# Patient Record
Sex: FEMALE | Race: WHITE | NOT HISPANIC OR LATINO | Employment: OTHER | URBAN - METROPOLITAN AREA
[De-identification: names, ages, dates, MRNs, and addresses within clinical notes are randomized per-mention and may not be internally consistent; named-entity substitution may affect disease eponyms.]

---

## 2018-02-23 ENCOUNTER — PATIENT OUTREACH (OUTPATIENT)
Dept: HEALTH INFORMATION MANAGEMENT | Facility: OTHER | Age: 53
End: 2018-02-23

## 2018-03-30 ENCOUNTER — OFFICE VISIT (OUTPATIENT)
Dept: MEDICAL GROUP | Facility: MEDICAL CENTER | Age: 53
End: 2018-03-30
Payer: COMMERCIAL

## 2018-03-30 ENCOUNTER — HOSPITAL ENCOUNTER (OUTPATIENT)
Facility: MEDICAL CENTER | Age: 53
End: 2018-03-30
Attending: FAMILY MEDICINE
Payer: COMMERCIAL

## 2018-03-30 VITALS
SYSTOLIC BLOOD PRESSURE: 140 MMHG | RESPIRATION RATE: 16 BRPM | OXYGEN SATURATION: 98 % | HEART RATE: 100 BPM | WEIGHT: 212 LBS | HEIGHT: 67 IN | DIASTOLIC BLOOD PRESSURE: 92 MMHG | BODY MASS INDEX: 33.27 KG/M2 | TEMPERATURE: 98 F

## 2018-03-30 DIAGNOSIS — Z13.220 SCREENING FOR HYPERLIPIDEMIA: ICD-10-CM

## 2018-03-30 DIAGNOSIS — Z13.1 SCREENING FOR DIABETES MELLITUS: ICD-10-CM

## 2018-03-30 DIAGNOSIS — Z13.29 SCREENING FOR THYROID DISORDER: ICD-10-CM

## 2018-03-30 DIAGNOSIS — Z12.4 SCREENING FOR CERVICAL CANCER: ICD-10-CM

## 2018-03-30 DIAGNOSIS — N30.01 ACUTE CYSTITIS WITH HEMATURIA: ICD-10-CM

## 2018-03-30 DIAGNOSIS — G43.009 MIGRAINE WITHOUT AURA AND WITHOUT STATUS MIGRAINOSUS, NOT INTRACTABLE: ICD-10-CM

## 2018-03-30 DIAGNOSIS — Z13.0 SCREENING FOR DEFICIENCY ANEMIA: ICD-10-CM

## 2018-03-30 DIAGNOSIS — Z01.419 WELL WOMAN EXAM WITH ROUTINE GYNECOLOGICAL EXAM: ICD-10-CM

## 2018-03-30 DIAGNOSIS — Z12.11 SCREENING FOR COLON CANCER: ICD-10-CM

## 2018-03-30 DIAGNOSIS — Z12.31 ENCOUNTER FOR SCREENING MAMMOGRAM FOR BREAST CANCER: ICD-10-CM

## 2018-03-30 DIAGNOSIS — Z13.21 ENCOUNTER FOR VITAMIN DEFICIENCY SCREENING: ICD-10-CM

## 2018-03-30 DIAGNOSIS — Z80.0 FAMILY HISTORY OF COLON CANCER IN MOTHER: ICD-10-CM

## 2018-03-30 LAB
APPEARANCE UR: NORMAL
BILIRUB UR STRIP-MCNC: NORMAL MG/DL
COLOR UR AUTO: NORMAL
GLUCOSE UR STRIP.AUTO-MCNC: NORMAL MG/DL
KETONES UR STRIP.AUTO-MCNC: NORMAL MG/DL
LEUKOCYTE ESTERASE UR QL STRIP.AUTO: NORMAL
NITRITE UR QL STRIP.AUTO: NORMAL
PH UR STRIP.AUTO: 7 [PH] (ref 5–8)
PROT UR QL STRIP: NORMAL MG/DL
RBC UR QL AUTO: NORMAL
SP GR UR STRIP.AUTO: 1.01
UROBILINOGEN UR STRIP-MCNC: NORMAL MG/DL

## 2018-03-30 PROCEDURE — 87086 URINE CULTURE/COLONY COUNT: CPT

## 2018-03-30 PROCEDURE — 81002 URINALYSIS NONAUTO W/O SCOPE: CPT | Performed by: FAMILY MEDICINE

## 2018-03-30 PROCEDURE — 81001 URINALYSIS AUTO W/SCOPE: CPT

## 2018-03-30 PROCEDURE — 88175 CYTOPATH C/V AUTO FLUID REDO: CPT

## 2018-03-30 PROCEDURE — 99396 PREV VISIT EST AGE 40-64: CPT | Performed by: FAMILY MEDICINE

## 2018-03-30 PROCEDURE — 87077 CULTURE AEROBIC IDENTIFY: CPT

## 2018-03-30 PROCEDURE — 87624 HPV HI-RISK TYP POOLED RSLT: CPT

## 2018-03-30 PROCEDURE — 87186 SC STD MICRODIL/AGAR DIL: CPT

## 2018-03-30 RX ORDER — SUMATRIPTAN 20 MG/1
1 SPRAY NASAL PRN
Qty: 9 EACH | Refills: 3 | Status: SHIPPED | OUTPATIENT
Start: 2018-03-30 | End: 2024-01-30

## 2018-03-30 RX ORDER — CEFDINIR 300 MG/1
300 CAPSULE ORAL EVERY 12 HOURS
Qty: 10 CAP | Refills: 0 | Status: SHIPPED | OUTPATIENT
Start: 2018-03-30 | End: 2018-04-04

## 2018-03-30 ASSESSMENT — PATIENT HEALTH QUESTIONNAIRE - PHQ9
CLINICAL INTERPRETATION OF PHQ2 SCORE: 4
SUM OF ALL RESPONSES TO PHQ QUESTIONS 1-9: 9
5. POOR APPETITE OR OVEREATING: 0 - NOT AT ALL

## 2018-04-02 DIAGNOSIS — Z12.4 SCREENING FOR CERVICAL CANCER: ICD-10-CM

## 2018-04-02 DIAGNOSIS — N30.01 ACUTE CYSTITIS WITH HEMATURIA: ICD-10-CM

## 2018-04-02 LAB
APPEARANCE UR: ABNORMAL
BACTERIA #/AREA URNS HPF: ABNORMAL /HPF
COLOR UR: YELLOW
CULTURE IF INDICATED INDCX: YES UA CULTURE
EPI CELLS #/AREA URNS HPF: ABNORMAL /HPF
GLUCOSE UR STRIP.AUTO-MCNC: NEGATIVE MG/DL
KETONES UR STRIP.AUTO-MCNC: NEGATIVE MG/DL
LEUKOCYTE ESTERASE UR QL STRIP.AUTO: ABNORMAL
MICRO URNS: ABNORMAL
NITRITE UR QL STRIP.AUTO: NEGATIVE
PH UR STRIP.AUTO: 7.5 [PH]
PROT UR QL STRIP: NEGATIVE MG/DL
RBC # URNS HPF: ABNORMAL /HPF
RBC UR QL AUTO: NEGATIVE
SP GR UR STRIP.AUTO: 1.01
WBC #/AREA URNS HPF: ABNORMAL /HPF

## 2018-04-02 PROCEDURE — 87086 URINE CULTURE/COLONY COUNT: CPT

## 2018-04-02 PROCEDURE — 87186 SC STD MICRODIL/AGAR DIL: CPT

## 2018-04-02 PROCEDURE — 87077 CULTURE AEROBIC IDENTIFY: CPT

## 2018-04-02 NOTE — PROGRESS NOTES
SUBJECTIVE:   Chief Complaint   Patient presents with   • Annual Exam       52 y.o. female for annual routine gynecologic exam  Acute cystitis with hematuria  Symptom onset: 7 days ago   Current symptoms: painful, urgent, frequent voids. No blood noted in urine.  Since onset symptoms are: unchanged  Treatments tried: none  Associated symptoms: negative for fever, flank pain, nausea and vomiting, vaginal discharge, pelvic pain.  History is negative for frequent UTI.           Obstetric History       T0      L1     SAB0   TAB0   Ectopic0   Molar0   Multiple0   Live Births0       History   Sexual Activity   • Sexual activity: Yes   • Partners: Male       Last Pap: 3-5 years ago  HPV testing unknown  H/O Abnormal Pap no  No significant bloating/fluid retention, pelvic pain, or dyspareunia. No vaginal discharge   She does perform regular self breast exams.  No breast tenderness, mass, nipple discharge, changes in size or contour, or abnormal cyclic discomfort.        ROS:      No abdominal pain, change in bowel habits, black or bloody stools.    No unusual headaches, no visual changes, menstrual migraines   No prolonged cough. No dyspnea or chest pain on exertion.  No depression, labile mood, anxiety ,libido changes, insomnia.  No new/concerning skin lesions,    Social History   Substance Use Topics   • Smoking status: Former Smoker     Packs/day: 0.50     Years: 25.00     Types: Cigarettes     Start date: 1994     Quit date: 2016   • Smokeless tobacco: Never Used   • Alcohol use No       Patient Active Problem List    Diagnosis Date Noted   • Hypotension 07/15/2016     Priority: Medium   • Lightheadedness 2015     Priority: Medium   • PSVT (paroxysmal supraventricular tachycardia) (CMS-[facility]) 2013     Priority: Medium   • Heart palpitations 2013     Priority: Medium   • Mixed hyperlipidemia 2013     Priority: Medium   • Essential hypertension 2013     Priority: Medium    • Depression with anxiety 07/15/2016     Priority: Low   • Vitamin d deficiency 05/03/2013     Priority: Low   • Tobacco abuse 05/03/2013     Priority: Low   • Anxiety 04/16/2013     Priority: Low   • Well woman exam with routine gynecological exam 03/30/2018   • Family history of colon cancer in mother 03/30/2018   • Heterozygous MTHFR mutation [id number] (CMS-HCC) 10/26/2016   • Elevated liver enzymes 10/26/2016   • High serum high density lipoprotein (HDL) 10/26/2016   • Macrocytosis without anemia 10/26/2016   • Acute cystitis with hematuria 09/21/2016   • Secondary adrenal insufficiency (CMS-HCC) 09/21/2016   • Migraine without aura and without status migrainosus, not intractable 09/21/2016       Past Medical History:   Diagnosis Date   • Allergy    • Anxiety    • Arrhythmia     for tachycardia   • Back pain    • Depression     Treated by Dr Karla Whatley   • Depression    • Hyperlipidemia    • Hypertension    • Indigestion    • Pain 2/24/15    right shoulder 6/10   • PSVT (paroxysmal supraventricular tachycardia) (CMS-HCC) 6/24/2013   • Psychiatric disorder     anxiety    • Tobacco abuse 5/3/2013   • URI (upper respiratory infection) 9/21/2013   • Urinary tract infection, site not specified     Recurrent UTI's   • UTI (lower urinary tract infection) 9/21/2013     Past Surgical History:   Procedure Laterality Date   • SHOULDER ARTHROSCOPY W/ ROTATOR CUFF REPAIR  3/9/2015    Performed by Gilberto Meyer M.D. at SURGERY [facility] ORS   • TRIGGER FINGER RELEASE  3/9/2015    Performed by Gilberto Meyer M.D. at SURGERY [facility] ORS   • OTHER  6/2014    broken ribs- plates & Pins right front 4-8   • SHOULDER ARTHROSCOPY  3/23/2009    Performed by GILBERTO MEYER at SURGERY [facility] ORS   • BREAST BIOPSY  7/18/08    Performed by MARY DE LA CRUZ at SURGERY SAME DAY [facility] ORS   • SHOULDER ARTHROSCOPY  2005   • GYN SURGERY  1995    tubal ligation   • OPEN REDUCTION      flail chest   • TUBAL  "LIGATION           Family History   Problem Relation Age of Onset   • Hypertension Mother    • Stroke Mother    • Hypertension Father      ? valvular heart    • Hypertension Brother    • Cancer Paternal Grandmother      breast cancer     Family Status   Relation Status   • Mother Alive   • Father Alive   • Brother Alive   • Paternal Grandmother          Current medicines (including changes today)  Current Outpatient Prescriptions   Medication Sig Dispense Refill   • cefdinir (OMNICEF) 300 MG Cap Take 1 Cap by mouth every 12 hours for 5 days. 10 Cap 0   • risperidone (RISPERDAL) 1 MG Tab every bedtime.     • alprazolam (XANAX XR) 0.5 MG XR tablet      • propafenone (RYTHMOL SR) 225 MG SR capsule Take 1 Cap by mouth 2 times a day. 60 Cap 11   • sumatriptan (IMITREX) 20 MG/ACT nasal spray Spray 1 Spray in nose as needed for Migraine. 9 Each 3   • Vortioxetine HBr 20 MG Tab Take 20 mg by mouth every day.     • omeprazole (PRILOSEC) 20 MG delayed-release capsule TAKE 1 CAPSULE ORALLY EVERY DAY IN THE MORNING ON A EMPTY STOMACH 30 Cap 10     No current facility-administered medications for this visit.            Allergies: Bactrim [sulfamethoxazole w-trimethoprim] and Macrobid [nitrofurantoin monohydrate macrocrystals]     Preventive Care:  Health Maintenance Topics with due status: Overdue       Topic Date Due    MAMMOGRAM 03/15/2014    PAP SMEAR 05/06/2016    COLONOSCOPY 06/22/2017       OBJECTIVE:   /92   Pulse 100   Temp 36.7 °C (98 °F)   Resp 16   Ht 1.702 m (5' 7\")   Wt 96.2 kg (212 lb)   SpO2 98%   BMI 33.20 kg/m²   Body mass index is 33.2 kg/m².      Gen: Well developed, well nourished in no acute distress.   Skin: Pink, warm, and dry. No rashes  Eyes: conjunctiva non-injected, sclera non-icteric. EOMs intact.   Nasal mucosa without edema nor erythema. No facial tenderness  Ears:Pinna normal. TM pearly gray.   Nose: Nares patent.  No discharge.  Oral mucous membranes pink and moist with no " lesions.  Neck: Supple, trachea midline. No adenopathy or masses in the neck or supraclavicular regions. No thyromegaly.  Lungs: Effort is normal. Clear to auscultation bilaterally with good excursion.  CV: regular rate and rhythm. No murmur.  Abdomen: soft, nontender, + BS. No HSM.  No CVAT  Ext: no edema, color normal, vascularity normal, temperature normal  Alert and oriented Eye contact is good, speech goal directed, affect calm     Breast Exam: Performed with instruction during examination. No axillary lymphadenopathy, no skin changes, no dominant masses. No nipple retraction  Pelvic Exam:  Normal external genitalia with no lesions. Normal vaginal mucosa with normal rugation and no discharge. Cervix with no visible lesions. No cervical motion tenderness. Uterus is normal sized with no masses. No adnexal tenderness or enlargement appreciated. Pap is obtained and the specimen was sent to lab  Rectal: Good tone, heme negative. No masses.    <ASSESSMENT and PLAN>  1. Well woman exam with routine gynecological exam     2. Acute cystitis with hematuria  POCT Urinalysis    Urinalysis, Culture if Indicated    cefdinir (OMNICEF) 300 MG Cap   3. Screening for colon cancer  REFERRAL TO GASTROENTEROLOGY   4. Screening for cervical cancer  THINPREP PAP WITH HPV   5. Encounter for screening mammogram for breast cancer  MA-SCREEN MAMMO W/CAD-BILAT   6. Family history of colon cancer in mother  REFERRAL TO GASTROENTEROLOGY   7. Screening for deficiency anemia  CBC WITH DIFFERENTIAL   8. Screening for diabetes mellitus  COMP METABOLIC PANEL   9. Screening for hyperlipidemia  LIPID PROFILE   10. Screening for thyroid disorder  TSH   11. Encounter for vitamin deficiency screening  VITAMIN D,25 HYDROXY       Discussed  menopause, adequate intake of calcium and vitamin D, diet and exercise     Return in about 1 year (around 3/30/2019).

## 2018-04-02 NOTE — ASSESSMENT & PLAN NOTE
Symptom onset: 7 days ago   Current symptoms: painful, urgent, frequent voids. No blood noted in urine.  Since onset symptoms are: unchanged  Treatments tried: none  Associated symptoms: negative for fever, flank pain, nausea and vomiting, vaginal discharge, pelvic pain.  History is negative for frequent UTI.

## 2018-04-03 LAB
CYTOLOGY REG CYTOL: NORMAL
HPV HR 12 DNA CVX QL NAA+PROBE: NEGATIVE
HPV16 DNA SPEC QL NAA+PROBE: NEGATIVE
HPV18 DNA SPEC QL NAA+PROBE: NEGATIVE
SPECIMEN SOURCE: NORMAL

## 2018-04-05 LAB
BACTERIA UR CULT: ABNORMAL
SIGNIFICANT IND 70042: ABNORMAL
SITE SITE: ABNORMAL
SOURCE SOURCE: ABNORMAL

## 2018-05-04 ENCOUNTER — HOSPITAL ENCOUNTER (OUTPATIENT)
Dept: RADIOLOGY | Facility: MEDICAL CENTER | Age: 53
End: 2018-05-04
Attending: FAMILY MEDICINE
Payer: COMMERCIAL

## 2018-05-04 DIAGNOSIS — R92.30 DENSE BREAST TISSUE: ICD-10-CM

## 2018-05-04 PROCEDURE — 76641 ULTRASOUND BREAST COMPLETE: CPT

## 2018-05-10 ENCOUNTER — HOSPITAL ENCOUNTER (OUTPATIENT)
Dept: LAB | Facility: MEDICAL CENTER | Age: 53
End: 2018-05-10
Attending: PSYCHIATRY & NEUROLOGY
Payer: COMMERCIAL

## 2018-05-10 ENCOUNTER — HOSPITAL ENCOUNTER (OUTPATIENT)
Dept: LAB | Facility: MEDICAL CENTER | Age: 53
End: 2018-05-10
Attending: FAMILY MEDICINE
Payer: COMMERCIAL

## 2018-05-10 ENCOUNTER — HOSPITAL ENCOUNTER (OUTPATIENT)
Dept: RADIOLOGY | Facility: MEDICAL CENTER | Age: 53
End: 2018-05-10
Attending: FAMILY MEDICINE
Payer: COMMERCIAL

## 2018-05-10 DIAGNOSIS — Z13.1 SCREENING FOR DIABETES MELLITUS: ICD-10-CM

## 2018-05-10 DIAGNOSIS — Z13.220 SCREENING FOR HYPERLIPIDEMIA: ICD-10-CM

## 2018-05-10 DIAGNOSIS — R92.8 ABNORMAL FINDING ON BREAST IMAGING: ICD-10-CM

## 2018-05-10 DIAGNOSIS — Z13.29 SCREENING FOR THYROID DISORDER: ICD-10-CM

## 2018-05-10 DIAGNOSIS — Z13.21 ENCOUNTER FOR VITAMIN DEFICIENCY SCREENING: ICD-10-CM

## 2018-05-10 DIAGNOSIS — Z13.0 SCREENING FOR DEFICIENCY ANEMIA: ICD-10-CM

## 2018-05-10 LAB
25(OH)D3 SERPL-MCNC: 4 NG/ML (ref 30–100)
ALBUMIN SERPL BCP-MCNC: 4.4 G/DL (ref 3.2–4.9)
ALBUMIN/GLOB SERPL: 1.5 G/DL
ALP SERPL-CCNC: 63 U/L (ref 30–99)
ALT SERPL-CCNC: 130 U/L (ref 2–50)
ANION GAP SERPL CALC-SCNC: 13 MMOL/L (ref 0–11.9)
AST SERPL-CCNC: 152 U/L (ref 12–45)
BASOPHILS # BLD AUTO: 0.6 % (ref 0–1.8)
BASOPHILS # BLD: 0.04 K/UL (ref 0–0.12)
BILIRUB SERPL-MCNC: 1 MG/DL (ref 0.1–1.5)
BUN SERPL-MCNC: 9 MG/DL (ref 8–22)
CALCIUM SERPL-MCNC: 10 MG/DL (ref 8.5–10.5)
CHLORIDE SERPL-SCNC: 103 MMOL/L (ref 96–112)
CHOLEST SERPL-MCNC: 216 MG/DL (ref 100–199)
CO2 SERPL-SCNC: 25 MMOL/L (ref 20–33)
CREAT SERPL-MCNC: 0.74 MG/DL (ref 0.5–1.4)
EOSINOPHIL # BLD AUTO: 0.19 K/UL (ref 0–0.51)
EOSINOPHIL NFR BLD: 2.9 % (ref 0–6.9)
ERYTHROCYTE [DISTWIDTH] IN BLOOD BY AUTOMATED COUNT: 55.7 FL (ref 35.9–50)
GLOBULIN SER CALC-MCNC: 3 G/DL (ref 1.9–3.5)
GLUCOSE SERPL-MCNC: 100 MG/DL (ref 65–99)
HCT VFR BLD AUTO: 45.6 % (ref 37–47)
HDLC SERPL-MCNC: 95 MG/DL
HGB BLD-MCNC: 15.8 G/DL (ref 12–16)
IMM GRANULOCYTES # BLD AUTO: 0.03 K/UL (ref 0–0.11)
IMM GRANULOCYTES NFR BLD AUTO: 0.5 % (ref 0–0.9)
LDLC SERPL CALC-MCNC: 107 MG/DL
LYMPHOCYTES # BLD AUTO: 1.36 K/UL (ref 1–4.8)
LYMPHOCYTES NFR BLD: 20.5 % (ref 22–41)
MCH RBC QN AUTO: 36.7 PG (ref 27–33)
MCHC RBC AUTO-ENTMCNC: 34.6 G/DL (ref 33.6–35)
MCV RBC AUTO: 105.8 FL (ref 81.4–97.8)
MONOCYTES # BLD AUTO: 0.6 K/UL (ref 0–0.85)
MONOCYTES NFR BLD AUTO: 9 % (ref 0–13.4)
NEUTROPHILS # BLD AUTO: 4.42 K/UL (ref 2–7.15)
NEUTROPHILS NFR BLD: 66.5 % (ref 44–72)
NRBC # BLD AUTO: 0 K/UL
NRBC BLD-RTO: 0 /100 WBC
PLATELET # BLD AUTO: 194 K/UL (ref 164–446)
PMV BLD AUTO: 10.8 FL (ref 9–12.9)
POTASSIUM SERPL-SCNC: 4 MMOL/L (ref 3.6–5.5)
PROT SERPL-MCNC: 7.4 G/DL (ref 6–8.2)
RBC # BLD AUTO: 4.31 M/UL (ref 4.2–5.4)
SODIUM SERPL-SCNC: 141 MMOL/L (ref 135–145)
TRIGL SERPL-MCNC: 69 MG/DL (ref 0–149)
TSH SERPL DL<=0.005 MIU/L-ACNC: 4.45 UIU/ML (ref 0.38–5.33)
WBC # BLD AUTO: 6.6 K/UL (ref 4.8–10.8)

## 2018-05-10 PROCEDURE — 80061 LIPID PANEL: CPT

## 2018-05-10 PROCEDURE — 84443 ASSAY THYROID STIM HORMONE: CPT

## 2018-05-10 PROCEDURE — 76642 ULTRASOUND BREAST LIMITED: CPT | Mod: LT

## 2018-05-10 PROCEDURE — 82306 VITAMIN D 25 HYDROXY: CPT

## 2018-05-10 PROCEDURE — 85025 COMPLETE CBC W/AUTO DIFF WBC: CPT

## 2018-05-10 PROCEDURE — 80053 COMPREHEN METABOLIC PANEL: CPT

## 2018-05-10 PROCEDURE — 36415 COLL VENOUS BLD VENIPUNCTURE: CPT

## 2018-05-14 RX ORDER — ERGOCALCIFEROL 1.25 MG/1
50000 CAPSULE ORAL
Qty: 28 CAP | Refills: 0 | Status: SHIPPED | OUTPATIENT
Start: 2018-05-14 | End: 2024-01-30

## 2018-06-18 ENCOUNTER — PATIENT OUTREACH (OUTPATIENT)
Dept: HEALTH INFORMATION MANAGEMENT | Facility: OTHER | Age: 53
End: 2018-06-18

## 2018-06-18 NOTE — PROGRESS NOTES
1. Attempt #: 1    2. [facility] Verified: yes    3. Verify PCP: yes    5.  Reviewed/Updated the following with patient:       •   Communication Preference Obtained? YES    6. MyChart Activation: already active        9. Care Gap Scheduling (Attempt to Schedule EACH Overdue Care Gap!)     There are no preventive care reminders to display for this patient.     Patient has completed Mammogram - Was completed earlier this year.

## 2018-10-17 ENCOUNTER — PATIENT OUTREACH (OUTPATIENT)
Dept: HEALTH INFORMATION MANAGEMENT | Facility: OTHER | Age: 53
End: 2018-10-17

## 2024-01-01 ENCOUNTER — APPOINTMENT (OUTPATIENT)
Dept: CARDIOLOGY | Facility: MEDICAL CENTER | Age: 59
DRG: 871 | End: 2024-01-01
Attending: HOSPITALIST
Payer: COMMERCIAL

## 2024-01-01 ENCOUNTER — APPOINTMENT (OUTPATIENT)
Dept: RADIOLOGY | Facility: MEDICAL CENTER | Age: 59
DRG: 871 | End: 2024-01-01
Attending: INTERNAL MEDICINE
Payer: COMMERCIAL

## 2024-01-01 ENCOUNTER — APPOINTMENT (OUTPATIENT)
Dept: RADIOLOGY | Facility: MEDICAL CENTER | Age: 59
DRG: 871 | End: 2024-01-01
Attending: NURSE PRACTITIONER
Payer: COMMERCIAL

## 2024-01-01 ENCOUNTER — ANESTHESIA EVENT (OUTPATIENT)
Dept: SURGERY | Facility: MEDICAL CENTER | Age: 59
DRG: 871 | End: 2024-01-01
Payer: COMMERCIAL

## 2024-01-01 ENCOUNTER — APPOINTMENT (OUTPATIENT)
Dept: RADIOLOGY | Facility: MEDICAL CENTER | Age: 59
DRG: 871 | End: 2024-01-01
Attending: HOSPITALIST
Payer: COMMERCIAL

## 2024-01-01 ENCOUNTER — ANESTHESIA (OUTPATIENT)
Dept: SURGERY | Facility: MEDICAL CENTER | Age: 59
DRG: 871 | End: 2024-01-01
Payer: COMMERCIAL

## 2024-01-01 ENCOUNTER — APPOINTMENT (OUTPATIENT)
Dept: RADIOLOGY | Facility: MEDICAL CENTER | Age: 59
DRG: 871 | End: 2024-01-01
Payer: COMMERCIAL

## 2024-01-01 ENCOUNTER — APPOINTMENT (OUTPATIENT)
Dept: CARDIOLOGY | Facility: MEDICAL CENTER | Age: 59
DRG: 871 | End: 2024-01-01
Attending: NURSE PRACTITIONER
Payer: COMMERCIAL

## 2024-01-01 ENCOUNTER — APPOINTMENT (OUTPATIENT)
Dept: RADIOLOGY | Facility: MEDICAL CENTER | Age: 59
DRG: 871 | End: 2024-01-01
Attending: EMERGENCY MEDICINE
Payer: COMMERCIAL

## 2024-01-01 ENCOUNTER — APPOINTMENT (OUTPATIENT)
Dept: NEUROLOGY | Facility: MEDICAL CENTER | Age: 59
DRG: 871 | End: 2024-01-01
Attending: EMERGENCY MEDICINE
Payer: COMMERCIAL

## 2024-01-01 PROCEDURE — 76700 US EXAM ABDOM COMPLETE: CPT

## 2024-01-01 PROCEDURE — 74018 RADEX ABDOMEN 1 VIEW: CPT

## 2024-01-01 PROCEDURE — 71045 X-RAY EXAM CHEST 1 VIEW: CPT

## 2024-01-01 PROCEDURE — 93308 TTE F-UP OR LMTD: CPT | Mod: 26 | Performed by: INTERNAL MEDICINE

## 2024-01-01 PROCEDURE — 93325 DOPPLER ECHO COLOR FLOW MAPG: CPT

## 2024-01-01 PROCEDURE — 70450 CT HEAD/BRAIN W/O DYE: CPT

## 2024-01-01 PROCEDURE — 700111 HCHG RX REV CODE 636 W/ 250 OVERRIDE (IP): Performed by: ANESTHESIOLOGY

## 2024-01-01 PROCEDURE — 74176 CT ABD & PELVIS W/O CONTRAST: CPT

## 2024-01-01 PROCEDURE — 76775 US EXAM ABDO BACK WALL LIM: CPT

## 2024-01-01 PROCEDURE — 93306 TTE W/DOPPLER COMPLETE: CPT

## 2024-01-01 PROCEDURE — 93306 TTE W/DOPPLER COMPLETE: CPT | Mod: 26 | Performed by: INTERNAL MEDICINE

## 2024-01-01 PROCEDURE — 93325 DOPPLER ECHO COLOR FLOW MAPG: CPT | Mod: 26 | Performed by: INTERNAL MEDICINE

## 2024-01-01 RX ADMIN — PROPOFOL 200 MG: 10 INJECTION, EMULSION INTRAVENOUS at 10:47

## 2024-01-30 ENCOUNTER — OFFICE VISIT (OUTPATIENT)
Dept: URGENT CARE | Facility: PHYSICIAN GROUP | Age: 59
End: 2024-01-30
Payer: COMMERCIAL

## 2024-01-30 ENCOUNTER — HOSPITAL ENCOUNTER (EMERGENCY)
Facility: MEDICAL CENTER | Age: 59
End: 2024-01-30
Attending: EMERGENCY MEDICINE
Payer: COMMERCIAL

## 2024-01-30 ENCOUNTER — APPOINTMENT (OUTPATIENT)
Dept: RADIOLOGY | Facility: IMAGING CENTER | Age: 59
End: 2024-01-30
Attending: NURSE PRACTITIONER
Payer: COMMERCIAL

## 2024-01-30 ENCOUNTER — APPOINTMENT (OUTPATIENT)
Dept: RADIOLOGY | Facility: MEDICAL CENTER | Age: 59
End: 2024-01-30
Attending: EMERGENCY MEDICINE
Payer: COMMERCIAL

## 2024-01-30 VITALS
HEART RATE: 89 BPM | RESPIRATION RATE: 20 BRPM | OXYGEN SATURATION: 92 % | SYSTOLIC BLOOD PRESSURE: 148 MMHG | TEMPERATURE: 98.2 F | WEIGHT: 182 LBS | DIASTOLIC BLOOD PRESSURE: 82 MMHG | HEIGHT: 68 IN | BODY MASS INDEX: 27.58 KG/M2

## 2024-01-30 VITALS
RESPIRATION RATE: 14 BRPM | SYSTOLIC BLOOD PRESSURE: 156 MMHG | TEMPERATURE: 98.6 F | OXYGEN SATURATION: 94 % | DIASTOLIC BLOOD PRESSURE: 102 MMHG | HEIGHT: 68 IN | WEIGHT: 183 LBS | BODY MASS INDEX: 27.74 KG/M2 | HEART RATE: 84 BPM

## 2024-01-30 DIAGNOSIS — I10 ESSENTIAL HYPERTENSION: ICD-10-CM

## 2024-01-30 DIAGNOSIS — R00.2 PALPITATIONS: ICD-10-CM

## 2024-01-30 DIAGNOSIS — R06.02 SOB (SHORTNESS OF BREATH): ICD-10-CM

## 2024-01-30 DIAGNOSIS — R07.9 CHEST PAIN, UNSPECIFIED TYPE: ICD-10-CM

## 2024-01-30 DIAGNOSIS — R05.1 ACUTE COUGH: ICD-10-CM

## 2024-01-30 DIAGNOSIS — R03.0 ELEVATED BLOOD PRESSURE READING: ICD-10-CM

## 2024-01-30 DIAGNOSIS — Z72.0 TOBACCO ABUSE: ICD-10-CM

## 2024-01-30 LAB
ALBUMIN SERPL BCP-MCNC: 3.5 G/DL (ref 3.2–4.9)
ALBUMIN/GLOB SERPL: 0.9 G/DL
ALP SERPL-CCNC: 86 U/L (ref 30–99)
ALT SERPL-CCNC: 32 U/L (ref 2–50)
ANION GAP SERPL CALC-SCNC: 11 MMOL/L (ref 7–16)
AST SERPL-CCNC: 63 U/L (ref 12–45)
BASOPHILS # BLD AUTO: 0.5 % (ref 0–1.8)
BASOPHILS # BLD: 0.05 K/UL (ref 0–0.12)
BILIRUB SERPL-MCNC: 2.2 MG/DL (ref 0.1–1.5)
BUN SERPL-MCNC: 7 MG/DL (ref 8–22)
CALCIUM ALBUM COR SERPL-MCNC: 9.6 MG/DL (ref 8.5–10.5)
CALCIUM SERPL-MCNC: 9.2 MG/DL (ref 8.5–10.5)
CHLORIDE SERPL-SCNC: 107 MMOL/L (ref 96–112)
CO2 SERPL-SCNC: 21 MMOL/L (ref 20–33)
CREAT SERPL-MCNC: 0.41 MG/DL (ref 0.5–1.4)
EKG IMPRESSION: NORMAL
EOSINOPHIL # BLD AUTO: 0.19 K/UL (ref 0–0.51)
EOSINOPHIL NFR BLD: 2 % (ref 0–6.9)
ERYTHROCYTE [DISTWIDTH] IN BLOOD BY AUTOMATED COUNT: 51.4 FL (ref 35.9–50)
FLUAV RNA SPEC QL NAA+PROBE: NEGATIVE
FLUBV RNA SPEC QL NAA+PROBE: NEGATIVE
GFR SERPLBLD CREATININE-BSD FMLA CKD-EPI: 115 ML/MIN/1.73 M 2
GLOBULIN SER CALC-MCNC: 4.1 G/DL (ref 1.9–3.5)
GLUCOSE SERPL-MCNC: 105 MG/DL (ref 65–99)
HCT VFR BLD AUTO: 45 % (ref 37–47)
HGB BLD-MCNC: 15.6 G/DL (ref 12–16)
IMM GRANULOCYTES # BLD AUTO: 0.04 K/UL (ref 0–0.11)
IMM GRANULOCYTES NFR BLD AUTO: 0.4 % (ref 0–0.9)
LYMPHOCYTES # BLD AUTO: 1.76 K/UL (ref 1–4.8)
LYMPHOCYTES NFR BLD: 18.4 % (ref 22–41)
MCH RBC QN AUTO: 34.4 PG (ref 27–33)
MCHC RBC AUTO-ENTMCNC: 34.7 G/DL (ref 32.2–35.5)
MCV RBC AUTO: 99.1 FL (ref 81.4–97.8)
MONOCYTES # BLD AUTO: 1.1 K/UL (ref 0–0.85)
MONOCYTES NFR BLD AUTO: 11.5 % (ref 0–13.4)
NEUTROPHILS # BLD AUTO: 6.45 K/UL (ref 1.82–7.42)
NEUTROPHILS NFR BLD: 67.2 % (ref 44–72)
NRBC # BLD AUTO: 0 K/UL
NRBC BLD-RTO: 0 /100 WBC (ref 0–0.2)
NT-PROBNP SERPL IA-MCNC: <36 PG/ML (ref 0–125)
PLATELET # BLD AUTO: 153 K/UL (ref 164–446)
PMV BLD AUTO: 10.8 FL (ref 9–12.9)
POTASSIUM SERPL-SCNC: 3.8 MMOL/L (ref 3.6–5.5)
PROT SERPL-MCNC: 7.6 G/DL (ref 6–8.2)
RBC # BLD AUTO: 4.54 M/UL (ref 4.2–5.4)
RSV RNA SPEC QL NAA+PROBE: NEGATIVE
SARS-COV-2 RNA RESP QL NAA+PROBE: NEGATIVE
SODIUM SERPL-SCNC: 139 MMOL/L (ref 135–145)
TROPONIN T SERPL-MCNC: 24 NG/L (ref 6–19)
WBC # BLD AUTO: 9.6 K/UL (ref 4.8–10.8)

## 2024-01-30 PROCEDURE — 93005 ELECTROCARDIOGRAM TRACING: CPT | Performed by: EMERGENCY MEDICINE

## 2024-01-30 PROCEDURE — 99205 OFFICE O/P NEW HI 60 MIN: CPT | Performed by: NURSE PRACTITIONER

## 2024-01-30 PROCEDURE — 94640 AIRWAY INHALATION TREATMENT: CPT

## 2024-01-30 PROCEDURE — 0241U POCT CEPHEID COV-2, FLU A/B, RSV - PCR: CPT | Performed by: NURSE PRACTITIONER

## 2024-01-30 PROCEDURE — 80053 COMPREHEN METABOLIC PANEL: CPT

## 2024-01-30 PROCEDURE — 84484 ASSAY OF TROPONIN QUANT: CPT

## 2024-01-30 PROCEDURE — 93000 ELECTROCARDIOGRAM COMPLETE: CPT | Performed by: NURSE PRACTITIONER

## 2024-01-30 PROCEDURE — 93005 ELECTROCARDIOGRAM TRACING: CPT

## 2024-01-30 PROCEDURE — 700101 HCHG RX REV CODE 250: Performed by: EMERGENCY MEDICINE

## 2024-01-30 PROCEDURE — 99285 EMERGENCY DEPT VISIT HI MDM: CPT

## 2024-01-30 PROCEDURE — 85025 COMPLETE CBC W/AUTO DIFF WBC: CPT

## 2024-01-30 PROCEDURE — 3080F DIAST BP >= 90 MM HG: CPT | Performed by: NURSE PRACTITIONER

## 2024-01-30 PROCEDURE — 83880 ASSAY OF NATRIURETIC PEPTIDE: CPT

## 2024-01-30 PROCEDURE — 36415 COLL VENOUS BLD VENIPUNCTURE: CPT

## 2024-01-30 PROCEDURE — 71045 X-RAY EXAM CHEST 1 VIEW: CPT

## 2024-01-30 PROCEDURE — 700102 HCHG RX REV CODE 250 W/ 637 OVERRIDE(OP): Performed by: EMERGENCY MEDICINE

## 2024-01-30 PROCEDURE — 71046 X-RAY EXAM CHEST 2 VIEWS: CPT | Mod: TC,FY | Performed by: RADIOLOGY

## 2024-01-30 PROCEDURE — 3077F SYST BP >= 140 MM HG: CPT | Performed by: NURSE PRACTITIONER

## 2024-01-30 PROCEDURE — A9270 NON-COVERED ITEM OR SERVICE: HCPCS | Performed by: EMERGENCY MEDICINE

## 2024-01-30 RX ORDER — DEXAMETHASONE 4 MG/1
4 TABLET ORAL ONCE
Status: COMPLETED | OUTPATIENT
Start: 2024-01-30 | End: 2024-01-30

## 2024-01-30 RX ORDER — IPRATROPIUM BROMIDE AND ALBUTEROL SULFATE 2.5; .5 MG/3ML; MG/3ML
3 SOLUTION RESPIRATORY (INHALATION) ONCE
Status: COMPLETED | OUTPATIENT
Start: 2024-01-30 | End: 2024-01-30

## 2024-01-30 RX ORDER — DULOXETIN HYDROCHLORIDE 60 MG/1
CAPSULE, DELAYED RELEASE ORAL
COMMUNITY
Start: 2024-01-25 | End: 2024-01-30

## 2024-01-30 RX ORDER — DOXEPIN HYDROCHLORIDE 10 MG/1
CAPSULE ORAL
COMMUNITY
Start: 2023-12-31 | End: 2024-01-30

## 2024-01-30 RX ORDER — ALPRAZOLAM 0.5 MG/1
0.5 TABLET ORAL
COMMUNITY
End: 2024-01-30

## 2024-01-30 RX ORDER — RISPERIDONE 3 MG/1
TABLET ORAL
COMMUNITY
Start: 2023-12-22 | End: 2024-01-30

## 2024-01-30 RX ADMIN — IPRATROPIUM BROMIDE AND ALBUTEROL SULFATE 3 ML: 2.5; .5 SOLUTION RESPIRATORY (INHALATION) at 15:46

## 2024-01-30 RX ADMIN — DEXAMETHASONE 4 MG: 4 TABLET ORAL at 16:41

## 2024-01-30 ASSESSMENT — FIBROSIS 4 INDEX
FIB4 SCORE: 7.56
FIB4 SCORE: 7.56

## 2024-01-30 NOTE — ED TRIAGE NOTES
"Chief Complaint   Patient presents with    Chest Pain     Pt BIB EMS from urgent care for chest pain and flu like symptoms. Pt reports a cough for a week and thinks \"the chest pain could be from coughing so much.\" Pt endorses neck pain and right sided chest pain. Pt denies any other flu like symptoms. Pt aox4.    Flu Like Symptoms     Blood Pressure: (!) 172/80, Pulse: 71, Respiration: 19, Temperature: 36.7 °C (98 °F), Height: 172.7 cm (5' 8\"), Weight: 82.6 kg (182 lb), BMI (Calculated): 27.67, BSA (Calculated): 2, Pulse Oximetry: 95 %, O2 Delivery Device: None - Room Air    Pt reports night sweats and weight loss of 15 pounds in the past two-three weeks.  "

## 2024-01-30 NOTE — ED PROVIDER NOTES
"ED Provider Note    Primary care provider: Jeni Nuno M.D.    CHIEF COMPLAINT  Chief Complaint   Patient presents with    Chest Pain     Pt BIB EMS from urgent care for chest pain and flu like symptoms. Pt reports a cough for a week and thinks \"the chest pain could be from coughing so much.\" Pt endorses neck pain and right sided chest pain. Pt denies any other flu like symptoms. Pt aox4.    Flu Like Symptoms         HPI  Kavita Freeman is a 58 y.o. female who presents to the Emergency Department for chest tightness.  The patient has had 2 weeks of a cough, 1 week of constant chest tightness.  The tightness is always there but appears worse if she is in a supine position, also when she voids, she feels that her heart rate races and the pain gets worse.  She notes it is improved with standing, not worsened with walking.  She has not had pain like this previously.  She denies any shortness of breath, she has had 1 episode of vomiting.    Does not currently take any medications.  Does smoke but has not smoked for the past 2 weeks.    External Record Review: Patient went to the [place] urgent care today for 2 weeks of chest pain, neck pain intermittently and was referred here for further evaluation.  Was seen at [facility] emergency department in July 2023 for frequent falls, dizziness.  Underwent head CT, cardiac workup that was unremarkable.  CT head showed slightly larger atrophy than expected for age.  Laboratory abnormalities were attributed to chronic alcohol use.    Was hospitalized to our facility in 2016 for hypotension, secondary adrenal insufficiency.    REVIEW OF SYSTEMS  See HPI.     PAST MEDICAL HISTORY   has a past medical history of Allergy, Anxiety, Arrhythmia, Back pain, Depression, Depression, Hyperlipidemia, Hypertension, Indigestion, Pain (2/24/15), PSVT (paroxysmal supraventricular tachycardia) (6/24/2013), Psychiatric disorder, Tobacco abuse (5/3/2013), URI (upper respiratory infection) " "(2013), Urinary tract infection, site not specified, and UTI (lower urinary tract infection) (2013).    SURGICAL HISTORY   has a past surgical history that includes breast biopsy (08); shoulder arthroscopy (3/23/2009); tubal ligation; gyn surgery (); shoulder arthroscopy (); other (2014); shoulder arthroscopy w/ rotator cuff repair (3/9/2015); trigger finger release (3/9/2015); and open reduction.    SOCIAL HISTORY  Social History     Tobacco Use    Smoking status: Former     Current packs/day: 0.00     Average packs/day: 0.5 packs/day for 25.0 years (12.5 ttl pk-yrs)     Types: Cigarettes     Start date: 1994     Quit date: 2016     Years since quittin.6    Smokeless tobacco: Never   Substance Use Topics    Alcohol use: No     Alcohol/week: 1.5 - 2.0 oz     Types: 3 - 4 Cans of beer per week    Drug use: No      Social History     Substance and Sexual Activity   Drug Use No       FAMILY HISTORY  Family History   Problem Relation Age of Onset    Hypertension Mother     Stroke Mother     Hypertension Father         ? valvular heart     Hypertension Brother     Cancer Paternal Grandmother         breast cancer       CURRENT MEDICATIONS  Reviewed.  See Encounter Summary.     ALLERGIES  Allergies   Allergen Reactions    Bactrim [Sulfamethoxazole W-Trimethoprim] Vomiting    Macrobid [Nitrofurantoin Monohydrate Macrocrystals]      vomiting       PHYSICAL EXAM  VITAL SIGNS: BP (!) 148/82   Pulse 89   Temp 36.8 °C (98.2 °F) (Temporal)   Resp 20   Ht 1.727 m (5' 8\")   Wt 82.6 kg (182 lb)   SpO2 92%   BMI 27.67 kg/m²   Constitutional: Awake, alert in no apparent distress.  HENT: Normocephalic, Bilateral external ears normal. Nose normal.   Eyes: Conjunctiva normal, non-icteric, EOMI.    Thorax & Lungs: Easy unlabored respirations, Clear to ascultation bilaterally.  Cardiovascular: Regular rate, Regular rhythm, No murmurs, rubs or gallops. Bilateral pulses symmetrical.   Abdomen:  " Soft, nontender, nondistended, normal active bowel sounds.   :    Skin: Visualized skin is  Dry, No erythema, No rash.   Musculoskeletal:   No cyanosis, clubbing or edema. No leg asymmetry.   Neurologic: Alert, Grossly non-focal.   Psychiatric: Normal affect, Normal mood  Lymphatic:      EKG   12 lead Interpreted by me  Rhythm:  Normal sinus rhythm   Rate: 66  Axis: normal  Ectopy: none  Conduction: normal  ST Segments: no acute change  T Waves: no acute change  Clinical Impression: Flat ST segments throughout, unchanged, otherwise normal EKG without acute changes       RADIOLOGY  I have independently interpreted the diagnostic imaging associated with this visit and am waiting the final reading from the radiologist.   My preliminary interpretation is as follows: No infiltrate    Radiologist interpretation:   DX-CHEST-PORTABLE (1 VIEW)   Final Result      No acute cardiopulmonary abnormality.          COURSE & MEDICAL DECISION MAKING  Pertinent Labs & Imaging studies reviewed. (See chart for details)    COURSE & MEDICAL DECISION MAKING  Pertinent Labs & Imaging studies reviewed. (See chart for details)    Differential diagnoses include but are not limited to: Bronchitis, early COPD, ACS, CHF, pericarditis/myocarditis, viral syndrome    2:11 PM - Nursing notes reviewed, patient seen and examined at bedside.    Escalation of care considered, and ultimately not performed: acute inpatient care management, however at this time, the patient is most appropriate for outpatient management.    Barriers to care at this time, including but not limited to: Patient does not have established PCP.     Decision tools and prescription drugs considered including, but not limited to: Heart score 2    Decision Making:  This is a pleasant 58 y.o. year old female who presents with 2 weeks of a cough, positional chest tightness.  The patient's EKG does not show any signs of ischemia, troponin is slightly elevated at 24 today, suspect this  is her baseline.  Given the chronicity of symptoms, do not feel that delta troponin is essential.  Seems very atypical in nature as well.  Do not suspect ACS.  At this point the patient is well-appearing, workup relatively unremarkable, feel that she can follow-up with cardiology for further testing with regards to the chest pain.  She is in agreement the plan.       The patient was discharged home (see d/c instructions) was told to return immediately for any signs or symptoms listed, or any worsening at all.  The patient verbally agreed to the discharge precautions and follow-up plan which is documented in EPIC.    Discharge Medications:  There are no discharge medications for this patient.      FINAL IMPRESSION  1. Chest pain, unspecified type

## 2024-01-30 NOTE — PROGRESS NOTES
Kavita Freeman is a 58 y.o. female who presents for Neck Pain, Cough (With chest discomfort ), and Congestion      HPI  This is a new problem. Kavita Freeman is a 58 y.o. patient who presents to urgent care with c/o: chest pain, neck pain intermittently for 2 weeks. Did not have insurance to come in until now. Sometimes gets sweaty. Pain 5/10.  Pain lasting 30 min. Pain does not radiate it is substernal. Taking theraflu prn . Generally feeling unwell.   Hx afib, tobacco abuse, and HTN. Not taking any medications at time time due to lack of insurance.     ROS See HPI    Allergies:       Allergies   Allergen Reactions    Bactrim [Sulfamethoxazole W-Trimethoprim] Vomiting    Macrobid [Nitrofurantoin Monohydrate Macrocrystals]      vomiting       PMSFS Hx:  Past Medical History:   Diagnosis Date    Allergy     Anxiety     Arrhythmia     for tachycardia    Back pain     Depression     Treated by Dr Karla Whatley    Depression     Hyperlipidemia     Hypertension     Indigestion     Pain 2/24/15    right shoulder 6/10    PSVT (paroxysmal supraventricular tachycardia) 6/24/2013    Psychiatric disorder     anxiety     Tobacco abuse 5/3/2013    URI (upper respiratory infection) 9/21/2013    Urinary tract infection, site not specified     Recurrent UTI's    UTI (lower urinary tract infection) 9/21/2013     Past Surgical History:   Procedure Laterality Date    SHOULDER ARTHROSCOPY W/ ROTATOR CUFF REPAIR  3/9/2015    Performed by Corey Terry M.D. at SURGERY [facility] ORS    TRIGGER FINGER RELEASE  3/9/2015    Performed by Corey Terry M.D. at SURGERY [facility] ORS    OTHER  6/2014    broken ribs- plates & Pins right front 4-8    SHOULDER ARTHROSCOPY  3/23/2009    Performed by COREY TERRY at SURGERY [facility] ORS    BREAST BIOPSY  7/18/08    Performed by MARY DE LA CRUZ at SURGERY SAME DAY [facility] ORS    SHOULDER ARTHROSCOPY  2005    GYN SURGERY  1995    tubal ligation    OPEN REDUCTION       flail chest    TUBAL LIGATION       Family History   Problem Relation Age of Onset    Hypertension Mother     Stroke Mother     Hypertension Father         ? valvular heart     Hypertension Brother     Cancer Paternal Grandmother         breast cancer     Social History     Tobacco Use    Smoking status: Former     Current packs/day: 0.00     Average packs/day: 0.5 packs/day for 25.0 years (12.5 ttl pk-yrs)     Types: Cigarettes     Start date: 1994     Quit date: 2016     Years since quittin.6    Smokeless tobacco: Never   Substance Use Topics    Alcohol use: No     Alcohol/week: 1.5 - 2.0 oz     Types: 3 - 4 Cans of beer per week       Problems:   Patient Active Problem List   Diagnosis    Essential hypertension    Anxiety    Vitamin d deficiency    Heart palpitations    Mixed hyperlipidemia    Tobacco abuse    PSVT (paroxysmal supraventricular tachycardia) (HCC)    Lightheadedness    Hypotension    Depression with anxiety    Acute cystitis with hematuria    Secondary adrenal insufficiency (HCC)    Migraine without aura and without status migrainosus, not intractable    Heterozygous MTHFR mutation [id number]    Elevated liver enzymes    High serum high density lipoprotein (HDL)    Macrocytosis without anemia    Well woman exam with routine gynecological exam    Family history of colon cancer in mother       Medications:   Current Outpatient Medications on File Prior to Visit   Medication Sig Dispense Refill    DOXEPIN HCL PO Take  by mouth every day. (Patient not taking: Reported on 2024)      DULoxetine (CYMBALTA) 60 MG Cap DR Particles delayed-release capsule  (Patient not taking: Reported on 2024)      doxepin (SINEQUAN) 10 MG Cap TAKE 2 CAPSULES BY MOUTH ONCE DAILY AT NIGHT (Patient not taking: Reported on 2024)      ALPRAZolam (XANAX) 0.5 MG Tab Take 0.5 mg by mouth. (Patient not taking: Reported on 2024)      risperiDONE (RISPERDAL) 3 MG Tab TAKE 1 TABLET BY MOUTH ONCE DAILY AT  "NIGHT (Patient not taking: Reported on 1/30/2024)      vitamin D, Ergocalciferol, (DRISDOL) 43720 units Cap capsule Take 1 Cap by mouth every 3 days. (Patient not taking: Reported on 1/30/2024) 28 Cap 0    sumatriptan (IMITREX) 20 MG/ACT nasal spray Spray 1 Spray in nose as needed for Migraine. (Patient not taking: Reported on 1/30/2024) 9 Each 3    Vortioxetine HBr 20 MG Tab Take 20 mg by mouth every day. (Patient not taking: Reported on 1/30/2024)      propafenone (RYTHMOL SR) 225 MG SR capsule Take 1 Cap by mouth 2 times a day. (Patient not taking: Reported on 1/30/2024) 60 Cap 11    omeprazole (PRILOSEC) 20 MG delayed-release capsule TAKE 1 CAPSULE ORALLY EVERY DAY IN THE MORNING ON A EMPTY STOMACH (Patient not taking: Reported on 1/30/2024) 30 Cap 10     No current facility-administered medications on file prior to visit.        Objective:     BP (!) 156/102   Pulse 84   Temp 37 °C (98.6 °F) (Temporal)   Resp 14   Ht 1.727 m (5' 8\")   Wt 83 kg (183 lb)   SpO2 94%   BMI 27.83 kg/m²     Physical Exam  Vitals and nursing note reviewed.   Constitutional:       General: She is not in acute distress.     Appearance: Normal appearance. She is well-developed. She is ill-appearing. She is not toxic-appearing.   HENT:      Head: Normocephalic.      Right Ear: Hearing, ear canal and external ear normal. No middle ear effusion. Tympanic membrane is injected. Tympanic membrane is not erythematous.      Left Ear: Hearing, ear canal and external ear normal.  No middle ear effusion. Tympanic membrane is injected. Tympanic membrane is not erythematous.      Nose: No mucosal edema, congestion or rhinorrhea.      Right Sinus: No maxillary sinus tenderness or frontal sinus tenderness.      Left Sinus: No maxillary sinus tenderness or frontal sinus tenderness.      Mouth/Throat:      Mouth: Mucous membranes are moist.      Pharynx: Uvula midline. No posterior oropharyngeal erythema.      Tonsils: No tonsillar abscesses. "   Neck:      Trachea: Trachea normal.   Cardiovascular:      Rate and Rhythm: Normal rate and regular rhythm.      Chest Wall: PMI is not displaced.      Pulses: Normal pulses.      Heart sounds: Normal heart sounds.   Pulmonary:      Effort: Pulmonary effort is normal. No respiratory distress.      Breath sounds: Normal breath sounds. No decreased breath sounds, wheezing, rhonchi or rales.   Chest:      Chest wall: No tenderness.   Musculoskeletal:         General: Normal range of motion.      Cervical back: Full passive range of motion without pain, normal range of motion and neck supple.   Lymphadenopathy:      Cervical: No cervical adenopathy.      Upper Body:      Right upper body: No supraclavicular adenopathy.      Left upper body: No supraclavicular adenopathy.   Skin:     General: Skin is warm and dry.      Capillary Refill: Capillary refill takes less than 2 seconds.   Neurological:      Mental Status: She is alert and oriented to person, place, and time.      Gait: Gait normal.   Psychiatric:         Mood and Affect: Mood normal.         Speech: Speech normal.         Behavior: Behavior normal. Behavior is cooperative.         Thought Content: Thought content normal.     EKG Interpretation    Rhythm: normal sinus   Rate: normal  Axis: normal  Ectopy: none  Conduction: normal  ST Segments: no acute change  T Waves: no acute change  Q Waves: none    Clinical Impression: no acute changes. No comparison EKG since 2015        RADIOLOGY RESULTS   DX-CHEST-2 VIEWS    Result Date: 1/30/2024 1/30/2024 12:30 PM HISTORY/REASON FOR EXAM:  Chest Pain. TECHNIQUE/EXAM DESCRIPTION AND NUMBER OF VIEWS: Two views of the chest. COMPARISON:  07/14/2016 FINDINGS: The heart is normal in size. No pulmonary infiltrates or consolidations are noted. No pleural effusions are appreciated. Malleable compression plates are again identified in right-sided ribs.     No active disease.         Xray: Reviewed and interpreted  independently by me. I agree with the radiologist's findings.         Assessment /Associated Orders:      1. Chest pain, unspecified type  EKG - Clinic Performed    DX-CHEST-2 VIEWS    POCT CoV-2, Flu A/B, RSV by PCR      2. Acute cough  DX-CHEST-2 VIEWS    POCT CoV-2, Flu A/B, RSV by PCR      3. Tobacco abuse        4. Essential hypertension      not curently being treated      5. Elevated blood pressure reading        6. SOB (shortness of breath)        7. Palpitations                Medical Decision Making:    Pt's clinical presentation and exam today indicate a need for higher level of care with further evaluation and/or diagnostics.   Social determinants of health - lack of insurance. Lack of PCP provider. + chronic medical problems.   [facility] was  called to arrange transfer to higher level of care in ER.  Pt is to be transported via [facility] ambulance.  I have reiterated to patient that although an Urgent Care to ER transfer was made this will not necessarily expedite the ER process        Please note that this dictation was created using voice recognition software. I have worked with consultants from the vendor as well as technical experts from [facility] to optimize the interface. I have made every reasonable attempt to correct obvious errors, but I expect that there are errors of grammar and possibly content that I did not discover before finalizing the note.  This note was electronically signed by provider

## 2024-01-31 NOTE — ED NOTES
Patient discharged per orders. IV removed -bandage applied. Wristband removed per protocol. Pt verbalized understanding of discharge instructions. Pt leaving in stable condition with all belongings.

## 2024-02-09 ENCOUNTER — APPOINTMENT (OUTPATIENT)
Dept: CARDIOLOGY | Facility: MEDICAL CENTER | Age: 59
End: 2024-02-09
Attending: EMERGENCY MEDICINE
Payer: COMMERCIAL

## 2024-02-16 ENCOUNTER — OFFICE VISIT (OUTPATIENT)
Dept: CARDIOLOGY | Facility: MEDICAL CENTER | Age: 59
End: 2024-02-16
Attending: EMERGENCY MEDICINE
Payer: COMMERCIAL

## 2024-02-16 ENCOUNTER — HOSPITAL ENCOUNTER (OUTPATIENT)
Dept: LAB | Facility: MEDICAL CENTER | Age: 59
End: 2024-02-16
Attending: NURSE PRACTITIONER
Payer: COMMERCIAL

## 2024-02-16 VITALS
DIASTOLIC BLOOD PRESSURE: 94 MMHG | HEART RATE: 86 BPM | WEIGHT: 180 LBS | HEIGHT: 68 IN | SYSTOLIC BLOOD PRESSURE: 152 MMHG | BODY MASS INDEX: 27.28 KG/M2 | OXYGEN SATURATION: 96 % | RESPIRATION RATE: 14 BRPM

## 2024-02-16 DIAGNOSIS — R07.89 OTHER CHEST PAIN: Primary | ICD-10-CM

## 2024-02-16 DIAGNOSIS — I47.10 PAROXYSMAL SUPRAVENTRICULAR TACHYCARDIA (HCC): ICD-10-CM

## 2024-02-16 DIAGNOSIS — R61 DIAPHORESIS: ICD-10-CM

## 2024-02-16 DIAGNOSIS — E78.2 MIXED HYPERLIPIDEMIA: ICD-10-CM

## 2024-02-16 DIAGNOSIS — I10 ESSENTIAL HYPERTENSION: ICD-10-CM

## 2024-02-16 PROCEDURE — 99211 OFF/OP EST MAY X REQ PHY/QHP: CPT | Performed by: NURSE PRACTITIONER

## 2024-02-16 PROCEDURE — 3077F SYST BP >= 140 MM HG: CPT | Performed by: NURSE PRACTITIONER

## 2024-02-16 PROCEDURE — 36415 COLL VENOUS BLD VENIPUNCTURE: CPT

## 2024-02-16 PROCEDURE — 80061 LIPID PANEL: CPT

## 2024-02-16 PROCEDURE — 84443 ASSAY THYROID STIM HORMONE: CPT

## 2024-02-16 PROCEDURE — 99214 OFFICE O/P EST MOD 30 MIN: CPT | Performed by: NURSE PRACTITIONER

## 2024-02-16 PROCEDURE — 99212 OFFICE O/P EST SF 10 MIN: CPT | Performed by: NURSE PRACTITIONER

## 2024-02-16 PROCEDURE — 3080F DIAST BP >= 90 MM HG: CPT | Performed by: NURSE PRACTITIONER

## 2024-02-16 PROCEDURE — 84439 ASSAY OF FREE THYROXINE: CPT

## 2024-02-16 PROCEDURE — 93005 ELECTROCARDIOGRAM TRACING: CPT | Performed by: NURSE PRACTITIONER

## 2024-02-16 RX ORDER — LOSARTAN POTASSIUM 50 MG/1
50 TABLET ORAL DAILY
Qty: 30 TABLET | Refills: 11 | Status: SHIPPED | OUTPATIENT
Start: 2024-02-16

## 2024-02-16 RX ORDER — BLOOD PRESSURE TEST KIT
1 KIT MISCELLANEOUS ONCE
Qty: 1 KIT | Refills: 0 | Status: SHIPPED | OUTPATIENT
Start: 2024-02-16 | End: 2024-02-16

## 2024-02-16 ASSESSMENT — ENCOUNTER SYMPTOMS
PND: 0
MUSCULOSKELETAL NEGATIVE: 1
DIZZINESS: 0
PALPITATIONS: 0
CONSTITUTIONAL NEGATIVE: 1
WHEEZING: 0
CLAUDICATION: 0
DEPRESSION: 0
EYES NEGATIVE: 1
BRUISES/BLEEDS EASILY: 0
NERVOUS/ANXIOUS: 0
SENSORY CHANGE: 0
GASTROINTESTINAL NEGATIVE: 1
ORTHOPNEA: 0
NEUROLOGICAL NEGATIVE: 1
NAUSEA: 0
HALLUCINATIONS: 0
SHORTNESS OF BREATH: 1

## 2024-02-16 ASSESSMENT — FIBROSIS 4 INDEX: FIB4 SCORE: 4.08

## 2024-02-16 NOTE — ASSESSMENT & PLAN NOTE
- ASCVD risk 4.8%  - nuclear stress test for perfusion assessment   - echocardiogram for functional structural/valvular assessment   - stop smoking   - start daily aspirin 81mg    - lipid panel

## 2024-02-16 NOTE — PROGRESS NOTES
Cardiology Follow up Note    DOS: 2/16/2024  [id number]  Maadam Sylvia Freeman    Chief complaint/Reason for consult: ED chest pain    HPI: Pt is a 58 y.o. female who presents to the clinic today in follow up for ED chest pain. Patient has a past medical history significant for but not limited to: no diagnosed problems pre visit. Patient is hypertensive in clinic today and was also hypertensive at ED. Patient recently had the flu and been coughing but chest pain was significant. Testing gin ED were unremarkable. Today she still states she is having chest pain and has noticed with exertion it gets worse. Some shortness of breath accompanying. She was last seen over 8 years ago here and documented mild mitral regurgitation at that time. No early family history of CAD but both parents had hypertension. Down to 3 cigarettes a day and encouraged to quit. Patient will start losartan 50mg daily for blood pressure with goal less than 130/80. Blood pressure cuff sent to pharmacy as well. Start baby aspirin prophylactically with suspicion of possible CAD. Some changes in ST intervals in inferior leads on today EKG vs ED. Order stress test and echocardiogram for assessment. Lipid panel ordered as well. Patient has also been diaphoretic a lot lately. Will order TSH to check thyroid. Patient lives >1hr away and will get her follow ups and established with Dr. Dsouza in [place].       Past Medical History:   Diagnosis Date    Allergy     Anxiety     Arrhythmia     for tachycardia    Back pain     Depression     Treated by Dr Karla Whatley    Depression     Hyperlipidemia     Hypertension     Indigestion     Pain 2/24/15    right shoulder 6/10    PSVT (paroxysmal supraventricular tachycardia) 6/24/2013    Psychiatric disorder     anxiety     Tobacco abuse 5/3/2013    URI (upper respiratory infection) 9/21/2013    Urinary tract infection, site not specified     Recurrent UTI's    UTI (lower urinary tract infection) 9/21/2013        Past Surgical History:   Procedure Laterality Date    SHOULDER ARTHROSCOPY W/ ROTATOR CUFF REPAIR  3/9/2015    Performed by Gilberto Meyer M.D. at SURGERY [facility] ORS    TRIGGER FINGER RELEASE  3/9/2015    Performed by Gilberto Meyer M.D. at SURGERY [facility] ORS    OTHER  2014    broken ribs- plates & Pins right front 4-8    SHOULDER ARTHROSCOPY  3/23/2009    Performed by GILBERTO MEYER at SURGERY [facility] ORS    BREAST BIOPSY  08    Performed by MARY DE LA CRUZ at SURGERY SAME DAY [facility] ORS    SHOULDER ARTHROSCOPY      GYN SURGERY      tubal ligation    OPEN REDUCTION      flail chest    TUBAL LIGATION         Social History     Socioeconomic History    Marital status:      Spouse name: Not on file    Number of children: Not on file    Years of education: Not on file    Highest education level: Not on file   Occupational History    Not on file   Tobacco Use    Smoking status: Every Day     Current packs/day: 0.00     Average packs/day: 0.5 packs/day for 25.0 years (12.5 ttl pk-yrs)     Types: Cigarettes     Start date: 1994     Last attempt to quit: 2016     Years since quittin.7    Smokeless tobacco: Never    Tobacco comments:     1-2 cigarettes a day   Substance and Sexual Activity    Alcohol use: No     Alcohol/week: 1.5 - 2.0 oz     Types: 3 - 4 Cans of beer per week    Drug use: No    Sexual activity: Yes     Partners: Male   Other Topics Concern    Not on file   Social History Narrative    ** Merged History Encounter **          Social Determinants of Health     Financial Resource Strain: Not on file   Food Insecurity: Not on file   Transportation Needs: Not on file   Physical Activity: Not on file   Stress: Not on file   Social Connections: Not on file   Intimate Partner Violence: Not on file   Housing Stability: Not on file       Family History   Problem Relation Age of Onset    Hypertension Mother     Stroke Mother     Hypertension Father          ? valvular heart     Hypertension Brother     Cancer Paternal Grandmother         breast cancer       Allergies   Allergen Reactions    Bactrim [Sulfamethoxazole W-Trimethoprim] Vomiting    Macrobid [Nitrofurantoin Monohydrate Macrocrystals]      vomiting       Current Outpatient Medications   Medication Sig Dispense Refill    losartan (COZAAR) 50 MG Tab Take 1 Tablet by mouth every day. 30 Tablet 11    Blood Pressure Kit 1 Each one time for 1 dose. 1 Kit 0     No current facility-administered medications for this visit.       Vitals:    02/16/24 1055   BP: (!) 152/94   Pulse: 86   Resp: 14   SpO2: 96%         Review of Systems   Constitutional: Negative.  Negative for malaise/fatigue.   HENT: Negative.     Eyes: Negative.    Respiratory:  Positive for shortness of breath. Negative for wheezing.    Cardiovascular:  Positive for chest pain. Negative for palpitations, orthopnea, claudication, leg swelling and PND.   Gastrointestinal: Negative.  Negative for nausea.   Genitourinary: Negative.    Musculoskeletal: Negative.    Skin: Negative.    Neurological: Negative.  Negative for dizziness and sensory change.   Endo/Heme/Allergies: Negative.  Does not bruise/bleed easily.   Psychiatric/Behavioral:  Negative for depression and hallucinations. The patient is not nervous/anxious.         Prior echo results reviewed: 2016 mild mitral regurgitation      EKG interpreted by me: Sinus with ST depression in inferior leads different than EKG in ED    Physical Exam  Constitutional:       Appearance: Normal appearance.   HENT:      Head: Normocephalic.   Eyes:      Pupils: Pupils are equal, round, and reactive to light.   Neck:      Vascular: No JVD.   Cardiovascular:      Rate and Rhythm: Normal rate and regular rhythm.      Pulses: Normal pulses.      Heart sounds: Normal heart sounds.   Pulmonary:      Effort: Pulmonary effort is normal.      Breath sounds: Normal breath sounds.   Abdominal:      General: Abdomen is  "flat.      Palpations: Abdomen is soft.   Musculoskeletal:      Cervical back: Normal range of motion.      Right lower leg: No edema.      Left lower leg: No edema.   Skin:     General: Skin is warm and dry.   Neurological:      Mental Status: She is alert and oriented to person, place, and time.   Psychiatric:         Mood and Affect: Mood normal.         Behavior: Behavior normal.          Data:  Lipids:   Lab Results   Component Value Date/Time    CHOLSTRLTOT 216 (H) 05/10/2018 12:00 PM    TRIGLYCERIDE 69 05/10/2018 12:00 PM    HDL 95 05/10/2018 12:00 PM     (H) 05/10/2018 12:00 PM        BMP:  Lab Results   Component Value Date/Time    SODIUM 139 01/30/2024 1458    POTASSIUM 3.8 01/30/2024 1458    CHLORIDE 107 01/30/2024 1458    CO2 21 01/30/2024 1458    GLUCOSE 105 (H) 01/30/2024 1458    BUN 7 (L) 01/30/2024 1458    CREATININE 0.41 (L) 01/30/2024 1458    CALCIUM 9.2 01/30/2024 1458    ANION 11.0 01/30/2024 1458       GFR:  Lab Results   Component Value Date/Time    IFAFRICA >60 05/10/2018 1200    IFNOTAFR >60 05/10/2018 1200        TSH:   Lab Results   Component Value Date/Time    TSHULTRASEN 4.450 05/10/2018 1200       MAGNESIUM:  Lab Results   Component Value Date/Time    MAGNESIUM 1.4 (L) 07/18/2016 0305    MAGNESIUM 1.8 11/21/2014 0830        THYROXINE (T4):   No results found for: \"FREEDIR\"     CBC:   Lab Results   Component Value Date/Time    WBC 9.6 01/30/2024 02:58 PM    RBC 4.54 01/30/2024 02:58 PM    HEMOGLOBIN 15.6 01/30/2024 02:58 PM    HEMATOCRIT 45.0 01/30/2024 02:58 PM    MCV 99.1 (H) 01/30/2024 02:58 PM    MCH 34.4 (H) 01/30/2024 02:58 PM    MCHC 34.7 01/30/2024 02:58 PM    RDW 51.4 (H) 01/30/2024 02:58 PM    PLATELETCT 153 (L) 01/30/2024 02:58 PM    MPV 10.8 01/30/2024 02:58 PM    NEUTSPOLYS 67.20 01/30/2024 02:58 PM    LYMPHOCYTES 18.40 (L) 01/30/2024 02:58 PM    MONOCYTES 11.50 01/30/2024 02:58 PM    EOSINOPHILS 2.00 01/30/2024 02:58 PM    BASOPHILS 0.50 01/30/2024 02:58 PM    IMMGRAN " "0.40 01/30/2024 02:58 PM    NRBC 0.00 01/30/2024 02:58 PM    NEUTS 6.45 01/30/2024 02:58 PM    LYMPHS 1.76 01/30/2024 02:58 PM    MONOS 1.10 (H) 01/30/2024 02:58 PM    EOS 0.19 01/30/2024 02:58 PM    BASO 0.05 01/30/2024 02:58 PM    IMMGRANAB 0.04 01/30/2024 02:58 PM    NRBCAB 0.00 01/30/2024 02:58 PM        CBC w/o DIFF  Lab Results   Component Value Date/Time    WBC 9.6 01/30/2024 02:58 PM    RBC 4.54 01/30/2024 02:58 PM    HEMOGLOBIN 15.6 01/30/2024 02:58 PM    MCV 99.1 (H) 01/30/2024 02:58 PM    MCH 34.4 (H) 01/30/2024 02:58 PM    MCHC 34.7 01/30/2024 02:58 PM    RDW 51.4 (H) 01/30/2024 02:58 PM    MPV 10.8 01/30/2024 02:58 PM       LIVER:  Lab Results   Component Value Date/Time    ALKPHOSPHAT 86 01/30/2024 02:58 PM    ASTSGOT 63 (H) 01/30/2024 02:58 PM    ALTSGPT 32 01/30/2024 02:58 PM    TBILIRUBIN 2.2 (H) 01/30/2024 02:58 PM       BNP:  No results found for: \"BNPBTYPENAT\"    PT/INR:  No results found for: \"PROTHROMBTM\", \"INR\"          Impression/Plan:  Essential hypertension   - losartan 50mg daily   - blood pressure daily   - taught proper technique   - cuff order placed   - RN check in 7-10 days     Mixed hyperlipidemia   - lipid panel   - likely starting statin therapy    Other chest pain  - ASCVD risk 4.8%  - nuclear stress test for perfusion assessment   - echocardiogram for functional structural/valvular assessment   - stop smoking   - start daily aspirin 81mg    - lipid panel       Lifestyle Modification for better heart health care as well as beneficial for prevention of worsening cardiac disease. Lifestyle modification discussed includes diet and reduction of sodium. Patients should eat more of a Mediterranean diet and be very careful with how much processed and fast food they eat. Excessive sodium intake can result in fluid retention which can add additional strain to patients cardiac system. Weight loss can also help long term with cardiac health. For patients with BMI above 25kg/m2, studies have " shown a greater chance of progression of disease as well as recurrence after interventions. Smoking and vaping should be stopped. Moderation on caffeine and alcohol. Excessive alcohol or caffeine can result in reactions and antagonize the heart system. Patient that fit the matrix for sleep apnea should be referred for a formal study and should try to be compliant with treatment for sleep apnea. Stay hydrated and stay active. Studies have shown that staying physically active can help heart health. Exercise goals should be trying to maintain 120-200 minutes per week of exercise.      A total of 35 minutes of time was spent on day of encounter reviewing medical record, performing history and examination, counseling, ordering medication/test/consults, collaborating with referring service, and documentation.    Shahzad Boyd AGACNP-EP  Cardiac Electrophysiology

## 2024-02-16 NOTE — ASSESSMENT & PLAN NOTE
- losartan 50mg daily   - blood pressure daily   - taught proper technique   - cuff order placed   - RN check in 7-10 days

## 2024-02-17 LAB
CHOLEST SERPL-MCNC: 183 MG/DL (ref 100–199)
EKG IMPRESSION: NORMAL
FASTING STATUS PATIENT QL REPORTED: NORMAL
HDLC SERPL-MCNC: 56 MG/DL
LDLC SERPL CALC-MCNC: 107 MG/DL
T4 FREE SERPL-MCNC: 1.34 NG/DL (ref 0.93–1.7)
TRIGL SERPL-MCNC: 99 MG/DL (ref 0–149)
TSH SERPL DL<=0.005 MIU/L-ACNC: 2.9 UIU/ML (ref 0.38–5.33)

## 2024-02-17 PROCEDURE — 93010 ELECTROCARDIOGRAM REPORT: CPT | Performed by: INTERNAL MEDICINE

## 2024-02-20 ENCOUNTER — TELEPHONE (OUTPATIENT)
Dept: CARDIOLOGY | Facility: MEDICAL CENTER | Age: 59
End: 2024-02-20
Payer: COMMERCIAL

## 2024-02-20 DIAGNOSIS — E78.2 MIXED HYPERLIPIDEMIA: ICD-10-CM

## 2024-02-20 RX ORDER — ROSUVASTATIN CALCIUM 20 MG/1
20 TABLET, COATED ORAL EVERY EVENING
Qty: 90 TABLET | Refills: 3 | Status: SHIPPED | OUTPATIENT
Start: 2024-02-20

## 2024-02-20 NOTE — TELEPHONE ENCOUNTER
----- Message from YANA Casper sent at 2/20/2024  9:32 AM PST -----  Start rosuvastatin 20mg daily

## 2024-03-22 ENCOUNTER — HOSPITAL ENCOUNTER (OUTPATIENT)
Dept: RADIOLOGY | Facility: MEDICAL CENTER | Age: 59
End: 2024-03-22
Attending: NURSE PRACTITIONER
Payer: COMMERCIAL

## 2024-03-22 ENCOUNTER — HOSPITAL ENCOUNTER (OUTPATIENT)
Dept: CARDIOLOGY | Facility: MEDICAL CENTER | Age: 59
End: 2024-03-22
Attending: NURSE PRACTITIONER
Payer: COMMERCIAL

## 2024-03-22 DIAGNOSIS — R07.89 OTHER CHEST PAIN: ICD-10-CM

## 2024-03-22 LAB — LV EJECT FRACT MOD 4C 99902: 69.18

## 2024-03-22 PROCEDURE — 93306 TTE W/DOPPLER COMPLETE: CPT

## 2024-03-22 PROCEDURE — A9502 TC99M TETROFOSMIN: HCPCS

## 2024-03-22 PROCEDURE — 700111 HCHG RX REV CODE 636 W/ 250 OVERRIDE (IP)

## 2024-03-22 PROCEDURE — 93306 TTE W/DOPPLER COMPLETE: CPT | Mod: 26 | Performed by: INTERNAL MEDICINE

## 2024-03-22 RX ORDER — REGADENOSON 0.08 MG/ML
INJECTION, SOLUTION INTRAVENOUS
Status: COMPLETED
Start: 2024-03-22 | End: 2024-03-22

## 2024-03-22 RX ORDER — AMINOPHYLLINE 25 MG/ML
100 INJECTION, SOLUTION INTRAVENOUS
Status: DISCONTINUED | OUTPATIENT
Start: 2024-03-22 | End: 2024-03-23 | Stop reason: HOSPADM

## 2024-03-22 RX ORDER — REGADENOSON 0.08 MG/ML
0.4 INJECTION, SOLUTION INTRAVENOUS ONCE
Status: COMPLETED | OUTPATIENT
Start: 2024-03-22 | End: 2024-03-22

## 2024-03-22 RX ADMIN — REGADENOSON 0.4 MG: 0.08 INJECTION, SOLUTION INTRAVENOUS at 15:12

## 2024-04-29 ENCOUNTER — APPOINTMENT (OUTPATIENT)
Dept: CARDIOLOGY | Facility: PHYSICIAN GROUP | Age: 59
End: 2024-04-29
Payer: COMMERCIAL

## 2024-05-06 ENCOUNTER — APPOINTMENT (OUTPATIENT)
Dept: CARDIOLOGY | Facility: MEDICAL CENTER | Age: 59
End: 2024-05-06
Payer: COMMERCIAL

## 2024-05-06 VITALS
BODY MASS INDEX: 26.07 KG/M2 | SYSTOLIC BLOOD PRESSURE: 132 MMHG | WEIGHT: 172 LBS | RESPIRATION RATE: 16 BRPM | DIASTOLIC BLOOD PRESSURE: 80 MMHG | HEIGHT: 68 IN | OXYGEN SATURATION: 100 % | HEART RATE: 70 BPM

## 2024-05-06 DIAGNOSIS — I10 ESSENTIAL HYPERTENSION: ICD-10-CM

## 2024-05-06 DIAGNOSIS — I47.10 PAROXYSMAL SUPRAVENTRICULAR TACHYCARDIA (HCC): ICD-10-CM

## 2024-05-06 DIAGNOSIS — E78.2 MIXED HYPERLIPIDEMIA: ICD-10-CM

## 2024-05-06 LAB — EKG IMPRESSION: NORMAL

## 2024-05-06 PROCEDURE — 3075F SYST BP GE 130 - 139MM HG: CPT | Performed by: NURSE PRACTITIONER

## 2024-05-06 PROCEDURE — 3079F DIAST BP 80-89 MM HG: CPT | Performed by: NURSE PRACTITIONER

## 2024-05-06 PROCEDURE — 93010 ELECTROCARDIOGRAM REPORT: CPT | Performed by: INTERNAL MEDICINE

## 2024-05-06 PROCEDURE — 99213 OFFICE O/P EST LOW 20 MIN: CPT | Performed by: NURSE PRACTITIONER

## 2024-05-06 ASSESSMENT — FIBROSIS 4 INDEX: FIB4 SCORE: 4.08

## 2024-05-06 ASSESSMENT — ENCOUNTER SYMPTOMS
WHEEZING: 0
HALLUCINATIONS: 0
GASTROINTESTINAL NEGATIVE: 1
MUSCULOSKELETAL NEGATIVE: 1
PALPITATIONS: 0
CONSTITUTIONAL NEGATIVE: 1
EYES NEGATIVE: 1
DIZZINESS: 0
NEUROLOGICAL NEGATIVE: 1
ORTHOPNEA: 0
DEPRESSION: 0
CLAUDICATION: 0
NAUSEA: 0
BRUISES/BLEEDS EASILY: 0
SENSORY CHANGE: 0
PND: 0
SHORTNESS OF BREATH: 1
NERVOUS/ANXIOUS: 0

## 2024-05-06 NOTE — PROGRESS NOTES
Cardiology Follow up Note    DOS: 2/16/2024  [id number]  Kavita Freeman    Chief complaint/Reason for consult: follow up    HPI: Pt is a 58 y.o. female who presents to the clinic today in follow up for follow up testing. Patient has a past medical history significant for but not limited to: hypertension, mixed hyperlipidemia, chest pain, PSVT, tobacco abuse. Patient blood pressure and lipid management much better now. Patient echocardiogram shows no valvular abnormalities and normal pump function and diastolic pattern. Stress test negative for coronary disease. Can continue to see cardiology as needed. Continue to make dietary changes.       Past Medical History:   Diagnosis Date    Allergy     Anxiety     Arrhythmia     for tachycardia    Back pain     Depression     Treated by Dr Karla Whatley    Depression     Hyperlipidemia     Hypertension     Indigestion     Pain 2/24/15    right shoulder 6/10    PSVT (paroxysmal supraventricular tachycardia) ([facility]) 6/24/2013    Psychiatric disorder     anxiety     Tobacco abuse 5/3/2013    URI (upper respiratory infection) 9/21/2013    Urinary tract infection, site not specified     Recurrent UTI's    UTI (lower urinary tract infection) 9/21/2013       Past Surgical History:   Procedure Laterality Date    SHOULDER ARTHROSCOPY W/ ROTATOR CUFF REPAIR  3/9/2015    Performed by Corey Terry M.D. at SURGERY [facility] ORS    TRIGGER FINGER RELEASE  3/9/2015    Performed by Corey Terry M.D. at SURGERY [facility] ORS    OTHER  6/2014    broken ribs- plates & Pins right front 4-8    SHOULDER ARTHROSCOPY  3/23/2009    Performed by COREY TERRY at SURGERY [facility] ORS    BREAST BIOPSY  7/18/08    Performed by MARY DE LA CRUZ at SURGERY SAME DAY [facility] ORS    SHOULDER ARTHROSCOPY  2005    GYN SURGERY  1995    tubal ligation    OPEN REDUCTION      flail chest    TUBAL LIGATION         Social History     Socioeconomic History    Marital status:       Spouse name: Not on file    Number of children: Not on file    Years of education: Not on file    Highest education level: Not on file   Occupational History    Not on file   Tobacco Use    Smoking status: Every Day     Current packs/day: 0.00     Average packs/day: 0.5 packs/day for 25.0 years (12.5 ttl pk-yrs)     Types: Cigarettes     Start date: 1994     Last attempt to quit: 2016     Years since quittin.9    Smokeless tobacco: Never    Tobacco comments:     1-2 cigarettes a day   Substance and Sexual Activity    Alcohol use: No     Alcohol/week: 1.5 - 2.0 oz     Types: 3 - 4 Cans of beer per week    Drug use: No    Sexual activity: Yes     Partners: Male   Other Topics Concern    Not on file   Social History Narrative    ** Merged History Encounter **          Social Determinants of Health     Financial Resource Strain: Not on file   Food Insecurity: Not on file   Transportation Needs: Not on file   Physical Activity: Not on file   Stress: Not on file   Social Connections: Not on file   Intimate Partner Violence: Not on file   Housing Stability: Not on file       Family History   Problem Relation Age of Onset    Hypertension Mother     Stroke Mother     Hypertension Father         ? valvular heart     Hypertension Brother     Cancer Paternal Grandmother         breast cancer       Allergies   Allergen Reactions    Bactrim [Sulfamethoxazole W-Trimethoprim] Vomiting    Macrobid [Nitrofurantoin Monohydrate Macrocrystals]      vomiting       Current Outpatient Medications   Medication Sig Dispense Refill    rosuvastatin (CRESTOR) 20 MG Tab Take 1 Tablet by mouth every evening. 90 Tablet 3    losartan (COZAAR) 50 MG Tab Take 1 Tablet by mouth every day. 30 Tablet 11     No current facility-administered medications for this visit.       Vitals:    24 0857   BP: 132/80   Pulse: 70   Resp: 16   SpO2: 100%         Review of Systems   Constitutional: Negative.  Negative for malaise/fatigue.   HENT:  Negative.     Eyes: Negative.    Respiratory:  Positive for shortness of breath. Negative for wheezing.    Cardiovascular:  Positive for chest pain. Negative for palpitations, orthopnea, claudication, leg swelling and PND.   Gastrointestinal: Negative.  Negative for nausea.   Genitourinary: Negative.    Musculoskeletal: Negative.    Skin: Negative.    Neurological: Negative.  Negative for dizziness and sensory change.   Endo/Heme/Allergies: Negative.  Does not bruise/bleed easily.   Psychiatric/Behavioral:  Negative for depression and hallucinations. The patient is not nervous/anxious.         Prior echo results reviewed: LVEF 65%; normal wall motion; normal diastolic; no valve abnormalities     Stress: negative for ischemia     EKG interpreted by me: Sinus     Physical Exam  Constitutional:       Appearance: Normal appearance.   HENT:      Head: Normocephalic.   Eyes:      Pupils: Pupils are equal, round, and reactive to light.   Neck:      Vascular: No JVD.   Cardiovascular:      Rate and Rhythm: Normal rate and regular rhythm.      Pulses: Normal pulses.      Heart sounds: Normal heart sounds.   Pulmonary:      Effort: Pulmonary effort is normal.      Breath sounds: Normal breath sounds.   Abdominal:      General: Abdomen is flat.      Palpations: Abdomen is soft.   Musculoskeletal:      Cervical back: Normal range of motion.      Right lower leg: No edema.      Left lower leg: No edema.   Skin:     General: Skin is warm and dry.   Neurological:      Mental Status: She is alert and oriented to person, place, and time.   Psychiatric:         Mood and Affect: Mood normal.         Behavior: Behavior normal.          Data:  Lipids:   Lab Results   Component Value Date/Time    CHOLSTRLTOT 183 02/16/2024 01:09 PM    TRIGLYCERIDE 99 02/16/2024 01:09 PM    HDL 56 02/16/2024 01:09 PM     (H) 02/16/2024 01:09 PM        BMP:  Lab Results   Component Value Date/Time    SODIUM 139 01/30/2024 1458    POTASSIUM 3.8  "01/30/2024 1458    CHLORIDE 107 01/30/2024 1458    CO2 21 01/30/2024 1458    GLUCOSE 105 (H) 01/30/2024 1458    BUN 7 (L) 01/30/2024 1458    CREATININE 0.41 (L) 01/30/2024 1458    CALCIUM 9.2 01/30/2024 1458    ANION 11.0 01/30/2024 1458       GFR:  Lab Results   Component Value Date/Time    IFAFRICA >60 05/10/2018 1200    IFNOTAFR >60 05/10/2018 1200        TSH:   Lab Results   Component Value Date/Time    TSHULTRASEN 4.450 05/10/2018 1200       MAGNESIUM:  Lab Results   Component Value Date/Time    MAGNESIUM 1.4 (L) 07/18/2016 0305    MAGNESIUM 1.8 11/21/2014 0830        THYROXINE (T4):   No results found for: \"FREEDIR\"     CBC:   Lab Results   Component Value Date/Time    WBC 9.6 01/30/2024 02:58 PM    RBC 4.54 01/30/2024 02:58 PM    HEMOGLOBIN 15.6 01/30/2024 02:58 PM    HEMATOCRIT 45.0 01/30/2024 02:58 PM    MCV 99.1 (H) 01/30/2024 02:58 PM    MCH 34.4 (H) 01/30/2024 02:58 PM    MCHC 34.7 01/30/2024 02:58 PM    RDW 51.4 (H) 01/30/2024 02:58 PM    PLATELETCT 153 (L) 01/30/2024 02:58 PM    MPV 10.8 01/30/2024 02:58 PM    NEUTSPOLYS 67.20 01/30/2024 02:58 PM    LYMPHOCYTES 18.40 (L) 01/30/2024 02:58 PM    MONOCYTES 11.50 01/30/2024 02:58 PM    EOSINOPHILS 2.00 01/30/2024 02:58 PM    BASOPHILS 0.50 01/30/2024 02:58 PM    IMMGRAN 0.40 01/30/2024 02:58 PM    NRBC 0.00 01/30/2024 02:58 PM    NEUTS 6.45 01/30/2024 02:58 PM    LYMPHS 1.76 01/30/2024 02:58 PM    MONOS 1.10 (H) 01/30/2024 02:58 PM    EOS 0.19 01/30/2024 02:58 PM    BASO 0.05 01/30/2024 02:58 PM    IMMGRANAB 0.04 01/30/2024 02:58 PM    NRBCAB 0.00 01/30/2024 02:58 PM        CBC w/o DIFF  Lab Results   Component Value Date/Time    WBC 9.6 01/30/2024 02:58 PM    RBC 4.54 01/30/2024 02:58 PM    HEMOGLOBIN 15.6 01/30/2024 02:58 PM    MCV 99.1 (H) 01/30/2024 02:58 PM    MCH 34.4 (H) 01/30/2024 02:58 PM    MCHC 34.7 01/30/2024 02:58 PM    RDW 51.4 (H) 01/30/2024 02:58 PM    MPV 10.8 01/30/2024 02:58 PM       LIVER:  Lab Results   Component Value Date/Time    " "ALKPHOSPHAT 86 01/30/2024 02:58 PM    ASTSGOT 63 (H) 01/30/2024 02:58 PM    ALTSGPT 32 01/30/2024 02:58 PM    TBILIRUBIN 2.2 (H) 01/30/2024 02:58 PM       BNP:  No results found for: \"BNPBTYPENAT\"    PT/INR:  No results found for: \"PROTHROMBTM\", \"INR\"          Impression/Plan:  Essential hypertension   - much better controlled   - stress yumi dn echo within normal limits   - follow up as needed     Mixed hyperlipidemia   - better cholesterol panel on rosuvastatin    - follow up with PCP       Lifestyle Modification for better heart health care as well as beneficial for prevention of worsening cardiac disease. Lifestyle modification discussed includes diet and reduction of sodium. Patients should eat more of a Mediterranean diet and be very careful with how much processed and fast food they eat. Excessive sodium intake can result in fluid retention which can add additional strain to patients cardiac system. Weight loss can also help long term with cardiac health. For patients with BMI above 25kg/m2, studies have shown a greater chance of progression of disease as well as recurrence after interventions. Smoking and vaping should be stopped. Moderation on caffeine and alcohol. Excessive alcohol or caffeine can result in reactions and antagonize the heart system. Patient that fit the matrix for sleep apnea should be referred for a formal study and should try to be compliant with treatment for sleep apnea. Stay hydrated and stay active. Studies have shown that staying physically active can help heart health. Exercise goals should be trying to maintain 120-200 minutes per week of exercise.      A total of 25 minutes of time was spent on day of encounter reviewing medical record, performing history and examination, counseling, ordering medication/test/consults, collaborating with referring service, and documentation.    Shahzad Boyd [facility]  Cardiac Electrophysiology    "

## 2024-06-14 ENCOUNTER — APPOINTMENT (OUTPATIENT)
Dept: RADIOLOGY | Facility: MEDICAL CENTER | Age: 59
DRG: 853 | End: 2024-06-14
Attending: EMERGENCY MEDICINE
Payer: COMMERCIAL

## 2024-06-14 ENCOUNTER — APPOINTMENT (OUTPATIENT)
Dept: MEDICAL GROUP | Facility: LAB | Age: 59
End: 2024-06-14
Payer: COMMERCIAL

## 2024-06-14 ENCOUNTER — ANESTHESIA EVENT (OUTPATIENT)
Dept: SURGERY | Facility: MEDICAL CENTER | Age: 59
DRG: 853 | End: 2024-06-14
Payer: COMMERCIAL

## 2024-06-14 ENCOUNTER — HOSPITAL ENCOUNTER (INPATIENT)
Facility: MEDICAL CENTER | Age: 59
LOS: 7 days | DRG: 853 | End: 2024-06-21
Attending: EMERGENCY MEDICINE | Admitting: SURGERY
Payer: COMMERCIAL

## 2024-06-14 ENCOUNTER — ANESTHESIA (OUTPATIENT)
Dept: SURGERY | Facility: MEDICAL CENTER | Age: 59
DRG: 853 | End: 2024-06-14
Payer: COMMERCIAL

## 2024-06-14 DIAGNOSIS — K61.1 PERI-RECTAL ABSCESS: ICD-10-CM

## 2024-06-14 DIAGNOSIS — M79.89 NECROTIZING SOFT TISSUE INFECTION: ICD-10-CM

## 2024-06-14 DIAGNOSIS — R65.21 SEPSIS WITH ACUTE ORGAN DYSFUNCTION AND SEPTIC SHOCK, DUE TO UNSPECIFIED ORGANISM, UNSPECIFIED ORGAN DYSFUNCTION TYPE (HCC): Primary | ICD-10-CM

## 2024-06-14 DIAGNOSIS — K61.1 PERIRECTAL ABSCESS: ICD-10-CM

## 2024-06-14 DIAGNOSIS — N17.9 AKI (ACUTE KIDNEY INJURY) (HCC): ICD-10-CM

## 2024-06-14 DIAGNOSIS — K70.31 ALCOHOLIC CIRRHOSIS OF LIVER WITH ASCITES (HCC): ICD-10-CM

## 2024-06-14 DIAGNOSIS — A41.9 SEPSIS WITH ACUTE ORGAN DYSFUNCTION AND SEPTIC SHOCK, DUE TO UNSPECIFIED ORGANISM, UNSPECIFIED ORGAN DYSFUNCTION TYPE (HCC): Primary | ICD-10-CM

## 2024-06-14 DIAGNOSIS — E87.20 LACTIC ACIDOSIS: ICD-10-CM

## 2024-06-14 DIAGNOSIS — M72.6 NECROTIZING FASCIITIS (HCC): ICD-10-CM

## 2024-06-14 DIAGNOSIS — E87.6 HYPOKALEMIA: ICD-10-CM

## 2024-06-14 LAB
ABO + RH BLD: NORMAL
ABO GROUP BLD: NORMAL
ALBUMIN SERPL BCP-MCNC: 2.5 G/DL (ref 3.2–4.9)
ALBUMIN/GLOB SERPL: 0.5 G/DL
ALP SERPL-CCNC: 71 U/L (ref 30–99)
ALT SERPL-CCNC: 28 U/L (ref 2–50)
ANION GAP SERPL CALC-SCNC: 17 MMOL/L (ref 7–16)
ANION GAP SERPL CALC-SCNC: 18 MMOL/L (ref 7–16)
APPEARANCE UR: CLEAR
AST SERPL-CCNC: 47 U/L (ref 12–45)
BASOPHILS # BLD AUTO: 0.3 % (ref 0–1.8)
BASOPHILS # BLD AUTO: 0.3 % (ref 0–1.8)
BASOPHILS # BLD: 0.07 K/UL (ref 0–0.12)
BASOPHILS # BLD: 0.09 K/UL (ref 0–0.12)
BILIRUB SERPL-MCNC: 4.8 MG/DL (ref 0.1–1.5)
BILIRUB UR QL STRIP.AUTO: NEGATIVE
BLD GP AB SCN SERPL QL: NORMAL
BUN SERPL-MCNC: 28 MG/DL (ref 8–22)
BUN SERPL-MCNC: 32 MG/DL (ref 8–22)
CALCIUM ALBUM COR SERPL-MCNC: 9.5 MG/DL (ref 8.5–10.5)
CALCIUM SERPL-MCNC: 8.3 MG/DL (ref 8.5–10.5)
CALCIUM SERPL-MCNC: 8.4 MG/DL (ref 8.5–10.5)
CFT BLD TEG: 5.1 MIN (ref 4.6–9.1)
CFT P HPASE BLD TEG: 5.6 MIN (ref 4.3–8.3)
CHLORIDE SERPL-SCNC: 100 MMOL/L (ref 96–112)
CHLORIDE SERPL-SCNC: 102 MMOL/L (ref 96–112)
CLOT ANGLE BLD TEG: 79.2 DEGREES (ref 63–78)
CLOT LYSIS 30M P MA LENFR BLD TEG: 0 % (ref 0–2.6)
CO2 SERPL-SCNC: 17 MMOL/L (ref 20–33)
CO2 SERPL-SCNC: 17 MMOL/L (ref 20–33)
COLOR UR: YELLOW
CREAT SERPL-MCNC: 1.5 MG/DL (ref 0.5–1.4)
CREAT SERPL-MCNC: 1.6 MG/DL (ref 0.5–1.4)
CT.EXTRINSIC BLD ROTEM: 0.8 MIN (ref 0.8–2.1)
EOSINOPHIL # BLD AUTO: 0.39 K/UL (ref 0–0.51)
EOSINOPHIL # BLD AUTO: 0.7 K/UL (ref 0–0.51)
EOSINOPHIL NFR BLD: 1.9 % (ref 0–6.9)
EOSINOPHIL NFR BLD: 2.1 % (ref 0–6.9)
ERYTHROCYTE [DISTWIDTH] IN BLOOD BY AUTOMATED COUNT: 54.1 FL (ref 35.9–50)
ERYTHROCYTE [DISTWIDTH] IN BLOOD BY AUTOMATED COUNT: 55.4 FL (ref 35.9–50)
GFR SERPLBLD CREATININE-BSD FMLA CKD-EPI: 37 ML/MIN/1.73 M 2
GFR SERPLBLD CREATININE-BSD FMLA CKD-EPI: 40 ML/MIN/1.73 M 2
GLOBULIN SER CALC-MCNC: 4.6 G/DL (ref 1.9–3.5)
GLUCOSE SERPL-MCNC: 127 MG/DL (ref 65–99)
GLUCOSE SERPL-MCNC: 98 MG/DL (ref 65–99)
GLUCOSE UR STRIP.AUTO-MCNC: NEGATIVE MG/DL
GRAM STN SPEC: ABNORMAL
HCT VFR BLD AUTO: 27.2 % (ref 37–47)
HCT VFR BLD AUTO: 29.2 % (ref 37–47)
HGB BLD-MCNC: 10.1 G/DL (ref 12–16)
HGB BLD-MCNC: 9.5 G/DL (ref 12–16)
IMM GRANULOCYTES # BLD AUTO: 0.2 K/UL (ref 0–0.11)
IMM GRANULOCYTES # BLD AUTO: 0.75 K/UL (ref 0–0.11)
IMM GRANULOCYTES NFR BLD AUTO: 1 % (ref 0–0.9)
IMM GRANULOCYTES NFR BLD AUTO: 2.2 % (ref 0–0.9)
INR PPP: 2.05 (ref 0.87–1.13)
KETONES UR STRIP.AUTO-MCNC: NEGATIVE MG/DL
LACTATE SERPL-SCNC: 1.9 MMOL/L (ref 0.5–2)
LACTATE SERPL-SCNC: 3.8 MMOL/L (ref 0.5–2)
LACTATE SERPL-SCNC: 4.8 MMOL/L (ref 0.5–2)
LEUKOCYTE ESTERASE UR QL STRIP.AUTO: NEGATIVE
LYMPHOCYTES # BLD AUTO: 1.12 K/UL (ref 1–4.8)
LYMPHOCYTES # BLD AUTO: 1.27 K/UL (ref 1–4.8)
LYMPHOCYTES NFR BLD: 3.8 % (ref 22–41)
LYMPHOCYTES NFR BLD: 5.6 % (ref 22–41)
MCF BLD TEG: 67.5 MM (ref 52–69)
MCF.PLATELET INHIB BLD ROTEM: 35.2 MM (ref 15–32)
MCH RBC QN AUTO: 35.7 PG (ref 27–33)
MCH RBC QN AUTO: 35.9 PG (ref 27–33)
MCHC RBC AUTO-ENTMCNC: 34.6 G/DL (ref 32.2–35.5)
MCHC RBC AUTO-ENTMCNC: 34.9 G/DL (ref 32.2–35.5)
MCV RBC AUTO: 102.3 FL (ref 81.4–97.8)
MCV RBC AUTO: 103.9 FL (ref 81.4–97.8)
MICRO URNS: NORMAL
MONOCYTES # BLD AUTO: 1.74 K/UL (ref 0–0.85)
MONOCYTES # BLD AUTO: 1.9 K/UL (ref 0–0.85)
MONOCYTES NFR BLD AUTO: 5.7 % (ref 0–13.4)
MONOCYTES NFR BLD AUTO: 8.7 % (ref 0–13.4)
NEUTROPHILS # BLD AUTO: 16.56 K/UL (ref 1.82–7.42)
NEUTROPHILS # BLD AUTO: 28.76 K/UL (ref 1.82–7.42)
NEUTROPHILS NFR BLD: 82.5 % (ref 44–72)
NEUTROPHILS NFR BLD: 85.9 % (ref 44–72)
NITRITE UR QL STRIP.AUTO: NEGATIVE
NRBC # BLD AUTO: 0 K/UL
NRBC # BLD AUTO: 0 K/UL
NRBC BLD-RTO: 0 /100 WBC (ref 0–0.2)
NRBC BLD-RTO: 0 /100 WBC (ref 0–0.2)
PA AA BLD-ACNC: 60.2 % (ref 0–11)
PA ADP BLD-ACNC: 97.2 % (ref 0–17)
PH UR STRIP.AUTO: 6 [PH] (ref 5–8)
PLATELET # BLD AUTO: 160 K/UL (ref 164–446)
PLATELET # BLD AUTO: 207 K/UL (ref 164–446)
PMV BLD AUTO: 11.2 FL (ref 9–12.9)
PMV BLD AUTO: 11.7 FL (ref 9–12.9)
POTASSIUM SERPL-SCNC: 2.9 MMOL/L (ref 3.6–5.5)
POTASSIUM SERPL-SCNC: 3 MMOL/L (ref 3.6–5.5)
PROT SERPL-MCNC: 7.1 G/DL (ref 6–8.2)
PROT UR QL STRIP: NEGATIVE MG/DL
PROTHROMBIN TIME: 23.4 SEC (ref 12–14.6)
RBC # BLD AUTO: 2.66 M/UL (ref 4.2–5.4)
RBC # BLD AUTO: 2.81 M/UL (ref 4.2–5.4)
RBC UR QL AUTO: NEGATIVE
RH BLD: NORMAL
SCCMEC + MECA PNL NOSE NAA+PROBE: NEGATIVE
SIGNIFICANT IND 70042: ABNORMAL
SITE SITE: ABNORMAL
SODIUM SERPL-SCNC: 135 MMOL/L (ref 135–145)
SODIUM SERPL-SCNC: 136 MMOL/L (ref 135–145)
SOURCE SOURCE: ABNORMAL
SP GR UR STRIP.AUTO: 1.02
TEG ALGORITHM TGALG: ABNORMAL
UROBILINOGEN UR STRIP.AUTO-MCNC: 0.2 MG/DL
WBC # BLD AUTO: 20.1 K/UL (ref 4.8–10.8)
WBC # BLD AUTO: 33.5 K/UL (ref 4.8–10.8)

## 2024-06-14 PROCEDURE — 87205 SMEAR GRAM STAIN: CPT

## 2024-06-14 PROCEDURE — 99291 CRITICAL CARE FIRST HOUR: CPT | Performed by: INTERNAL MEDICINE

## 2024-06-14 PROCEDURE — 87076 CULTURE ANAEROBE IDENT EACH: CPT

## 2024-06-14 PROCEDURE — 71045 X-RAY EXAM CHEST 1 VIEW: CPT

## 2024-06-14 PROCEDURE — 81003 URINALYSIS AUTO W/O SCOPE: CPT

## 2024-06-14 PROCEDURE — 87102 FUNGUS ISOLATION CULTURE: CPT

## 2024-06-14 PROCEDURE — 85576 BLOOD PLATELET AGGREGATION: CPT | Mod: 91

## 2024-06-14 PROCEDURE — 02HV33Z INSERTION OF INFUSION DEVICE INTO SUPERIOR VENA CAVA, PERCUTANEOUS APPROACH: ICD-10-PCS | Performed by: EMERGENCY MEDICINE

## 2024-06-14 PROCEDURE — 700111 HCHG RX REV CODE 636 W/ 250 OVERRIDE (IP): Performed by: INTERNAL MEDICINE

## 2024-06-14 PROCEDURE — 85025 COMPLETE CBC W/AUTO DIFF WBC: CPT

## 2024-06-14 PROCEDURE — 87077 CULTURE AEROBIC IDENTIFY: CPT | Mod: 91

## 2024-06-14 PROCEDURE — 87070 CULTURE OTHR SPECIMN AEROBIC: CPT

## 2024-06-14 PROCEDURE — 700117 HCHG RX CONTRAST REV CODE 255: Performed by: EMERGENCY MEDICINE

## 2024-06-14 PROCEDURE — 700111 HCHG RX REV CODE 636 W/ 250 OVERRIDE (IP): Performed by: STUDENT IN AN ORGANIZED HEALTH CARE EDUCATION/TRAINING PROGRAM

## 2024-06-14 PROCEDURE — 87075 CULTR BACTERIA EXCEPT BLOOD: CPT

## 2024-06-14 PROCEDURE — 86901 BLOOD TYPING SEROLOGIC RH(D): CPT

## 2024-06-14 PROCEDURE — 700111 HCHG RX REV CODE 636 W/ 250 OVERRIDE (IP): Performed by: EMERGENCY MEDICINE

## 2024-06-14 PROCEDURE — 0D9P0ZX DRAINAGE OF RECTUM, OPEN APPROACH, DIAGNOSTIC: ICD-10-PCS | Performed by: SURGERY

## 2024-06-14 PROCEDURE — 160002 HCHG RECOVERY MINUTES (STAT): Performed by: SURGERY

## 2024-06-14 PROCEDURE — 83605 ASSAY OF LACTIC ACID: CPT | Mod: 91

## 2024-06-14 PROCEDURE — 85610 PROTHROMBIN TIME: CPT

## 2024-06-14 PROCEDURE — 46040 I&D ISCHIORCT&/PERIRCT ABSC: CPT | Performed by: SURGERY

## 2024-06-14 PROCEDURE — 86850 RBC ANTIBODY SCREEN: CPT

## 2024-06-14 PROCEDURE — 99222 1ST HOSP IP/OBS MODERATE 55: CPT | Mod: 25 | Performed by: SURGERY

## 2024-06-14 PROCEDURE — 160027 HCHG SURGERY MINUTES - 1ST 30 MINS LEVEL 2: Performed by: SURGERY

## 2024-06-14 PROCEDURE — 770022 HCHG ROOM/CARE - ICU (200)

## 2024-06-14 PROCEDURE — 80053 COMPREHEN METABOLIC PANEL: CPT

## 2024-06-14 PROCEDURE — 80048 BASIC METABOLIC PNL TOTAL CA: CPT

## 2024-06-14 PROCEDURE — 96367 TX/PROPH/DG ADDL SEQ IV INF: CPT

## 2024-06-14 PROCEDURE — 700101 HCHG RX REV CODE 250: Performed by: STUDENT IN AN ORGANIZED HEALTH CARE EDUCATION/TRAINING PROGRAM

## 2024-06-14 PROCEDURE — 85384 FIBRINOGEN ACTIVITY: CPT

## 2024-06-14 PROCEDURE — 96368 THER/DIAG CONCURRENT INF: CPT

## 2024-06-14 PROCEDURE — 0JBB0ZZ EXCISION OF PERINEUM SUBCUTANEOUS TISSUE AND FASCIA, OPEN APPROACH: ICD-10-PCS | Performed by: SURGERY

## 2024-06-14 PROCEDURE — 160048 HCHG OR STATISTICAL LEVEL 1-5: Performed by: SURGERY

## 2024-06-14 PROCEDURE — 99291 CRITICAL CARE FIRST HOUR: CPT

## 2024-06-14 PROCEDURE — 700111 HCHG RX REV CODE 636 W/ 250 OVERRIDE (IP): Mod: JZ | Performed by: STUDENT IN AN ORGANIZED HEALTH CARE EDUCATION/TRAINING PROGRAM

## 2024-06-14 PROCEDURE — C1751 CATH, INF, PER/CENT/MIDLINE: HCPCS

## 2024-06-14 PROCEDURE — 96365 THER/PROPH/DIAG IV INF INIT: CPT

## 2024-06-14 PROCEDURE — 87040 BLOOD CULTURE FOR BACTERIA: CPT | Mod: 91

## 2024-06-14 PROCEDURE — 160038 HCHG SURGERY MINUTES - EA ADDL 1 MIN LEVEL 2: Performed by: SURGERY

## 2024-06-14 PROCEDURE — 96366 THER/PROPH/DIAG IV INF ADDON: CPT

## 2024-06-14 PROCEDURE — 87086 URINE CULTURE/COLONY COUNT: CPT

## 2024-06-14 PROCEDURE — 160009 HCHG ANES TIME/MIN: Performed by: SURGERY

## 2024-06-14 PROCEDURE — 160035 HCHG PACU - 1ST 60 MINS PHASE I: Performed by: SURGERY

## 2024-06-14 PROCEDURE — 36556 INSERT NON-TUNNEL CV CATH: CPT

## 2024-06-14 PROCEDURE — 700105 HCHG RX REV CODE 258: Performed by: EMERGENCY MEDICINE

## 2024-06-14 PROCEDURE — 85347 COAGULATION TIME ACTIVATED: CPT

## 2024-06-14 PROCEDURE — 86900 BLOOD TYPING SEROLOGIC ABO: CPT

## 2024-06-14 PROCEDURE — 96375 TX/PRO/DX INJ NEW DRUG ADDON: CPT

## 2024-06-14 PROCEDURE — 700105 HCHG RX REV CODE 258: Performed by: INTERNAL MEDICINE

## 2024-06-14 PROCEDURE — 700101 HCHG RX REV CODE 250: Performed by: SURGERY

## 2024-06-14 PROCEDURE — 700101 HCHG RX REV CODE 250: Performed by: EMERGENCY MEDICINE

## 2024-06-14 PROCEDURE — 87641 MR-STAPH DNA AMP PROBE: CPT

## 2024-06-14 PROCEDURE — 36415 COLL VENOUS BLD VENIPUNCTURE: CPT

## 2024-06-14 PROCEDURE — 87186 SC STD MICRODIL/AGAR DIL: CPT

## 2024-06-14 PROCEDURE — 74177 CT ABD & PELVIS W/CONTRAST: CPT

## 2024-06-14 RX ORDER — ONDANSETRON 2 MG/ML
4 INJECTION INTRAMUSCULAR; INTRAVENOUS
Status: DISCONTINUED | OUTPATIENT
Start: 2024-06-14 | End: 2024-06-14 | Stop reason: HOSPADM

## 2024-06-14 RX ORDER — NOREPINEPHRINE BITARTRATE 0.03 MG/ML
0-1 INJECTION, SOLUTION INTRAVENOUS CONTINUOUS
Status: DISCONTINUED | OUTPATIENT
Start: 2024-06-14 | End: 2024-06-18

## 2024-06-14 RX ORDER — ENOXAPARIN SODIUM 100 MG/ML
40 INJECTION SUBCUTANEOUS DAILY
Status: DISCONTINUED | OUTPATIENT
Start: 2024-06-14 | End: 2024-06-14

## 2024-06-14 RX ORDER — ROCURONIUM BROMIDE 10 MG/ML
INJECTION, SOLUTION INTRAVENOUS PRN
Status: DISCONTINUED | OUTPATIENT
Start: 2024-06-14 | End: 2024-06-14 | Stop reason: SURG

## 2024-06-14 RX ORDER — LINEZOLID 2 MG/ML
600 INJECTION, SOLUTION INTRAVENOUS ONCE
Status: COMPLETED | OUTPATIENT
Start: 2024-06-14 | End: 2024-06-14

## 2024-06-14 RX ORDER — SODIUM CHLORIDE, SODIUM LACTATE, POTASSIUM CHLORIDE, CALCIUM CHLORIDE 600; 310; 30; 20 MG/100ML; MG/100ML; MG/100ML; MG/100ML
1000 INJECTION, SOLUTION INTRAVENOUS ONCE
Status: COMPLETED | OUTPATIENT
Start: 2024-06-14 | End: 2024-06-14

## 2024-06-14 RX ORDER — OXYCODONE HCL 5 MG/5 ML
5 SOLUTION, ORAL ORAL
Status: DISCONTINUED | OUTPATIENT
Start: 2024-06-14 | End: 2024-06-14 | Stop reason: HOSPADM

## 2024-06-14 RX ORDER — DIPHENHYDRAMINE HYDROCHLORIDE 50 MG/ML
12.5 INJECTION INTRAMUSCULAR; INTRAVENOUS
Status: DISCONTINUED | OUTPATIENT
Start: 2024-06-14 | End: 2024-06-14 | Stop reason: HOSPADM

## 2024-06-14 RX ORDER — SODIUM CHLORIDE, SODIUM LACTATE, POTASSIUM CHLORIDE, CALCIUM CHLORIDE 600; 310; 30; 20 MG/100ML; MG/100ML; MG/100ML; MG/100ML
INJECTION, SOLUTION INTRAVENOUS CONTINUOUS
Status: DISCONTINUED | OUTPATIENT
Start: 2024-06-14 | End: 2024-06-17

## 2024-06-14 RX ORDER — SODIUM CHLORIDE, SODIUM LACTATE, POTASSIUM CHLORIDE, CALCIUM CHLORIDE 600; 310; 30; 20 MG/100ML; MG/100ML; MG/100ML; MG/100ML
INJECTION, SOLUTION INTRAVENOUS CONTINUOUS
Status: DISCONTINUED | OUTPATIENT
Start: 2024-06-14 | End: 2024-06-14 | Stop reason: HOSPADM

## 2024-06-14 RX ORDER — OXYCODONE HCL 5 MG/5 ML
10 SOLUTION, ORAL ORAL
Status: DISCONTINUED | OUTPATIENT
Start: 2024-06-14 | End: 2024-06-14 | Stop reason: HOSPADM

## 2024-06-14 RX ORDER — POTASSIUM CHLORIDE 7.45 MG/ML
10 INJECTION INTRAVENOUS ONCE
Status: COMPLETED | OUTPATIENT
Start: 2024-06-14 | End: 2024-06-14

## 2024-06-14 RX ORDER — ONDANSETRON 2 MG/ML
4 INJECTION INTRAMUSCULAR; INTRAVENOUS EVERY 4 HOURS PRN
Status: DISCONTINUED | OUTPATIENT
Start: 2024-06-14 | End: 2024-06-21 | Stop reason: HOSPADM

## 2024-06-14 RX ORDER — POTASSIUM CHLORIDE 29.8 MG/ML
40 INJECTION INTRAVENOUS ONCE
Status: COMPLETED | OUTPATIENT
Start: 2024-06-14 | End: 2024-06-15

## 2024-06-14 RX ORDER — HALOPERIDOL 5 MG/ML
1 INJECTION INTRAMUSCULAR
Status: DISCONTINUED | OUTPATIENT
Start: 2024-06-14 | End: 2024-06-14 | Stop reason: HOSPADM

## 2024-06-14 RX ORDER — PROCHLORPERAZINE EDISYLATE 5 MG/ML
5-10 INJECTION INTRAMUSCULAR; INTRAVENOUS EVERY 4 HOURS PRN
Status: DISCONTINUED | OUTPATIENT
Start: 2024-06-14 | End: 2024-06-21 | Stop reason: HOSPADM

## 2024-06-14 RX ORDER — LIDOCAINE HYDROCHLORIDE 20 MG/ML
INJECTION, SOLUTION EPIDURAL; INFILTRATION; INTRACAUDAL; PERINEURAL PRN
Status: DISCONTINUED | OUTPATIENT
Start: 2024-06-14 | End: 2024-06-14 | Stop reason: SURG

## 2024-06-14 RX ORDER — HYDRALAZINE HYDROCHLORIDE 20 MG/ML
5 INJECTION INTRAMUSCULAR; INTRAVENOUS
Status: DISCONTINUED | OUTPATIENT
Start: 2024-06-14 | End: 2024-06-14 | Stop reason: HOSPADM

## 2024-06-14 RX ORDER — ALPRAZOLAM 0.5 MG/1
0.25 TABLET ORAL
COMMUNITY
End: 2024-06-27

## 2024-06-14 RX ORDER — ONDANSETRON 4 MG/1
4 TABLET, ORALLY DISINTEGRATING ORAL EVERY 4 HOURS PRN
Status: DISCONTINUED | OUTPATIENT
Start: 2024-06-14 | End: 2024-06-21 | Stop reason: HOSPADM

## 2024-06-14 RX ORDER — EPHEDRINE SULFATE 50 MG/ML
5 INJECTION, SOLUTION INTRAVENOUS
Status: DISCONTINUED | OUTPATIENT
Start: 2024-06-14 | End: 2024-06-14 | Stop reason: HOSPADM

## 2024-06-14 RX ORDER — DEXAMETHASONE SODIUM PHOSPHATE 4 MG/ML
INJECTION, SOLUTION INTRA-ARTICULAR; INTRALESIONAL; INTRAMUSCULAR; INTRAVENOUS; SOFT TISSUE PRN
Status: DISCONTINUED | OUTPATIENT
Start: 2024-06-14 | End: 2024-06-14 | Stop reason: SURG

## 2024-06-14 RX ORDER — ONDANSETRON 2 MG/ML
INJECTION INTRAMUSCULAR; INTRAVENOUS PRN
Status: DISCONTINUED | OUTPATIENT
Start: 2024-06-14 | End: 2024-06-14 | Stop reason: SURG

## 2024-06-14 RX ORDER — HYDROMORPHONE HYDROCHLORIDE 1 MG/ML
0.2 INJECTION, SOLUTION INTRAMUSCULAR; INTRAVENOUS; SUBCUTANEOUS
Status: DISCONTINUED | OUTPATIENT
Start: 2024-06-14 | End: 2024-06-14 | Stop reason: HOSPADM

## 2024-06-14 RX ORDER — HYDROMORPHONE HYDROCHLORIDE 1 MG/ML
0.4 INJECTION, SOLUTION INTRAMUSCULAR; INTRAVENOUS; SUBCUTANEOUS
Status: DISCONTINUED | OUTPATIENT
Start: 2024-06-14 | End: 2024-06-14 | Stop reason: HOSPADM

## 2024-06-14 RX ORDER — BUPIVACAINE HYDROCHLORIDE AND EPINEPHRINE 5; 5 MG/ML; UG/ML
INJECTION, SOLUTION EPIDURAL; INTRACAUDAL; PERINEURAL
Status: DISCONTINUED | OUTPATIENT
Start: 2024-06-14 | End: 2024-06-14 | Stop reason: HOSPADM

## 2024-06-14 RX ORDER — PROMETHAZINE HYDROCHLORIDE 25 MG/1
12.5-25 SUPPOSITORY RECTAL EVERY 4 HOURS PRN
Status: DISCONTINUED | OUTPATIENT
Start: 2024-06-14 | End: 2024-06-21 | Stop reason: HOSPADM

## 2024-06-14 RX ORDER — PROMETHAZINE HYDROCHLORIDE 25 MG/1
12.5-25 TABLET ORAL EVERY 4 HOURS PRN
Status: DISCONTINUED | OUTPATIENT
Start: 2024-06-14 | End: 2024-06-21 | Stop reason: HOSPADM

## 2024-06-14 RX ORDER — OXYCODONE HYDROCHLORIDE 5 MG/1
5 TABLET ORAL EVERY 4 HOURS PRN
Status: DISCONTINUED | OUTPATIENT
Start: 2024-06-14 | End: 2024-06-15

## 2024-06-14 RX ORDER — CEFAZOLIN SODIUM 1 G/3ML
INJECTION, POWDER, FOR SOLUTION INTRAMUSCULAR; INTRAVENOUS PRN
Status: DISCONTINUED | OUTPATIENT
Start: 2024-06-14 | End: 2024-06-14 | Stop reason: SURG

## 2024-06-14 RX ORDER — HEPARIN SODIUM 5000 [USP'U]/ML
5000 INJECTION, SOLUTION INTRAVENOUS; SUBCUTANEOUS EVERY 8 HOURS
Status: DISCONTINUED | OUTPATIENT
Start: 2024-06-14 | End: 2024-06-16

## 2024-06-14 RX ORDER — HYDROMORPHONE HYDROCHLORIDE 1 MG/ML
0.1 INJECTION, SOLUTION INTRAMUSCULAR; INTRAVENOUS; SUBCUTANEOUS
Status: DISCONTINUED | OUTPATIENT
Start: 2024-06-14 | End: 2024-06-14 | Stop reason: HOSPADM

## 2024-06-14 RX ADMIN — SODIUM CHLORIDE, POTASSIUM CHLORIDE, SODIUM LACTATE AND CALCIUM CHLORIDE: 600; 310; 30; 20 INJECTION, SOLUTION INTRAVENOUS at 20:05

## 2024-06-14 RX ADMIN — POTASSIUM CHLORIDE 10 MEQ: 7.46 INJECTION, SOLUTION INTRAVENOUS at 14:52

## 2024-06-14 RX ADMIN — LINEZOLID 600 MG: 600 INJECTION, SOLUTION INTRAVENOUS at 13:33

## 2024-06-14 RX ADMIN — FENTANYL CITRATE 50 MCG: 50 INJECTION, SOLUTION INTRAMUSCULAR; INTRAVENOUS at 14:14

## 2024-06-14 RX ADMIN — ROCURONIUM BROMIDE 50 MG: 50 INJECTION, SOLUTION INTRAVENOUS at 18:22

## 2024-06-14 RX ADMIN — SODIUM CHLORIDE, POTASSIUM CHLORIDE, SODIUM LACTATE AND CALCIUM CHLORIDE: 600; 310; 30; 20 INJECTION, SOLUTION INTRAVENOUS at 12:45

## 2024-06-14 RX ADMIN — PROPOFOL 110 MG: 10 INJECTION, EMULSION INTRAVENOUS at 18:21

## 2024-06-14 RX ADMIN — POTASSIUM CHLORIDE 40 MEQ: 29.8 INJECTION, SOLUTION INTRAVENOUS at 22:49

## 2024-06-14 RX ADMIN — NOREPINEPHRINE BITARTRATE 0.15 MCG/KG/MIN: 1 INJECTION, SOLUTION, CONCENTRATE INTRAVENOUS at 20:08

## 2024-06-14 RX ADMIN — HEPARIN SODIUM 5000 UNITS: 5000 INJECTION, SOLUTION INTRAVENOUS; SUBCUTANEOUS at 22:50

## 2024-06-14 RX ADMIN — PIPERACILLIN AND TAZOBACTAM 4.5 G: 4; .5 INJECTION, POWDER, FOR SOLUTION INTRAVENOUS at 13:17

## 2024-06-14 RX ADMIN — SUGAMMADEX 200 MG: 100 INJECTION, SOLUTION INTRAVENOUS at 18:45

## 2024-06-14 RX ADMIN — SODIUM CHLORIDE, POTASSIUM CHLORIDE, SODIUM LACTATE AND CALCIUM CHLORIDE 1000 ML: 600; 310; 30; 20 INJECTION, SOLUTION INTRAVENOUS at 14:15

## 2024-06-14 RX ADMIN — CEFAZOLIN 2 G: 1 INJECTION, POWDER, FOR SOLUTION INTRAMUSCULAR; INTRAVENOUS at 18:25

## 2024-06-14 RX ADMIN — NOREPINEPHRINE BITARTRATE 0.1 MCG/KG/MIN: 1 INJECTION, SOLUTION, CONCENTRATE INTRAVENOUS at 13:45

## 2024-06-14 RX ADMIN — SODIUM CHLORIDE, POTASSIUM CHLORIDE, SODIUM LACTATE AND CALCIUM CHLORIDE: 600; 310; 30; 20 INJECTION, SOLUTION INTRAVENOUS at 18:14

## 2024-06-14 RX ADMIN — SODIUM CHLORIDE, POTASSIUM CHLORIDE, SODIUM LACTATE AND CALCIUM CHLORIDE 1000 ML: 600; 310; 30; 20 INJECTION, SOLUTION INTRAVENOUS at 13:26

## 2024-06-14 RX ADMIN — FENTANYL CITRATE 50 MCG: 50 INJECTION, SOLUTION INTRAMUSCULAR; INTRAVENOUS at 19:05

## 2024-06-14 RX ADMIN — ONDANSETRON 4 MG: 2 INJECTION INTRAMUSCULAR; INTRAVENOUS at 18:28

## 2024-06-14 RX ADMIN — LIDOCAINE HYDROCHLORIDE 60 MG: 20 INJECTION, SOLUTION EPIDURAL; INFILTRATION; INTRACAUDAL at 18:21

## 2024-06-14 RX ADMIN — DEXAMETHASONE SODIUM PHOSPHATE 8 MG: 4 INJECTION INTRA-ARTICULAR; INTRALESIONAL; INTRAMUSCULAR; INTRAVENOUS; SOFT TISSUE at 18:28

## 2024-06-14 RX ADMIN — IOHEXOL 100 ML: 350 INJECTION, SOLUTION INTRAVENOUS at 13:45

## 2024-06-14 RX ADMIN — FENTANYL CITRATE 100 MCG: 50 INJECTION, SOLUTION INTRAMUSCULAR; INTRAVENOUS at 18:21

## 2024-06-14 ASSESSMENT — ENCOUNTER SYMPTOMS
DIARRHEA: 0
NAUSEA: 0
BACK PAIN: 0
FEVER: 0
SHORTNESS OF BREATH: 0
ABDOMINAL PAIN: 0
HEADACHES: 0
NERVOUS/ANXIOUS: 0
VOMITING: 0
WEAKNESS: 0

## 2024-06-14 ASSESSMENT — PAIN DESCRIPTION - PAIN TYPE
TYPE: ACUTE PAIN
TYPE: ACUTE PAIN;SURGICAL PAIN
TYPE: ACUTE PAIN
TYPE: ACUTE PAIN

## 2024-06-14 ASSESSMENT — FIBROSIS 4 INDEX: FIB4 SCORE: 4.08

## 2024-06-14 ASSESSMENT — PAIN SCALES - GENERAL: PAIN_LEVEL: 5

## 2024-06-14 NOTE — ED PROVIDER NOTES
ER Provider Note    Scribed for August Paula Ii, M.d. by Kelly Apple. 6/14/2024  12:37 PM    Primary Care Provider: Galilea Vaca D.O.    CHIEF COMPLAINT   Chief Complaint   Patient presents with    Wound Check     BIBA from home. Per EMS report, wound on pt's gluteal started a week ago.      EXTERNAL RECORDS REVIEWED  Outpatient Notes The patient was last seen on 05/06/2024 by [facility] for a follow up in regards to the patient's paroxysmal supraventricular tachycardia.    HPI/ROS  LIMITATION TO HISTORY   Select: : None  OUTSIDE HISTORIAN(S):  None.    Kavita Freeman is a 58 y.o. female who presents to the ED via EMS from her home in [place] for evaluation of a wound to her gluteal onset one week ago. The patient states that she has not been able to ambulate normally for about a week secondary to the wound pain. The patient states that she has had to lay on her left hip to be somewhat comfortable while laying down, but notes that she now has mild numbness to her left hip. The patient also reports pain to her vaginal area. The patient states she also has rhinorrhea and subjective fevers, but denies any shortness of breath. Per nursing note, the patient had a blood pressure of 65/40 upon EMS arrival and was given a liter of NS fluids en route.       PAST MEDICAL HISTORY  Past Medical History:   Diagnosis Date    Allergy     Anxiety     Arrhythmia     for tachycardia    Back pain     Depression     Treated by Dr Krala Whatley    Depression     Hyperlipidemia     Hypertension     Indigestion     Pain 2/24/15    right shoulder 6/10    PSVT (paroxysmal supraventricular tachycardia) (HCC) 6/24/2013    Psychiatric disorder     anxiety     Tobacco abuse 5/3/2013    URI (upper respiratory infection) 9/21/2013    Urinary tract infection, site not specified     Recurrent UTI's    UTI (lower urinary tract infection) 9/21/2013     SURGICAL HISTORY  Past Surgical History:  "  Procedure Laterality Date    SHOULDER ARTHROSCOPY W/ ROTATOR CUFF REPAIR  3/9/2015    Performed by Gilberto Meyer M.D. at SURGERY [facility] ORS    TRIGGER FINGER RELEASE  3/9/2015    Performed by Gilberto Meyer M.D. at SURGERY [facility] ORS    OTHER  6/2014    broken ribs- plates & Pins right front 4-8    SHOULDER ARTHROSCOPY  3/23/2009    Performed by GILBERTO MEYER at SURGERY [facility]    BREAST BIOPSY  7/18/08    Performed by MARY DE LA CRUZ at SURGERY SAME DAY [facility]    SHOULDER ARTHROSCOPY  2005    GYN SURGERY  1995    tubal ligation    OPEN REDUCTION      flail chest    TUBAL LIGATION       FAMILY HISTORY  Family History   Problem Relation Age of Onset    Hypertension Mother     Stroke Mother     Hypertension Father         ? valvular heart     Hypertension Brother     Cancer Paternal Grandmother         breast cancer     SOCIAL HISTORY   reports that she has been smoking cigarettes. She started smoking about 30 years ago. She has a 12.5 pack-year smoking history. She has never used smokeless tobacco. She reports that she does not drink alcohol and does not use drugs.    CURRENT MEDICATIONS  Previous Medications    ALPRAZOLAM (XANAX) 0.5 MG TAB    Take 0.25 mg by mouth 1 time a day as needed for Sleep.    LOSARTAN (COZAAR) 50 MG TAB    Take 1 Tablet by mouth every day.    ROSUVASTATIN (CRESTOR) 20 MG TAB    Take 1 Tablet by mouth every evening.     ALLERGIES  Bactrim [sulfamethoxazole w-trimethoprim] and Macrobid [nitrofurantoin monohydrate macrocrystals]    PHYSICAL EXAM  BP (!) 71/34   Pulse 63   Temp 36.7 °C (98.1 °F) (Temporal)   Resp 20   Ht 1.727 m (5' 8\")   Wt 76.2 kg (168 lb)   SpO2 97%   BMI 25.54 kg/m²     Physical Exam  Vitals and nursing note reviewed.   Constitutional:       Comments: 58 year old woman appears to be in pain   HENT:      Head: Normocephalic and atraumatic.      Mouth/Throat:      Mouth: Mucous membranes are moist.   Eyes:      Extraocular " Movements: Extraocular movements intact.      Pupils: Pupils are equal, round, and reactive to light.   Cardiovascular:      Rate and Rhythm: Normal rate and regular rhythm.   Pulmonary:      Effort: Pulmonary effort is normal.      Breath sounds: Normal breath sounds.   Abdominal:      General: There is no distension.      Tenderness: There is no abdominal tenderness.   Genitourinary:     Comments: At rectum area extending to perineum is erythema, skin breakdown, induration. This extends to the inferior right labia. This area is extremely tender to palpation.   Musculoskeletal:      Cervical back: Normal range of motion.   Neurological:      General: No focal deficit present.      Mental Status: She is alert.          DIAGNOSTIC STUDIES    EKG/LABS  Results for orders placed or performed during the hospital encounter of 06/14/24   Lactic Acid   Result Value Ref Range    Lactic Acid 4.8 (HH) 0.5 - 2.0 mmol/L   Lactic Acid   Result Value Ref Range    Lactic Acid 3.8 (H) 0.5 - 2.0 mmol/L   CBC with Differential   Result Value Ref Range    WBC 20.1 (H) 4.8 - 10.8 K/uL    RBC 2.66 (L) 4.20 - 5.40 M/uL    Hemoglobin 9.5 (L) 12.0 - 16.0 g/dL    Hematocrit 27.2 (L) 37.0 - 47.0 %    .3 (H) 81.4 - 97.8 fL    MCH 35.7 (H) 27.0 - 33.0 pg    MCHC 34.9 32.2 - 35.5 g/dL    RDW 54.1 (H) 35.9 - 50.0 fL    Platelet Count 160 (L) 164 - 446 K/uL    MPV 11.7 9.0 - 12.9 fL    Neutrophils-Polys 82.50 (H) 44.00 - 72.00 %    Lymphocytes 5.60 (L) 22.00 - 41.00 %    Monocytes 8.70 0.00 - 13.40 %    Eosinophils 1.90 0.00 - 6.90 %    Basophils 0.30 0.00 - 1.80 %    Immature Granulocytes 1.00 (H) 0.00 - 0.90 %    Nucleated RBC 0.00 0.00 - 0.20 /100 WBC    Neutrophils (Absolute) 16.56 (H) 1.82 - 7.42 K/uL    Lymphs (Absolute) 1.12 1.00 - 4.80 K/uL    Monos (Absolute) 1.74 (H) 0.00 - 0.85 K/uL    Eos (Absolute) 0.39 0.00 - 0.51 K/uL    Baso (Absolute) 0.07 0.00 - 0.12 K/uL    Immature Granulocytes (abs) 0.20 (H) 0.00 - 0.11 K/uL    NRBC  (Absolute) 0.00 K/uL   Complete Metabolic Panel   Result Value Ref Range    Sodium 135 135 - 145 mmol/L    Potassium 3.0 (L) 3.6 - 5.5 mmol/L    Chloride 100 96 - 112 mmol/L    Co2 17 (L) 20 - 33 mmol/L    Anion Gap 18.0 (H) 7.0 - 16.0    Glucose 98 65 - 99 mg/dL    Bun 32 (H) 8 - 22 mg/dL    Creatinine 1.60 (H) 0.50 - 1.40 mg/dL    Calcium 8.3 (L) 8.5 - 10.5 mg/dL    Correct Calcium 9.5 8.5 - 10.5 mg/dL    AST(SGOT) 47 (H) 12 - 45 U/L    ALT(SGPT) 28 2 - 50 U/L    Alkaline Phosphatase 71 30 - 99 U/L    Total Bilirubin 4.8 (H) 0.1 - 1.5 mg/dL    Albumin 2.5 (L) 3.2 - 4.9 g/dL    Total Protein 7.1 6.0 - 8.2 g/dL    Globulin 4.6 (H) 1.9 - 3.5 g/dL    A-G Ratio 0.5 g/dL   MRSA By PCR (Amp)    Specimen: Nares; Respirate   Result Value Ref Range    MRSA by PCR Negative Negative   ESTIMATED GFR   Result Value Ref Range    GFR (CKD-EPI) 37 (A) >60 mL/min/1.73 m 2   Prothrombin Time   Result Value Ref Range    PT 23.4 (H) 12.0 - 14.6 sec    INR 2.05 (H) 0.87 - 1.13   COD - Adult (Type and Screen)   Result Value Ref Range    ABO Grouping Only O     Rh Grouping Only POS     Antibody Screen-Cod NEG    ABO Rh Confirm   Result Value Ref Range    ABO Rh Confirm O POS         RADIOLOGY/PROCEDURES   The attending emergency physician has independently interpreted the diagnostic imaging associated with this visit and am waiting the final reading from the radiologist.   My preliminary interpretation is a follows: Inflammatory changes with gas to perirectal region with extension. Free fluid in abdomen likely ascites.    Radiologist interpretation:  DX-CHEST-PORTABLE (1 VIEW)   Final Result      1.  Interval placement of right internal jugular central venous catheter.   2.  No evidence of procedure-related pneumothorax or other change compared with earlier film from today's date.      CT-ABDOMEN-PELVIS WITH   Final Result      1.  Cirrhotic changes of the liver.      2.  Splenomegaly.      3.  Esophageal, periumbilical, mesenteric, and  retroperitoneal varices are noted.      4.  Small to moderate amount of ascites throughout the lower abdomen and pelvis      5.  Acute left-sided perirectal abscess extending into the medial aspect of the left upper thigh.      DX-CHEST-PORTABLE (1 VIEW)   Final Result      1.  Slight atelectasis near left costophrenic angle.      2.  Postoperative changes of the right ribs.                  Point of Care Ultrasound    Indication: hypotension, septic shock    Consent: The patient provided verbal consent for this procedure.    Procedure: The patient was positioned appropriately and the skin over the right internal jugular vein was prepped with betadine and draped in a sterile fashion. Local anesthesia was obtained by infiltration using 1% Lidocaine without epinephrine.  A large bore needle was used to identify the vein under dynamic Ultrasound guidance.  A guide wire was then inserted into the vein through the needle. An 7 [place] introducer was then inserted into the vessel over the guide wire using the Seldinger technique.  All ports showed good, free flowing blood return and were flushed with saline solution.  The catheter was then securely fastened to the skin with sutures and covered with a sterile dressing.  A post procedure X-ray was ordered and showed good line position.    *Bedside Ultrasound utilized for Limited Diagnostic Exam: Venipuncture requiring physician skill with ultrasound guidance and image retained as below.    The patient tolerated the procedure well.    Complications: None    [facility]:       This study is a limited ultrasound examination performed and interpreted to evaluate for limited conditions as outlined above. There may be other clinically important information contained in the images that is outside this scope. When clinically warranted, a comprehensive ultrasound through the appropriate department is considered.         COURSE & MEDICAL DECISION  MAKING     ASSESSMENT, COURSE AND PLAN  Care Narrative:     12:37 PM - Patient seen and examined at bedside. This is a 58 year old woman who presents for evaluation of a wound to her gluteal onset about one week ago. The patient reports difficulty ambulating secondary to the pain and requires laying on her left hip for comfort.Presentation very concerning for necrotizing infection such as Jose's gangrene.  discussed plan of care, including performing lab work and imaging. Patient agrees to the plan of care. The patient will be medicated with LR bolus (she is hypotensive), Zosyn 4.5 g and Zyvox 600 mg. Ordered for Blood Culture x2, Urine Culture, UA, CMP, CBC w/ Diff., Lactic Acid, MRSA by PCR, DX-Chest and CT-Abdomen-Pelvis w/ to evaluate her symptoms.      1:20 PM - The patient was reevaluated at bedside.  Labs show leukocytosis WBC 20, anemia hemoglobin 9.5, EFRAIN creatinine 1.6 BUN 32, hypokalemia K 3.0, Increased bilirubin 4.8, lactic acid 4.8. I reviewed CT and concerning infectious findings, also findings of ascites.     1:24 PM - Paged Surgery.     1:33 PM - The patient had no responsive to LR fluids, so I am starting noepi infusion. Spoke with Pharmacy, Alexys.     1:41 PM - Spoke with Dr. Ko, Surgery, about the patient's condition. She will come down to see the patient. The patient will likely need to go up to the OR. The patient was reevaluated and I informed her that Dr. Ko will be down in a little to come evaluate her.      1:43 PM - Ordered COD. The patient will be given potassium chloride. She has not been responsive to fluid bolus and norepinephrine infusion will be started.     1:51 PM - The patient was reevaluated at bedside.  BP improved.     2:03 PM - The patient was reevaluated at bedside. Central line procedure done by me. See note above.     5:11 PM  She was going to surgery soon.  Surgery team contacted me and after discussion among other service decided that it be best for her to be  admitted to the medical ICU following surgery.  I will send a request for ICU admission now.    CRITICAL CARE  The very real possibility of a deterioration of this patient's condition required the highest level of my preparedness for sudden, emergent intervention.  I provided critical care services, which included medication orders, frequent reevaluations of the patient's condition and response to treatment, ordering and reviewing test results, and discussing the case with various consultants.  The critical care time associated with the care of the patient was 40 minutes. Review chart for interventions. This time is exclusive of any other billable procedures.      Sepsis: Infection was suspected 12:37 PM (Time). Sepsis pathway was initiated. 30cc/kg bolus given. Antibiotics were given per protocol.    PROBLEM LIST  #Yeimi-rectal abscess with likely necrotizing infection   -abx given shortly after arrival   -to surgery with Dr. Ko     #Septic shock   -not responsive to 30cc/kg bolus   -norepinephrine started    #EFRAIN    #Hypokalemia   -given KCL 10meq  #Lactic acidosis    #Cirrhosis    -heavy alcohol use history but no longer drinking   -labs show liver dysfunction with INR >2, increased bilirubin    #Anemia   -likely chronic    DISPOSITION AND DISCUSSIONS  I have discussed management of the patient with the following physicians and ALONSO's:  Dr. Ko (Surgery)    Discussion of management with other QHP or appropriate source(s): Pharmacy Christopher      Barriers to care at this time, including but not limited to:  None known .     DISPOSITION:  Patient will be hospitalized by Dr. Ko in criticalcondition.     FINAL DIANGOSIS  1. Sepsis with acute organ dysfunction and septic shock, due to unspecified organism, unspecified organ dysfunction type (HCC)    2. EFRAIN (acute kidney injury) (HCC)    3. Hypokalemia    4. Lactic acidosis    5. Necrotizing soft tissue infection    6. Yeimi-rectal abscess       IKelly  Rony Apple (Scribe), am scribing for, and in the presence of, NANETTE Reynoso II.    Electronically signed by: Kelly Apple (Scribe), 6/14/2024    IAugust II, M* personally performed the services described in this documentation, as scribed by Kelly Apple in my presence, and it is both accurate and complete.      The note accurately reflects work and decisions made by me.  August Paula II, M.D.  6/14/2024  4:59 PM

## 2024-06-14 NOTE — ANESTHESIA PREPROCEDURE EVALUATION
Case: [id number] Date/Time: 06/14/24 1608    Procedure: INCISION AND DRAINAGE, ABSCESS, PERIRECTAL    [facility] OR [facility]    Surgeons: NAIMA Cavazos central line in situ. On norepinephrine 0.2mcg/kg/min.   Alert and oriented. Preop BP 110s/60s.    Relevant Problems   NEURO   (positive) Migraine without aura and without status migrainosus, not intractable      CARDIAC   (positive) Essential hypertension   (positive) Migraine without aura and without status migrainosus, not intractable   (positive) PSVT (paroxysmal supraventricular tachycardia) (HCC)         (positive) Alcoholic cirrhosis of liver with ascites (HCC)       Physical Exam    Airway   Mallampati: II  TM distance: >3 FB  Neck ROM: full       Cardiovascular - normal exam  Rhythm: regular  Rate: normal  (-) murmur     Dental - normal exam           Pulmonary - normal exam  Breath sounds clear to auscultation     Abdominal    Neurological - normal exam         Other findings: No loose teeth               Anesthesia Plan    ASA 4- EMERGENT   ASA physical status 4 criteria: sepsisASA physical status emergent criteria: sepsis and acutely contaminated wound or identified infection source    Plan - general       Airway plan will be ETT          Induction: intravenous    Postoperative Plan: Postoperative administration of opioids is intended.    Pertinent diagnostic labs and testing reviewed    Informed Consent:    Anesthetic plan and risks discussed with patient.    Use of blood products discussed with: patient whom consented to blood products.

## 2024-06-14 NOTE — H&P
DATE OF CONSULTATION:  6/14/2024     REFERRING PHYSICIAN:   August Paula M.D.     CONSULTING PHYSICIAN:  Lori Ko M.D.     REASON FOR CONSULTATION:  I have been asked by Dr. Paula to see the patient in surgical consultation for evaluation of perirectal abscess and septic shock.    HISTORY OF PRESENT ILLNESS: The patient is a 58 year-old White woman who presents to the Emergency Department with a one-week history of severe  perirectal . The pain is associated with diaphoresis and malaise. The patient denies any recent or intercurrent illness. The patient denies any history of previous abdominal surgery. The patient denies any previous surgery for obstructive symptoms.  Patient states that she has stopped drinking alcohol but was a very heavy drinker previously.  She does have cirrhosis of her liver with ascites.  INR has not been sent so I cannot calculate a MELD currently.  Will add it later with MELD.  Patient states that she thought she had a hemorrhoid which is now gotten entirely out of hand.    PAST MEDICAL HISTORY:  has a past medical history of Allergy, Anxiety, Arrhythmia, Back pain, Depression, Depression, Hyperlipidemia, Hypertension, Indigestion, Pain (2/24/15), PSVT (paroxysmal supraventricular tachycardia) (HCC) (6/24/2013), Psychiatric disorder, Tobacco abuse (5/3/2013), URI (upper respiratory infection) (9/21/2013), Urinary tract infection, site not specified, and UTI (lower urinary tract infection) (9/21/2013).    She has no past medical history of Breast cancer (HCC), Chronic airway obstruction, not elsewhere classified, Diabetes, Fall, Thyroid disease, or Unspecified asthma(493.90).    PAST SURGICAL HISTORY:  has a past surgical history that includes breast biopsy (7/18/08); shoulder arthroscopy (3/23/2009); tubal ligation; gyn surgery (1995); shoulder arthroscopy (2005); other (6/2014); shoulder arthroscopy w/ rotator cuff repair (3/9/2015); trigger finger release (3/9/2015);  and open reduction.    ALLERGIES:   Allergies   Allergen Reactions    Nitrofurantoin Vomiting    Sulfamethoxazole W-Trimethoprim Vomiting       CURRENT MEDICATIONS:    Home Medications       Reviewed by Conrado Dominique (Pharmacy Tech) on 06/14/24 at 1344  Med List Status: Complete     Medication Last Dose Status   ALPRAZolam (XANAX) 0.5 MG Tab 6/14/2024 Active   losartan (COZAAR) 50 MG Tab 6/13/2024 Active   rosuvastatin (CRESTOR) 20 MG Tab 6/13/2024 Active                  Audit from Redirected Encounters    **Home medications have not yet been reviewed for this encounter**         FAMILY HISTORY: family history includes Cancer in her paternal grandmother; Hypertension in her brother, father, and mother; Stroke in her mother.    SOCIAL HISTORY:  reports that she has been smoking cigarettes. She started smoking about 30 years ago. She has a 12.5 pack-year smoking history. She has never used smokeless tobacco. She reports that she does not drink alcohol and does not use drugs.    REVIEW OF SYSTEMS: Comprehensive review of systems is negative with the exception of the aforementioned HPI, PMH, and PSH bullets in accordance with CMS guidelines.    PHYSICAL EXAMINATION:    Vitals:    06/14/24 1401   BP: (!) 87/52   Pulse: 61   Resp: (!) 22   Temp:    SpO2: 96%       Physical Exam  Vitals and nursing note reviewed. Exam conducted with a chaperone present.   Constitutional:       Appearance: She is ill-appearing and toxic-appearing.   HENT:      Head: Normocephalic and atraumatic.      Right Ear: External ear normal.      Left Ear: External ear normal.      Nose: Nose normal.      Mouth/Throat:      Mouth: Mucous membranes are moist.   Eyes:      General: Scleral icterus present.      Pupils: Pupils are equal, round, and reactive to light.   Cardiovascular:      Rate and Rhythm: Normal rate and regular rhythm.      Pulses: Normal pulses.   Pulmonary:      Effort: Pulmonary effort is normal.   Abdominal:      General:  Abdomen is flat.      Palpations: Abdomen is soft.   Genitourinary:     Comments: Large perirectal abscess with blistering and foul drainage  Musculoskeletal:         General: Normal range of motion.      Cervical back: Normal range of motion.   Skin:     General: Skin is warm.      Capillary Refill: Capillary refill takes 2 to 3 seconds.   Neurological:      Mental Status: She is alert and oriented to person, place, and time.   Psychiatric:         Mood and Affect: Mood normal.         LABORATORY VALUES:   Recent Labs     06/14/24  1212   WBC 20.1*   RBC 2.66*   HEMOGLOBIN 9.5*   HEMATOCRIT 27.2*   .3*   MCH 35.7*   MCHC 34.9   RDW 54.1*   PLATELETCT 160*   MPV 11.7     Recent Labs     06/14/24  1212   SODIUM 135   POTASSIUM 3.0*   CHLORIDE 100   CO2 17*   GLUCOSE 98   BUN 32*   CREATININE 1.60*   CALCIUM 8.3*     Recent Labs     06/14/24  1212   ASTSGOT 47*   ALTSGPT 28   TBILIRUBIN 4.8*   ALKPHOSPHAT 71   GLOBULIN 4.6*            IMAGING:   CT-ABDOMEN-PELVIS WITH   Final Result      1.  Cirrhotic changes of the liver.      2.  Splenomegaly.      3.  Esophageal, periumbilical, mesenteric, and retroperitoneal varices are noted.      4.  Small to moderate amount of ascites throughout the lower abdomen and pelvis      5.  Acute left-sided perirectal abscess extending into the medial aspect of the left upper thigh.      DX-CHEST-PORTABLE (1 VIEW)   Final Result      1.  Slight atelectasis near left costophrenic angle.      2.  Postoperative changes of the right ribs.             ASSESSMENT AND PLAN:     Alcoholic cirrhosis of liver with ascites (HCC)- (present on admission)  Assessment & Plan  Cirrhosis with ascites.  INR pending for for meld calculation.    Perirectal abscess- (present on admission)  Assessment & Plan  Perirectal abscess with septic shock.  Fluid resuscitation in the emergency department as well as broad-spectrum antibiotic coverage.  NPO.  Coagulation studies have been sent.  Patient was  taken to the operating room for incision and debridement perirectal abscess with potential component of necrotizing soft tissue infection.  Postoperative ICU admission will be necessary.        DISPOSITION: Admit [facility] Acute Care Surgery [facility].     ____________________________________     Lori Ko M.D.    DD: 6/14/2024  2:07 PM

## 2024-06-14 NOTE — ED NOTES
Pharmacy Medication Reconciliation      ~Medication reconciliation updated and complete per patient at bedside   ~Allergies have been verified and updated   ~No oral ABX within the last 30 days  ~Patient home pharmacy :  [facility]      ~Anticoagulants (rivaroxaban, apixaban, edoxaban, dabigatran, warfarin, enoxaparin) taken in the last 14 days? No

## 2024-06-14 NOTE — ED TRIAGE NOTES
"Chief Complaint   Patient presents with    Wound Check     BIBA from home. Per EMS report, wound on pt's gluteal started a week ago.      65/40 BP upon EMS arrival. PIV established G20 Left AC. NS 1L given en route.    AOx4. GCS 15. Room air.    BP (!) 71/34   Pulse 63   Temp 36.7 °C (98.1 °F) (Temporal)   Resp 20   Ht 1.727 m (5' 8\")   Wt 76.2 kg (168 lb)   SpO2 97%   BMI 25.54 kg/m²     "

## 2024-06-14 NOTE — ASSESSMENT & PLAN NOTE
Perirectal abscess with septic shock.  Fluid resuscitation in the emergency department as well as broad-spectrum antibiotic coverage.  NPO.  Coagulation studies have been sent.  Patient was taken to the operating room for incision and debridement perirectal abscess with potential component of necrotizing soft tissue infection.  Postoperative ICU admission will be necessary.

## 2024-06-15 LAB
ANION GAP SERPL CALC-SCNC: 14 MMOL/L (ref 7–16)
BASOPHILS # BLD AUTO: 0.4 % (ref 0–1.8)
BASOPHILS # BLD: 0.13 K/UL (ref 0–0.12)
BUN SERPL-MCNC: 27 MG/DL (ref 8–22)
CALCIUM SERPL-MCNC: 8.6 MG/DL (ref 8.5–10.5)
CHLORIDE SERPL-SCNC: 105 MMOL/L (ref 96–112)
CO2 SERPL-SCNC: 19 MMOL/L (ref 20–33)
CREAT SERPL-MCNC: 1.38 MG/DL (ref 0.5–1.4)
EKG IMPRESSION: NORMAL
EOSINOPHIL # BLD AUTO: 0.22 K/UL (ref 0–0.51)
EOSINOPHIL NFR BLD: 0.7 % (ref 0–6.9)
ERYTHROCYTE [DISTWIDTH] IN BLOOD BY AUTOMATED COUNT: 55.7 FL (ref 35.9–50)
FUNGUS SPEC FUNGUS STN: NORMAL
GFR SERPLBLD CREATININE-BSD FMLA CKD-EPI: 45 ML/MIN/1.73 M 2
GLUCOSE SERPL-MCNC: 113 MG/DL (ref 65–99)
HCT VFR BLD AUTO: 29.2 % (ref 37–47)
HGB BLD-MCNC: 10.1 G/DL (ref 12–16)
IMM GRANULOCYTES # BLD AUTO: 0.56 K/UL (ref 0–0.11)
IMM GRANULOCYTES NFR BLD AUTO: 1.8 % (ref 0–0.9)
LACTATE SERPL-SCNC: 1.3 MMOL/L (ref 0.5–2)
LYMPHOCYTES # BLD AUTO: 0.79 K/UL (ref 1–4.8)
LYMPHOCYTES NFR BLD: 2.5 % (ref 22–41)
MAGNESIUM SERPL-MCNC: 1.4 MG/DL (ref 1.5–2.5)
MCH RBC QN AUTO: 35.6 PG (ref 27–33)
MCHC RBC AUTO-ENTMCNC: 34.6 G/DL (ref 32.2–35.5)
MCV RBC AUTO: 102.8 FL (ref 81.4–97.8)
MONOCYTES # BLD AUTO: 0.35 K/UL (ref 0–0.85)
MONOCYTES NFR BLD AUTO: 1.1 % (ref 0–13.4)
NEUTROPHILS # BLD AUTO: 29.62 K/UL (ref 1.82–7.42)
NEUTROPHILS NFR BLD: 93.5 % (ref 44–72)
NRBC # BLD AUTO: 0 K/UL
NRBC BLD-RTO: 0 /100 WBC (ref 0–0.2)
PHOSPHATE SERPL-MCNC: 4.5 MG/DL (ref 2.5–4.5)
PLATELET # BLD AUTO: 188 K/UL (ref 164–446)
PMV BLD AUTO: 11.4 FL (ref 9–12.9)
POTASSIUM SERPL-SCNC: 3.9 MMOL/L (ref 3.6–5.5)
RBC # BLD AUTO: 2.84 M/UL (ref 4.2–5.4)
SIGNIFICANT IND 70042: NORMAL
SITE SITE: NORMAL
SODIUM SERPL-SCNC: 138 MMOL/L (ref 135–145)
SOURCE SOURCE: NORMAL
WBC # BLD AUTO: 31.7 K/UL (ref 4.8–10.8)

## 2024-06-15 PROCEDURE — 83735 ASSAY OF MAGNESIUM: CPT

## 2024-06-15 PROCEDURE — 84100 ASSAY OF PHOSPHORUS: CPT

## 2024-06-15 PROCEDURE — 770022 HCHG ROOM/CARE - ICU (200)

## 2024-06-15 PROCEDURE — 93010 ELECTROCARDIOGRAM REPORT: CPT | Performed by: INTERNAL MEDICINE

## 2024-06-15 PROCEDURE — 700105 HCHG RX REV CODE 258: Performed by: NURSE PRACTITIONER

## 2024-06-15 PROCEDURE — 93005 ELECTROCARDIOGRAM TRACING: CPT | Performed by: NURSE PRACTITIONER

## 2024-06-15 PROCEDURE — 99291 CRITICAL CARE FIRST HOUR: CPT | Performed by: INTERNAL MEDICINE

## 2024-06-15 PROCEDURE — 83605 ASSAY OF LACTIC ACID: CPT

## 2024-06-15 PROCEDURE — A9270 NON-COVERED ITEM OR SERVICE: HCPCS | Performed by: INTERNAL MEDICINE

## 2024-06-15 PROCEDURE — 80048 BASIC METABOLIC PNL TOTAL CA: CPT

## 2024-06-15 PROCEDURE — 700111 HCHG RX REV CODE 636 W/ 250 OVERRIDE (IP): Performed by: INTERNAL MEDICINE

## 2024-06-15 PROCEDURE — 85025 COMPLETE CBC W/AUTO DIFF WBC: CPT

## 2024-06-15 PROCEDURE — 700102 HCHG RX REV CODE 250 W/ 637 OVERRIDE(OP): Performed by: INTERNAL MEDICINE

## 2024-06-15 PROCEDURE — 700105 HCHG RX REV CODE 258: Performed by: EMERGENCY MEDICINE

## 2024-06-15 PROCEDURE — 99024 POSTOP FOLLOW-UP VISIT: CPT | Performed by: SURGERY

## 2024-06-15 PROCEDURE — 700101 HCHG RX REV CODE 250: Performed by: EMERGENCY MEDICINE

## 2024-06-15 PROCEDURE — 700111 HCHG RX REV CODE 636 W/ 250 OVERRIDE (IP): Performed by: NURSE PRACTITIONER

## 2024-06-15 PROCEDURE — 700105 HCHG RX REV CODE 258: Performed by: INTERNAL MEDICINE

## 2024-06-15 RX ORDER — MAGNESIUM SULFATE HEPTAHYDRATE 40 MG/ML
4 INJECTION, SOLUTION INTRAVENOUS ONCE
Status: COMPLETED | OUTPATIENT
Start: 2024-06-15 | End: 2024-06-15

## 2024-06-15 RX ORDER — HYDROMORPHONE HYDROCHLORIDE 1 MG/ML
1-2 INJECTION, SOLUTION INTRAMUSCULAR; INTRAVENOUS; SUBCUTANEOUS
Status: DISCONTINUED | OUTPATIENT
Start: 2024-06-15 | End: 2024-06-19

## 2024-06-15 RX ORDER — OXYCODONE HYDROCHLORIDE 5 MG/1
5-10 TABLET ORAL
Status: DISCONTINUED | OUTPATIENT
Start: 2024-06-15 | End: 2024-06-19

## 2024-06-15 RX ADMIN — OXYCODONE HYDROCHLORIDE 5 MG: 5 TABLET ORAL at 09:01

## 2024-06-15 RX ADMIN — HEPARIN SODIUM 5000 UNITS: 5000 INJECTION, SOLUTION INTRAVENOUS; SUBCUTANEOUS at 21:40

## 2024-06-15 RX ADMIN — SODIUM CHLORIDE, POTASSIUM CHLORIDE, SODIUM LACTATE AND CALCIUM CHLORIDE: 600; 310; 30; 20 INJECTION, SOLUTION INTRAVENOUS at 20:35

## 2024-06-15 RX ADMIN — NOREPINEPHRINE BITARTRATE 0.12 MCG/KG/MIN: 1 INJECTION, SOLUTION, CONCENTRATE INTRAVENOUS at 23:35

## 2024-06-15 RX ADMIN — HEPARIN SODIUM 5000 UNITS: 5000 INJECTION, SOLUTION INTRAVENOUS; SUBCUTANEOUS at 06:04

## 2024-06-15 RX ADMIN — OXYCODONE HYDROCHLORIDE 10 MG: 5 TABLET ORAL at 21:41

## 2024-06-15 RX ADMIN — MAGNESIUM SULFATE HEPTAHYDRATE 4 G: 4 INJECTION, SOLUTION INTRAVENOUS at 05:59

## 2024-06-15 RX ADMIN — FENTANYL CITRATE 50 MCG: 50 INJECTION, SOLUTION INTRAMUSCULAR; INTRAVENOUS at 10:23

## 2024-06-15 RX ADMIN — OXYCODONE HYDROCHLORIDE 5 MG: 5 TABLET ORAL at 17:04

## 2024-06-15 RX ADMIN — PIPERACILLIN AND TAZOBACTAM 4.5 G: 4; .5 INJECTION, POWDER, FOR SOLUTION INTRAVENOUS at 09:29

## 2024-06-15 RX ADMIN — SODIUM CHLORIDE, POTASSIUM CHLORIDE, SODIUM LACTATE AND CALCIUM CHLORIDE: 600; 310; 30; 20 INJECTION, SOLUTION INTRAVENOUS at 11:16

## 2024-06-15 RX ADMIN — PIPERACILLIN AND TAZOBACTAM 4.5 G: 4; .5 INJECTION, POWDER, FOR SOLUTION INTRAVENOUS at 22:16

## 2024-06-15 RX ADMIN — NOREPINEPHRINE BITARTRATE 0.08 MCG/KG/MIN: 1 INJECTION, SOLUTION, CONCENTRATE INTRAVENOUS at 06:35

## 2024-06-15 RX ADMIN — PIPERACILLIN AND TAZOBACTAM 4.5 G: 4; .5 INJECTION, POWDER, FOR SOLUTION INTRAVENOUS at 06:04

## 2024-06-15 RX ADMIN — FENTANYL CITRATE 50 MCG: 50 INJECTION, SOLUTION INTRAMUSCULAR; INTRAVENOUS at 11:17

## 2024-06-15 RX ADMIN — SODIUM CHLORIDE, POTASSIUM CHLORIDE, SODIUM LACTATE AND CALCIUM CHLORIDE: 600; 310; 30; 20 INJECTION, SOLUTION INTRAVENOUS at 03:53

## 2024-06-15 RX ADMIN — HEPARIN SODIUM 5000 UNITS: 5000 INJECTION, SOLUTION INTRAVENOUS; SUBCUTANEOUS at 14:24

## 2024-06-15 SDOH — ECONOMIC STABILITY: TRANSPORTATION INSECURITY
IN THE PAST 12 MONTHS, HAS LACK OF RELIABLE TRANSPORTATION KEPT YOU FROM MEDICAL APPOINTMENTS, MEETINGS, WORK OR FROM GETTING THINGS NEEDED FOR DAILY LIVING?: NO

## 2024-06-15 SDOH — ECONOMIC STABILITY: TRANSPORTATION INSECURITY
IN THE PAST 12 MONTHS, HAS THE LACK OF TRANSPORTATION KEPT YOU FROM MEDICAL APPOINTMENTS OR FROM GETTING MEDICATIONS?: NO

## 2024-06-15 ASSESSMENT — COGNITIVE AND FUNCTIONAL STATUS - GENERAL
DRESSING REGULAR LOWER BODY CLOTHING: A LOT
WALKING IN HOSPITAL ROOM: A LOT
MOVING TO AND FROM BED TO CHAIR: A LOT
STANDING UP FROM CHAIR USING ARMS: A LITTLE
MOBILITY SCORE: 16
TOILETING: A LITTLE
MOVING FROM LYING ON BACK TO SITTING ON SIDE OF FLAT BED: A LITTLE
DAILY ACTIVITIY SCORE: 19
HELP NEEDED FOR BATHING: A LOT
SUGGESTED CMS G CODE MODIFIER DAILY ACTIVITY: CK
SUGGESTED CMS G CODE MODIFIER MOBILITY: CK
CLIMB 3 TO 5 STEPS WITH RAILING: A LOT

## 2024-06-15 ASSESSMENT — PAIN DESCRIPTION - PAIN TYPE
TYPE: ACUTE PAIN

## 2024-06-15 ASSESSMENT — PATIENT HEALTH QUESTIONNAIRE - PHQ9
1. LITTLE INTEREST OR PLEASURE IN DOING THINGS: NOT AT ALL
2. FEELING DOWN, DEPRESSED, IRRITABLE, OR HOPELESS: NOT AT ALL
SUM OF ALL RESPONSES TO PHQ9 QUESTIONS 1 AND 2: 0

## 2024-06-15 ASSESSMENT — SOCIAL DETERMINANTS OF HEALTH (SDOH)

## 2024-06-15 ASSESSMENT — LIFESTYLE VARIABLES
TOTAL SCORE: 0
TOTAL SCORE: 0
HOW MANY TIMES IN THE PAST YEAR HAVE YOU HAD 5 OR MORE DRINKS IN A DAY: 0
HAVE YOU EVER FELT YOU SHOULD CUT DOWN ON YOUR DRINKING: NO
HAVE PEOPLE ANNOYED YOU BY CRITICIZING YOUR DRINKING: NO
EVER HAD A DRINK FIRST THING IN THE MORNING TO STEADY YOUR NERVES TO GET RID OF A HANGOVER: NO
CONSUMPTION TOTAL: NEGATIVE
AVERAGE NUMBER OF DAYS PER WEEK YOU HAVE A DRINK CONTAINING ALCOHOL: 0
ALCOHOL_USE: NO
ON A TYPICAL DAY WHEN YOU DRINK ALCOHOL HOW MANY DRINKS DO YOU HAVE: 0
EVER FELT BAD OR GUILTY ABOUT YOUR DRINKING: NO
DOES PATIENT WANT TO STOP DRINKING: NO
TOTAL SCORE: 0

## 2024-06-15 ASSESSMENT — COPD QUESTIONNAIRES
DO YOU EVER COUGH UP ANY MUCUS OR PHLEGM?: NO/ONLY WITH OCCASIONAL COLDS OR INFECTIONS
DURING THE PAST 4 WEEKS HOW MUCH DID YOU FEEL SHORT OF BREATH: NONE/LITTLE OF THE TIME
COPD SCREENING SCORE: 4
HAVE YOU SMOKED AT LEAST 100 CIGARETTES IN YOUR ENTIRE LIFE: YES

## 2024-06-15 ASSESSMENT — FIBROSIS 4 INDEX: FIB4 SCORE: 2.645751311064590591

## 2024-06-15 NOTE — WOUND TEAM
Received wound consult for barbara-rectal abscess s/p I&D.  Clarified consult with RN; per Dr. Maikel zaman for wound team to assist with daily packing changes starting tomorrow.     Wound team following.

## 2024-06-15 NOTE — ASSESSMENT & PLAN NOTE
This is Septic shock Present on admission  SIRS criteria identified on my evaluation include: Tachycardia, with heart rate greater than 90 BPM and Leukocytosis, with WBC greater than 12,000  Clinical indicators of end organ dysfunction include Hypotension with systolic blood pressure less than 90 or MAP less than 65  Indicators of septic shock include: Sepsis present and persistent hypotension despite fluid resuscitation   Sources is: perirectal abscess  Sepsis protocol initiated  Crystalloid Fluid Administration: Fluid resuscitation ordered per standard protocol - 30 mL/kg per current or ideal body weight  IV antibiotics as appropriate for source of sepsis  Reassessment: I have reassessed the patient's hemodynamic status    Source control per surgery.   S/p I&D of abscess  Unasyn  Wound culture: E.coli, fusobacterium mortiferum, bacteroides ovatus and fragilis, corynebacterium amycolatum   Vasopressors titrated to MAP > 65

## 2024-06-15 NOTE — ANESTHESIA PROCEDURE NOTES
Airway    Date/Time: 6/14/2024 6:22 PM    Performed by: Gosia Velasco M.D.  Authorized by: Gosia Velasco M.D.    Location:  OR  Urgency:  Elective  Indications for Airway Management:  Anesthesia      Spontaneous Ventilation: absent    Sedation Level:  Deep  Preoxygenated: Yes    Patient Position:  Sniffing  Mask Difficulty Assessment:  0 - not attempted  Final Airway Type:  Endotracheal airway  Final Endotracheal Airway:  ETT  Cuffed: Yes    Technique Used for Successful ETT Placement:  Direct laryngoscopy    Insertion Site:  Oral  Blade Type:  Mali  Laryngoscope Blade/Videolaryngoscope Blade Size:  3  ETT Size (mm):  7.5  Measured from:  Teeth  ETT to Teeth (cm):  21  Placement Verified by: auscultation and capnometry    Cormack-Lehane Classification:  Grade I - full view of glottis  Number of Attempts at Approach:  1

## 2024-06-15 NOTE — OP REPORT
Surgeon: Kelton Orellana MD  Preoperative diagnosis: Perirectal abscess  Postoperative diagnosis: Perirectal abscess  Procedure: Incision and drainage with minimal debridement of perirectal abscess  Anesthesia: General endotracheal anesthesia  Anesthesiologist: Gosia Velasco MD  Indications: 58-year-old woman with a history of alcoholic cirrhosis and a perirectal abscess.  I&D is indicated.  Narrative: The procedure was discussed in detail with the patient including the risks of bleeding, infection, abscess, and hematoma.  I discussed the fact that the wound would be left open and that would require wound care.  I discussed the potential for debridement if nonviable tissue was identified.  She understood all the above and wished proceed.  She was placed under anesthesia by Dr. Velasco.  She was placed in a high lithotomy position.  The area of the abscess was identified.  It was partially drained by an opening that was present at the time of surgery.  I enlarged this to allow for complete unroofing of the abscess cavity.  The majority of the tissue in this area was well-perfused and viable and did not require debridement.  I did debride some necrotic skin next to the opening that was present.  The abscess cavity was carefully probed and drained completely.  A culture was obtained and sent.  Hemostasis was assured with cautery and packing.  The wound was packed with 1 inch plain packing gauze.  Dressings were placed.  The patient tolerated the procedure well without apparent complication.  The final counts were reported as correct.  Wound class was class IV.

## 2024-06-15 NOTE — OR NURSING
1856 Pt arrived to PACU with Anesthesiologist and OR RN. AAOx4. Even, unlabored respirations. VSS. C/o rectal pain, PRN oxycodone and fentanyl given. Denies nausea. Surgical site dressing CDI. Right IJ CVC dressing bloody and loose on arrival. Dressing change performed by anesthesiologist. Pt declined RN for calling family with POC update.     1917 Pt meets floor criteria. Report called to KAYLEY Giordano on TICU.     1922 Pt transported to [facility] with RN. Chart, transport monitor, full oxygen tank, and belongings with patient.

## 2024-06-15 NOTE — ASSESSMENT & PLAN NOTE
Perirectal abscess that extends into the medial aspect of the left upper thigh with multiple air bubbles.   Surgery following for source control  Unasyn  Wound culture: E.coli, fusobacterium mortiferum, bacteroides ovatus and fragilis, corynebacterium amycolatum

## 2024-06-15 NOTE — ANESTHESIA PROCEDURE NOTES
Arterial Line    Performed by: Gosia Velasco M.D.  Authorized by: Gosia Velasco M.D.    Start Time:  6/14/2024 6:27 PM  End Time:  6/14/2024 6:30 PM  Localization: ultrasound guidance  Image captured, interpreted and electronically stored.  Patient Location:  OR  Indication: continuous blood pressure monitoring        Catheter Size:  20 G  Seldinger Technique?: Yes    Laterality:  Left  Site:  Radial artery  Line Secured:  Antimicrobial disc, tape and transparent dressing  Events: patient tolerated procedure well with no complications

## 2024-06-15 NOTE — PROGRESS NOTES
"  DATE: 6/15/2024    Post Operative Day  1 incision and drainage of  perirectal abscess .    INTERVAL EVENTS:  No acute events overnight.  Patient remains on pressors in the intensive care unit.  Packing was changed at the bedside with the nurse.    PHYSICAL EXAMINATION:  Vital Signs: /56   Pulse (!) 53   Temp (!) 35.7 °C (96.2 °F) (Temporal)   Resp (!) 29   Ht 1.727 m (5' 8\")   Wt 75 kg (165 lb 5.5 oz)   SpO2 97%     Awake and alert.  Perirectal packing changed at bedside.  Small amount of necrotic tissue remains in place.    LABORATORY VALUES:   Recent Labs     06/14/24  1212 06/14/24  2000 06/15/24  0253   WBC 20.1* 33.5* 31.7*   RBC 2.66* 2.81* 2.84*   HEMOGLOBIN 9.5* 10.1* 10.1*   HEMATOCRIT 27.2* 29.2* 29.2*   .3* 103.9* 102.8*   MCH 35.7* 35.9* 35.6*   MCHC 34.9 34.6 34.6   RDW 54.1* 55.4* 55.7*   PLATELETCT 160* 207 188   MPV 11.7 11.2 11.4     Recent Labs     06/14/24  1212 06/14/24  2000 06/15/24  0253   SODIUM 135 136 138   POTASSIUM 3.0* 2.9* 3.9   CHLORIDE 100 102 105   CO2 17* 17* 19*   GLUCOSE 98 127* 113*   BUN 32* 28* 27*   CREATININE 1.60* 1.50* 1.38   CALCIUM 8.3* 8.4* 8.6     Recent Labs     06/14/24  1212   ASTSGOT 47*   ALTSGPT 28   TBILIRUBIN 4.8*   ALKPHOSPHAT 71   GLOBULIN 4.6*   INR 2.05*     Recent Labs     06/14/24  1212   INR 2.05*        IMAGING:   DX-CHEST-PORTABLE (1 VIEW)   Final Result      1.  Interval placement of right internal jugular central venous catheter.   2.  No evidence of procedure-related pneumothorax or other change compared with earlier film from today's date.      CT-ABDOMEN-PELVIS WITH   Final Result      1.  Cirrhotic changes of the liver.      2.  Splenomegaly.      3.  Esophageal, periumbilical, mesenteric, and retroperitoneal varices are noted.      4.  Small to moderate amount of ascites throughout the lower abdomen and pelvis      5.  Acute left-sided perirectal abscess extending into the medial aspect of the left upper thigh.    "   DX-CHEST-PORTABLE (1 VIEW)   Final Result      1.  Slight atelectasis near left costophrenic angle.      2.  Postoperative changes of the right ribs.             ASSESSMENT AND PLAN:  Change packing daily.  Patient may require return trip to the operating room.      Appreciate ICU and consulting physician care.       ____________________________________     Lori Ko M.D.    DD: 6/15/2024  11:38 AM

## 2024-06-15 NOTE — CONSULTS
Critical Care Consultation    Date of consult: 6/14/2024    Referring Physician  Judd Paula II, MD, ERP    Reason for Consultation  Necrotizing fasciitis    History of Presenting Illness  58 y.o. female with hx of cirrhosis presented to ED after 1 week hx of severe perirectal pain. At ED, pt was hypotensive in 50-70s s/p 2L fluid boluses. Pt was found to have 6.7x5.7x3 cmperirectal abscess that extends into the medial aspect of the left upper thigh with multiple air bubbles, concerning of necrotizing soft tissue infection. Blood cultures obtained. Linezolid and pitpazo were given. General surgery was consulted and plan to take pt to OR emergently     WBC 20K, plt 160K. HCO3 17, creatiine 1.6, sodium 135  Lactate 3.8. INR 2.0  Total bili 4.8, AST mildly elevated    Pt is evaluated after OR. Pt underwent I&D with minimal debridement of perirectal abscess. Culture was sent. No evidence of nec fasc per Dr. Orellana. Pt was transferred to ICU post op. Alert, oriented. Comfortable. On NE gtt at 0.2 mcg/kg/min. On 2L NC.     Code Status  Full Code    Review of Systems  Review of Systems   Constitutional:  Negative for fever.   Respiratory:  Negative for shortness of breath.    Cardiovascular:  Negative for chest pain and leg swelling.   Gastrointestinal:  Negative for abdominal pain, diarrhea, nausea and vomiting.   Genitourinary:         Pain where the surgical incision is.   Surgical packing noted in left upper thigh   Musculoskeletal:  Negative for back pain.   Neurological:  Negative for weakness and headaches.   Psychiatric/Behavioral:  The patient is not nervous/anxious.    All other systems reviewed and are negative.      Past Medical History   has a past medical history of Allergy, Anxiety, Arrhythmia, Back pain, Depression, Depression, Hyperlipidemia, Hypertension, Indigestion, Pain (2/24/15), PSVT (paroxysmal supraventricular tachycardia) (HCC) (6/24/2013), Psychiatric disorder, Tobacco abuse (5/3/2013), URI  (upper respiratory infection) (9/21/2013), Urinary tract infection, site not specified, and UTI (lower urinary tract infection) (9/21/2013).    She has no past medical history of Breast cancer (HCC), Chronic airway obstruction, not elsewhere classified, Diabetes, Fall, Thyroid disease, or Unspecified asthma(493.90).    Surgical History   has a past surgical history that includes breast biopsy (7/18/08); shoulder arthroscopy (3/23/2009); tubal ligation; gyn surgery (1995); shoulder arthroscopy (2005); other (6/2014); shoulder arthroscopy w/ rotator cuff repair (3/9/2015); trigger finger release (3/9/2015); and open reduction.    Family History  family history includes Cancer in her paternal grandmother; Hypertension in her brother, father, and mother; Stroke in her mother.    Social History   reports that she has been smoking cigarettes. She started smoking about 30 years ago. She has a 12.5 pack-year smoking history. She has never used smokeless tobacco. She reports that she does not drink alcohol and does not use drugs.    Medications  Home Medications       Reviewed by Conrado Dominique (Pharmacy Tech) on 06/14/24 at 1344  Med List Status: Complete     Medication Last Dose Status   ALPRAZolam (XANAX) 0.5 MG Tab 6/14/2024 Active   losartan (COZAAR) 50 MG Tab 6/13/2024 Active   rosuvastatin (CRESTOR) 20 MG Tab 6/13/2024 Active                  Audit from Redirected Encounters    **Home medications have not yet been reviewed for this encounter**       Current Facility-Administered Medications   Medication Dose Route Frequency Provider Last Rate Last Admin    lactated ringers infusion   Intravenous Continuous August Paula II, M.D.   Stopped at 06/14/24 1326    norepinephrine (Levophed) 8 mg in 250 mL NS infusion (premix)  0-1 mcg/kg/min (Ideal) Intravenous Continuous August Paula II, M.D. 12 mL/hr at 06/14/24 1345 0.1 mcg/kg/min at 06/14/24 1345     Current Outpatient Medications   Medication Sig  Dispense Refill    ALPRAZolam (XANAX) 0.5 MG Tab Take 0.25 mg by mouth 1 time a day as needed for Sleep.      rosuvastatin (CRESTOR) 20 MG Tab Take 1 Tablet by mouth every evening. 90 Tablet 3    losartan (COZAAR) 50 MG Tab Take 1 Tablet by mouth every day. 30 Tablet 11       Allergies  Allergies   Allergen Reactions    Nitrofurantoin Vomiting    Sulfamethoxazole W-Trimethoprim Vomiting       Vital Signs last 24 hours  Temp:  [36.7 °C (98.1 °F)] 36.7 °C (98.1 °F)  Pulse:  [56-72] 62  Resp:  [15-74] 18  BP: ()/(31-68) 118/57  SpO2:  [89 %-98 %] 93 %    Physical Exam  Physical Exam  Vitals and nursing note reviewed.   Constitutional:       General: She is not in acute distress.     Appearance: She is ill-appearing. She is not toxic-appearing or diaphoretic.   HENT:      Head: Normocephalic and atraumatic.      Mouth/Throat:      Mouth: Mucous membranes are dry.   Eyes:      Pupils: Pupils are equal, round, and reactive to light.   Cardiovascular:      Rate and Rhythm: Normal rate and regular rhythm.      Pulses: Normal pulses.      Heart sounds: Normal heart sounds. No murmur heard.  Pulmonary:      Effort: No respiratory distress.      Breath sounds: Normal breath sounds. No wheezing, rhonchi or rales.   Abdominal:      General: There is no distension.      Palpations: Abdomen is soft.      Tenderness: There is no abdominal tenderness. There is no guarding.   Genitourinary:     Comments: Surgical packing noted in left upper thigh  Musculoskeletal:      Cervical back: Neck supple.      Right lower leg: No edema.      Left lower leg: No edema.   Skin:     Capillary Refill: Capillary refill takes less than 2 seconds.      Coloration: Skin is jaundiced.      Findings: No bruising or rash.   Neurological:      General: No focal deficit present.      Mental Status: She is alert and oriented to person, place, and time.      Comments: Nonfocal exam  Moving all extremities.    Psychiatric:         Mood and Affect: Mood  normal.         Behavior: Behavior normal.         Fluids    Intake/Output Summary (Last 24 hours) at 6/14/2024 1740  Last data filed at 6/14/2024 1601  Gross per 24 hour   Intake 3500 ml   Output --   Net 3500 ml       Laboratory  Recent Results (from the past 48 hour(s))   CBC with Differential    Collection Time: 06/14/24 12:12 PM   Result Value Ref Range    WBC 20.1 (H) 4.8 - 10.8 K/uL    RBC 2.66 (L) 4.20 - 5.40 M/uL    Hemoglobin 9.5 (L) 12.0 - 16.0 g/dL    Hematocrit 27.2 (L) 37.0 - 47.0 %    .3 (H) 81.4 - 97.8 fL    MCH 35.7 (H) 27.0 - 33.0 pg    MCHC 34.9 32.2 - 35.5 g/dL    RDW 54.1 (H) 35.9 - 50.0 fL    Platelet Count 160 (L) 164 - 446 K/uL    MPV 11.7 9.0 - 12.9 fL    Neutrophils-Polys 82.50 (H) 44.00 - 72.00 %    Lymphocytes 5.60 (L) 22.00 - 41.00 %    Monocytes 8.70 0.00 - 13.40 %    Eosinophils 1.90 0.00 - 6.90 %    Basophils 0.30 0.00 - 1.80 %    Immature Granulocytes 1.00 (H) 0.00 - 0.90 %    Nucleated RBC 0.00 0.00 - 0.20 /100 WBC    Neutrophils (Absolute) 16.56 (H) 1.82 - 7.42 K/uL    Lymphs (Absolute) 1.12 1.00 - 4.80 K/uL    Monos (Absolute) 1.74 (H) 0.00 - 0.85 K/uL    Eos (Absolute) 0.39 0.00 - 0.51 K/uL    Baso (Absolute) 0.07 0.00 - 0.12 K/uL    Immature Granulocytes (abs) 0.20 (H) 0.00 - 0.11 K/uL    NRBC (Absolute) 0.00 K/uL   Complete Metabolic Panel    Collection Time: 06/14/24 12:12 PM   Result Value Ref Range    Sodium 135 135 - 145 mmol/L    Potassium 3.0 (L) 3.6 - 5.5 mmol/L    Chloride 100 96 - 112 mmol/L    Co2 17 (L) 20 - 33 mmol/L    Anion Gap 18.0 (H) 7.0 - 16.0    Glucose 98 65 - 99 mg/dL    Bun 32 (H) 8 - 22 mg/dL    Creatinine 1.60 (H) 0.50 - 1.40 mg/dL    Calcium 8.3 (L) 8.5 - 10.5 mg/dL    Correct Calcium 9.5 8.5 - 10.5 mg/dL    AST(SGOT) 47 (H) 12 - 45 U/L    ALT(SGPT) 28 2 - 50 U/L    Alkaline Phosphatase 71 30 - 99 U/L    Total Bilirubin 4.8 (H) 0.1 - 1.5 mg/dL    Albumin 2.5 (L) 3.2 - 4.9 g/dL    Total Protein 7.1 6.0 - 8.2 g/dL    Globulin 4.6 (H) 1.9 - 3.5 g/dL     A-G Ratio 0.5 g/dL   ESTIMATED GFR    Collection Time: 06/14/24 12:12 PM   Result Value Ref Range    GFR (CKD-EPI) 37 (A) >60 mL/min/1.73 m 2   Prothrombin Time    Collection Time: 06/14/24 12:12 PM   Result Value Ref Range    PT 23.4 (H) 12.0 - 14.6 sec    INR 2.05 (H) 0.87 - 1.13   Lactic Acid    Collection Time: 06/14/24 12:42 PM   Result Value Ref Range    Lactic Acid 4.8 (HH) 0.5 - 2.0 mmol/L   MRSA By PCR (Amp)    Collection Time: 06/14/24 12:49 PM    Specimen: Nares; Respirate   Result Value Ref Range    MRSA by PCR Negative Negative   COD - Adult (Type and Screen)    Collection Time: 06/14/24  2:45 PM   Result Value Ref Range    ABO Grouping Only O     Rh Grouping Only POS     Antibody Screen-Cod NEG    Lactic Acid    Collection Time: 06/14/24  2:54 PM   Result Value Ref Range    Lactic Acid 3.8 (H) 0.5 - 2.0 mmol/L   ABO Rh Confirm    Collection Time: 06/14/24  2:54 PM   Result Value Ref Range    ABO Rh Confirm O POS    PLATELET MAPPING WITH BASIC TEG    Collection Time: 06/14/24  3:52 PM   Result Value Ref Range    Reaction Time Initial-R 5.1 4.6 - 9.1 min    React Time Initial Hep 5.6 4.3 - 8.3 min    Clot Kinetics-K 0.8 0.8 - 2.1 min    Clot Angle-Angle 79.2 (H) 63.0 - 78.0 degrees    Maximum Clot Strength-MA 67.5 52.0 - 69.0 mm    TEG Functional Fibrinogen(MA) 35.2 (H) 15.0 - 32.0 mm    Lysis 30 minutes-LY30 0.0 0.0 - 2.6 %    % Inhibition ADP 97.2 (H) 0.0 - 17.0 %    % Inhibition AA 60.2 (H) 0.0 - 11.0 %    TEG Algorithm Link Algorithm    Lactic Acid    Collection Time: 06/14/24  6:09 PM   Result Value Ref Range    Lactic Acid 1.9 0.5 - 2.0 mmol/L   Urinalysis    Collection Time: 06/14/24  6:09 PM    Specimen: Urine, Clean Catch   Result Value Ref Range    Color Yellow     Character Clear     Specific Gravity 1.019 <1.035    Ph 6.0 5.0 - 8.0    Glucose Negative Negative mg/dL    Ketones Negative Negative mg/dL    Protein Negative Negative mg/dL    Bilirubin Negative Negative    Urobilinogen, Urine  0.2 Negative    Nitrite Negative Negative    Leukocyte Esterase Negative Negative    Occult Blood Negative Negative    Micro Urine Req see below        Imaging  DX-CHEST-PORTABLE (1 VIEW)   Final Result      1.  Interval placement of right internal jugular central venous catheter.   2.  No evidence of procedure-related pneumothorax or other change compared with earlier film from today's date.      CT-ABDOMEN-PELVIS WITH   Final Result      1.  Cirrhotic changes of the liver.      2.  Splenomegaly.      3.  Esophageal, periumbilical, mesenteric, and retroperitoneal varices are noted.      4.  Small to moderate amount of ascites throughout the lower abdomen and pelvis      5.  Acute left-sided perirectal abscess extending into the medial aspect of the left upper thigh.      DX-CHEST-PORTABLE (1 VIEW)   Final Result      1.  Slight atelectasis near left costophrenic angle.      2.  Postoperative changes of the right ribs.             Assessment/Plan  * Septic shock (HCC)  Assessment & Plan  This is Septic shock Present on admission  SIRS criteria identified on my evaluation include: Tachycardia, with heart rate greater than 90 BPM and Leukocytosis, with WBC greater than 12,000  Clinical indicators of end organ dysfunction include Hypotension with systolic blood pressure less than 90 or MAP less than 65  Indicators of septic shock include: Sepsis present and persistent hypotension despite fluid resuscitation   Sources is: perirectal abscess  Sepsis protocol initiated  Crystalloid Fluid Administration: Fluid resuscitation ordered per standard protocol - 30 mL/kg per current or ideal body weight  IV antibiotics as appropriate for source of sepsis  Reassessment: I have reassessed the patient's hemodynamic status    Source controled by surgery.   S/p I&D of abscess  No evidence of nec fasc intra-op   Linezolid and piptazo  Follow up intra-op cultures and blood cultures  Serial lactates until normalizes  Monitor UOP, strict  I/Os. Herrera     Perirectal abscess- (present on admission)  Assessment & Plan  Perirectal abscess that extends into the medial aspect of the left upper thigh with multiple air bubbles.   Went to OR emergently for concern of necrotizing fasciitis.   Intra-op, pt underwent I&D of the abscess and sample sent for culture  No evidence of nec fasc per surgery.     On linezolid and piptazo  Follow up intra-op cx  Follow up blood cultures.   Fluids  Follow lactates.   Monitor UOP  D/w Dr. Orellana, Surgery    Acute kidney injury (HCC)  Assessment & Plan  EFRAIN due to ATN due to sepsis, hypotension   Sodium 135  K 3.0, HCO3 17  Replete K   Monitor UOP, strict I/Os      Alcoholic cirrhosis of liver with ascites (HCC)- (present on admission)  Assessment & Plan  Pt admits that she has been drinking a lot of alcohols (beer, wine, hard liquor) for years, daily drink  Recently quit beer/wine 4 months ago and quit hard liquor 2 months ago  She does not know prior diagnosis of cirrhosis  Denies hematemesis, melena, hematochezia.   Noted minimal ascites on CT A/P, not enough to tap   H/H 9.5  Total bili 4.8, INR 2.0. MELD 26  No signs of decompensated cirrhosis   Monitor for signs of bleeding        Discussed patient condition and risk of morbidity and/or mortality with RN, RT, Pharmacy, and Charge nurse / hot rounds.    The patient remains critically ill.  Critical care time = 65 minutes in directly providing and coordinating critical care and extensive data review.  No time overlap and excludes procedures.

## 2024-06-15 NOTE — PROGRESS NOTES
"Critical Care Progress Note    Date of admission  6/14/2024    Chief Complaint  58 y.o. female admitted 6/14/2024 with perirectal abscess, sepsis    Hospital Course  \"58 y.o. female with hx of cirrhosis presented to ED after 1 week hx of severe perirectal pain. At ED, pt was hypotensive in 50-70s s/p 2L fluid boluses. Pt was found to have 6.7x5.7x3 cmperirectal abscess that extends into the medial aspect of the left upper thigh with multiple air bubbles, concerning of necrotizing soft tissue infection. Blood cultures obtained. Linezolid and pitpazo were given. General surgery was consulted and plan to take pt to OR emergently. WBC 20K, plt 160K. HCO3 17, creatiine 1.6, sodium 135, Lactate 3.8. INR 2.0, Total bili 4.8, AST mildly elevated. Pt is evaluated after OR. Pt underwent I&D with minimal debridement of perirectal abscess. Culture was sent. No evidence of nec fasc per Dr. Orellana. Pt was transferred to ICU post op. Alert, oriented. Comfortable. On NE gtt at 0.2 mcg/kg/min. On 2L NC.\" ~Dr De Jesus 6/14    6/15 -remains on norepinephrine; Zosyn day #2, replacing magnesium, low-flow oxygen, wound care consult today, start diet    Interval Problem Update  Reviewed last 24 hour events:  A/o x 4  SR/SB  T 98.1 off Gregor hugger  WBC 31.7  Zosyn d#2  NE at 0.1  LA 4.8->1.3  I/O = 5563/1500  Cr 1.6->1.38  Mg 1.4  Sc heparin ppx    Wound care  Start diet      Review of Systems  Review of Systems   Unable to perform ROS: Acuity of condition        Vital Signs for last 24 hours   Temp:  [35.7 °C (96.2 °F)-36.7 °C (98.1 °F)] 35.7 °C (96.2 °F)  Pulse:  [40-80] 54  Resp:  [5-74] 18  BP: ()/(31-68) 95/51  SpO2:  [89 %-99 %] 95 %    Hemodynamic parameters for last 24 hours       Respiratory Information for the last 24 hours       Physical Exam   Physical Exam  Vitals and nursing note reviewed.   Constitutional:       General: She is not in acute distress.     Appearance: She is ill-appearing. She is not toxic-appearing or " diaphoretic.   HENT:      Head: Normocephalic and atraumatic.      Mouth/Throat:      Mouth: Mucous membranes are dry.   Eyes:      Pupils: Pupils are equal, round, and reactive to light.   Cardiovascular:      Rate and Rhythm: Normal rate and regular rhythm.      Pulses: Normal pulses.      Heart sounds: Normal heart sounds. No murmur heard.  Pulmonary:      Effort: No respiratory distress.      Breath sounds: Normal breath sounds. No wheezing, rhonchi or rales.   Abdominal:      General: There is no distension.      Palpations: Abdomen is soft.      Tenderness: There is no abdominal tenderness. There is no guarding.   Genitourinary:     Comments: Surgical packing noted in left upper thigh  Musculoskeletal:      Cervical back: Neck supple.      Right lower leg: No edema.      Left lower leg: No edema.   Skin:     General: Skin is warm and dry.      Capillary Refill: Capillary refill takes less than 2 seconds.      Findings: No bruising or rash.   Neurological:      General: No focal deficit present.      Mental Status: She is alert and oriented to person, place, and time.      Comments: Nonfocal exam  Moving all extremities.    Psychiatric:         Mood and Affect: Mood normal.         Behavior: Behavior normal.         Medications  Current Facility-Administered Medications   Medication Dose Route Frequency Provider Last Rate Last Admin    magnesium sulfate IVPB premix 4 g  4 g Intravenous Once Nissa L. Latona 25 mL/hr at 06/15/24 0559 4 g at 06/15/24 0559    piperacillin-tazobactam (Zosyn) 4.5 g in  mL IVPB  4.5 g Intravenous Q8HRS Nissa L. Latona        lactated ringers infusion   Intravenous Continuous Eliazar De Jesus D.O. 125 mL/hr at 06/15/24 0353 New Bag at 06/15/24 0353    norepinephrine (Levophed) 8 mg in 250 mL NS infusion (premix)  0-1 mcg/kg/min (Ideal) Intravenous Continuous August Paula II, M.D. 9.6 mL/hr at 06/15/24 0635 0.08 mcg/kg/min at 06/15/24 0635    Respiratory Therapy Consult    Nebulization Continuous RT Eliazar De Jesus D.O.        ondansetron (Zofran) syringe/vial injection 4 mg  4 mg Intravenous Q4HRS PRN TRUDY MilanO.        ondansetron (Zofran ODT) dispertab 4 mg  4 mg Oral Q4HRS PRN ASTRID Milan.OSandy        promethazine (Phenergan) tablet 12.5-25 mg  12.5-25 mg Oral Q4HRS PRN ASTRID Mlian.O.        promethazine (Phenergan) suppository 12.5-25 mg  12.5-25 mg Rectal Q4HRS PRN ASTRID Milan.O.        prochlorperazine (Compazine) injection 5-10 mg  5-10 mg Intravenous Q4HRS PRN ASTRID Milan.O.        fentaNYL (Sublimaze) injection 50 mcg  50 mcg Intravenous Q HOUR PRN ASTRID Milan.O.        oxyCODONE immediate-release (Roxicodone) tablet 5 mg  5 mg Oral Q4HRS PRN ASTRID Milan.O.        heparin injection 5,000 Units  5,000 Units Subcutaneous Q8HRS ASTRID Milan.O.   5,000 Units at 06/15/24 0604       Fluids    Intake/Output Summary (Last 24 hours) at 6/15/2024 0650  Last data filed at 6/15/2024 0600  Gross per 24 hour   Intake 5563.5 ml   Output 1500 ml   Net 4063.5 ml       Laboratory          Recent Labs     06/14/24 1212 06/14/24  2000 06/15/24  0253   SODIUM 135 136 138   POTASSIUM 3.0* 2.9* 3.9   CHLORIDE 100 102 105   CO2 17* 17* 19*   BUN 32* 28* 27*   CREATININE 1.60* 1.50* 1.38   MAGNESIUM  --   --  1.4*   PHOSPHORUS  --   --  4.5   CALCIUM 8.3* 8.4* 8.6     Recent Labs     06/14/24 1212 06/14/24  2000 06/15/24  0253   ALTSGPT 28  --   --    ASTSGOT 47*  --   --    ALKPHOSPHAT 71  --   --    TBILIRUBIN 4.8*  --   --    GLUCOSE 98 127* 113*     Recent Labs     06/14/24  1212 06/14/24  2000 06/15/24  0253   WBC 20.1* 33.5* 31.7*   NEUTSPOLYS 82.50* 85.90* 93.50*   LYMPHOCYTES 5.60* 3.80* 2.50*   MONOCYTES 8.70 5.70 1.10   EOSINOPHILS 1.90 2.10 0.70   BASOPHILS 0.30 0.30 0.40   ASTSGOT 47*  --   --    ALTSGPT 28  --   --    ALKPHOSPHAT 71  --   --    TBILIRUBIN 4.8*  --   --      Recent Labs     06/14/24  1212 06/14/24  2000 06/15/24  0253   RBC 2.66* 2.81* 2.84*   HEMOGLOBIN  9.5* 10.1* 10.1*   HEMATOCRIT 27.2* 29.2* 29.2*   PLATELETCT 160* 207 188   PROTHROMBTM 23.4*  --   --    INR 2.05*  --   --        Imaging  X-Ray:  I have personally reviewed the images and compared with prior images.    Assessment/Plan  * Septic shock (HCC)  Assessment & Plan  This is Septic shock Present on admission  SIRS criteria identified on my evaluation include: Tachycardia, with heart rate greater than 90 BPM and Leukocytosis, with WBC greater than 12,000  Clinical indicators of end organ dysfunction include Hypotension with systolic blood pressure less than 90 or MAP less than 65  Indicators of septic shock include: Sepsis present and persistent hypotension despite fluid resuscitation   Sources is: perirectal abscess  Sepsis protocol initiated  Crystalloid Fluid Administration: Fluid resuscitation ordered per standard protocol - 30 mL/kg per current or ideal body weight  IV antibiotics as appropriate for source of sepsis  Reassessment: I have reassessed the patient's hemodynamic status    Source controled by surgery.   S/p I&D of abscess  No evidence of nec fasc intra-op   Linezolid and piptazo  Follow up intra-op cultures and blood cultures  Serial lactates until normalizes  Monitor UOP, strict I/Os. Sharon     Acute kidney injury (HCC)  Assessment & Plan  EFRAIN due to ATN due to sepsis, hypotension   Sodium 135  K 3.0, HCO3 17  Replete K   Monitor UOP, strict I/Os      Alcoholic cirrhosis of liver with ascites (HCC)- (present on admission)  Assessment & Plan  Pt admits that she has been drinking a lot of alcohols (beer, wine, hard liquor) for years, daily drink  Recently quit beer/wine 4 months ago and quit hard liquor 2 months ago  She does not know prior diagnosis of cirrhosis  Denies hematemesis, melena, hematochezia.   Noted minimal ascites on CT A/P, not enough to tap   H/H 9.5  Total bili 4.8, INR 2.0. MELD 26  No signs of decompensated cirrhosis   Monitor for signs of bleeding    Perirectal abscess-  (present on admission)  Assessment & Plan  Perirectal abscess that extends into the medial aspect of the left upper thigh with multiple air bubbles.   Went to OR emergently for concern of necrotizing fasciitis.   Intra-op, pt underwent I&D of the abscess and sample sent for culture  No evidence of nec fasc per surgery.     On linezolid and piptazo  Follow up intra-op cx  Follow up blood cultures.   Fluids  Follow lactates.   Monitor UOP  D/w Dr. Orellana, Surgery       Updated plan:  Okay to advance diet  Wound care and ongoing dressing change daily, surgery following  Continue empiric Zosyn, follow-up blood and wound cultures  Titrating norepinephrine  Continue to follow closely in ICU    VTE:  Heparin  Ulcer: Not Indicated  Lines: Central Line  Ongoing indication addressed    I have performed a physical exam and reviewed and updated ROS and Plan today (6/15/2024). In review of yesterday's note (6/14/2024), there are no changes except as documented above.     Discussed patient condition and risk of morbidity and/or mortality with RN, RT, Pharmacy, and QA team  The patient remains critically ill.  Critical care time = 45 minutes in directly providing and coordinating critical care and extensive data review.  No time overlap and excludes procedures.

## 2024-06-15 NOTE — CARE PLAN
The patient is Watcher - Medium risk of patient condition declining or worsening    Shift Goals  Clinical Goals: Hemodynamic stability  Patient Goals: Rest  Family Goals: Family not present    Progress made toward(s) clinical / shift goals:    Problem: Pain - Standard  Goal: Alleviation of pain or a reduction in pain to the patient’s comfort goal  Outcome: Progressing     Problem: Knowledge Deficit - Standard  Goal: Patient and family/care givers will demonstrate understanding of plan of care, disease process/condition, diagnostic tests and medications  Outcome: Progressing     Problem: Skin Integrity  Goal: Skin integrity is maintained or improved  Outcome: Progressing

## 2024-06-15 NOTE — ANESTHESIA POSTPROCEDURE EVALUATION
Patient: Kavita Freeman    Procedure Summary       Date: 06/14/24 Room / Location: [facility] 09 / SURGERY [facility]    Anesthesia Start: 1814 Anesthesia Stop: 1909    Procedure: INCISION AND DRAINAGE, ABSCESS, PERIRECTAL (Perineum) Diagnosis: (PERIRECTAL ABSCESS)    Surgeons: Kelton Oerllana M.D. Responsible Provider: Gosia Velasco M.D.    Anesthesia Type: general ASA Status: 4 - Emergent            Final Anesthesia Type: general  Last vitals  BP   Blood Pressure: 103/50    Temp   36.2 °C (97.2 °F)    Pulse   80   Resp   16    SpO2   97 %      Anesthesia Post Evaluation    Patient location during evaluation: PACU  Patient participation: complete - patient participated  Level of consciousness: awake and alert  Pain score: 5    Airway patency: patent  Anesthetic complications: no  Cardiovascular status: hemodynamically stable  Respiratory status: acceptable and face mask  Hydration status: euvolemic    PONV: none          No notable events documented.     Nurse Pain Score: 4 (NPRS)

## 2024-06-15 NOTE — PROGRESS NOTES
Potassium noted to be 2.9 on repeat lab draw. Nissa OWENS updated. Orders received and implemented.

## 2024-06-15 NOTE — ASSESSMENT & PLAN NOTE
EFRAIN due to ATN due to sepsis, hypotension  Improved     Strict I/Os  Renally dosed medications  Avoid nephrotoxic agents as able  Trend

## 2024-06-15 NOTE — ASSESSMENT & PLAN NOTE
Pt admits that she has been drinking a lot of alcohols (beer, wine, hard liquor) for years, daily drink  Recently quit beer/wine 4 months ago and quit hard liquor 2 months ago  She does not know prior diagnosis of cirrhosis  Denies hematemesis, melena, hematochezia.   Noted minimal ascites on CT A/P, not enough to tap   H/H 9.5  Total bili 4.8, INR 2.0. MELD 26  Monitor for signs of bleeding

## 2024-06-15 NOTE — PROGRESS NOTES
4 Eyes Skin Assessment Completed by KAYLEY Giordano and KAYLEY Alfaro.  HR: 56  BP: 108/59  TEMP:  SPO2: 97 ON 4l nc  WEIGHT:     Head WDL  Ears WDL  Nose WDL  Mouth Redness, small cut to lip  Neck Bleeding central line to right IJ  Breast/Chest Redness  Shoulder Blades WDL  Spine WDL  (R) Arm/Elbow/Hand WDL  (L) Arm/Elbow/Hand WDL  Abdomen WDL  Groin surgical packing in place  Scrotum/Coccyx/Buttocks WDL- barbara rectal abscess OR packing in place  (R) Leg WDL  (L) Leg WDL  (R) Heel/Foot/Toe WDL  (L) Heel/Foot/Toe WDL    Patient has *tooth* in a blue specimen cup      Devices In Places ECG, Blood Pressure Cuff, Pulse Ox, Arterial Line, SCD's, and Central Line      Interventions In Place Gray Ear Foams, NC W/Ear Foams, Sacral Mepilex, TAP System, Pillows, Q2 Turns, Low Air Loss Mattress, and Heels Loaded W/Pillows    Possible Skin Injury No    Pictures Uploaded Into Epic Yes  Wound Consult Placed N/A  RN Wound Prevention Protocol Ordered Yes

## 2024-06-15 NOTE — CARE PLAN
The patient is Stable - Low risk of patient condition declining or worsening    Shift Goals  Clinical Goals: MAP goal >65, pain management  Patient Goals: Rest  Family Goals:   Progress made toward(s) clinical / shift goals:  pt is on levophed for MAP goals of 65. Dressing changed by Dr. Ko at bedside, orders for wound team consult in place. Pt has orders for PRN medication, is able to make needs known.           Problem: Pain - Standard  Goal: Alleviation of pain or a reduction in pain to the patient’s comfort goal  Outcome: Progressing     Problem: Knowledge Deficit - Standard  Goal: Patient and family/care givers will demonstrate understanding of plan of care, disease process/condition, diagnostic tests and medications  Outcome: Progressing     Problem: Skin Integrity  Goal: Skin integrity is maintained or improved  Outcome: Progressing       Patient is not progressing towards the following goals:

## 2024-06-15 NOTE — ANESTHESIA TIME REPORT
Anesthesia Start and Stop Event Times       Date Time Event    6/14/2024 1800 Ready for Procedure     1814 Anesthesia Start     1909 Anesthesia Stop          Responsible Staff  06/14/24      Name Role Begin End    Gosia Velasco M.D. Anesth 1814 1909          Overtime Reason:  no overtime (within assigned shift)    Comments:

## 2024-06-16 LAB
ALBUMIN SERPL BCP-MCNC: 2.4 G/DL (ref 3.2–4.9)
ALBUMIN/GLOB SERPL: 0.6 G/DL
ALP SERPL-CCNC: 90 U/L (ref 30–99)
ALT SERPL-CCNC: 39 U/L (ref 2–50)
ANION GAP SERPL CALC-SCNC: 12 MMOL/L (ref 7–16)
AST SERPL-CCNC: 63 U/L (ref 12–45)
BACTERIA UR CULT: NORMAL
BACTERIA WND AEROBE CULT: ABNORMAL
BASOPHILS # BLD AUTO: 0.2 % (ref 0–1.8)
BASOPHILS # BLD: 0.09 K/UL (ref 0–0.12)
BILIRUB SERPL-MCNC: 2.7 MG/DL (ref 0.1–1.5)
BUN SERPL-MCNC: 23 MG/DL (ref 8–22)
CALCIUM ALBUM COR SERPL-MCNC: 9.9 MG/DL (ref 8.5–10.5)
CALCIUM SERPL-MCNC: 8.6 MG/DL (ref 8.5–10.5)
CHLORIDE SERPL-SCNC: 109 MMOL/L (ref 96–112)
CO2 SERPL-SCNC: 21 MMOL/L (ref 20–33)
CREAT SERPL-MCNC: 1.06 MG/DL (ref 0.5–1.4)
EOSINOPHIL # BLD AUTO: 0.08 K/UL (ref 0–0.51)
EOSINOPHIL NFR BLD: 0.2 % (ref 0–6.9)
ERYTHROCYTE [DISTWIDTH] IN BLOOD BY AUTOMATED COUNT: 57.7 FL (ref 35.9–50)
GFR SERPLBLD CREATININE-BSD FMLA CKD-EPI: 61 ML/MIN/1.73 M 2
GLOBULIN SER CALC-MCNC: 4.1 G/DL (ref 1.9–3.5)
GLUCOSE SERPL-MCNC: 149 MG/DL (ref 65–99)
GRAM STN SPEC: ABNORMAL
HCT VFR BLD AUTO: 27.5 % (ref 37–47)
HGB BLD-MCNC: 9.2 G/DL (ref 12–16)
IMM GRANULOCYTES # BLD AUTO: 0.74 K/UL (ref 0–0.11)
IMM GRANULOCYTES NFR BLD AUTO: 2 % (ref 0–0.9)
LYMPHOCYTES # BLD AUTO: 1.32 K/UL (ref 1–4.8)
LYMPHOCYTES NFR BLD: 3.5 % (ref 22–41)
MAGNESIUM SERPL-MCNC: 2 MG/DL (ref 1.5–2.5)
MCH RBC QN AUTO: 35.4 PG (ref 27–33)
MCHC RBC AUTO-ENTMCNC: 33.5 G/DL (ref 32.2–35.5)
MCV RBC AUTO: 105.8 FL (ref 81.4–97.8)
MONOCYTES # BLD AUTO: 1.78 K/UL (ref 0–0.85)
MONOCYTES NFR BLD AUTO: 4.8 % (ref 0–13.4)
NEUTROPHILS # BLD AUTO: 33.32 K/UL (ref 1.82–7.42)
NEUTROPHILS NFR BLD: 89.3 % (ref 44–72)
NRBC # BLD AUTO: 0 K/UL
NRBC BLD-RTO: 0 /100 WBC (ref 0–0.2)
PLATELET # BLD AUTO: 223 K/UL (ref 164–446)
PMV BLD AUTO: 11.3 FL (ref 9–12.9)
POTASSIUM SERPL-SCNC: 3.5 MMOL/L (ref 3.6–5.5)
PROT SERPL-MCNC: 6.5 G/DL (ref 6–8.2)
RBC # BLD AUTO: 2.6 M/UL (ref 4.2–5.4)
SIGNIFICANT IND 70042: ABNORMAL
SIGNIFICANT IND 70042: NORMAL
SITE SITE: ABNORMAL
SITE SITE: NORMAL
SODIUM SERPL-SCNC: 142 MMOL/L (ref 135–145)
SOURCE SOURCE: ABNORMAL
SOURCE SOURCE: NORMAL
WBC # BLD AUTO: 37.3 K/UL (ref 4.8–10.8)

## 2024-06-16 PROCEDURE — 83735 ASSAY OF MAGNESIUM: CPT

## 2024-06-16 PROCEDURE — 700111 HCHG RX REV CODE 636 W/ 250 OVERRIDE (IP): Mod: JZ | Performed by: INTERNAL MEDICINE

## 2024-06-16 PROCEDURE — 80053 COMPREHEN METABOLIC PANEL: CPT

## 2024-06-16 PROCEDURE — 700105 HCHG RX REV CODE 258: Performed by: NURSE PRACTITIONER

## 2024-06-16 PROCEDURE — 85025 COMPLETE CBC W/AUTO DIFF WBC: CPT

## 2024-06-16 PROCEDURE — 700111 HCHG RX REV CODE 636 W/ 250 OVERRIDE (IP): Mod: JZ | Performed by: NURSE PRACTITIONER

## 2024-06-16 PROCEDURE — 700105 HCHG RX REV CODE 258: Performed by: INTERNAL MEDICINE

## 2024-06-16 PROCEDURE — 97597 DBRDMT OPN WND 1ST 20 CM/<: CPT

## 2024-06-16 PROCEDURE — 99231 SBSQ HOSP IP/OBS SF/LOW 25: CPT | Performed by: SURGERY

## 2024-06-16 PROCEDURE — 700111 HCHG RX REV CODE 636 W/ 250 OVERRIDE (IP): Performed by: INTERNAL MEDICINE

## 2024-06-16 PROCEDURE — A9270 NON-COVERED ITEM OR SERVICE: HCPCS | Mod: JZ | Performed by: INTERNAL MEDICINE

## 2024-06-16 PROCEDURE — 99291 CRITICAL CARE FIRST HOUR: CPT | Performed by: INTERNAL MEDICINE

## 2024-06-16 PROCEDURE — 770022 HCHG ROOM/CARE - ICU (200)

## 2024-06-16 PROCEDURE — 700102 HCHG RX REV CODE 250 W/ 637 OVERRIDE(OP): Mod: JZ | Performed by: INTERNAL MEDICINE

## 2024-06-16 RX ORDER — AMOXICILLIN 250 MG
2 CAPSULE ORAL EVERY EVENING
Status: DISCONTINUED | OUTPATIENT
Start: 2024-06-16 | End: 2024-06-21 | Stop reason: HOSPADM

## 2024-06-16 RX ORDER — POTASSIUM CHLORIDE 20 MEQ/1
60 TABLET, EXTENDED RELEASE ORAL ONCE
Status: COMPLETED | OUTPATIENT
Start: 2024-06-16 | End: 2024-06-16

## 2024-06-16 RX ORDER — ENOXAPARIN SODIUM 100 MG/ML
40 INJECTION SUBCUTANEOUS DAILY
Status: DISCONTINUED | OUTPATIENT
Start: 2024-06-16 | End: 2024-06-21 | Stop reason: HOSPADM

## 2024-06-16 RX ORDER — POLYETHYLENE GLYCOL 3350 17 G/17G
1 POWDER, FOR SOLUTION ORAL
Status: DISCONTINUED | OUTPATIENT
Start: 2024-06-16 | End: 2024-06-21 | Stop reason: HOSPADM

## 2024-06-16 RX ORDER — MIDODRINE HYDROCHLORIDE 5 MG/1
5 TABLET ORAL EVERY 8 HOURS
Status: DISCONTINUED | OUTPATIENT
Start: 2024-06-16 | End: 2024-06-19

## 2024-06-16 RX ADMIN — PIPERACILLIN AND TAZOBACTAM 4.5 G: 4; .5 INJECTION, POWDER, FOR SOLUTION INTRAVENOUS at 04:46

## 2024-06-16 RX ADMIN — SODIUM CHLORIDE, POTASSIUM CHLORIDE, SODIUM LACTATE AND CALCIUM CHLORIDE: 600; 310; 30; 20 INJECTION, SOLUTION INTRAVENOUS at 16:20

## 2024-06-16 RX ADMIN — MIDODRINE HYDROCHLORIDE 5 MG: 5 TABLET ORAL at 14:35

## 2024-06-16 RX ADMIN — HEPARIN SODIUM 5000 UNITS: 5000 INJECTION, SOLUTION INTRAVENOUS; SUBCUTANEOUS at 05:47

## 2024-06-16 RX ADMIN — OXYCODONE HYDROCHLORIDE 5 MG: 5 TABLET ORAL at 05:47

## 2024-06-16 RX ADMIN — SODIUM CHLORIDE, POTASSIUM CHLORIDE, SODIUM LACTATE AND CALCIUM CHLORIDE: 600; 310; 30; 20 INJECTION, SOLUTION INTRAVENOUS at 01:51

## 2024-06-16 RX ADMIN — HYDROMORPHONE HYDROCHLORIDE 2 MG: 1 INJECTION, SOLUTION INTRAMUSCULAR; INTRAVENOUS; SUBCUTANEOUS at 10:40

## 2024-06-16 RX ADMIN — OXYCODONE HYDROCHLORIDE 5 MG: 5 TABLET ORAL at 12:54

## 2024-06-16 RX ADMIN — OXYCODONE HYDROCHLORIDE 5 MG: 5 TABLET ORAL at 08:30

## 2024-06-16 RX ADMIN — POTASSIUM CHLORIDE 60 MEQ: 1500 TABLET, EXTENDED RELEASE ORAL at 08:30

## 2024-06-16 RX ADMIN — PIPERACILLIN AND TAZOBACTAM 4.5 G: 4; .5 INJECTION, POWDER, FOR SOLUTION INTRAVENOUS at 12:56

## 2024-06-16 RX ADMIN — PIPERACILLIN AND TAZOBACTAM 4.5 G: 4; .5 INJECTION, POWDER, FOR SOLUTION INTRAVENOUS at 20:19

## 2024-06-16 RX ADMIN — HYDROMORPHONE HYDROCHLORIDE 1 MG: 1 INJECTION, SOLUTION INTRAMUSCULAR; INTRAVENOUS; SUBCUTANEOUS at 14:39

## 2024-06-16 RX ADMIN — SENNOSIDES AND DOCUSATE SODIUM 2 TABLET: 50; 8.6 TABLET ORAL at 17:40

## 2024-06-16 RX ADMIN — ENOXAPARIN SODIUM 40 MG: 100 INJECTION SUBCUTANEOUS at 17:40

## 2024-06-16 RX ADMIN — MIDODRINE HYDROCHLORIDE 5 MG: 5 TABLET ORAL at 21:31

## 2024-06-16 RX ADMIN — MIDODRINE HYDROCHLORIDE 5 MG: 5 TABLET ORAL at 08:30

## 2024-06-16 RX ADMIN — SODIUM CHLORIDE, POTASSIUM CHLORIDE, SODIUM LACTATE AND CALCIUM CHLORIDE: 600; 310; 30; 20 INJECTION, SOLUTION INTRAVENOUS at 08:22

## 2024-06-16 ASSESSMENT — PAIN DESCRIPTION - PAIN TYPE
TYPE: ACUTE PAIN

## 2024-06-16 ASSESSMENT — FIBROSIS 4 INDEX: FIB4 SCORE: 2.645751311064590591

## 2024-06-16 NOTE — CARE PLAN
The patient is Watcher - Medium risk of patient condition declining or worsening    Shift Goals  Clinical Goals: MAP>65, pain management  Patient Goals: Rest  Family Goals: Family not present    Progress made toward(s) clinical / shift goals:  pt is on levophed drip for MAP goals of 65. Has prn medication for pain management. Is able to make needs known. Mobilized with 1 person assist to standing at the edge of the bed. Tolerated well.      Problem: Pain - Standard  Goal: Alleviation of pain or a reduction in pain to the patient’s comfort goal  Outcome: Progressing     Problem: Knowledge Deficit - Standard  Goal: Patient and family/care givers will demonstrate understanding of plan of care, disease process/condition, diagnostic tests and medications  Outcome: Progressing     Problem: Skin Integrity  Goal: Skin integrity is maintained or improved  Outcome: Progressing       Patient is not progressing towards the following goals:

## 2024-06-16 NOTE — WOUND TEAM
[facility]  Inpatient Services  Initial Wound and Skin Care Evaluation    Admission Date: 2024     Last order of IP CONSULT TO WOUND CARE was found on 6/15/2024 from Hospital Encounter on 2024     HPI, PMH, SH: Reviewed    Past Surgical History:   Procedure Laterality Date    SHOULDER ARTHROSCOPY W/ ROTATOR CUFF REPAIR  3/9/2015    Performed by Gilberto Meyer M.D. at SURGERY [facility] ORS    TRIGGER FINGER RELEASE  3/9/2015    Performed by Gilberto Meyer M.D. at SURGERY [facility] ORS    OTHER  2014    broken ribs- plates & Pins right front 4-8    SHOULDER ARTHROSCOPY  3/23/2009    Performed by GILBERTO MEYER at SURGERY [facility] ORS    BREAST BIOPSY  08    Performed by MARY DE LA CRUZ at SURGERY SAME DAY [facility] ORS    SHOULDER ARTHROSCOPY      GYN SURGERY      tubal ligation    OPEN REDUCTION      flail chest    TUBAL LIGATION       Social History     Tobacco Use    Smoking status: Every Day     Current packs/day: 0.00     Average packs/day: 0.5 packs/day for 25.0 years (12.5 ttl pk-yrs)     Types: Cigarettes     Start date: 1994     Last attempt to quit: 2016     Years since quittin.0    Smokeless tobacco: Never    Tobacco comments:     1-2 cigarettes a day   Substance Use Topics    Alcohol use: No     Alcohol/week: 1.5 - 2.0 oz     Types: 3 - 4 Cans of beer per week     Chief Complaint   Patient presents with    Wound Check     BIBA from home. Per EMS report, wound on pt's gluteal started a week ago.      Diagnosis: Necrotizing fasciitis (HCC) [M72.6]    Unit where seen by Wound Team: T913/01     WOUND CONSULT RELATED TO:  Yeimi-rectal abscess packing    WOUND TEAM PLAN OF CARE - Frequency of Follow-up:   Nursing to follow dressing orders written for wound care. Contact wound team if area fails to progress, deteriorates or with any questions/concerns if something comes up before next scheduled follow up (See below as to whether wound is  following and frequency of wound follow up)   NPWT change 3 times weekly - possible VAC placement after tomorrow I&D  Weekly - barbara-rectal abscess packing    WOUND HISTORY:   Barbara-rectal abscess, I&D performed by Dr. Orellana on 6/14, packed with 1'' plain packing strip.  Dr. Ko assessed and re-packed on 6/15.        WOUND ASSESSMENT/LDA  Wound 06/14/24 Other (comment) Buttocks Abcess site, Barbara rectal (Active)   Date First Assessed/Time First Assessed: 06/14/24 (c) 0000   Present on Original Admission: (c) Yes  Primary Wound Type: Other (comment)  Location: Buttocks  Wound Description (Comments): Abcess site, Barbara rectal      Assessments 6/16/2024 10:00 AM   Wound Image     Site Assessment Red;Yellow   Periwound Assessment Red   Margins Unattached edges;Undefined edges   Closure Secondary intention   Drainage Amount Moderate   Drainage Description Sanguineous;Purulent   Treatments Cleansed;Irrigation;Nonselective debridement;Site care   Wound Cleansing Approved Wound Cleanser   Periwound Protectant Not Applicable   Dressing Status Clean;Dry;Intact   Dressing Changed Changed   Dressing Options Plain Strip Packing;Dry Gauze;Offloading Dressing - Sacral   Dressing Change/Treatment Frequency Daily, and As Needed   NEXT Dressing Change/Treatment Date 06/17/24   NEXT Weekly Photo (Inpatient Only) 06/19/24   Wound Team Following Weekly   Non-staged Wound Description Full thickness   Wound Length (cm) 1.5 cm   Wound Width (cm) 0.5 cm   Wound Depth (cm) 4 cm   Wound Surface Area (cm^2) 0.75 cm^2   Wound Volume (cm^3) 3 cm^3   Shape linear x2   WOUND NURSE ONLY - Time Spent with Patient (mins) 30        Vascular:    YOSSI:   No results found.    Lab Values:    Lab Results   Component Value Date/Time    WBC 37.3 (H) 06/16/2024 04:54 AM    RBC 2.60 (L) 06/16/2024 04:54 AM    HEMOGLOBIN 9.2 (L) 06/16/2024 04:54 AM    HEMATOCRIT 27.5 (L) 06/16/2024 04:54 AM    HBA1C 4.6 03/15/2012 02:34 PM         Culture Results show:  No  results found for this or any previous visit (from the past 720 hour(s)).    Pain Level/Medicated:  IV pain medications administered by bedside RN at start and during prior (Pt educated that IV medication will not be available on outpatient basis)       INTERVENTIONS BY WOUND TEAM:  Chart and images reviewed. Discussed with bedside RN. All areas of concern (based on picture review, LDA review and discussion with bedside RN) have been thoroughly assessed. Documentation of areas based on significant findings. This RN in to assess patient. Performed standard wound care which includes appropriate positioning, dressing removal and non-selective debridement. Pictures and measurements obtained weekly if/when required.    Wound:  COLBY-RECTAL ABSCESS   Preparation for Dressing removal: Removed without difficulty  Cleansed/Non-selectively Debrided with:  Wound cleanser and Gauze  Non-Excisional Conservative Sharp debridement: Slough and Non-Viable/Devitalized tissue debrided away using scissors and forceps < 20cm2 debrided down to slough and non-viable/devitalized tissue.  Abscess fluid drained  Colby wound: Cleansed with Wound cleanser and Gauze, Prepped with No Sting  Primary Dressin/2'' plain packing strip moisten with wound cleanser packed into the two colby-rectal wounds   Secondary (Outer) Dressing: dry gauze and  sacral offloading foam     Advanced Wound Care Discharge Planning  Number of Clinicians necessary to complete wound care: 2  Is patient requiring IV pain medications for dressing changes:  Yes  Length of time for dressing change 10 min. (This does not include chart review, pre-medication time, set up, clean up or time spent charting.)    Interdisciplinary consultation: Patient, Bedside RN Connor), N/A.  Pressure injury and staging reviewed with N/A.    EVALUATION / RATIONALE FOR TREATMENT:     Date:  24  Wound Status:  Initial evaluation    Colby-rectal abscess wound openings still continue with  non-viable tissue and necrosis.  CSWD to remove some slough in the left lateral (to the anus) wound bed and drained, malodorous with stool smell.  Packed again with 1/2'' plain packing strip moisten with wound cleanser.  Distal (to the anus) wound bed is moist red with clear serosanguinous drainage.  Patient to go back to surgery with Dr. Ko tomorrow for possible I&D and VAC placement.          Goals: Steady decrease in wound area and depth weekly.    NURSING PLAN OF CARE ORDERS:  No new orders this visit    NUTRITION RECOMMENDATIONS   Wound Team Recommendations:  N/A    DIET ORDERS (From admission to next 24h)       Start     Ordered    06/16/24 4487  Diet NPO Restrict to: Sips with Medications  AT MIDNIGHT      Question:  Diet NPO Restrict to:  Answer:  Sips with Medications    06/16/24 1028    06/15/24 1434  Supplements  ALL MEALS        Comments: Chocolate flavor please   Question Answer Comment   Which Supplement Ensure    Ensure: Ensure Plus Carton        06/15/24 1434    06/15/24 0801  Diet Order Diet: Regular  ALL MEALS        Question:  Diet:  Answer:  Regular    06/15/24 0801                    PREVENTATIVE INTERVENTIONS:    Q shift Thad - performed per nursing policy  Q shift pressure point assessments - performed per nursing policy    Surface/Positioning  ICU Low Airloss - Currently in Place  Reposition q 2 hours - Currently in Place  TAPs Turning system - Currently in Place    Offloading/Redistribution  Sacral offloading dressing (Silicone dressing) - Currently in Place  Float Heels off Bed with Pillows - Currently in Place           Respiratory  Silicone O2 tubing - Currently in Place  Gray Foam Ear protectors - Currently in Place    Containment/Moisture Prevention    Dri-rhona pad - Currently in Place    Mobilization      Unable to assess     Anticipated discharge plans:  TBD        Vac Discharge Needs:  Vac Discharge plan is purely a recommendation from wound team and not a requirement for  discharge unless otherwise stated by physician.  Not Applicable Pt not on a wound vac  Possible VAC placement tomorrow after I&D

## 2024-06-16 NOTE — CARE PLAN
The patient is Watcher - Medium risk of patient condition declining or worsening    Shift Goals  Clinical Goals: MAP goal >65, pain management  Patient Goals: Rest  Family Goals: Family not present    Progress made toward(s) clinical / shift goals:    Problem: Pain - Standard  Goal: Alleviation of pain or a reduction in pain to the patient’s comfort goal  Outcome: Progressing     Problem: Knowledge Deficit - Standard  Goal: Patient and family/care givers will demonstrate understanding of plan of care, disease process/condition, diagnostic tests and medications  Outcome: Progressing     Problem: Skin Integrity  Goal: Skin integrity is maintained or improved  Outcome: Progressing

## 2024-06-16 NOTE — PROGRESS NOTES
"  DATE: 6/16/2024    Post Operative Day  2 incision and drainage of  perirectal abscess .    INTERVAL EVENTS:  Some persistent cellulitis.  There is also some necrotic tissue around the area of drainage.  Patient would benefit from trip back to the operating room.  Will make n.p.o. at midnight for trip to the operating room tomorrow.    PHYSICAL EXAMINATION:  Vital Signs: /58   Pulse (!) 43   Temp 36.3 °C (97.3 °F) (Temporal)   Resp 15   Ht 1.727 m (5' 8\")   Wt 77.3 kg (170 lb 6.7 oz)   SpO2 96%     Awake and slightly confused.  Abdomen soft and nontender  Perineal wound with some necrotic tissue.  Packing in place.  Some cellulitis remains with induration.    LABORATORY VALUES:   Recent Labs     06/14/24  2000 06/15/24  0253 06/16/24  0454   WBC 33.5* 31.7* 37.3*   RBC 2.81* 2.84* 2.60*   HEMOGLOBIN 10.1* 10.1* 9.2*   HEMATOCRIT 29.2* 29.2* 27.5*   .9* 102.8* 105.8*   MCH 35.9* 35.6* 35.4*   MCHC 34.6 34.6 33.5   RDW 55.4* 55.7* 57.7*   PLATELETCT 207 188 223   MPV 11.2 11.4 11.3     Recent Labs     06/14/24  2000 06/15/24  0253 06/16/24  0454   SODIUM 136 138 142   POTASSIUM 2.9* 3.9 3.5*   CHLORIDE 102 105 109   CO2 17* 19* 21   GLUCOSE 127* 113* 149*   BUN 28* 27* 23*   CREATININE 1.50* 1.38 1.06   CALCIUM 8.4* 8.6 8.6     Recent Labs     06/14/24 1212 06/16/24  0454   ASTSGOT 47* 63*   ALTSGPT 28 39   TBILIRUBIN 4.8* 2.7*   ALKPHOSPHAT 71 90   GLOBULIN 4.6* 4.1*   INR 2.05*  --      Recent Labs     06/14/24 1212   INR 2.05*        IMAGING:   DX-CHEST-PORTABLE (1 VIEW)   Final Result      1.  Interval placement of right internal jugular central venous catheter.   2.  No evidence of procedure-related pneumothorax or other change compared with earlier film from today's date.      CT-ABDOMEN-PELVIS WITH   Final Result      1.  Cirrhotic changes of the liver.      2.  Splenomegaly.      3.  Esophageal, periumbilical, mesenteric, and retroperitoneal varices are noted.      4.  Small to moderate " amount of ascites throughout the lower abdomen and pelvis      5.  Acute left-sided perirectal abscess extending into the medial aspect of the left upper thigh.      DX-CHEST-PORTABLE (1 VIEW)   Final Result      1.  Slight atelectasis near left costophrenic angle.      2.  Postoperative changes of the right ribs.             ASSESSMENT AND PLAN:  Perirectal infection in the setting of severe cirrhosis.  Recommend return trip to the operating room tomorrow.  N.p.o. midnight.    Appreciate ICU and consulting physician care.       ____________________________________     Lori Ko M.D.    DD: 6/16/2024  10:25 AM

## 2024-06-16 NOTE — PROGRESS NOTES
"Critical Care Progress Note    Date of admission  6/14/2024    Chief Complaint  58 y.o. female admitted 6/14/2024 with perirectal abscess, sepsis    Hospital Course  \"58 y.o. female with hx of cirrhosis presented to ED after 1 week hx of severe perirectal pain. At ED, pt was hypotensive in 50-70s s/p 2L fluid boluses. Pt was found to have 6.7x5.7x3 cmperirectal abscess that extends into the medial aspect of the left upper thigh with multiple air bubbles, concerning of necrotizing soft tissue infection. Blood cultures obtained. Linezolid and pitpazo were given. General surgery was consulted and plan to take pt to OR emergently. WBC 20K, plt 160K. HCO3 17, creatiine 1.6, sodium 135, Lactate 3.8. INR 2.0, Total bili 4.8, AST mildly elevated. Pt is evaluated after OR. Pt underwent I&D with minimal debridement of perirectal abscess. Culture was sent. No evidence of nec fasc per Dr. Orellana. Pt was transferred to ICU post op. Alert, oriented. Comfortable. On NE gtt at 0.2 mcg/kg/min. On 2L NC.\" ~Dr De Jesus 6/14    6/15 -remains on norepinephrine; Zosyn day #2, replacing magnesium, low-flow oxygen, wound care consult today, start diet  6/16 -POD #2 perirectal abscess I&D, Zosyn, cultures pending, remains on norepinephrine, adding midodrine, creatinine improving    Interval Problem Update  Reviewed last 24 hour events:  Tmax 97.3  WBC 37.3  Zosyn day #3  Wound culture light growth E. coli, sensitivities pending  Blood cultures x 2 6/14 no growth to date  POD #2 after I&D perirectal abscess, surgery following  Remains on norepinephrine at  I/O= 3121/1335 (+1.7 L, net +5.8 L)  Creatinine improving 1.6-> 1.06    Review of Systems  Review of Systems   Unable to perform ROS: Acuity of condition        Vital Signs for last 24 hours   Temp:  [36.1 °C (97 °F)-36.3 °C (97.3 °F)] 36.2 °C (97.1 °F)  Pulse:  [37-98] 44  Resp:  [0-67] 17  BP: ()/(36-63) 89/53  SpO2:  [90 %-100 %] 97 %    Hemodynamic parameters for last 24 hours   "     Respiratory Information for the last 24 hours       Physical Exam   Physical Exam  Vitals and nursing note reviewed.   Constitutional:       General: She is not in acute distress.     Appearance: She is ill-appearing. She is not toxic-appearing or diaphoretic.   HENT:      Head: Normocephalic and atraumatic.      Mouth/Throat:      Mouth: Mucous membranes are dry.   Eyes:      Pupils: Pupils are equal, round, and reactive to light.   Cardiovascular:      Rate and Rhythm: Normal rate and regular rhythm.      Pulses: Normal pulses.      Heart sounds: Normal heart sounds. No murmur heard.  Pulmonary:      Effort: No respiratory distress.      Breath sounds: Normal breath sounds. No wheezing, rhonchi or rales.   Abdominal:      General: There is no distension.      Palpations: Abdomen is soft.      Tenderness: There is no abdominal tenderness. There is no guarding.   Genitourinary:     Comments: Surgical packing noted in left upper thigh  Musculoskeletal:      Cervical back: Neck supple.      Right lower leg: No edema.      Left lower leg: No edema.   Skin:     General: Skin is warm and dry.      Capillary Refill: Capillary refill takes less than 2 seconds.      Findings: No bruising or rash.   Neurological:      General: No focal deficit present.      Mental Status: She is alert and oriented to person, place, and time.      Comments: Nonfocal exam  Moving all extremities.    Psychiatric:         Mood and Affect: Mood normal.         Behavior: Behavior normal.         Medications  Current Facility-Administered Medications   Medication Dose Route Frequency Provider Last Rate Last Admin    potassium chloride SA (Kdur) tablet 60 mEq  60 mEq Oral Once Dawit Bell M.D.        piperacillin-tazobactam (Zosyn) 4.5 g in  mL IVPB  4.5 g Intravenous Q8HRS Nissa Carbajal 25 mL/hr at 06/16/24 0446 4.5 g at 06/16/24 0446    MD Alert...ICU Electrolyte Replacement per Pharmacy   Other PHARMACY TO DOSE Dawit Bell  M.D.        oxyCODONE immediate-release (Roxicodone) tablet 5-10 mg  5-10 mg Oral Q3HRS PRN Dawit Bell M.D.   5 mg at 06/16/24 0547    HYDROmorphone (Dilaudid) injection 1-2 mg  1-2 mg Intravenous Q2HRS PRN Dawit Bell M.D.        lactated ringers infusion   Intravenous Continuous TRUDY MilanOSandy 125 mL/hr at 06/16/24 0151 New Bag at 06/16/24 0151    norepinephrine (Levophed) 8 mg in 250 mL NS infusion (premix)  0-1 mcg/kg/min (Ideal) Intravenous Continuous August Paula II, M.D. 4.8 mL/hr at 06/16/24 0628 0.04 mcg/kg/min at 06/16/24 0628    Respiratory Therapy Consult   Nebulization Continuous RT Eliazar De Jesus D.O.        ondansetron (Zofran) syringe/vial injection 4 mg  4 mg Intravenous Q4HRS PRN Eliazar De Jesus D.O.        ondansetron (Zofran ODT) dispertab 4 mg  4 mg Oral Q4HRS PRN Eliazar De Jesus, D.O.        promethazine (Phenergan) tablet 12.5-25 mg  12.5-25 mg Oral Q4HRS PRN Eliazar De Jesus, D.O.        promethazine (Phenergan) suppository 12.5-25 mg  12.5-25 mg Rectal Q4HRS PRN Eliazar De Jesus, D.O.        prochlorperazine (Compazine) injection 5-10 mg  5-10 mg Intravenous Q4HRS PRN Eliazar De Jesus, D.O.        heparin injection 5,000 Units  5,000 Units Subcutaneous Q8HRS Eliazar De Jesus D.O.   5,000 Units at 06/16/24 0547       Fluids    Intake/Output Summary (Last 24 hours) at 6/16/2024 0719  Last data filed at 6/16/2024 0600  Gross per 24 hour   Intake 3121.62 ml   Output 1335 ml   Net 1786.62 ml       Laboratory          Recent Labs     06/14/24  2000 06/15/24  0253 06/16/24  0454   SODIUM 136 138 142   POTASSIUM 2.9* 3.9 3.5*   CHLORIDE 102 105 109   CO2 17* 19* 21   BUN 28* 27* 23*   CREATININE 1.50* 1.38 1.06   MAGNESIUM  --  1.4* 2.0   PHOSPHORUS  --  4.5  --    CALCIUM 8.4* 8.6 8.6     Recent Labs     06/14/24  1212 06/14/24  2000 06/15/24  0253 06/16/24  0454   ALTSGPT 28  --   --  39   ASTSGOT 47*  --   --  63*   ALKPHOSPHAT 71  --   --  90   TBILIRUBIN 4.8*  --   --  2.7*   GLUCOSE 98 127* 113* 149*     Recent  Labs     06/14/24  1212 06/14/24  2000 06/15/24  0253 06/16/24  0454   WBC 20.1* 33.5* 31.7* 37.3*   NEUTSPOLYS 82.50* 85.90* 93.50* 89.30*   LYMPHOCYTES 5.60* 3.80* 2.50* 3.50*   MONOCYTES 8.70 5.70 1.10 4.80   EOSINOPHILS 1.90 2.10 0.70 0.20   BASOPHILS 0.30 0.30 0.40 0.20   ASTSGOT 47*  --   --  63*   ALTSGPT 28  --   --  39   ALKPHOSPHAT 71  --   --  90   TBILIRUBIN 4.8*  --   --  2.7*     Recent Labs     06/14/24  1212 06/14/24  2000 06/15/24  0253 06/16/24  0454   RBC 2.66* 2.81* 2.84* 2.60*   HEMOGLOBIN 9.5* 10.1* 10.1* 9.2*   HEMATOCRIT 27.2* 29.2* 29.2* 27.5*   PLATELETCT 160* 207 188 223   PROTHROMBTM 23.4*  --   --   --    INR 2.05*  --   --   --        Imaging  X-Ray:  I have personally reviewed the images and compared with prior images.    Assessment/Plan  * Septic shock (HCC)  Assessment & Plan  This is Septic shock Present on admission  SIRS criteria identified on my evaluation include: Tachycardia, with heart rate greater than 90 BPM and Leukocytosis, with WBC greater than 12,000  Clinical indicators of end organ dysfunction include Hypotension with systolic blood pressure less than 90 or MAP less than 65  Indicators of septic shock include: Sepsis present and persistent hypotension despite fluid resuscitation   Sources is: perirectal abscess  Sepsis protocol initiated  Crystalloid Fluid Administration: Fluid resuscitation ordered per standard protocol - 30 mL/kg per current or ideal body weight  IV antibiotics as appropriate for source of sepsis  Reassessment: I have reassessed the patient's hemodynamic status    Source controled by surgery.   S/p I&D of abscess  No evidence of nec fasc intra-op   Linezolid and piptazo  Follow up intra-op cultures and blood cultures  Serial lactates until normalizes  Monitor UOP, strict I/Os. Sharon     Acute kidney injury (HCC)  Assessment & Plan  EFRAIN due to ATN due to sepsis, hypotension   Sodium 135  K 3.0, HCO3 17  Replete K   Monitor UOP, strict  I/Os      Alcoholic cirrhosis of liver with ascites (HCC)- (present on admission)  Assessment & Plan  Pt admits that she has been drinking a lot of alcohols (beer, wine, hard liquor) for years, daily drink  Recently quit beer/wine 4 months ago and quit hard liquor 2 months ago  She does not know prior diagnosis of cirrhosis  Denies hematemesis, melena, hematochezia.   Noted minimal ascites on CT A/P, not enough to tap   H/H 9.5  Total bili 4.8, INR 2.0. MELD 26  No signs of decompensated cirrhosis   Monitor for signs of bleeding    Perirectal abscess- (present on admission)  Assessment & Plan  Perirectal abscess that extends into the medial aspect of the left upper thigh with multiple air bubbles.   Went to OR emergently for concern of necrotizing fasciitis.   Intra-op, pt underwent I&D of the abscess and sample sent for culture  No evidence of nec fasc per surgery.     On linezolid and piptazo  Follow up intra-op cx  Follow up blood cultures.   Fluids  Follow lactates.   Monitor UOP  D/w Dr. Orellana, Surgery       Updated plan:  Reviewed with surgery at bedside, plan for return to OR for further debridement in a.m.  N.p.o. after midnight  Continue titrated norepinephrine, adding midodrine with underlying cirrhosis  Continue Zosyn  Follow-up culture results      VTE:  Heparin  Ulcer: Not Indicated  Lines: Central Line  Ongoing indication addressed    I have performed a physical exam and reviewed and updated ROS and Plan today (6/16/2024). In review of yesterday's note (6/15/2024), there are no changes except as documented above.     Discussed patient condition and risk of morbidity and/or mortality with RN, RT, Pharmacy, and QA team  The patient remains critically ill.  I have actively titrated vasopressors today.  Critical care time = 40 minutes in directly providing and coordinating critical care and extensive data review.  No time overlap and excludes procedures.

## 2024-06-17 ENCOUNTER — ANESTHESIA EVENT (OUTPATIENT)
Dept: SURGERY | Facility: MEDICAL CENTER | Age: 59
DRG: 853 | End: 2024-06-17
Payer: COMMERCIAL

## 2024-06-17 ENCOUNTER — ANESTHESIA (OUTPATIENT)
Dept: SURGERY | Facility: MEDICAL CENTER | Age: 59
DRG: 853 | End: 2024-06-17
Payer: COMMERCIAL

## 2024-06-17 LAB
ALBUMIN SERPL BCP-MCNC: 2 G/DL (ref 3.2–4.9)
ALBUMIN/GLOB SERPL: 0.5 G/DL
ALP SERPL-CCNC: 93 U/L (ref 30–99)
ALT SERPL-CCNC: 63 U/L (ref 2–50)
ANION GAP SERPL CALC-SCNC: 9 MMOL/L (ref 7–16)
AST SERPL-CCNC: 105 U/L (ref 12–45)
BACTERIA SPEC ANAEROBE CULT: ABNORMAL
BASOPHILS # BLD AUTO: 0.3 % (ref 0–1.8)
BASOPHILS # BLD: 0.05 K/UL (ref 0–0.12)
BILIRUB SERPL-MCNC: 2.3 MG/DL (ref 0.1–1.5)
BUN SERPL-MCNC: 16 MG/DL (ref 8–22)
CALCIUM ALBUM COR SERPL-MCNC: 10.1 MG/DL (ref 8.5–10.5)
CALCIUM SERPL-MCNC: 8.5 MG/DL (ref 8.5–10.5)
CHLORIDE SERPL-SCNC: 110 MMOL/L (ref 96–112)
CO2 SERPL-SCNC: 23 MMOL/L (ref 20–33)
CREAT SERPL-MCNC: 0.94 MG/DL (ref 0.5–1.4)
EOSINOPHIL # BLD AUTO: 0.22 K/UL (ref 0–0.51)
EOSINOPHIL NFR BLD: 1.1 % (ref 0–6.9)
ERYTHROCYTE [DISTWIDTH] IN BLOOD BY AUTOMATED COUNT: 58.6 FL (ref 35.9–50)
GFR SERPLBLD CREATININE-BSD FMLA CKD-EPI: 71 ML/MIN/1.73 M 2
GLOBULIN SER CALC-MCNC: 3.9 G/DL (ref 1.9–3.5)
GLUCOSE SERPL-MCNC: 102 MG/DL (ref 65–99)
HCT VFR BLD AUTO: 26.5 % (ref 37–47)
HGB BLD-MCNC: 8.8 G/DL (ref 12–16)
IMM GRANULOCYTES # BLD AUTO: 0.39 K/UL (ref 0–0.11)
IMM GRANULOCYTES NFR BLD AUTO: 2 % (ref 0–0.9)
LYMPHOCYTES # BLD AUTO: 1.53 K/UL (ref 1–4.8)
LYMPHOCYTES NFR BLD: 7.7 % (ref 22–41)
MAGNESIUM SERPL-MCNC: 1.5 MG/DL (ref 1.5–2.5)
MCH RBC QN AUTO: 35.3 PG (ref 27–33)
MCHC RBC AUTO-ENTMCNC: 33.2 G/DL (ref 32.2–35.5)
MCV RBC AUTO: 106.4 FL (ref 81.4–97.8)
MONOCYTES # BLD AUTO: 1.21 K/UL (ref 0–0.85)
MONOCYTES NFR BLD AUTO: 6.1 % (ref 0–13.4)
NEUTROPHILS # BLD AUTO: 16.43 K/UL (ref 1.82–7.42)
NEUTROPHILS NFR BLD: 82.8 % (ref 44–72)
NRBC # BLD AUTO: 0 K/UL
NRBC BLD-RTO: 0 /100 WBC (ref 0–0.2)
PLATELET # BLD AUTO: 162 K/UL (ref 164–446)
PMV BLD AUTO: 11 FL (ref 9–12.9)
POTASSIUM SERPL-SCNC: 4.1 MMOL/L (ref 3.6–5.5)
PROT SERPL-MCNC: 5.9 G/DL (ref 6–8.2)
RBC # BLD AUTO: 2.49 M/UL (ref 4.2–5.4)
SIGNIFICANT IND 70042: ABNORMAL
SITE SITE: ABNORMAL
SODIUM SERPL-SCNC: 142 MMOL/L (ref 135–145)
SOURCE SOURCE: ABNORMAL
WBC # BLD AUTO: 19.8 K/UL (ref 4.8–10.8)

## 2024-06-17 PROCEDURE — 770022 HCHG ROOM/CARE - ICU (200)

## 2024-06-17 PROCEDURE — 160027 HCHG SURGERY MINUTES - 1ST 30 MINS LEVEL 2: Performed by: STUDENT IN AN ORGANIZED HEALTH CARE EDUCATION/TRAINING PROGRAM

## 2024-06-17 PROCEDURE — 700101 HCHG RX REV CODE 250: Performed by: ANESTHESIOLOGY

## 2024-06-17 PROCEDURE — 0JBB0ZZ EXCISION OF PERINEUM SUBCUTANEOUS TISSUE AND FASCIA, OPEN APPROACH: ICD-10-PCS | Performed by: STUDENT IN AN ORGANIZED HEALTH CARE EDUCATION/TRAINING PROGRAM

## 2024-06-17 PROCEDURE — 700102 HCHG RX REV CODE 250 W/ 637 OVERRIDE(OP): Performed by: ANESTHESIOLOGY

## 2024-06-17 PROCEDURE — 160009 HCHG ANES TIME/MIN: Performed by: STUDENT IN AN ORGANIZED HEALTH CARE EDUCATION/TRAINING PROGRAM

## 2024-06-17 PROCEDURE — A9270 NON-COVERED ITEM OR SERVICE: HCPCS | Performed by: ANESTHESIOLOGY

## 2024-06-17 PROCEDURE — 700105 HCHG RX REV CODE 258: Performed by: ANESTHESIOLOGY

## 2024-06-17 PROCEDURE — 99291 CRITICAL CARE FIRST HOUR: CPT | Performed by: INTERNAL MEDICINE

## 2024-06-17 PROCEDURE — 97162 PT EVAL MOD COMPLEX 30 MIN: CPT

## 2024-06-17 PROCEDURE — 700105 HCHG RX REV CODE 258: Performed by: INTERNAL MEDICINE

## 2024-06-17 PROCEDURE — 700105 HCHG RX REV CODE 258: Performed by: NURSE PRACTITIONER

## 2024-06-17 PROCEDURE — 160035 HCHG PACU - 1ST 60 MINS PHASE I: Performed by: STUDENT IN AN ORGANIZED HEALTH CARE EDUCATION/TRAINING PROGRAM

## 2024-06-17 PROCEDURE — 700111 HCHG RX REV CODE 636 W/ 250 OVERRIDE (IP): Mod: JZ | Performed by: NURSE PRACTITIONER

## 2024-06-17 PROCEDURE — 700102 HCHG RX REV CODE 250 W/ 637 OVERRIDE(OP): Performed by: INTERNAL MEDICINE

## 2024-06-17 PROCEDURE — 160036 HCHG PACU - EA ADDL 30 MINS PHASE I: Performed by: STUDENT IN AN ORGANIZED HEALTH CARE EDUCATION/TRAINING PROGRAM

## 2024-06-17 PROCEDURE — 700111 HCHG RX REV CODE 636 W/ 250 OVERRIDE (IP): Performed by: ANESTHESIOLOGY

## 2024-06-17 PROCEDURE — 85025 COMPLETE CBC W/AUTO DIFF WBC: CPT

## 2024-06-17 PROCEDURE — 160048 HCHG OR STATISTICAL LEVEL 1-5: Performed by: STUDENT IN AN ORGANIZED HEALTH CARE EDUCATION/TRAINING PROGRAM

## 2024-06-17 PROCEDURE — 80053 COMPREHEN METABOLIC PANEL: CPT

## 2024-06-17 PROCEDURE — 160002 HCHG RECOVERY MINUTES (STAT): Performed by: STUDENT IN AN ORGANIZED HEALTH CARE EDUCATION/TRAINING PROGRAM

## 2024-06-17 PROCEDURE — A9270 NON-COVERED ITEM OR SERVICE: HCPCS | Performed by: INTERNAL MEDICINE

## 2024-06-17 PROCEDURE — 700111 HCHG RX REV CODE 636 W/ 250 OVERRIDE (IP): Performed by: INTERNAL MEDICINE

## 2024-06-17 PROCEDURE — 83735 ASSAY OF MAGNESIUM: CPT

## 2024-06-17 PROCEDURE — 46050 I&D PERIANAL ABSCESS SUPFC: CPT | Performed by: STUDENT IN AN ORGANIZED HEALTH CARE EDUCATION/TRAINING PROGRAM

## 2024-06-17 PROCEDURE — 700111 HCHG RX REV CODE 636 W/ 250 OVERRIDE (IP): Mod: JZ | Performed by: ANESTHESIOLOGY

## 2024-06-17 RX ORDER — SODIUM CHLORIDE, SODIUM LACTATE, POTASSIUM CHLORIDE, CALCIUM CHLORIDE 600; 310; 30; 20 MG/100ML; MG/100ML; MG/100ML; MG/100ML
INJECTION, SOLUTION INTRAVENOUS CONTINUOUS
Status: DISCONTINUED | OUTPATIENT
Start: 2024-06-17 | End: 2024-06-17 | Stop reason: HOSPADM

## 2024-06-17 RX ORDER — MIDAZOLAM HYDROCHLORIDE 1 MG/ML
INJECTION INTRAMUSCULAR; INTRAVENOUS PRN
Status: DISCONTINUED | OUTPATIENT
Start: 2024-06-17 | End: 2024-06-17 | Stop reason: SURG

## 2024-06-17 RX ORDER — MEPERIDINE HYDROCHLORIDE 25 MG/ML
12.5 INJECTION INTRAMUSCULAR; INTRAVENOUS; SUBCUTANEOUS
Status: DISCONTINUED | OUTPATIENT
Start: 2024-06-17 | End: 2024-06-17 | Stop reason: HOSPADM

## 2024-06-17 RX ORDER — SODIUM CHLORIDE, SODIUM LACTATE, POTASSIUM CHLORIDE, CALCIUM CHLORIDE 600; 310; 30; 20 MG/100ML; MG/100ML; MG/100ML; MG/100ML
INJECTION, SOLUTION INTRAVENOUS
Status: DISCONTINUED | OUTPATIENT
Start: 2024-06-17 | End: 2024-06-17 | Stop reason: SURG

## 2024-06-17 RX ORDER — DEXAMETHASONE SODIUM PHOSPHATE 4 MG/ML
INJECTION, SOLUTION INTRA-ARTICULAR; INTRALESIONAL; INTRAMUSCULAR; INTRAVENOUS; SOFT TISSUE PRN
Status: DISCONTINUED | OUTPATIENT
Start: 2024-06-17 | End: 2024-06-17 | Stop reason: SURG

## 2024-06-17 RX ORDER — OXYCODONE HCL 5 MG/5 ML
5 SOLUTION, ORAL ORAL
Status: COMPLETED | OUTPATIENT
Start: 2024-06-17 | End: 2024-06-17

## 2024-06-17 RX ORDER — OXYCODONE HCL 5 MG/5 ML
10 SOLUTION, ORAL ORAL
Status: COMPLETED | OUTPATIENT
Start: 2024-06-17 | End: 2024-06-17

## 2024-06-17 RX ORDER — HYDROMORPHONE HYDROCHLORIDE 1 MG/ML
0.2 INJECTION, SOLUTION INTRAMUSCULAR; INTRAVENOUS; SUBCUTANEOUS
Status: DISCONTINUED | OUTPATIENT
Start: 2024-06-17 | End: 2024-06-17 | Stop reason: HOSPADM

## 2024-06-17 RX ORDER — MAGNESIUM SULFATE HEPTAHYDRATE 40 MG/ML
4 INJECTION, SOLUTION INTRAVENOUS ONCE
Status: COMPLETED | OUTPATIENT
Start: 2024-06-17 | End: 2024-06-17

## 2024-06-17 RX ORDER — HYDROMORPHONE HYDROCHLORIDE 1 MG/ML
0.4 INJECTION, SOLUTION INTRAMUSCULAR; INTRAVENOUS; SUBCUTANEOUS
Status: DISCONTINUED | OUTPATIENT
Start: 2024-06-17 | End: 2024-06-17 | Stop reason: HOSPADM

## 2024-06-17 RX ORDER — HYDROMORPHONE HYDROCHLORIDE 1 MG/ML
0.1 INJECTION, SOLUTION INTRAMUSCULAR; INTRAVENOUS; SUBCUTANEOUS
Status: DISCONTINUED | OUTPATIENT
Start: 2024-06-17 | End: 2024-06-17 | Stop reason: HOSPADM

## 2024-06-17 RX ORDER — HALOPERIDOL 5 MG/ML
1 INJECTION INTRAMUSCULAR
Status: DISCONTINUED | OUTPATIENT
Start: 2024-06-17 | End: 2024-06-17 | Stop reason: HOSPADM

## 2024-06-17 RX ORDER — DIPHENHYDRAMINE HYDROCHLORIDE 50 MG/ML
12.5 INJECTION INTRAMUSCULAR; INTRAVENOUS
Status: DISCONTINUED | OUTPATIENT
Start: 2024-06-17 | End: 2024-06-17 | Stop reason: HOSPADM

## 2024-06-17 RX ORDER — ONDANSETRON 2 MG/ML
INJECTION INTRAMUSCULAR; INTRAVENOUS PRN
Status: DISCONTINUED | OUTPATIENT
Start: 2024-06-17 | End: 2024-06-17 | Stop reason: SURG

## 2024-06-17 RX ORDER — LIDOCAINE HYDROCHLORIDE 20 MG/ML
INJECTION, SOLUTION EPIDURAL; INFILTRATION; INTRACAUDAL; PERINEURAL PRN
Status: DISCONTINUED | OUTPATIENT
Start: 2024-06-17 | End: 2024-06-17 | Stop reason: SURG

## 2024-06-17 RX ADMIN — LIDOCAINE HYDROCHLORIDE 100 MG: 20 INJECTION, SOLUTION EPIDURAL; INFILTRATION; INTRACAUDAL at 09:12

## 2024-06-17 RX ADMIN — SODIUM CHLORIDE, POTASSIUM CHLORIDE, SODIUM LACTATE AND CALCIUM CHLORIDE: 600; 310; 30; 20 INJECTION, SOLUTION INTRAVENOUS at 00:13

## 2024-06-17 RX ADMIN — AMPICILLIN AND SULBACTAM 3 G: 1; 2 INJECTION, POWDER, FOR SOLUTION INTRAMUSCULAR; INTRAVENOUS at 23:58

## 2024-06-17 RX ADMIN — SUGAMMADEX 200 MG: 100 INJECTION, SOLUTION INTRAVENOUS at 09:36

## 2024-06-17 RX ADMIN — HYDROMORPHONE HYDROCHLORIDE 1 MG: 1 INJECTION, SOLUTION INTRAMUSCULAR; INTRAVENOUS; SUBCUTANEOUS at 07:21

## 2024-06-17 RX ADMIN — PROPOFOL 150 MG: 10 INJECTION, EMULSION INTRAVENOUS at 09:12

## 2024-06-17 RX ADMIN — OXYCODONE HYDROCHLORIDE 10 MG: 5 TABLET ORAL at 17:53

## 2024-06-17 RX ADMIN — MIDAZOLAM HYDROCHLORIDE 2 MG: 2 INJECTION, SOLUTION INTRAMUSCULAR; INTRAVENOUS at 09:10

## 2024-06-17 RX ADMIN — FENTANYL CITRATE 50 MCG: 50 INJECTION, SOLUTION INTRAMUSCULAR; INTRAVENOUS at 09:28

## 2024-06-17 RX ADMIN — SODIUM CHLORIDE, POTASSIUM CHLORIDE, SODIUM LACTATE AND CALCIUM CHLORIDE: 600; 310; 30; 20 INJECTION, SOLUTION INTRAVENOUS at 09:10

## 2024-06-17 RX ADMIN — FENTANYL CITRATE 50 MCG: 50 INJECTION, SOLUTION INTRAMUSCULAR; INTRAVENOUS at 09:25

## 2024-06-17 RX ADMIN — MAGNESIUM SULFATE HEPTAHYDRATE 4 G: 4 INJECTION, SOLUTION INTRAVENOUS at 07:57

## 2024-06-17 RX ADMIN — ONDANSETRON 8 MG: 2 INJECTION INTRAMUSCULAR; INTRAVENOUS at 09:32

## 2024-06-17 RX ADMIN — HYDROMORPHONE HYDROCHLORIDE 0.4 MG: 1 INJECTION, SOLUTION INTRAMUSCULAR; INTRAVENOUS; SUBCUTANEOUS at 10:30

## 2024-06-17 RX ADMIN — MIDODRINE HYDROCHLORIDE 5 MG: 5 TABLET ORAL at 13:58

## 2024-06-17 RX ADMIN — SODIUM CHLORIDE, POTASSIUM CHLORIDE, SODIUM LACTATE AND CALCIUM CHLORIDE: 600; 310; 30; 20 INJECTION, SOLUTION INTRAVENOUS at 07:55

## 2024-06-17 RX ADMIN — FENTANYL CITRATE 50 MCG: 50 INJECTION, SOLUTION INTRAMUSCULAR; INTRAVENOUS at 10:15

## 2024-06-17 RX ADMIN — AMPICILLIN AND SULBACTAM 3 G: 1; 2 INJECTION, POWDER, FOR SOLUTION INTRAMUSCULAR; INTRAVENOUS at 12:22

## 2024-06-17 RX ADMIN — OXYCODONE HYDROCHLORIDE 10 MG: 5 TABLET ORAL at 11:15

## 2024-06-17 RX ADMIN — DEXAMETHASONE SODIUM PHOSPHATE 8 MG: 4 INJECTION INTRA-ARTICULAR; INTRALESIONAL; INTRAMUSCULAR; INTRAVENOUS; SOFT TISSUE at 09:12

## 2024-06-17 RX ADMIN — ENOXAPARIN SODIUM 40 MG: 100 INJECTION SUBCUTANEOUS at 17:40

## 2024-06-17 RX ADMIN — OXYCODONE HYDROCHLORIDE 10 MG: 5 SOLUTION ORAL at 09:45

## 2024-06-17 RX ADMIN — AMPICILLIN AND SULBACTAM 3 G: 1; 2 INJECTION, POWDER, FOR SOLUTION INTRAMUSCULAR; INTRAVENOUS at 17:41

## 2024-06-17 RX ADMIN — ROCURONIUM BROMIDE 50 MG: 10 INJECTION, SOLUTION INTRAVENOUS at 09:12

## 2024-06-17 RX ADMIN — MIDODRINE HYDROCHLORIDE 5 MG: 5 TABLET ORAL at 21:31

## 2024-06-17 RX ADMIN — GLYCOPYRROLATE 0.4 MG: 0.2 INJECTION INTRAMUSCULAR; INTRAVENOUS at 09:12

## 2024-06-17 RX ADMIN — MIDODRINE HYDROCHLORIDE 5 MG: 5 TABLET ORAL at 05:13

## 2024-06-17 RX ADMIN — FENTANYL CITRATE 50 MCG: 50 INJECTION, SOLUTION INTRAMUSCULAR; INTRAVENOUS at 10:26

## 2024-06-17 RX ADMIN — PIPERACILLIN AND TAZOBACTAM 4.5 G: 4; .5 INJECTION, POWDER, FOR SOLUTION INTRAVENOUS at 05:13

## 2024-06-17 ASSESSMENT — PAIN DESCRIPTION - PAIN TYPE
TYPE: ACUTE PAIN
TYPE: SURGICAL PAIN
TYPE: ACUTE PAIN

## 2024-06-17 ASSESSMENT — GAIT ASSESSMENTS
ASSISTIVE DEVICE: HAND HELD ASSIST
DISTANCE (FEET): 30
DEVIATION: BRADYKINETIC;SHUFFLED GAIT;DECREASED BASE OF SUPPORT
GAIT LEVEL OF ASSIST: MINIMAL ASSIST

## 2024-06-17 ASSESSMENT — COGNITIVE AND FUNCTIONAL STATUS - GENERAL
SUGGESTED CMS G CODE MODIFIER MOBILITY: CK
WALKING IN HOSPITAL ROOM: A LOT
TURNING FROM BACK TO SIDE WHILE IN FLAT BAD: A LITTLE
CLIMB 3 TO 5 STEPS WITH RAILING: A LOT
MOVING TO AND FROM BED TO CHAIR: A LITTLE
STANDING UP FROM CHAIR USING ARMS: A LITTLE
MOBILITY SCORE: 16
MOVING FROM LYING ON BACK TO SITTING ON SIDE OF FLAT BED: A LITTLE

## 2024-06-17 ASSESSMENT — FIBROSIS 4 INDEX: FIB4 SCORE: 2.53

## 2024-06-17 NOTE — OP REPORT
"  DATE OF OPERATION:   6/17/2024     PREOPERATIVE DIAGNOSIS: Perianal abscess    POSTOPERATIVE DIAGNOSIS: Perianal abscess     PROCEDURE PERFORMED: Sharp excisional debridement of necrotic subcutaneous tissue 3 x3 cm    SURGEON:    Gilberto Sierra M.D.    ASSISTANT:    N/cruz    ANESTHESIOLOGIST:  Erick Anand M.D.     ANESTHESIA:   General endotracheal anesthesia.    ASA CLASSIFICATION:  IV.    INDICATIONS: The patient is a 58 year-old woman with history of cirrhosis who underwent I&D of barbara-anal abscess on 6/14/24.  She has been in the ICU for ongoing resuscitation of septic shock.  She is now off vasopressors.  At last wound check there was concern for some ongoing necrosis so she is indicated to return to the OR today        FINDINGS: minimal necrotic tissue lining the abscess cavities,     WOUND CLASSIFICATION:  Class IV, Dirty or Infected. Infection present at the time of surgery (PATOS).    SPECIMEN: n/a    ESTIMATED BLOOD LOSS:   10 mL.     PROCEDURE: Following informed consent consent, the patient was properly identified, taken to the operating room and placed in supine position where general endotracheal anesthesia was administered.   She was then transferred to the operating table in the prone position taking care to pad all bony prominences and pressure points.  Intravenous antibiotics were administered by the anesthesiologist in correct time interval. The patient arrived with a previously placed Herrera catheter. Sequential compression devices were employed. The abdomen was prepped and draped into a sterile field. A timeout was conducted with the full attention and participation of all operating room personnel.    The two wound cavities were explored.  The more cephalad one had a 3x3 cm area of necrotic subcutaneous fat that was sharply excised.  The remainder of the wound was without necrosis, abscess, or any obvious purulence.  A single long piece of 1\" Iodoform was used to pack both wounds.    The " patient tolerated the procedure well, and there were no apparent complications. All sponge, needle, and instrument counts were correct on 2 separate occasions. The patient was awakened, extubated, and transferred to  to the post anesthesia care unit (PACU) in satisfactory condition.    PLAN:  Daily packing changes       ____________________________________     Gilberto Sierra M.D.    DD: 6/17/2024  9:57 AM

## 2024-06-17 NOTE — DIETARY
"Nutrition services: Day 3 of admit.  Kavita Freeman is a 58 y.o. female with admitting DX of Necrotizing fasciitis.  Consult received for failure to thrive: only drinks ensure and eats fruit, poor PO intake, needs dietary education/resource information. Malnutrition Screening Tool score of 4 for unplanned weight loss of 34 or more lb x 6 months.    Spoke with pt at bedside and clarified food preferences. Discussed importance of good nutrition intake and good protein intake to support healing. Pt stated understanding and stated she will eat other protein items other than Ensure. She likes cottage cheese, yogurt, tofu, and plain baked chicken. She is drinking Ensure Compact, but would prefer Ensure Max Protein for a higher protein supplement.    Pt states she lost about 50 lb, but this was due to healthy dietary choices including stopping drinking and following a healthy diet.    Assessment:  Height: 172.7 cm (5' 8\")  Weight: 78.1 kg (172 lb 2.9 oz)  Body mass index is 26.18 kg/m²., BMI classification: Overweight  Diet/Intake: Regular with Ensure    Evaluation:   Recorded PO intake of meals has been <50%, but pt is drinking % of Ensure.  Labs 6/17 include Alb 2 (L). No Pre-Alb or CRP available.  Medications reviewed.  Skin: barbara-rectal abscess wounds. S/P surgical debridement today.  No significant fat or muscle wasting.    Malnutrition Risk: Unable to meet 2 criteria.    Recommendations/Plan:  Ensure Max Protein supplement with meals.  Offer pt's preferred high protein foods at meals.   Encourage intake of meals and supplements >50%  Document intake of all meals and supplements as % taken in ADL's to provide interdisciplinary communication across all shifts.   Monitor weight.  Nutrition rep will continue to see patient for ongoing meal and snack preferences.     RD following      "

## 2024-06-17 NOTE — ANESTHESIA POSTPROCEDURE EVALUATION
Patient: Kavita Freeman    Procedure Summary       Date: 06/17/24 Room / Location: [place] [id number] / SURGERY [facility]    Anesthesia Start: 0910 Anesthesia Stop: 0944    Procedure: INCISION AND DRAINAGE, ABSCESS, PERIRECTAL (Buttocks) Diagnosis: (PERIANAL ABSCESS)    Surgeons: Gilberto Sierra M.D. Responsible Provider: Erick Anand M.D.    Anesthesia Type: general ASA Status: 4            Final Anesthesia Type: general  Last vitals  /54     Temp   36.2 °C (97.1 °F)    Pulse   66   Resp   16    SpO2   96 %      Anesthesia Post Evaluation    Patient location during evaluation: PACU  Level of consciousness: awake and alert    Airway patency: patent  Anesthetic complications: no  Cardiovascular status: hemodynamically stable  Respiratory status: acceptable  Hydration status: euvolemic    PONV: none          There were no known notable events for this encounter.     Nurse Pain Score: 7 (NPRS)

## 2024-06-17 NOTE — ANESTHESIA TIME REPORT
Anesthesia Start and Stop Event Times       Date Time Event    6/17/2024 0750 Ready for Procedure     0910 Anesthesia Start     0944 Anesthesia Stop          Responsible Staff  06/17/24      Name Role Begin End    Erick Anand M.D. Anesth 0910 0944          Overtime Reason:  no overtime (within assigned shift)    Comments:

## 2024-06-17 NOTE — ANESTHESIA PROCEDURE NOTES
Airway    Date/Time: 6/17/2024 9:13 AM    Performed by: Erick Anand M.D.  Authorized by: Erick Anand M.D.    Location:  OR  Urgency:  Elective  Difficult Airway: No    Indications for Airway Management:  Anesthesia      Spontaneous Ventilation: absent    Sedation Level:  Deep  Preoxygenated: Yes    Patient Position:  Sniffing  Final Airway Type:  Endotracheal airway  Final Endotracheal Airway:  ETT  Cuffed: Yes    Technique Used for Successful ETT Placement:  Direct laryngoscopy  Devices/Methods Used in Placement:  Intubating stylet    Insertion Site:  Oral  Blade Type:  Mali  Laryngoscope Blade/Videolaryngoscope Blade Size:  3  ETT Size (mm):  7.0  Measured from:  Teeth  ETT to Teeth (cm):  21  Placement Verified by: auscultation and capnometry    Cormack-Lehane Classification:  Grade I - full view of glottis  Number of Attempts at Approach:  1

## 2024-06-17 NOTE — PROGRESS NOTES
"Critical Care Progress Note    Date of admission  6/14/2024    Chief Complaint  58 y.o. female admitted 6/14/2024 with perirectal abscess, sepsis    Hospital Course  \"58 y.o. female with hx of cirrhosis presented to ED after 1 week hx of severe perirectal pain. At ED, pt was hypotensive in 50-70s s/p 2L fluid boluses. Pt was found to have 6.7x5.7x3 cmperirectal abscess that extends into the medial aspect of the left upper thigh with multiple air bubbles, concerning of necrotizing soft tissue infection. Blood cultures obtained. Linezolid and pitpazo were given. General surgery was consulted and plan to take pt to OR emergently. WBC 20K, plt 160K. HCO3 17, creatiine 1.6, sodium 135, Lactate 3.8. INR 2.0, Total bili 4.8, AST mildly elevated. Pt is evaluated after OR. Pt underwent I&D with minimal debridement of perirectal abscess. Culture was sent. No evidence of nec fasc per Dr. Orellana. Pt was transferred to ICU post op. Alert, oriented. Comfortable. On NE gtt at 0.2 mcg/kg/min. On 2L NC.\" ~Dr De Jesus 6/14    6/15 -remains on norepinephrine; Zosyn day #2, replacing magnesium, low-flow oxygen, wound care consult today, start diet  6/16 -POD #2 perirectal abscess I&D, Zosyn, cultures pending, remains on norepinephrine, adding midodrine, creatinine improving  6/17 Weaning levo; repeat ID in OR    Interval Problem Update  Reviewed last 24 hour events:  Awake and alert  Levo weaning  Afebrile  Back to OR for I&D  Wound culture + ecoli, bacteroides, fusobacterium, and corynbacterium   Zosyn --> unasyn  VTE ppx      Review of Systems  Review of Systems   Unable to perform ROS: Acuity of condition        Vital Signs for last 24 hours   Temp:  [36.1 °C (97 °F)-36.3 °C (97.3 °F)] 36.2 °C (97.1 °F)  Pulse:  [39-78] 60  Resp:  [6-124] 12  BP: ()/(38-77) 110/55  SpO2:  [89 %-99 %] 95 %    Hemodynamic parameters for last 24 hours       Respiratory Information for the last 24 hours       Physical Exam   Physical Exam  Vitals and " nursing note reviewed.   Constitutional:       General: She is not in acute distress.     Appearance: She is ill-appearing. She is not toxic-appearing or diaphoretic.   HENT:      Head: Normocephalic and atraumatic.      Mouth/Throat:      Mouth: Mucous membranes are moist.   Eyes:      Pupils: Pupils are equal, round, and reactive to light.   Cardiovascular:      Rate and Rhythm: Normal rate.      Pulses: Normal pulses.   Pulmonary:      Effort: Pulmonary effort is normal. No respiratory distress.   Abdominal:      General: Abdomen is flat. There is no distension.      Palpations: Abdomen is soft.      Tenderness: There is no abdominal tenderness. There is no guarding or rebound.   Genitourinary:     Comments: Deferred   Musculoskeletal:      Right lower leg: No edema.      Left lower leg: No edema.   Skin:     General: Skin is warm and dry.      Capillary Refill: Capillary refill takes less than 2 seconds.      Findings: No bruising or rash.   Neurological:      General: No focal deficit present.      Mental Status: She is alert and oriented to person, place, and time.   Psychiatric:         Mood and Affect: Mood normal.         Behavior: Behavior normal.         Medications  Current Facility-Administered Medications   Medication Dose Route Frequency Provider Last Rate Last Admin    ampicillin/sulbactam (Unasyn) 3 g in  mL IVPB  3 g Intravenous Q6HRS Shahzad Jenkins M.D.        midodrine (Proamatine) tablet 5 mg  5 mg Oral Q8HRS Dawit Bell M.D.   5 mg at 06/17/24 0513    enoxaparin (Lovenox) inj 40 mg  40 mg Subcutaneous DAILY AT 1800 Dawit Bell M.D.   40 mg at 06/16/24 1740    senna-docusate (Pericolace Or Senokot S) 8.6-50 MG per tablet 2 Tablet  2 Tablet Oral Q EVENING Dawit Bell M.D.   2 Tablet at 06/16/24 1740    And    polyethylene glycol/lytes (Miralax) Packet 1 Packet  1 Packet Oral QDAY PRN Dawit Bell M.D.        MD Alert...ICU Electrolyte Replacement per Pharmacy   Other  PHARMACY TO DOSE Dawit Bell M.D.        oxyCODONE immediate-release (Roxicodone) tablet 5-10 mg  5-10 mg Oral Q3HRS PRN Dawit Bell M.D.   10 mg at 06/17/24 1115    HYDROmorphone (Dilaudid) injection 1-2 mg  1-2 mg Intravenous Q2HRS PRN Dawit Bell M.D.   1 mg at 06/17/24 0721    norepinephrine (Levophed) 8 mg in 250 mL NS infusion (premix)  0-1 mcg/kg/min (Ideal) Intravenous Continuous August Paula II, M.D.   Stopped at 06/17/24 0416    Respiratory Therapy Consult   Nebulization Continuous RT Eliazar De Jesus D.O.        ondansetron (Zofran) syringe/vial injection 4 mg  4 mg Intravenous Q4HRS PRN ASTRID Milan.O.        ondansetron (Zofran ODT) dispertab 4 mg  4 mg Oral Q4HRS PRN Eliazar De Jesus D.O.        promethazine (Phenergan) tablet 12.5-25 mg  12.5-25 mg Oral Q4HRS PRN ASTRID Milan.O.        promethazine (Phenergan) suppository 12.5-25 mg  12.5-25 mg Rectal Q4HRS PRN Eliazar De Jesus D.O.        prochlorperazine (Compazine) injection 5-10 mg  5-10 mg Intravenous Q4HRS PRN Elaizar De Jesus D.O.           Fluids    Intake/Output Summary (Last 24 hours) at 6/17/2024 1208  Last data filed at 6/17/2024 0943  Gross per 24 hour   Intake 3178.13 ml   Output 1065 ml   Net 2113.13 ml       Laboratory          Recent Labs     06/15/24  0253 06/16/24  0454 06/17/24  0511   SODIUM 138 142 142   POTASSIUM 3.9 3.5* 4.1   CHLORIDE 105 109 110   CO2 19* 21 23   BUN 27* 23* 16   CREATININE 1.38 1.06 0.94   MAGNESIUM 1.4* 2.0 1.5   PHOSPHORUS 4.5  --   --    CALCIUM 8.6 8.6 8.5     Recent Labs     06/14/24  1212 06/14/24  2000 06/15/24  0253 06/16/24  0454 06/17/24  0511   ALTSGPT 28  --   --  39 63*   ASTSGOT 47*  --   --  63* 105*   ALKPHOSPHAT 71  --   --  90 93   TBILIRUBIN 4.8*  --   --  2.7* 2.3*   GLUCOSE 98   < > 113* 149* 102*    < > = values in this interval not displayed.     Recent Labs     06/14/24  1212 06/14/24  2000 06/15/24  0253 06/16/24  0454 06/17/24  0511   WBC 20.1*   < > 31.7* 37.3* 19.8*   NEUTSPOLYS  82.50*   < > 93.50* 89.30* 82.80*   LYMPHOCYTES 5.60*   < > 2.50* 3.50* 7.70*   MONOCYTES 8.70   < > 1.10 4.80 6.10   EOSINOPHILS 1.90   < > 0.70 0.20 1.10   BASOPHILS 0.30   < > 0.40 0.20 0.30   ASTSGOT 47*  --   --  63* 105*   ALTSGPT 28  --   --  39 63*   ALKPHOSPHAT 71  --   --  90 93   TBILIRUBIN 4.8*  --   --  2.7* 2.3*    < > = values in this interval not displayed.     Recent Labs     06/14/24  1212 06/14/24  2000 06/15/24  0253 06/16/24  0454 06/17/24  0511   RBC 2.66*   < > 2.84* 2.60* 2.49*   HEMOGLOBIN 9.5*   < > 10.1* 9.2* 8.8*   HEMATOCRIT 27.2*   < > 29.2* 27.5* 26.5*   PLATELETCT 160*   < > 188 223 162*   PROTHROMBTM 23.4*  --   --   --   --    INR 2.05*  --   --   --   --     < > = values in this interval not displayed.       Imaging  X-Ray:  I have personally reviewed the images and compared with prior images.    Assessment/Plan  * Septic shock (HCC)  Assessment & Plan  This is Septic shock Present on admission  SIRS criteria identified on my evaluation include: Tachycardia, with heart rate greater than 90 BPM and Leukocytosis, with WBC greater than 12,000  Clinical indicators of end organ dysfunction include Hypotension with systolic blood pressure less than 90 or MAP less than 65  Indicators of septic shock include: Sepsis present and persistent hypotension despite fluid resuscitation   Sources is: perirectal abscess  Sepsis protocol initiated  Crystalloid Fluid Administration: Fluid resuscitation ordered per standard protocol - 30 mL/kg per current or ideal body weight  IV antibiotics as appropriate for source of sepsis  Reassessment: I have reassessed the patient's hemodynamic status    Source control per surgery.   S/p I&D of abscess  Unasyn  Wound culture: E.coli, fusobacterium mortiferum, bacteroides ovatus and fragilis, corynebacterium amycolatum   Vasopressors titrated to MAP > 65     Acute kidney injury (HCC)  Assessment & Plan  EFRAIN due to ATN due to sepsis, hypotension  Improved      Strict I/Os  Renally dosed medications  Avoid nephrotoxic agents as able  Trend       Alcoholic cirrhosis of liver with ascites (HCC)- (present on admission)  Assessment & Plan  Pt admits that she has been drinking a lot of alcohols (beer, wine, hard liquor) for years, daily drink  Recently quit beer/wine 4 months ago and quit hard liquor 2 months ago  She does not know prior diagnosis of cirrhosis  Denies hematemesis, melena, hematochezia.   Noted minimal ascites on CT A/P, not enough to tap   H/H 9.5  Total bili 4.8, INR 2.0. MELD 26  Monitor for signs of bleeding    Perirectal abscess- (present on admission)  Assessment & Plan  Perirectal abscess that extends into the medial aspect of the left upper thigh with multiple air bubbles.   Surgery following for source control  Unasyn  Wound culture: E.coli, fusobacterium mortiferum, bacteroides ovatus and fragilis, corynebacterium amycolatum          VTE:  Heparin  Ulcer: Not Indicated  Lines: Central Line  Ongoing indication addressed    I have performed a physical exam and reviewed and updated ROS and Plan today (6/17/2024). In review of yesterday's note (6/16/2024), there are no changes except as documented above.     Discussed patient condition and risk of morbidity and/or mortality with RN, RT, Pharmacy, and QA team  The patient remains critically ill.  I have actively titrated vasopressors today.  Critical care time = 38 minutes in directly providing and coordinating critical care and extensive data review.  No time overlap and excludes procedures.

## 2024-06-17 NOTE — CARE PLAN
The patient is Watcher - Medium risk of patient condition declining or worsening    Shift Goals  Clinical Goals: MAP>65, manage pain  Patient Goals: Sleep, comfort  Family Goals: Family not present    Progress made toward(s) clinical / shift goals:    Problem: Pain - Standard  Goal: Alleviation of pain or a reduction in pain to the patient’s comfort goal  Outcome: Progressing     Problem: Knowledge Deficit - Standard  Goal: Patient and family/care givers will demonstrate understanding of plan of care, disease process/condition, diagnostic tests and medications  Outcome: Progressing     Problem: Skin Integrity  Goal: Skin integrity is maintained or improved  Outcome: Progressing

## 2024-06-17 NOTE — THERAPY
Physical Therapy   Initial Evaluation     Patient Name: Kavita Freeman  Age:  58 y.o., Sex:  female  Medical Record #: [id number]  Today's Date: 6/17/2024     Precautions  Precautions: Fall Risk    Assessment  Patient is 58 y.o. female admitted with a perirectal abscess, EFRAIN, and sepsis. PMH includes cirrhosis. Pt is POD #3 and POD#0 I&D. Pt received in the ICU in bed with mother at bedside. She plans to dc to her [facility] which [facility] a [facility] with 0STE. Pt tremulous during mobility and limited by generalize fatigue and weakness. Min assist required for bed mobility, transfers, and gait with HHA. She ambulated with short shuffled steps with very narrow ARCADIO despite cues. PT will cont while pt is in acute care setting to address deficits.     Plan    Physical Therapy Initial Treatment Plan   Treatment Plan : Bed Mobility, Equipment, Family / Caregiver Training, Gait Training, Neuro Re-Education / Balance, Self Care / Home Evaluation, Stair Training, Therapeutic Activities, Therapeutic Exercise  Treatment Frequency: 4 Times per Week  Duration: Until Therapy Goals Met    DC Equipment Recommendations: Unable to determine at this time  Discharge Recommendations: Recommend post-acute placement for additional physical therapy services prior to discharge home (will likely progress to home with HHPT)            06/17/24 1524   Vitals   O2 (LPM) 1   O2 Delivery Device Silicone Nasal Cannula   Vitals Comments BP in 90s   Pain 0 - 10 Group   Therapist Pain Assessment During Activity;Nurse Notified;5   Non Verbal Descriptors   Non Verbal Scale  Calm   Prior Living Situation   Prior Services Home-Independent   Housing / Facility [address] [facility]   Steps Into Home 0   Steps In Home 0   Equipment Owned None   Lives with - Patient's Self Care Capacity Parents   Comments pt lives with her fiance but plans to stay with her parents   Prior Level of Functional Mobility   Bed Mobility Independent   Transfer Status Independent   Ambulation  Independent   Ambulation Distance community   Assistive Devices Used None   Stairs Independent   Comments independent prior   History of Falls   History of Falls No   Cognition    Level of Consciousness Alert   Active ROM Upper Body   Active ROM Upper Body  WDL   Strength Upper Body   Upper Body Strength  WDL   Active ROM Lower Body    Active ROM Lower Body  WDL   Strength Lower Body   Lower Body Strength  X   Gross Strength Generalized Weakness, Equal Bilaterally   Coordination Lower Body    Coordination Lower Body  X   Comments tremulous   Other Treatments   Other Treatments Provided mother present during evaluation   Balance Assessment   Sitting Balance (Static) Fair   Sitting Balance (Dynamic) Fair -   Standing Balance (Static) Poor   Standing Balance (Dynamic) Poor   Weight Shift Sitting Fair   Weight Shift Standing Poor   Comments HHA   Bed Mobility    Supine to Sit Minimal Assist   Sit to Supine Moderate Assist   Scooting Contact Guard Assist   Comments HOB elevated   Gait Analysis   Gait Level Of Assist Minimal Assist   Assistive Device Hand Held Assist   Distance (Feet) 30   # of Times Distance was Traveled 1   Deviation Bradykinetic;Shuffled Gait;Decreased Base Of Support   Weight Bearing Status no restrictions   Comments very slow gait speed with shuffled steps   Functional Mobility   Sit to Stand Minimal Assist   Bed, Chair, Wheelchair Transfer Minimal Assist   Transfer Method Stand Step   Mobility in room and hallway with HHA   Edema / Skin Assessment   Edema / Skin  Not Assessed   Patient / Family Goals    Patient / Family Goal #1 none stated   Short Term Goals    Short Term Goal # 1 pt will be able to complete supine<>sitting from flat bed with SPV in 6tx in order ot progress to prior level   Short Term Goal # 2 pt will be able to complete STS with no AD and SPV in 6tx in order to decrease fall risk   Short Term Goal # 3 pt will be able to ambulate 150ft with no AD and SPV in 6tx in order to decrease  fall risk   Education Group   Education Provided Role of Physical Therapist   Role of Physical Therapist Patient Response Patient;Family;Acceptance;Explanation;Demonstration;Verbal Demonstration;Action Demonstration   Anticipated Discharge Equipment and Recommendations   DC Equipment Recommendations Unable to determine at this time   Discharge Recommendations Recommend post-acute placement for additional physical therapy services prior to discharge home  (will likely progress to home with HHPT)

## 2024-06-17 NOTE — CARE PLAN
The patient is Stable - Low risk of patient condition declining or worsening    Shift Goals  Clinical Goals: stable hemodynamics, pain control  Patient Goals: pain control  Family Goals: pratibha    Progress made toward(s) clinical / shift goals:    Problem: Pain - Standard  Goal: Alleviation of pain or a reduction in pain to the patient’s comfort goal  Outcome: Progressing     Problem: Knowledge Deficit - Standard  Goal: Patient and family/care givers will demonstrate understanding of plan of care, disease process/condition, diagnostic tests and medications  Outcome: Progressing     Problem: Skin Integrity  Goal: Skin integrity is maintained or improved  Outcome: Progressing     Problem: Hemodynamics  Goal: Patient's hemodynamics, fluid balance and neurologic status will be stable or improve  Outcome: Progressing     Problem: Fluid Volume  Goal: Fluid volume balance will be maintained  Outcome: Progressing     Problem: Urinary - Renal Perfusion  Goal: Ability to achieve and maintain adequate renal perfusion and functioning will improve  Outcome: Progressing     Problem: Respiratory  Goal: Patient will achieve/maintain optimum respiratory ventilation and gas exchange  Outcome: Progressing     Problem: Mechanical Ventilation  Goal: Safe management of artificial airway and ventilation  Outcome: Progressing  Goal: Successful weaning off mechanical ventilator, spontaneously maintains adequate gas exchange  Outcome: Progressing  Goal: Patient will be able to express needs and understand communication  Outcome: Progressing     Problem: Physical Regulation  Goal: Diagnostic test results will improve  Outcome: Progressing  Goal: Signs and symptoms of infection will decrease  Outcome: Progressing

## 2024-06-17 NOTE — PROGRESS NOTES
"  DATE: 6/17/2024    Post Operative Day  3 incision and drainage of  perirectal abscess .    INTERVAL EVENTS:  WBC improving  Pressor requirements improving  Back to OR today for serial debridement      PHYSICAL EXAMINATION:  Vital Signs: BP 92/50   Pulse (!) 50   Temp 36.2 °C (97.2 °F) (Temporal)   Resp (!) 34   Ht 1.727 m (5' 8\")   Wt 78.1 kg (172 lb 2.9 oz)   SpO2 98%     Awake and alert.  Abdomen soft and nontender  Perineal wound with some necrotic tissue.  Packing in place    LABORATORY VALUES:   Recent Labs     06/15/24  0253 06/16/24  0454 06/17/24  0511   WBC 31.7* 37.3* 19.8*   RBC 2.84* 2.60* 2.49*   HEMOGLOBIN 10.1* 9.2* 8.8*   HEMATOCRIT 29.2* 27.5* 26.5*   .8* 105.8* 106.4*   MCH 35.6* 35.4* 35.3*   MCHC 34.6 33.5 33.2   RDW 55.7* 57.7* 58.6*   PLATELETCT 188 223 162*   MPV 11.4 11.3 11.0     Recent Labs     06/15/24  0253 06/16/24  0454 06/17/24  0511   SODIUM 138 142 142   POTASSIUM 3.9 3.5* 4.1   CHLORIDE 105 109 110   CO2 19* 21 23   GLUCOSE 113* 149* 102*   BUN 27* 23* 16   CREATININE 1.38 1.06 0.94   CALCIUM 8.6 8.6 8.5     Recent Labs     06/14/24  1212 06/16/24  0454 06/17/24  0511   ASTSGOT 47* 63* 105*   ALTSGPT 28 39 63*   TBILIRUBIN 4.8* 2.7* 2.3*   ALKPHOSPHAT 71 90 93   GLOBULIN 4.6* 4.1* 3.9*   INR 2.05*  --   --      Recent Labs     06/14/24  1212   INR 2.05*        IMAGING:   DX-CHEST-PORTABLE (1 VIEW)   Final Result      1.  Interval placement of right internal jugular central venous catheter.   2.  No evidence of procedure-related pneumothorax or other change compared with earlier film from today's date.      CT-ABDOMEN-PELVIS WITH   Final Result      1.  Cirrhotic changes of the liver.      2.  Splenomegaly.      3.  Esophageal, periumbilical, mesenteric, and retroperitoneal varices are noted.      4.  Small to moderate amount of ascites throughout the lower abdomen and pelvis      5.  Acute left-sided perirectal abscess extending into the medial aspect of the left " upper thigh.      DX-CHEST-PORTABLE (1 VIEW)   Final Result      1.  Slight atelectasis near left costophrenic angle.      2.  Postoperative changes of the right ribs.             ASSESSMENT AND PLAN:  Perirectal infection in the setting of severe cirrhosis.  Recommend return trip to the operating room today.     Full discussion of procedural details, risks, and alternative treatment strategies reviewed with patient.  Reviewed risks which include but are not limited to infection, bleeding, scars, and injury to nearby structures including the anal sphincter which could lead to incontinence.  All patient questions answered.  Patient states understanding and provides consent for surgery as planned.      Appreciate ICU and consulting physician care.       ____________________________________     Gilberto Sierra M.D.    DD: 6/16/2024  10:25 AM

## 2024-06-17 NOTE — PROGRESS NOTES
Pt return from OR @11:03    4 Eyes Skin Assessment Completed by katia restrepo RN and troy restrepo RN.    Head WDL  Ears WDL  Nose WDL  Mouth lower lip crack  Neck WDL  Breast/Chest Rash and Discoloration  Shoulder Blades WDL  Spine WDL  (R) Arm/Elbow/Hand WDL  (L) Arm/Elbow/Hand WDL  Abdomen Bruising  Groin WDL  Scrotum/Coccyx/Buttocks sacrum WDL, surgical dressing in place perirectal  (R) Leg WDL  (L) Leg WDL  (R) Heel/Foot/Toe WDL  (L) Heel/Foot/Toe WDL          Devices In Places ECG, Silicone Nasal Cannual, b/p cuff, pagan catheter      Interventions In Place NC W/Ear Foams, Sacral Mepilex, TAP System, Pillows, Q2 Turns, and Low Air Loss Mattress    Possible Skin Injury No    Pictures Uploaded Into Epic N/A  Wound Consult Placed N/A  RN Wound Prevention Protocol Ordered No

## 2024-06-17 NOTE — ANESTHESIA PREPROCEDURE EVALUATION
Case: [id number] Anesthesia Start Date/Time: 06/17/24 0910    Procedure: INCISION AND DRAINAGE, ABSCESS, PERIRECTAL    Anesthesia type: general    Pre-op diagnosis: Perirectal Abscess    Location: [facility] OR 10 / SURGERY [facility]    Surgeons: Gilberto Sierra M.D.            Pt septic and at times in septic shock    Relevant Problems   CARDIAC   (positive) Essential hypertension   (positive) PSVT (paroxysmal supraventricular tachycardia) (HCC)         (positive) Alcoholic cirrhosis of liver with ascites (HCC)      Other   (positive) Mixed hyperlipidemia   (positive) Necrotizing fasciitis (HCC)   (positive) Perirectal abscess   (positive) Tobacco abuse       Physical Exam    Airway   Mallampati: II  TM distance: >3 FB  Neck ROM: full       Cardiovascular - normal exam  Rhythm: regular  Rate: abnormal  (-) murmur     Dental       Very poor dentition     Pulmonary - normal exam  Breath sounds clear to auscultation     Abdominal    Neurological - normal exam                   Anesthesia Plan    ASA 4   ASA physical status 4 criteria: sepsis    Plan - general       Airway plan will be ETT          Induction: intravenous    Postoperative Plan: Postoperative administration of opioids is intended.    Pertinent diagnostic labs and testing reviewed    Informed Consent:    Anesthetic plan and risks discussed with patient.

## 2024-06-17 NOTE — OR NURSING
Report called to Anay ZALDIVAR. Plan of care discussed. Patient resting with even and unlabored respirations. Arouses to voice. Reports improvement to surgical site pain post medication administration. Denies nausea. VSS. Patient reports readiness to proceed to hospital room.

## 2024-06-18 LAB
ALBUMIN SERPL BCP-MCNC: 2.4 G/DL (ref 3.2–4.9)
ALBUMIN/GLOB SERPL: 0.7 G/DL
ALP SERPL-CCNC: 93 U/L (ref 30–99)
ALT SERPL-CCNC: 76 U/L (ref 2–50)
ANION GAP SERPL CALC-SCNC: 7 MMOL/L (ref 7–16)
AST SERPL-CCNC: 96 U/L (ref 12–45)
BASOPHILS # BLD AUTO: 0.2 % (ref 0–1.8)
BASOPHILS # BLD: 0.05 K/UL (ref 0–0.12)
BILIRUB SERPL-MCNC: 2.2 MG/DL (ref 0.1–1.5)
BUN SERPL-MCNC: 14 MG/DL (ref 8–22)
CALCIUM ALBUM COR SERPL-MCNC: 9.8 MG/DL (ref 8.5–10.5)
CALCIUM SERPL-MCNC: 8.5 MG/DL (ref 8.5–10.5)
CHLORIDE SERPL-SCNC: 109 MMOL/L (ref 96–112)
CO2 SERPL-SCNC: 24 MMOL/L (ref 20–33)
CREAT SERPL-MCNC: 0.9 MG/DL (ref 0.5–1.4)
EOSINOPHIL # BLD AUTO: 0.05 K/UL (ref 0–0.51)
EOSINOPHIL NFR BLD: 0.2 % (ref 0–6.9)
ERYTHROCYTE [DISTWIDTH] IN BLOOD BY AUTOMATED COUNT: 58.4 FL (ref 35.9–50)
GFR SERPLBLD CREATININE-BSD FMLA CKD-EPI: 75 ML/MIN/1.73 M 2
GLOBULIN SER CALC-MCNC: 3.6 G/DL (ref 1.9–3.5)
GLUCOSE SERPL-MCNC: 107 MG/DL (ref 65–99)
HCT VFR BLD AUTO: 27.9 % (ref 37–47)
HGB BLD-MCNC: 9.4 G/DL (ref 12–16)
IMM GRANULOCYTES # BLD AUTO: 0.47 K/UL (ref 0–0.11)
IMM GRANULOCYTES NFR BLD AUTO: 2 % (ref 0–0.9)
LYMPHOCYTES # BLD AUTO: 1.07 K/UL (ref 1–4.8)
LYMPHOCYTES NFR BLD: 4.5 % (ref 22–41)
MAGNESIUM SERPL-MCNC: 1.6 MG/DL (ref 1.5–2.5)
MCH RBC QN AUTO: 35.6 PG (ref 27–33)
MCHC RBC AUTO-ENTMCNC: 33.7 G/DL (ref 32.2–35.5)
MCV RBC AUTO: 105.7 FL (ref 81.4–97.8)
MONOCYTES # BLD AUTO: 1.48 K/UL (ref 0–0.85)
MONOCYTES NFR BLD AUTO: 6.2 % (ref 0–13.4)
NEUTROPHILS # BLD AUTO: 20.8 K/UL (ref 1.82–7.42)
NEUTROPHILS NFR BLD: 86.9 % (ref 44–72)
NRBC # BLD AUTO: 0 K/UL
NRBC BLD-RTO: 0 /100 WBC (ref 0–0.2)
PLATELET # BLD AUTO: 180 K/UL (ref 164–446)
PMV BLD AUTO: 10.9 FL (ref 9–12.9)
POTASSIUM SERPL-SCNC: 4.4 MMOL/L (ref 3.6–5.5)
PROT SERPL-MCNC: 6 G/DL (ref 6–8.2)
RBC # BLD AUTO: 2.64 M/UL (ref 4.2–5.4)
SODIUM SERPL-SCNC: 140 MMOL/L (ref 135–145)
WBC # BLD AUTO: 23.9 K/UL (ref 4.8–10.8)

## 2024-06-18 PROCEDURE — 99222 1ST HOSP IP/OBS MODERATE 55: CPT | Performed by: HOSPITALIST

## 2024-06-18 PROCEDURE — A9270 NON-COVERED ITEM OR SERVICE: HCPCS | Performed by: INTERNAL MEDICINE

## 2024-06-18 PROCEDURE — 700111 HCHG RX REV CODE 636 W/ 250 OVERRIDE (IP): Mod: JZ | Performed by: INTERNAL MEDICINE

## 2024-06-18 PROCEDURE — 83735 ASSAY OF MAGNESIUM: CPT

## 2024-06-18 PROCEDURE — 700105 HCHG RX REV CODE 258: Performed by: INTERNAL MEDICINE

## 2024-06-18 PROCEDURE — 80053 COMPREHEN METABOLIC PANEL: CPT

## 2024-06-18 PROCEDURE — 85025 COMPLETE CBC W/AUTO DIFF WBC: CPT

## 2024-06-18 PROCEDURE — 700102 HCHG RX REV CODE 250 W/ 637 OVERRIDE(OP): Performed by: INTERNAL MEDICINE

## 2024-06-18 PROCEDURE — 770020 HCHG ROOM/CARE - TELE (206)

## 2024-06-18 RX ORDER — MAGNESIUM SULFATE HEPTAHYDRATE 40 MG/ML
2 INJECTION, SOLUTION INTRAVENOUS ONCE
Status: COMPLETED | OUTPATIENT
Start: 2024-06-18 | End: 2024-06-18

## 2024-06-18 RX ADMIN — MAGNESIUM SULFATE HEPTAHYDRATE 2 G: 2 INJECTION, SOLUTION INTRAVENOUS at 08:56

## 2024-06-18 RX ADMIN — AMPICILLIN AND SULBACTAM 3 G: 1; 2 INJECTION, POWDER, FOR SOLUTION INTRAMUSCULAR; INTRAVENOUS at 12:35

## 2024-06-18 RX ADMIN — MIDODRINE HYDROCHLORIDE 5 MG: 5 TABLET ORAL at 14:27

## 2024-06-18 RX ADMIN — MIDODRINE HYDROCHLORIDE 5 MG: 5 TABLET ORAL at 06:10

## 2024-06-18 RX ADMIN — AMPICILLIN AND SULBACTAM 3 G: 1; 2 INJECTION, POWDER, FOR SOLUTION INTRAMUSCULAR; INTRAVENOUS at 17:05

## 2024-06-18 RX ADMIN — AMPICILLIN AND SULBACTAM 3 G: 1; 2 INJECTION, POWDER, FOR SOLUTION INTRAMUSCULAR; INTRAVENOUS at 06:11

## 2024-06-18 RX ADMIN — MIDODRINE HYDROCHLORIDE 5 MG: 5 TABLET ORAL at 21:12

## 2024-06-18 RX ADMIN — ENOXAPARIN SODIUM 40 MG: 100 INJECTION SUBCUTANEOUS at 17:06

## 2024-06-18 ASSESSMENT — ENCOUNTER SYMPTOMS
DEPRESSION: 0
CONSTITUTIONAL NEGATIVE: 1
EYES NEGATIVE: 1
FEVER: 0
FOCAL WEAKNESS: 0
DIZZINESS: 0
BRUISES/BLEEDS EASILY: 0
PALPITATIONS: 0
BACK PAIN: 0
HEADACHES: 0
POLYDIPSIA: 0
MYALGIAS: 1
VOMITING: 0
NEUROLOGICAL NEGATIVE: 1
WEAKNESS: 0
COUGH: 0
CHILLS: 0
RESPIRATORY NEGATIVE: 1
PSYCHIATRIC NEGATIVE: 1
CARDIOVASCULAR NEGATIVE: 1
GASTROINTESTINAL NEGATIVE: 1
NECK PAIN: 0
HEARTBURN: 0
NAUSEA: 0

## 2024-06-18 ASSESSMENT — COGNITIVE AND FUNCTIONAL STATUS - GENERAL
DAILY ACTIVITIY SCORE: 21
SUGGESTED CMS G CODE MODIFIER DAILY ACTIVITY: CJ
MOVING TO AND FROM BED TO CHAIR: A LITTLE
DRESSING REGULAR LOWER BODY CLOTHING: A LITTLE
CLIMB 3 TO 5 STEPS WITH RAILING: A LOT
TOILETING: A LITTLE
HELP NEEDED FOR BATHING: A LITTLE
MOVING FROM LYING ON BACK TO SITTING ON SIDE OF FLAT BED: A LITTLE
WALKING IN HOSPITAL ROOM: A LITTLE
SUGGESTED CMS G CODE MODIFIER MOBILITY: CK
MOBILITY SCORE: 19

## 2024-06-18 ASSESSMENT — PAIN DESCRIPTION - PAIN TYPE
TYPE: ACUTE PAIN

## 2024-06-18 ASSESSMENT — FIBROSIS 4 INDEX: FIB4 SCORE: 4.57

## 2024-06-18 NOTE — CONSULTS
Hospital Medicine Consultation    Date of Service  6/18/2024    Referring Physician  Shahzad Jenkins M.D.    Consulting Physician  Ant Christian M.D.    Reason for Consultation  Hospital medicine consultation requested patient admitted with perirectal abscess, sepsis    History of Presenting Illness  58 y.o. female who presented 6/14/2024 with a medical history of cirrhosis, presented to the hospital with complaints of 1 week of severe perirectal pain, initially patient in emergency room was hypotensive with blood pressure in the 50s to 70s, was fluid resuscitated, by imaging was found to have a 6.7 x 5.7 x 3 cm perirectal abscess extending into the medial aspect of the left upper thigh with multiple air bubbles, concerning for necrotizing soft tissue infection, the patient was pancultured and started on broad-spectrum antibiotics and referred to general surgery for intervention, the patient initially with a white second of 20,000, platelets 160, lactate of 3.8, INR 2.0, following the patient underwent I&D with surgery, some debridement of empiric abscess performed, the patient postoperatively was transferred to the intensive care unit secondary to low blood pressure and need for pressor therapy.  The patient remained in ICU, then underwent additional debridement on 6/17 with general surgery  On 6/18 the patient felt improved, she does have some pain with movement, she denies fevers chills currently  The patient is afebrile, heart rate in the 50s, respiration unlabored, the patient saturating well on room air, blood pressure 1 teens to 120s over 50s  Laboratory data indicated white count 23.9, hemoglobin 9.4, hematocrit 27.9, platelet count 180,  Cultures positive for E. coli, corynebacterium, as well as bacteroides    Review of Systems  Review of Systems   Constitutional: Negative.  Negative for chills and fever.   HENT: Negative.     Eyes: Negative.    Respiratory: Negative.  Negative for cough.     Cardiovascular: Negative.  Negative for chest pain and palpitations.   Gastrointestinal: Negative.  Negative for heartburn, nausea and vomiting.   Genitourinary: Negative.  Negative for dysuria and frequency.        Perirectal pain   Musculoskeletal:  Positive for myalgias. Negative for back pain and neck pain.   Skin: Negative.  Negative for itching and rash.   Neurological: Negative.  Negative for dizziness, focal weakness, weakness and headaches.   Endo/Heme/Allergies: Negative.  Negative for polydipsia. Does not bruise/bleed easily.   Psychiatric/Behavioral: Negative.  Negative for depression.        Past Medical History   has a past medical history of Allergy, Anxiety, Arrhythmia, Back pain, Depression, Depression, Hyperlipidemia, Hypertension, Indigestion, Pain (2/24/15), PSVT (paroxysmal supraventricular tachycardia) ([facility]) (6/24/2013), Psychiatric disorder, Tobacco abuse (5/3/2013), URI (upper respiratory infection) (9/21/2013), Urinary tract infection, site not specified, and UTI (lower urinary tract infection) (9/21/2013).    She has no past medical history of Breast cancer ([facility]), Chronic airway obstruction, not elsewhere classified, Diabetes, Fall, Thyroid disease, or Unspecified asthma(493.90).    Surgical History   has a past surgical history that includes breast biopsy (7/18/08); shoulder arthroscopy (3/23/2009); tubal ligation; gyn surgery (1995); shoulder arthroscopy (2005); other (6/2014); shoulder arthroscopy w/ rotator cuff repair (3/9/2015); trigger finger release (3/9/2015); open reduction; pr i&d perirectal abscess (6/14/2024); and pr i&d perirectal abscess (N/A, 6/17/2024).    Family History  family history includes Cancer in her paternal grandmother; Hypertension in her brother, father, and mother; Stroke in her mother.    Social History   reports that she has been smoking cigarettes. She started smoking about 30 years ago. She has a 12.5 pack-year smoking history. She has never used  smokeless tobacco. She reports that she does not drink alcohol and does not use drugs.    Medications  Prior to Admission Medications   Prescriptions Last Dose Informant Patient Reported? Taking?   ALPRAZolam (XANAX) 0.5 MG Tab 6/14/2024 at 0800 Patient Yes Yes   Sig: Take 0.25 mg by mouth 1 time a day as needed for Sleep.   losartan (COZAAR) 50 MG Tab 6/13/2024 at PM Patient No Yes   Sig: Take 1 Tablet by mouth every day.   rosuvastatin (CRESTOR) 20 MG Tab 6/13/2024 at PM Patient No Yes   Sig: Take 1 Tablet by mouth every evening.      Facility-Administered Medications: None       Allergies  Allergies   Allergen Reactions    Nitrofurantoin Vomiting    Sulfamethoxazole W-Trimethoprim Vomiting       Physical Exam  Pulse:  [41-60] 52  Resp:  [9-24] 20  BP: ()/(43-63) 108/63  SpO2:  [88 %-99 %] 96 %    Physical Exam  Vitals and nursing note reviewed.   Constitutional:       Appearance: She is well-developed. She is ill-appearing. She is not diaphoretic.   HENT:      Head: Normocephalic and atraumatic.      Nose: Nose normal.   Eyes:      Conjunctiva/sclera: Conjunctivae normal.      Pupils: Pupils are equal, round, and reactive to light.   Neck:      Thyroid: No thyromegaly.      Vascular: No JVD.   Cardiovascular:      Rate and Rhythm: Normal rate and regular rhythm.      Heart sounds: Normal heart sounds.      No friction rub. No gallop.   Pulmonary:      Effort: Pulmonary effort is normal.      Breath sounds: Normal breath sounds. No wheezing or rales.   Abdominal:      General: Bowel sounds are normal. There is distension.      Palpations: Abdomen is soft. There is no mass.      Tenderness: There is no abdominal tenderness. There is no guarding or rebound.   Genitourinary:     Comments: Perirectal abscess wound with packing  Musculoskeletal:         General: No tenderness. Normal range of motion.      Cervical back: Normal range of motion and neck supple.   Lymphadenopathy:      Cervical: No cervical  adenopathy.   Skin:     General: Skin is warm and dry.   Neurological:      Mental Status: She is alert and oriented to person, place, and time.      Cranial Nerves: No cranial nerve deficit.   Psychiatric:         Behavior: Behavior normal.         Fluids  Date 06/18/24 0700 - 06/19/24 0659   Shift 5381-5102 8900-5193 4973-8785 24 Hour Total   INTAKE   P.O. 800   800   IV Piggyback 99.5   99.5   Shift Total 899.5   899.5   OUTPUT   Urine 495   495   Shift Total 495   495   Weight (kg) 80.9 80.9 80.9 80.9       Laboratory  Recent Labs     06/16/24  0454 06/17/24  0511 06/18/24  0609   WBC 37.3* 19.8* 23.9*   RBC 2.60* 2.49* 2.64*   HEMOGLOBIN 9.2* 8.8* 9.4*   HEMATOCRIT 27.5* 26.5* 27.9*   .8* 106.4* 105.7*   MCH 35.4* 35.3* 35.6*   MCHC 33.5 33.2 33.7   RDW 57.7* 58.6* 58.4*   PLATELETCT 223 162* 180   MPV 11.3 11.0 10.9     Recent Labs     06/16/24  0454 06/17/24  0511 06/18/24  0609   SODIUM 142 142 140   POTASSIUM 3.5* 4.1 4.4   CHLORIDE 109 110 109   CO2 21 23 24   GLUCOSE 149* 102* 107*   BUN 23* 16 14   CREATININE 1.06 0.94 0.90   CALCIUM 8.6 8.5 8.5                     Imaging  DX-CHEST-PORTABLE (1 VIEW)   Final Result      1.  Interval placement of right internal jugular central venous catheter.   2.  No evidence of procedure-related pneumothorax or other change compared with earlier film from today's date.      CT-ABDOMEN-PELVIS WITH   Final Result      1.  Cirrhotic changes of the liver.      2.  Splenomegaly.      3.  Esophageal, periumbilical, mesenteric, and retroperitoneal varices are noted.      4.  Small to moderate amount of ascites throughout the lower abdomen and pelvis      5.  Acute left-sided perirectal abscess extending into the medial aspect of the left upper thigh.      DX-CHEST-PORTABLE (1 VIEW)   Final Result      1.  Slight atelectasis near left costophrenic angle.      2.  Postoperative changes of the right ribs.             Assessment/Plan  * Septic shock (HCC)- (present on  admission)  Assessment & Plan  This is Septic shock Present on admission  SIRS criteria identified on my evaluation include: Tachycardia, with heart rate greater than 90 BPM and Leukocytosis, with WBC greater than 12,000  Clinical indicators of end organ dysfunction include Hypotension with systolic blood pressure less than 90 or MAP less than 65  Indicators of septic shock include: Sepsis present and persistent hypotension despite fluid resuscitation   Sources is: perirectal abscess  Sepsis protocol initiated  Crystalloid Fluid Administration: Fluid resuscitation ordered per standard protocol - 30 mL/kg per current or ideal body weight  IV antibiotics as appropriate for source of sepsis  Reassessment: I have reassessed the patient's hemodynamic status  Source control per surgery.   S/p I&D of abscess  Unasyn  Wound culture: E.coli, fusobacterium mortiferum, bacteroides ovatus and fragilis, corynebacterium amycolatum   Vasopressors titrated to MAP > 65     Perirectal abscess- (present on admission)  Assessment & Plan  Perirectal abscess that extends into the medial aspect of the left upper thigh with multiple air bubbles.   Surgery following for source control  Unasyn  Wound culture: E.coli, fusobacterium mortiferum, bacteroides ovatus and fragilis, corynebacterium amycolatum     Acute kidney injury (HCC)- (present on admission)  Assessment & Plan  EFRAIN due to ATN due to sepsis, hypotension  Improved   Strict I/Os  Renally dosed medications  Avoid nephrotoxic agents as able  Trend     Alcoholic cirrhosis of liver with ascites (HCC)- (present on admission)  Assessment & Plan  Pt admits that she has been drinking a lot of alcohols (beer, wine, hard liquor) for years, daily drink  Recently quit beer/wine 4 months ago and quit hard liquor 2 months ago  She does not know prior diagnosis of cirrhosis  Denies hematemesis, melena, hematochezia.   Noted minimal ascites on CT A/P, not enough to tap   H/H 9.5  Total bili 4.8,  INR 2.0. MELD 26  Monitor for signs of bleeding    Plan  6/18  Continued antibiotic therapy through 6/21 preliminary,  Ongoing wound care, packing changes, surgical follow-up  Pain control  Blood pressure reported with midodrine, weaned off norepinephrine for the time being  Close laboratory follow-up  The patient will need close follow-up in terms of her newly diagnosed liver cirrhosis  Get additional laboratory workup  A.m. labs  See orders  Patient is has a high medical complexity, complex decision making and is at high risk for complication, morbidity, and mortality.  I spent 67 minutes, reviewing the chart, obtaining and/or reviewing separately obtained history. Performing a medically appropriate examination and evaluation.  Counseling and educating the patient. Ordering and reviewing medications, tests, or procedures.   Documenting clinical information in EPIC. Independently interpreting results and communicating results to patient. Discussing future disposition of care with patient, RN and case management.    Thank you for consulting with us, we will follow along closely while the patient is hospitalized      Please note that this dictation was created using voice recognition software. I have made every reasonable attempt to correct obvious errors, but I expect that there are errors of grammar and possibly context that I did not discover before finalizing the note.

## 2024-06-18 NOTE — CARE PLAN
The patient is Stable - Low risk of patient condition declining or worsening    Shift Goals  Clinical Goals: Stable hemodynamics, mobilize  Patient Goals: Rest, pain control  Family Goals: Family not present    Progress made toward(s) clinical / shift goals:    Problem: Pain - Standard  Goal: Alleviation of pain or a reduction in pain to the patient’s comfort goal  Outcome: Progressing  Flowsheets  Taken 6/18/2024 0800 by Zena Broussard R.N.  Pain Rating Scale (NPRS): 0  Taken 6/17/2024 1524 by Brittany Nance PT  Non Verbal Scale: Calm  Note: Patient remains pain free this AM     Problem: Respiratory  Goal: Patient will achieve/maintain optimum respiratory ventilation and gas exchange  Outcome: Progressing  Flowsheets (Taken 6/18/2024 0800)  O2 Delivery Device: None - Room Air  Incentive Spirometer: Not Applicable  Deep Breathe and Cough: Performs Correctly  Note: Patient is remaining on room air       Patient is not progressing towards the following goals:

## 2024-06-18 NOTE — CARE PLAN
The patient is Stable - Low risk of patient condition declining or worsening    Shift Goals  Clinical Goals: Stable hemodynamics, mobilize  Patient Goals: Rest, pain control  Family Goals: Family not present    Progress made toward(s) clinical / shift goals:    Problem: Pain - Standard  Goal: Alleviation of pain or a reduction in pain to the patient’s comfort goal  Outcome: Progressing     Problem: Knowledge Deficit - Standard  Goal: Patient and family/care givers will demonstrate understanding of plan of care, disease process/condition, diagnostic tests and medications  Outcome: Progressing     Problem: Skin Integrity  Goal: Skin integrity is maintained or improved  Outcome: Progressing     Problem: Hemodynamics  Goal: Patient's hemodynamics, fluid balance and neurologic status will be stable or improve  Outcome: Progressing     Problem: Fall Risk  Goal: Patient will remain free from falls  Outcome: Progressing

## 2024-06-18 NOTE — PROGRESS NOTES
"  DATE: 6/18/2024    Post Operative Day  4 incision and drainage of  perirectal abscess .  Post operative day 1 repeat debridement    INTERVAL EVENTS:  WBC stable  Pain improved  Off vasopressors      PHYSICAL EXAMINATION:  Vital Signs: /55   Pulse (!) 49   Temp 36.3 °C (97.3 °F) (Temporal)   Resp 14   Ht 1.727 m (5' 8\")   Wt 80.9 kg (178 lb 5.6 oz)   SpO2 92%     Awake and alert.  Abdomen soft and nontender  Packing in place, hemostatic    LABORATORY VALUES:   Recent Labs     06/16/24  0454 06/17/24  0511 06/18/24  0609   WBC 37.3* 19.8* 23.9*   RBC 2.60* 2.49* 2.64*   HEMOGLOBIN 9.2* 8.8* 9.4*   HEMATOCRIT 27.5* 26.5* 27.9*   .8* 106.4* 105.7*   MCH 35.4* 35.3* 35.6*   MCHC 33.5 33.2 33.7   RDW 57.7* 58.6* 58.4*   PLATELETCT 223 162* 180   MPV 11.3 11.0 10.9     Recent Labs     06/16/24  0454 06/17/24  0511 06/18/24  0609   SODIUM 142 142 140   POTASSIUM 3.5* 4.1 4.4   CHLORIDE 109 110 109   CO2 21 23 24   GLUCOSE 149* 102* 107*   BUN 23* 16 14   CREATININE 1.06 0.94 0.90   CALCIUM 8.6 8.5 8.5     Recent Labs     06/16/24  0454 06/17/24  0511 06/18/24  0609   ASTSGOT 63* 105* 96*   ALTSGPT 39 63* 76*   TBILIRUBIN 2.7* 2.3* 2.2*   ALKPHOSPHAT 90 93 93   GLOBULIN 4.1* 3.9* 3.6*              IMAGING:   DX-CHEST-PORTABLE (1 VIEW)   Final Result      1.  Interval placement of right internal jugular central venous catheter.   2.  No evidence of procedure-related pneumothorax or other change compared with earlier film from today's date.      CT-ABDOMEN-PELVIS WITH   Final Result      1.  Cirrhotic changes of the liver.      2.  Splenomegaly.      3.  Esophageal, periumbilical, mesenteric, and retroperitoneal varices are noted.      4.  Small to moderate amount of ascites throughout the lower abdomen and pelvis      5.  Acute left-sided perirectal abscess extending into the medial aspect of the left upper thigh.      DX-CHEST-PORTABLE (1 VIEW)   Final Result      1.  Slight atelectasis near left " "costophrenic angle.      2.  Postoperative changes of the right ribs.             ASSESSMENT AND PLAN:  Wound not amenable to vac placement  Daily packing changes with 1\" gauze and PRN soilage from bowel movements  After 2-3 days of packing she can switch to 3x daily sitz baths for wound care      Appreciate ICU and consulting physician care.       ____________________________________     Gilberto Sierra M.D.    DD: 6/16/2024  10:25 AM    "

## 2024-06-19 LAB
ALBUMIN SERPL BCP-MCNC: 2.2 G/DL (ref 3.2–4.9)
ALBUMIN/GLOB SERPL: 0.6 G/DL
ALP SERPL-CCNC: 80 U/L (ref 30–99)
ALT SERPL-CCNC: 74 U/L (ref 2–50)
ANION GAP SERPL CALC-SCNC: 8 MMOL/L (ref 7–16)
AST SERPL-CCNC: 100 U/L (ref 12–45)
BACTERIA BLD CULT: NORMAL
BACTERIA BLD CULT: NORMAL
BASOPHILS # BLD AUTO: 0.2 % (ref 0–1.8)
BASOPHILS # BLD: 0.04 K/UL (ref 0–0.12)
BILIRUB SERPL-MCNC: 2.2 MG/DL (ref 0.1–1.5)
BUN SERPL-MCNC: 16 MG/DL (ref 8–22)
CALCIUM ALBUM COR SERPL-MCNC: 9.8 MG/DL (ref 8.5–10.5)
CALCIUM SERPL-MCNC: 8.4 MG/DL (ref 8.5–10.5)
CHLORIDE SERPL-SCNC: 105 MMOL/L (ref 96–112)
CO2 SERPL-SCNC: 23 MMOL/L (ref 20–33)
CREAT SERPL-MCNC: 0.66 MG/DL (ref 0.5–1.4)
EOSINOPHIL # BLD AUTO: 0.38 K/UL (ref 0–0.51)
EOSINOPHIL NFR BLD: 1.9 % (ref 0–6.9)
ERYTHROCYTE [DISTWIDTH] IN BLOOD BY AUTOMATED COUNT: 56.2 FL (ref 35.9–50)
FUNGUS SPEC CULT: NORMAL
FUNGUS SPEC FUNGUS STN: NORMAL
GFR SERPLBLD CREATININE-BSD FMLA CKD-EPI: 102 ML/MIN/1.73 M 2
GLOBULIN SER CALC-MCNC: 3.9 G/DL (ref 1.9–3.5)
GLUCOSE SERPL-MCNC: 88 MG/DL (ref 65–99)
HAV IGM SERPL QL IA: NORMAL
HBV CORE IGM SER QL: NORMAL
HBV SURFACE AG SER QL: NORMAL
HCT VFR BLD AUTO: 28 % (ref 37–47)
HCV AB SER QL: NORMAL
HGB BLD-MCNC: 9.6 G/DL (ref 12–16)
IMM GRANULOCYTES # BLD AUTO: 0.61 K/UL (ref 0–0.11)
IMM GRANULOCYTES NFR BLD AUTO: 3.1 % (ref 0–0.9)
LYMPHOCYTES # BLD AUTO: 2.22 K/UL (ref 1–4.8)
LYMPHOCYTES NFR BLD: 11.3 % (ref 22–41)
MAGNESIUM SERPL-MCNC: 1.5 MG/DL (ref 1.5–2.5)
MCH RBC QN AUTO: 35.6 PG (ref 27–33)
MCHC RBC AUTO-ENTMCNC: 34.3 G/DL (ref 32.2–35.5)
MCV RBC AUTO: 103.7 FL (ref 81.4–97.8)
MONOCYTES # BLD AUTO: 1.38 K/UL (ref 0–0.85)
MONOCYTES NFR BLD AUTO: 7 % (ref 0–13.4)
NEUTROPHILS # BLD AUTO: 14.99 K/UL (ref 1.82–7.42)
NEUTROPHILS NFR BLD: 76.5 % (ref 44–72)
NRBC # BLD AUTO: 0.03 K/UL
NRBC BLD-RTO: 0.2 /100 WBC (ref 0–0.2)
PHOSPHATE SERPL-MCNC: 3.1 MG/DL (ref 2.5–4.5)
PLATELET # BLD AUTO: 175 K/UL (ref 164–446)
PMV BLD AUTO: 10.7 FL (ref 9–12.9)
POTASSIUM SERPL-SCNC: 4.2 MMOL/L (ref 3.6–5.5)
PROT SERPL-MCNC: 6.1 G/DL (ref 6–8.2)
RBC # BLD AUTO: 2.7 M/UL (ref 4.2–5.4)
SIGNIFICANT IND 70042: NORMAL
SITE SITE: NORMAL
SODIUM SERPL-SCNC: 136 MMOL/L (ref 135–145)
SOURCE SOURCE: NORMAL
WBC # BLD AUTO: 19.6 K/UL (ref 4.8–10.8)

## 2024-06-19 PROCEDURE — 700111 HCHG RX REV CODE 636 W/ 250 OVERRIDE (IP): Performed by: HOSPITALIST

## 2024-06-19 PROCEDURE — 99233 SBSQ HOSP IP/OBS HIGH 50: CPT | Performed by: HOSPITALIST

## 2024-06-19 PROCEDURE — 700105 HCHG RX REV CODE 258: Performed by: INTERNAL MEDICINE

## 2024-06-19 PROCEDURE — 770006 HCHG ROOM/CARE - MED/SURG/GYN SEMI*

## 2024-06-19 PROCEDURE — 80053 COMPREHEN METABOLIC PANEL: CPT

## 2024-06-19 PROCEDURE — 84100 ASSAY OF PHOSPHORUS: CPT

## 2024-06-19 PROCEDURE — 83735 ASSAY OF MAGNESIUM: CPT

## 2024-06-19 PROCEDURE — 82105 ALPHA-FETOPROTEIN SERUM: CPT

## 2024-06-19 PROCEDURE — 700102 HCHG RX REV CODE 250 W/ 637 OVERRIDE(OP): Performed by: HOSPITALIST

## 2024-06-19 PROCEDURE — A9270 NON-COVERED ITEM OR SERVICE: HCPCS | Performed by: HOSPITALIST

## 2024-06-19 PROCEDURE — 85025 COMPLETE CBC W/AUTO DIFF WBC: CPT

## 2024-06-19 PROCEDURE — A9270 NON-COVERED ITEM OR SERVICE: HCPCS | Performed by: INTERNAL MEDICINE

## 2024-06-19 PROCEDURE — 80074 ACUTE HEPATITIS PANEL: CPT

## 2024-06-19 PROCEDURE — 700111 HCHG RX REV CODE 636 W/ 250 OVERRIDE (IP): Performed by: INTERNAL MEDICINE

## 2024-06-19 PROCEDURE — 700111 HCHG RX REV CODE 636 W/ 250 OVERRIDE (IP): Mod: JZ | Performed by: INTERNAL MEDICINE

## 2024-06-19 PROCEDURE — 700102 HCHG RX REV CODE 250 W/ 637 OVERRIDE(OP): Performed by: INTERNAL MEDICINE

## 2024-06-19 RX ORDER — MAGNESIUM SULFATE HEPTAHYDRATE 40 MG/ML
4 INJECTION, SOLUTION INTRAVENOUS ONCE
Status: COMPLETED | OUTPATIENT
Start: 2024-06-19 | End: 2024-06-19

## 2024-06-19 RX ORDER — OXYCODONE HYDROCHLORIDE 5 MG/1
5 TABLET ORAL
Status: DISCONTINUED | OUTPATIENT
Start: 2024-06-19 | End: 2024-06-21 | Stop reason: HOSPADM

## 2024-06-19 RX ORDER — HYDROMORPHONE HYDROCHLORIDE 1 MG/ML
1 INJECTION, SOLUTION INTRAMUSCULAR; INTRAVENOUS; SUBCUTANEOUS
Status: DISCONTINUED | OUTPATIENT
Start: 2024-06-19 | End: 2024-06-21 | Stop reason: HOSPADM

## 2024-06-19 RX ORDER — SPIRONOLACTONE 25 MG/1
50 TABLET ORAL
Status: DISCONTINUED | OUTPATIENT
Start: 2024-06-19 | End: 2024-06-21 | Stop reason: HOSPADM

## 2024-06-19 RX ORDER — OXYCODONE HYDROCHLORIDE 10 MG/1
10 TABLET ORAL
Status: DISCONTINUED | OUTPATIENT
Start: 2024-06-19 | End: 2024-06-21 | Stop reason: HOSPADM

## 2024-06-19 RX ORDER — CARVEDILOL 3.12 MG/1
3.12 TABLET ORAL 2 TIMES DAILY WITH MEALS
Status: DISCONTINUED | OUTPATIENT
Start: 2024-06-19 | End: 2024-06-21 | Stop reason: HOSPADM

## 2024-06-19 RX ADMIN — CARVEDILOL 3.12 MG: 3.12 TABLET, FILM COATED ORAL at 17:27

## 2024-06-19 RX ADMIN — AMPICILLIN AND SULBACTAM 3 G: 1; 2 INJECTION, POWDER, FOR SOLUTION INTRAMUSCULAR; INTRAVENOUS at 17:33

## 2024-06-19 RX ADMIN — AMPICILLIN AND SULBACTAM 3 G: 1; 2 INJECTION, POWDER, FOR SOLUTION INTRAMUSCULAR; INTRAVENOUS at 00:17

## 2024-06-19 RX ADMIN — OXYCODONE HYDROCHLORIDE 10 MG: 5 TABLET ORAL at 14:00

## 2024-06-19 RX ADMIN — AMPICILLIN AND SULBACTAM 3 G: 1; 2 INJECTION, POWDER, FOR SOLUTION INTRAMUSCULAR; INTRAVENOUS at 05:20

## 2024-06-19 RX ADMIN — OXYCODONE HYDROCHLORIDE 5 MG: 5 TABLET ORAL at 10:38

## 2024-06-19 RX ADMIN — MAGNESIUM SULFATE HEPTAHYDRATE 4 G: 4 INJECTION, SOLUTION INTRAVENOUS at 08:16

## 2024-06-19 RX ADMIN — SPIRONOLACTONE 50 MG: 50 TABLET ORAL at 17:27

## 2024-06-19 RX ADMIN — MIDODRINE HYDROCHLORIDE 5 MG: 5 TABLET ORAL at 05:20

## 2024-06-19 RX ADMIN — ENOXAPARIN SODIUM 40 MG: 100 INJECTION SUBCUTANEOUS at 17:27

## 2024-06-19 RX ADMIN — AMPICILLIN AND SULBACTAM 3 G: 1; 2 INJECTION, POWDER, FOR SOLUTION INTRAMUSCULAR; INTRAVENOUS at 12:48

## 2024-06-19 RX ADMIN — OXYCODONE HYDROCHLORIDE 10 MG: 5 TABLET ORAL at 23:04

## 2024-06-19 RX ADMIN — HYDROMORPHONE HYDROCHLORIDE 1 MG: 1 INJECTION, SOLUTION INTRAMUSCULAR; INTRAVENOUS; SUBCUTANEOUS at 05:00

## 2024-06-19 ASSESSMENT — PAIN DESCRIPTION - PAIN TYPE
TYPE: ACUTE PAIN

## 2024-06-19 ASSESSMENT — ENCOUNTER SYMPTOMS
EYES NEGATIVE: 1
HEARTBURN: 0
CARDIOVASCULAR NEGATIVE: 1
HEADACHES: 0
RESPIRATORY NEGATIVE: 1
WEAKNESS: 0
GASTROINTESTINAL NEGATIVE: 1
PSYCHIATRIC NEGATIVE: 1
MUSCULOSKELETAL NEGATIVE: 1
BACK PAIN: 0
NEUROLOGICAL NEGATIVE: 1
BRUISES/BLEEDS EASILY: 0
NECK PAIN: 0
PALPITATIONS: 0
CHILLS: 0
DEPRESSION: 0
VOMITING: 0
CONSTITUTIONAL NEGATIVE: 1
NAUSEA: 0
DIZZINESS: 0
FOCAL WEAKNESS: 0
POLYDIPSIA: 0
COUGH: 0
FEVER: 0

## 2024-06-19 ASSESSMENT — FIBROSIS 4 INDEX: FIB4 SCORE: 3.72

## 2024-06-19 NOTE — CARE PLAN
The patient is Stable - Low risk of patient condition declining or worsening    Shift Goals  Clinical Goals: wound care, wean O2, transfer  Patient Goals: rest, transfer  Family Goals: JESUSITA    Progress made toward(s) clinical / shift goals:      Problem: Pain - Standard  Goal: Alleviation of pain or a reduction in pain to the patient’s comfort goal  Outcome: Progressing     Problem: Knowledge Deficit - Standard  Goal: Patient and family/care givers will demonstrate understanding of plan of care, disease process/condition, diagnostic tests and medications  Outcome: Progressing     Problem: Skin Integrity  Goal: Skin integrity is maintained or improved  Outcome: Progressing     Problem: Hemodynamics  Goal: Patient's hemodynamics, fluid balance and neurologic status will be stable or improve  Outcome: Progressing     Problem: Respiratory  Goal: Patient will achieve/maintain optimum respiratory ventilation and gas exchange  Outcome: Progressing     Problem: Fall Risk  Goal: Patient will remain free from falls  Outcome: Progressing       Patient is not progressing towards the following goals:

## 2024-06-19 NOTE — DIETARY
Nutrition Update:    Day 5 of admit.  Kavita Freeman is a 58 y.o. female with admitting DX of Necrotizing fasciitis.  Patient being followed to optimize nutrition.    Current Diet: Regular with Ensure Max Protein. Recorded PO intake of meals is <50%. Spoke with pt at bedside. She reports she is drinking Ensure and she also likes the yogurt. Pt requesting baked chicken - request sent to Nutrition Rep.    Problem: Nutritional:  Goal: Achieve adequate nutritional intake  Description: Patient will consume >50% of meals  Outcome: Progressing    RD following

## 2024-06-19 NOTE — CARE PLAN
The patient is Stable - Low risk of patient condition declining or worsening    Shift Goals  Clinical Goals: rest/mobilization  Patient Goals: rest/transfer  Family Goals: pratibha    Progress made toward(s) clinical / shift goals:    Problem: Pain - Standard  Goal: Alleviation of pain or a reduction in pain to the patient’s comfort goal  Outcome: Progressing     Problem: Fall Risk  Goal: Patient will remain free from falls  Outcome: Progressing

## 2024-06-19 NOTE — PROGRESS NOTES
[facility] Medicine Daily Progress Note    Date of Service  6/19/2024    Chief Complaint  Kavita Freeman is a 58 y.o. female admitted 6/14/2024 with a perirectal abscess, sepsis    Hospital Course  No notes on file58 y.o. female who presented 6/14/2024 with a medical history of cirrhosis, presented to the hospital with complaints of 1 week of severe perirectal pain, initially patient in emergency room was hypotensive with blood pressure in the 50s to 70s, was fluid resuscitated, by imaging was found to have a 6.7 x 5.7 x 3 cm perirectal abscess extending into the medial aspect of the left upper thigh with multiple air bubbles, concerning for necrotizing soft tissue infection, the patient was pancultured and started on broad-spectrum antibiotics and referred to general surgery for intervention, the patient initially with a white second of 20,000, platelets 160, lactate of 3.8, INR 2.0, following the patient underwent I&D with surgery, some debridement of empiric abscess performed, the patient postoperatively was transferred to the intensive care unit secondary to low blood pressure and need for pressor therapy.  The patient remained in ICU, then underwent additional debridement on 6/17 with general surgery  On 6/18 the patient felt improved, she does have some pain with movement, she denies fevers chills currently  The patient is afebrile, heart rate in the 50s, respiration unlabored, the patient saturating well on room air, blood pressure 1 teens to 120s over 50s  Laboratory data indicated white count 23.9, hemoglobin 9.4, hematocrit 27.9, platelet count 180,  Cultures positive for E. coli, corynebacterium, as well as bacteroides    Interval Problem Update  Patient seen and examined today.  Data, Medication data reviewed.  Case discussed with nursing as available.  Plan of Care reviewed with patient and notified of changes.  6/19 the patient feels overall better, she still has significant amount of pain with  dressing changes but is tolerable currently with Dilaudid, the patient is afebrile, heart rate in the 60s to 80s off midodrine, respiration unlabored, she is on room air satting in the mid to high 90s, blood pressure in the 100s to 130s over 60s,  Laboratory data show a decreasing white cell count 19.6, hemoglobin 9.6, platelet count 175, chemistry with a AST of 100, ALT 74, total bili 2.2, albumin 2.2, hepatitis panel negative, ongoing IV antibiotics and aggressive wound care needed    I have discussed this patient's plan of care and discharge plan at IDT rounds today with Case Management, Nursing, Nursing leadership, and other members of the IDT team.    Consultants/Specialty  general surgery    Code Status  Full Code    Disposition  The patient is not medically cleared for discharge to home or a post-acute facility.  Anticipate discharge to: home with close outpatient follow-up    I have placed the appropriate orders for post-discharge needs.    Review of Systems  Review of Systems   Constitutional: Negative.  Negative for chills and fever.   HENT: Negative.     Eyes: Negative.    Respiratory: Negative.  Negative for cough.    Cardiovascular: Negative.  Negative for chest pain and palpitations.   Gastrointestinal: Negative.  Negative for heartburn, nausea and vomiting.   Genitourinary: Negative.  Negative for dysuria and frequency.        Perirectal pain   Musculoskeletal: Negative.  Negative for back pain and neck pain.   Skin: Negative.  Negative for itching and rash.   Neurological: Negative.  Negative for dizziness, focal weakness, weakness and headaches.   Endo/Heme/Allergies: Negative.  Negative for polydipsia. Does not bruise/bleed easily.   Psychiatric/Behavioral: Negative.  Negative for depression.         Physical Exam  Temp:  [36.9 °C (98.4 °F)-37.2 °C (99 °F)] 36.9 °C (98.4 °F)  Pulse:  [46-60] 55  Resp:  [11-26] 20  BP: ()/(49-70) 125/64  SpO2:  [88 %-97 %] 97 %    Physical Exam  Vitals and  nursing note reviewed.   Constitutional:       Appearance: She is well-developed. She is not diaphoretic.   HENT:      Head: Normocephalic and atraumatic.      Nose: Nose normal.   Eyes:      Conjunctiva/sclera: Conjunctivae normal.      Pupils: Pupils are equal, round, and reactive to light.   Neck:      Thyroid: No thyromegaly.      Vascular: No JVD.   Cardiovascular:      Rate and Rhythm: Normal rate and regular rhythm.      Heart sounds: Normal heart sounds.      No friction rub. No gallop.   Pulmonary:      Effort: Pulmonary effort is normal.      Breath sounds: Normal breath sounds. No wheezing or rales.   Abdominal:      General: Bowel sounds are normal. There is no distension.      Palpations: Abdomen is soft. There is no mass.      Tenderness: There is no abdominal tenderness. There is no guarding or rebound.      Comments: Protuberant abdomen   Genitourinary:     Comments: Perirectal abscess lesion, dressing in place  Musculoskeletal:         General: No tenderness. Normal range of motion.      Cervical back: Normal range of motion and neck supple.   Lymphadenopathy:      Cervical: No cervical adenopathy.   Skin:     General: Skin is warm and dry.   Neurological:      Mental Status: She is alert and oriented to person, place, and time.      Cranial Nerves: No cranial nerve deficit.   Psychiatric:         Behavior: Behavior normal.         Fluids    Intake/Output Summary (Last 24 hours) at 6/19/2024 0743  Last data filed at 6/19/2024 0500  Gross per 24 hour   Intake 1299.48 ml   Output 1655 ml   Net -355.52 ml       Laboratory  Recent Labs     06/17/24  0511 06/18/24  0609 06/19/24  0020   WBC 19.8* 23.9* 19.6*   RBC 2.49* 2.64* 2.70*   HEMOGLOBIN 8.8* 9.4* 9.6*   HEMATOCRIT 26.5* 27.9* 28.0*   .4* 105.7* 103.7*   MCH 35.3* 35.6* 35.6*   MCHC 33.2 33.7 34.3   RDW 58.6* 58.4* 56.2*   PLATELETCT 162* 180 175   MPV 11.0 10.9 10.7     Recent Labs     06/17/24  0511 06/18/24  0609 06/19/24  0020   SODIUM  142 140 136   POTASSIUM 4.1 4.4 4.2   CHLORIDE 110 109 105   CO2 23 24 23   GLUCOSE 102* 107* 88   BUN 16 14 16   CREATININE 0.94 0.90 0.66   CALCIUM 8.5 8.5 8.4*                   Imaging  DX-CHEST-PORTABLE (1 VIEW)   Final Result      1.  Interval placement of right internal jugular central venous catheter.   2.  No evidence of procedure-related pneumothorax or other change compared with earlier film from today's date.      CT-ABDOMEN-PELVIS WITH   Final Result      1.  Cirrhotic changes of the liver.      2.  Splenomegaly.      3.  Esophageal, periumbilical, mesenteric, and retroperitoneal varices are noted.      4.  Small to moderate amount of ascites throughout the lower abdomen and pelvis      5.  Acute left-sided perirectal abscess extending into the medial aspect of the left upper thigh.      DX-CHEST-PORTABLE (1 VIEW)   Final Result      1.  Slight atelectasis near left costophrenic angle.      2.  Postoperative changes of the right ribs.              Assessment/Plan  * Septic shock (HCC)- (present on admission)  Assessment & Plan  This is Septic shock Present on admission  SIRS criteria identified on my evaluation include: Tachycardia, with heart rate greater than 90 BPM and Leukocytosis, with WBC greater than 12,000  Clinical indicators of end organ dysfunction include Hypotension with systolic blood pressure less than 90 or MAP less than 65  Indicators of septic shock include: Sepsis present and persistent hypotension despite fluid resuscitation   Sources is: perirectal abscess  Sepsis protocol initiated  Crystalloid Fluid Administration: Fluid resuscitation ordered per standard protocol - 30 mL/kg per current or ideal body weight  IV antibiotics as appropriate for source of sepsis  Reassessment: I have reassessed the patient's hemodynamic status  Source control per surgery.   S/p I&D of abscess  Unasyn  Wound culture: E.coli, fusobacterium mortiferum, bacteroides ovatus and fragilis, corynebacterium  amycolatum   Vasopressors titrated to MAP > 65     Perirectal abscess- (present on admission)  Assessment & Plan  Perirectal abscess that extends into the medial aspect of the left upper thigh with multiple air bubbles.   Surgery following for source control  Unasyn  Wound culture: E.coli, fusobacterium mortiferum, bacteroides ovatus and fragilis, corynebacterium amycolatum     Acute kidney injury (HCC)- (present on admission)  Assessment & Plan  EFRAIN due to ATN due to sepsis, hypotension  Improved   Strict I/Os  Renally dosed medications  Avoid nephrotoxic agents as able  Trend     Alcoholic cirrhosis of liver with ascites (HCC)- (present on admission)  Assessment & Plan  Pt admits that she has been drinking a lot of alcohols (beer, wine, hard liquor) for years, daily drink  Recently quit beer/wine 4 months ago and quit hard liquor 2 months ago  She does not know prior diagnosis of cirrhosis  Denies hematemesis, melena, hematochezia.   Noted minimal ascites on CT A/P, not enough to tap   H/H 9.5  Total bili 4.8, INR 2.0. MELD 26  Monitor for signs of bleeding    Plan  6/19  Continued antibiotic therapy through 6/21 preliminary,  Ongoing wound care, packing changes, surgical follow-up  Pain control  Blood pressure improved, deseeding midodrine, off pressors  Close laboratory follow-up  The patient will need close follow-up in terms of her newly diagnosed liver cirrhosis  Negative hepatitis panel so far, will need outpatient GI follow-up  A.m. labs  See orders  Disposition somewhat questionable, the patient lives remotely and will need send ongoing aggressive wound care, question of location that can be accomplished at  Patient is has a high medical complexity, complex decision making and is at high risk for complication, morbidity, and mortality.  I spent 58 minutes, reviewing the chart, obtaining and/or reviewing separately obtained history. Performing a medically appropriate examination and evaluation.  Counseling  and educating the patient. Ordering and reviewing medications, tests, or procedures.   Documenting clinical information in EPIC. Independently interpreting results and communicating results to patient. Discussing future disposition of care with patient, RN and case management.    VTE prophylaxis:    enoxaparin ppx      I have performed a physical exam and reviewed and updated ROS and Plan today (6/19/2024). In review of yesterday's note (6/18/2024), there are no changes except as documented above.      Please note that this dictation was created using voice recognition software. I have made every reasonable attempt to correct obvious errors, but I expect that there are errors of grammar and possibly context that I did not discover before finalizing the note.

## 2024-06-20 LAB
AFP-TM SERPL-MCNC: 11 NG/ML (ref 0–9)
ANION GAP SERPL CALC-SCNC: 8 MMOL/L (ref 7–16)
BUN SERPL-MCNC: 17 MG/DL (ref 8–22)
CALCIUM SERPL-MCNC: 8.3 MG/DL (ref 8.5–10.5)
CHLORIDE SERPL-SCNC: 104 MMOL/L (ref 96–112)
CO2 SERPL-SCNC: 25 MMOL/L (ref 20–33)
CREAT SERPL-MCNC: 0.72 MG/DL (ref 0.5–1.4)
ERYTHROCYTE [DISTWIDTH] IN BLOOD BY AUTOMATED COUNT: 58.4 FL (ref 35.9–50)
GFR SERPLBLD CREATININE-BSD FMLA CKD-EPI: 97 ML/MIN/1.73 M 2
GLUCOSE SERPL-MCNC: 92 MG/DL (ref 65–99)
HCT VFR BLD AUTO: 29.4 % (ref 37–47)
HGB BLD-MCNC: 9.9 G/DL (ref 12–16)
MAGNESIUM SERPL-MCNC: 1.7 MG/DL (ref 1.5–2.5)
MCH RBC QN AUTO: 35.9 PG (ref 27–33)
MCHC RBC AUTO-ENTMCNC: 33.7 G/DL (ref 32.2–35.5)
MCV RBC AUTO: 106.5 FL (ref 81.4–97.8)
PHOSPHATE SERPL-MCNC: 3.6 MG/DL (ref 2.5–4.5)
PLATELET # BLD AUTO: 162 K/UL (ref 164–446)
PMV BLD AUTO: 10.5 FL (ref 9–12.9)
POTASSIUM SERPL-SCNC: 4.2 MMOL/L (ref 3.6–5.5)
RBC # BLD AUTO: 2.76 M/UL (ref 4.2–5.4)
SODIUM SERPL-SCNC: 137 MMOL/L (ref 135–145)
WBC # BLD AUTO: 14.7 K/UL (ref 4.8–10.8)

## 2024-06-20 PROCEDURE — 99232 SBSQ HOSP IP/OBS MODERATE 35: CPT | Performed by: STUDENT IN AN ORGANIZED HEALTH CARE EDUCATION/TRAINING PROGRAM

## 2024-06-20 PROCEDURE — A9270 NON-COVERED ITEM OR SERVICE: HCPCS | Performed by: INTERNAL MEDICINE

## 2024-06-20 PROCEDURE — 700105 HCHG RX REV CODE 258: Performed by: INTERNAL MEDICINE

## 2024-06-20 PROCEDURE — 700102 HCHG RX REV CODE 250 W/ 637 OVERRIDE(OP): Performed by: STUDENT IN AN ORGANIZED HEALTH CARE EDUCATION/TRAINING PROGRAM

## 2024-06-20 PROCEDURE — 85027 COMPLETE CBC AUTOMATED: CPT

## 2024-06-20 PROCEDURE — 700102 HCHG RX REV CODE 250 W/ 637 OVERRIDE(OP)

## 2024-06-20 PROCEDURE — 700102 HCHG RX REV CODE 250 W/ 637 OVERRIDE(OP): Performed by: HOSPITALIST

## 2024-06-20 PROCEDURE — 700111 HCHG RX REV CODE 636 W/ 250 OVERRIDE (IP): Mod: JZ | Performed by: INTERNAL MEDICINE

## 2024-06-20 PROCEDURE — 84100 ASSAY OF PHOSPHORUS: CPT

## 2024-06-20 PROCEDURE — 80048 BASIC METABOLIC PNL TOTAL CA: CPT

## 2024-06-20 PROCEDURE — 770006 HCHG ROOM/CARE - MED/SURG/GYN SEMI*

## 2024-06-20 PROCEDURE — 83735 ASSAY OF MAGNESIUM: CPT

## 2024-06-20 PROCEDURE — A9270 NON-COVERED ITEM OR SERVICE: HCPCS

## 2024-06-20 PROCEDURE — A9270 NON-COVERED ITEM OR SERVICE: HCPCS | Performed by: STUDENT IN AN ORGANIZED HEALTH CARE EDUCATION/TRAINING PROGRAM

## 2024-06-20 PROCEDURE — A9270 NON-COVERED ITEM OR SERVICE: HCPCS | Performed by: HOSPITALIST

## 2024-06-20 PROCEDURE — 97116 GAIT TRAINING THERAPY: CPT

## 2024-06-20 PROCEDURE — 700102 HCHG RX REV CODE 250 W/ 637 OVERRIDE(OP): Performed by: INTERNAL MEDICINE

## 2024-06-20 RX ORDER — ROSUVASTATIN CALCIUM 20 MG/1
20 TABLET, COATED ORAL EVERY EVENING
Status: DISCONTINUED | OUTPATIENT
Start: 2024-06-20 | End: 2024-06-21 | Stop reason: HOSPADM

## 2024-06-20 RX ADMIN — ROSUVASTATIN CALCIUM 20 MG: 20 TABLET, FILM COATED ORAL at 17:40

## 2024-06-20 RX ADMIN — OXYCODONE HYDROCHLORIDE 10 MG: 10 TABLET ORAL at 18:11

## 2024-06-20 RX ADMIN — AMPICILLIN AND SULBACTAM 3 G: 1; 2 INJECTION, POWDER, FOR SOLUTION INTRAMUSCULAR; INTRAVENOUS at 23:45

## 2024-06-20 RX ADMIN — AMPICILLIN AND SULBACTAM 3 G: 1; 2 INJECTION, POWDER, FOR SOLUTION INTRAMUSCULAR; INTRAVENOUS at 00:03

## 2024-06-20 RX ADMIN — AMPICILLIN AND SULBACTAM 3 G: 1; 2 INJECTION, POWDER, FOR SOLUTION INTRAMUSCULAR; INTRAVENOUS at 17:41

## 2024-06-20 RX ADMIN — CARVEDILOL 3.12 MG: 3.12 TABLET, FILM COATED ORAL at 17:40

## 2024-06-20 RX ADMIN — AMPICILLIN AND SULBACTAM 3 G: 1; 2 INJECTION, POWDER, FOR SOLUTION INTRAMUSCULAR; INTRAVENOUS at 12:27

## 2024-06-20 RX ADMIN — SPIRONOLACTONE 50 MG: 50 TABLET ORAL at 05:22

## 2024-06-20 RX ADMIN — OXYCODONE HYDROCHLORIDE 10 MG: 10 TABLET ORAL at 23:40

## 2024-06-20 RX ADMIN — SENNOSIDES AND DOCUSATE SODIUM 2 TABLET: 50; 8.6 TABLET ORAL at 17:39

## 2024-06-20 RX ADMIN — AMPICILLIN AND SULBACTAM 3 G: 1; 2 INJECTION, POWDER, FOR SOLUTION INTRAMUSCULAR; INTRAVENOUS at 05:22

## 2024-06-20 RX ADMIN — ENOXAPARIN SODIUM 40 MG: 100 INJECTION SUBCUTANEOUS at 17:40

## 2024-06-20 ASSESSMENT — ENCOUNTER SYMPTOMS
GASTROINTESTINAL NEGATIVE: 1
CARDIOVASCULAR NEGATIVE: 1
BRUISES/BLEEDS EASILY: 0
NEUROLOGICAL NEGATIVE: 1
RESPIRATORY NEGATIVE: 1
COUGH: 0
HEADACHES: 0
VOMITING: 0
CHILLS: 0
NECK PAIN: 0
DIZZINESS: 0
FOCAL WEAKNESS: 0
HEARTBURN: 0
MUSCULOSKELETAL NEGATIVE: 1
PSYCHIATRIC NEGATIVE: 1
EYES NEGATIVE: 1
BACK PAIN: 0
NAUSEA: 0
FEVER: 0
CONSTITUTIONAL NEGATIVE: 1
WEAKNESS: 0
DEPRESSION: 0
POLYDIPSIA: 0
PALPITATIONS: 0

## 2024-06-20 ASSESSMENT — PAIN DESCRIPTION - PAIN TYPE
TYPE: ACUTE PAIN

## 2024-06-20 ASSESSMENT — COGNITIVE AND FUNCTIONAL STATUS - GENERAL
SUGGESTED CMS G CODE MODIFIER MOBILITY: CH
SUGGESTED CMS G CODE MODIFIER DAILY ACTIVITY: CH
DAILY ACTIVITIY SCORE: 24
MOBILITY SCORE: 24
SUGGESTED CMS G CODE MODIFIER MOBILITY: CI
CLIMB 3 TO 5 STEPS WITH RAILING: A LITTLE
MOBILITY SCORE: 23

## 2024-06-20 ASSESSMENT — GAIT ASSESSMENTS
GAIT LEVEL OF ASSIST: SUPERVISED
DISTANCE (FEET): 200

## 2024-06-20 NOTE — DISCHARGE PLANNING
Case Management Discharge Planning    Admission Date: 6/14/2024  GMLOS: 9.9  ALOS: 6    6-Clicks ADL Score: 24  6-Clicks Mobility Score: 23    Anticipated Discharge Dispo: Discharge Disposition: Discharged to home/self care (01)    DME Needed: No    Action(s) Taken:   Pt discussed during afternoon IDT rounds. Per MD, pt is not yet medically clear but anticipated to be cleared and discharge tomorrow.   PT re-evaluated pt today and anticipated that the patient will have no further therapy needs after discharge from the hospital.   MD to place  orders for wound care.     DORON RN met with pt and mother Ivelisse at the bedside to discuss discharge plan. Pt stated her plan is to discharge home with her mother, Ivelisse, to confirmed address   [address] [place] [address] [place] [address]  Pt verbalized she is agreeable with home with HH and choice form was completed by pt and form was then faxed to [facility] for processing.   1) [facility]    [facility]) [facility]    3) [facility]      Social Determinants of Health Community Resources for tobacco use provided.      Escalations Completed: None    Medically Clear: No    Next Steps:  DORON RN to follow up with medical team to discuss discharge barriers or needs.     Barriers to Discharge: Medical clearance and Outpatient referrals pending

## 2024-06-20 NOTE — PROGRESS NOTES
At this point if there is no signs of active bleeding during packing changes patient can stop packing wound and transition to Sitz baths 3 times daily and PRN soilage for ongoing wound care.  Continue doing them as an outpatient and follow up in ACS clinic 1 week from discharge.    Gilberto Sierra MD

## 2024-06-20 NOTE — DISCHARGE PLANNING
Choice received for HH scanned into media    2401- Referral sent per choice to [facility]  (D/c address is in [place], [place], see CM note on 6/20)

## 2024-06-20 NOTE — DISCHARGE PLANNING
Case Management Discharge Planning    Admission Date: 6/14/2024  GMLOS: 9.9  ALOS: 6    6-Clicks ADL Score: 21  6-Clicks Mobility Score: 19    Anticipated Discharge Dispo: Discharge Disposition: D/T to SNF with Medicare cert in anticipation of skilled care (03)  Discharge Contact Phone Number: Karine Stevens ()    DME Needed: No    Action(s) Taken:  PT has recommended post-acute placement for continued therapy services prior to discharge on 6/17.     CM RN requested DPA send referrals to local SNFs per protocol.     PASRR in manual review.     Escalations Completed: None    Medically Clear: No    Next Steps:  CM RN to follow up with medical team to discuss discharge barriers or needs.     Barriers to Discharge: Medical clearance and Pending Placement    Is the patient up for discharge tomorrow: No

## 2024-06-20 NOTE — THERAPY
Physical Therapy   Discharge     Patient Name: Kavita Freeman  Age:  58 y.o., Sex:  female  Medical Record #: [id number]  Today's Date: 6/20/2024          Assessment    Rec'd pt alert, pleasant and agreeable to work w/ PT.  She is able to mobilize at spv level, w/o AD and w/o loss of balance or need of physical assist.  Pt ambulated 200 ft.  PT goals have been met.  Recommend oob/amb prn w/ nsg.  PT for d/c needs.    Plan    Reason for Discharge From Therapy:      DC Equipment Recommendations: None  Discharge Recommendations: Anticipate that the patient will have no further physical therapy needs after discharge from the hospital      Objective       06/20/24 0936   Balance   Sitting Balance (Static) Fair +   Sitting Balance (Dynamic) Fair +   Standing Balance (Static) Fair   Standing Balance (Dynamic) Fair   Weight Shift Sitting Good   Weight Shift Standing Good   Bed Mobility    Supine to Sit Supervised   Sit to Supine Supervised   Gait Analysis   Gait Level Of Assist Supervised   Assistive Device None   Distance (Feet) 200   Functional Mobility   Sit to Stand Supervised   Short Term Goals    Short Term Goal # 1 pt will be able to complete supine<>sitting from flat bed with SPV in 6tx in order ot progress to prior level   Goal Outcome # 1 Goal met   Short Term Goal # 2 pt will be able to complete STS with no AD and SPV in 6tx in order to decrease fall risk   Goal Outcome # 2 Goal met   Short Term Goal # 3 pt will be able to ambulate 150ft with no AD and SPV in 6tx in order to decrease fall risk   Goal Outcome # 3 Goal met   Physical Therapy Treatment Plan   Physical Therapy Treatment Plan Modify Current Treatment Plan   Duration Discharge Needs Only   Anticipated Discharge Equipment and Recommendations   DC Equipment Recommendations None   Discharge Recommendations Anticipate that the patient will have no further physical therapy needs after discharge from the hospital

## 2024-06-20 NOTE — PROGRESS NOTES
Received patient to the floor by transport staff. Patient ambulated from the gurney to the bed with the handheld assist of one. Patient assessment complete, 2 RN skin check complete. Patient is A&Ox4, on RA, and reports left buttock pain and pressure of 5/10. Patient oriented to the room, water provided per request. Call light and personal belongings within reach. Bed in low and locked position. Patient denies further needs at this time.

## 2024-06-20 NOTE — CARE PLAN
The patient is Stable - Low risk of patient condition declining or worsening    Shift Goals  Clinical Goals: IV abx, sitz baths  Patient Goals: Rest, BM  Family Goals: NA    Progress made toward(s) clinical / shift goals:  Patient and family verbalized understanding of plan of care and education. Patient's pain was controlled with pharmacological and nonpharmacological comfort measures. Patient was free from falls and no changes in skin integrity were noticed during this shift.  Problem: Pain - Standard  Goal: Alleviation of pain or a reduction in pain to the patient’s comfort goal  Outcome: Progressing     Problem: Knowledge Deficit - Standard  Goal: Patient and family/care givers will demonstrate understanding of plan of care, disease process/condition, diagnostic tests and medications  Outcome: Progressing     Problem: Skin Integrity  Goal: Skin integrity is maintained or improved  Outcome: Progressing     Problem: Hemodynamics  Goal: Patient's hemodynamics, fluid balance and neurologic status will be stable or improve  Outcome: Progressing     Problem: Fluid Volume  Goal: Fluid volume balance will be maintained  Outcome: Progressing     Problem: Urinary - Renal Perfusion  Goal: Ability to achieve and maintain adequate renal perfusion and functioning will improve  Outcome: Progressing     Problem: Respiratory  Goal: Patient will achieve/maintain optimum respiratory ventilation and gas exchange  Outcome: Progressing     Problem: Mechanical Ventilation  Goal: Safe management of artificial airway and ventilation  Outcome: Progressing  Goal: Successful weaning off mechanical ventilator, spontaneously maintains adequate gas exchange  Outcome: Progressing  Goal: Patient will be able to express needs and understand communication  Outcome: Progressing     Problem: Physical Regulation  Goal: Diagnostic test results will improve  Outcome: Progressing  Goal: Signs and symptoms of infection will decrease  Outcome:  Progressing     Problem: Fall Risk  Goal: Patient will remain free from falls  Outcome: Progressing       Patient is not progressing towards the following goals:

## 2024-06-20 NOTE — DISCHARGE PLANNING
ATTN: Case Management  RE: Referral for Home Health    Reason for referral denial: [facility]  does not service [place]              Unfortunately, we are not able to accept this referral for the reason listed above. If further clarity is needed, our Transitional Care Specialists are available to discuss any barriers to service at [phone].      We look forward to collaborating with you in the future,  [facility] Team

## 2024-06-20 NOTE — PROGRESS NOTES
4 Eyes Skin Assessment Completed by KAYLEY Ramirez and KAYLEY Franks.    Head WDL  Ears WDL  Nose WDL  Mouth WDL  Neck WDL  Breast/Chest WDL  Shoulder Blades WDL  Spine WDL  (R) Arm/Elbow/Hand WDL  (L) Arm/Elbow/Hand WDL  Abdomen WDL  Groin WDL  Scrotum/Coccyx/Buttocks Open wound to left buttock  (R) Leg WDL  (L) Leg WDL  (R) Heel/Foot/Toe WDL  (L) Heel/Foot/Toe WDL          Devices In Places Herrera      Interventions In Place Pillows and Pressure Redistribution Mattress    Possible Skin Injury Yes    Pictures Uploaded Into Epic N/A  Wound Consult Placed N/A, pt already being followed by wound care  RN Wound Prevention Protocol Ordered No

## 2024-06-20 NOTE — CARE PLAN
The patient is Stable - Low risk of patient condition declining or worsening    Shift Goals  Clinical Goals: Patient will receive IV antibiotics as ordered.  Patient Goals: Patient will be able to sleep comfortably.    Progress made toward(s) clinical / shift goals:  Patient received IV antibiotics as ordered to treat infection. Patient received PRN pain medications as needed which allowed her to sleep comfortably. Patient ambulated with handheld assist of one and remained free from falls.    Patient is not progressing towards the following goals:

## 2024-06-21 ENCOUNTER — HOME HEALTH ADMISSION (OUTPATIENT)
Dept: HOME HEALTH SERVICES | Facility: HOME HEALTHCARE | Age: 59
End: 2024-06-21
Payer: COMMERCIAL

## 2024-06-21 ENCOUNTER — PHARMACY VISIT (OUTPATIENT)
Dept: PHARMACY | Facility: MEDICAL CENTER | Age: 59
End: 2024-06-21
Payer: COMMERCIAL

## 2024-06-21 VITALS
OXYGEN SATURATION: 95 % | WEIGHT: 184.3 LBS | RESPIRATION RATE: 20 BRPM | HEIGHT: 68 IN | BODY MASS INDEX: 27.93 KG/M2 | SYSTOLIC BLOOD PRESSURE: 107 MMHG | HEART RATE: 60 BPM | TEMPERATURE: 96.8 F | DIASTOLIC BLOOD PRESSURE: 65 MMHG

## 2024-06-21 LAB
ERYTHROCYTE [DISTWIDTH] IN BLOOD BY AUTOMATED COUNT: 57.9 FL (ref 35.9–50)
HCT VFR BLD AUTO: 26.4 % (ref 37–47)
HGB BLD-MCNC: 8.9 G/DL (ref 12–16)
MCH RBC QN AUTO: 35.5 PG (ref 27–33)
MCHC RBC AUTO-ENTMCNC: 33.7 G/DL (ref 32.2–35.5)
MCV RBC AUTO: 105.2 FL (ref 81.4–97.8)
PLATELET # BLD AUTO: 139 K/UL (ref 164–446)
PMV BLD AUTO: 10.8 FL (ref 9–12.9)
RBC # BLD AUTO: 2.51 M/UL (ref 4.2–5.4)
WBC # BLD AUTO: 12.1 K/UL (ref 4.8–10.8)

## 2024-06-21 PROCEDURE — RXMED WILLOW AMBULATORY MEDICATION CHARGE: Performed by: STUDENT IN AN ORGANIZED HEALTH CARE EDUCATION/TRAINING PROGRAM

## 2024-06-21 PROCEDURE — 700102 HCHG RX REV CODE 250 W/ 637 OVERRIDE(OP): Performed by: HOSPITALIST

## 2024-06-21 PROCEDURE — A9270 NON-COVERED ITEM OR SERVICE: HCPCS | Performed by: HOSPITALIST

## 2024-06-21 PROCEDURE — A9270 NON-COVERED ITEM OR SERVICE: HCPCS

## 2024-06-21 PROCEDURE — 700102 HCHG RX REV CODE 250 W/ 637 OVERRIDE(OP): Performed by: STUDENT IN AN ORGANIZED HEALTH CARE EDUCATION/TRAINING PROGRAM

## 2024-06-21 PROCEDURE — 700105 HCHG RX REV CODE 258: Performed by: INTERNAL MEDICINE

## 2024-06-21 PROCEDURE — 700102 HCHG RX REV CODE 250 W/ 637 OVERRIDE(OP)

## 2024-06-21 PROCEDURE — 700111 HCHG RX REV CODE 636 W/ 250 OVERRIDE (IP): Mod: JZ | Performed by: INTERNAL MEDICINE

## 2024-06-21 PROCEDURE — A9270 NON-COVERED ITEM OR SERVICE: HCPCS | Performed by: STUDENT IN AN ORGANIZED HEALTH CARE EDUCATION/TRAINING PROGRAM

## 2024-06-21 PROCEDURE — 99239 HOSP IP/OBS DSCHRG MGMT >30: CPT | Performed by: STUDENT IN AN ORGANIZED HEALTH CARE EDUCATION/TRAINING PROGRAM

## 2024-06-21 PROCEDURE — 700111 HCHG RX REV CODE 636 W/ 250 OVERRIDE (IP): Performed by: INTERNAL MEDICINE

## 2024-06-21 PROCEDURE — 85027 COMPLETE CBC AUTOMATED: CPT

## 2024-06-21 RX ORDER — SPIRONOLACTONE 50 MG/1
50 TABLET, FILM COATED ORAL DAILY
Qty: 30 TABLET | Refills: 3 | Status: SHIPPED | OUTPATIENT
Start: 2024-06-22

## 2024-06-21 RX ORDER — AMOXICILLIN AND CLAVULANATE POTASSIUM 875; 125 MG/1; MG/1
1 TABLET, FILM COATED ORAL EVERY 12 HOURS
Status: DISCONTINUED | OUTPATIENT
Start: 2024-06-21 | End: 2024-06-21 | Stop reason: HOSPADM

## 2024-06-21 RX ORDER — AMOXICILLIN AND CLAVULANATE POTASSIUM 875; 125 MG/1; MG/1
1 TABLET, FILM COATED ORAL EVERY 12 HOURS
Qty: 6 TABLET | Refills: 0 | Status: ACTIVE | OUTPATIENT
Start: 2024-06-21 | End: 2024-06-24

## 2024-06-21 RX ORDER — OXYCODONE HYDROCHLORIDE 5 MG/1
5 TABLET ORAL EVERY 6 HOURS PRN
Qty: 20 TABLET | Refills: 0 | Status: SHIPPED | OUTPATIENT
Start: 2024-06-21 | End: 2024-06-26

## 2024-06-21 RX ADMIN — AMPICILLIN AND SULBACTAM 3 G: 1; 2 INJECTION, POWDER, FOR SOLUTION INTRAMUSCULAR; INTRAVENOUS at 05:52

## 2024-06-21 RX ADMIN — MAGNESIUM HYDROXIDE 30 ML: 1200 LIQUID ORAL at 10:46

## 2024-06-21 RX ADMIN — CARVEDILOL 3.12 MG: 3.12 TABLET, FILM COATED ORAL at 08:07

## 2024-06-21 RX ADMIN — OXYCODONE HYDROCHLORIDE 5 MG: 5 TABLET ORAL at 10:47

## 2024-06-21 RX ADMIN — ONDANSETRON 4 MG: 4 TABLET, ORALLY DISINTEGRATING ORAL at 09:10

## 2024-06-21 RX ADMIN — SPIRONOLACTONE 50 MG: 50 TABLET ORAL at 05:48

## 2024-06-21 ASSESSMENT — PAIN DESCRIPTION - PAIN TYPE
TYPE: ACUTE PAIN

## 2024-06-21 NOTE — PROGRESS NOTES
Patient transported [place]c [facility] All belongings with patient. PIV removed. Pt A&Ox4. Aware of d/c and all questions answered.

## 2024-06-21 NOTE — CARE PLAN
The patient is Stable - Low risk of patient condition declining or worsening    Shift Goals  Clinical Goals: Patient will get a sitz bath and receive IV ABX per MAR this shift.  Patient Goals: Patient will have a bowel movement this shift.  Family Goals: NA    Progress made toward(s) clinical / shift goals:  Pt remained free from falls throughout shift. Pt received sitz bath and IV ABX per MAR. Pt slept comfortably throughout shift.    Patient is not progressing towards the following goals:

## 2024-06-21 NOTE — DISCHARGE PLANNING
Per chart note [facility] declined.    0730- Referral sent to [facility]    0800- [facility] declined / non contracted insurance    0801- Resent to [facility] regarding d/c address is in [place].

## 2024-06-21 NOTE — DISCHARGE PLANNING
Case Management Discharge Planning    Admission Date: 6/14/2024  GMLOS: 9.9  ALOS: 7    6-Clicks ADL Score: 24  6-Clicks Mobility Score: 23    Anticipated Discharge Dispo: Discharge Disposition: D/T to home under A care in anticipation of covered skilled care (06)  Discharge Contact Phone Number: Karine Stevens ()    DME Needed: No    Action(s) Taken:   Pt discussed during IDT rounds. Per MD, pt is medically clear to discharge with . HH referrals sent per choice form.     [facility] is reviewing insurance benefits at this time. Pending acceptance. MD informed, no accepting HH agency at this time. Pt cleared to discharge pending referral.     Escalations Completed: None    Medically Clear: Yes    Next Steps:  CM RN to follow up with medical team to discuss discharge barriers or needs.     Barriers to Discharge: Outpatient referrals pending

## 2024-06-21 NOTE — DISCHARGE PLANNING
We are currently verifying this patient's insurance and benefits. This will be resolved ASAP.     Respectfully,   [facility]

## 2024-06-21 NOTE — PROGRESS NOTES
Bedside report received at 1905.     Patient is A&O x 4.  Patient reports 8/10 pain. PRN administered.  Patient is a standby assist.  Bed locked and in lowest position.  Call light is within reach. All questions answered at this time. Hourly rounding in place.

## 2024-06-21 NOTE — PROGRESS NOTES
Patient being discharged via D[facility]. Pt educated on discharge instructions and new prescriptions, verbalized understanding. Follow up appointment to be made with PCP. PIV removed on floor. Patient going home via car with pt's mother.

## 2024-06-21 NOTE — PROGRESS NOTES
[facility] Medicine Daily Progress Note    Date of Service  6/20/2024    Chief Complaint  Kavita Freeman is a 58 y.o. female admitted 6/14/2024 with a perirectal abscess, sepsis    Hospital Course  No notes on file58 y.o. female who presented 6/14/2024 with a medical history of cirrhosis, presented to the hospital with complaints of 1 week of severe perirectal pain, initially patient in emergency room was hypotensive with blood pressure in the 50s to 70s, was fluid resuscitated, by imaging was found to have a 6.7 x 5.7 x 3 cm perirectal abscess extending into the medial aspect of the left upper thigh with multiple air bubbles, concerning for necrotizing soft tissue infection, the patient was pancultured and started on broad-spectrum antibiotics and referred to general surgery for intervention, the patient initially with a white second of 20,000, platelets 160, lactate of 3.8, INR 2.0, following the patient underwent I&D with surgery, some debridement of empiric abscess performed, the patient postoperatively was transferred to the intensive care unit secondary to low blood pressure and need for pressor therapy.  The patient remained in ICU, then underwent additional debridement on 6/17 with general surgery  On 6/18 the patient felt improved, she does have some pain with movement, she denies fevers chills currently  The patient is afebrile, heart rate in the 50s, respiration unlabored, the patient saturating well on room air, blood pressure 1 teens to 120s over 50s  Laboratory data indicated white count 23.9, hemoglobin 9.4, hematocrit 27.9, platelet count 180,  Cultures positive for E. coli, corynebacterium, as well as bacteroides    Interval Problem Update  No acute events overnight.  Patient feeling well. Wound open but not acutely inflamed.  Surgery recommend sitz baths TID for wound management.  Continue unasyn, can transition to PO antibiotic tomorrow.  BP low normal, monitor for any hypotension.  Hgb  stable, WBC improving to 14.7k. monitory with CBC in AM.  Patient agreeable to discharge home tomorrow with home health if remains clinically stable.  Home health ordered for nursing and wound checks.  Patient to follow up with surgery in clinic.      I have discussed this patient's plan of care and discharge plan at IDT rounds today with Case Management, Nursing, Nursing leadership, and other members of the IDT team.    Consultants/Specialty  general surgery    Code Status  Full Code    Disposition  The patient is not medically cleared for discharge to home or a post-acute facility.  Anticipate discharge to: home with organized home healthcare and close outpatient follow-up    I have placed the appropriate orders for post-discharge needs.    Review of Systems  Review of Systems   Constitutional: Negative.  Negative for chills and fever.   HENT: Negative.     Eyes: Negative.    Respiratory: Negative.  Negative for cough.    Cardiovascular: Negative.  Negative for chest pain and palpitations.   Gastrointestinal: Negative.  Negative for heartburn, nausea and vomiting.   Genitourinary: Negative.  Negative for dysuria and frequency.        Perirectal pain   Musculoskeletal: Negative.  Negative for back pain and neck pain.   Skin: Negative.  Negative for itching and rash.   Neurological: Negative.  Negative for dizziness, focal weakness, weakness and headaches.   Endo/Heme/Allergies: Negative.  Negative for polydipsia. Does not bruise/bleed easily.   Psychiatric/Behavioral: Negative.  Negative for depression.         Physical Exam  Temp:  [36.7 °C (98.1 °F)-37.6 °C (99.7 °F)] 37.6 °C (99.7 °F)  Pulse:  [60-69] 69  Resp:  [16-18] 18  BP: ()/(41-70) 108/56  SpO2:  [93 %-99 %] 96 %    Physical Exam  Vitals and nursing note reviewed.   Constitutional:       Appearance: She is well-developed. She is not diaphoretic.   HENT:      Head: Normocephalic and atraumatic.      Nose: Nose normal.   Eyes:      Conjunctiva/sclera:  Conjunctivae normal.      Pupils: Pupils are equal, round, and reactive to light.   Neck:      Thyroid: No thyromegaly.      Vascular: No JVD.   Cardiovascular:      Rate and Rhythm: Normal rate and regular rhythm.      Heart sounds: Normal heart sounds.      No friction rub. No gallop.   Pulmonary:      Effort: Pulmonary effort is normal.      Breath sounds: Normal breath sounds. No wheezing or rales.   Abdominal:      General: Bowel sounds are normal. There is no distension.      Palpations: Abdomen is soft. There is no mass.      Tenderness: There is no abdominal tenderness. There is no guarding or rebound.      Comments: Protuberant abdomen   Genitourinary:     Comments: Perirectal abscess lesion, dressing in place  Musculoskeletal:         General: No tenderness. Normal range of motion.      Cervical back: Normal range of motion and neck supple.   Lymphadenopathy:      Cervical: No cervical adenopathy.   Skin:     General: Skin is warm and dry.   Neurological:      Mental Status: She is alert and oriented to person, place, and time.      Cranial Nerves: No cranial nerve deficit.   Psychiatric:         Behavior: Behavior normal.         Fluids    Intake/Output Summary (Last 24 hours) at 6/20/2024 1743  Last data filed at 6/20/2024 0927  Gross per 24 hour   Intake 776.38 ml   Output 1175 ml   Net -398.62 ml       Laboratory  Recent Labs     06/18/24  0609 06/19/24  0020 06/20/24  0156   WBC 23.9* 19.6* 14.7*   RBC 2.64* 2.70* 2.76*   HEMOGLOBIN 9.4* 9.6* 9.9*   HEMATOCRIT 27.9* 28.0* 29.4*   .7* 103.7* 106.5*   MCH 35.6* 35.6* 35.9*   MCHC 33.7 34.3 33.7   RDW 58.4* 56.2* 58.4*   PLATELETCT 180 175 162*   MPV 10.9 10.7 10.5     Recent Labs     06/18/24  0609 06/19/24  0020 06/20/24  0156   SODIUM 140 136 137   POTASSIUM 4.4 4.2 4.2   CHLORIDE 109 105 104   CO2 24 23 25   GLUCOSE 107* 88 92   BUN 14 16 17   CREATININE 0.90 0.66 0.72   CALCIUM 8.5 8.4* 8.3*                   Imaging  DX-CHEST-PORTABLE (1  VIEW)   Final Result      1.  Interval placement of right internal jugular central venous catheter.   2.  No evidence of procedure-related pneumothorax or other change compared with earlier film from today's date.      CT-ABDOMEN-PELVIS WITH   Final Result      1.  Cirrhotic changes of the liver.      2.  Splenomegaly.      3.  Esophageal, periumbilical, mesenteric, and retroperitoneal varices are noted.      4.  Small to moderate amount of ascites throughout the lower abdomen and pelvis      5.  Acute left-sided perirectal abscess extending into the medial aspect of the left upper thigh.      DX-CHEST-PORTABLE (1 VIEW)   Final Result      1.  Slight atelectasis near left costophrenic angle.      2.  Postoperative changes of the right ribs.              Assessment/Plan  * Septic shock (HCC)- (present on admission)  Assessment & Plan  This is Septic shock Present on admission  SIRS criteria identified on my evaluation include: Tachycardia, with heart rate greater than 90 BPM and Leukocytosis, with WBC greater than 12,000  Clinical indicators of end organ dysfunction include Hypotension with systolic blood pressure less than 90 or MAP less than 65  Indicators of septic shock include: Sepsis present and persistent hypotension despite fluid resuscitation   Sources is: perirectal abscess  Sepsis protocol initiated  Crystalloid Fluid Administration: Fluid resuscitation ordered per standard protocol - 30 mL/kg per current or ideal body weight  IV antibiotics as appropriate for source of sepsis  Reassessment: I have reassessed the patient's hemodynamic status  Source control per surgery.   S/p I&D of abscess  Unasyn  Wound culture: E.coli, fusobacterium mortiferum, bacteroides ovatus and fragilis, corynebacterium amycolatum   Vasopressors titrated to MAP > 65     Acute kidney injury (HCC)- (present on admission)  Assessment & Plan  EFRAIN due to ATN due to sepsis, hypotension  Improved   Strict I/Os  Renally dosed  medications  Avoid nephrotoxic agents as able  Trend     Alcoholic cirrhosis of liver with ascites (HCC)- (present on admission)  Assessment & Plan  Pt admits that she has been drinking a lot of alcohols (beer, wine, hard liquor) for years, daily drink  Recently quit beer/wine 4 months ago and quit hard liquor 2 months ago  She does not know prior diagnosis of cirrhosis  Denies hematemesis, melena, hematochezia.   Noted minimal ascites on CT A/P, not enough to tap   H/H 9.5  Total bili 4.8, INR 2.0. MELD 26  Monitor for signs of bleeding    Perirectal abscess- (present on admission)  Assessment & Plan  Perirectal abscess that extends into the medial aspect of the left upper thigh with multiple air bubbles.   Surgery following for source control  Unasyn  Wound culture: E.coli, fusobacterium mortiferum, bacteroides ovatus and fragilis, corynebacterium amycolatum     Plan  6/19  Continued antibiotic therapy through 6/21 preliminary,  Ongoing wound care, packing changes, surgical follow-up  Pain control  Blood pressure improved, deseeding midodrine, off pressors  Close laboratory follow-up  The patient will need close follow-up in terms of her newly diagnosed liver cirrhosis  Negative hepatitis panel so far, will need outpatient GI follow-up  A.m. labs  See orders  Disposition somewhat questionable, the patient lives remotely and will need send ongoing aggressive wound care, question of location that can be accomplished at  Patient is has a high medical complexity, complex decision making and is at high risk for complication, morbidity, and mortality.  I spent 58 minutes, reviewing the chart, obtaining and/or reviewing separately obtained history. Performing a medically appropriate examination and evaluation.  Counseling and educating the patient. Ordering and reviewing medications, tests, or procedures.   Documenting clinical information in EPIC. Independently interpreting results and communicating results to patient.  Discussing future disposition of care with patient, RN and case management.    VTE prophylaxis: VTE Selection    I have performed a physical exam and reviewed and updated ROS and Plan today (6/20/2024). In review of yesterday's note (6/19/2024), there are no changes except as documented above.      Please note that this dictation was created using voice recognition software. I have made every reasonable attempt to correct obvious errors, but I expect that there are errors of grammar and possibly context that I did not discover before finalizing the note.

## 2024-06-21 NOTE — DISCHARGE PLANNING
Agency/Facility Name: [facility]  Plan or Request: KAREEM called for an update on pending HH referral.  Per DORON Kunz, pt is now d/c'd to go home.    1507-Per chart note, [facility] declined    1510- Referral sent to [facility]

## 2024-06-21 NOTE — DISCHARGE PLANNING
ATTN: Case Management  RE: Referral for Home Health    Reason for referral denial: not in network with patients insurance               Unfortunately, we are not able to accept this referral for the reason listed above. If further clarity is needed, our Transitional Care Specialists are available to discuss any barriers to service at [phone].      We look forward to collaborating with you in the future,  [facility] Team

## 2024-06-22 NOTE — DISCHARGE SUMMARY
Discharge Summary    CHIEF COMPLAINT ON ADMISSION  Chief Complaint   Patient presents with    Wound Check     BIBA from home. Per EMS report, wound on pt's gluteal started a week ago.        Reason for Admission  ems     Admission Date  6/14/2024    CODE STATUS  Prior    HPI & HOSPITAL COURSE  58 y.o. female who presented 6/14/2024 with a medical history of cirrhosis, presented to the hospital with complaints of 1 week of severe perirectal pain, initially patient in emergency room was hypotensive with blood pressure in the 50s to 70s, was fluid resuscitated, by imaging was found to have a 6.7 x 5.7 x 3 cm perirectal abscess extending into the medial aspect of the left upper thigh with multiple air bubbles, concerning for necrotizing soft tissue infection, the patient was pancultured and started on broad-spectrum antibiotics and referred to general surgery for intervention. The patient underwent I&D with surgery, some debridement of empiric abscess performed, the patient postoperatively was transferred to the intensive care unit secondary to low blood pressure and need for pressor therapy. The patient remained in ICU, then underwent additional debridement on 6/17 with general surgery. Patients perirectal wound was packed with dressing initially, able to stop packing. General surgery recommend sitz baths 3x per day for wound care. Operative cultures positive for E. coli, corynebacterium, as well as bacteroides. Patient improved with surgical and medical treatment, treated with IV antibiotics. Wound currently looking well, no evidence of necrosis or infection. Patient is discharged on oral antibiotic course on discharge. She is to follow up with surgery team for wound check. Home health ordered for additional home support for nursing and wound checks too.    Therefore, she is discharged in fair and stable condition to home with organized home healthcare and close outpatient follow-up.    The patient met 2-midnight  criteria for an inpatient stay at the time of discharge.    Discharge Date  6/21/2024    FOLLOW UP ITEMS POST DISCHARGE  Take medications as prescribed.  Follow up with PCP, surgery, home health.    DISCHARGE DIAGNOSES  Principal Problem:    Septic shock (HCC) (POA: Yes)  Active Problems:    Perirectal abscess (POA: Yes)    Alcoholic cirrhosis of liver with ascites (HCC) (POA: Yes)    Necrotizing fasciitis (HCC) (POA: Yes)    Acute kidney injury (HCC) (POA: Yes)  Resolved Problems:    * No resolved hospital problems. *      FOLLOW UP  Future Appointments   Date Time Provider Department Center   6/27/2024  2:20 PM [facility]     Galilea Vaca D.O.  [address] [id number] [phone]    Call in 2 day(s)        MEDICATIONS ON DISCHARGE     Medication List        START taking these medications        Instructions   amoxicillin-clavulanate 875-125 MG Tabs  Commonly known as: Augmentin   Take 1 Tablet by mouth every 12 hours for 3 days.  Dose: 1 Tablet     oxyCODONE immediate-release 5 MG Tabs  Commonly known as: Roxicodone   Take 1 Tablet by mouth every 6 hours as needed for Severe Pain for up to 5 days.  Dose: 5 mg     spironolactone 50 MG Tabs  Start taking on: June 22, 2024  Commonly known as: Aldactone   Take 1 Tablet by mouth every day.  Dose: 50 mg            CONTINUE taking these medications        Instructions   ALPRAZolam 0.5 MG Tabs  Commonly known as: Xanax   Take 0.25 mg by mouth 1 time a day as needed for Sleep.  Dose: 0.25 mg     losartan 50 MG Tabs  Commonly known as: Cozaar   Take 1 Tablet by mouth every day.  Dose: 50 mg     rosuvastatin 20 MG Tabs  Commonly known as: Crestor   Take 1 Tablet by mouth every evening.  Dose: 20 mg              Allergies  Allergies   Allergen Reactions    Nitrofurantoin Vomiting    Sulfamethoxazole W-Trimethoprim Vomiting       DIET  No orders of the defined types were placed in this encounter.      ACTIVITY  As  tolerated.  Weight bearing as tolerated    CONSULTATIONS  Critical care  General surgery    PROCEDURES  DATE OF OPERATION:                    6/17/2024     PREOPERATIVE DIAGNOSIS:         Perianal abscess     POSTOPERATIVE DIAGNOSIS:      Perianal abscess      PROCEDURE PERFORMED:           Sharp excisional debridement of necrotic subcutaneous tissue 3 x3 cm     SURGEON:                             Gilberto Sierra M.D.     ASSISTANT:                          N/a     ANESTHESIOLOGIST:          Erick Anand M.D.      ANESTHESIA:                       General endotracheal anesthesia.     ASA CLASSIFICATION:        IV.     INDICATIONS: The patient is a 58 year-old woman with history of cirrhosis who underwent I&D of barbara-anal abscess on 6/14/24.  She has been in the ICU for ongoing resuscitation of septic shock.  She is now off vasopressors.  At last wound check there was concern for some ongoing necrosis so she is indicated to return to the OR today           FINDINGS: minimal necrotic tissue lining the abscess cavities,      WOUND CLASSIFICATION:             Class IV, Dirty or Infected. Infection present at the time of surgery (PATOS).     SPECIMEN: n/a     ESTIMATED BLOOD LOSS:             10 mL.                PROCEDURE: Following informed consent consent, the patient was properly identified, taken to the operating room and placed in supine position where general endotracheal anesthesia was administered.   She was then transferred to the operating table in the prone position taking care to pad all bony prominences and pressure points.  Intravenous antibiotics were administered by the anesthesiologist in correct time interval. The patient arrived with a previously placed Herrera catheter. Sequential compression devices were employed. The abdomen was prepped and draped into a sterile field. A timeout was conducted with the full attention and participation of all operating room personnel.     The two wound cavities were  "explored.  The more cephalad one had a 3x3 cm area of necrotic subcutaneous fat that was sharply excised.  The remainder of the wound was without necrosis, abscess, or any obvious purulence.  A single long piece of 1\" Iodoform was used to pack both wounds.     The patient tolerated the procedure well, and there were no apparent complications. All sponge, needle, and instrument counts were correct on 2 separate occasions. The patient was awakened, extubated, and transferred to  to the post anesthesia care unit (PACU) in satisfactory condition.    LABORATORY  Lab Results   Component Value Date    SODIUM 137 06/20/2024    POTASSIUM 4.2 06/20/2024    CHLORIDE 104 06/20/2024    CO2 25 06/20/2024    GLUCOSE 92 06/20/2024    BUN 17 06/20/2024    CREATININE 0.72 06/20/2024    CREATININE 0.8 08/07/2008        Lab Results   Component Value Date    WBC 12.1 (H) 06/21/2024    HEMOGLOBIN 8.9 (L) 06/21/2024    HEMATOCRIT 26.4 (L) 06/21/2024    PLATELETCT 139 (L) 06/21/2024        Total time of the discharge process exceeds 34 minutes.  "

## 2024-06-24 NOTE — DISCHARGE PLANNING
Per epic link, [facility]  declined   Referral sent to [facility]    1030- [facility] declined    1050- Referral sent to [facility]    1230- [facility] declined / non contracted insurance    1245- Referral sent to [facility]    1300- [facility] declined / non contracted insurance

## 2024-06-25 ENCOUNTER — TELEPHONE (OUTPATIENT)
Dept: MEDICAL GROUP | Facility: LAB | Age: 59
End: 2024-06-25

## 2024-06-26 ENCOUNTER — TELEPHONE (OUTPATIENT)
Dept: MEDICAL GROUP | Facility: LAB | Age: 59
End: 2024-06-26

## 2024-06-26 NOTE — TELEPHONE ENCOUNTER
Phone Number Called: [phone] (home)      Call outcome: Spoke to patient regarding message below.    Message: Let pt know we received her VM and she wanted an earlier appointment I tried but Dr. Vaca is all full for new patient's today so her appointment is tomorrow

## 2024-06-26 NOTE — TELEPHONE ENCOUNTER
VOICEMAIL  1. Caller Name: Randa                      Call Back Number: [phone] (home)      2. Message: LVM was admitted to ICU last week wants appointment in a lot of pain.    3. Patient approves office to leave a detailed voicemail/MyChart message: yes

## 2024-06-27 ENCOUNTER — HOSPITAL ENCOUNTER (OUTPATIENT)
Dept: LAB | Facility: MEDICAL CENTER | Age: 59
End: 2024-06-27
Attending: STUDENT IN AN ORGANIZED HEALTH CARE EDUCATION/TRAINING PROGRAM
Payer: COMMERCIAL

## 2024-06-27 ENCOUNTER — OFFICE VISIT (OUTPATIENT)
Dept: MEDICAL GROUP | Facility: LAB | Age: 59
End: 2024-06-27
Payer: COMMERCIAL

## 2024-06-27 ENCOUNTER — HOSPITAL ENCOUNTER (OUTPATIENT)
Facility: MEDICAL CENTER | Age: 59
End: 2024-06-27
Attending: STUDENT IN AN ORGANIZED HEALTH CARE EDUCATION/TRAINING PROGRAM
Payer: COMMERCIAL

## 2024-06-27 VITALS
TEMPERATURE: 97 F | BODY MASS INDEX: 28.02 KG/M2 | DIASTOLIC BLOOD PRESSURE: 64 MMHG | SYSTOLIC BLOOD PRESSURE: 120 MMHG | RESPIRATION RATE: 16 BRPM | HEIGHT: 68 IN | HEART RATE: 86 BPM | OXYGEN SATURATION: 100 %

## 2024-06-27 DIAGNOSIS — E83.19 IRON EXCESS: ICD-10-CM

## 2024-06-27 DIAGNOSIS — I85.10 SECONDARY ESOPHAGEAL VARICES WITHOUT BLEEDING (HCC): ICD-10-CM

## 2024-06-27 DIAGNOSIS — S31.829D WOUND OF LEFT BUTTOCK, SUBSEQUENT ENCOUNTER: ICD-10-CM

## 2024-06-27 DIAGNOSIS — R77.2 ELEVATED ALPHA FETOPROTEIN: ICD-10-CM

## 2024-06-27 DIAGNOSIS — R19.7 DIARRHEA, UNSPECIFIED TYPE: ICD-10-CM

## 2024-06-27 DIAGNOSIS — D69.6 THROMBOCYTOPENIA (HCC): ICD-10-CM

## 2024-06-27 DIAGNOSIS — R16.1 SPLENOMEGALY: ICD-10-CM

## 2024-06-27 DIAGNOSIS — K70.31 ALCOHOLIC CIRRHOSIS OF LIVER WITH ASCITES (HCC): ICD-10-CM

## 2024-06-27 DIAGNOSIS — E55.9 VITAMIN D DEFICIENCY: ICD-10-CM

## 2024-06-27 DIAGNOSIS — E80.6 HYPERBILIRUBINEMIA: ICD-10-CM

## 2024-06-27 DIAGNOSIS — Z51.89 WOUND CHECK, ABSCESS: ICD-10-CM

## 2024-06-27 DIAGNOSIS — D64.9 ANEMIA, UNSPECIFIED TYPE: ICD-10-CM

## 2024-06-27 DIAGNOSIS — I86.8: ICD-10-CM

## 2024-06-27 DIAGNOSIS — I86.8 MESENTERIC VARICES: ICD-10-CM

## 2024-06-27 DIAGNOSIS — Z11.4 SCREENING FOR HIV WITHOUT PRESENCE OF RISK FACTORS: ICD-10-CM

## 2024-06-27 PROBLEM — N17.9 ACUTE KIDNEY INJURY (HCC): Status: RESOLVED | Noted: 2024-06-14 | Resolved: 2024-06-27

## 2024-06-27 PROBLEM — M72.6 NECROTIZING FASCIITIS (HCC): Status: RESOLVED | Noted: 2024-06-14 | Resolved: 2024-06-27

## 2024-06-27 PROBLEM — R65.21 SEPTIC SHOCK (HCC): Status: RESOLVED | Noted: 2024-06-14 | Resolved: 2024-06-27

## 2024-06-27 PROBLEM — A41.9 SEPTIC SHOCK (HCC): Status: RESOLVED | Noted: 2024-06-14 | Resolved: 2024-06-27

## 2024-06-27 PROBLEM — F17.211 NICOTINE DEPENDENCE, CIGARETTES, IN REMISSION: Status: ACTIVE | Noted: 2024-06-27

## 2024-06-27 LAB
ALBUMIN SERPL BCP-MCNC: 3.1 G/DL (ref 3.2–4.9)
ALBUMIN/GLOB SERPL: 0.7 G/DL
ALP SERPL-CCNC: 108 U/L (ref 30–99)
ALT SERPL-CCNC: 49 U/L (ref 2–50)
ANION GAP SERPL CALC-SCNC: 14 MMOL/L (ref 7–16)
ANISOCYTOSIS BLD QL SMEAR: ABNORMAL
AST SERPL-CCNC: 65 U/L (ref 12–45)
BASOPHILS # BLD AUTO: 0.3 % (ref 0–1.8)
BASOPHILS # BLD: 0.03 K/UL (ref 0–0.12)
BILIRUB CONJ SERPL-MCNC: 1.7 MG/DL (ref 0.1–0.5)
BILIRUB INDIRECT SERPL-MCNC: 2.4 MG/DL (ref 0–1)
BILIRUB SERPL-MCNC: 4.1 MG/DL (ref 0.1–1.5)
BUN SERPL-MCNC: 7 MG/DL (ref 8–22)
CALCIUM ALBUM COR SERPL-MCNC: 10.1 MG/DL (ref 8.5–10.5)
CALCIUM SERPL-MCNC: 9.4 MG/DL (ref 8.5–10.5)
CHLORIDE SERPL-SCNC: 104 MMOL/L (ref 96–112)
CO2 SERPL-SCNC: 20 MMOL/L (ref 20–33)
COMMENT 1642: NORMAL
CREAT SERPL-MCNC: 0.68 MG/DL (ref 0.5–1.4)
EOSINOPHIL # BLD AUTO: 0.13 K/UL (ref 0–0.51)
EOSINOPHIL NFR BLD: 1.2 % (ref 0–6.9)
ERYTHROCYTE [DISTWIDTH] IN BLOOD BY AUTOMATED COUNT: 65.7 FL (ref 35.9–50)
GFR SERPLBLD CREATININE-BSD FMLA CKD-EPI: 102 ML/MIN/1.73 M 2
GLOBULIN SER CALC-MCNC: 4.3 G/DL (ref 1.9–3.5)
GLUCOSE SERPL-MCNC: 122 MG/DL (ref 65–99)
HCT VFR BLD AUTO: 33 % (ref 37–47)
HGB BLD-MCNC: 11.4 G/DL (ref 12–16)
HGB RETIC QN AUTO: 35.9 PG/CELL (ref 29–35)
IMM GRANULOCYTES # BLD AUTO: 0.04 K/UL (ref 0–0.11)
IMM GRANULOCYTES NFR BLD AUTO: 0.4 % (ref 0–0.9)
IMM RETICS NFR: 15.5 % (ref 2.6–16.1)
INR PPP: 1.78 (ref 0.87–1.13)
IRON SATN MFR SERPL: 57 % (ref 15–55)
IRON SERPL-MCNC: 99 UG/DL (ref 40–170)
LDH SERPL L TO P-CCNC: 242 U/L (ref 107–266)
LYMPHOCYTES # BLD AUTO: 1.47 K/UL (ref 1–4.8)
LYMPHOCYTES NFR BLD: 13.9 % (ref 22–41)
MACROCYTES BLD QL SMEAR: ABNORMAL
MCH RBC QN AUTO: 35.8 PG (ref 27–33)
MCHC RBC AUTO-ENTMCNC: 34.5 G/DL (ref 32.2–35.5)
MCV RBC AUTO: 103.8 FL (ref 81.4–97.8)
MONOCYTES # BLD AUTO: 0.86 K/UL (ref 0–0.85)
MONOCYTES NFR BLD AUTO: 8.1 % (ref 0–13.4)
MORPHOLOGY BLD-IMP: NORMAL
NEUTROPHILS # BLD AUTO: 8.04 K/UL (ref 1.82–7.42)
NEUTROPHILS NFR BLD: 76.1 % (ref 44–72)
NRBC # BLD AUTO: 0 K/UL
NRBC BLD-RTO: 0 /100 WBC (ref 0–0.2)
PLATELET # BLD AUTO: 131 K/UL (ref 164–446)
PLATELET # BLD AUTO: 135 K/UL (ref 164–446)
PLATELET BLD QL SMEAR: NORMAL
PMV BLD AUTO: 11.9 FL (ref 9–12.9)
POTASSIUM SERPL-SCNC: 4.1 MMOL/L (ref 3.6–5.5)
PROT SERPL-MCNC: 7.4 G/DL (ref 6–8.2)
PROTHROMBIN TIME: 21 SEC (ref 12–14.6)
RBC # BLD AUTO: 3.18 M/UL (ref 4.2–5.4)
RBC BLD AUTO: PRESENT
RETICS # AUTO: 0.08 M/UL (ref 0.04–0.12)
RETICS/RBC NFR: 2.5 % (ref 0.8–2.6)
SODIUM SERPL-SCNC: 138 MMOL/L (ref 135–145)
TIBC SERPL-MCNC: 175 UG/DL (ref 250–450)
UIBC SERPL-MCNC: 76 UG/DL (ref 110–370)
WBC # BLD AUTO: 10.6 K/UL (ref 4.8–10.8)

## 2024-06-27 PROCEDURE — 83615 LACTATE (LD) (LDH) ENZYME: CPT

## 2024-06-27 PROCEDURE — 85046 RETICYTE/HGB CONCENTRATE: CPT

## 2024-06-27 PROCEDURE — 83540 ASSAY OF IRON: CPT

## 2024-06-27 PROCEDURE — 82306 VITAMIN D 25 HYDROXY: CPT

## 2024-06-27 PROCEDURE — 82728 ASSAY OF FERRITIN: CPT

## 2024-06-27 PROCEDURE — 82746 ASSAY OF FOLIC ACID SERUM: CPT

## 2024-06-27 PROCEDURE — 83550 IRON BINDING TEST: CPT

## 2024-06-27 PROCEDURE — 85025 COMPLETE CBC W/AUTO DIFF WBC: CPT

## 2024-06-27 PROCEDURE — 84443 ASSAY THYROID STIM HORMONE: CPT

## 2024-06-27 PROCEDURE — 80053 COMPREHEN METABOLIC PANEL: CPT

## 2024-06-27 PROCEDURE — 82607 VITAMIN B-12: CPT

## 2024-06-27 PROCEDURE — 36415 COLL VENOUS BLD VENIPUNCTURE: CPT

## 2024-06-27 PROCEDURE — 87389 HIV-1 AG W/HIV-1&-2 AB AG IA: CPT

## 2024-06-27 PROCEDURE — 82248 BILIRUBIN DIRECT: CPT

## 2024-06-27 PROCEDURE — 83010 ASSAY OF HAPTOGLOBIN QUANT: CPT

## 2024-06-27 PROCEDURE — 85610 PROTHROMBIN TIME: CPT

## 2024-06-27 ASSESSMENT — ENCOUNTER SYMPTOMS
FEVER: 0
DIZZINESS: 0
CHILLS: 0
CONSTIPATION: 0
BLOOD IN STOOL: 0
COUGH: 0
DIARRHEA: 1
SHORTNESS OF BREATH: 0
ABDOMINAL PAIN: 0
VOMITING: 0
WEIGHT LOSS: 0
NAUSEA: 0

## 2024-06-27 NOTE — PROGRESS NOTES
Subjective:     Chief Complaint   Patient presents with    Post-op Problem     Had surgery and needs it to be looked at     Establish Care     HISTORY OF THE PRESENT ILLNESS: Patient is a 59 y.o. female. This pleasant patient is here today to establish care and postoperative follow-up. His/her prior PCP was Dr. Jeni Nuno (last visit 2018).    Patient was hospitalized from 6/14-6/21/2024 for perirectal abscess with concern for necrotizing fasciitis, had acute kidney injury and septic shock.    History of Present Illness  The patient is a 59-year-old female coming in to establish care and hospital follow-up.    Patient reports that since discharge from the hospital she has been staying in town with her mother but plans to go home soon, lives further away.    Has issues still with diarrhea since discharge from the hospital having stools approximately once every 2 hours.  Denies any abdominal plain or blood in the stool.  No nausea or vomiting.  Patient denies any constipation but feels that it seems like the stool accumulates and is relieved by applying pressure.  She is hoping for an exam of her abscess sites and wonders how long they will take to completely resolve/close.  Denies any fevers or chills or purulent discharge nor malodor from the sites.  Has been doing sitz bath's as directed.  Did not have any follow-up scheduled with general surgery. Her symptoms have not worsened since her hospital discharge. She has no history of C. difficile infection. She denies any family history of Crohn's or ulcerative colitis. Prior to her hospitalization, she had not experienced any unusual diarrhea.    States that she has no gastroenterologist and has been avoiding all alcohol since her recent hospital stay.  Cirrhosis is a new diagnosis.  States that she used to drink 2-3 shots daily.  States that her lower extremity edema has improved since taking spironolactone.  Does admit to recent easy bruising but denies any other  "bleeding. She denies experiencing lightheadedness. Her respiratory function is normal.  She recently quit smoking after being a tobacco user for approximately 20 years.    Review of Systems   Constitutional:  Negative for chills, fever, malaise/fatigue and weight loss.   Respiratory:  Negative for cough and shortness of breath.    Cardiovascular:  Positive for leg swelling. Negative for chest pain.   Gastrointestinal:  Positive for diarrhea. Negative for abdominal pain, blood in stool, constipation, nausea and vomiting.   Skin:  Negative for rash.   Neurological:  Negative for dizziness.     Objective:     Exam: /64 (BP Location: Right arm, Patient Position: Sitting, BP Cuff Size: Adult)   Pulse 86   Temp 36.1 °C (97 °F) (Temporal)   Resp 16   Ht 1.727 m (5' 8\")   SpO2 100%  Body mass index is 28.02 kg/m².    Physical Exam  Vitals reviewed. Exam conducted with a chaperone present.   Constitutional:       General: She is not in acute distress.     Appearance: She is not ill-appearing.   HENT:      Head: Normocephalic and atraumatic.      Mouth/Throat:      Mouth: Mucous membranes are moist.   Eyes:      General: No scleral icterus.  Cardiovascular:      Rate and Rhythm: Normal rate and regular rhythm.      Heart sounds: Normal heart sounds.   Pulmonary:      Effort: Pulmonary effort is normal.      Breath sounds: Normal breath sounds.   Abdominal:      General: Abdomen is flat and protuberant. Bowel sounds are normal.      Palpations: Abdomen is soft. There is no fluid wave or mass.      Tenderness: There is no abdominal tenderness. There is no guarding or rebound.   Musculoskeletal:      Right lower le+ Pitting Edema present.      Left lower le+ Pitting Edema present.   Skin:     General: Skin is warm and dry.      Coloration: Skin is jaundiced (Mild).      Comments: Large left buttock wounds with pink granulation tissue, no erythema or tenderness nor purulent discharge noted.   Neurological:      " General: No focal deficit present.      Mental Status: She is alert and oriented to person, place, and time.   Psychiatric:         Mood and Affect: Mood normal.         Behavior: Behavior normal.         Thought Content: Thought content normal.       Labs/imaging: Reviewed from recent hospital stay    Assessment & Plan:   59 y.o. female with the following -    1. Wound check, abscess  2. Wound of left buttock, subsequent encounter  Patient without any evidence of wound infection at this time, discussed that it would likely take quite some time to completely heal.  Continue sitz baths and advised/instructed on how to keep wounds clean especially in relation to bowel movements.  Will have patient meet with general surgery for postoperative appointment given history of perirectal abscess and some of her GI symptoms as above.  May benefit from wound care clinic rather than in-home health given degree of wound and location if able.  - Referral to General Surgery  - Referral to Wound Clinic    3. Diarrhea, unspecified type  Concerning for C. difficile given recent hospital stay and administration of antibiotics.  Gave return precautions and discussed foods to avoid, advised adequate hydration.  Plan pending results.  - TSH WITH REFLEX TO FT4; Future  - Comp Metabolic Panel; Future  - CBC WITH DIFFERENTIAL; Future  - Referral to Gastroenterology  - C Diff by PCR rflx Toxin; Future    4. Anemia, unspecified type  Occurred during hospital stay, trend and evaluate further with labs as below.  - HAPTOGLOBIN; Future  - LDH; Future  - RETICULOCYTES COUNT; Future  - FOLATE; Future  - VITAMIN B12; Future  - Comp Metabolic Panel; Future  - CBC WITH DIFFERENTIAL; Future  - Referral to Gastroenterology  - FERRITIN; Future  - IRON/TOTAL IRON BIND; Future    5. Thrombocytopenia (HCC)  6. Hyperbilirubinemia  7. Alcoholic cirrhosis of liver with ascites (HCC)  8. Elevated alpha fetoprotein  9. Mesenteric varices  10. 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 Peritoneal varices  12. Splenomegaly  New finding of cirrhosis during recent hospital stay.  Recommend complete avoidance of alcohol.  Referred to gastroenterology and gave return precautions.  Continue spironolactone 50 mg daily.  - Referral to Gastroenterology  - FOLATE; Future  - VITAMIN B12; Future  - CBC WITH DIFFERENTIAL; Future  - BILIRUBIN DIRECT; Future  - Prothrombin Time; Future  - Comp Metabolic Panel; Future    13. Vitamin D deficiency  Trend.  - VITAMIN D,25 HYDROXY (DEFICIENCY); Future    14. Screening for HIV without presence of risk factors  - HIV AG/AB COMBO ASSAY SCREENING; Future  ________  15. Iron excess  Labs reviewed, check for hemochromatosis.  - Hemochromatosis Genotype; Future    I spent a total of 40 minutes with record review, exam, communication with the patient, and documentation of this encounter.    Return in about 5 weeks (around 8/1/2024), or if symptoms worsen or fail to improve.    Please note that this dictation was created using voice recognition software. I have made every reasonable attempt to correct obvious errors, but I expect that there are errors of grammar and possibly content that I did not discover before finalizing the note.

## 2024-06-28 DIAGNOSIS — R19.7 DIARRHEA, UNSPECIFIED TYPE: ICD-10-CM

## 2024-06-28 LAB
25(OH)D3 SERPL-MCNC: 25 NG/ML (ref 30–100)
FERRITIN SERPL-MCNC: 1426 NG/ML (ref 10–291)
FOLATE SERPL-MCNC: 12 NG/ML
HIV 1+2 AB+HIV1 P24 AG SERPL QL IA: NORMAL
TSH SERPL DL<=0.005 MIU/L-ACNC: 1.21 UIU/ML (ref 0.38–5.33)
VIT B12 SERPL-MCNC: 3426 PG/ML (ref 211–911)

## 2024-06-29 LAB — HAPTOGLOB SERPL-MCNC: 89 MG/DL (ref 30–200)

## 2024-06-30 PROBLEM — E83.19 IRON EXCESS: Status: ACTIVE | Noted: 2024-06-30

## 2024-07-09 LAB
FUNGUS SPEC CULT: NORMAL
FUNGUS SPEC FUNGUS STN: NORMAL
SIGNIFICANT IND 70042: NORMAL
SITE SITE: NORMAL
SOURCE SOURCE: NORMAL

## 2024-07-11 ENCOUNTER — OFFICE VISIT (OUTPATIENT)
Dept: WOUND CARE | Facility: MEDICAL CENTER | Age: 59
End: 2024-07-11
Attending: STUDENT IN AN ORGANIZED HEALTH CARE EDUCATION/TRAINING PROGRAM
Payer: COMMERCIAL

## 2024-07-11 DIAGNOSIS — T14.8XXA OPEN WOUND: ICD-10-CM

## 2024-07-11 PROCEDURE — 99999 PR NO CHARGE: CPT | Performed by: NURSE PRACTITIONER

## 2024-08-01 ENCOUNTER — APPOINTMENT (OUTPATIENT)
Dept: MEDICAL GROUP | Facility: LAB | Age: 59
End: 2024-08-01
Payer: COMMERCIAL

## 2024-08-09 ENCOUNTER — APPOINTMENT (OUTPATIENT)
Dept: MEDICAL GROUP | Facility: LAB | Age: 59
End: 2024-08-09
Payer: COMMERCIAL

## 2024-08-14 ENCOUNTER — APPOINTMENT (OUTPATIENT)
Dept: RADIOLOGY | Facility: MEDICAL CENTER | Age: 59
DRG: 432 | End: 2024-08-14
Attending: EMERGENCY MEDICINE
Payer: COMMERCIAL

## 2024-08-14 ENCOUNTER — HOSPITAL ENCOUNTER (INPATIENT)
Facility: MEDICAL CENTER | Age: 59
LOS: 7 days | DRG: 432 | End: 2024-08-21
Attending: EMERGENCY MEDICINE | Admitting: EMERGENCY MEDICINE
Payer: COMMERCIAL

## 2024-08-14 DIAGNOSIS — K70.31 ALCOHOLIC CIRRHOSIS OF LIVER WITH ASCITES (HCC): ICD-10-CM

## 2024-08-14 DIAGNOSIS — D64.9 ACUTE ANEMIA: ICD-10-CM

## 2024-08-14 DIAGNOSIS — K92.2 UPPER GI BLEED: ICD-10-CM

## 2024-08-14 DIAGNOSIS — R57.8 HEMORRHAGIC SHOCK (HCC): ICD-10-CM

## 2024-08-14 PROBLEM — I95.9 HYPOTENSION: Status: ACTIVE | Noted: 2024-08-14

## 2024-08-14 PROBLEM — E83.42 HYPOMAGNESEMIA: Status: ACTIVE | Noted: 2024-08-14

## 2024-08-14 PROBLEM — I85.10 SECONDARY ESOPHAGEAL VARICES WITHOUT BLEEDING (HCC): Status: RESOLVED | Noted: 2024-06-27 | Resolved: 2024-08-14

## 2024-08-14 PROBLEM — D68.9 COAGULOPATHY (HCC): Status: ACTIVE | Noted: 2024-08-14

## 2024-08-14 LAB
ABO GROUP BLD: NORMAL
ALBUMIN SERPL BCP-MCNC: 2.4 G/DL (ref 3.2–4.9)
ALBUMIN/GLOB SERPL: 0.8 G/DL
ALP SERPL-CCNC: 62 U/L (ref 30–99)
ALT SERPL-CCNC: 33 U/L (ref 2–50)
AMMONIA PLAS-SCNC: 90 UMOL/L (ref 11–45)
ANION GAP SERPL CALC-SCNC: 15 MMOL/L (ref 7–16)
ANISOCYTOSIS BLD QL SMEAR: ABNORMAL
APTT PPP: 37.6 SEC (ref 24.7–36)
AST SERPL-CCNC: 65 U/L (ref 12–45)
BARCODED ABORH UBTYP: 5100
BARCODED PRD CODE UBPRD: NORMAL
BARCODED UNIT NUM UBUNT: NORMAL
BASOPHILS # BLD AUTO: 0 % (ref 0–1.8)
BASOPHILS # BLD: 0 K/UL (ref 0–0.12)
BILIRUB SERPL-MCNC: 2.4 MG/DL (ref 0.1–1.5)
BLD GP AB SCN SERPL QL: NORMAL
BUN SERPL-MCNC: 34 MG/DL (ref 8–22)
BURR CELLS BLD QL SMEAR: NORMAL
CA-I SERPL-SCNC: 1.2 MMOL/L (ref 1.1–1.3)
CALCIUM ALBUM COR SERPL-MCNC: 9.6 MG/DL (ref 8.5–10.5)
CALCIUM SERPL-MCNC: 8.3 MG/DL (ref 8.5–10.5)
CFT BLD TEG: 4.2 MIN (ref 4.6–9.1)
CFT P HPASE BLD TEG: 4.1 MIN (ref 4.3–8.3)
CHLORIDE SERPL-SCNC: 100 MMOL/L (ref 96–112)
CLOT ANGLE BLD TEG: 78.4 DEGREES (ref 63–78)
CLOT LYSIS 30M P MA LENFR BLD TEG: 17 % (ref 0–2.6)
CO2 SERPL-SCNC: 15 MMOL/L (ref 20–33)
COMPONENT R 8504R: NORMAL
CORTIS SERPL-MCNC: 22.5 UG/DL (ref 0–23)
CREAT SERPL-MCNC: 1.11 MG/DL (ref 0.5–1.4)
CT.EXTRINSIC BLD ROTEM: 0.8 MIN (ref 0.8–2.1)
EKG IMPRESSION: NORMAL
EOSINOPHIL # BLD AUTO: 0.34 K/UL (ref 0–0.51)
EOSINOPHIL NFR BLD: 0.9 % (ref 0–6.9)
ERYTHROCYTE [DISTWIDTH] IN BLOOD BY AUTOMATED COUNT: 74.2 FL (ref 35.9–50)
GFR SERPLBLD CREATININE-BSD FMLA CKD-EPI: 58 ML/MIN/1.73 M 2
GLOBULIN SER CALC-MCNC: 3.2 G/DL (ref 1.9–3.5)
GLUCOSE SERPL-MCNC: 119 MG/DL (ref 65–99)
HCT VFR BLD AUTO: 15.5 % (ref 37–47)
HGB BLD-MCNC: 5 G/DL (ref 12–16)
HGB BLD-MCNC: 5.8 G/DL (ref 12–16)
INR PPP: 2.56 (ref 0.87–1.13)
LG PLATELETS BLD QL SMEAR: NORMAL
LIPASE SERPL-CCNC: 54 U/L (ref 11–82)
LYMPHOCYTES # BLD AUTO: 1.98 K/UL (ref 1–4.8)
LYMPHOCYTES NFR BLD: 5.3 % (ref 22–41)
MACROCYTES BLD QL SMEAR: ABNORMAL
MAGNESIUM SERPL-MCNC: 1.6 MG/DL (ref 1.5–2.5)
MANUAL DIFF BLD: NORMAL
MCF BLD TEG: 64.1 MM (ref 52–69)
MCF.PLATELET INHIB BLD ROTEM: 21.4 MM (ref 15–32)
MCH RBC QN AUTO: 34.5 PG (ref 27–33)
MCHC RBC AUTO-ENTMCNC: 31.4 G/DL (ref 32.2–35.5)
MCV RBC AUTO: 109.7 FL (ref 81.4–97.8)
MONOCYTES # BLD AUTO: 0.97 K/UL (ref 0–0.85)
MONOCYTES NFR BLD AUTO: 2.6 % (ref 0–13.4)
MORPHOLOGY BLD-IMP: NORMAL
MYELOCYTES NFR BLD MANUAL: 2.6 %
NEUTROPHILS # BLD AUTO: 33.05 K/UL (ref 1.82–7.42)
NEUTROPHILS NFR BLD: 86.8 % (ref 44–72)
NEUTS BAND NFR BLD MANUAL: 1.8 % (ref 0–10)
NRBC # BLD AUTO: 0.04 K/UL
NRBC BLD-RTO: 0.1 /100 WBC (ref 0–0.2)
PA AA BLD-ACNC: 34.4 % (ref 0–11)
PA ADP BLD-ACNC: 83.6 % (ref 0–17)
PLATELET # BLD AUTO: 305 K/UL (ref 164–446)
PLATELET BLD QL SMEAR: NORMAL
PMV BLD AUTO: 11.5 FL (ref 9–12.9)
POIKILOCYTOSIS BLD QL SMEAR: NORMAL
POLYCHROMASIA BLD QL SMEAR: NORMAL
POTASSIUM SERPL-SCNC: 3.7 MMOL/L (ref 3.6–5.5)
PROCALCITONIN SERPL-MCNC: 0.29 NG/ML
PRODUCT TYPE UPROD: NORMAL
PROT SERPL-MCNC: 5.6 G/DL (ref 6–8.2)
PROTHROMBIN TIME: 27.9 SEC (ref 12–14.6)
RBC # BLD AUTO: 1.45 M/UL (ref 4.2–5.4)
RBC BLD AUTO: PRESENT
RH BLD: NORMAL
SODIUM SERPL-SCNC: 130 MMOL/L (ref 135–145)
TEG ALGORITHM TGALG: ABNORMAL
TROPONIN T SERPL-MCNC: 34 NG/L (ref 6–19)
TSH SERPL DL<=0.005 MIU/L-ACNC: 1.74 UIU/ML (ref 0.38–5.33)
UNIT STATUS USTAT: NORMAL
WBC # BLD AUTO: 37.3 K/UL (ref 4.8–10.8)

## 2024-08-14 PROCEDURE — 84443 ASSAY THYROID STIM HORMONE: CPT

## 2024-08-14 PROCEDURE — 83690 ASSAY OF LIPASE: CPT

## 2024-08-14 PROCEDURE — 700105 HCHG RX REV CODE 258: Performed by: EMERGENCY MEDICINE

## 2024-08-14 PROCEDURE — 99292 CRITICAL CARE ADDL 30 MIN: CPT | Performed by: EMERGENCY MEDICINE

## 2024-08-14 PROCEDURE — 99222 1ST HOSP IP/OBS MODERATE 55: CPT | Performed by: INTERNAL MEDICINE

## 2024-08-14 PROCEDURE — 83735 ASSAY OF MAGNESIUM: CPT

## 2024-08-14 PROCEDURE — P9016 RBC LEUKOCYTES REDUCED: HCPCS

## 2024-08-14 PROCEDURE — 84145 PROCALCITONIN (PCT): CPT

## 2024-08-14 PROCEDURE — 86900 BLOOD TYPING SEROLOGIC ABO: CPT

## 2024-08-14 PROCEDURE — 82330 ASSAY OF CALCIUM: CPT

## 2024-08-14 PROCEDURE — 82533 TOTAL CORTISOL: CPT

## 2024-08-14 PROCEDURE — 84484 ASSAY OF TROPONIN QUANT: CPT

## 2024-08-14 PROCEDURE — 85347 COAGULATION TIME ACTIVATED: CPT

## 2024-08-14 PROCEDURE — 93005 ELECTROCARDIOGRAM TRACING: CPT

## 2024-08-14 PROCEDURE — 71045 X-RAY EXAM CHEST 1 VIEW: CPT

## 2024-08-14 PROCEDURE — 86901 BLOOD TYPING SEROLOGIC RH(D): CPT

## 2024-08-14 PROCEDURE — 85384 FIBRINOGEN ACTIVITY: CPT

## 2024-08-14 PROCEDURE — 82962 GLUCOSE BLOOD TEST: CPT

## 2024-08-14 PROCEDURE — 85576 BLOOD PLATELET AGGREGATION: CPT | Mod: 91

## 2024-08-14 PROCEDURE — 85730 THROMBOPLASTIN TIME PARTIAL: CPT

## 2024-08-14 PROCEDURE — 36430 TRANSFUSION BLD/BLD COMPNT: CPT

## 2024-08-14 PROCEDURE — 86850 RBC ANTIBODY SCREEN: CPT

## 2024-08-14 PROCEDURE — 99291 CRITICAL CARE FIRST HOUR: CPT

## 2024-08-14 PROCEDURE — 770022 HCHG ROOM/CARE - ICU (200)

## 2024-08-14 PROCEDURE — 82140 ASSAY OF AMMONIA: CPT

## 2024-08-14 PROCEDURE — 96374 THER/PROPH/DIAG INJ IV PUSH: CPT

## 2024-08-14 PROCEDURE — 36415 COLL VENOUS BLD VENIPUNCTURE: CPT

## 2024-08-14 PROCEDURE — 85018 HEMOGLOBIN: CPT

## 2024-08-14 PROCEDURE — 700111 HCHG RX REV CODE 636 W/ 250 OVERRIDE (IP): Performed by: EMERGENCY MEDICINE

## 2024-08-14 PROCEDURE — 85027 COMPLETE CBC AUTOMATED: CPT

## 2024-08-14 PROCEDURE — 96375 TX/PRO/DX INJ NEW DRUG ADDON: CPT

## 2024-08-14 PROCEDURE — 700101 HCHG RX REV CODE 250: Performed by: EMERGENCY MEDICINE

## 2024-08-14 PROCEDURE — 85610 PROTHROMBIN TIME: CPT

## 2024-08-14 PROCEDURE — 86923 COMPATIBILITY TEST ELECTRIC: CPT

## 2024-08-14 PROCEDURE — 85007 BL SMEAR W/DIFF WBC COUNT: CPT

## 2024-08-14 PROCEDURE — 99291 CRITICAL CARE FIRST HOUR: CPT | Performed by: EMERGENCY MEDICINE

## 2024-08-14 PROCEDURE — 70450 CT HEAD/BRAIN W/O DYE: CPT

## 2024-08-14 PROCEDURE — 700111 HCHG RX REV CODE 636 W/ 250 OVERRIDE (IP): Mod: JZ | Performed by: EMERGENCY MEDICINE

## 2024-08-14 PROCEDURE — 80053 COMPREHEN METABOLIC PANEL: CPT

## 2024-08-14 RX ORDER — SODIUM CHLORIDE, SODIUM LACTATE, POTASSIUM CHLORIDE, CALCIUM CHLORIDE 600; 310; 30; 20 MG/100ML; MG/100ML; MG/100ML; MG/100ML
INJECTION, SOLUTION INTRAVENOUS CONTINUOUS
Status: DISCONTINUED | OUTPATIENT
Start: 2024-08-14 | End: 2024-08-15

## 2024-08-14 RX ORDER — PANTOPRAZOLE SODIUM 40 MG/10ML
40 INJECTION, POWDER, LYOPHILIZED, FOR SOLUTION INTRAVENOUS ONCE
Status: COMPLETED | OUTPATIENT
Start: 2024-08-14 | End: 2024-08-14

## 2024-08-14 RX ORDER — POLYETHYLENE GLYCOL 3350 17 G/17G
1 POWDER, FOR SOLUTION ORAL
Status: DISCONTINUED | OUTPATIENT
Start: 2024-08-14 | End: 2024-08-21 | Stop reason: HOSPADM

## 2024-08-14 RX ORDER — OCTREOTIDE ACETATE 100 UG/ML
50 INJECTION, SOLUTION INTRAVENOUS; SUBCUTANEOUS ONCE
Status: COMPLETED | OUTPATIENT
Start: 2024-08-14 | End: 2024-08-14

## 2024-08-14 RX ORDER — ONDANSETRON 2 MG/ML
4 INJECTION INTRAMUSCULAR; INTRAVENOUS EVERY 8 HOURS PRN
Status: DISCONTINUED | OUTPATIENT
Start: 2024-08-14 | End: 2024-08-21 | Stop reason: HOSPADM

## 2024-08-14 RX ORDER — CEFTRIAXONE 2 G/1
2000 INJECTION, POWDER, FOR SOLUTION INTRAMUSCULAR; INTRAVENOUS ONCE
Status: COMPLETED | OUTPATIENT
Start: 2024-08-14 | End: 2024-08-14

## 2024-08-14 RX ORDER — AMOXICILLIN 250 MG
2 CAPSULE ORAL 2 TIMES DAILY
Status: DISCONTINUED | OUTPATIENT
Start: 2024-08-14 | End: 2024-08-21 | Stop reason: HOSPADM

## 2024-08-14 RX ORDER — CALCIUM GLUCONATE 20 MG/ML
2 INJECTION, SOLUTION INTRAVENOUS ONCE
Status: COMPLETED | OUTPATIENT
Start: 2024-08-14 | End: 2024-08-14

## 2024-08-14 RX ORDER — MAGNESIUM SULFATE HEPTAHYDRATE 40 MG/ML
2 INJECTION, SOLUTION INTRAVENOUS ONCE
Status: COMPLETED | OUTPATIENT
Start: 2024-08-14 | End: 2024-08-14

## 2024-08-14 RX ORDER — ACETAMINOPHEN 325 MG/1
650 TABLET ORAL EVERY 6 HOURS PRN
Status: DISCONTINUED | OUTPATIENT
Start: 2024-08-14 | End: 2024-08-21 | Stop reason: HOSPADM

## 2024-08-14 RX ORDER — SODIUM CHLORIDE, SODIUM LACTATE, POTASSIUM CHLORIDE, CALCIUM CHLORIDE 600; 310; 30; 20 MG/100ML; MG/100ML; MG/100ML; MG/100ML
1000 INJECTION, SOLUTION INTRAVENOUS ONCE
Status: COMPLETED | OUTPATIENT
Start: 2024-08-14 | End: 2024-08-14

## 2024-08-14 RX ORDER — DEXTROSE MONOHYDRATE 25 G/50ML
25 INJECTION, SOLUTION INTRAVENOUS
Status: DISCONTINUED | OUTPATIENT
Start: 2024-08-14 | End: 2024-08-16

## 2024-08-14 RX ORDER — PANTOPRAZOLE SODIUM 40 MG/10ML
40 INJECTION, POWDER, LYOPHILIZED, FOR SOLUTION INTRAVENOUS 2 TIMES DAILY
Status: DISCONTINUED | OUTPATIENT
Start: 2024-08-14 | End: 2024-08-19

## 2024-08-14 RX ADMIN — TRANEXAMIC ACID 1000 MG: 100 INJECTION, SOLUTION INTRAVENOUS at 20:47

## 2024-08-14 RX ADMIN — MAGNESIUM SULFATE HEPTAHYDRATE 2 G: 2 INJECTION, SOLUTION INTRAVENOUS at 21:20

## 2024-08-14 RX ADMIN — CEFTRIAXONE SODIUM 2000 MG: 2 INJECTION, POWDER, FOR SOLUTION INTRAMUSCULAR; INTRAVENOUS at 14:46

## 2024-08-14 RX ADMIN — SODIUM CHLORIDE, POTASSIUM CHLORIDE, SODIUM LACTATE AND CALCIUM CHLORIDE: 600; 310; 30; 20 INJECTION, SOLUTION INTRAVENOUS at 20:28

## 2024-08-14 RX ADMIN — OCTREOTIDE ACETATE 50 MCG/HR: 200 INJECTION, SOLUTION INTRAVENOUS; SUBCUTANEOUS at 17:28

## 2024-08-14 RX ADMIN — OCTREOTIDE ACETATE 50 MCG: 100 INJECTION, SOLUTION INTRAVENOUS; SUBCUTANEOUS at 16:46

## 2024-08-14 RX ADMIN — PANTOPRAZOLE SODIUM 40 MG: 40 INJECTION, POWDER, FOR SOLUTION INTRAVENOUS at 18:10

## 2024-08-14 RX ADMIN — ONDANSETRON 4 MG: 2 INJECTION INTRAMUSCULAR; INTRAVENOUS at 21:07

## 2024-08-14 RX ADMIN — CALCIUM GLUCONATE 2 G: 20 INJECTION, SOLUTION INTRAVENOUS at 20:11

## 2024-08-14 RX ADMIN — SODIUM CHLORIDE, POTASSIUM CHLORIDE, SODIUM LACTATE AND CALCIUM CHLORIDE 1000 ML: 600; 310; 30; 20 INJECTION, SOLUTION INTRAVENOUS at 14:36

## 2024-08-14 RX ADMIN — PANTOPRAZOLE SODIUM 40 MG: 40 INJECTION, POWDER, FOR SOLUTION INTRAVENOUS at 14:46

## 2024-08-14 SDOH — ECONOMIC STABILITY: TRANSPORTATION INSECURITY
IN THE PAST 12 MONTHS, HAS THE LACK OF TRANSPORTATION KEPT YOU FROM MEDICAL APPOINTMENTS OR FROM GETTING MEDICATIONS?: YES

## 2024-08-14 ASSESSMENT — LIFESTYLE VARIABLES
HOW MANY TIMES IN THE PAST YEAR HAVE YOU HAD 5 OR MORE DRINKS IN A DAY: 0
TOTAL SCORE: 0
HAVE YOU EVER FELT YOU SHOULD CUT DOWN ON YOUR DRINKING: NO
ALCOHOL_USE: NO
ON A TYPICAL DAY WHEN YOU DRINK ALCOHOL HOW MANY DRINKS DO YOU HAVE: 0
HAVE PEOPLE ANNOYED YOU BY CRITICIZING YOUR DRINKING: NO
EVER HAD A DRINK FIRST THING IN THE MORNING TO STEADY YOUR NERVES TO GET RID OF A HANGOVER: NO
AVERAGE NUMBER OF DAYS PER WEEK YOU HAVE A DRINK CONTAINING ALCOHOL: 0
CONSUMPTION TOTAL: NEGATIVE
EVER FELT BAD OR GUILTY ABOUT YOUR DRINKING: NO

## 2024-08-14 ASSESSMENT — COGNITIVE AND FUNCTIONAL STATUS - GENERAL
CLIMB 3 TO 5 STEPS WITH RAILING: A LITTLE
SUGGESTED CMS G CODE MODIFIER DAILY ACTIVITY: CJ
MOVING TO AND FROM BED TO CHAIR: A LITTLE
WALKING IN HOSPITAL ROOM: A LITTLE
TOILETING: A LITTLE
DRESSING REGULAR LOWER BODY CLOTHING: A LITTLE
STANDING UP FROM CHAIR USING ARMS: A LITTLE
SUGGESTED CMS G CODE MODIFIER MOBILITY: CJ
DAILY ACTIVITIY SCORE: 21
HELP NEEDED FOR BATHING: A LITTLE
MOBILITY SCORE: 20

## 2024-08-14 ASSESSMENT — SOCIAL DETERMINANTS OF HEALTH (SDOH)
WITHIN THE PAST 12 MONTHS, YOU WORRIED THAT YOUR FOOD WOULD RUN OUT BEFORE YOU GOT THE MONEY TO BUY MORE: NEVER TRUE
IN THE PAST 12 MONTHS, HAS THE ELECTRIC, GAS, OIL, OR WATER COMPANY THREATENED TO SHUT OFF SERVICE IN YOUR HOME?: NO
WITHIN THE PAST 12 MONTHS, THE FOOD YOU BOUGHT JUST DIDN'T LAST AND YOU DIDN'T HAVE MONEY TO GET MORE: NEVER TRUE
WITHIN THE LAST YEAR, HAVE YOU BEEN KICKED, HIT, SLAPPED, OR OTHERWISE PHYSICALLY HURT BY YOUR PARTNER OR EX-PARTNER?: NO
WITHIN THE LAST YEAR, HAVE TO BEEN RAPED OR FORCED TO HAVE ANY KIND OF SEXUAL ACTIVITY BY YOUR PARTNER OR EX-PARTNER?: NO
WITHIN THE LAST YEAR, HAVE YOU BEEN AFRAID OF YOUR PARTNER OR EX-PARTNER?: NO
WITHIN THE LAST YEAR, HAVE YOU BEEN HUMILIATED OR EMOTIONALLY ABUSED IN OTHER WAYS BY YOUR PARTNER OR EX-PARTNER?: NO

## 2024-08-14 ASSESSMENT — PATIENT HEALTH QUESTIONNAIRE - PHQ9
2. FEELING DOWN, DEPRESSED, IRRITABLE, OR HOPELESS: SEVERAL DAYS
SUM OF ALL RESPONSES TO PHQ9 QUESTIONS 1 AND 2: 1
1. LITTLE INTEREST OR PLEASURE IN DOING THINGS: NOT AT ALL

## 2024-08-14 ASSESSMENT — FIBROSIS 4 INDEX
FIB4 SCORE: 2.11
FIB4 SCORE: 2.11

## 2024-08-14 ASSESSMENT — PAIN DESCRIPTION - PAIN TYPE
TYPE: ACUTE PAIN
TYPE: ACUTE PAIN

## 2024-08-14 NOTE — ED NOTES
COD drawn and sent to lab.   Called for add on procal.   Pt aware of plan of care. Denies further needs at this time.

## 2024-08-14 NOTE — ED PROVIDER NOTES
ED Provider Note    CHIEF COMPLAINT  Chief Complaint   Patient presents with    Blood in Vomit     Pt BIBA bloody emesis. Syncopal event today, pt states head strike, denies blood thinners or daily ASA, no trauma to head, pt denies neck pain. GCS 15. Dried blood to nares/mouth.        EXTERNAL RECORDS REVIEWED  Reviewed baseline laboratory studies admission and discharge summaries for prolonged hospitalization at the end of June of this year.    HPI/ROS  LIMITATION TO HISTORY   Patient is a poor historian  OUTSIDE HISTORIAN(S):  EMS provided additional history and HPI    Kavita Sylvia Freeman is a 59 y.o. female who presents for evaluation of apparent coffee-ground emesis victoriano hematemesis and hypotension.  The patient apparently was brought in by paramedics.  Neighbor called for a well check and there is apparently significant bloody emesis surrounding the patient.  She was pale and hypotensive.  IV is established fluids were initiated and she was immediately transported here for higher level of care.  The patient is a complex medical history including recent perirectal abscess management, admission for sepsis as well as underlying cirrhosis secondary to alcohol abuse.  She reports no alcohol use for 4 months.  She reports black stools but no victoriano hematochezia.  No report of any chest pain productive cough fevers or chills.Further history taking reveals that she did fall and hit her head earlier today.    PAST MEDICAL HISTORY   has a past medical history of Acute cystitis with hematuria (09/21/2016), Acute kidney injury (HCC) (06/14/2024), Allergy, Anxiety, Arrhythmia, Back pain, Depression, Depression, Hyperlipidemia, Hypertension, Hypotension (07/15/2016), Indigestion, Lightheadedness (12/17/2015), Necrotizing fasciitis ([facility]) (06/14/2024), Other chest pain (12/17/2015), Pain (02/24/2015), PSVT (paroxysmal supraventricular tachycardia) ([facility]) (06/24/2013), Psychiatric disorder, Septic shock ([facility]) (06/14/2024),  Tobacco abuse (2013), URI (upper respiratory infection) (2013), Urinary tract infection, site not specified, and UTI (lower urinary tract infection) (2013).    SURGICAL HISTORY   has a past surgical history that includes breast biopsy (08); shoulder arthroscopy (3/23/2009); tubal ligation; gyn surgery (); shoulder arthroscopy (); other (2014); shoulder arthroscopy w/ rotator cuff repair (3/9/2015); trigger finger release (3/9/2015); open reduction; i&d perirectal abscess (2024); and i&d perirectal abscess (N/A, 2024).    FAMILY HISTORY  Family History   Problem Relation Age of Onset    Hypertension Mother     Stroke Mother     Hypertension Father         ? valvular heart     Hypertension Brother     Cancer Paternal Grandmother         breast cancer       SOCIAL HISTORY  Social History     Tobacco Use    Smoking status: Every Day     Current packs/day: 0.00     Average packs/day: 0.5 packs/day for 25.0 years (12.5 ttl pk-yrs)     Types: Cigarettes     Start date: 1994     Last attempt to quit: 2016     Years since quittin.1    Smokeless tobacco: Never    Tobacco comments:     1-2 cigarettes a day   Substance and Sexual Activity    Alcohol use: No     Alcohol/week: 1.5 - 2.0 oz     Types: 3 - 4 Cans of beer per week    Drug use: No    Sexual activity: Yes     Partners: Male       CURRENT MEDICATIONS  Home Medications       Reviewed by Conrado Maharaj (Pharmacy Tech) on 24 at 1543  Med List Status: Complete     Medication Last Dose Status   DIPHENHYDRAMINE HCL PO 2024 Active   losartan (COZAAR) 50 MG Tab 2024 Active   rosuvastatin (CRESTOR) 20 MG Tab 2024 Active   spironolactone (ALDACTONE) 50 MG Tab 2024 Active                  Audit from Redirected Encounters    **Home medications have not yet been reviewed for this encounter**       Current Facility-Administered Medications:     octreotide (SandoSTATIN) injection 50 mcg, 50 mcg,  Intravenous, Once, Julio Mendoza M.D.    Current Outpatient Medications:     DIPHENHYDRAMINE HCL PO, Take 1 Tablet by mouth at bedtime., Disp: , Rfl:     spironolactone (ALDACTONE) 50 MG Tab, Take 1 Tablet by mouth every day., Disp: 30 Tablet, Rfl: 3    rosuvastatin (CRESTOR) 20 MG Tab, Take 1 Tablet by mouth every evening. (Patient taking differently: Take 20 mg by mouth every day.), Disp: 90 Tablet, Rfl: 3    losartan (COZAAR) 50 MG Tab, Take 1 Tablet by mouth every day., Disp: 30 Tablet, Rfl: 11      ALLERGIES  Allergies   Allergen Reactions    Nitrofurantoin Vomiting    Sulfamethoxazole W-Trimethoprim Vomiting       PHYSICAL EXAM  VITAL SIGNS: BP 96/45   Pulse 70   Resp 16   SpO2 97%    Pulse ox interpretation: I interpret this pulse ox as normal.  Constitutional: Pale ill-appearing appears critically ill  HEENT: Atraumatic normocephalic, pupils are equal round reactive to light extraocular movements are intact. The nares is clear, external ears are normal, mouth shows moist mucous membranes normal dentition for age  Neck: Supple, no JVD no tracheal deviation  Cardiovascular: Regular rate and rhythm no murmur rub or gallop 2+ pulses peripherally x4  Thorax & Lungs: No respiratory distress, no wheezes rales or rhonchi, No chest tenderness.   GI: Soft nontender nondistended positive bowel sounds, no peritoneal signs no fluid wave no suggestion of large volume ascites  Skin: Pale mottled  Musculoskeletal: Moving all extremities with full range and 5 of 5 strength no acute  deformity  Neurologic: Cranial nerves III through XII are grossly intact no sensory deficit no cerebellar dysfunction   Psychiatric: Sluggish          EKG/LABS  Results for orders placed or performed during the hospital encounter of 08/14/24   AMMONIA   Result Value Ref Range    Ammonia 90 (H) 11 - 45 umol/L   CBC WITH DIFFERENTIAL   Result Value Ref Range    WBC 37.3 (H) 4.8 - 10.8 K/uL    RBC 1.45 (L) 4.20 - 5.40 M/uL    Hemoglobin 5.0  (LL) 12.0 - 16.0 g/dL    Hematocrit 15.5 (LL) 37.0 - 47.0 %    .7 (H) 81.4 - 97.8 fL    MCH 34.5 (H) 27.0 - 33.0 pg    MCHC 31.4 (L) 32.2 - 35.5 g/dL    RDW 74.2 (H) 35.9 - 50.0 fL    Platelet Count 305 164 - 446 K/uL    MPV 11.5 9.0 - 12.9 fL    Neutrophils-Polys 86.80 (H) 44.00 - 72.00 %    Lymphocytes 5.30 (L) 22.00 - 41.00 %    Monocytes 2.60 0.00 - 13.40 %    Eosinophils 0.90 0.00 - 6.90 %    Basophils 0.00 0.00 - 1.80 %    Nucleated RBC 0.10 0.00 - 0.20 /100 WBC    Neutrophils (Absolute) 33.05 (H) 1.82 - 7.42 K/uL    Lymphs (Absolute) 1.98 1.00 - 4.80 K/uL    Monos (Absolute) 0.97 (H) 0.00 - 0.85 K/uL    Eos (Absolute) 0.34 0.00 - 0.51 K/uL    Baso (Absolute) 0.00 0.00 - 0.12 K/uL    NRBC (Absolute) 0.04 K/uL    Anisocytosis 2+ (A)     Macrocytosis 2+ (A)    Comp Metabolic Panel   Result Value Ref Range    Sodium 130 (L) 135 - 145 mmol/L    Potassium 3.7 3.6 - 5.5 mmol/L    Chloride 100 96 - 112 mmol/L    Co2 15 (L) 20 - 33 mmol/L    Anion Gap 15.0 7.0 - 16.0    Glucose 119 (H) 65 - 99 mg/dL    Bun 34 (H) 8 - 22 mg/dL    Creatinine 1.11 0.50 - 1.40 mg/dL    Calcium 8.3 (L) 8.5 - 10.5 mg/dL    Correct Calcium 9.6 8.5 - 10.5 mg/dL    AST(SGOT) 65 (H) 12 - 45 U/L    ALT(SGPT) 33 2 - 50 U/L    Alkaline Phosphatase 62 30 - 99 U/L    Total Bilirubin 2.4 (H) 0.1 - 1.5 mg/dL    Albumin 2.4 (L) 3.2 - 4.9 g/dL    Total Protein 5.6 (L) 6.0 - 8.2 g/dL    Globulin 3.2 1.9 - 3.5 g/dL    A-G Ratio 0.8 g/dL   LIPASE   Result Value Ref Range    Lipase 54 11 - 82 U/L   TROPONIN   Result Value Ref Range    Troponin T 34 (H) 6 - 19 ng/L   Prothrombin Time   Result Value Ref Range    PT 27.9 (H) 12.0 - 14.6 sec    INR 2.56 (H) 0.87 - 1.13   APTT   Result Value Ref Range    APTT 37.6 (H) 24.7 - 36.0 sec   TSH WITH REFLEX TO FT4   Result Value Ref Range    TSH 1.740 0.380 - 5.330 uIU/mL   ESTIMATED GFR   Result Value Ref Range    GFR (CKD-EPI) 58 (A) >60 mL/min/1.73 m 2   DIFFERENTIAL MANUAL   Result Value Ref Range     Bands-Stabs 1.80 0.00 - 10.00 %    Myelocytes 2.60 %    Manual Diff Status PERFORMED    PERIPHERAL SMEAR REVIEW   Result Value Ref Range    Peripheral Smear Review see below    PLATELET ESTIMATE   Result Value Ref Range    Plt Estimation Normal    MORPHOLOGY   Result Value Ref Range    RBC Morphology Present     Large Platelets 1+     Polychromia 2+     Poikilocytosis 2+     Echinocytes 2+    EKG   Result Value Ref Range    Report       [facility]    Test Date:  2024  Pt Name:    ROE JJ              Department: ER  MRN:        [id number]                      Room:       RD 03  Gender:     Female                       Technician: [id number]  :        [id number]                   Requested By:ER TRIAGE PROTOCOL  Order #:    [id number]                    Reading MD:    Measurements  Intervals                                Axis  Rate:       79                           P:          32  AZ:         118                          QRS:        38  QRSD:       104                          T:          18  QT:         459  QTc:        527    Interpretive Statements  Sinus rhythm  Borderline short AZ interval  Prolonged QT interval  Compared to ECG 06/15/2024 03:00:30  Sinus bradycardia no longer present        I have independently interpreted this EKG    RADIOLOGY/PROCEDURES   I have independently interpreted the diagnostic imaging associated with this visit and am waiting the final reading from the radiologist.   My preliminary interpretation is as follows: No evidence of intracranial hemorrhage    Radiologist interpretation:  CT-HEAD W/O   Final Result      No acute intracranial abnormality detected.               DX-CHEST-PORTABLE (1 VIEW)   Final Result      Mild left basilar opacification be atelectasis versus mild infiltrate.          COURSE & MEDICAL DECISION MAKING    ASSESSMENT, COURSE AND PLAN  Care Narrative:     This is a critically ill 59-year-old is brought in by  paramedics being covered in blood from coffee-ground emesis and was pale and hypotensive.  2 large-bore IVs were established and she was given IV fluids, Protonix, octreotide as well as Rocephin for leukocytosis and to cover for gastrointestinal hemorrhage.  I reviewed her records she has a history of underlying liver disease placing her at high risk for variceal bleeding.  Her hemoglobin has precipitously dropped from 11 down to 5.  She does appear to be in compensated hemorrhagic shock but could deteriorate at any moment.  Emergent consultation with gastroenterology was obtained with  as well as the intensivist Dr. Carranza.  All parties agree that the patient is critically ill and will require admission at minimum to the stepdown unit or possibly the intensive care unit depending on how she clinically responds to therapy.  I have ordered a unit of blood which will be administered as well as octreotide.  The patient will be admitted in critical condition    Hydration: Based on the patient's presentation of Acute Vomiting and GI Bleed / Upset the patient was given IV fluids. IV Hydration was used because oral hydration was not adequate alone. Upon recheck following hydration, the patient was improving.          ADDITIONAL PROBLEMS MANAGED      DISPOSITION AND DISCUSSIONS  I have discussed management of the patient with the following physicians and ALONSO's: Geremias plan of care with intensivist as well as gastroenterologist    Discussion of management with other QHP or appropriate source(s): Discussed plan of care with ER pharmacist    Escalation of care considered, and ultimately not performed: Sitter and central line    Barriers to care at this time, including but not limited to: None.     Decision tools and prescription drugs considered including, but not limited to: None.    FINAL DIAGNOSIS  1. Upper GI bleed    2. Hemorrhagic shock (HCC)    3. Acute anemia       CRITICAL CARE  The very real possibilty of a  deterioration of this patient's condition required the highest level of my preparedness for sudden, emergent intervention.  I provided critical care services, which included medication orders, frequent reevaluations of the patient's condition and response to treatment, ordering and reviewing test results, and discussing the case with various consultants.  The critical care time associated with the care of the patient was 30 minutes. Review chart for interventions. This time is exclusive of any other billable procedures.     Electronically signed by: Julio Mendoza M.D., 8/14/2024 2:31 PM

## 2024-08-14 NOTE — ED TRIAGE NOTES
Chief Complaint   Patient presents with    Blood in Vomit     Pt BIBA bloody emesis. Syncopal event today, pt states head strike, denies blood thinners or daily ASA, no trauma to head, pt denies neck pain. GCS 15. Dried blood to nares/mouth.       BP (!) 84/40   Pulse 77   Resp 15   SpO2 95%

## 2024-08-14 NOTE — PROGRESS NOTES
4 Eyes Skin Assessment Completed by Veronika RN and KAYLEY Valdivia.  71 kg   96.8° F    Head WDL  Ears WDL  Nose dried blood   Mouth WDL  Neck WDL  Breast/Chest WDL  Shoulder Blades WDL  Spine WDL  (R) Arm/Elbow/Hand WDL  (L) Arm/Elbow/Hand WDL  Abdomen WDL  Groin WDL  Coccyx/Buttocks Abrasion to coccyx.  (R) Leg abrasion to right leg   (L) Leg WDL  (R) Heel/Foot/Toe  Dry, overgrown toenails   (L) Heel/Foot/Toe Dry, overgrown toenails       Devices In Places ECG, Blood Pressure Cuff, Pulse Ox, and SCD's      Interventions In Place Sacral Mepilex, TAP System, Pillows, Q2 Turns, and Low Air Loss Mattress    Possible Skin Injury Yes    Pictures Uploaded Into Epic Yes    Wound Consult Placed Yes  RN Wound Prevention Protocol Ordered Yes

## 2024-08-14 NOTE — CONSULTS
Critical Care Consultation    Date of consult: 8/14/2024    Referring Physician  Feliz    Reason for Consultation  GIB, cirrhosis    History of Presenting Illness  59 y.o. female who presented 8/14/2024 with past medical history of extensive alcohol use and recently diagnosed with cirrhosis she has had GI bleeding before, with a history of esophageal varices.  She has had both melena and coffee-ground emesis for the last couple of days weakness general fatigue.  She presented the emergency department today for lightheadedness and presyncopal symptoms, the patient in the ED was hypotensive and given crystalloid and then was noted to have a hemoglobin of 5 and was given PRBCs, she her blood pressure improved and she was normotensive thereafter, well supported good mentation and no further bleeding.    She was seen by GI in the ED with plans to do EGD tomorrow morning, she will be admitted to ICU for further resuscitation and observation given the extent of her bleeding and potential for esophageal variceal bleeding.    Code Status  Full Code    Review of Systems  Review of Systems   All other systems reviewed and are negative.      Past Medical History   has a past medical history of Acute cystitis with hematuria (09/21/2016), Acute kidney injury (HCC) (06/14/2024), Allergy, Anxiety, Arrhythmia, Back pain, Depression, Depression, Hyperlipidemia, Hypertension, Hypotension (07/15/2016), Indigestion, Lightheadedness (12/17/2015), Necrotizing fasciitis ([facility]) (06/14/2024), Other chest pain (12/17/2015), Pain (02/24/2015), PSVT (paroxysmal supraventricular tachycardia) ([facility]) (06/24/2013), Psychiatric disorder, Septic shock ([facility]) (06/14/2024), Tobacco abuse (05/03/2013), URI (upper respiratory infection) (09/21/2013), Urinary tract infection, site not specified, and UTI (lower urinary tract infection) (09/21/2013).    She has no past medical history of Breast cancer ([facility]), Chronic airway obstruction, not elsewhere  classified, Diabetes, Fall, Thyroid disease, or Unspecified asthma(493.90).    Surgical History   has a past surgical history that includes breast biopsy (7/18/08); shoulder arthroscopy (3/23/2009); tubal ligation; gyn surgery (1995); shoulder arthroscopy (2005); other (6/2014); shoulder arthroscopy w/ rotator cuff repair (3/9/2015); trigger finger release (3/9/2015); open reduction; pr i&d perirectal abscess (6/14/2024); and pr i&d perirectal abscess (N/A, 6/17/2024).    Family History  family history includes Cancer in her paternal grandmother; Hypertension in her brother, father, and mother; Stroke in her mother.    Social History   reports that she has been smoking cigarettes. She started smoking about 30 years ago. She has a 12.5 pack-year smoking history. She has never used smokeless tobacco. She reports that she does not drink alcohol and does not use drugs.    Medications  Home Medications       Reviewed by Conrado Maharaj (Pharmacy Tech) on 08/14/24 at 1543  Med List Status: Complete     Medication Last Dose Status   DIPHENHYDRAMINE HCL PO 8/12/2024 Active   losartan (COZAAR) 50 MG Tab 8/12/2024 Active   rosuvastatin (CRESTOR) 20 MG Tab 8/13/2024 Active   spironolactone (ALDACTONE) 50 MG Tab 8/13/2024 Active                  Audit from Redirected Encounters    **Home medications have not yet been reviewed for this encounter**       Current Facility-Administered Medications   Medication Dose Route Frequency Provider Last Rate Last Admin    senna-docusate (Pericolace Or Senokot S) 8.6-50 MG per tablet 2 Tablet  2 Tablet Oral BID Clifford Carranza M.D.        And    polyethylene glycol/lytes (Miralax) Packet 1 Packet  1 Packet Oral QDAY PRN Clifford Carranza M.D.        [START ON 8/15/2024] cefTRIAXone (Rocephin) syringe 1,000 mg  1,000 mg Intravenous Q24HRS Clifford Carranza M.D.        Pharmacy Consult Request - IV Antibiotics to PO conversion   Other PHARMACY TO DOSE Clifford Carranza M.D.         pantoprazole (Protonix) injection 40 mg  40 mg Intravenous BID Clifford Carranza M.D.   40 mg at 08/14/24 1810    insulin regular (HumuLIN R,NovoLIN R) injection  2-9 Units Subcutaneous Q6HRS Clifford Carranza M.D.        And    dextrose 50% (D50W) injection 25 g  25 g Intravenous Q15 MIN PRN Clifford Carranza M.D.        octreotide (SandoSTATIN) 1,250 mcg in  mL Infusion  50 mcg/hr Intravenous Continuous Clifford Carranza M.D. 10 mL/hr at 08/14/24 1728 50 mcg/hr at 08/14/24 1728    lactated ringers infusion   Intravenous Continuous Clifford Carranza M.D. 75 mL/hr at 08/14/24 2028 New Bag at 08/14/24 2028    ondansetron (Zofran) syringe/vial injection 4 mg  4 mg Intravenous Q8HRS PRN Clifford Carranza M.D.        magnesium sulfate IVPB premix 2 g  2 g Intravenous Once Clifford Carranza M.D.        acetaminophen (Tylenol) tablet 650 mg  650 mg Oral Q6HRS PRN Clifford Carranza M.D.        calcium GLUConate 2 g in NaCl IVPB premix  2 g Intravenous Once Clifford Carranza M.D. 100 mL/hr at 08/14/24 2011 2 g at 08/14/24 2011    Tranexamic Acid (Cyklokapron) 1,000 mg in  mL IVPB  1,000 mg Intravenous Once Clifford Carranza M.D.           Allergies  Allergies   Allergen Reactions    Nitrofurantoin Vomiting    Sulfamethoxazole W-Trimethoprim Vomiting       Vital Signs last 24 hours  Temp:  [35.9 °C (96.7 °F)-36.3 °C (97.4 °F)] 36 °C (96.8 °F)  Pulse:  [67-81] 74  Resp:  [13-37] 24  BP: ()/(40-53) 103/50  SpO2:  [95 %-100 %] 98 %    Physical Exam  Physical Exam  Constitutional:       Appearance: She is ill-appearing. She is not toxic-appearing.   HENT:      Head: Normocephalic.      Mouth/Throat:      Mouth: Mucous membranes are dry.   Eyes:      Extraocular Movements: Extraocular movements intact.      Pupils: Pupils are equal, round, and reactive to light.   Cardiovascular:      Rate and Rhythm: Regular rhythm. Tachycardia present.   Pulmonary:      Effort: Pulmonary effort is normal.      Breath  sounds: No rales.   Abdominal:      General: Abdomen is flat.      Palpations: Abdomen is soft.   Musculoskeletal:      Cervical back: Neck supple.      Right lower leg: No edema.      Left lower leg: No edema.   Skin:     Capillary Refill: Capillary refill takes 2 to 3 seconds.      Coloration: Skin is pale.   Neurological:      General: No focal deficit present.      Mental Status: She is oriented to person, place, and time. Mental status is at baseline.         Fluids    Intake/Output Summary (Last 24 hours) at 2024  Last data filed at 2024 1915  Gross per 24 hour   Intake 1300 ml   Output 100 ml   Net 1200 ml       Laboratory  Recent Results (from the past 48 hour(s))   EKG    Collection Time: 24  2:21 PM   Result Value Ref Range    Report       [facility]    Test Date:  2024  Pt Name:    ROE JJ              Department: ER  MRN:        [id number]                      Room:       [facility]  Gender:     Female                       Technician: [id number]  :        [id number]                   Requested By:ER TRIAGE PROTOCOL  Order #:    [id number]                    Reading MD:    Measurements  Intervals                                Axis  Rate:       79                           P:          32  SD:         118                          QRS:        38  QRSD:       104                          T:          18  QT:         459  QTc:        527    Interpretive Statements  Sinus rhythm  Borderline short SD interval  Prolonged QT interval  Compared to ECG 06/15/2024 03:00:30  Sinus bradycardia no longer present     CBC WITH DIFFERENTIAL    Collection Time: 24  2:27 PM   Result Value Ref Range    WBC 37.3 (H) 4.8 - 10.8 K/uL    RBC 1.45 (L) 4.20 - 5.40 M/uL    Hemoglobin 5.0 (LL) 12.0 - 16.0 g/dL    Hematocrit 15.5 (LL) 37.0 - 47.0 %    .7 (H) 81.4 - 97.8 fL    MCH 34.5 (H) 27.0 - 33.0 pg    MCHC 31.4 (L) 32.2 - 35.5 g/dL    RDW 74.2 (H) 35.9 -  50.0 fL    Platelet Count 305 164 - 446 K/uL    MPV 11.5 9.0 - 12.9 fL    Neutrophils-Polys 86.80 (H) 44.00 - 72.00 %    Lymphocytes 5.30 (L) 22.00 - 41.00 %    Monocytes 2.60 0.00 - 13.40 %    Eosinophils 0.90 0.00 - 6.90 %    Basophils 0.00 0.00 - 1.80 %    Nucleated RBC 0.10 0.00 - 0.20 /100 WBC    Neutrophils (Absolute) 33.05 (H) 1.82 - 7.42 K/uL    Lymphs (Absolute) 1.98 1.00 - 4.80 K/uL    Monos (Absolute) 0.97 (H) 0.00 - 0.85 K/uL    Eos (Absolute) 0.34 0.00 - 0.51 K/uL    Baso (Absolute) 0.00 0.00 - 0.12 K/uL    NRBC (Absolute) 0.04 K/uL    Anisocytosis 2+ (A)     Macrocytosis 2+ (A)    Comp Metabolic Panel    Collection Time: 08/14/24  2:27 PM   Result Value Ref Range    Sodium 130 (L) 135 - 145 mmol/L    Potassium 3.7 3.6 - 5.5 mmol/L    Chloride 100 96 - 112 mmol/L    Co2 15 (L) 20 - 33 mmol/L    Anion Gap 15.0 7.0 - 16.0    Glucose 119 (H) 65 - 99 mg/dL    Bun 34 (H) 8 - 22 mg/dL    Creatinine 1.11 0.50 - 1.40 mg/dL    Calcium 8.3 (L) 8.5 - 10.5 mg/dL    Correct Calcium 9.6 8.5 - 10.5 mg/dL    AST(SGOT) 65 (H) 12 - 45 U/L    ALT(SGPT) 33 2 - 50 U/L    Alkaline Phosphatase 62 30 - 99 U/L    Total Bilirubin 2.4 (H) 0.1 - 1.5 mg/dL    Albumin 2.4 (L) 3.2 - 4.9 g/dL    Total Protein 5.6 (L) 6.0 - 8.2 g/dL    Globulin 3.2 1.9 - 3.5 g/dL    A-G Ratio 0.8 g/dL   LIPASE    Collection Time: 08/14/24  2:27 PM   Result Value Ref Range    Lipase 54 11 - 82 U/L   TROPONIN    Collection Time: 08/14/24  2:27 PM   Result Value Ref Range    Troponin T 34 (H) 6 - 19 ng/L   Prothrombin Time    Collection Time: 08/14/24  2:27 PM   Result Value Ref Range    PT 27.9 (H) 12.0 - 14.6 sec    INR 2.56 (H) 0.87 - 1.13   APTT    Collection Time: 08/14/24  2:27 PM   Result Value Ref Range    APTT 37.6 (H) 24.7 - 36.0 sec   TSH WITH REFLEX TO FT4    Collection Time: 08/14/24  2:27 PM   Result Value Ref Range    TSH 1.740 0.380 - 5.330 uIU/mL   ESTIMATED GFR    Collection Time: 08/14/24  2:27 PM   Result Value Ref Range    GFR  (CKD-EPI) 58 (A) >60 mL/min/1.73 m 2   DIFFERENTIAL MANUAL    Collection Time: 08/14/24  2:27 PM   Result Value Ref Range    Bands-Stabs 1.80 0.00 - 10.00 %    Myelocytes 2.60 %    Manual Diff Status PERFORMED    PERIPHERAL SMEAR REVIEW    Collection Time: 08/14/24  2:27 PM   Result Value Ref Range    Peripheral Smear Review see below    PLATELET ESTIMATE    Collection Time: 08/14/24  2:27 PM   Result Value Ref Range    Plt Estimation Normal    MORPHOLOGY    Collection Time: 08/14/24  2:27 PM   Result Value Ref Range    RBC Morphology Present     Large Platelets 1+     Polychromia 2+     Poikilocytosis 2+     Echinocytes 2+    PROCALCITONIN    Collection Time: 08/14/24  2:27 PM   Result Value Ref Range    Procalcitonin 0.29 (H) <0.25 ng/mL   AMMONIA    Collection Time: 08/14/24  3:00 PM   Result Value Ref Range    Ammonia 90 (H) 11 - 45 umol/L   COD - Adult (Type and Screen)    Collection Time: 08/14/24  3:43 PM   Result Value Ref Range    ABO Grouping Only O     Rh Grouping Only POS     Antibody Screen-Cod NEG     Component R       Red Cells, LR       [id number]   transfused   08/14/24 16:56    Product Type R99     Dispense Status transfused     Unit Number (Barcoded) [id number]     Product Code (Barcoded) D0479K60     Blood Type (Barcoded) 5100    Magnesium    Collection Time: 08/14/24  5:41 PM   Result Value Ref Range    Magnesium 1.6 1.5 - 2.5 mg/dL   PLATELET MAPPING WITH BASIC TEG    Collection Time: 08/14/24  5:41 PM   Result Value Ref Range    Reaction Time Initial-R 4.2 (L) 4.6 - 9.1 min    React Time Initial Hep 4.1 (L) 4.3 - 8.3 min    Clot Kinetics-K 0.8 0.8 - 2.1 min    Clot Angle-Angle 78.4 (H) 63.0 - 78.0 degrees    Maximum Clot Strength-MA 64.1 52.0 - 69.0 mm    TEG Functional Fibrinogen(MA) 21.4 15.0 - 32.0 mm    Lysis 30 minutes-LY30 17.0 (HH) 0.0 - 2.6 %    % Inhibition ADP 83.6 (H) 0.0 - 17.0 %    % Inhibition AA 34.4 (H) 0.0 - 11.0 %    TEG Algorithm Link Algorithm        Imaging  CT-HEAD  W/O   Final Result      No acute intracranial abnormality detected.               DX-CHEST-PORTABLE (1 VIEW)   Final Result      Mild left basilar opacification be atelectasis versus mild infiltrate.          Assessment/Plan  * GIB (gastrointestinal bleeding)- (present on admission)  Assessment & Plan  Likely portal hypertensive in origin given cirrhosis history    No evidence of hypoperfusion or shock at this time  S/P PRBC administration    Octreotide and SBP prophylaxis given history of cirrhosis and likely portal hypertensive bleed  PPI therapy   Serial H/H, transfuse for Hb <7 unless unstable  Correct and monitor coagulopathy    GI following, plan for EGD in the AM  NPO post midnight      Hypomagnesemia  Assessment & Plan  Given 2g IV mag empirically  Follow daily Mg and replete as needed    Coagulopathy (HCC)  Assessment & Plan  Likely multifactorial iso of cirrhosis   TEG indicative of significant hyperfibrinolysis and given ongoing hemorrhage will give TXA 1g IV    INR 2.5 but R time low normal  Repeat TEG to guide coagulation correction if rebleeds given multifactorial abnormalities with cirrhosis      Hypotension  Assessment & Plan  Hypotensive prior to arrival and in ED, resolved after crystalloid and PRBCs  Remains borderline without hypoperfusion  Transfuse blood products as needed    NE available if needed to keep MAP > 65 but will focus on product replacement and maintenance of coagulation and calcium homeostasis  Will send TSH/cortisol as well        Discussed patient condition and risk of morbidity and/or mortality with RN, RT, Therapies, Pharmacy, Patient, and GI.    The patient remains critically ill.  Critical care time = 85 minutes in directly providing and coordinating critical care and extensive data review.  No time overlap and excludes procedures.

## 2024-08-14 NOTE — ED NOTES
Medication history reviewed with patient at bedside.   Med rec is complete  Allergies reviewed.     Patient has not had any outpatient antibiotics in the last 30 days.   Anticoagulants: No    Conrado Maharaj

## 2024-08-15 ENCOUNTER — APPOINTMENT (OUTPATIENT)
Dept: RADIOLOGY | Facility: MEDICAL CENTER | Age: 59
DRG: 432 | End: 2024-08-15
Attending: NURSE PRACTITIONER
Payer: COMMERCIAL

## 2024-08-15 ENCOUNTER — ANESTHESIA EVENT (OUTPATIENT)
Dept: SURGERY | Facility: MEDICAL CENTER | Age: 59
DRG: 432 | End: 2024-08-15
Payer: COMMERCIAL

## 2024-08-15 ENCOUNTER — ANESTHESIA (OUTPATIENT)
Dept: SURGERY | Facility: MEDICAL CENTER | Age: 59
DRG: 432 | End: 2024-08-15
Payer: COMMERCIAL

## 2024-08-15 PROBLEM — R94.31 PROLONGED QT INTERVAL: Status: ACTIVE | Noted: 2024-08-15

## 2024-08-15 PROBLEM — E87.1 HYPONATREMIA: Status: ACTIVE | Noted: 2024-08-15

## 2024-08-15 LAB
ALBUMIN SERPL BCP-MCNC: 2.4 G/DL (ref 3.2–4.9)
ALBUMIN/GLOB SERPL: 0.8 G/DL
ALP SERPL-CCNC: 65 U/L (ref 30–99)
ALT SERPL-CCNC: 145 U/L (ref 2–50)
ANION GAP SERPL CALC-SCNC: 10 MMOL/L (ref 7–16)
AST SERPL-CCNC: 210 U/L (ref 12–45)
BARCODED ABORH UBTYP: 1700
BARCODED PRD CODE UBPRD: NORMAL
BARCODED UNIT NUM UBUNT: NORMAL
BILIRUB SERPL-MCNC: 2.6 MG/DL (ref 0.1–1.5)
BUN SERPL-MCNC: 30 MG/DL (ref 8–22)
CA-I SERPL-SCNC: 1 MMOL/L (ref 1.1–1.3)
CA-I SERPL-SCNC: 1.1 MMOL/L (ref 1.1–1.3)
CA-I SERPL-SCNC: 1.2 MMOL/L (ref 1.1–1.3)
CALCIUM ALBUM COR SERPL-MCNC: 9.4 MG/DL (ref 8.5–10.5)
CALCIUM SERPL-MCNC: 8.1 MG/DL (ref 8.5–10.5)
CFT BLD TEG: 3.7 MIN (ref 4.6–9.1)
CFT P HPASE BLD TEG: 3.9 MIN (ref 4.3–8.3)
CHLORIDE SERPL-SCNC: 102 MMOL/L (ref 96–112)
CLOT ANGLE BLD TEG: 76.7 DEGREES (ref 63–78)
CLOT LYSIS 30M P MA LENFR BLD TEG: 0 % (ref 0–2.6)
CO2 SERPL-SCNC: 18 MMOL/L (ref 20–33)
COMPONENT P 8504P: NORMAL
CREAT SERPL-MCNC: 0.91 MG/DL (ref 0.5–1.4)
CT.EXTRINSIC BLD ROTEM: 1 MIN (ref 0.8–2.1)
ERYTHROCYTE [DISTWIDTH] IN BLOOD BY AUTOMATED COUNT: 58.1 FL (ref 35.9–50)
GFR SERPLBLD CREATININE-BSD FMLA CKD-EPI: 74 ML/MIN/1.73 M 2
GLOBULIN SER CALC-MCNC: 3.2 G/DL (ref 1.9–3.5)
GLUCOSE BLD STRIP.AUTO-MCNC: 121 MG/DL (ref 65–99)
GLUCOSE BLD STRIP.AUTO-MCNC: 127 MG/DL (ref 65–99)
GLUCOSE BLD STRIP.AUTO-MCNC: 130 MG/DL (ref 65–99)
GLUCOSE BLD STRIP.AUTO-MCNC: 135 MG/DL (ref 65–99)
GLUCOSE BLD STRIP.AUTO-MCNC: 184 MG/DL (ref 65–99)
GLUCOSE SERPL-MCNC: 126 MG/DL (ref 65–99)
HCT VFR BLD AUTO: 22.4 % (ref 37–47)
HGB BLD-MCNC: 7 G/DL (ref 12–16)
HGB BLD-MCNC: 7.3 G/DL (ref 12–16)
HGB BLD-MCNC: 7.9 G/DL (ref 12–16)
HGB BLD-MCNC: 7.9 G/DL (ref 12–16)
INR PPP: 2.32 (ref 0.87–1.13)
MAGNESIUM SERPL-MCNC: 2 MG/DL (ref 1.5–2.5)
MCF BLD TEG: 59.2 MM (ref 52–69)
MCF.PLATELET INHIB BLD ROTEM: 19.6 MM (ref 15–32)
MCH RBC QN AUTO: 29.7 PG (ref 27–33)
MCHC RBC AUTO-ENTMCNC: 32.6 G/DL (ref 32.2–35.5)
MCV RBC AUTO: 91.1 FL (ref 81.4–97.8)
PA AA BLD-ACNC: 35 % (ref 0–11)
PA ADP BLD-ACNC: 94.1 % (ref 0–17)
PHOSPHATE SERPL-MCNC: 3.6 MG/DL (ref 2.5–4.5)
PLATELET # BLD AUTO: 167 K/UL (ref 164–446)
PMV BLD AUTO: 10.9 FL (ref 9–12.9)
POTASSIUM SERPL-SCNC: 4.1 MMOL/L (ref 3.6–5.5)
PRODUCT TYPE UPROD: NORMAL
PROT SERPL-MCNC: 5.6 G/DL (ref 6–8.2)
PROTHROMBIN TIME: 25.8 SEC (ref 12–14.6)
RBC # BLD AUTO: 2.46 M/UL (ref 4.2–5.4)
SODIUM SERPL-SCNC: 130 MMOL/L (ref 135–145)
TEG ALGORITHM TGALG: ABNORMAL
UNIT STATUS USTAT: NORMAL
WBC # BLD AUTO: 28 K/UL (ref 4.8–10.8)

## 2024-08-15 PROCEDURE — 160002 HCHG RECOVERY MINUTES (STAT): Performed by: INTERNAL MEDICINE

## 2024-08-15 PROCEDURE — 700111 HCHG RX REV CODE 636 W/ 250 OVERRIDE (IP): Mod: JZ | Performed by: INTERNAL MEDICINE

## 2024-08-15 PROCEDURE — 05HC33Z INSERTION OF INFUSION DEVICE INTO LEFT BASILIC VEIN, PERCUTANEOUS APPROACH: ICD-10-PCS | Performed by: HOSPITALIST

## 2024-08-15 PROCEDURE — 30233R1 TRANSFUSION OF NONAUTOLOGOUS PLATELETS INTO PERIPHERAL VEIN, PERCUTANEOUS APPROACH: ICD-10-PCS | Performed by: HOSPITALIST

## 2024-08-15 PROCEDURE — P9034 PLATELETS, PHERESIS: HCPCS

## 2024-08-15 PROCEDURE — A9270 NON-COVERED ITEM OR SERVICE: HCPCS | Performed by: INTERNAL MEDICINE

## 2024-08-15 PROCEDURE — 85384 FIBRINOGEN ACTIVITY: CPT

## 2024-08-15 PROCEDURE — 700111 HCHG RX REV CODE 636 W/ 250 OVERRIDE (IP): Performed by: EMERGENCY MEDICINE

## 2024-08-15 PROCEDURE — 80053 COMPREHEN METABOLIC PANEL: CPT

## 2024-08-15 PROCEDURE — 700101 HCHG RX REV CODE 250: Performed by: EMERGENCY MEDICINE

## 2024-08-15 PROCEDURE — 06L38CZ OCCLUSION OF ESOPHAGEAL VEIN WITH EXTRALUMINAL DEVICE, VIA NATURAL OR ARTIFICIAL OPENING ENDOSCOPIC: ICD-10-PCS | Performed by: INTERNAL MEDICINE

## 2024-08-15 PROCEDURE — 83735 ASSAY OF MAGNESIUM: CPT

## 2024-08-15 PROCEDURE — 99291 CRITICAL CARE FIRST HOUR: CPT | Performed by: INTERNAL MEDICINE

## 2024-08-15 PROCEDURE — 85347 COAGULATION TIME ACTIVATED: CPT

## 2024-08-15 PROCEDURE — 51798 US URINE CAPACITY MEASURE: CPT

## 2024-08-15 PROCEDURE — 85018 HEMOGLOBIN: CPT

## 2024-08-15 PROCEDURE — 700105 HCHG RX REV CODE 258: Performed by: INTERNAL MEDICINE

## 2024-08-15 PROCEDURE — 86923 COMPATIBILITY TEST ELECTRIC: CPT | Mod: 91

## 2024-08-15 PROCEDURE — 160035 HCHG PACU - 1ST 60 MINS PHASE I: Performed by: INTERNAL MEDICINE

## 2024-08-15 PROCEDURE — 85027 COMPLETE CBC AUTOMATED: CPT

## 2024-08-15 PROCEDURE — 700101 HCHG RX REV CODE 250: Performed by: ANESTHESIOLOGY

## 2024-08-15 PROCEDURE — 700105 HCHG RX REV CODE 258: Performed by: EMERGENCY MEDICINE

## 2024-08-15 PROCEDURE — 82962 GLUCOSE BLOOD TEST: CPT

## 2024-08-15 PROCEDURE — 85610 PROTHROMBIN TIME: CPT

## 2024-08-15 PROCEDURE — 700105 HCHG RX REV CODE 258: Performed by: NURSE PRACTITIONER

## 2024-08-15 PROCEDURE — 700111 HCHG RX REV CODE 636 W/ 250 OVERRIDE (IP): Mod: JZ | Performed by: ANESTHESIOLOGY

## 2024-08-15 PROCEDURE — P9016 RBC LEUKOCYTES REDUCED: HCPCS

## 2024-08-15 PROCEDURE — C1751 CATH, INF, PER/CENT/MIDLINE: HCPCS

## 2024-08-15 PROCEDURE — A9270 NON-COVERED ITEM OR SERVICE: HCPCS | Performed by: ANESTHESIOLOGY

## 2024-08-15 PROCEDURE — A9270 NON-COVERED ITEM OR SERVICE: HCPCS | Performed by: EMERGENCY MEDICINE

## 2024-08-15 PROCEDURE — 700102 HCHG RX REV CODE 250 W/ 637 OVERRIDE(OP): Performed by: INTERNAL MEDICINE

## 2024-08-15 PROCEDURE — 160048 HCHG OR STATISTICAL LEVEL 1-5: Performed by: INTERNAL MEDICINE

## 2024-08-15 PROCEDURE — 700111 HCHG RX REV CODE 636 W/ 250 OVERRIDE (IP): Performed by: ANESTHESIOLOGY

## 2024-08-15 PROCEDURE — 36430 TRANSFUSION BLD/BLD COMPNT: CPT

## 2024-08-15 PROCEDURE — 160203 HCHG ENDO MINUTES - 1ST 30 MINS LEVEL 4: Performed by: INTERNAL MEDICINE

## 2024-08-15 PROCEDURE — 700111 HCHG RX REV CODE 636 W/ 250 OVERRIDE (IP): Mod: JZ | Performed by: NURSE PRACTITIONER

## 2024-08-15 PROCEDURE — 160009 HCHG ANES TIME/MIN: Performed by: INTERNAL MEDICINE

## 2024-08-15 PROCEDURE — 700102 HCHG RX REV CODE 250 W/ 637 OVERRIDE(OP): Performed by: ANESTHESIOLOGY

## 2024-08-15 PROCEDURE — 82330 ASSAY OF CALCIUM: CPT | Mod: 91

## 2024-08-15 PROCEDURE — 770022 HCHG ROOM/CARE - ICU (200)

## 2024-08-15 PROCEDURE — 43255 EGD CONTROL BLEEDING ANY: CPT | Performed by: INTERNAL MEDICINE

## 2024-08-15 PROCEDURE — 700102 HCHG RX REV CODE 250 W/ 637 OVERRIDE(OP): Performed by: EMERGENCY MEDICINE

## 2024-08-15 PROCEDURE — 84100 ASSAY OF PHOSPHORUS: CPT

## 2024-08-15 PROCEDURE — 30233N1 TRANSFUSION OF NONAUTOLOGOUS RED BLOOD CELLS INTO PERIPHERAL VEIN, PERCUTANEOUS APPROACH: ICD-10-PCS | Performed by: HOSPITALIST

## 2024-08-15 PROCEDURE — 85576 BLOOD PLATELET AGGREGATION: CPT

## 2024-08-15 RX ORDER — MEPERIDINE HYDROCHLORIDE 25 MG/ML
12.5 INJECTION INTRAMUSCULAR; INTRAVENOUS; SUBCUTANEOUS
Status: DISCONTINUED | OUTPATIENT
Start: 2024-08-15 | End: 2024-08-15 | Stop reason: HOSPADM

## 2024-08-15 RX ORDER — ONDANSETRON 2 MG/ML
INJECTION INTRAMUSCULAR; INTRAVENOUS PRN
Status: DISCONTINUED | OUTPATIENT
Start: 2024-08-15 | End: 2024-08-15 | Stop reason: SURG

## 2024-08-15 RX ORDER — OXYCODONE HCL 5 MG/5 ML
5 SOLUTION, ORAL ORAL
Status: COMPLETED | OUTPATIENT
Start: 2024-08-15 | End: 2024-08-15

## 2024-08-15 RX ORDER — LABETALOL HYDROCHLORIDE 5 MG/ML
5 INJECTION, SOLUTION INTRAVENOUS
Status: DISCONTINUED | OUTPATIENT
Start: 2024-08-15 | End: 2024-08-15 | Stop reason: HOSPADM

## 2024-08-15 RX ORDER — ONDANSETRON 2 MG/ML
4 INJECTION INTRAMUSCULAR; INTRAVENOUS
Status: DISCONTINUED | OUTPATIENT
Start: 2024-08-15 | End: 2024-08-15 | Stop reason: HOSPADM

## 2024-08-15 RX ORDER — HYDRALAZINE HYDROCHLORIDE 20 MG/ML
5 INJECTION INTRAMUSCULAR; INTRAVENOUS
Status: DISCONTINUED | OUTPATIENT
Start: 2024-08-15 | End: 2024-08-15 | Stop reason: HOSPADM

## 2024-08-15 RX ORDER — OXYCODONE HCL 5 MG/5 ML
10 SOLUTION, ORAL ORAL
Status: COMPLETED | OUTPATIENT
Start: 2024-08-15 | End: 2024-08-15

## 2024-08-15 RX ORDER — SODIUM CHLORIDE 9 MG/ML
INJECTION, SOLUTION INTRAVENOUS CONTINUOUS
Status: DISCONTINUED | OUTPATIENT
Start: 2024-08-15 | End: 2024-08-15

## 2024-08-15 RX ORDER — HALOPERIDOL 5 MG/ML
1 INJECTION INTRAMUSCULAR
Status: DISCONTINUED | OUTPATIENT
Start: 2024-08-15 | End: 2024-08-15 | Stop reason: HOSPADM

## 2024-08-15 RX ORDER — EPHEDRINE SULFATE 50 MG/ML
5 INJECTION, SOLUTION INTRAVENOUS
Status: DISCONTINUED | OUTPATIENT
Start: 2024-08-15 | End: 2024-08-15 | Stop reason: HOSPADM

## 2024-08-15 RX ORDER — SODIUM CHLORIDE 9 MG/ML
INJECTION, SOLUTION INTRAVENOUS CONTINUOUS
Status: ACTIVE | OUTPATIENT
Start: 2024-08-15 | End: 2024-08-16

## 2024-08-15 RX ORDER — IPRATROPIUM BROMIDE AND ALBUTEROL SULFATE 2.5; .5 MG/3ML; MG/3ML
3 SOLUTION RESPIRATORY (INHALATION)
Status: DISCONTINUED | OUTPATIENT
Start: 2024-08-15 | End: 2024-08-15 | Stop reason: HOSPADM

## 2024-08-15 RX ORDER — LIDOCAINE HYDROCHLORIDE 20 MG/ML
INJECTION, SOLUTION EPIDURAL; INFILTRATION; INTRACAUDAL; PERINEURAL PRN
Status: DISCONTINUED | OUTPATIENT
Start: 2024-08-15 | End: 2024-08-15 | Stop reason: SURG

## 2024-08-15 RX ORDER — DIPHENHYDRAMINE HYDROCHLORIDE 50 MG/ML
12.5 INJECTION INTRAMUSCULAR; INTRAVENOUS
Status: DISCONTINUED | OUTPATIENT
Start: 2024-08-15 | End: 2024-08-15 | Stop reason: HOSPADM

## 2024-08-15 RX ORDER — SUCRALFATE ORAL 1 G/10ML
1 SUSPENSION ORAL EVERY 6 HOURS
Status: DISCONTINUED | OUTPATIENT
Start: 2024-08-15 | End: 2024-08-21 | Stop reason: HOSPADM

## 2024-08-15 RX ORDER — METOPROLOL TARTRATE 1 MG/ML
1 INJECTION, SOLUTION INTRAVENOUS
Status: DISCONTINUED | OUTPATIENT
Start: 2024-08-15 | End: 2024-08-15 | Stop reason: HOSPADM

## 2024-08-15 RX ADMIN — PHYTONADIONE 10 MG: 10 INJECTION, EMULSION INTRAMUSCULAR; INTRAVENOUS; SUBCUTANEOUS at 09:27

## 2024-08-15 RX ADMIN — ACETAMINOPHEN 650 MG: 325 TABLET ORAL at 13:50

## 2024-08-15 RX ADMIN — CALCIUM CHLORIDE 1000 MG: 100 INJECTION, SOLUTION INTRAVENOUS at 22:43

## 2024-08-15 RX ADMIN — SODIUM CHLORIDE, POTASSIUM CHLORIDE, SODIUM LACTATE AND CALCIUM CHLORIDE: 600; 310; 30; 20 INJECTION, SOLUTION INTRAVENOUS at 10:21

## 2024-08-15 RX ADMIN — INSULIN HUMAN 2 UNITS: 100 INJECTION, SOLUTION PARENTERAL at 17:39

## 2024-08-15 RX ADMIN — PROPOFOL 80 MG: 10 INJECTION, EMULSION INTRAVENOUS at 10:26

## 2024-08-15 RX ADMIN — SODIUM CHLORIDE, POTASSIUM CHLORIDE, SODIUM LACTATE AND CALCIUM CHLORIDE: 600; 310; 30; 20 INJECTION, SOLUTION INTRAVENOUS at 11:28

## 2024-08-15 RX ADMIN — SUCRALFATE 1 G: 1 SUSPENSION ORAL at 14:08

## 2024-08-15 RX ADMIN — SODIUM CHLORIDE: 9 INJECTION, SOLUTION INTRAVENOUS at 00:30

## 2024-08-15 RX ADMIN — PROPOFOL 20 MG: 10 INJECTION, EMULSION INTRAVENOUS at 10:31

## 2024-08-15 RX ADMIN — SENNOSIDES AND DOCUSATE SODIUM 2 TABLET: 50; 8.6 TABLET ORAL at 17:20

## 2024-08-15 RX ADMIN — PROPOFOL 20 MG: 10 INJECTION, EMULSION INTRAVENOUS at 10:29

## 2024-08-15 RX ADMIN — SUCRALFATE 1 G: 1 SUSPENSION ORAL at 23:25

## 2024-08-15 RX ADMIN — SUCRALFATE 1 G: 1 SUSPENSION ORAL at 17:21

## 2024-08-15 RX ADMIN — LIDOCAINE HYDROCHLORIDE 50 MG: 20 INJECTION, SOLUTION EPIDURAL; INFILTRATION; INTRACAUDAL at 10:26

## 2024-08-15 RX ADMIN — PANTOPRAZOLE SODIUM 40 MG: 40 INJECTION, POWDER, FOR SOLUTION INTRAVENOUS at 17:20

## 2024-08-15 RX ADMIN — FENTANYL CITRATE 25 MCG: 50 INJECTION, SOLUTION INTRAMUSCULAR; INTRAVENOUS at 10:48

## 2024-08-15 RX ADMIN — PANTOPRAZOLE SODIUM 40 MG: 40 INJECTION, POWDER, FOR SOLUTION INTRAVENOUS at 05:13

## 2024-08-15 RX ADMIN — FENTANYL CITRATE 100 MCG: 50 INJECTION, SOLUTION INTRAMUSCULAR; INTRAVENOUS at 10:24

## 2024-08-15 RX ADMIN — CEFTRIAXONE SODIUM 1000 MG: 10 INJECTION, POWDER, FOR SOLUTION INTRAVENOUS at 05:13

## 2024-08-15 RX ADMIN — ONDANSETRON 4 MG: 2 INJECTION INTRAMUSCULAR; INTRAVENOUS at 10:29

## 2024-08-15 RX ADMIN — ACETAMINOPHEN 650 MG: 325 TABLET ORAL at 21:10

## 2024-08-15 RX ADMIN — OXYCODONE HYDROCHLORIDE 5 MG: 5 SOLUTION ORAL at 10:48

## 2024-08-15 ASSESSMENT — PAIN DESCRIPTION - PAIN TYPE
TYPE: ACUTE PAIN
TYPE: ACUTE PAIN;SURGICAL PAIN
TYPE: ACUTE PAIN
TYPE: SURGICAL PAIN
TYPE: ACUTE PAIN

## 2024-08-15 ASSESSMENT — PAIN SCALES - GENERAL: PAIN_LEVEL: 2

## 2024-08-15 ASSESSMENT — ENCOUNTER SYMPTOMS
DIZZINESS: 0
SPEECH CHANGE: 0
COUGH: 0
DEPRESSION: 0
NERVOUS/ANXIOUS: 0
ABDOMINAL PAIN: 0
VOMITING: 0
SENSORY CHANGE: 0
SORE THROAT: 0
PALPITATIONS: 0
CHILLS: 0
BRUISES/BLEEDS EASILY: 0
BLOOD IN STOOL: 1
WEAKNESS: 1
NAUSEA: 1
MYALGIAS: 0
FEVER: 0
BLURRED VISION: 0
SHORTNESS OF BREATH: 0
BACK PAIN: 0
SPUTUM PRODUCTION: 0
HEADACHES: 0

## 2024-08-15 ASSESSMENT — FIBROSIS 4 INDEX: FIB4 SCORE: 5.95

## 2024-08-15 NOTE — ANESTHESIA POSTPROCEDURE EVALUATION
Patient: Kavita Freeman    Procedure Summary       Date: 08/15/24 Room / Location: [facility] ROOM 26 / SURGERY SAME DAY [facility]    Anesthesia Start: 1021 Anesthesia Stop: 1039    Procedures:       GASTROSCOPY (Esophagus)      GASTROSCOPY, WITH BANDING (Esophagus) Diagnosis: (mild portal htn gastropathy, GERD grade C, moderate esophageal varices)    Surgeons: Bahman Vences M.D. Responsible Provider: Yo Carrera M.D.    Anesthesia Type: general ASA Status: 3            Final Anesthesia Type: general  Last vitals  BP   Blood Pressure: 115/57    Temp   36.4 °C (97.6 °F)    Pulse   80   Resp   14    SpO2   100 %      Anesthesia Post Evaluation    Patient location during evaluation: PACU  Patient participation: complete - patient participated  Level of consciousness: awake and alert  Pain score: 2    Airway patency: patent  Anesthetic complications: no  Cardiovascular status: hemodynamically stable  Respiratory status: acceptable  Hydration status: euvolemic    PONV: none          There were no known notable events for this encounter.     Nurse Pain Score: 7 (NPRS)

## 2024-08-15 NOTE — PROGRESS NOTES
"Critical Care Progress Note    Date of admission  8/14/2024    Chief Complaint  59 y.o. female admitted 8/14/2024 with GIB, cirrhosis    Hospital Course  \"59 y.o. female who presented 8/14/2024 with past medical history of extensive alcohol use and recently diagnosed with cirrhosis she has had GI bleeding before, with a history of esophageal varices.  She has had both melena and coffee-ground emesis for the last couple of days weakness general fatigue.  She presented the emergency department today for lightheadedness and presyncopal symptoms, the patient in the ED was hypotensive and given crystalloid and then was noted to have a hemoglobin of 5 and was given PRBCs, she her blood pressure improved and she was normotensive thereafter, well supported good mentation and no further bleeding.     She was seen by GI in the ED with plans to do EGD tomorrow morning, she will be admitted to ICU for further resuscitation and observation given the extent of her bleeding and potential for esophageal variceal bleeding.\" - Dr. Carranza 8/14    Interval Problem Update  Reviewed last 24 hour events:   - remains on octrotide gtt, PPI, rocephin   - Hgb up to 7.3 after 2 units pRBC, TXA   - planned EGD today   - AF, decreasing WBC   - NSR, SBP    - midline placed   - AAOx4   - nl plt count   - Na unchanged at 130   - QTc 526   - improving EFRAIN   - no BM PTA   - LFTs elevated   - ok UOP   - INR 2.3   - LR @ 75 ml/hr, NPO    Review of Systems  Review of Systems   Constitutional:  Positive for malaise/fatigue. Negative for chills and fever.   HENT:  Negative for congestion and sore throat.    Eyes:  Negative for blurred vision.   Respiratory:  Negative for cough, sputum production and shortness of breath.    Cardiovascular:  Negative for chest pain, palpitations and leg swelling.   Gastrointestinal:  Positive for blood in stool, melena and nausea. Negative for abdominal pain and vomiting.   Genitourinary:  Negative for dysuria. "   Musculoskeletal:  Negative for back pain and myalgias.   Skin:  Negative for rash.   Neurological:  Positive for weakness. Negative for dizziness, sensory change, speech change and headaches.   Endo/Heme/Allergies:  Does not bruise/bleed easily.   Psychiatric/Behavioral:  Negative for depression. The patient is not nervous/anxious.    All other systems reviewed and are negative.       Vital Signs for last 24 hours   Temp:  [35.9 °C (96.6 °F)-36.6 °C (97.9 °F)] 35.9 °C (96.6 °F)  Pulse:  [67-91] 70  Resp:  [13-37] 14  BP: ()/(40-58) 98/46  SpO2:  [92 %-100 %] 96 %    Respiratory Information for the last 24 hours   Room air    Physical Exam   Physical Exam  Vitals and nursing note reviewed.   Constitutional:       General: She is awake. She is in acute distress.      Appearance: She is well-developed and underweight. She is ill-appearing.   HENT:      Head: Normocephalic.      Nose: Nose normal.      Mouth/Throat:      Mouth: Mucous membranes are dry.      Pharynx: Oropharynx is clear.   Eyes:      General: Scleral icterus present.      Pupils: Pupils are equal, round, and reactive to light.      Comments: Conjunctival pallor   Neck:      Vascular: No JVD.      Trachea: No tracheal deviation.   Cardiovascular:      Rate and Rhythm: Normal rate and regular rhythm. Occasional Extrasystoles are present.     Chest Wall: PMI is not displaced.      Pulses: Normal pulses.      Heart sounds: Normal heart sounds. No murmur heard.  Pulmonary:      Effort: Pulmonary effort is normal. No tachypnea or respiratory distress.      Breath sounds: Normal breath sounds. No wheezing or rales.   Abdominal:      General: Bowel sounds are normal. There is distension.      Palpations: Abdomen is soft. There is fluid wave.      Tenderness: There is no abdominal tenderness. There is no guarding or rebound.   Musculoskeletal:         General: No tenderness.      Cervical back: Neck supple. No tenderness.      Right lower leg: No edema.       Left lower leg: No edema.   Skin:     General: Skin is warm and dry.      Capillary Refill: Capillary refill takes 2 to 3 seconds.      Coloration: Skin is jaundiced and pale.   Neurological:      General: No focal deficit present.      Mental Status: She is alert and oriented to person, place, and time.      Cranial Nerves: No cranial nerve deficit.      Sensory: No sensory deficit.   Psychiatric:         Mood and Affect: Mood normal.         Behavior: Behavior normal. Behavior is cooperative.         Thought Content: Thought content normal.         Medications  Current Facility-Administered Medications   Medication Dose Route Frequency Provider Last Rate Last Admin    NS infusion   Intravenous Continuous Nissa LEELA Latona   Stopped at 08/15/24 0200    senna-docusate (Pericolace Or Senokot S) 8.6-50 MG per tablet 2 Tablet  2 Tablet Oral BID Clifford Carranza M.D.        And    polyethylene glycol/lytes (Miralax) Packet 1 Packet  1 Packet Oral QDAY PRN Clifford Carranza M.D.        cefTRIAXone (Rocephin) syringe 1,000 mg  1,000 mg Intravenous Q24HRS Clifford Carranza M.D.   1,000 mg at 08/15/24 0513    Pharmacy Consult Request - IV Antibiotics to PO conversion   Other PHARMACY TO DOSE Clifford Carranza M.D.        pantoprazole (Protonix) injection 40 mg  40 mg Intravenous BID Clifford Carranza M.D.   40 mg at 08/15/24 0513    insulin regular (HumuLIN R,NovoLIN R) injection  2-9 Units Subcutaneous Q6HRS Clifford Carranza M.D.        And    dextrose 50% (D50W) injection 25 g  25 g Intravenous Q15 MIN PRN Clifford Carranza M.D.        octreotide (SandoSTATIN) 1,250 mcg in  mL Infusion  50 mcg/hr Intravenous Continuous Clifford Carranza M.D. 10 mL/hr at 08/15/24 0532 50 mcg/hr at 08/15/24 0532    lactated ringers infusion   Intravenous Continuous Clifford Carranza M.D. 75 mL/hr at 08/15/24 0532 Rate Verify at 08/15/24 0532    ondansetron (Zofran) syringe/vial injection 4 mg  4 mg Intravenous Q8HRS PRN  Clifford Carranza M.D.   4 mg at 08/14/24 2107    acetaminophen (Tylenol) tablet 650 mg  650 mg Oral Q6HRS PRN Clifford Carranza M.D.           Fluids    Intake/Output Summary (Last 24 hours) at 8/15/2024 0708  Last data filed at 8/15/2024 0600  Gross per 24 hour   Intake 2728.59 ml   Output 950 ml   Net 1778.59 ml       Laboratory          Recent Labs     08/14/24 1427 08/14/24  1741 08/15/24  0352   SODIUM 130*  --  130*   POTASSIUM 3.7  --  4.1   CHLORIDE 100  --  102   CO2 15*  --  18*   BUN 34*  --  30*   CREATININE 1.11  --  0.91   MAGNESIUM  --  1.6 2.0   PHOSPHORUS  --   --  3.6   CALCIUM 8.3*  --  8.1*     Recent Labs     08/14/24  1427 08/15/24  0352   ALTSGPT 33 145*   ASTSGOT 65* 210*   ALKPHOSPHAT 62 65   TBILIRUBIN 2.4* 2.6*   LIPASE 54  --    GLUCOSE 119* 126*     Recent Labs     08/14/24  1427 08/15/24  0352   WBC 37.3* 28.0*   NEUTSPOLYS 86.80*  --    LYMPHOCYTES 5.30*  --    MONOCYTES 2.60  --    EOSINOPHILS 0.90  --    BASOPHILS 0.00  --    ASTSGOT 65* 210*   ALTSGPT 33 145*   ALKPHOSPHAT 62 65   TBILIRUBIN 2.4* 2.6*     Recent Labs     08/14/24 1427 08/14/24  2253 08/15/24  0352   RBC 1.45*  --  2.46*   HEMOGLOBIN 5.0* 5.8* 7.3*   HEMATOCRIT 15.5*  --  22.4*   PLATELETCT 305  --  167   PROTHROMBTM 27.9*  --  25.8*   APTT 37.6*  --   --    INR 2.56*  --  2.32*       Imaging  X-Ray:  I have personally reviewed the images and compared with prior images. and My impression is: Minimal left lower lobe atelectasis with hardware seen on the right ribs, no tubes or lines  CT:    Reviewed    Assessment/Plan  * GIB (gastrointestinal bleeding)- (present on admission)  Assessment & Plan  Likely portal hypertensive in origin given cirrhosis history  GI consulted  Keep n.p.o. with planned EGD today  Serial H/H with conservative transfusion strategy  Continue IV octreotide and Protonix  Continue IV Rocephin  Correct coagulopathy    Coagulopathy (HCC)  Assessment & Plan  Likely multifactorial iso of cirrhosis    S/p TXA 1g IV  IV vitamin K x 1  Repeat TEG to guide coagulation correction if rebleeds given multifactorial abnormalities with cirrhosis    Hypotension  Assessment & Plan  Hypotensive prior to arrival and in ED, resolved after crystalloid and PRBCs  Continue to monitor blood pressure as well as signs of perfusion closely    Alcoholic cirrhosis of liver with ascites (HCC)- (present on admission)  Assessment & Plan  Monitor synthetic function  Avoid hepatotoxins  Consider paracentesis for therapeutic and diagnostic purposes    Prolonged QT interval  Assessment & Plan  Avoid QT prolonging medications  Optimize electrolytes, continuous telemetry monitoring    Hyponatremia  Assessment & Plan  Likely due to cirrhosis  Monitor    Hypomagnesemia  Assessment & Plan  Replete and monitor closely    Anemia- (present on admission)  Assessment & Plan  Acute blood loss due to GI bleed  Serial H/H with conservative transfusion strategy         VTE:  Contraindicated  Ulcer: PPI  Lines: None    I have performed a physical exam and reviewed and updated ROS and Plan today (8/15/2024). In review of yesterday's note (8/14/2024), there are no changes except as documented above.     Discussed patient condition and risk of morbidity and/or mortality with RN, RT, Pharmacy, , Charge nurse / hot rounds, Patient, and GI  The patient remains critically ill.  Critical care time = 55 minutes in directly providing and coordinating critical care and extensive data review.  No time overlap and excludes procedures.    Please note that this dictation was created using voice recognition software. I have made every reasonable attempt to correct obvious errors, but there may be errors of grammar and possibly content that I did not discover before finalizing the note.
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
## 2024-08-15 NOTE — CARE PLAN
The patient is Watcher - Medium risk of patient condition declining or worsening    Shift Goals  Clinical Goals: monitor hgb levels, hd stability  Patient Goals: comfort  Family Goals: updates    Progress made toward(s) clinical / shift goals:    Problem: Knowledge Deficit - Standard  Goal: Patient and family/care givers will demonstrate understanding of plan of care, disease process/condition, diagnostic tests and medications  Description: Target End Date:  1-3 days or as soon as patient condition allows    Document in Patient Education    1.  Patient and family/caregiver oriented to unit, equipment, visitation policy and means for communicating concern  2.  Complete/review Learning Assessment  3.  Assess knowledge level of disease process/condition, treatment plan, diagnostic tests and medications  4.  Explain disease process/condition, treatment plan, diagnostic tests and medications  Outcome: Progressing     Problem: Skin Integrity  Goal: Skin integrity is maintained or improved  Description: Target End Date:  Prior to discharge or change in level of care    Document interventions on Skin Risk/Thad flowsheet groups and corresponding LDA    1.  Assess and monitor skin integrity, appearance and/or temperature  2.  Assess risk factors for impaired skin integrity and/or pressures ulcers  3.  Implement precautions to protect skin integrity in collaboration with interdisciplinary team  4.  Implement pressure ulcer prevention protocol if at risk for skin breakdown  5.  Confirm wound care consult if at risk for skin breakdown  6.  Ensure patient use of pressure relieving devices  (Low air loss bed, waffle overlay, heel protectors, ROHO cushion, etc)  Outcome: Progressing     Problem: Fall Risk  Goal: Patient will remain free from falls  Description: Target End Date:  Prior to discharge or change in level of care    Document interventions on the Krupa Guillory Fall Risk Assessment    1.  Assess for fall risk factors  2.   Implement fall precautions  Outcome: Progressing     Problem: Pain - Standard  Goal: Alleviation of pain or a reduction in pain to the patient’s comfort goal  Description: Target End Date:  Prior to discharge or change in level of care    Document on Vitals flowsheet    1.  Document pain using the appropriate pain scale per order or unit policy  2.  Educate and implement non-pharmacologic comfort measures (i.e. relaxation, distraction, massage, cold/heat therapy, etc.)  3.  Pain management medications as ordered  4.  Reassess pain after pain med administration per policy  5.  If opiods administered assess patient's response to pain medication is appropriate per POSS sedation scale  6.  Follow pain management plan developed in collaboration with patient and interdisciplinary team (including palliative care or pain specialists if applicable)  Outcome: Progressing       Patient is not progressing towards the following goals:

## 2024-08-15 NOTE — HOSPITAL COURSE
"\"59 y.o. female who presented 8/14/2024 with past medical history of extensive alcohol use and recently diagnosed with cirrhosis she has had GI bleeding before, with a history of esophageal varices.  She has had both melena and coffee-ground emesis for the last couple of days weakness general fatigue.  She presented the emergency department today for lightheadedness and presyncopal symptoms, the patient in the ED was hypotensive and given crystalloid and then was noted to have a hemoglobin of 5 and was given PRBCs, she her blood pressure improved and she was normotensive thereafter, well supported good mentation and no further bleeding.     She was seen by GI in the ED with plans to do EGD tomorrow morning, she will be admitted to ICU for further resuscitation and observation given the extent of her bleeding and potential for esophageal variceal bleeding.\" - Dr. Carranza 8/14    8/15 - EGD with esoph varices s/p 7 bands  "

## 2024-08-15 NOTE — CARE PLAN
The patient is Watcher - Medium risk of patient condition declining or worsening    Shift Goals  Clinical Goals: Q6 H&H; EGD; Rpt TEG  Patient Goals: eat and drink  Family Goals: JESUSITA    Progress made toward(s) clinical / shift goals:    Problem: Knowledge Deficit - Standard  Goal: Patient and family/care givers will demonstrate understanding of plan of care, disease process/condition, diagnostic tests and medications  Outcome: Progressing     Problem: Skin Integrity  Goal: Skin integrity is maintained or improved  Outcome: Progressing     Problem: Fall Risk  Goal: Patient will remain free from falls  Outcome: Progressing     Problem: Pain - Standard  Goal: Alleviation of pain or a reduction in pain to the patient’s comfort goal  Outcome: Progressing

## 2024-08-15 NOTE — OR NURSING
Telephone report received from bedside R[place] for patient procedure in RV Periop today. All questions answered at this time.

## 2024-08-15 NOTE — ASSESSMENT & PLAN NOTE
Monitor synthetic function  Avoid hepatotoxins  Paracentesis today both diagnostic and therapeutic with albumin repletion

## 2024-08-15 NOTE — DOCUMENTATION QUERY
"                                                                         [facility]                                                                       Query Response Note      PATIENT:               ROE JJ  ACCT #:                  [id number]  MRN:                     [id number]  :                      1965  ADMIT DATE:       2024 2:09 PM  DISCH DATE:          RESPONDING  PROVIDER #:        [id number]           QUERY TEXT:    Anemia is documented in the Medical Record. Can the type of anemia be further specified?    The patient's Clinical Indicators include:  59 year old female admitted with a GIB.     Tere \"Serial H/H, transfuse for Hb <7 unless unstable\"     ED note \"Acute anemia\"     HGB 5.0  8/15 HGB 7.3    Risk factors: GIB, cirrhosis,   Treatment: labs, CXR, CT, transfusion    If you have any questions, please contact:  Yesenia Cao RN [facility]  [email or web address]  Yesenia Cao [address]    Note: If you agree with a diagnosis listed, please remember to include it in your concurrent daily documentation and onto the Discharge Summary.  Options provided:   -- Acute blood loss anemia   -- GIB with acute blood loss anemia treated with transfusion   -- Other explanation, (please specify other explanation)      Query created by: Yesenia Cao on 8/15/2024 9:54 AM    RESPONSE TEXT:    GIB with acute blood loss anemia treated with transfusion          Electronically signed by:  IESHA SUTTON MD 8/15/2024 1:12 PM              "

## 2024-08-15 NOTE — ANESTHESIA PREPROCEDURE EVALUATION
Case: [id number] Date/Time: 08/15/24 1021    Procedure: GASTROSCOPY (Esophagus)    Anesthesia type: MAC    Pre-op diagnosis: coffee-ground emesis and melena in the last couple days    Location: [facility] ROOM 26 / SURGERY SAME DAY [facility]    Surgeons: Bahman Vences M.D.            Relevant Problems   NEURO   (positive) Migraine without aura and without status migrainosus, not intractable      CARDIAC   (positive) Essential hypertension   (positive) Mesenteric varices   (positive) Migraine without aura and without status migrainosus, not intractable   (positive) PSVT (paroxysmal supraventricular tachycardia) (HCC)   (positive) Peritoneal varices         (positive) Alcoholic cirrhosis of liver with ascites (HCC)      Other   (positive) Splenomegaly       Physical Exam    Airway   Mallampati: III  TM distance: >3 FB  Neck ROM: full       Cardiovascular - normal exam  Rhythm: regular  Rate: normal  (-) murmur     Dental - normal exam      Very poor dentition     Pulmonary - normal exam  Breath sounds clear to auscultation     Abdominal    Neurological - normal exam         Other findings: Patient has cirrhosis and GI bleeding.              Anesthesia Plan    ASA 3   ASA physical status 3 criteria: alcohol and/or substance dependence or abuse    Plan - general       Airway plan will be mask          Induction: intravenous      Pertinent diagnostic labs and testing reviewed    Informed Consent:    Anesthetic plan and risks discussed with patient.    Use of blood products discussed with: patient whom consented to blood products.

## 2024-08-15 NOTE — CONSULTS
Date of Consultation:  8/14/2024    Patient: : Kavita Freeman  MRN: [id number]    Referring Physician:  Dr. Clifford Carranza MD.    GI:Bahman Vences M.D.     Reason for Consultation: GI bleeding    History of Present Illness:   The patient is 59 years old female with history of alcohol use, GI bleeding before presented to inpatient GI service for coffee-ground emesis and melena in the last couple days.  The patient started to have some discomfort from epigastric area, the patient had a intermittent vomiting with blood or coffee-ground and also the patient noted tarry stool in the last couple days.    For her cirrhosis, the patient is aware she had the liver damage from alcohol use.  Last alcohol use was couple months ago.  The patient denies any upper GI scope before, she told me she is going to be seen by digestive health in this month.    Distended abdomen at bedside but no acute abdomen.  Last bowel movement around 6 hours ago.  Last coffee-ground vomiting was earlier today.      Past Medical History:   Diagnosis Date    Septic shock ([facility]) 06/14/2024    Acute kidney injury ([facility]) 06/14/2024    Necrotizing fasciitis ([facility]) 06/14/2024    Acute cystitis with hematuria 09/21/2016    Hypotension 07/15/2016    Lightheadedness 12/17/2015    Other chest pain 12/17/2015    Pain 02/24/2015    right shoulder 6/10    UTI (lower urinary tract infection) 09/21/2013    URI (upper respiratory infection) 09/21/2013    PSVT (paroxysmal supraventricular tachycardia) ([facility]) 06/24/2013    Tobacco abuse 05/03/2013    Allergy     Anxiety     Arrhythmia     for tachycardia    Back pain     Depression     Treated by Dr Karla Whatley    Depression     Hyperlipidemia     Hypertension     Indigestion     Psychiatric disorder     anxiety     Urinary tract infection, site not specified     Recurrent UTI's         Past Surgical History:   Procedure Laterality Date    OR I&D PERIRECTAL ABSCESS N/A 6/17/2024    Procedure: INCISION AND  DRAINAGE, ABSCESS, PERIRECTAL;  Surgeon: Gilberto Sierra M.D.;  Location: SURGERY [facility];  Service: Thoracic    OH I&D PERIRECTAL ABSCESS  2024    Procedure: INCISION AND DRAINAGE, ABSCESS, PERIRECTAL;  Surgeon: Kelton Orellana M.D.;  Location: SURGERY [facility];  Service: General    SHOULDER ARTHROSCOPY W/ ROTATOR CUFF REPAIR  3/9/2015    Performed by Gilberto Terry M.D. at SURGERY [facility] ORS    TRIGGER FINGER RELEASE  3/9/2015    Performed by Gilberto Terry M.D. at SURGERY [facility] ORS    OTHER  2014    broken ribs- plates & Pins right front 4-8    SHOULDER ARTHROSCOPY  3/23/2009    Performed by GILBERTO TERRY at SURGERY [facility] ORS    BREAST BIOPSY  08    Performed by MARY DE LA CRUZ at SURGERY SAME DAY [facility] ORS    SHOULDER ARTHROSCOPY      GYN SURGERY      tubal ligation    OPEN REDUCTION      flail chest    TUBAL LIGATION         Family History   Problem Relation Age of Onset    Hypertension Mother     Stroke Mother     Hypertension Father         ? valvular heart     Hypertension Brother     Cancer Paternal Grandmother         breast cancer       Social History     Socioeconomic History    Marital status:    Tobacco Use    Smoking status: Every Day     Current packs/day: 0.00     Average packs/day: 0.5 packs/day for 25.0 years (12.5 ttl pk-yrs)     Types: Cigarettes     Start date: 1994     Last attempt to quit: 2016     Years since quittin.1    Smokeless tobacco: Never    Tobacco comments:     1-2 cigarettes a day   Substance and Sexual Activity    Alcohol use: No     Alcohol/week: 1.5 - 2.0 oz     Types: 3 - 4 Cans of beer per week    Drug use: No    Sexual activity: Yes     Partners: Male   Social History Narrative    ** Merged History Encounter **          Social Determinants of Health     Food Insecurity: No Food Insecurity (2024)    Hunger Vital Sign     Worried About Running Out of Food in the Last Year: Never true      Ran Out of Food in the Last Year: Never true   Transportation Needs: Unmet Transportation Needs (8/14/2024)    PRAPARE - Transportation     Lack of Transportation (Medical): Yes     Lack of Transportation (Non-Medical): No   Intimate Partner Violence: Not At Risk (8/14/2024)    Humiliation, Afraid, Rape, and Kick questionnaire     Fear of Current or Ex-Partner: No     Emotionally Abused: No     Physically Abused: No     Sexually Abused: No   Housing Stability: Low Risk  (8/14/2024)    Housing Stability Vital Sign     Unable to Pay for Housing in the Last Year: No     Number of Times Moved in the Last Year: 1     Homeless in the Last Year: No           Physical Exam:  Vitals:    08/14/24 1602 08/14/24 1641 08/14/24 1643 08/14/24 1704   BP: 101/46   107/51   Pulse: 72   74   Resp: 18   17   Temp: 36.3 °C (97.4 °F)   35.9 °C (96.7 °F)   SpO2: 98%   98%   Weight:  83.6 kg (184 lb 4.9 oz) 71 kg (156 lb 8.4 oz)        Constitutional: No fevers.  Eyes: No symptoms reported.   Ears/Nose/Throat/Mouth: No choking reported.  Cardiovascular: No chest pain reported.   Respiratory: Denies shortness of breath.  Gastrointestinal/Hepatic: Per H/P.   Skin/Breast: No new rash reported.   Psychiatric: No complaints    HEENT: grossly normal.  Cardiovascular: Please refer to primary team's daily assessment.   Lungs: Please refer to primary team's daily assessment.   Abdomen: Distended, soft, likely ascites inside, no evidence of acute abdomen.  Skin: No erythema, No rash.  Lower limbs: normal, no pitting edema.   Neurologic: Alert & oriented x 3, Normal motor function, No focal deficits noted.  PSY: stable mood.         Labs:  Recent Labs     08/14/24  1427   WBC 37.3*   RBC 1.45*   HEMOGLOBIN 5.0*   HEMATOCRIT 15.5*   .7*   MCH 34.5*   MCHC 31.4*   RDW 74.2*   PLATELETCT 305   MPV 11.5     Recent Labs     08/14/24  1427   SODIUM 130*   POTASSIUM 3.7   CHLORIDE 100   CO2 15*   GLUCOSE 119*   BUN 34*     Recent Labs      08/14/24  1427   APTT 37.6*   INR 2.56*       Recent Labs     08/14/24  1427 08/14/24  1500   ASTSGOT 65*  --    ALTSGPT 33  --    TBILIRUBIN 2.4*  --    ALKPHOSPHAT 62  --    GLOBULIN 3.2  --    INR 2.56*  --    AMMONIA  --  90*         Imaging:  CT-HEAD W/O  Narrative: 8/14/2024 2:54 PM    HISTORY/REASON FOR EXAM:  Head Injury.    TECHNIQUE/EXAM DESCRIPTION AND NUMBER OF VIEWS:  CT of the head without contrast.    The study was performed on a helical multidetector CT scanner. Contiguous axial sections were obtained from the skull base through the vertex.    Up to date radiation dose reduction adjustments have been utilized to meet ALARA standards for radiation dose reduction.    COMPARISON:  Noncontrast head CT 6/1/2014    FINDINGS:  No intracranial mass, mass effect, midline shift, hydrocephalus, hemorrhage or CT apparent acute infarct.    Calvarium is intact.    Paranasal sinuses in the field of view are unremarkable.    Mastoids in the field of view are unremarkable.  Impression: No acute intracranial abnormality detected.  DX-CHEST-PORTABLE (1 VIEW)  Narrative: 8/14/2024 2:13 PM    HISTORY/REASON FOR EXAM:  Cough.    TECHNIQUE/EXAM DESCRIPTION AND NUMBER OF VIEWS:  Single portable view of the chest.    COMPARISON: Chest radiograph 6/14/2024    FINDINGS:  Mild left basilar opacification be atelectasis versus mild infiltrate.    Normal heart size. Plate and screw fixation of right ribs.  Impression: Mild left basilar opacification be atelectasis versus mild infiltrate.          CAT scan from June 2024  1.  Cirrhotic changes of the liver.  2.  Splenomegaly.  3.  Esophageal, periumbilical, mesenteric, and retroperitoneal varices are noted.  4.  Small to moderate amount of ascites throughout the lower abdomen and pelvis  5.  Acute left-sided perirectal abscess extending into the medial aspect of the left upper thigh.      Impression:    59 years old female with history of alcoholic cirrhosis, being sober for more  than 3 months, presented to inpatient GI service for coffee-ground emesis, small amount hematemesis, melena in the last 1 to 2 weeks.    Distended abdomen, likely from ascites.  But no overt abdominal pain or evidence of SBP.    GI team will suggest repeat Doppler with ultrasound to check the portal vein flow.  If ascites volume is large, consider diagnostic and therapeutic paracentesis for symptom relief.    For GI bleeding, could be from variceal bleeding, AVMs, GAVE, other vascular disease related.  Regular ulcer erosion and nuclear bleeding not excluded at this time.    For cirrhotic bleeding, consider IV antibiotic ceftriaxone.    Agree to continue IV PPI, octreotide, fluid support, n.p.o., upper GI scope on August 15.    Diagnosis: upper gastrointestinal bleeding.   Procedure: Esophagogastroduodenoscopy with bleeding control including banding.       This note was generated using voice recognition software which has a small chance of producing errors of grammar and possibly content. I have made every reasonable attempt to find and correct any obvious errors, but expect that some may not be found prior to finalization of this note.

## 2024-08-15 NOTE — ASSESSMENT & PLAN NOTE
Secondary to esophageal varices s/p bands x 7 8/15  GI consulted  Clear liquid diet today, advance when cleared by GI  Serial H/H with conservative transfusion strategy  Continue IV Protonix twice daily  Continue IV Rocephin x 7 days

## 2024-08-15 NOTE — OR NURSING
1035: Pt arrived from OR to PACU 5. Connected to monitor. Report received from anesthesia & RN. VSS. Oxygen at 6L via mask. Breaths calm, even, and unlabored.  No signs of pain.       1044 md at bedside updated patient,    1048 prn pain medication given for epigastric pain , pt tolerating water     1106 report given to Kelton ZALDIVAR      updated family on patient status, patient transported to room via bed  with RN,with monitor on room air.

## 2024-08-15 NOTE — PROCEDURES
Vascular Access Team    Date of Insertion: 8/15/24  Arm Circumference: n/a  Line Length: 10  Line Size: 20  Vein Occupancy %: 39  Reason for Midline: access  Labs: WBC 28.0, , BUN 30, Cr 0.91, GFR 74, INR 2.32    Orders confirmed, vessel patency confirmed with ultrasound. Risks and benefits of procedure explained to patient and education regarding line associated bloodstream infections provided. Questions answered.     PowerGlide Midline placed in LUE per licensed provider order with ultrasound guidance. 20g, 10 cm line placed in basilic vein after 1 attempt(s).  Catheter inserted with brisk blood return. Secured with 0cm external from insertion site.  Line flushed without resistance with 10 mL 0.9% normal saline.  Midline secured with Biopatch and Tegaderm.     Midline placement is confirmed by nurse using ultrasound and ability to flush and draw blood. Midline is appropriate for use at this time.  No X-ray is needed for placement confirmation. Pt tolerated procedure well.  Patient condition relayed to unit RN or ordering physician via this post procedure note in the EMR.    Ultrasound images uploaded to PACS and viewable in the EMR - yes  Ultrasound imaged printed and placed in paper chart - no      BARD PowerGlide Midline ref # [id number], Lot # [id number], Expiration Date 4/30/25

## 2024-08-16 ENCOUNTER — APPOINTMENT (OUTPATIENT)
Dept: RADIOLOGY | Facility: MEDICAL CENTER | Age: 59
DRG: 432 | End: 2024-08-16
Attending: NURSE PRACTITIONER
Payer: COMMERCIAL

## 2024-08-16 PROBLEM — E87.8 ELECTROLYTE ABNORMALITY: Status: ACTIVE | Noted: 2024-08-16

## 2024-08-16 LAB
ALBUMIN FLD-MCNC: 0.3 G/DL
ALBUMIN SERPL BCP-MCNC: 2.5 G/DL (ref 3.2–4.9)
ALBUMIN/GLOB SERPL: 0.8 G/DL
ALP SERPL-CCNC: 73 U/L (ref 30–99)
ALT SERPL-CCNC: 151 U/L (ref 2–50)
ANION GAP SERPL CALC-SCNC: 9 MMOL/L (ref 7–16)
APPEARANCE FLD: CLEAR
AST SERPL-CCNC: 160 U/L (ref 12–45)
BASOPHILS # BLD AUTO: 0.4 % (ref 0–1.8)
BASOPHILS # BLD: 0.06 K/UL (ref 0–0.12)
BILIRUB SERPL-MCNC: 3 MG/DL (ref 0.1–1.5)
BODY FLD TYPE: NORMAL
BODY FLD TYPE: NORMAL
BUN SERPL-MCNC: 20 MG/DL (ref 8–22)
CA-I SERPL-SCNC: 1.2 MMOL/L (ref 1.1–1.3)
CALCIUM ALBUM COR SERPL-MCNC: 9.6 MG/DL (ref 8.5–10.5)
CALCIUM SERPL-MCNC: 8.4 MG/DL (ref 8.5–10.5)
CELLS FLD: 1
CHLORIDE SERPL-SCNC: 100 MMOL/L (ref 96–112)
CO2 SERPL-SCNC: 19 MMOL/L (ref 20–33)
COLOR FLD: YELLOW
CREAT SERPL-MCNC: 0.86 MG/DL (ref 0.5–1.4)
EOSINOPHIL # BLD AUTO: 1.06 K/UL (ref 0–0.51)
EOSINOPHIL NFR BLD: 6.3 % (ref 0–6.9)
ERYTHROCYTE [DISTWIDTH] IN BLOOD BY AUTOMATED COUNT: 61.8 FL (ref 35.9–50)
GFR SERPLBLD CREATININE-BSD FMLA CKD-EPI: 79 ML/MIN/1.73 M 2
GLOBULIN SER CALC-MCNC: 3.2 G/DL (ref 1.9–3.5)
GLUCOSE BLD STRIP.AUTO-MCNC: 115 MG/DL (ref 65–99)
GLUCOSE SERPL-MCNC: 108 MG/DL (ref 65–99)
GRAM STN SPEC: NORMAL
HCT VFR BLD AUTO: 23.4 % (ref 37–47)
HGB BLD-MCNC: 7.7 G/DL (ref 12–16)
HGB BLD-MCNC: 8.1 G/DL (ref 12–16)
HGB BLD-MCNC: 8.1 G/DL (ref 12–16)
IMM GRANULOCYTES # BLD AUTO: 0.33 K/UL (ref 0–0.11)
IMM GRANULOCYTES NFR BLD AUTO: 1.9 % (ref 0–0.9)
INR PPP: 1.91 (ref 0.87–1.13)
LYMPHOCYTES # BLD AUTO: 2.98 K/UL (ref 1–4.8)
LYMPHOCYTES NFR BLD: 17.6 % (ref 22–41)
LYMPHOCYTES NFR FLD: 64 %
MAGNESIUM SERPL-MCNC: 1.8 MG/DL (ref 1.5–2.5)
MCH RBC QN AUTO: 31.5 PG (ref 27–33)
MCHC RBC AUTO-ENTMCNC: 34.2 G/DL (ref 32.2–35.5)
MCV RBC AUTO: 92.1 FL (ref 81.4–97.8)
MONOCYTES # BLD AUTO: 1.43 K/UL (ref 0–0.85)
MONOCYTES NFR BLD AUTO: 8.4 % (ref 0–13.4)
MONOS+MACROS NFR FLD MANUAL: 22 %
NEUTROPHILS # BLD AUTO: 11.09 K/UL (ref 1.82–7.42)
NEUTROPHILS NFR BLD: 65.4 % (ref 44–72)
NEUTROPHILS NFR FLD: 13 %
NRBC # BLD AUTO: 0.02 K/UL
NRBC BLD-RTO: 0.1 /100 WBC (ref 0–0.2)
NUC CELL # FLD: 549 CELLS/UL
PHOSPHATE SERPL-MCNC: 2.3 MG/DL (ref 2.5–4.5)
PLATELET # BLD AUTO: 163 K/UL (ref 164–446)
PMV BLD AUTO: 11 FL (ref 9–12.9)
POTASSIUM SERPL-SCNC: 4 MMOL/L (ref 3.6–5.5)
PROT FLD-MCNC: 0.7 G/DL
PROT SERPL-MCNC: 5.7 G/DL (ref 6–8.2)
PROTHROMBIN TIME: 22.1 SEC (ref 12–14.6)
RBC # BLD AUTO: 2.54 M/UL (ref 4.2–5.4)
RBC # FLD: <2000 CELLS/UL
SIGNIFICANT IND 70042: NORMAL
SITE SITE: NORMAL
SODIUM SERPL-SCNC: 128 MMOL/L (ref 135–145)
SOURCE SOURCE: NORMAL
WBC # BLD AUTO: 17 K/UL (ref 4.8–10.8)

## 2024-08-16 PROCEDURE — 89051 BODY FLUID CELL COUNT: CPT

## 2024-08-16 PROCEDURE — 700111 HCHG RX REV CODE 636 W/ 250 OVERRIDE (IP): Mod: JZ

## 2024-08-16 PROCEDURE — 700105 HCHG RX REV CODE 258: Performed by: INTERNAL MEDICINE

## 2024-08-16 PROCEDURE — 85018 HEMOGLOBIN: CPT

## 2024-08-16 PROCEDURE — 80053 COMPREHEN METABOLIC PANEL: CPT

## 2024-08-16 PROCEDURE — 99233 SBSQ HOSP IP/OBS HIGH 50: CPT | Mod: 25 | Performed by: INTERNAL MEDICINE

## 2024-08-16 PROCEDURE — 93975 VASCULAR STUDY: CPT

## 2024-08-16 PROCEDURE — 49082 ABD PARACENTESIS: CPT

## 2024-08-16 PROCEDURE — 700102 HCHG RX REV CODE 250 W/ 637 OVERRIDE(OP): Performed by: EMERGENCY MEDICINE

## 2024-08-16 PROCEDURE — 700111 HCHG RX REV CODE 636 W/ 250 OVERRIDE (IP): Performed by: EMERGENCY MEDICINE

## 2024-08-16 PROCEDURE — 84157 ASSAY OF PROTEIN OTHER: CPT

## 2024-08-16 PROCEDURE — 83735 ASSAY OF MAGNESIUM: CPT

## 2024-08-16 PROCEDURE — P9047 ALBUMIN (HUMAN), 25%, 50ML: HCPCS | Mod: JZ

## 2024-08-16 PROCEDURE — 700101 HCHG RX REV CODE 250: Performed by: INTERNAL MEDICINE

## 2024-08-16 PROCEDURE — 700102 HCHG RX REV CODE 250 W/ 637 OVERRIDE(OP): Performed by: HOSPITALIST

## 2024-08-16 PROCEDURE — 85025 COMPLETE CBC W/AUTO DIFF WBC: CPT

## 2024-08-16 PROCEDURE — 770020 HCHG ROOM/CARE - TELE (206)

## 2024-08-16 PROCEDURE — 87070 CULTURE OTHR SPECIMN AEROBIC: CPT

## 2024-08-16 PROCEDURE — A9270 NON-COVERED ITEM OR SERVICE: HCPCS | Performed by: HOSPITALIST

## 2024-08-16 PROCEDURE — 84100 ASSAY OF PHOSPHORUS: CPT

## 2024-08-16 PROCEDURE — 99232 SBSQ HOSP IP/OBS MODERATE 35: CPT | Performed by: NURSE PRACTITIONER

## 2024-08-16 PROCEDURE — 700102 HCHG RX REV CODE 250 W/ 637 OVERRIDE(OP): Performed by: INTERNAL MEDICINE

## 2024-08-16 PROCEDURE — 87205 SMEAR GRAM STAIN: CPT

## 2024-08-16 PROCEDURE — 82330 ASSAY OF CALCIUM: CPT

## 2024-08-16 PROCEDURE — 99222 1ST HOSP IP/OBS MODERATE 55: CPT | Performed by: HOSPITALIST

## 2024-08-16 PROCEDURE — A9270 NON-COVERED ITEM OR SERVICE: HCPCS | Performed by: INTERNAL MEDICINE

## 2024-08-16 PROCEDURE — 82042 OTHER SOURCE ALBUMIN QUAN EA: CPT

## 2024-08-16 PROCEDURE — 700101 HCHG RX REV CODE 250: Performed by: EMERGENCY MEDICINE

## 2024-08-16 PROCEDURE — 85610 PROTHROMBIN TIME: CPT

## 2024-08-16 PROCEDURE — 36415 COLL VENOUS BLD VENIPUNCTURE: CPT

## 2024-08-16 PROCEDURE — 49083 ABD PARACENTESIS W/IMAGING: CPT | Mod: GC | Performed by: INTERNAL MEDICINE

## 2024-08-16 PROCEDURE — 0W9G3ZZ DRAINAGE OF PERITONEAL CAVITY, PERCUTANEOUS APPROACH: ICD-10-PCS | Performed by: INTERNAL MEDICINE

## 2024-08-16 PROCEDURE — A9270 NON-COVERED ITEM OR SERVICE: HCPCS | Performed by: EMERGENCY MEDICINE

## 2024-08-16 PROCEDURE — 82962 GLUCOSE BLOOD TEST: CPT

## 2024-08-16 PROCEDURE — 700111 HCHG RX REV CODE 636 W/ 250 OVERRIDE (IP): Performed by: INTERNAL MEDICINE

## 2024-08-16 RX ORDER — SPIRONOLACTONE 50 MG/1
50 TABLET, FILM COATED ORAL EVERY EVENING
Status: DISCONTINUED | OUTPATIENT
Start: 2024-08-16 | End: 2024-08-21 | Stop reason: HOSPADM

## 2024-08-16 RX ORDER — LACTULOSE 20 G/30ML
30 SOLUTION ORAL EVERY EVENING
Status: DISCONTINUED | OUTPATIENT
Start: 2024-08-16 | End: 2024-08-17

## 2024-08-16 RX ORDER — ALBUMIN (HUMAN) 12.5 G/50ML
25 SOLUTION INTRAVENOUS ONCE
Status: COMPLETED | OUTPATIENT
Start: 2024-08-16 | End: 2024-08-16

## 2024-08-16 RX ORDER — MAGNESIUM SULFATE HEPTAHYDRATE 40 MG/ML
2 INJECTION, SOLUTION INTRAVENOUS ONCE
Status: COMPLETED | OUTPATIENT
Start: 2024-08-16 | End: 2024-08-16

## 2024-08-16 RX ORDER — ALBUMIN (HUMAN) 12.5 G/50ML
SOLUTION INTRAVENOUS
Status: COMPLETED
Start: 2024-08-16 | End: 2024-08-16

## 2024-08-16 RX ADMIN — SENNOSIDES AND DOCUSATE SODIUM 2 TABLET: 50; 8.6 TABLET ORAL at 05:11

## 2024-08-16 RX ADMIN — LACTULOSE 30 ML: 10 SOLUTION ORAL at 18:43

## 2024-08-16 RX ADMIN — ACETAMINOPHEN 650 MG: 325 TABLET ORAL at 04:53

## 2024-08-16 RX ADMIN — PANTOPRAZOLE SODIUM 40 MG: 40 INJECTION, POWDER, FOR SOLUTION INTRAVENOUS at 05:11

## 2024-08-16 RX ADMIN — SENNOSIDES AND DOCUSATE SODIUM 2 TABLET: 50; 8.6 TABLET ORAL at 17:00

## 2024-08-16 RX ADMIN — SUCRALFATE 1 G: 1 SUSPENSION ORAL at 23:43

## 2024-08-16 RX ADMIN — ACETAMINOPHEN 650 MG: 325 TABLET ORAL at 20:35

## 2024-08-16 RX ADMIN — SODIUM PHOSPHATE, MONOBASIC, MONOHYDRATE AND SODIUM PHOSPHATE, DIBASIC, ANHYDROUS 15 MMOL: 142; 276 INJECTION, SOLUTION INTRAVENOUS at 08:43

## 2024-08-16 RX ADMIN — MAGNESIUM SULFATE HEPTAHYDRATE 2 G: 2 INJECTION, SOLUTION INTRAVENOUS at 07:40

## 2024-08-16 RX ADMIN — SUCRALFATE 1 G: 1 SUSPENSION ORAL at 17:00

## 2024-08-16 RX ADMIN — CEFTRIAXONE SODIUM 1000 MG: 10 INJECTION, POWDER, FOR SOLUTION INTRAVENOUS at 05:12

## 2024-08-16 RX ADMIN — PANTOPRAZOLE SODIUM 40 MG: 40 INJECTION, POWDER, FOR SOLUTION INTRAVENOUS at 16:58

## 2024-08-16 RX ADMIN — ALBUMIN (HUMAN) 12.5 G: 0.25 INJECTION, SOLUTION INTRAVENOUS at 11:12

## 2024-08-16 RX ADMIN — ALBUMIN (HUMAN) 12.5 G: 0.25 INJECTION, SOLUTION INTRAVENOUS at 11:56

## 2024-08-16 RX ADMIN — ACETAMINOPHEN 650 MG: 325 TABLET ORAL at 12:10

## 2024-08-16 RX ADMIN — SPIRONOLACTONE 50 MG: 50 TABLET ORAL at 18:43

## 2024-08-16 RX ADMIN — SUCRALFATE 1 G: 1 SUSPENSION ORAL at 05:11

## 2024-08-16 RX ADMIN — SUCRALFATE 1 G: 1 SUSPENSION ORAL at 12:09

## 2024-08-16 ASSESSMENT — ENCOUNTER SYMPTOMS
PSYCHIATRIC NEGATIVE: 1
COUGH: 0
MUSCULOSKELETAL NEGATIVE: 1
NECK PAIN: 0
BACK PAIN: 0
GASTROINTESTINAL NEGATIVE: 1
HEARTBURN: 0
FEVER: 0
DOUBLE VISION: 0
EYES NEGATIVE: 1
POLYDIPSIA: 0
NAUSEA: 0
SORE THROAT: 0
FLANK PAIN: 0
CHILLS: 0
HEADACHES: 0
BLURRED VISION: 0
NEUROLOGICAL NEGATIVE: 1
WEAKNESS: 0
BRUISES/BLEEDS EASILY: 0
CONSTIPATION: 0
VOMITING: 0
DIARRHEA: 0
BLOOD IN STOOL: 0
DEPRESSION: 0
ABDOMINAL PAIN: 1
PALPITATIONS: 0
DIZZINESS: 0
NERVOUS/ANXIOUS: 0
SHORTNESS OF BREATH: 0
FOCAL WEAKNESS: 0
CARDIOVASCULAR NEGATIVE: 1
RESPIRATORY NEGATIVE: 1

## 2024-08-16 ASSESSMENT — PAIN DESCRIPTION - PAIN TYPE
TYPE: ACUTE PAIN

## 2024-08-16 ASSESSMENT — COGNITIVE AND FUNCTIONAL STATUS - GENERAL
DRESSING REGULAR UPPER BODY CLOTHING: A LITTLE
TURNING FROM BACK TO SIDE WHILE IN FLAT BAD: A LITTLE
TOILETING: A LITTLE
EATING MEALS: A LITTLE
STANDING UP FROM CHAIR USING ARMS: A LITTLE
DRESSING REGULAR LOWER BODY CLOTHING: A LITTLE
DAILY ACTIVITIY SCORE: 18
MOBILITY SCORE: 18
PERSONAL GROOMING: A LITTLE
WALKING IN HOSPITAL ROOM: A LITTLE
SUGGESTED CMS G CODE MODIFIER MOBILITY: CK
SUGGESTED CMS G CODE MODIFIER DAILY ACTIVITY: CK
MOVING TO AND FROM BED TO CHAIR: A LITTLE
CLIMB 3 TO 5 STEPS WITH RAILING: A LITTLE
HELP NEEDED FOR BATHING: A LITTLE
MOVING FROM LYING ON BACK TO SITTING ON SIDE OF FLAT BED: A LITTLE

## 2024-08-16 ASSESSMENT — FIBROSIS 4 INDEX
FIB4 SCORE: 5.95
FIB4 SCORE: 4.55

## 2024-08-16 NOTE — DIETARY
"Nutrition services: Day 2 of admit.  Kavita Freeman is a 59 y.o. female with admitting DX of GIB (gastrointestinal bleed).  Consult received for Malnutrition Screening Tool score of 2 for unplanned weight loss of 14-23 lb in 6 months.    Spoke with pt at bedside. Prior weight loss was due to change in diet and stopping drinking. Pt reports a good appetite at home. States she does drink one ensure at breakfast daily and would like to continue that here. Discussed current full liquid diet. Pt stated understanding.    Assessment:  Height: 172.7 cm (5' 8\")  Weight: 77.3 kg (170 lb 6.7 oz)  Body mass index is 25.91 kg/m²., BMI classification: Overweight  Diet/Intake: Full liquid. PO intake breakfast 8/16 = %.    Evaluation:   History includes alcohol use and cirrhosis.  Per chart review, weight on 6/15/24 = 75 kg. Weight has recently been stable.  Labs 8/16 include Na 128 (L), Alb 2.5 (L), Phos 2.3 (L)  Medications include albumin, Rocephin. Pt received sodium phosphate today.  S/P EGD with banding 8/15.  S/P paracentesis today.  Skin: abrasions per RN skin assessment    Malnutrition Risk: ASPEN criteria not met.    Recommendations/Plan:  Diet advancement per MD.   Ensure Plus with breakfast daily per pt preference.  Encourage intake of meals >50%.  Document intake of all meals as % taken in ADL's to provide interdisciplinary communication across all shifts.   Monitor weight.  Nutrition rep will continue to see patient for ongoing meal and snack preferences.     RD to monitor per department policy.      "

## 2024-08-16 NOTE — CARE PLAN
Problem: Knowledge Deficit - Standard  Goal: Patient and family/care givers will demonstrate understanding of plan of care, disease process/condition, diagnostic tests and medications  Outcome: Progressing     Problem: Pain - Standard  Goal: Alleviation of pain or a reduction in pain to the patient’s comfort goal  Outcome: Progressing   The patient is Watcher - Medium risk of patient condition declining or worsening    Shift Goals  Clinical Goals: Q6 Hemoglobin and hemodynamic stability  Patient Goals: rest  Family Goals: no family present    Progress made toward(s) clinical / shift goals:  Pt given pain medication per pain scale.     Patient is not progressing towards the following goals:

## 2024-08-16 NOTE — PROGRESS NOTES
"Critical Care Progress Note    Date of admission  8/14/2024    Chief Complaint  59 y.o. female admitted 8/14/2024 with GIB, cirrhosis    Hospital Course  \"59 y.o. female who presented 8/14/2024 with past medical history of extensive alcohol use and recently diagnosed with cirrhosis she has had GI bleeding before, with a history of esophageal varices.  She has had both melena and coffee-ground emesis for the last couple of days weakness general fatigue.  She presented the emergency department today for lightheadedness and presyncopal symptoms, the patient in the ED was hypotensive and given crystalloid and then was noted to have a hemoglobin of 5 and was given PRBCs, she her blood pressure improved and she was normotensive thereafter, well supported good mentation and no further bleeding.     She was seen by GI in the ED with plans to do EGD tomorrow morning, she will be admitted to ICU for further resuscitation and observation given the extent of her bleeding and potential for esophageal variceal bleeding.\" - Dr. Carranza 8/14    8/15 - EGD with esoph varices s/p 7 bands    Interval Problem Update  Reviewed last 24 hour events:   - EGD with esoph varices s/p 7 bands   - saurabh CLD, BM overnight non-bloody   - Hgb improving to 8.1   - AF, improving WBC   - sinus rahat, -120   - low Ca, Mag, and phos   - AAOx4   - APAP for abd pain   - plts down to 163   - ok UOP   - Na down to 128   - improving EFRAIN   - LFTs improving   - IV PPI, carafate, rocephin   - INR 1.9    Review of Systems  Review of Systems   Constitutional:  Positive for malaise/fatigue. Negative for fever.   HENT:  Negative for sore throat.    Eyes:  Negative for double vision.   Respiratory:  Negative for shortness of breath.    Cardiovascular:  Negative for chest pain.   Gastrointestinal:  Positive for abdominal pain. Negative for blood in stool, melena and nausea.   Genitourinary:  Negative for flank pain.   Musculoskeletal:  Negative for back pain. "   Neurological:  Negative for dizziness and focal weakness.   Endo/Heme/Allergies:  Does not bruise/bleed easily.   Psychiatric/Behavioral:  Negative for depression. The patient is not nervous/anxious.    All other systems reviewed and are negative.       Vital Signs for last 24 hours   Temp:  [36.1 °C (97 °F)-36.7 °C (98 °F)] 36.3 °C (97.4 °F)  Pulse:  [54-80] 63  Resp:  [11-29] 20  BP: (102-135)/(50-72) 113/58  SpO2:  [92 %-100 %] 92 %    Respiratory Information for the last 24 hours   Remains on room air 8/16    Physical Exam   Physical Exam  Vitals and nursing note reviewed.   Constitutional:       General: She is awake.      Appearance: She is well-developed and underweight. She is not toxic-appearing.      Comments: Sitting up, watching TV   HENT:      Head: Normocephalic.      Nose: Nose normal.      Mouth/Throat:      Mouth: Mucous membranes are moist.      Pharynx: Oropharynx is clear.   Eyes:      General: Scleral icterus present.      Pupils: Pupils are equal, round, and reactive to light.      Comments: Conjunctival pallor   Neck:      Vascular: No JVD.      Trachea: No tracheal deviation.   Cardiovascular:      Rate and Rhythm: Regular rhythm. Bradycardia present. Occasional Extrasystoles are present.     Chest Wall: PMI is not displaced.      Pulses: Normal pulses.      Heart sounds: Normal heart sounds. No murmur heard.  Pulmonary:      Effort: Pulmonary effort is normal. No tachypnea or respiratory distress.      Breath sounds: Normal breath sounds. No rhonchi or rales.   Abdominal:      General: Bowel sounds are normal. There is distension.      Palpations: Abdomen is soft. There is fluid wave.      Tenderness: There is no abdominal tenderness. There is no guarding or rebound.   Musculoskeletal:         General: No tenderness.      Cervical back: No rigidity.      Right lower leg: No edema.      Left lower leg: No edema.   Skin:     General: Skin is warm and dry.      Capillary Refill: Capillary  refill takes less than 2 seconds.      Coloration: Skin is jaundiced and pale.   Neurological:      General: No focal deficit present.      Mental Status: She is alert and oriented to person, place, and time.      Cranial Nerves: No cranial nerve deficit.      Sensory: No sensory deficit.   Psychiatric:         Mood and Affect: Mood normal.         Behavior: Behavior normal. Behavior is cooperative.         Medications  Current Facility-Administered Medications   Medication Dose Route Frequency Provider Last Rate Last Admin    MD Alert...ICU Electrolyte Replacement per Pharmacy   Other PHARMACY TO DOSE Jeremy M Gonda, M.D.        sucralfate (Carafate) 1 GM/10ML suspension 1 g  1 g Oral Q6HRS Jeremy M Gonda, M.D.   1 g at 08/16/24 0511    senna-docusate (Pericolace Or Senokot S) 8.6-50 MG per tablet 2 Tablet  2 Tablet Oral BID Clifford Carranza M.D.   2 Tablet at 08/16/24 0511    And    polyethylene glycol/lytes (Miralax) Packet 1 Packet  1 Packet Oral QDAY PRN Clifford Carranza M.D.        cefTRIAXone (Rocephin) syringe 1,000 mg  1,000 mg Intravenous Q24HRS Clifford Carranza M.D.   1,000 mg at 08/16/24 0512    Pharmacy Consult Request - IV Antibiotics to PO conversion   Other PHARMACY TO DOSE Clifford Carranza M.D.        pantoprazole (Protonix) injection 40 mg  40 mg Intravenous BID Clifford Carranza M.D.   40 mg at 08/16/24 0511    insulin regular (HumuLIN R,NovoLIN R) injection  2-9 Units Subcutaneous Q6HRS Clifford Carranza M.D.   2 Units at 08/15/24 1739    And    dextrose 50% (D50W) injection 25 g  25 g Intravenous Q15 MIN PRN Clifford Carranza M.D.        ondansetron (Zofran) syringe/vial injection 4 mg  4 mg Intravenous Q8HRS PRN Clifford Carranza M.D.   4 mg at 08/14/24 2107    acetaminophen (Tylenol) tablet 650 mg  650 mg Oral Q6HRS PRN Clifford Carranza M.D.   650 mg at 08/16/24 0453       Fluids    Intake/Output Summary (Last 24 hours) at 8/16/2024 0640  Last data filed at 8/16/2024 0600  Gross  per 24 hour   Intake 1998.3 ml   Output 602 ml   Net 1396.3 ml       Laboratory          Recent Labs     08/14/24 1427 08/14/24  1741 08/15/24  0352 08/16/24  0500   SODIUM 130*  --  130* 128*   POTASSIUM 3.7  --  4.1 4.0   CHLORIDE 100  --  102 100   CO2 15*  --  18* 19*   BUN 34*  --  30* 20   CREATININE 1.11  --  0.91 0.86   MAGNESIUM  --  1.6 2.0 1.8   PHOSPHORUS  --   --  3.6 2.3*   CALCIUM 8.3*  --  8.1* 8.4*     Recent Labs     08/14/24  1427 08/15/24  0352 08/16/24  0500   ALTSGPT 33 145* 151*   ASTSGOT 65* 210* 160*   ALKPHOSPHAT 62 65 73   TBILIRUBIN 2.4* 2.6* 3.0*   LIPASE 54  --   --    GLUCOSE 119* 126* 108*     Recent Labs     08/14/24  1427 08/15/24  0352 08/16/24  0500   WBC 37.3* 28.0*  --    NEUTSPOLYS 86.80*  --   --    LYMPHOCYTES 5.30*  --   --    MONOCYTES 2.60  --   --    EOSINOPHILS 0.90  --   --    BASOPHILS 0.00  --   --    ASTSGOT 65* 210* 160*   ALTSGPT 33 145* 151*   ALKPHOSPHAT 62 65 73   TBILIRUBIN 2.4* 2.6* 3.0*     Recent Labs     08/14/24 1427 08/14/24 2253 08/15/24  0352 08/15/24  1135 08/15/24  1735 08/15/24  2330 08/16/24  0500   RBC 1.45*  --  2.46*  --   --   --   --    HEMOGLOBIN 5.0*   < > 7.3*   < > 7.9* 7.9* 8.1*   HEMATOCRIT 15.5*  --  22.4*  --   --   --   --    PLATELETCT 305  --  167  --   --   --   --    PROTHROMBTM 27.9*  --  25.8*  --   --   --  22.1*   APTT 37.6*  --   --   --   --   --   --    INR 2.56*  --  2.32*  --   --   --  1.91*    < > = values in this interval not displayed.       Imaging  X-Ray:  I have personally reviewed the images and compared with prior images. and No film today    Assessment/Plan  * GIB (gastrointestinal bleeding)- (present on admission)  Assessment & Plan  Secondary to esophageal varices s/p bands x 7 8/15  GI consulted  Clear liquid diet today, advance when cleared by GI  Serial H/H with conservative transfusion strategy  Continue IV Protonix twice daily  Continue IV Rocephin x 7 days    Coagulopathy (HCC)  Assessment &  Plan  Likely multifactorial iso of cirrhosis   S/p TXA 1g IV  S/p IV vitamin K x 1    Hypotension  Assessment & Plan  Hypotensive prior to arrival and in ED, resolved after crystalloid and PRBCs --> improved    Alcoholic cirrhosis of liver with ascites (HCC)- (present on admission)  Assessment & Plan  Monitor synthetic function  Avoid hepatotoxins  Paracentesis today both diagnostic and therapeutic with albumin repletion    Electrolyte abnormality  Assessment & Plan  Replete and monitor low calcium, magnesium, and phosphorus levels    Prolonged QT interval  Assessment & Plan  Avoid QT prolonging medications  Optimize electrolytes, continuous telemetry monitoring    Hyponatremia  Assessment & Plan  Likely due to cirrhosis -worsening  Monitor    Anemia- (present on admission)  Assessment & Plan  Acute blood loss due to GI bleed -improving  Q 12-hour H/H with conservative transfusion strategy         VTE:  Contraindicated  Ulcer: PPI  Lines: None    I have performed a physical exam and reviewed and updated ROS and Plan today (8/16/2024). In review of yesterday's note (8/15/2024), there are no changes except as documented above.     Discussed patient condition and risk of morbidity and/or mortality with Hospitalist, RN, RT, Pharmacy, , Charge nurse / hot rounds, Patient, and GI. Critical care services will sign off at this time.  Please call with any questions or concerns.    Please note that this dictation was created using voice recognition software. I have made every reasonable attempt to correct obvious errors, but there may be errors of grammar and possibly content that I did not discover before finalizing the note.

## 2024-08-16 NOTE — PROGRESS NOTES
..Gastroenterology Progress Note               Author:  Oumou Mart, CHANDANA,  APRN Date & Time Created: 8/16/2024 8:11 AM       Patient ID:  Name:             Kavita Freeman  YOB: 1965  Age:                 59 y.o.  female  MRN:               [id number]    Medical Decision Making, by Problem:  Active Hospital Problems    Diagnosis     Hyponatremia [E87.1]     Prolonged QT interval [R94.31]     GIB (gastrointestinal bleeding) [K92.2]     Hypotension [I95.9]     Coagulopathy (HCC) [D68.9]     Hypomagnesemia [E83.42]     Anemia [D64.9]     Alcoholic cirrhosis of liver with ascites (HCC) [K70.31]      Presenting Chief Complaint:  GI bleeding    History of Present Illness:  This is a 59 year old female with history of alcohol misuse disorder, recent diagnosis of cirrhosis, GI bleeding secondary to esophageal varices who presented to the ED on 8/14/2024 with melena and coffee ground emesis. She was initiall hypotensive with Hgb of 5. She was resuscitated with crystalloid infusions and PRBCs with improvement. She as admitted to the ICU and started on octreotide, IV pantoprazole, and ceftriaxone with planned endoscopy the next morning.     8/15/2024: EGD with GERD grade C on top of moderate-sized esophageal varices, it is the source of bleeding in the last 2 week. No active bleeding now, s/p banding x 7.     Interval History:  8/16/2024: Patient seen in the TICU. She feels well, no complaints. Had one small hard BM this morning. Hungry.  Hgb 8.1.  WBC 17<28. Paracentesis completed with 5L removed, albumin replenishment, negative for SBP. SAAG 2.2    MELD 3.0 - 25    Hospital Medications:  Current Facility-Administered Medications   Medication Dose Frequency Provider Last Rate Last Admin    magnesium sulfate IVPB premix 2 g  2 g Once Jeremy M Gonda, M.D. 25 mL/hr at 08/16/24 0740 2 g at 08/16/24 0740    sodium phosphate 15 mmol in dextrose 5% 250 mL ivpb  15 mmol Once Jeremy M Gonda, M.D.         MD Alert...ICU Electrolyte Replacement per Pharmacy   PHARMACY TO DOSE Jeremy M Gonda, M.D.        sucralfate (Carafate) 1 GM/10ML suspension 1 g  1 g Q6HRS Jeremy M Gonda, M.D.   1 g at 08/16/24 0511    senna-docusate (Pericolace Or Senokot S) 8.6-50 MG per tablet 2 Tablet  2 Tablet BID Clifford Carranza M.D.   2 Tablet at 08/16/24 0511    And    polyethylene glycol/lytes (Miralax) Packet 1 Packet  1 Packet QDAY PRN Clifford Carranza M.D.        cefTRIAXone (Rocephin) syringe 1,000 mg  1,000 mg Q24HRS Clifford Carranza M.D.   1,000 mg at 08/16/24 0512    Pharmacy Consult Request - IV Antibiotics to PO conversion   PHARMACY TO DOSE Clifford Carranza M.D.        pantoprazole (Protonix) injection 40 mg  40 mg BID Clifford Carranza M.D.   40 mg at 08/16/24 0511    insulin regular (HumuLIN R,NovoLIN R) injection  2-9 Units Q6HRS Clifford Carranza M.D.   2 Units at 08/15/24 1739    And    dextrose 50% (D50W) injection 25 g  25 g Q15 MIN PRN Clifford Carranza M.D.        ondansetron (Zofran) syringe/vial injection 4 mg  4 mg Q8HRS PRN Clifford Carranza M.D.   4 mg at 08/14/24 2107    acetaminophen (Tylenol) tablet 650 mg  650 mg Q6HRS PRN Clifford Carranza M.D.   650 mg at 08/16/24 0453   Last reviewed on 8/14/2024  3:43 PM by Conrado Maharaj       Review of Systems:  Review of Systems   Constitutional:  Negative for chills, fever and malaise/fatigue.   HENT:  Negative for hearing loss.    Eyes:  Negative for blurred vision.   Respiratory:  Negative for cough and shortness of breath.    Cardiovascular:  Positive for leg swelling. Negative for chest pain.   Gastrointestinal:  Positive for abdominal pain. Negative for blood in stool, constipation, diarrhea, heartburn, melena, nausea and vomiting.   Genitourinary:  Negative for dysuria.   Musculoskeletal:  Negative for back pain.   Skin:  Negative for rash.   Neurological:  Negative for dizziness and weakness.   Psychiatric/Behavioral:  Negative for depression.   "  All other systems reviewed and are negative.        Vital signs:  Weight/BMI: Body mass index is 25.91 kg/m².  /58   Pulse 63   Temp 36.3 °C (97.4 °F) (Temporal)   Resp 20   Ht 1.727 m (5' 8\")   Wt 77.3 kg (170 lb 6.7 oz)   SpO2 92%   Vitals:    08/16/24 0300 08/16/24 0400 08/16/24 0500 08/16/24 0600   BP: 108/52 104/55 114/59 113/58   Pulse: (!) 56 60 60 63   Resp: 12 12 16 20   Temp:  36.4 °C (97.5 °F)  36.3 °C (97.4 °F)   TempSrc:  Temporal  Temporal   SpO2: 94% 96% 94% 92%   Weight:  77.3 kg (170 lb 6.7 oz)     Height:         Oxygen Therapy:  Pulse Oximetry: 92 %, O2 (LPM): 6, O2 Delivery Device: Room air w/o2 available    Intake/Output Summary (Last 24 hours) at 8/16/2024 0811  Last data filed at 8/16/2024 0600  Gross per 24 hour   Intake 1828.34 ml   Output 452 ml   Net 1376.34 ml         Physical Exam:  Physical Exam  Vitals and nursing note reviewed.   Constitutional:       General: She is not in acute distress.     Appearance: Normal appearance.   HENT:      Head: Normocephalic and atraumatic.      Right Ear: External ear normal.      Left Ear: External ear normal.      Nose: Nose normal.      Mouth/Throat:      Mouth: Mucous membranes are moist.      Pharynx: Oropharynx is clear.   Eyes:      General: Scleral icterus present.   Cardiovascular:      Rate and Rhythm: Normal rate and regular rhythm.      Pulses: Normal pulses.      Heart sounds: Normal heart sounds.   Pulmonary:      Effort: Pulmonary effort is normal. No respiratory distress.      Breath sounds: Normal breath sounds.   Abdominal:      General: Bowel sounds are normal. There is distension.      Tenderness: There is no abdominal tenderness.   Musculoskeletal:         General: Normal range of motion.      Cervical back: Normal range of motion.      Right lower leg: Edema present.      Left lower leg: Edema present.   Skin:     General: Skin is warm and dry.      Capillary Refill: Capillary refill takes less than 2 seconds.      " Coloration: Skin is jaundiced.   Neurological:      Mental Status: She is alert and oriented to person, place, and time.      Motor: Weakness present.   Psychiatric:         Mood and Affect: Mood normal.         Behavior: Behavior normal.         Labs:  Recent Labs     08/14/24 1427 08/14/24  1741 08/15/24  0352 08/16/24  0500   SODIUM 130*  --  130* 128*   POTASSIUM 3.7  --  4.1 4.0   CHLORIDE 100  --  102 100   CO2 15*  --  18* 19*   BUN 34*  --  30* 20   CREATININE 1.11  --  0.91 0.86   MAGNESIUM  --  1.6 2.0 1.8   PHOSPHORUS  --   --  3.6 2.3*   CALCIUM 8.3*  --  8.1* 8.4*     Recent Labs     08/14/24  1427 08/15/24  0352 08/16/24  0500   ALTSGPT 33 145* 151*   ASTSGOT 65* 210* 160*   ALKPHOSPHAT 62 65 73   TBILIRUBIN 2.4* 2.6* 3.0*   LIPASE 54  --   --    GLUCOSE 119* 126* 108*     Recent Labs     08/14/24  1427 08/15/24  0352 08/16/24  0500   WBC 37.3* 28.0* 17.0*   NEUTSPOLYS 86.80*  --  65.40   LYMPHOCYTES 5.30*  --  17.60*   MONOCYTES 2.60  --  8.40   EOSINOPHILS 0.90  --  6.30   BASOPHILS 0.00  --  0.40   ASTSGOT 65* 210* 160*   ALTSGPT 33 145* 151*   ALKPHOSPHAT 62 65 73   TBILIRUBIN 2.4* 2.6* 3.0*     Recent Labs     08/14/24 1427 08/14/24  2253 08/15/24  0352 08/15/24  1135 08/15/24  1735 08/15/24  2330 08/16/24  0500   RBC 1.45*  --  2.46*  --   --   --  2.54*   HEMOGLOBIN 5.0*   < > 7.3*   < > 7.9* 7.9* 8.1*  8.1*   HEMATOCRIT 15.5*  --  22.4*  --   --   --  23.4*   PLATELETCT 305  --  167  --   --   --  163*   PROTHROMBTM 27.9*  --  25.8*  --   --   --  22.1*   APTT 37.6*  --   --   --   --   --   --    INR 2.56*  --  2.32*  --   --   --  1.91*    < > = values in this interval not displayed.     Recent Results (from the past 24 hour(s))   PLATELETS REQUEST    Collection Time: 08/15/24  8:33 AM   Result Value Ref Range    Component P       Plts,Pheresis       [id number]   transfused   08/15/24 08:35    Product Type Platelets Pheresis LR     Dispense Status transfused     Unit Number (Barcoded)  [id number]     Product Code (Barcoded) [id number]     Blood Type (Barcoded) 1700    HGB (Hemoglobin) for 48 hours    Collection Time: 08/15/24 11:35 AM   Result Value Ref Range    Hemoglobin 7.0 (L) 12.0 - 16.0 g/dL   PLATELET MAPPING WITH BASIC TEG    Collection Time: 08/15/24 11:35 AM   Result Value Ref Range    Reaction Time Initial-R 3.7 (L) 4.6 - 9.1 min    React Time Initial Hep 3.9 (L) 4.3 - 8.3 min    Clot Kinetics-K 1.0 0.8 - 2.1 min    Clot Angle-Angle 76.7 63.0 - 78.0 degrees    Maximum Clot Strength-MA 59.2 52.0 - 69.0 mm    TEG Functional Fibrinogen(MA) 19.6 15.0 - 32.0 mm    Lysis 30 minutes-LY30 0.0 0.0 - 2.6 %    % Inhibition ADP 94.1 (H) 0.0 - 17.0 %    % Inhibition AA 35.0 (H) 0.0 - 11.0 %    TEG Algorithm Link Algorithm    IONIZED CALCIUM    Collection Time: 08/15/24 11:50 AM   Result Value Ref Range    Ionized Calcium 1.1 1.1 - 1.3 mmol/L   POCT glucose device results    Collection Time: 08/15/24  5:29 PM   Result Value Ref Range    POC Glucose, Blood 184 (H) 65 - 99 mg/dL   HGB (Hemoglobin) for 48 hours    Collection Time: 08/15/24  5:35 PM   Result Value Ref Range    Hemoglobin 7.9 (L) 12.0 - 16.0 g/dL   IONIZED CALCIUM    Collection Time: 08/15/24  5:35 PM   Result Value Ref Range    Ionized Calcium 1.0 (L) 1.1 - 1.3 mmol/L   HGB (Hemoglobin) for 48 hours    Collection Time: 08/15/24 11:30 PM   Result Value Ref Range    Hemoglobin 7.9 (L) 12.0 - 16.0 g/dL   POCT glucose device results    Collection Time: 08/15/24 11:30 PM   Result Value Ref Range    POC Glucose, Blood 130 (H) 65 - 99 mg/dL   HGB (Hemoglobin) for 48 hours    Collection Time: 08/16/24  5:00 AM   Result Value Ref Range    Hemoglobin 8.1 (L) 12.0 - 16.0 g/dL   Comp Metabolic Panel    Collection Time: 08/16/24  5:00 AM   Result Value Ref Range    Sodium 128 (L) 135 - 145 mmol/L    Potassium 4.0 3.6 - 5.5 mmol/L    Chloride 100 96 - 112 mmol/L    Co2 19 (L) 20 - 33 mmol/L    Anion Gap 9.0 7.0 - 16.0    Glucose 108 (H) 65 - 99 mg/dL     Bun 20 8 - 22 mg/dL    Creatinine 0.86 0.50 - 1.40 mg/dL    Calcium 8.4 (L) 8.5 - 10.5 mg/dL    Correct Calcium 9.6 8.5 - 10.5 mg/dL    AST(SGOT) 160 (H) 12 - 45 U/L    ALT(SGPT) 151 (H) 2 - 50 U/L    Alkaline Phosphatase 73 30 - 99 U/L    Total Bilirubin 3.0 (H) 0.1 - 1.5 mg/dL    Albumin 2.5 (L) 3.2 - 4.9 g/dL    Total Protein 5.7 (L) 6.0 - 8.2 g/dL    Globulin 3.2 1.9 - 3.5 g/dL    A-G Ratio 0.8 g/dL   Magnesium    Collection Time: 08/16/24  5:00 AM   Result Value Ref Range    Magnesium 1.8 1.5 - 2.5 mg/dL   Phosphorus    Collection Time: 08/16/24  5:00 AM   Result Value Ref Range    Phosphorus 2.3 (L) 2.5 - 4.5 mg/dL   Prothrombin Time    Collection Time: 08/16/24  5:00 AM   Result Value Ref Range    PT 22.1 (H) 12.0 - 14.6 sec    INR 1.91 (H) 0.87 - 1.13   IONIZED CALCIUM    Collection Time: 08/16/24  5:00 AM   Result Value Ref Range    Ionized Calcium 1.2 1.1 - 1.3 mmol/L   ESTIMATED GFR    Collection Time: 08/16/24  5:00 AM   Result Value Ref Range    GFR (CKD-EPI) 79 >60 mL/min/1.73 m 2   CBC WITH DIFFERENTIAL    Collection Time: 08/16/24  5:00 AM   Result Value Ref Range    WBC 17.0 (H) 4.8 - 10.8 K/uL    RBC 2.54 (L) 4.20 - 5.40 M/uL    Hemoglobin 8.1 (L) 12.0 - 16.0 g/dL    Hematocrit 23.4 (L) 37.0 - 47.0 %    MCV 92.1 81.4 - 97.8 fL    MCH 31.5 27.0 - 33.0 pg    MCHC 34.2 32.2 - 35.5 g/dL    RDW 61.8 (H) 35.9 - 50.0 fL    Platelet Count 163 (L) 164 - 446 K/uL    MPV 11.0 9.0 - 12.9 fL    Neutrophils-Polys 65.40 44.00 - 72.00 %    Lymphocytes 17.60 (L) 22.00 - 41.00 %    Monocytes 8.40 0.00 - 13.40 %    Eosinophils 6.30 0.00 - 6.90 %    Basophils 0.40 0.00 - 1.80 %    Immature Granulocytes 1.90 (H) 0.00 - 0.90 %    Nucleated RBC 0.10 0.00 - 0.20 /100 WBC    Neutrophils (Absolute) 11.09 (H) 1.82 - 7.42 K/uL    Lymphs (Absolute) 2.98 1.00 - 4.80 K/uL    Monos (Absolute) 1.43 (H) 0.00 - 0.85 K/uL    Eos (Absolute) 1.06 (H) 0.00 - 0.51 K/uL    Baso (Absolute) 0.06 0.00 - 0.12 K/uL    Immature Granulocytes  (abs) 0.33 (H) 0.00 - 0.11 K/uL    NRBC (Absolute) 0.02 K/uL   POCT glucose device results    Collection Time: 08/16/24  5:13 AM   Result Value Ref Range    POC Glucose, Blood 115 (H) 65 - 99 mg/dL       Radiology Review:  IR-MIDLINE CATHETER INSERTION WO GUIDANCE > AGE 3   Final Result                  Ultrasound-guided midline placement performed by qualified nursing staff    as above.          CT-HEAD W/O   Final Result      No acute intracranial abnormality detected.               DX-CHEST-PORTABLE (1 VIEW)   Final Result      Mild left basilar opacification be atelectasis versus mild infiltrate.          MDM (Data Review):   -Records reviewed and summarized in current documentation  -I personally reviewed and interpreted the laboratory results  -I personally reviewed the radiology images    Assessment/Recommendations:  Acute blood loss anemia  Esophageal varices status post banding x 7  GERD grade C  Mild portal hypertensive gastropathy  Decompensated alcoholic cirrhosis  Ascites  Coagulopathy  Prolonged QTc  Hypertension  Leukocytosis    Recommendations:  Continue ceftriaxone, octreotide, pantoprazole, sucralfate  RUQ US with doppler  Will need diuresed with Lasix and spironolactone, consider starting 20 mg p.o. Lasix and 50 mg spironolactone  Can't beta blockade as she is bradycardic  Continue full liquid diet today    GI will round on patient in a.m.    Discussed with patient, RN, Dr. Gonda, Dr. Vences    .Oumou Mart, DNP,  APRN    Core Quality Measures   Reviewed items::  Labs, Medications and Radiology reports reviewed

## 2024-08-16 NOTE — CARE PLAN
The patient is Stable - Low risk of patient condition declining or worsening    Shift Goals  Clinical Goals: Q6 Hemoglobin and hemodynamic stability  Patient Goals: rest  Family Goals: no family present    Progress made toward(s) clinical / shift goals:    Problem: Knowledge Deficit - Standard  Goal: Patient and family/care givers will demonstrate understanding of plan of care, disease process/condition, diagnostic tests and medications  Outcome: Progressing     Problem: Fall Risk  Goal: Patient will remain free from falls  Outcome: Progressing     Problem: Pain - Standard  Goal: Alleviation of pain or a reduction in pain to the patient’s comfort goal  Outcome: Progressing

## 2024-08-16 NOTE — PROGRESS NOTES
4 Eyes Skin Assessment Completed by KAYLEY Davis and KAYLEY Jefferson.    Head WDL  Ears WDL  Nose WDL  Mouth WDL  Neck WDL  Breast/Chest WDL  Shoulder Blades WDL  Spine WDL  (R) Arm/Elbow/Hand WDL  (L) Arm/Elbow/Hand WDL  Abdomen puncture site from para on right quadrant   Groin WDL  Scrotum/Coccyx/Buttocks Redness, Moisture Fissure, Discoloration, and Scar    (R) Leg WDL  (L) Leg WDL  (R) Heel/Foot/Toe WDL  (L) Heel/Foot/Toe WDL          Devices In Places Tele Box, Blood Pressure Cuff, and Pulse Ox      Interventions In Place Pillows    Possible Skin Injury Yes    Pictures Uploaded Into Epic Yes  Wound Consult Placed Yes  RN Wound Prevention Protocol Ordered Yes

## 2024-08-16 NOTE — PROCEDURES
BEDSIDE PARACENTESIS NOTE    PROCEDURE DATE: 08/16/24  PROCEDURE START TIME: 0955    PRIMARY PROCEDURALIST: Reggie Richey M.D.  ASSISTANT(S): None      INFORMED CONSENT: Informed Consent obtained and on the chart    UNIVERSAL PROTOCOL / SAFETY CHECKLIST  Sign in Communication: Completed    Time Out:  Team Confirms the Correct Patient, Correct Procedure, Correct Site and Site Marking, Correct Position (if applicable), Prep and Dry Time (if applicable).  Time:  0954    Affirmation of Time Out: YES      Sign Out Discussion: Completed    PROCEDURE: PARACENTESIS  Indication: Ascites   Anesthesia: 1% lidocaine  Site: Right lateral abdomen  Site Prep: Chlorhexidine gluconate    Ultrasound guidance was used and appropriate fluid pocket was identified and marked. Depth to enter fluid collection was 5 cm & depth to mid of fluid collection was 7 cm.    A paracentesis kit was used, a 16 gauge angiocath was introduced into the peritoneal space and fluid was removed.    Patient tolerated procedure well.    Complications: None.    Total Fluid Removed: 5 L    Color of Fluid: Straw   Specimens: Cell Count, Culture, Gram Stain and Albumin    Estimated Blood Loss if > Minimal Noted Here    Reggie Richey M.D.  08/16/24  10:04 AM

## 2024-08-17 LAB
ALBUMIN SERPL BCP-MCNC: 2.5 G/DL (ref 3.2–4.9)
ALBUMIN/GLOB SERPL: 0.8 G/DL
ALP SERPL-CCNC: 71 U/L (ref 30–99)
ALT SERPL-CCNC: 109 U/L (ref 2–50)
AMMONIA PLAS-SCNC: 74 UMOL/L (ref 11–45)
ANION GAP SERPL CALC-SCNC: 7 MMOL/L (ref 7–16)
AST SERPL-CCNC: 94 U/L (ref 12–45)
BILIRUB SERPL-MCNC: 3 MG/DL (ref 0.1–1.5)
BUN SERPL-MCNC: 14 MG/DL (ref 8–22)
CALCIUM ALBUM COR SERPL-MCNC: 9.1 MG/DL (ref 8.5–10.5)
CALCIUM SERPL-MCNC: 7.9 MG/DL (ref 8.5–10.5)
CHLORIDE SERPL-SCNC: 99 MMOL/L (ref 96–112)
CO2 SERPL-SCNC: 18 MMOL/L (ref 20–33)
CREAT SERPL-MCNC: 0.75 MG/DL (ref 0.5–1.4)
ERYTHROCYTE [DISTWIDTH] IN BLOOD BY AUTOMATED COUNT: 72.3 FL (ref 35.9–50)
GFR SERPLBLD CREATININE-BSD FMLA CKD-EPI: 93 ML/MIN/1.73 M 2
GLOBULIN SER CALC-MCNC: 3.1 G/DL (ref 1.9–3.5)
GLUCOSE SERPL-MCNC: 100 MG/DL (ref 65–99)
HCT VFR BLD AUTO: 24.7 % (ref 37–47)
HGB BLD-MCNC: 8.2 G/DL (ref 12–16)
HGB BLD-MCNC: 8.3 G/DL (ref 12–16)
MAGNESIUM SERPL-MCNC: 1.8 MG/DL (ref 1.5–2.5)
MCH RBC QN AUTO: 31.7 PG (ref 27–33)
MCHC RBC AUTO-ENTMCNC: 33.6 G/DL (ref 32.2–35.5)
MCV RBC AUTO: 94.3 FL (ref 81.4–97.8)
PHOSPHATE SERPL-MCNC: 2.1 MG/DL (ref 2.5–4.5)
PLATELET # BLD AUTO: 154 K/UL (ref 164–446)
PMV BLD AUTO: 10.5 FL (ref 9–12.9)
POTASSIUM SERPL-SCNC: 3.4 MMOL/L (ref 3.6–5.5)
PROT SERPL-MCNC: 5.6 G/DL (ref 6–8.2)
RBC # BLD AUTO: 2.62 M/UL (ref 4.2–5.4)
SODIUM SERPL-SCNC: 124 MMOL/L (ref 135–145)
WBC # BLD AUTO: 15.8 K/UL (ref 4.8–10.8)

## 2024-08-17 PROCEDURE — 85018 HEMOGLOBIN: CPT

## 2024-08-17 PROCEDURE — A9270 NON-COVERED ITEM OR SERVICE: HCPCS | Performed by: NURSE PRACTITIONER

## 2024-08-17 PROCEDURE — 97535 SELF CARE MNGMENT TRAINING: CPT

## 2024-08-17 PROCEDURE — 700102 HCHG RX REV CODE 250 W/ 637 OVERRIDE(OP): Performed by: STUDENT IN AN ORGANIZED HEALTH CARE EDUCATION/TRAINING PROGRAM

## 2024-08-17 PROCEDURE — 84100 ASSAY OF PHOSPHORUS: CPT

## 2024-08-17 PROCEDURE — 99232 SBSQ HOSP IP/OBS MODERATE 35: CPT | Performed by: NURSE PRACTITIONER

## 2024-08-17 PROCEDURE — A9270 NON-COVERED ITEM OR SERVICE: HCPCS | Performed by: STUDENT IN AN ORGANIZED HEALTH CARE EDUCATION/TRAINING PROGRAM

## 2024-08-17 PROCEDURE — 82140 ASSAY OF AMMONIA: CPT

## 2024-08-17 PROCEDURE — 97166 OT EVAL MOD COMPLEX 45 MIN: CPT

## 2024-08-17 PROCEDURE — A9270 NON-COVERED ITEM OR SERVICE: HCPCS | Performed by: INTERNAL MEDICINE

## 2024-08-17 PROCEDURE — 700102 HCHG RX REV CODE 250 W/ 637 OVERRIDE(OP): Performed by: EMERGENCY MEDICINE

## 2024-08-17 PROCEDURE — 99232 SBSQ HOSP IP/OBS MODERATE 35: CPT | Performed by: STUDENT IN AN ORGANIZED HEALTH CARE EDUCATION/TRAINING PROGRAM

## 2024-08-17 PROCEDURE — 770006 HCHG ROOM/CARE - MED/SURG/GYN SEMI*

## 2024-08-17 PROCEDURE — 700102 HCHG RX REV CODE 250 W/ 637 OVERRIDE(OP): Performed by: HOSPITALIST

## 2024-08-17 PROCEDURE — 700101 HCHG RX REV CODE 250: Performed by: EMERGENCY MEDICINE

## 2024-08-17 PROCEDURE — 700102 HCHG RX REV CODE 250 W/ 637 OVERRIDE(OP): Performed by: INTERNAL MEDICINE

## 2024-08-17 PROCEDURE — 85027 COMPLETE CBC AUTOMATED: CPT

## 2024-08-17 PROCEDURE — 97162 PT EVAL MOD COMPLEX 30 MIN: CPT

## 2024-08-17 PROCEDURE — A9270 NON-COVERED ITEM OR SERVICE: HCPCS | Performed by: HOSPITALIST

## 2024-08-17 PROCEDURE — 700111 HCHG RX REV CODE 636 W/ 250 OVERRIDE (IP): Performed by: EMERGENCY MEDICINE

## 2024-08-17 PROCEDURE — 36415 COLL VENOUS BLD VENIPUNCTURE: CPT

## 2024-08-17 PROCEDURE — 83735 ASSAY OF MAGNESIUM: CPT

## 2024-08-17 PROCEDURE — 700102 HCHG RX REV CODE 250 W/ 637 OVERRIDE(OP): Performed by: NURSE PRACTITIONER

## 2024-08-17 PROCEDURE — 80053 COMPREHEN METABOLIC PANEL: CPT

## 2024-08-17 PROCEDURE — A9270 NON-COVERED ITEM OR SERVICE: HCPCS | Performed by: EMERGENCY MEDICINE

## 2024-08-17 RX ORDER — LACTULOSE 20 G/30ML
15 SOLUTION ORAL 2 TIMES DAILY
Status: DISCONTINUED | OUTPATIENT
Start: 2024-08-17 | End: 2024-08-18

## 2024-08-17 RX ORDER — FUROSEMIDE 20 MG
20 TABLET ORAL
Status: DISCONTINUED | OUTPATIENT
Start: 2024-08-17 | End: 2024-08-21 | Stop reason: HOSPADM

## 2024-08-17 RX ORDER — POTASSIUM CHLORIDE 1500 MG/1
40 TABLET, EXTENDED RELEASE ORAL ONCE
Status: COMPLETED | OUTPATIENT
Start: 2024-08-17 | End: 2024-08-17

## 2024-08-17 RX ADMIN — POTASSIUM CHLORIDE 40 MEQ: 1500 TABLET, EXTENDED RELEASE ORAL at 12:27

## 2024-08-17 RX ADMIN — ACETAMINOPHEN 650 MG: 325 TABLET ORAL at 12:30

## 2024-08-17 RX ADMIN — ACETAMINOPHEN 650 MG: 325 TABLET ORAL at 23:48

## 2024-08-17 RX ADMIN — LACTULOSE 15 ML: 10 SOLUTION ORAL at 17:22

## 2024-08-17 RX ADMIN — SUCRALFATE 1 G: 1 SUSPENSION ORAL at 12:27

## 2024-08-17 RX ADMIN — SUCRALFATE 1 G: 1 SUSPENSION ORAL at 23:48

## 2024-08-17 RX ADMIN — SPIRONOLACTONE 50 MG: 50 TABLET ORAL at 17:22

## 2024-08-17 RX ADMIN — SUCRALFATE 1 G: 1 SUSPENSION ORAL at 17:22

## 2024-08-17 RX ADMIN — PANTOPRAZOLE SODIUM 40 MG: 40 INJECTION, POWDER, FOR SOLUTION INTRAVENOUS at 17:22

## 2024-08-17 RX ADMIN — CEFTRIAXONE SODIUM 1000 MG: 10 INJECTION, POWDER, FOR SOLUTION INTRAVENOUS at 04:46

## 2024-08-17 RX ADMIN — SUCRALFATE 1 G: 1 SUSPENSION ORAL at 04:46

## 2024-08-17 RX ADMIN — PANTOPRAZOLE SODIUM 40 MG: 40 INJECTION, POWDER, FOR SOLUTION INTRAVENOUS at 04:45

## 2024-08-17 RX ADMIN — FUROSEMIDE 20 MG: 20 TABLET ORAL at 10:23

## 2024-08-17 ASSESSMENT — COGNITIVE AND FUNCTIONAL STATUS - GENERAL
WALKING IN HOSPITAL ROOM: A LITTLE
SUGGESTED CMS G CODE MODIFIER DAILY ACTIVITY: CH
CLIMB 3 TO 5 STEPS WITH RAILING: A LITTLE
STANDING UP FROM CHAIR USING ARMS: A LITTLE
TURNING FROM BACK TO SIDE WHILE IN FLAT BAD: A LITTLE
MOVING TO AND FROM BED TO CHAIR: A LITTLE
SUGGESTED CMS G CODE MODIFIER MOBILITY: CK
DAILY ACTIVITIY SCORE: 24
MOVING FROM LYING ON BACK TO SITTING ON SIDE OF FLAT BED: A LITTLE
MOBILITY SCORE: 18

## 2024-08-17 ASSESSMENT — ENCOUNTER SYMPTOMS
BLOOD IN STOOL: 0
CHILLS: 0
NAUSEA: 0
CONSTIPATION: 0
EYE PAIN: 0
BACK PAIN: 0
VOMITING: 0
SENSORY CHANGE: 0
COUGH: 0
BLURRED VISION: 0
INSOMNIA: 0
FEVER: 0
DIARRHEA: 1
WEAKNESS: 1
WEAKNESS: 0
SHORTNESS OF BREATH: 0
DIZZINESS: 0
FOCAL WEAKNESS: 0
ABDOMINAL PAIN: 1
HEARTBURN: 0
PALPITATIONS: 0
DEPRESSION: 0
FALLS: 0
HEADACHES: 0

## 2024-08-17 ASSESSMENT — PAIN DESCRIPTION - PAIN TYPE
TYPE: ACUTE PAIN
TYPE: ACUTE PAIN

## 2024-08-17 ASSESSMENT — FIBROSIS 4 INDEX
FIB4 SCORE: 4.55
FIB4 SCORE: 3.33

## 2024-08-17 ASSESSMENT — GAIT ASSESSMENTS
DEVIATION: BRADYKINETIC;DECREASED BASE OF SUPPORT
DISTANCE (FEET): 150
GAIT LEVEL OF ASSIST: STANDBY ASSIST

## 2024-08-17 ASSESSMENT — ACTIVITIES OF DAILY LIVING (ADL): TOILETING: INDEPENDENT

## 2024-08-17 ASSESSMENT — LIFESTYLE VARIABLES: SUBSTANCE_ABUSE: 0

## 2024-08-17 NOTE — PROGRESS NOTES
[facility] informed this RN patient that the patient needs to eat food with fat in it today for HIDA Scan tomorrow.     RN spoke to Kitchen - adjustments to meal tray in place for lunch and dinner today.     Patient NPO tomorrow morning at 0400.

## 2024-08-17 NOTE — ASSESSMENT & PLAN NOTE
Secondary to esophageal varices s/p bands x 7 8/15  GI following  clears  Serial H/H with conservative transfusion strategy  Continue IV Protonix twice daily  Continue IV Rocephin x 7 days  If stable and tolerating advancing diet may be able to transition to orals in the am     8/20 h/h ordered, f/u bmp and cbc  - ppi bid  Outpt GI f/u

## 2024-08-17 NOTE — CONSULTS
Hospital Medicine Consultation    Date of Service  8/16/2024    Referring Physician  Jeremy Gonda, M.D.    Consulting Physician  Ant Christian M.D.    Reason for Consultation  Hospital medicine consultation requested patient admitted with GI bleed, cirrhosis    History of Presenting Illness  59 y.o. female who presented 8/14/2024 with extensive alcohol history, alcohol abuse, recently diagnosed with cirrhosis, she had a history of prior GI bleeding, history of esophageal varices, presenting with several days of complaints of melena, coffee-ground emesis, and general fatigue, the patient presents with lightheadedness, presyncopal symptoms, seen in the emergency room the patient was found hypotensive, given volume resuscitation and the patient was noted to have a hemoglobin of 5, was given cell transfusions, blood pressure initially improved and the patient was getting a normalizing blood pressure, the patient was seen in the emergency room by gastroenterology and the plan was for EGD, the patient mated to ICU secondary to high risk features, need for further resuscitation and observation, on 8/15 the patient had an EGD with esophageal varices findings and 7 esophageal bands placed.  The patient appeared to tolerate the procedure well, she had a paracentesis performed in ICU removing a total of 5 L, the patient was given albumin post paracentesis high-volume.  The patient recently was admitted to the hospital where she had a left upper thigh perirectal abscess, required surgical intervention with I&D and debridement, at the time discharged for outpatient follow-up, she did not present to outpatient gastroenterology per her report.  She reports having had her last alcoholic drink several months ago.      Review of Systems  Review of Systems   Constitutional:  Positive for malaise/fatigue. Negative for chills and fever.   HENT: Negative.     Eyes: Negative.    Respiratory: Negative.  Negative for cough.     Cardiovascular: Negative.  Negative for chest pain and palpitations.   Gastrointestinal: Negative.  Negative for heartburn, nausea and vomiting.   Genitourinary: Negative.  Negative for dysuria and frequency.   Musculoskeletal: Negative.  Negative for back pain and neck pain.   Skin: Negative.  Negative for itching and rash.   Neurological: Negative.  Negative for dizziness, focal weakness, weakness and headaches.   Endo/Heme/Allergies: Negative.  Negative for polydipsia. Does not bruise/bleed easily.   Psychiatric/Behavioral: Negative.  Negative for depression.        Past Medical History   has a past medical history of Acute cystitis with hematuria (09/21/2016), Acute kidney injury (HCC) (06/14/2024), Allergy, Anxiety, Arrhythmia, Back pain, Depression, Depression, Hyperlipidemia, Hypertension, Hypotension (07/15/2016), Indigestion, Lightheadedness (12/17/2015), Necrotizing fasciitis ([facility]) (06/14/2024), Other chest pain (12/17/2015), Pain (02/24/2015), PSVT (paroxysmal supraventricular tachycardia) ([facility]) (06/24/2013), Psychiatric disorder, Septic shock ([facility]) (06/14/2024), Tobacco abuse (05/03/2013), URI (upper respiratory infection) (09/21/2013), Urinary tract infection, site not specified, and UTI (lower urinary tract infection) (09/21/2013).    She has no past medical history of Breast cancer ([facility]), Chronic airway obstruction, not elsewhere classified, Diabetes, Fall, Thyroid disease, or Unspecified asthma(493.90).    Surgical History   has a past surgical history that includes breast biopsy (7/18/08); shoulder arthroscopy (3/23/2009); tubal ligation; gyn surgery (1995); shoulder arthroscopy (2005); other (6/2014); shoulder arthroscopy w/ rotator cuff repair (3/9/2015); trigger finger release (3/9/2015); open reduction; pr i&d perirectal abscess (6/14/2024); pr i&d perirectal abscess (N/A, 6/17/2024); pr upper gi endoscopy,diagnosis (N/A, 8/15/2024); and pr upper gi endoscopy,ligat varix (N/A,  8/15/2024).    Family History  family history includes Cancer in her paternal grandmother; Hypertension in her brother, father, and mother; Stroke in her mother.    Social History   reports that she has been smoking cigarettes. She started smoking about 30 years ago. She has a 12.5 pack-year smoking history. She has never used smokeless tobacco. She reports that she does not drink alcohol and does not use drugs.    Medications  Prior to Admission Medications   Prescriptions Last Dose Informant Patient Reported? Taking?   DIPHENHYDRAMINE HCL PO 8/12/2024 at hs Patient Yes Yes   Sig: Take 1 Tablet by mouth at bedtime.   losartan (COZAAR) 50 MG Tab 8/12/2024 at unk Patient No No   Sig: Take 1 Tablet by mouth every day.   rosuvastatin (CRESTOR) 20 MG Tab 8/13/2024 at am Patient No Yes   Sig: Take 1 Tablet by mouth every evening.   Patient taking differently: Take 20 mg by mouth every day.   spironolactone (ALDACTONE) 50 MG Tab 8/13/2024 at hs Patient No Yes   Sig: Take 1 Tablet by mouth every day.      Facility-Administered Medications: None       Allergies  Allergies   Allergen Reactions    Nitrofurantoin Vomiting    Sulfamethoxazole W-Trimethoprim Vomiting       Physical Exam  Temp:  [36.1 °C (97 °F)-36.7 °C (98.1 °F)] 36.3 °C (97.3 °F)  Pulse:  [54-67] 66  Resp:  [11-21] 18  BP: (102-142)/(51-69) 115/64  SpO2:  [89 %-100 %] 98 %    Physical Exam  Vitals and nursing note reviewed.   Constitutional:       Appearance: She is well-developed. She is ill-appearing. She is not diaphoretic.      Comments: Currently ill-appearing female, looks much older than stated age   HENT:      Head: Normocephalic and atraumatic.      Nose: Nose normal.      Mouth/Throat:      Mouth: Oropharynx is clear and moist. Mucous membranes are dry.   Eyes:      Extraocular Movements: EOM normal.      Conjunctiva/sclera: Conjunctivae normal.      Pupils: Pupils are equal, round, and reactive to light.   Neck:      Thyroid: No thyromegaly.       Vascular: No JVD.   Cardiovascular:      Rate and Rhythm: Normal rate and regular rhythm.      Heart sounds: Normal heart sounds.      No friction rub. No gallop.   Pulmonary:      Effort: Pulmonary effort is normal.      Breath sounds: Normal breath sounds. No wheezing or rales.   Abdominal:      General: Bowel sounds are normal. There is distension.      Palpations: Abdomen is soft. There is no mass.      Tenderness: There is no abdominal tenderness. There is no guarding or rebound.      Comments: Slightly protuberant abdomen, nontender   Musculoskeletal:         General: No tenderness or edema. Normal range of motion.      Cervical back: Normal range of motion and neck supple.   Lymphadenopathy:      Cervical: No cervical adenopathy.   Skin:     General: Skin is warm and dry.      Coloration: Skin is pale.   Neurological:      Mental Status: She is alert and oriented to person, place, and time.      Cranial Nerves: No cranial nerve deficit.   Psychiatric:         Mood and Affect: Mood and affect normal.         Behavior: Behavior normal.         Fluids  Date 08/16/24 0700 - 08/17/24 0659   Shift 3626-5476 4360-5149 6295-7085 24 Hour Total   INTAKE   I.V. 124.6   124.6   Shift Total 124.6   124.6   OUTPUT   Shift Total       Weight (kg) 73.7 73.7 73.7 73.7       Laboratory  Recent Labs     08/14/24  1427 08/14/24  2253 08/15/24  0352 08/15/24  1135 08/15/24  2330 08/16/24  0500 08/16/24  1700   WBC 37.3*  --  28.0*  --   --  17.0*  --    RBC 1.45*  --  2.46*  --   --  2.54*  --    HEMOGLOBIN 5.0*   < > 7.3*   < > 7.9* 8.1*  8.1* 7.7*   HEMATOCRIT 15.5*  --  22.4*  --   --  23.4*  --    .7*  --  91.1  --   --  92.1  --    MCH 34.5*  --  29.7  --   --  31.5  --    MCHC 31.4*  --  32.6  --   --  34.2  --    RDW 74.2*  --  58.1*  --   --  61.8*  --    PLATELETCT 305  --  167  --   --  163*  --    MPV 11.5  --  10.9  --   --  11.0  --     < > = values in this interval not displayed.     Recent Labs      08/14/24  1427 08/15/24  0352 08/16/24  0500   SODIUM 130* 130* 128*   POTASSIUM 3.7 4.1 4.0   CHLORIDE 100 102 100   CO2 15* 18* 19*   GLUCOSE 119* 126* 108*   BUN 34* 30* 20   CREATININE 1.11 0.91 0.86   CALCIUM 8.3* 8.1* 8.4*     Recent Labs     08/14/24  1427 08/15/24  0352 08/16/24  0500   APTT 37.6*  --   --    INR 2.56* 2.32* 1.91*                 Imaging  IR-MIDLINE CATHETER INSERTION WO GUIDANCE > AGE 3   Final Result                  Ultrasound-guided midline placement performed by qualified nursing staff    as above.          CT-HEAD W/O   Final Result      No acute intracranial abnormality detected.               DX-CHEST-PORTABLE (1 VIEW)   Final Result      Mild left basilar opacification be atelectasis versus mild infiltrate.      US-RUQ    (Results Pending)       Assessment/Plan  * GIB (gastrointestinal bleeding)- (present on admission)  Assessment & Plan  Secondary to esophageal varices s/p bands x 7 8/15  GI consulted  Clear liquid diet today, advance when cleared by GI  Serial H/H with conservative transfusion strategy  Continue IV Protonix twice daily  Continue IV Rocephin x 7 days    Alcoholic cirrhosis of liver with ascites (HCC)- (present on admission)  Assessment & Plan  Monitor synthetic function  Avoid hepatotoxins  Paracentesis today both diagnostic and therapeutic with albumin repletion, 5 L removed  Place patient on diuretics once appropriate    Electrolyte abnormality- (present on admission)  Assessment & Plan  Replete and monitor low calcium, magnesium, and phosphorus levels    Prolonged QT interval- (present on admission)  Assessment & Plan  Avoid QT prolonging medications  Optimize electrolytes, continuous telemetry monitoring    Hyponatremia- (present on admission)  Assessment & Plan  Likely due to cirrhosis -worsening  Monitor  Free water restriction    Coagulopathy (HCC)- (present on admission)  Assessment & Plan  Likely multifactorial iso of cirrhosis   S/p TXA 1g IV  S/p IV  vitamin K x 1    Hypotension- (present on admission)  Assessment & Plan  Hypotensive prior to arrival and in ED, resolved after crystalloid and PRBCs --> improved    Anemia- (present on admission)  Assessment & Plan  Acute blood loss due to GI bleed -improving  Q 12-hour H/H with conservative transfusion strategy    Plan  8/16  Continue current antibiotic coverage,  Twice daily PPI  Carafate  Fluid restriction  Initiate Aldactone  Close monitoring in terms of additional blood loss, H&H checks  Slow progression in terms of diet  Monitor for hepatic encephalopathy  See orders  Will need close GI follow-up  Patient is has a high medical complexity, complex decision making and is at high risk for complication, morbidity, and mortality.  I spent 63 minutes, reviewing the chart, obtaining and/or reviewing separately obtained history. Performing a medically appropriate examination and evaluation.  Counseling and educating the patient. Ordering and reviewing medications, tests, or procedures.   Documenting clinical information in EPIC. Independently interpreting results and communicating results to patient. Discussing future disposition of care with patient, RN and case management.  Thank you for consulting with us, we will follow closely with the patient is hospitalized      Please note that this dictation was created using voice recognition software. I have made every reasonable attempt to correct obvious errors, but I expect that there are errors of grammar and possibly context that I did not discover before finalizing the note.

## 2024-08-17 NOTE — CARE PLAN
The patient is Watcher - Medium risk of patient condition declining or worsening    Shift Goals  Clinical Goals: Monitor I/O, labs  Patient Goals: rest  Family Goals: no family present    Progress made toward(s) clinical / shift goals:      Patient is not progressing towards the following goals:      Problem: Knowledge Deficit - Standard  Goal: Patient and family/care givers will demonstrate understanding of plan of care, disease process/condition, diagnostic tests and medications  Outcome: Progressing  Note: Informed pt on plan of care, no question at this time     Problem: Skin Integrity  Goal: Skin integrity is maintained or improved  Outcome: Progressing  Note: Assessed skin integrity and bony prominences      Problem: Fall Risk  Goal: Patient will remain free from falls  Outcome: Progressing  Note: Non slip socks in place, bed locked and lowest position, bed alarm on, educated pt on use of call light     Problem: Pain - Standard  Goal: Alleviation of pain or a reduction in pain to the patient’s comfort goal  Outcome: Progressing  Note: Assessed pain level every 4 hours, educated pt on pain scale 1-10

## 2024-08-17 NOTE — ASSESSMENT & PLAN NOTE
Monitor synthetic function  8/19 Avoid hepatotoxins  S/p paracentesis, 5L removed, albumin protocol  On lasix and aldactone for ascites  On lactulose for hepatic encephalopathy prophylaxis  Prognosis and long term plan discussed   Bmp in am     8/20  cont diuretics, encourage acitivity, na 129  - monitor

## 2024-08-17 NOTE — CARE PLAN
The patient is Stable - Low risk of patient condition declining or worsening    Shift Goals  Clinical Goals: Monitor Labs, Comfort, Safety  Patient Goals: Rest, Comfort  Family Goals: JESUSITA, no family present    Progress made toward(s) clinical / shift goals:    Problem: Fall Risk  Goal: Patient will remain free from falls  Outcome: Progressing; Patient calls appropriately to ambulate to the bathroom. Standby assist. Patient was seen by PT and OT today.      Problem: Pain - Standard  Goal: Alleviation of pain or a reduction in pain to the patient’s comfort goal  Outcome: Progressing; Patient reports abdominal pain is managed with PRN Tylenol.

## 2024-08-17 NOTE — ASSESSMENT & PLAN NOTE
Acute blood loss due to GI bleed  S/p esophageal variceal banding  Some fluctuation  H/h stable overnight but more melena this am, continue to follow  Cbc in am

## 2024-08-17 NOTE — PROGRESS NOTES
[facility] Medicine Daily Progress Note    Date of Service  8/17/2024    Chief Complaint  Kavita Freeman is a 59 y.o. female admitted 8/14/2024 with GI bleed.    Hospital Course  59 y.o. female who presented 8/14/2024 with extensive alcohol history, alcohol abuse, recently diagnosed with cirrhosis, she had a history of prior GI bleeding, history of esophageal varices, presenting with several days of complaints of melena, coffee-ground emesis, and general fatigue, the patient presents with lightheadedness, presyncopal symptoms, seen in the emergency room the patient was found hypotensive, given volume resuscitation and the patient was noted to have a hemoglobin of 5, was given cell transfusions, blood pressure initially improved and the patient was getting a normalizing blood pressure, the patient was seen in the emergency room by gastroenterology and the plan was for EGD, the patient mated to ICU secondary to high risk features, need for further resuscitation and observation, on 8/15 the patient had an EGD with esophageal varices findings and 7 esophageal bands placed.  The patient appeared to tolerate the procedure well, she had a paracentesis performed in ICU removing a total of 5 L, the patient was given albumin post paracentesis high-volume.  The patient recently was admitted to the hospital where she had a left upper thigh perirectal abscess, required surgical intervention with I&D and debridement, at the time discharged for outpatient follow-up, she did not present to outpatient gastroenterology per her report.  She reports having had her last alcoholic drink several months ago.    Interval Problem Update  No acute events overnight.  No signs of active bleeding, hgb stable. Monitor, transfuse for Hgb<7.  LFTs improving, , AST 94, bilirubin stable at 3.  HIDA scan ordered by GI team to evaluate US findings of gallbladder wall thickening.  Appreciate GI recommendations.  Completed octreotide  treatment.  Continue PPI, ceftriaxone.  K low 3.4, placing orally. Monitor with daily BMP.  On lasix and aldactone for ascites.  On lactulose for hepatic encephalopathy prevention.  On GI soft diet.  PT/OT evaluation for disposition planning.  Okay for medical floor.    Patient is medically complex, high risk for clinical deterioration.    I have discussed this patient's plan of care and discharge plan at IDT rounds today with Case Management, Nursing, Nursing leadership, and other members of the IDT team.    Consultants/Specialty  GI  Critical care    Code Status  Full Code    Disposition  The patient is not medically cleared for discharge to home or a post-acute facility.      I have placed the appropriate orders for post-discharge needs.    Review of Systems  Review of Systems   Constitutional:  Negative for chills and fever.   Eyes:  Negative for blurred vision and pain.   Respiratory:  Negative for cough and shortness of breath.    Cardiovascular:  Negative for chest pain, palpitations and leg swelling.   Gastrointestinal:  Positive for abdominal pain and melena. Negative for nausea and vomiting.   Genitourinary:  Negative for dysuria and urgency.   Musculoskeletal:  Negative for back pain and falls.   Skin:  Negative for itching and rash.   Neurological:  Positive for weakness. Negative for dizziness, sensory change, focal weakness and headaches.   Psychiatric/Behavioral:  Negative for substance abuse. The patient does not have insomnia.         Physical Exam  Temp:  [36.3 °C (97.3 °F)-37 °C (98.6 °F)] 37 °C (98.6 °F)  Pulse:  [61-73] 73  Resp:  [16-18] 16  BP: (115-132)/(57-64) 132/61  SpO2:  [94 %-98 %] 94 %    Physical Exam  Vitals reviewed.   Constitutional:       General: She is not in acute distress.     Appearance: She is not diaphoretic.   HENT:      Head: Normocephalic and atraumatic.      Right Ear: External ear normal.      Left Ear: External ear normal.      Nose: Nose normal. No congestion.       Mouth/Throat:      Pharynx: No oropharyngeal exudate or posterior oropharyngeal erythema.   Eyes:      Extraocular Movements: Extraocular movements intact.      Pupils: Pupils are equal, round, and reactive to light.   Cardiovascular:      Rate and Rhythm: Normal rate and regular rhythm.   Pulmonary:      Effort: Pulmonary effort is normal. No respiratory distress.      Breath sounds: Normal breath sounds. No wheezing or rales.   Abdominal:      General: Bowel sounds are normal. There is distension.      Palpations: Abdomen is soft.      Tenderness: There is no abdominal tenderness. There is no guarding or rebound.   Musculoskeletal:         General: No swelling. Normal range of motion.      Cervical back: Normal range of motion and neck supple.      Right lower leg: No edema.      Left lower leg: No edema.   Skin:     General: Skin is warm and dry.   Neurological:      General: No focal deficit present.      Mental Status: She is alert and oriented to person, place, and time.      Cranial Nerves: No cranial nerve deficit.      Sensory: No sensory deficit.      Motor: No weakness.   Psychiatric:         Mood and Affect: Mood normal.         Behavior: Behavior normal.         Fluids    Intake/Output Summary (Last 24 hours) at 8/17/2024 1546  Last data filed at 8/17/2024 1200  Gross per 24 hour   Intake 660 ml   Output --   Net 660 ml       Laboratory  Recent Labs     08/15/24  0352 08/15/24  1135 08/16/24  0500 08/16/24  1700 08/17/24  0452   WBC 28.0*  --  17.0*  --  15.8*   RBC 2.46*  --  2.54*  --  2.62*   HEMOGLOBIN 7.3*   < > 8.1*  8.1* 7.7* 8.2*  8.3*   HEMATOCRIT 22.4*  --  23.4*  --  24.7*   MCV 91.1  --  92.1  --  94.3   MCH 29.7  --  31.5  --  31.7   MCHC 32.6  --  34.2  --  33.6   RDW 58.1*  --  61.8*  --  72.3*   PLATELETCT 167  --  163*  --  154*   MPV 10.9  --  11.0  --  10.5    < > = values in this interval not displayed.     Recent Labs     08/15/24  0352 08/16/24  0500 08/17/24  0452   SODIUM 130*  128* 124*   POTASSIUM 4.1 4.0 3.4*   CHLORIDE 102 100 99   CO2 18* 19* 18*   GLUCOSE 126* 108* 100*   BUN 30* 20 14   CREATININE 0.91 0.86 0.75   CALCIUM 8.1* 8.4* 7.9*     Recent Labs     08/15/24  0352 08/16/24  0500   INR 2.32* 1.91*               Imaging  US-LIVER AND VESSELS COMPLETE (COMBO)   Final Result         1.  Hepatomegaly   2.  Cirrhotic morphology of the liver.   3.  Gallbladder sludge with gallbladder wall thickening, nonspecific in the setting of hepatocellular disease. Consider acalculous cholecystitis as clinically appropriate. Could be further evaluated with HIDA scan as warranted.   4.  Common bile duct dilatation, consider causes of biliary obstruction with additional workup as clinically appropriate   5.  Splenomegaly   6.  Multiple hepatic cysts   7.  Moderate scattered abdominal ascites      IR-MIDLINE CATHETER INSERTION WO GUIDANCE > AGE 3   Final Result                  Ultrasound-guided midline placement performed by qualified nursing staff    as above.          CT-HEAD W/O   Final Result      No acute intracranial abnormality detected.               DX-CHEST-PORTABLE (1 VIEW)   Final Result      Mild left basilar opacification be atelectasis versus mild infiltrate.      NM-BILIARY (HIDA) SCAN WITH CCK    (Results Pending)        Assessment/Plan  * GIB (gastrointestinal bleeding)- (present on admission)  Assessment & Plan  Secondary to esophageal varices s/p bands x 7 8/15  GI consulted  GI soft diet today  Serial H/H with conservative transfusion strategy  Continue IV Protonix twice daily  Continue IV Rocephin x 7 days    Electrolyte abnormality- (present on admission)  Assessment & Plan  Replete and monitor low calcium, magnesium, and phosphorus levels    Prolonged QT interval- (present on admission)  Assessment & Plan  Avoid QT prolonging medications  Optimize electrolytes, continuous telemetry monitoring    Hyponatremia- (present on admission)  Assessment & Plan  Likely due to  cirrhosis -worsening  Monitor  Free water restriction    Coagulopathy (HCC)- (present on admission)  Assessment & Plan  Likely multifactorial iso of cirrhosis   S/p TXA 1g IV  S/p IV vitamin K x 1    Hypotension- (present on admission)  Assessment & Plan  Hypotensive prior to arrival and in ED, resolved after crystalloid and PRBCs --> improved    Anemia- (present on admission)  Assessment & Plan  Acute blood loss due to GI bleed -improving  Stable after esophageal variceal banding    Alcoholic cirrhosis of liver with ascites (HCC)- (present on admission)  Assessment & Plan  Monitor synthetic function  Avoid hepatotoxins  S/p paracentesis, 5L removed, albumin protocol  On lasix and aldactone for ascites  On lactulose for hepatic encephalopathy prophylaxis         VTE prophylaxis: VTE Selection    I have performed a physical exam and reviewed and updated ROS and Plan today (8/17/2024). In review of yesterday's note (8/16/2024), there are no changes except as documented above.

## 2024-08-17 NOTE — PROGRESS NOTES
Bedside report received from off going RN Tova: Pt oriented x4, pt is normal sinus, pt stated 6/10 pain. Pt call light and personal items within reach. Informed pt on plan of care. Bed locked and lowest position. Assumed care of patient.     Fall Risk Score: MODERATE RISK  Fall risk interventions in place: Place yellow fall risk ID band on patient, Provide patient/family education based on risk assessment, Educate patient/family to call staff for assistance when getting out of bed, Place fall precaution signage outside patient door, Place patient in room close to nursing station, Utilize bed/chair fall alarm, and Bed alarm connected correctly  Bed type: Regular (Thad Score less than 17 interventions in place)  Patient on cardiac monitor: Yes  IVF/IV medications: Not Applicable   Oxygen: Room Air  Bedside sitter: Not Applicable   Isolation: Not applicable

## 2024-08-17 NOTE — PROGRESS NOTES
..Gastroenterology Progress Note               Author:  Oumou Mart, CHANDANA,  APRN Date & Time Created: 8/17/2024 7:44 AM       Patient ID:  Name:             Kavita Freeman  YOB: 1965  Age:                 59 y.o.  female  MRN:               [id number]    Medical Decision Making, by Problem:  Active Hospital Problems    Diagnosis     Electrolyte abnormality [E87.8]     Hyponatremia [E87.1]     Prolonged QT interval [R94.31]     GIB (gastrointestinal bleeding) [K92.2]     Hypotension [I95.9]     Coagulopathy (HCC) [D68.9]     Anemia [D64.9]     Alcoholic cirrhosis of liver with ascites (HCC) [K70.31]      Presenting Chief Complaint:  GI bleeding    History of Present Illness:  This is a 59 year old female with history of alcohol misuse disorder, recent diagnosis of cirrhosis, GI bleeding secondary to esophageal varices who presented to the ED on 8/14/2024 with melena and coffee ground emesis. She was initially hypotensive with Hgb of 5. She was resuscitated with crystalloid infusions and PRBCs with improvement. She as admitted to the ICU and started on octreotide, IV pantoprazole, and ceftriaxone with planned endoscopy the next morning.     8/15/2024: EGD with GERD grade C on top of moderate-sized esophageal varices, it is the source of bleeding in the last 2 week. No active bleeding now, s/p banding x 7.     Interval History:  8/16/2024: Patient seen in the TICU. She feels well, no complaints. Had one small hard BM this morning. Hungry.  Hgb 8.1.  WBC 17<28. Paracentesis completed with 5L removed, albumin replenishment, negative for SBP. SAAG 2.2    MELD 3.0 - 25    8/17/2024: Having diarrhea, and mild RUQ pain. Hungry and would like to eat. WBC 15.8, Na 124, ammonia 74, neg peritoneal fluid.     Hospital Medications:  Current Facility-Administered Medications   Medication Dose Frequency Provider Last Rate Last Admin    lactulose 20 GM/30ML solution 30 mL  30 mL Q EVENING Ant  NAIMA Christian   30 mL at 08/16/24 1843    spironolactone (Aldactone) tablet 50 mg  50 mg Q EVENING Ant Christian M.D.   50 mg at 08/16/24 1843    sucralfate (Carafate) 1 GM/10ML suspension 1 g  1 g Q6HRS Jeremy M Gonda, M.D.   1 g at 08/17/24 0446    senna-docusate (Pericolace Or Senokot S) 8.6-50 MG per tablet 2 Tablet  2 Tablet BID Clifford Carranza M.D.   2 Tablet at 08/16/24 1700    And    polyethylene glycol/lytes (Miralax) Packet 1 Packet  1 Packet QDAY PRN Clifford Carranza M.D.        cefTRIAXone (Rocephin) syringe 1,000 mg  1,000 mg Q24HRS Clifford Carranza M.D.   1,000 mg at 08/17/24 0446    Pharmacy Consult Request - IV Antibiotics to PO conversion   PHARMACY TO DOSE Clifford Carranza M.D.        pantoprazole (Protonix) injection 40 mg  40 mg BID Clifford Carranza M.D.   40 mg at 08/17/24 0445    ondansetron (Zofran) syringe/vial injection 4 mg  4 mg Q8HRS PRN Clifford Carranza M.D.   4 mg at 08/14/24 2107    acetaminophen (Tylenol) tablet 650 mg  650 mg Q6HRS PRN Clifford Carranza M.D.   650 mg at 08/16/24 2035   Last reviewed on 8/14/2024  3:43 PM by Conrado Maharaj       Review of Systems:  Review of Systems   Constitutional:  Negative for chills, fever and malaise/fatigue.   HENT:  Negative for hearing loss.    Eyes:  Negative for blurred vision.   Respiratory:  Negative for cough and shortness of breath.    Cardiovascular:  Positive for leg swelling. Negative for chest pain.   Gastrointestinal:  Positive for abdominal pain and diarrhea. Negative for blood in stool, constipation, heartburn, melena, nausea and vomiting.   Genitourinary:  Negative for dysuria.   Musculoskeletal:  Negative for back pain.   Skin:  Negative for rash.   Neurological:  Negative for dizziness and weakness.   Psychiatric/Behavioral:  Negative for depression.    All other systems reviewed and are negative.      Vital signs:  Weight/BMI: Body mass index is 24.74 kg/m².  /57   Pulse 67   Temp 36.7 °C  "(98 °F) (Temporal)   Resp 17   Ht 1.727 m (5' 8\")   Wt 73.8 kg (162 lb 11.2 oz)   SpO2 97%   Vitals:    08/16/24 1649 08/16/24 2000 08/16/24 2320 08/17/24 0338   BP: 115/64 123/63 120/60 120/57   Pulse: 66 61 65 67   Resp: 18 18 17 17   Temp: 36.3 °C (97.3 °F) 36.7 °C (98 °F)     TempSrc: Temporal Temporal Temporal Temporal   SpO2: 98% 97% 96% 97%   Weight:    73.8 kg (162 lb 11.2 oz)   Height:         Oxygen Therapy:  Pulse Oximetry: 97 %, O2 (LPM): 0, O2 Delivery Device: None - Room Air    Intake/Output Summary (Last 24 hours) at 8/17/2024 0744  Last data filed at 8/17/2024 0559  Gross per 24 hour   Intake 344.63 ml   Output --   Net 344.63 ml         Physical Exam:  Physical Exam  Vitals and nursing note reviewed.   Constitutional:       General: She is not in acute distress.     Appearance: Normal appearance.   HENT:      Head: Normocephalic and atraumatic.      Right Ear: External ear normal.      Left Ear: External ear normal.      Nose: Nose normal.      Mouth/Throat:      Mouth: Mucous membranes are moist.      Pharynx: Oropharynx is clear.   Eyes:      General: Scleral icterus present.   Cardiovascular:      Rate and Rhythm: Normal rate and regular rhythm.      Pulses: Normal pulses.      Heart sounds: Normal heart sounds.   Pulmonary:      Effort: Pulmonary effort is normal. No respiratory distress.      Breath sounds: Normal breath sounds.   Abdominal:      General: Bowel sounds are normal. There is distension.      Tenderness: There is no abdominal tenderness.   Musculoskeletal:         General: Normal range of motion.      Cervical back: Normal range of motion.      Right lower leg: Edema present.      Left lower leg: Edema present.   Skin:     General: Skin is warm and dry.      Capillary Refill: Capillary refill takes less than 2 seconds.      Coloration: Skin is jaundiced.   Neurological:      Mental Status: She is alert and oriented to person, place, and time.      Motor: Weakness present. "   Psychiatric:         Mood and Affect: Mood normal.         Behavior: Behavior normal.       Labs:  Recent Labs     08/15/24  0352 08/16/24  0500 08/17/24  0452   SODIUM 130* 128* 124*   POTASSIUM 4.1 4.0 3.4*   CHLORIDE 102 100 99   CO2 18* 19* 18*   BUN 30* 20 14   CREATININE 0.91 0.86 0.75   MAGNESIUM 2.0 1.8 1.8   PHOSPHORUS 3.6 2.3* 2.1*   CALCIUM 8.1* 8.4* 7.9*     Recent Labs     08/14/24  1427 08/15/24  0352 08/16/24  0500 08/17/24  0452   ALTSGPT 33 145* 151* 109*   ASTSGOT 65* 210* 160* 94*   ALKPHOSPHAT 62 65 73 71   TBILIRUBIN 2.4* 2.6* 3.0* 3.0*   LIPASE 54  --   --   --    GLUCOSE 119* 126* 108* 100*     Recent Labs     08/14/24  1427 08/15/24  0352 08/16/24  0500 08/17/24  0452   WBC 37.3* 28.0* 17.0* 15.8*   NEUTSPOLYS 86.80*  --  65.40  --    LYMPHOCYTES 5.30*  --  17.60*  --    MONOCYTES 2.60  --  8.40  --    EOSINOPHILS 0.90  --  6.30  --    BASOPHILS 0.00  --  0.40  --    ASTSGOT 65* 210* 160* 94*   ALTSGPT 33 145* 151* 109*   ALKPHOSPHAT 62 65 73 71   TBILIRUBIN 2.4* 2.6* 3.0* 3.0*     Recent Labs     08/14/24 1427 08/14/24  2253 08/15/24  0352 08/15/24  1135 08/16/24  0500 08/16/24  1700 08/17/24  0452   RBC 1.45*  --  2.46*  --  2.54*  --  2.62*   HEMOGLOBIN 5.0*   < > 7.3*   < > 8.1*  8.1* 7.7* 8.2*  8.3*   HEMATOCRIT 15.5*  --  22.4*  --  23.4*  --  24.7*   PLATELETCT 305  --  167  --  163*  --  154*   PROTHROMBTM 27.9*  --  25.8*  --  22.1*  --   --    APTT 37.6*  --   --   --   --   --   --    INR 2.56*  --  2.32*  --  1.91*  --   --     < > = values in this interval not displayed.     Recent Results (from the past 24 hour(s))   Fluid Cell Count w/Diff    Collection Time: 08/16/24 10:00 AM   Result Value Ref Range    Fluid Type Ascites     Color-Body Fluid Yellow     Character-Body Fluid Clear     Total RBC Count <2000 cells/uL    FL Total Nucleated Cells 549 cells/uL    Polys 13 %    Lymphs 64 %    Fl Mono Macrophages 22 %    Fl Lining Cells 1    Fluid Culture with Gram Stain     Collection Time: 08/16/24 10:00 AM    Specimen: Peritoneal Fluid; Body Fluid   Result Value Ref Range    Significant Indicator NEG     Source BF     Site PERITONEAL FLUID     Culture Result -     Gram Stain Result Few WBCs.  No organisms seen.      FLUID TOTAL PROTEIN    Collection Time: 08/16/24 10:00 AM   Result Value Ref Range    Fluid Type Ascites     Total Protein Fluid 0.7 g/dL   FLUID ALBUMIN    Collection Time: 08/16/24 10:00 AM   Result Value Ref Range    Albumin 0.3 g/dL   GRAM STAIN    Collection Time: 08/16/24 10:00 AM    Specimen: Body Fluid   Result Value Ref Range    Significant Indicator .     Source BF     Site PERITONEAL FLUID     Gram Stain Result Few WBCs.  No organisms seen.      HGB (Hemoglobin) for 48 hours    Collection Time: 08/16/24  5:00 PM   Result Value Ref Range    Hemoglobin 7.7 (L) 12.0 - 16.0 g/dL   CBC WITHOUT DIFFERENTIAL    Collection Time: 08/17/24  4:52 AM   Result Value Ref Range    WBC 15.8 (H) 4.8 - 10.8 K/uL    RBC 2.62 (L) 4.20 - 5.40 M/uL    Hemoglobin 8.3 (L) 12.0 - 16.0 g/dL    Hematocrit 24.7 (L) 37.0 - 47.0 %    MCV 94.3 81.4 - 97.8 fL    MCH 31.7 27.0 - 33.0 pg    MCHC 33.6 32.2 - 35.5 g/dL    RDW 72.3 (H) 35.9 - 50.0 fL    Platelet Count 154 (L) 164 - 446 K/uL    MPV 10.5 9.0 - 12.9 fL   Comp Metabolic Panel    Collection Time: 08/17/24  4:52 AM   Result Value Ref Range    Sodium 124 (L) 135 - 145 mmol/L    Potassium 3.4 (L) 3.6 - 5.5 mmol/L    Chloride 99 96 - 112 mmol/L    Co2 18 (L) 20 - 33 mmol/L    Anion Gap 7.0 7.0 - 16.0    Glucose 100 (H) 65 - 99 mg/dL    Bun 14 8 - 22 mg/dL    Creatinine 0.75 0.50 - 1.40 mg/dL    Calcium 7.9 (L) 8.5 - 10.5 mg/dL    Correct Calcium 9.1 8.5 - 10.5 mg/dL    AST(SGOT) 94 (H) 12 - 45 U/L    ALT(SGPT) 109 (H) 2 - 50 U/L    Alkaline Phosphatase 71 30 - 99 U/L    Total Bilirubin 3.0 (H) 0.1 - 1.5 mg/dL    Albumin 2.5 (L) 3.2 - 4.9 g/dL    Total Protein 5.6 (L) 6.0 - 8.2 g/dL    Globulin 3.1 1.9 - 3.5 g/dL    A-G Ratio 0.8 g/dL    MAGNESIUM    Collection Time: 08/17/24  4:52 AM   Result Value Ref Range    Magnesium 1.8 1.5 - 2.5 mg/dL   PHOSPHORUS    Collection Time: 08/17/24  4:52 AM   Result Value Ref Range    Phosphorus 2.1 (L) 2.5 - 4.5 mg/dL   AMMONIA    Collection Time: 08/17/24  4:52 AM   Result Value Ref Range    Ammonia 74 (H) 11 - 45 umol/L   HGB (Hemoglobin) for 48 hours    Collection Time: 08/17/24  4:52 AM   Result Value Ref Range    Hemoglobin 8.2 (L) 12.0 - 16.0 g/dL   ESTIMATED GFR    Collection Time: 08/17/24  4:52 AM   Result Value Ref Range    GFR (CKD-EPI) 93 >60 mL/min/1.73 m 2       Radiology Review:  US-LIVER AND VESSELS COMPLETE (COMBO)   Final Result         1.  Hepatomegaly   2.  Cirrhotic morphology of the liver.   3.  Gallbladder sludge with gallbladder wall thickening, nonspecific in the setting of hepatocellular disease. Consider acalculous cholecystitis as clinically appropriate. Could be further evaluated with HIDA scan as warranted.   4.  Common bile duct dilatation, consider causes of biliary obstruction with additional workup as clinically appropriate   5.  Splenomegaly   6.  Multiple hepatic cysts   7.  Moderate scattered abdominal ascites      IR-MIDLINE CATHETER INSERTION WO GUIDANCE > AGE 3   Final Result                  Ultrasound-guided midline placement performed by qualified nursing staff    as above.          CT-HEAD W/O   Final Result      No acute intracranial abnormality detected.               DX-CHEST-PORTABLE (1 VIEW)   Final Result      Mild left basilar opacification be atelectasis versus mild infiltrate.          MDM (Data Review):   -Records reviewed and summarized in current documentation  -I personally reviewed and interpreted the laboratory results  -I personally reviewed the radiology images    Assessment/Recommendations:  Acute blood loss anemia  Esophageal varices status post banding x 7  GERD grade C  Mild portal hypertensive gastropathy  Decompensated alcoholic  cirrhosis  Ascites  Coagulopathy  Prolonged QTc  Hypertension  Leukocytosis  Hepatic cysts - monitor  Splenomegaly  Hepatofugal portal flow  encephalopathy  Acalculous cholecystitis with CBD dilation    Recommendations:  HIDA scan   Continue ceftriaxone, pantoprazole, sucralfate  Continue 20 mg p.o. Lasix and 50 mg spironolactone, uptitrate as tolerated  Can't beta blockade as she is bradycardic - ?TIPS when encephalopathy improves  No asterixis, ammonia elevated, endorses fogginess, start lactulose 15 ml BID titrated to 203 BM daily  2 gram sodium diet    Has follow up appointment with [facility] on 9/18    Discussed with patient, Dr. Finch, Dr. Ocampo    .Oumou Mart, DNP,  APRN    Core Quality Measures   Reviewed items::  Labs, Medications and Radiology reports reviewed

## 2024-08-17 NOTE — THERAPY
Occupational Therapy   Initial Evaluation     Patient Name: Kavita Freeman  Age:  59 y.o., Sex:  female  Medical Record #: [id number]  Today's Date: 8/17/2024     Precautions  Precautions: Fall Risk    Assessment  Patient is 59 y.o. female with a diagnosis of GIB, hypotension, blood loss anemia, melena and coffee ground emesis. Recent admit for perirectal abscess with I and D. Pmhx: Pt with hx of EToH abuse, cirrhosis, esophageal varices, hepatic cysts, leukocytosis, splenomegaly, encephalopathy, hypotension.  Additional factors influencing patient status / progress: Pt has supportive S.O. who works so pt is alone in daytime. Pt has friend that can check on her during the day. Pt lacks awareness of her alcoholism and reported genuine surprise that many of her medical issues are directly related to alcohol consumption. Pt pleasantly declined offer to connect her to AA. Pt is unsteady in standing and walking during ADLs and can benefit from acute OT to increase independence in her ADLs. Pt will benefit from post acute home health OT to reduce risk for falls and increase independence at home back to baseline level of function with ADLs.      Plan    Occupational Therapy Initial Treatment Plan   Treatment Interventions: Self Care / Activities of Daily Living, Neuro Re-Education / Balance, Therapeutic Activity, Family / Caregiver Training, Therapeutic Exercises, Adaptive Equipment  Treatment Frequency: 3 Times per Week  Duration: Until Therapy Goals Met    DC Equipment Recommendations: Front-Wheel Walker, Tub Transfer Bench  Discharge Recommendations: Recommend home health for continued occupational therapy services        08/17/24 1523   Prior Living Situation   Prior Services None;Home-Independent   Housing / Facility 1 Story House   Steps Into Home 2   Steps In Home 0   Bathroom Set up Bathtub / Shower Combination   Equipment Owned Front-Wheel Walker   Lives with - Patient's Self Care Capacity Significant Other    Comments S.O. works and cannot assist; has friend that can come by to give some assist.   Prior Level of ADL Function   Self Feeding Independent   Grooming / Hygiene Independent   Bathing Independent   Dressing Independent   Toileting Independent   Prior Level of IADL Function   Medication Management Independent   Laundry Independent   Kitchen Mobility Independent   Finances Independent   Home Management Independent   Shopping Independent   Prior Level Of Mobility Independent Without Device in Community   Driving / Transportation Relatives / Others Provide Transportation  (last drove 2 months ago)   History of Falls   History of Falls Yes   Date of Last Fall   (adm with fall; no other falls recalled)   Precautions   Precautions Fall Risk   Pain   Pain Scales 0 to 10 Scale    Intervention Ambulation / Increased Activity   Pain 0 - 10 Group   Therapist Pain Assessment Post Activity Pain Same as Prior to Activity;Nurse Notified;0   Non Verbal Descriptors   Non Verbal Scale  Calm   Cognition    Cognition / Consciousness X   Speech/ Communication Delayed Responses   Level of Consciousness Alert   Ability To Follow Commands 2 Step   Safety Awareness Impaired   Attention Impaired   Comments Pleasant and cooperative; no s/s of withdrawl; no insight into health effects of etoh abuse   Passive ROM Upper Body   Passive ROM Upper Body WDL   Active ROM Upper Body   Active ROM Upper Body  WDL   Strength Upper Body   Upper Body Strength  WDL   Sensation Upper Body   Upper Extremity Sensation  WDL   Upper Body Muscle Tone   Upper Body Muscle Tone  WDL   Neurological Concerns   Neurological Concerns No   Coordination Upper Body   Coordination WDL   Balance Assessment   Sitting Balance (Static) Fair +   Sitting Balance (Dynamic) Fair   Standing Balance (Static) Fair -   Standing Balance (Dynamic) Fair -   Weight Shift Sitting Fair   Weight Shift Standing Fair   Comments No AD  two losses of balance requiring assist to recover   Bed  Mobility    Supine to Sit Supervised   Sit to Supine Supervised   Scooting Supervised   Comments using rail and HOB slightly elevated   ADL Assessment   Eating Supervision   Grooming Standing;Supervision   Upper Body Dressing Supervision   Lower Body Dressing Supervision   Toileting Supervision   How much help from another person does the patient currently need...   Putting on and taking off regular lower body clothing? 4   Bathing (including washing, rinsing, and drying)? 4   Toileting, which includes using a toilet, bedpan, or urinal? 4   Putting on and taking off regular upper body clothing? 4   Taking care of personal grooming such as brushing teeth? 4   Eating meals? 4   6 Clicks Daily Activity Score 24   Functional Mobility   Sit to Stand Contact Guard Assist   Bed, Chair, Wheelchair Transfer Contact Guard Assist   Toilet Transfers Contact Guard Assist   Transfer Method Stand Step   Comments NO AD used; mild unsteadiness with moments requiring assist to recover   Visual Perception   Visual Perception  WDL   Edema / Skin Assessment   Edema / Skin  X   Comments significant jaundice noted   Activity Tolerance   Sitting in Chair Toilet 5 min   Sitting Edge of Bed 10 min   Standing 6 min total   Patient / Family Goals   Patient / Family Goal #1 I want to get better   Short Term Goals   Short Term Goal # 1 Xfer to chair or toilet at mod I   Short Term Goal # 2 standing grooming with mod I and no LOB   Education Group   Education Provided Role of Occupational Therapist   Role of Occupational Therapist Patient Response Patient;Family;Acceptance;Explanation;Verbal Demonstration   Occupational Therapy Initial Treatment Plan    Treatment Interventions Self Care / Activities of Daily Living;Neuro Re-Education / Balance;Therapeutic Activity;Family / Caregiver Training;Therapeutic Exercises;Adaptive Equipment   Treatment Frequency 3 Times per Week   Duration Until Therapy Goals Met   Problem List   Problem List Decreased  Active Daily Living Skills;Decreased Homemaking Skills;Decreased Functional Mobility;Decreased Activity Tolerance;Safety Awareness Deficits / Cognition   Anticipated Discharge Equipment and Recommendations   DC Equipment Recommendations Front-Wheel Walker;Tub Transfer Bench   Discharge Recommendations Recommend home health for continued occupational therapy services

## 2024-08-18 ENCOUNTER — APPOINTMENT (OUTPATIENT)
Dept: RADIOLOGY | Facility: MEDICAL CENTER | Age: 59
DRG: 432 | End: 2024-08-18
Attending: NURSE PRACTITIONER
Payer: COMMERCIAL

## 2024-08-18 PROBLEM — E87.20 METABOLIC ACIDOSIS: Status: ACTIVE | Noted: 2024-08-18

## 2024-08-18 PROBLEM — K81.9 CHOLECYSTITIS: Status: ACTIVE | Noted: 2024-08-18

## 2024-08-18 LAB
ALBUMIN SERPL BCP-MCNC: 2.6 G/DL (ref 3.2–4.9)
ALBUMIN/GLOB SERPL: 0.9 G/DL
ALP SERPL-CCNC: 104 U/L (ref 30–99)
ALT SERPL-CCNC: 82 U/L (ref 2–50)
ANION GAP SERPL CALC-SCNC: 9 MMOL/L (ref 7–16)
AST SERPL-CCNC: 71 U/L (ref 12–45)
BILIRUB SERPL-MCNC: 2.2 MG/DL (ref 0.1–1.5)
BUN SERPL-MCNC: 11 MG/DL (ref 8–22)
CALCIUM ALBUM COR SERPL-MCNC: 8.8 MG/DL (ref 8.5–10.5)
CALCIUM SERPL-MCNC: 7.7 MG/DL (ref 8.5–10.5)
CHLORIDE SERPL-SCNC: 100 MMOL/L (ref 96–112)
CO2 SERPL-SCNC: 18 MMOL/L (ref 20–33)
CORTIS SERPL-MCNC: 7.5 UG/DL (ref 0–23)
CREAT SERPL-MCNC: 0.69 MG/DL (ref 0.5–1.4)
ERYTHROCYTE [DISTWIDTH] IN BLOOD BY AUTOMATED COUNT: 82.3 FL (ref 35.9–50)
GFR SERPLBLD CREATININE-BSD FMLA CKD-EPI: 101 ML/MIN/1.73 M 2
GLOBULIN SER CALC-MCNC: 2.9 G/DL (ref 1.9–3.5)
GLUCOSE BLD STRIP.AUTO-MCNC: 113 MG/DL (ref 65–99)
GLUCOSE BLD STRIP.AUTO-MCNC: 116 MG/DL (ref 65–99)
GLUCOSE BLD STRIP.AUTO-MCNC: 92 MG/DL (ref 65–99)
GLUCOSE SERPL-MCNC: 100 MG/DL (ref 65–99)
HCT VFR BLD AUTO: 23.4 % (ref 37–47)
HGB BLD-MCNC: 7.7 G/DL (ref 12–16)
MAGNESIUM SERPL-MCNC: 1.7 MG/DL (ref 1.5–2.5)
MCH RBC QN AUTO: 31 PG (ref 27–33)
MCHC RBC AUTO-ENTMCNC: 32.9 G/DL (ref 32.2–35.5)
MCV RBC AUTO: 94.4 FL (ref 81.4–97.8)
PHOSPHATE SERPL-MCNC: 1.3 MG/DL (ref 2.5–4.5)
PLATELET # BLD AUTO: 170 K/UL (ref 164–446)
PMV BLD AUTO: 10.1 FL (ref 9–12.9)
POTASSIUM SERPL-SCNC: 3.6 MMOL/L (ref 3.6–5.5)
PROT SERPL-MCNC: 5.5 G/DL (ref 6–8.2)
RBC # BLD AUTO: 2.48 M/UL (ref 4.2–5.4)
SODIUM SERPL-SCNC: 127 MMOL/L (ref 135–145)
WBC # BLD AUTO: 17.6 K/UL (ref 4.8–10.8)

## 2024-08-18 PROCEDURE — 82962 GLUCOSE BLOOD TEST: CPT

## 2024-08-18 PROCEDURE — 700101 HCHG RX REV CODE 250: Performed by: EMERGENCY MEDICINE

## 2024-08-18 PROCEDURE — A9270 NON-COVERED ITEM OR SERVICE: HCPCS | Performed by: HOSPITALIST

## 2024-08-18 PROCEDURE — 82533 TOTAL CORTISOL: CPT

## 2024-08-18 PROCEDURE — 700101 HCHG RX REV CODE 250

## 2024-08-18 PROCEDURE — 700105 HCHG RX REV CODE 258

## 2024-08-18 PROCEDURE — A9270 NON-COVERED ITEM OR SERVICE: HCPCS | Performed by: INTERNAL MEDICINE

## 2024-08-18 PROCEDURE — 80053 COMPREHEN METABOLIC PANEL: CPT

## 2024-08-18 PROCEDURE — 99232 SBSQ HOSP IP/OBS MODERATE 35: CPT | Performed by: NURSE PRACTITIONER

## 2024-08-18 PROCEDURE — 78226 HEPATOBILIARY SYSTEM IMAGING: CPT

## 2024-08-18 PROCEDURE — 700102 HCHG RX REV CODE 250 W/ 637 OVERRIDE(OP): Performed by: NURSE PRACTITIONER

## 2024-08-18 PROCEDURE — 36415 COLL VENOUS BLD VENIPUNCTURE: CPT

## 2024-08-18 PROCEDURE — A9270 NON-COVERED ITEM OR SERVICE: HCPCS | Performed by: NURSE PRACTITIONER

## 2024-08-18 PROCEDURE — 770006 HCHG ROOM/CARE - MED/SURG/GYN SEMI*

## 2024-08-18 PROCEDURE — 700111 HCHG RX REV CODE 636 W/ 250 OVERRIDE (IP): Performed by: EMERGENCY MEDICINE

## 2024-08-18 PROCEDURE — 97602 WOUND(S) CARE NON-SELECTIVE: CPT

## 2024-08-18 PROCEDURE — 700111 HCHG RX REV CODE 636 W/ 250 OVERRIDE (IP): Mod: JZ | Performed by: RADIOLOGY

## 2024-08-18 PROCEDURE — 700111 HCHG RX REV CODE 636 W/ 250 OVERRIDE (IP)

## 2024-08-18 PROCEDURE — 700102 HCHG RX REV CODE 250 W/ 637 OVERRIDE(OP): Performed by: HOSPITALIST

## 2024-08-18 PROCEDURE — 99233 SBSQ HOSP IP/OBS HIGH 50: CPT | Performed by: HOSPITALIST

## 2024-08-18 PROCEDURE — 700102 HCHG RX REV CODE 250 W/ 637 OVERRIDE(OP): Performed by: INTERNAL MEDICINE

## 2024-08-18 PROCEDURE — 84100 ASSAY OF PHOSPHORUS: CPT

## 2024-08-18 PROCEDURE — 83735 ASSAY OF MAGNESIUM: CPT

## 2024-08-18 PROCEDURE — 85027 COMPLETE CBC AUTOMATED: CPT

## 2024-08-18 RX ORDER — MAGNESIUM SULFATE HEPTAHYDRATE 40 MG/ML
2 INJECTION, SOLUTION INTRAVENOUS ONCE
Status: COMPLETED | OUTPATIENT
Start: 2024-08-18 | End: 2024-08-18

## 2024-08-18 RX ORDER — MORPHINE SULFATE 4 MG/ML
3 INJECTION INTRAVENOUS ONCE
Status: COMPLETED | OUTPATIENT
Start: 2024-08-18 | End: 2024-08-18

## 2024-08-18 RX ORDER — MORPHINE SULFATE 4 MG/ML
INJECTION INTRAVENOUS
Status: COMPLETED
Start: 2024-08-18 | End: 2024-08-18

## 2024-08-18 RX ORDER — LACTULOSE 20 G/30ML
30 SOLUTION ORAL 2 TIMES DAILY
Status: DISCONTINUED | OUTPATIENT
Start: 2024-08-18 | End: 2024-08-21 | Stop reason: HOSPADM

## 2024-08-18 RX ADMIN — SUCRALFATE 1 G: 1 SUSPENSION ORAL at 13:53

## 2024-08-18 RX ADMIN — POTASSIUM PHOSPHATE, MONOBASIC AND POTASSIUM PHOSPHATE, DIBASIC 30 MMOL: 224; 236 INJECTION, SOLUTION, CONCENTRATE INTRAVENOUS at 04:51

## 2024-08-18 RX ADMIN — PANTOPRAZOLE SODIUM 40 MG: 40 INJECTION, POWDER, FOR SOLUTION INTRAVENOUS at 17:27

## 2024-08-18 RX ADMIN — MORPHINE SULFATE 3 MG: 4 INJECTION, SOLUTION INTRAMUSCULAR; INTRAVENOUS at 12:49

## 2024-08-18 RX ADMIN — CEFTRIAXONE SODIUM 1000 MG: 10 INJECTION, POWDER, FOR SOLUTION INTRAVENOUS at 05:29

## 2024-08-18 RX ADMIN — MAGNESIUM SULFATE HEPTAHYDRATE 2 G: 2 INJECTION, SOLUTION INTRAVENOUS at 05:18

## 2024-08-18 RX ADMIN — SUCRALFATE 1 G: 1 SUSPENSION ORAL at 22:50

## 2024-08-18 RX ADMIN — SPIRONOLACTONE 50 MG: 50 TABLET ORAL at 17:27

## 2024-08-18 RX ADMIN — SUCRALFATE 1 G: 1 SUSPENSION ORAL at 17:27

## 2024-08-18 RX ADMIN — LACTULOSE 30 ML: 20 SOLUTION ORAL at 17:27

## 2024-08-18 RX ADMIN — PANTOPRAZOLE SODIUM 40 MG: 40 INJECTION, POWDER, FOR SOLUTION INTRAVENOUS at 04:47

## 2024-08-18 ASSESSMENT — ENCOUNTER SYMPTOMS
DEPRESSION: 0
CHILLS: 0
FALLS: 0
WEAKNESS: 0
PALPITATIONS: 0
BLOOD IN STOOL: 0
CONSTIPATION: 0
EYE PAIN: 0
INSOMNIA: 0
COUGH: 0
DIARRHEA: 1
FEVER: 0
HEADACHES: 0
ABDOMINAL PAIN: 1
BLURRED VISION: 0
WEAKNESS: 1
VOMITING: 0
SHORTNESS OF BREATH: 0
HEARTBURN: 0
DIZZINESS: 0
BACK PAIN: 0
NAUSEA: 0
SENSORY CHANGE: 0
FOCAL WEAKNESS: 0

## 2024-08-18 ASSESSMENT — PAIN DESCRIPTION - PAIN TYPE
TYPE: ACUTE PAIN
TYPE: ACUTE PAIN

## 2024-08-18 ASSESSMENT — LIFESTYLE VARIABLES: SUBSTANCE_ABUSE: 0

## 2024-08-18 NOTE — ASSESSMENT & PLAN NOTE
HIDA consistent with chronic cholecystitis  I discussed this with surgery Dr. Soria and she will follow up with them as an outpatient

## 2024-08-18 NOTE — PROGRESS NOTES
..Gastroenterology Progress Note               Author:  Oumou Mart, DNP,  APRN Date & Time Created: 8/18/2024 7:53 AM       Patient ID:  Name:             Kavita Freeman  YOB: 1965  Age:                 59 y.o.  female  MRN:               [id number]    Medical Decision Making, by Problem:  Active Hospital Problems    Diagnosis     Electrolyte abnormality [E87.8]     Hyponatremia [E87.1]     Prolonged QT interval [R94.31]     GIB (gastrointestinal bleeding) [K92.2]     Hypotension [I95.9]     Coagulopathy (HCC) [D68.9]     Anemia [D64.9]     Alcoholic cirrhosis of liver with ascites (HCC) [K70.31]      Presenting Chief Complaint:  GI bleeding    History of Present Illness:  This is a 59 year old female with history of alcohol misuse disorder, recent diagnosis of cirrhosis, GI bleeding secondary to esophageal varices who presented to the ED on 8/14/2024 with melena and coffee ground emesis. She was initially hypotensive with Hgb of 5. She was resuscitated with crystalloid infusions and PRBCs with improvement. She as admitted to the ICU and started on octreotide, IV pantoprazole, and ceftriaxone with planned endoscopy the next morning.     8/15/2024: EGD with GERD grade C on top of moderate-sized esophageal varices, it is the source of bleeding in the last 2 week. No active bleeding now, s/p banding x 7.     Interval History:  8/16/2024: Patient seen in the TICU. She feels well, no complaints. Had one small hard BM this morning. Hungry.  Hgb 8.1.  WBC 17<28. Paracentesis completed with 5L removed, albumin replenishment, negative for SBP. SAAG 2.2    MELD 3.0 - 25    8/17/2024: Having diarrhea, and mild RUQ pain. Hungry and would like to eat. WBC 15.8, Na 124, ammonia 74, neg peritoneal fluid.     8/18/2024: Patient seen, feels okay.  Still having right upper quadrant pain.  HIDA consistent with chronic cholecystitis or abnormal gallbladder function..WBC 17.6, hgb 7.7, no BM documented.  Alk phos slowly uptrending, AST/ALT/T. Bili downtrending. R factor 1.6 - cholestatic injury    Hospital Medications:  Current Facility-Administered Medications   Medication Dose Frequency Provider Last Rate Last Admin    potassium phosphate IVPB 30 mmol in 500 mL D5W (premix)  30 mmol Once Clem Woods P.A.-C. 83.3 mL/hr at 08/18/24 0451 30 mmol at 08/18/24 0451    furosemide (Lasix) tablet 20 mg  20 mg Q DAY Armando Finch M.D.   20 mg at 08/17/24 1023    lactulose 20 GM/30ML solution 15 mL  15 mL BID Oumou Mart, DNP,  APRN   15 mL at 08/17/24 1722    spironolactone (Aldactone) tablet 50 mg  50 mg Q EVENING Ant Christian M.D.   50 mg at 08/17/24 1722    sucralfate (Carafate) 1 GM/10ML suspension 1 g  1 g Q6HRS Jeremy M Gonda, M.D.   1 g at 08/17/24 2348    [Held by provider] senna-docusate (Pericolace Or Senokot S) 8.6-50 MG per tablet 2 Tablet  2 Tablet BID Clifford Carranza M.D.   2 Tablet at 08/16/24 1700    And    polyethylene glycol/lytes (Miralax) Packet 1 Packet  1 Packet QDAY PRN Clifford Carranza M.D.        cefTRIAXone (Rocephin) syringe 1,000 mg  1,000 mg Q24HRS Clifford Carranza M.D.   1,000 mg at 08/18/24 0529    Pharmacy Consult Request - IV Antibiotics to PO conversion   PHARMACY TO DOSE Clifford Carranza M.D.        pantoprazole (Protonix) injection 40 mg  40 mg BID Clifford Carranza M.D.   40 mg at 08/18/24 0447    ondansetron (Zofran) syringe/vial injection 4 mg  4 mg Q8HRS PRN Clifford Carranza M.D.   4 mg at 08/14/24 2107    acetaminophen (Tylenol) tablet 650 mg  650 mg Q6HRS PRN Clifford Carranza M.D.   650 mg at 08/17/24 2348   Last reviewed on 8/14/2024  3:43 PM by Conrado Maharaj       Review of Systems:  Review of Systems   Constitutional:  Negative for chills, fever and malaise/fatigue.   HENT:  Negative for hearing loss.    Eyes:  Negative for blurred vision.   Respiratory:  Negative for cough and shortness of breath.    Cardiovascular:  Positive for leg  "swelling. Negative for chest pain.   Gastrointestinal:  Positive for abdominal pain and diarrhea. Negative for blood in stool, constipation, heartburn, melena, nausea and vomiting.   Genitourinary:  Negative for dysuria.   Musculoskeletal:  Negative for back pain.   Skin:  Negative for rash.   Neurological:  Negative for dizziness and weakness.   Psychiatric/Behavioral:  Negative for depression.    All other systems reviewed and are negative.      Vital signs:  Weight/BMI: Body mass index is 24.64 kg/m².  /41   Pulse 69   Temp 36.3 °C (97.4 °F) (Temporal)   Resp 16   Ht 1.727 m (5' 8\")   Wt 73.5 kg (162 lb 0.6 oz)   SpO2 99%   Vitals:    08/17/24 2247 08/17/24 2348 08/18/24 0100 08/18/24 0339   BP: 123/58   104/41   Pulse: 76   69   Resp: 16 18 16 16   Temp: 37.1 °C (98.8 °F)   36.3 °C (97.4 °F)   TempSrc: Temporal   Temporal   SpO2: 97%   99%   Weight: 73.5 kg (162 lb 0.6 oz)      Height:         Oxygen Therapy:  Pulse Oximetry: 99 %, O2 (LPM): 0, O2 Delivery Device: None - Room Air    Intake/Output Summary (Last 24 hours) at 8/18/2024 0753  Last data filed at 8/17/2024 2100  Gross per 24 hour   Intake 1390 ml   Output --   Net 1390 ml         Physical Exam:  Physical Exam  Vitals and nursing note reviewed.   Constitutional:       General: She is not in acute distress.     Appearance: Normal appearance.   HENT:      Head: Normocephalic and atraumatic.      Right Ear: External ear normal.      Left Ear: External ear normal.      Nose: Nose normal.      Mouth/Throat:      Mouth: Mucous membranes are moist.      Pharynx: Oropharynx is clear.   Eyes:      General: Scleral icterus present.   Cardiovascular:      Rate and Rhythm: Normal rate and regular rhythm.      Pulses: Normal pulses.      Heart sounds: Normal heart sounds.   Pulmonary:      Effort: Pulmonary effort is normal. No respiratory distress.      Breath sounds: Normal breath sounds.   Abdominal:      General: Bowel sounds are normal. There is " distension.      Tenderness: There is no abdominal tenderness.   Musculoskeletal:         General: Normal range of motion.      Cervical back: Normal range of motion.      Right lower leg: Edema present.      Left lower leg: Edema present.   Skin:     General: Skin is warm and dry.      Capillary Refill: Capillary refill takes less than 2 seconds.      Coloration: Skin is jaundiced.   Neurological:      Mental Status: She is alert and oriented to person, place, and time.      Motor: Weakness present.   Psychiatric:         Mood and Affect: Mood normal.         Behavior: Behavior normal.       Labs:  Recent Labs     08/16/24  0500 08/17/24 0452 08/18/24  0055   SODIUM 128* 124* 127*   POTASSIUM 4.0 3.4* 3.6   CHLORIDE 100 99 100   CO2 19* 18* 18*   BUN 20 14 11   CREATININE 0.86 0.75 0.69   MAGNESIUM 1.8 1.8 1.7   PHOSPHORUS 2.3* 2.1* 1.3*   CALCIUM 8.4* 7.9* 7.7*     Recent Labs     08/16/24  0500 08/17/24 0452 08/18/24  0055   ALTSGPT 151* 109* 82*   ASTSGOT 160* 94* 71*   ALKPHOSPHAT 73 71 104*   TBILIRUBIN 3.0* 3.0* 2.2*   GLUCOSE 108* 100* 100*     Recent Labs     08/16/24  0500 08/17/24 0452 08/18/24  0055   WBC 17.0* 15.8* 17.6*   NEUTSPOLYS 65.40  --   --    LYMPHOCYTES 17.60*  --   --    MONOCYTES 8.40  --   --    EOSINOPHILS 6.30  --   --    BASOPHILS 0.40  --   --    ASTSGOT 160* 94* 71*   ALTSGPT 151* 109* 82*   ALKPHOSPHAT 73 71 104*   TBILIRUBIN 3.0* 3.0* 2.2*     Recent Labs     08/16/24  0500 08/16/24  1700 08/17/24 0452 08/18/24  0055   RBC 2.54*  --  2.62* 2.48*   HEMOGLOBIN 8.1*  8.1* 7.7* 8.2*  8.3* 7.7*   HEMATOCRIT 23.4*  --  24.7* 23.4*   PLATELETCT 163*  --  154* 170   PROTHROMBTM 22.1*  --   --   --    INR 1.91*  --   --   --      Recent Results (from the past 24 hour(s))   CBC WITHOUT DIFFERENTIAL    Collection Time: 08/18/24 12:55 AM   Result Value Ref Range    WBC 17.6 (H) 4.8 - 10.8 K/uL    RBC 2.48 (L) 4.20 - 5.40 M/uL    Hemoglobin 7.7 (L) 12.0 - 16.0 g/dL    Hematocrit 23.4 (L)  37.0 - 47.0 %    MCV 94.4 81.4 - 97.8 fL    MCH 31.0 27.0 - 33.0 pg    MCHC 32.9 32.2 - 35.5 g/dL    RDW 82.3 (H) 35.9 - 50.0 fL    Platelet Count 170 164 - 446 K/uL    MPV 10.1 9.0 - 12.9 fL   MAGNESIUM    Collection Time: 08/18/24 12:55 AM   Result Value Ref Range    Magnesium 1.7 1.5 - 2.5 mg/dL   PHOSPHORUS    Collection Time: 08/18/24 12:55 AM   Result Value Ref Range    Phosphorus 1.3 (L) 2.5 - 4.5 mg/dL   CORTISOL    Collection Time: 08/18/24 12:55 AM   Result Value Ref Range    Cortisol 7.5 0.0 - 23.0 ug/dL   Comp Metabolic Panel    Collection Time: 08/18/24 12:55 AM   Result Value Ref Range    Sodium 127 (L) 135 - 145 mmol/L    Potassium 3.6 3.6 - 5.5 mmol/L    Chloride 100 96 - 112 mmol/L    Co2 18 (L) 20 - 33 mmol/L    Anion Gap 9.0 7.0 - 16.0    Glucose 100 (H) 65 - 99 mg/dL    Bun 11 8 - 22 mg/dL    Creatinine 0.69 0.50 - 1.40 mg/dL    Calcium 7.7 (L) 8.5 - 10.5 mg/dL    Correct Calcium 8.8 8.5 - 10.5 mg/dL    AST(SGOT) 71 (H) 12 - 45 U/L    ALT(SGPT) 82 (H) 2 - 50 U/L    Alkaline Phosphatase 104 (H) 30 - 99 U/L    Total Bilirubin 2.2 (H) 0.1 - 1.5 mg/dL    Albumin 2.6 (L) 3.2 - 4.9 g/dL    Total Protein 5.5 (L) 6.0 - 8.2 g/dL    Globulin 2.9 1.9 - 3.5 g/dL    A-G Ratio 0.9 g/dL   ESTIMATED GFR    Collection Time: 08/18/24 12:55 AM   Result Value Ref Range    GFR (CKD-EPI) 101 >60 mL/min/1.73 m 2       Radiology Review:  US-LIVER AND VESSELS COMPLETE (COMBO)   Final Result         1.  Hepatomegaly   2.  Cirrhotic morphology of the liver.   3.  Gallbladder sludge with gallbladder wall thickening, nonspecific in the setting of hepatocellular disease. Consider acalculous cholecystitis as clinically appropriate. Could be further evaluated with HIDA scan as warranted.   4.  Common bile duct dilatation, consider causes of biliary obstruction with additional workup as clinically appropriate   5.  Splenomegaly   6.  Multiple hepatic cysts   7.  Moderate scattered abdominal ascites      IR-MIDLINE CATHETER  INSERTION WO GUIDANCE > AGE 3   Final Result                  Ultrasound-guided midline placement performed by qualified nursing staff    as above.          CT-HEAD W/O   Final Result      No acute intracranial abnormality detected.               DX-CHEST-PORTABLE (1 VIEW)   Final Result      Mild left basilar opacification be atelectasis versus mild infiltrate.      NM-BILIARY (HIDA) SCAN WITH CCK    (Results Pending)       MDM (Data Review):   -Records reviewed and summarized in current documentation  -I personally reviewed and interpreted the laboratory results  -I personally reviewed the radiology images    Assessment/Recommendations:  Acute blood loss anemia  Esophageal varices status post banding x 7  GERD grade C  Mild portal hypertensive gastropathy  Decompensated alcoholic cirrhosis  Ascites  Coagulopathy  Prolonged QTc  Hypertension  Leukocytosis  Hepatic cysts - monitor  Splenomegaly  Hepatofugal portal flow  Encephalopathy  Acalculous cholecystitis with CBD dilation    Recommendations:  HIDA scan reveals chronic cholecystitis, given elevated white count and right upper quadrant pain would consider surgical consultation and evaluation  Continue ceftriaxone, pantoprazole, sucralfate  Continue 20 mg p.o. Lasix and 50 mg spironolactone, uptitrate as tolerated  Can't beta blockade as she is bradycardic - ?TIPS when encephalopathy improves  No asterixis, ammonia elevated, endorses fogginess, start lactulose 15 ml BID titrated to 2-3 BM daily, increase to 30 mg as she is not having any bowel movements  2 gram sodium diet  Has follow up appointment with [facility] on 9/18    Discussed with patient, Dr. Ortiz, Dr. Hess    .Oumou Mart, DNP,  APRN    Core Quality Measures   Reviewed items::  Labs, Medications and Radiology reports reviewed

## 2024-08-18 NOTE — THERAPY
Physical Therapy   Initial Evaluation     Patient Name: Kavita Freeman  Age:  59 y.o., Sex:  female  Medical Record #: [id number]  Today's Date: 8/17/2024     Precautions  Precautions: Fall Risk    Assessment  Patient is 59 y.o. female with melena and coffee ground emesis, Acute blood loss anemia, EGD with GERD grade C on top of moderate-sized esophageal varices, it is the source of bleeding in the last 2 week. No active bleeding now, s/p banding x 7.   History of alcohol misuse disorder, recent diagnosis of cirrhosis, GI bleeding secondary to esophageal varices, HTN, encephalopathy. Pt lives with S.O. Had multiple falls in the last week.     Pt presents to PT with gait impairments with pathway deviations, pt able to self correct. Required assistance with the steps. Decreased activity tolerance. Pt with decreased balance, educated pt to use FWW at home prn for fall prevention. Provided fall prevention handout. Anticipate pt will benefit from home with home health and home with family assistance upon DC from hospital. Pt currently presents with PT clinical presentation of evolving due to continued gait disturbances and requiring home health. Pt will also benefit from nursing to assist pt with mobility to include the following: up to chair for meals, ambulate to bathroom as needed, and ambulate in halls.        Plan    Physical Therapy Initial Treatment Plan   Duration: Discharge Needs Only    DC Equipment Recommendations:  (use FWW at home as needed for fall prevention)  Discharge Recommendations: Recommend home health for continued physical therapy services (pt stated that she previously tried to get HH but it was too expensive. If this is still the case, pt may benefit from outpatient PT.)       Subjective    Pt agreed to PT.      Objective       08/17/24 1616   Time In/Time Out   Therapy Start Time 1547   Therapy End Time 1616   Total Therapy Time 29   Initial Contact Note    Initial Contact Note Order Received  and Verified, Physical Therapy Evaluation in Progress with Full Report to Follow.   Precautions   Precautions Fall Risk   Vitals   O2 Delivery Device None - Room Air   Vitals Comments no s/s distress   Pain 0 - 10 Group   Location Abdomen   Location Orientation Mid;Left;Right   Therapist Pain Assessment During Activity;Post Activity Pain Same as Prior to Activity  (pt stated that her abdomen is better today)   Prior Living Situation   Prior Services None;Home-Independent   Housing / Facility 1 Story House   Steps Into Home 2   Steps In Home 0   Bathroom Set up Bathtub / Shower Combination   Equipment Owned Front-Wheel Walker   Lives with - Patient's Self Care Capacity Significant Other   Comments S.O. works and cannot assist; has friend that can come by to give some assist.   History of Falls   History of Falls Yes   Date of Last Fall   (muitple falls per pt, pt stated that she does not remember some of them due to her ammonia levels were off)   Cognition    Cognition / Consciousness X   Speech/ Communication Delayed Responses   Level of Consciousness Alert   Ability To Follow Commands 2 Step   Attention Impaired   Comments pleasant, cooperative, motivated to be more active and maybe start walking outside on her property   Active ROM Lower Body    Active ROM Lower Body  WDL   Strength Lower Body   Lower Body Strength  WDL   Coordination Lower Body    Coordination Lower Body  WDL   Balance Assessment   Sitting Balance (Static) Fair +   Sitting Balance (Dynamic) Fair +   Standing Balance (Static) Fair   Standing Balance (Dynamic) Fair   Weight Shift Sitting Good   Weight Shift Standing Good   Comments no AD, pathway deviations with amb, no LOB   Bed Mobility    Supine to Sit Supervised   Sit to Supine Supervised   Scooting Supervised   Comments no features and HOB min elevated   Gait Analysis   Gait Level Of Assist Standby Assist   Assistive Device None   Distance (Feet) 150   # of Times Distance was Traveled 2    Deviation Bradykinetic;Decreased Base Of Support  (min occasional pathway deviations, pt able to self correct, no overt LOB with amb)   # of Stairs Climbed 6  (3 with rails, 3 with HHA)   Level of Assist with Stairs Contact Guard Assist   Functional Mobility   Sit to Stand Standby Assist   Bed, Chair, Wheelchair Transfer Standby Assist   Transfer Method Stand Step   6 Clicks Assessment - How much HELP from from another person do you currently need... (If the patient hasn't done an activity recently, how much help from another person do you think he/she would need if he/she tried?)   Turning from your back to your side while in a flat bed without using bedrails? 3   Moving from lying on your back to sitting on the side of a flat bed without using bedrails? 3   Moving to and from a bed to a chair (including a wheelchair)? 3   Standing up from a chair using your arms (e.g., wheelchair, or bedside chair)? 3   Walking in hospital room? 3   Climbing 3-5 steps with a railing? 3   6 clicks Mobility Score 18   Activity Tolerance   Comments limited by fatigue after amb in halls   Edema / Skin Assessment   Edema / Skin  X   Comments jaundice skin   Patient / Family Goals    Patient / Family Goal #1 to get stronger   Education Group   Education Provided Role of Physical Therapist;Gait Training  (fall prevention handout)   Role of Physical Therapist Patient Response Patient;Acceptance;Explanation;Verbal Demonstration   Gait Training Patient Response Patient;Acceptance;Explanation;Action Demonstration   Physical Therapy Initial Treatment Plan    Duration Discharge Needs Only   Anticipated Discharge Equipment and Recommendations   DC Equipment Recommendations   (use FWW at home as needed for fall prevention)   Discharge Recommendations Recommend home health for continued physical therapy services  (pt stated that she previously tried to get HH but it was too expensive. If this is still the case, pt may benefit from outpatient  PT.)   Interdisciplinary Plan of Care Collaboration   IDT Collaboration with  Nursing   Patient Position at End of Therapy In Bed;Bed Alarm On;Call Light within Reach;Tray Table within Reach;Phone within Reach   Collaboration Comments RN updated

## 2024-08-18 NOTE — PROGRESS NOTES
[facility] Medicine Daily Progress Note    Date of Service  8/18/2024    Chief Complaint  Kavita Freeman is a 59 y.o. female admitted 8/14/2024 with GI bleed.    Hospital Course  Patient is a 59 year old female who presented to [facility] 8/14/2024 with an acute GI bleed. She has an extensive history of alcohol history, alcohol abuse and was recently diagnosed with cirrhosis. She had a history of prior GI bleeding and previously diagnosed esophageal varices. She presented to [facility] complaining of  several days of melena, coffee-ground emesis, and general fatigue. In the ER, she was found to be hypotensive so she was given volume resuscitation. She was also noted to have a hemoglobin of 5 so she was transfused PRBC. On 8/15 she had an EGD with esophageal varices, 7 esophageal bands were placed.  She also had a paracentesis performed in ICU removing a total of 5 L. She was given albumin post paracentesis.  The patient recently was admitted to the hospital where she had a left upper thigh perirectal abscess, required surgical intervention with I&D and debridement, at the time discharged for outpatient follow-up, she did not present to outpatient gastroenterology per her report.      Interval Problem Update  Axox3, she states she is feeling better, denies abdominal pain currently but did state she had some earlier, no melena or hematochezia. HIDA done, I spoke with general surgery and they recommend outpatient non urgent gallbladder removal. She denies sob, no cp. Eager to eat. ROS otherwise negative    She remains medically complex and at high risk for clinical deterioration.    I have discussed this patient's plan of care and discharge plan at IDT rounds today with Case Management, Nursing, Nursing leadership, and other members of the IDT team.    Consultants/Specialty  GI  Critical care    Code Status  Full Code    Disposition  The patient is not medically cleared for discharge to home or a post-acute  facility.      I have placed the appropriate orders for post-discharge needs.    Review of Systems  Review of Systems   Constitutional:  Negative for chills and fever.   Eyes:  Negative for blurred vision and pain.   Respiratory:  Negative for cough and shortness of breath.    Cardiovascular:  Negative for chest pain, palpitations and leg swelling.   Gastrointestinal:  Positive for abdominal pain and melena. Negative for nausea and vomiting.   Genitourinary:  Negative for dysuria and urgency.   Musculoskeletal:  Negative for back pain and falls.   Skin:  Negative for itching and rash.   Neurological:  Positive for weakness. Negative for dizziness, sensory change, focal weakness and headaches.   Psychiatric/Behavioral:  Negative for substance abuse. The patient does not have insomnia.         Physical Exam  Temp:  [36.3 °C (97.4 °F)-37.3 °C (99.1 °F)] 37.3 °C (99.1 °F)  Pulse:  [62-88] 71  Resp:  [16-18] 17  BP: (104-123)/(41-70) 118/49  SpO2:  [97 %-99 %] 99 %    Physical Exam  Vitals reviewed.   Constitutional:       General: She is not in acute distress.     Appearance: She is not diaphoretic.   HENT:      Head: Normocephalic and atraumatic.      Right Ear: External ear normal.      Left Ear: External ear normal.      Nose: Nose normal. No congestion.      Mouth/Throat:      Pharynx: No oropharyngeal exudate or posterior oropharyngeal erythema.   Eyes:      Extraocular Movements: Extraocular movements intact.      Pupils: Pupils are equal, round, and reactive to light.   Cardiovascular:      Rate and Rhythm: Normal rate and regular rhythm.   Pulmonary:      Effort: Pulmonary effort is normal. No respiratory distress.      Breath sounds: Normal breath sounds. No wheezing or rales.   Abdominal:      General: Bowel sounds are normal. There is distension.      Palpations: Abdomen is soft.      Tenderness: There is no abdominal tenderness. There is no guarding or rebound.   Musculoskeletal:         General: No  swelling. Normal range of motion.      Cervical back: Normal range of motion and neck supple.      Right lower leg: No edema.      Left lower leg: No edema.   Skin:     General: Skin is warm and dry.   Neurological:      General: No focal deficit present.      Mental Status: She is alert and oriented to person, place, and time.      Cranial Nerves: No cranial nerve deficit.      Sensory: No sensory deficit.      Motor: No weakness.   Psychiatric:         Mood and Affect: Mood normal.         Behavior: Behavior normal.         Fluids    Intake/Output Summary (Last 24 hours) at 8/18/2024 1605  Last data filed at 8/17/2024 2100  Gross per 24 hour   Intake 200 ml   Output --   Net 200 ml       Laboratory  Recent Labs     08/16/24  0500 08/16/24  1700 08/17/24  0452 08/18/24  0055   WBC 17.0*  --  15.8* 17.6*   RBC 2.54*  --  2.62* 2.48*   HEMOGLOBIN 8.1*  8.1* 7.7* 8.2*  8.3* 7.7*   HEMATOCRIT 23.4*  --  24.7* 23.4*   MCV 92.1  --  94.3 94.4   MCH 31.5  --  31.7 31.0   MCHC 34.2  --  33.6 32.9   RDW 61.8*  --  72.3* 82.3*   PLATELETCT 163*  --  154* 170   MPV 11.0  --  10.5 10.1     Recent Labs     08/16/24  0500 08/17/24  0452 08/18/24  0055   SODIUM 128* 124* 127*   POTASSIUM 4.0 3.4* 3.6   CHLORIDE 100 99 100   CO2 19* 18* 18*   GLUCOSE 108* 100* 100*   BUN 20 14 11   CREATININE 0.86 0.75 0.69   CALCIUM 8.4* 7.9* 7.7*     Recent Labs     08/16/24  0500   INR 1.91*               Imaging  NM-HEPATOBILIARY SCAN   Final Result      Gallbladder is visualized after morphine administration, suggest patency of the cystic duct.      Chronic cholecystitis or abnormal gallbladder function are in the differential.      US-LIVER AND VESSELS COMPLETE (COMBO)   Final Result         1.  Hepatomegaly   2.  Cirrhotic morphology of the liver.   3.  Gallbladder sludge with gallbladder wall thickening, nonspecific in the setting of hepatocellular disease. Consider acalculous cholecystitis as clinically appropriate. Could be further  evaluated with HIDA scan as warranted.   4.  Common bile duct dilatation, consider causes of biliary obstruction with additional workup as clinically appropriate   5.  Splenomegaly   6.  Multiple hepatic cysts   7.  Moderate scattered abdominal ascites      IR-MIDLINE CATHETER INSERTION WO GUIDANCE > AGE 3   Final Result                  Ultrasound-guided midline placement performed by qualified nursing staff    as above.          CT-HEAD W/O   Final Result      No acute intracranial abnormality detected.               DX-CHEST-PORTABLE (1 VIEW)   Final Result      Mild left basilar opacification be atelectasis versus mild infiltrate.           Assessment/Plan  * GIB (gastrointestinal bleeding)- (present on admission)  Assessment & Plan  Secondary to esophageal varices s/p bands x 7 8/15  GI following  clears  Serial H/H with conservative transfusion strategy  Continue IV Protonix twice daily  Continue IV Rocephin x 7 days    Cholecystitis  Assessment & Plan  HIDA consistent with chronic cholecystitis  I discussed this with surgery and she will follow up with them as an outpatient    Metabolic acidosis  Assessment & Plan  Related to cirrhosis  Improving   following    Electrolyte abnormality- (present on admission)  Assessment & Plan  Continue to replace and follow  Repeat labs in am     Prolonged QT interval- (present on admission)  Assessment & Plan  Avoid QT prolonging medications  Optimize electrolytes, continuous telemetry monitoring    Hyponatremia- (present on admission)  Assessment & Plan  Likely due to cirrhosis   Some fluctuation  Following  Am labs    Coagulopathy (HCC)- (present on admission)  Assessment & Plan  Likely multifactorial iso of cirrhosis   S/p TXA 1g IV  S/p IV vitamin K x 1  following    Hypotension- (present on admission)  Assessment & Plan  Hypotensive prior to arrival and in ED, resolved after crystalloid and PRBCs --> improved  following    Anemia- (present on admission)  Assessment &  Plan  Acute blood loss due to GI bleed  S/p esophageal variceal banding  Some fluctuation  Continue to follow, cbc in am    Alcoholic cirrhosis of liver with ascites (HCC)- (present on admission)  Assessment & Plan  Monitor synthetic function  Avoid hepatotoxins  S/p paracentesis, 5L removed, albumin protocol  On lasix and aldactone for ascites  On lactulose for hepatic encephalopathy prophylaxis  Following closely   Cmp in am          VTE prophylaxis: scd    I have performed a physical exam and reviewed and updated ROS and Plan today (8/18/2024). In review of yesterday's note (8/17/2024), there are no changes except as documented above.

## 2024-08-18 NOTE — PROGRESS NOTES
Bedside report received from off going RN/tech: Letitia assumed care of patient.     Fall Risk Score: HIGH RISK  Fall risk interventions in place: Place yellow fall risk ID band on patient, Provide patient/family education based on risk assessment, Educate patient/family to call staff for assistance when getting out of bed, Place fall precaution signage outside patient door, Place patient in room close to nursing station, Utilize bed/chair fall alarm, Notify charge of high risk for huddle, and Bed alarm connected correctly  Bed type: Regular (Thad Score less than 17 interventions in place)  Patient on cardiac monitor: yes  IVF/IV medications: Not Applicable   Oxygen: Room Air  Bedside sitter: Not Applicable   Isolation: Not applicable

## 2024-08-18 NOTE — WOUND TEAM
[facility]  Inpatient Services  Initial Wound and Skin Care Evaluation    Admission Date: 2024     Last order of IP CONSULT TO WOUND CARE was found on 2024 from Hospital Encounter on 2024     HPI, PMH, SH: Reviewed    Past Surgical History:   Procedure Laterality Date    TX UPPER GI ENDOSCOPY,DIAGNOSIS N/A 8/15/2024    Procedure: GASTROSCOPY;  Surgeon: Bahman Vences M.D.;  Location: SURGERY SAME DAY [facility];  Service: Gastroenterology    TX UPPER GI ENDOSCOPY,LIGAT VARIX N/A 8/15/2024    Procedure: GASTROSCOPY, WITH BANDING;  Surgeon: Bahman Vences M.D.;  Location: SURGERY SAME DAY [facility];  Service: Gastroenterology    TX I&D PERIRECTAL ABSCESS N/A 2024    Procedure: INCISION AND DRAINAGE, ABSCESS, PERIRECTAL;  Surgeon: Corey Sierra M.D.;  Location: SURGERY [facility];  Service: Thoracic    TX I&D PERIRECTAL ABSCESS  2024    Procedure: INCISION AND DRAINAGE, ABSCESS, PERIRECTAL;  Surgeon: Kelton Orellana M.D.;  Location: SURGERY [facility];  Service: General    SHOULDER ARTHROSCOPY W/ ROTATOR CUFF REPAIR  3/9/2015    Performed by Corey Terry M.D. at SURGERY [facility] ORS    TRIGGER FINGER RELEASE  3/9/2015    Performed by Corey Terry M.D. at SURGERY [facility] ORS    OTHER  2014    broken ribs- plates & Pins right front 4-8    SHOULDER ARTHROSCOPY  3/23/2009    Performed by COREY TERRY at SURGERY [facility] ORS    BREAST BIOPSY  08    Performed by MARY DE LA CRUZ at SURGERY SAME DAY [facility] ORS    SHOULDER ARTHROSCOPY      GYN SURGERY      tubal ligation    OPEN REDUCTION      flail chest    TUBAL LIGATION       Social History     Tobacco Use    Smoking status: Every Day     Current packs/day: 0.00     Average packs/day: 0.5 packs/day for 25.0 years (12.5 ttl pk-yrs)     Types: Cigarettes     Start date: 1994     Last attempt to quit: 2016     Years since quittin.2    Smokeless tobacco: Never    Tobacco  comments:     1-2 cigarettes a day   Substance Use Topics    Alcohol use: No     Alcohol/week: 1.5 - 2.0 oz     Types: 3 - 4 Cans of beer per week     Chief Complaint   Patient presents with    Blood in Vomit     Pt BIBA bloody emesis. Syncopal event today, pt states head strike, denies blood thinners or daily ASA, no trauma to head, pt denies neck pain. GCS 15. Dried blood to nares/mouth.      Diagnosis: GIB (gastrointestinal bleeding) [K92.2]    Unit where seen by Wound Team: S631/01     WOUND CONSULT RELATED TO:  barbara-anal fissure    WOUND TEAM PLAN OF CARE - Frequency of Follow-up:   Nursing to follow dressing orders written for wound care. Contact wound team if area fails to progress, deteriorates or with any questions/concerns if something comes up before next scheduled follow up (See below as to whether wound is following and frequency of wound follow up)   Not following, consult as needed  - barbara-anal     WOUND HISTORY:   Barbara-anal abscess drained and healing       WOUND ASSESSMENT/LDA  Wound 06/17/24 Full Thickness Wound Buttocks (Active)   Date First Assessed/Time First Assessed: 06/17/24 0932   Primary Wound Type: Full Thickness Wound  Location: Buttocks      Assessments 8/18/2024  3:00 PM   Wound Image      Site Assessment Red;Pink   Periwound Assessment Clean;Dry;Intact;Scar tissue   Margins Defined edges   Closure Open to air   Drainage Amount None   Treatments Cleansed;Irrigation;Nonselective debridement;Site care;Offloading   Wound Cleansing Normal Saline Irrigation   Periwound Protectant Barrier Paste   Dressing Status Open to Air   Dressing Cleansing/Solutions Not Applicable   Dressing Options Open to Air   Dressing Change/Treatment Frequency Every Shift, and As Needed   NEXT Weekly Photo (Inpatient Only) 08/21/24   Wound Team Following Not following   Non-staged Wound Description Full thickness   Wound Length (cm) 2.5 cm   Wound Width (cm) 0.3 cm   Wound Depth (cm) 0.1 cm   Wound Surface Area  (cm^2) 0.75 cm^2   Wound Volume (cm^3) 0.075 cm^3   Shape linear   Wound Odor None   Exposed Structures None   WOUND NURSE ONLY - Time Spent with Patient (mins) 15        Vascular:    YOSSI:   No results found.    Lab Values:    Lab Results   Component Value Date/Time    WBC 17.6 (H) 08/18/2024 12:55 AM    RBC 2.48 (L) 08/18/2024 12:55 AM    HEMOGLOBIN 7.7 (L) 08/18/2024 12:55 AM    HEMATOCRIT 23.4 (L) 08/18/2024 12:55 AM    HBA1C 4.6 03/15/2012 02:34 PM         Culture Results show:  No results found for this or any previous visit (from the past 720 hour(s)).    Pain Level/Medicated:  None, Tolerated without pain medication       INTERVENTIONS BY WOUND TEAM:  Chart and images reviewed. Discussed with bedside RN. All areas of concern (based on picture review, LDA review and discussion with bedside RN) have been thoroughly assessed. Documentation of areas based on significant findings. This RN in to assess patient. Performed standard wound care which includes appropriate positioning, dressing removal and non-selective debridement. Pictures and measurements obtained weekly if/when required.    Wound:  Yeimi-anal   Preparation for Dressing removal: Open to air  Cleansed/Non-selectively Debrided with:  Normal Saline and Gauze  Yeimi wound: Cleansed with Normal Saline and Gauze, Prepped with No Sting  Primary Dressing:  barrier paste  Secondary (Outer) Dressing: CRISTÓBAL, offloading measures    Advanced Wound Care Discharge Planning  Number of Clinicians necessary to complete wound care: 1  Is patient requiring IV pain medications for dressing changes:  No   Length of time for dressing change 5 min. (This does not include chart review, pre-medication time, set up, clean up or time spent charting.)    Interdisciplinary consultation: Patient, Bedside RN (Mojgan), N/A.  Pressure injury and staging reviewed with N/A.    EVALUATION / RATIONALE FOR TREATMENT:     Date:  08/18/24  Wound Status:  Initial evaluation    Yeimi-anal abscess  that was drained and now nearly resolved.  Moist red with granular tissue at the wound base and barbara-skin scar tissue developing.  Barrier paste to keep stool and urine from the wound bed.          Goals: Steady decrease in wound area and depth weekly.    NURSING PLAN OF CARE ORDERS:  Dressing changes: See Dressing Care orders    NUTRITION RECOMMENDATIONS   Wound Team Recommendations:  N/A    DIET ORDERS (From admission to next 24h)       Start     Ordered    08/18/24 0400  Diet NPO Restrict to: Strict (For HIDA Scan)  ALL MEALS        Question:  Diet NPO Restrict to:  Answer:  Strict  Comment:  For HIDA Scan    08/17/24 1203    08/16/24 1412  Supplements  ONCE DAILY        Question Answer Comment   Which Supplement Ensure    Ensure: Ensure Plus Carton        08/16/24 1412                    PREVENTATIVE INTERVENTIONS:    Q shift Thad - performed per nursing policy  Q shift pressure point assessments - performed per nursing policy    Surface/Positioning  Standard/trauma mattress - Currently in Place    Offloading/Redistribution  Float Heels off Bed with Pillows - Applied this Visit           Respiratory  N/A    Containment/Moisture Prevention    Dri-rhona pad - Currently in Place  Barrier paste - Applied this Visit    Mobilization      Up to chair and Ambulate      Anticipated discharge plans:  TBD        Vac Discharge Needs:  Vac Discharge plan is purely a recommendation from wound team and not a requirement for discharge unless otherwise stated by physician.  Not Applicable Pt not on a wound vac

## 2024-08-18 NOTE — PROGRESS NOTES
Pt transferred from [facility] report received from KAYLEY Johnson. Pt on unit via hospital bed. Pt alert/oriented x4, medicated for abdominal pain. Pt resting in bed, needs met. Call light within reach, personal belongings available, bed in lowest position, treaded socks on, and bed alarm on. Hourly rounding in place.

## 2024-08-18 NOTE — PROGRESS NOTES
4 Eyes Skin Assessment Completed by KAYLEY Steele and KAYLEY Babb.    Head WDL  Ears WDL  Nose WDL  Mouth WDL  Neck WDL  Breast/Chest WDL  Shoulder Blades WDL  Spine - dark spot mid-lower back  (R) Arm/Elbow/Hand Bruising  (L) Arm/Elbow/Hand Bruising  Abdomen WDL  Groin WDL  Scrotum/Coccyx/Buttocks - open area  (R) Leg WDL  (L) Leg WDL  (R) Heel/Foot/Toe Redness and Blanching, brown spot  (L) Heel/Foot/Toe Redness and Blanching          Devices In Places - PIV midline      Interventions In Place Pillows and Pressure Redistribution Mattress    Possible Skin Injury Yes    Pictures Uploaded Into Epic Yes  Wound Consult Placed Yes from previous unit  RN Wound Prevention Protocol Ordered No

## 2024-08-19 LAB
ALBUMIN SERPL BCP-MCNC: 2.6 G/DL (ref 3.2–4.9)
ALBUMIN/GLOB SERPL: 0.9 G/DL
ALP SERPL-CCNC: 79 U/L (ref 30–99)
ALT SERPL-CCNC: 71 U/L (ref 2–50)
ANION GAP SERPL CALC-SCNC: 12 MMOL/L (ref 7–16)
AST SERPL-CCNC: 49 U/L (ref 12–45)
BACTERIA FLD AEROBE CULT: NORMAL
BASOPHILS # BLD AUTO: 0.3 % (ref 0–1.8)
BASOPHILS # BLD: 0.03 K/UL (ref 0–0.12)
BILIRUB SERPL-MCNC: 3.7 MG/DL (ref 0.1–1.5)
BUN SERPL-MCNC: 8 MG/DL (ref 8–22)
CALCIUM ALBUM COR SERPL-MCNC: 8.9 MG/DL (ref 8.5–10.5)
CALCIUM SERPL-MCNC: 7.8 MG/DL (ref 8.5–10.5)
CHLORIDE SERPL-SCNC: 99 MMOL/L (ref 96–112)
CO2 SERPL-SCNC: 18 MMOL/L (ref 20–33)
CREAT SERPL-MCNC: 0.62 MG/DL (ref 0.5–1.4)
EOSINOPHIL # BLD AUTO: 0.4 K/UL (ref 0–0.51)
EOSINOPHIL NFR BLD: 3.9 % (ref 0–6.9)
ERYTHROCYTE [DISTWIDTH] IN BLOOD BY AUTOMATED COUNT: 81.7 FL (ref 35.9–50)
GFR SERPLBLD CREATININE-BSD FMLA CKD-EPI: 104 ML/MIN/1.73 M 2
GLOBULIN SER CALC-MCNC: 3 G/DL (ref 1.9–3.5)
GLUCOSE SERPL-MCNC: 143 MG/DL (ref 65–99)
GRAM STN SPEC: NORMAL
HCT VFR BLD AUTO: 25 % (ref 37–47)
HGB BLD-MCNC: 8.3 G/DL (ref 12–16)
IMM GRANULOCYTES # BLD AUTO: 0.08 K/UL (ref 0–0.11)
IMM GRANULOCYTES NFR BLD AUTO: 0.8 % (ref 0–0.9)
LYMPHOCYTES # BLD AUTO: 1.23 K/UL (ref 1–4.8)
LYMPHOCYTES NFR BLD: 12.1 % (ref 22–41)
MAGNESIUM SERPL-MCNC: 1.6 MG/DL (ref 1.5–2.5)
MCH RBC QN AUTO: 31.4 PG (ref 27–33)
MCHC RBC AUTO-ENTMCNC: 33.2 G/DL (ref 32.2–35.5)
MCV RBC AUTO: 94.7 FL (ref 81.4–97.8)
MONOCYTES # BLD AUTO: 0.65 K/UL (ref 0–0.85)
MONOCYTES NFR BLD AUTO: 6.4 % (ref 0–13.4)
NEUTROPHILS # BLD AUTO: 7.79 K/UL (ref 1.82–7.42)
NEUTROPHILS NFR BLD: 76.5 % (ref 44–72)
NRBC # BLD AUTO: 0 K/UL
NRBC BLD-RTO: 0 /100 WBC (ref 0–0.2)
PHOSPHATE SERPL-MCNC: 2.5 MG/DL (ref 2.5–4.5)
PLATELET # BLD AUTO: 166 K/UL (ref 164–446)
PMV BLD AUTO: 9.8 FL (ref 9–12.9)
POTASSIUM SERPL-SCNC: 3.8 MMOL/L (ref 3.6–5.5)
PROT SERPL-MCNC: 5.6 G/DL (ref 6–8.2)
RBC # BLD AUTO: 2.64 M/UL (ref 4.2–5.4)
SIGNIFICANT IND 70042: NORMAL
SITE SITE: NORMAL
SODIUM SERPL-SCNC: 129 MMOL/L (ref 135–145)
SOURCE SOURCE: NORMAL
WBC # BLD AUTO: 10.2 K/UL (ref 4.8–10.8)

## 2024-08-19 PROCEDURE — 99233 SBSQ HOSP IP/OBS HIGH 50: CPT | Performed by: HOSPITALIST

## 2024-08-19 PROCEDURE — A9270 NON-COVERED ITEM OR SERVICE: HCPCS | Performed by: INTERNAL MEDICINE

## 2024-08-19 PROCEDURE — 36415 COLL VENOUS BLD VENIPUNCTURE: CPT

## 2024-08-19 PROCEDURE — 700102 HCHG RX REV CODE 250 W/ 637 OVERRIDE(OP): Performed by: STUDENT IN AN ORGANIZED HEALTH CARE EDUCATION/TRAINING PROGRAM

## 2024-08-19 PROCEDURE — 700102 HCHG RX REV CODE 250 W/ 637 OVERRIDE(OP): Performed by: INTERNAL MEDICINE

## 2024-08-19 PROCEDURE — 99232 SBSQ HOSP IP/OBS MODERATE 35: CPT | Performed by: NURSE PRACTITIONER

## 2024-08-19 PROCEDURE — 700102 HCHG RX REV CODE 250 W/ 637 OVERRIDE(OP): Performed by: HOSPITALIST

## 2024-08-19 PROCEDURE — A9270 NON-COVERED ITEM OR SERVICE: HCPCS | Performed by: HOSPITALIST

## 2024-08-19 PROCEDURE — 700102 HCHG RX REV CODE 250 W/ 637 OVERRIDE(OP): Performed by: NURSE PRACTITIONER

## 2024-08-19 PROCEDURE — 700111 HCHG RX REV CODE 636 W/ 250 OVERRIDE (IP): Performed by: EMERGENCY MEDICINE

## 2024-08-19 PROCEDURE — 83735 ASSAY OF MAGNESIUM: CPT

## 2024-08-19 PROCEDURE — 84100 ASSAY OF PHOSPHORUS: CPT

## 2024-08-19 PROCEDURE — 770006 HCHG ROOM/CARE - MED/SURG/GYN SEMI*

## 2024-08-19 PROCEDURE — 85025 COMPLETE CBC W/AUTO DIFF WBC: CPT

## 2024-08-19 PROCEDURE — 80053 COMPREHEN METABOLIC PANEL: CPT

## 2024-08-19 PROCEDURE — 700101 HCHG RX REV CODE 250: Performed by: EMERGENCY MEDICINE

## 2024-08-19 PROCEDURE — A9270 NON-COVERED ITEM OR SERVICE: HCPCS | Performed by: NURSE PRACTITIONER

## 2024-08-19 PROCEDURE — A9270 NON-COVERED ITEM OR SERVICE: HCPCS | Performed by: STUDENT IN AN ORGANIZED HEALTH CARE EDUCATION/TRAINING PROGRAM

## 2024-08-19 RX ADMIN — PANTOPRAZOLE SODIUM 40 MG: 40 INJECTION, POWDER, FOR SOLUTION INTRAVENOUS at 05:15

## 2024-08-19 RX ADMIN — LACTULOSE 30 ML: 20 SOLUTION ORAL at 18:04

## 2024-08-19 RX ADMIN — OMEPRAZOLE 20 MG: 20 CAPSULE, DELAYED RELEASE ORAL at 18:05

## 2024-08-19 RX ADMIN — FUROSEMIDE 20 MG: 20 TABLET ORAL at 05:15

## 2024-08-19 RX ADMIN — SPIRONOLACTONE 50 MG: 50 TABLET ORAL at 18:05

## 2024-08-19 RX ADMIN — SUCRALFATE 1 G: 1 SUSPENSION ORAL at 12:20

## 2024-08-19 RX ADMIN — CEFTRIAXONE SODIUM 1000 MG: 10 INJECTION, POWDER, FOR SOLUTION INTRAVENOUS at 05:16

## 2024-08-19 RX ADMIN — SUCRALFATE 1 G: 1 SUSPENSION ORAL at 05:16

## 2024-08-19 RX ADMIN — SUCRALFATE 1 G: 1 SUSPENSION ORAL at 18:04

## 2024-08-19 RX ADMIN — LACTULOSE 30 ML: 20 SOLUTION ORAL at 05:16

## 2024-08-19 RX ADMIN — ONDANSETRON 4 MG: 2 INJECTION INTRAMUSCULAR; INTRAVENOUS at 20:09

## 2024-08-19 ASSESSMENT — ENCOUNTER SYMPTOMS
INSOMNIA: 0
CHILLS: 0
NAUSEA: 0
EYE PAIN: 0
HEARTBURN: 0
FEVER: 0
SHORTNESS OF BREATH: 0
FALLS: 0
PALPITATIONS: 0
HEADACHES: 0
DEPRESSION: 0
DIZZINESS: 0
BLOOD IN STOOL: 0
FOCAL WEAKNESS: 0
CONSTIPATION: 0
BACK PAIN: 0
SENSORY CHANGE: 0
COUGH: 0
BLURRED VISION: 0
WEAKNESS: 1
VOMITING: 0
DIARRHEA: 0
ABDOMINAL PAIN: 1
WEAKNESS: 0

## 2024-08-19 ASSESSMENT — LIFESTYLE VARIABLES: SUBSTANCE_ABUSE: 0

## 2024-08-19 NOTE — PROGRESS NOTES
Pt alert/oriented x4, denies pain at this time. Tolerating clear liquids, aware of 1500 fluid restriction. Pt resting in bed, needs met. Call light within reach, personal belongings available, bed in lowest position, treaded socks on, and bed alarm on. Hourly rounding in place.

## 2024-08-19 NOTE — PROGRESS NOTES
[facility] Medicine Daily Progress Note    Date of Service  8/19/2024    Chief Complaint  Kavita Freeman is a 59 y.o. female admitted 8/14/2024 with GI bleed.    Hospital Course  Patient is a 59 year old female who presented to [facility] 8/14/2024 with an acute GI bleed. She has an extensive history of alcohol history, alcohol abuse and was recently diagnosed with cirrhosis. She had a history of prior GI bleeding and previously diagnosed esophageal varices. She presented to [facility] complaining of  several days of melena, coffee-ground emesis, and general fatigue. In the ER, she was found to be hypotensive so she was given volume resuscitation. She was also noted to have a hemoglobin of 5 so she was transfused PRBC. On 8/15 she had an EGD with esophageal varices, 7 esophageal bands were placed.  She also had a paracentesis performed in ICU removing a total of 5 L. She was given albumin post paracentesis.  The patient recently was admitted to the hospital where she had a left upper thigh perirectal abscess, required surgical intervention with I&D and debridement, at the time discharged for outpatient follow-up, she did not present to outpatient gastroenterology per her report.      Interval Problem Update  Axox3, she states she is feeling better, she reports small amount of melena this am, mild abdominal pain, some nausea but no emesis, will advance diet. Exam and vitals stable. If continues to improve hopefully home in am, discussed with GI. Pt has outpatient GI arranged already    I have discussed this patient's plan of care and discharge plan at IDT rounds today with Case Management, Nursing, Nursing leadership, and other members of the IDT team.    Consultants/Specialty  GI  Critical care    Code Status  Full Code    Disposition  The patient is not medically cleared for discharge to home or a post-acute facility.      I have placed the appropriate orders for post-discharge needs.    Review of Systems  Review of  Systems   Constitutional:  Negative for chills and fever.   Eyes:  Negative for blurred vision and pain.   Respiratory:  Negative for cough and shortness of breath.    Cardiovascular:  Negative for chest pain, palpitations and leg swelling.   Gastrointestinal:  Positive for abdominal pain and melena. Negative for nausea and vomiting.   Genitourinary:  Negative for dysuria and urgency.   Musculoskeletal:  Negative for back pain and falls.   Skin:  Negative for itching and rash.   Neurological:  Positive for weakness. Negative for dizziness, sensory change, focal weakness and headaches.   Psychiatric/Behavioral:  Negative for substance abuse. The patient does not have insomnia.         Physical Exam  Temp:  [36.7 °C (98.1 °F)-37.3 °C (99.2 °F)] 37.3 °C (99.2 °F)  Pulse:  [65-89] 89  Resp:  [16-20] 18  BP: (107-125)/(50-69) 125/69  SpO2:  [96 %-98 %] 98 %    Physical Exam  Vitals reviewed.   Constitutional:       General: She is not in acute distress.     Appearance: She is not diaphoretic.   HENT:      Head: Normocephalic and atraumatic.      Right Ear: External ear normal.      Left Ear: External ear normal.      Nose: Nose normal. No congestion.      Mouth/Throat:      Pharynx: No oropharyngeal exudate or posterior oropharyngeal erythema.   Eyes:      Extraocular Movements: Extraocular movements intact.      Pupils: Pupils are equal, round, and reactive to light.   Cardiovascular:      Rate and Rhythm: Normal rate and regular rhythm.   Pulmonary:      Effort: Pulmonary effort is normal. No respiratory distress.      Breath sounds: Normal breath sounds. No wheezing or rales.   Abdominal:      General: Bowel sounds are normal. There is distension (much improved).      Palpations: Abdomen is soft.      Tenderness: There is no abdominal tenderness. There is no guarding or rebound.   Musculoskeletal:         General: No swelling. Normal range of motion.      Cervical back: Normal range of motion and neck supple.       Right lower leg: No edema.      Left lower leg: No edema.   Skin:     General: Skin is warm and dry.   Neurological:      General: No focal deficit present.      Mental Status: She is alert and oriented to person, place, and time.      Cranial Nerves: No cranial nerve deficit.      Sensory: No sensory deficit.      Motor: No weakness.   Psychiatric:         Mood and Affect: Mood normal.         Behavior: Behavior normal.         Fluids    Intake/Output Summary (Last 24 hours) at 8/19/2024 1721  Last data filed at 8/19/2024 1409  Gross per 24 hour   Intake 1560 ml   Output 0 ml   Net 1560 ml       Laboratory  Recent Labs     08/17/24 0452 08/18/24  0055 08/19/24  0905   WBC 15.8* 17.6* 10.2   RBC 2.62* 2.48* 2.64*   HEMOGLOBIN 8.2*  8.3* 7.7* 8.3*   HEMATOCRIT 24.7* 23.4* 25.0*   MCV 94.3 94.4 94.7   MCH 31.7 31.0 31.4   MCHC 33.6 32.9 33.2   RDW 72.3* 82.3* 81.7*   PLATELETCT 154* 170 166   MPV 10.5 10.1 9.8     Recent Labs     08/17/24 0452 08/18/24  0055 08/19/24  0905   SODIUM 124* 127* 129*   POTASSIUM 3.4* 3.6 3.8   CHLORIDE 99 100 99   CO2 18* 18* 18*   GLUCOSE 100* 100* 143*   BUN 14 11 8   CREATININE 0.75 0.69 0.62   CALCIUM 7.9* 7.7* 7.8*                     Imaging  NM-HEPATOBILIARY SCAN   Final Result      Gallbladder is visualized after morphine administration, suggest patency of the cystic duct.      Chronic cholecystitis or abnormal gallbladder function are in the differential.      US-LIVER AND VESSELS COMPLETE (COMBO)   Final Result         1.  Hepatomegaly   2.  Cirrhotic morphology of the liver.   3.  Gallbladder sludge with gallbladder wall thickening, nonspecific in the setting of hepatocellular disease. Consider acalculous cholecystitis as clinically appropriate. Could be further evaluated with HIDA scan as warranted.   4.  Common bile duct dilatation, consider causes of biliary obstruction with additional workup as clinically appropriate   5.  Splenomegaly   6.  Multiple hepatic cysts   7.   Moderate scattered abdominal ascites      IR-MIDLINE CATHETER INSERTION WO GUIDANCE > AGE 3   Final Result                  Ultrasound-guided midline placement performed by qualified nursing staff    as above.          CT-HEAD W/O   Final Result      No acute intracranial abnormality detected.               DX-CHEST-PORTABLE (1 VIEW)   Final Result      Mild left basilar opacification be atelectasis versus mild infiltrate.           Assessment/Plan  * GIB (gastrointestinal bleeding)- (present on admission)  Assessment & Plan  Secondary to esophageal varices s/p bands x 7 8/15  GI following  clears  Serial H/H with conservative transfusion strategy  Continue IV Protonix twice daily  Continue IV Rocephin x 7 days  If stable and tolerating advancing diet may be able to transition to orals in the am     Cholecystitis  Assessment & Plan  HIDA consistent with chronic cholecystitis  I discussed this with surgery Dr. Soria and she will follow up with them as an outpatient    Metabolic acidosis  Assessment & Plan  Related to cirrhosis  Improving   Following  Bmp in am     Electrolyte abnormality- (present on admission)  Assessment & Plan  Continue to replace and follow  Repeat labs in am     Prolonged QT interval- (present on admission)  Assessment & Plan  Avoid QT prolonging medications  Optimize electrolytes, continuous telemetry monitoring    Hyponatremia- (present on admission)  Assessment & Plan  Likely due to cirrhosis   Some fluctuation  Following  Am labs    Coagulopathy (HCC)- (present on admission)  Assessment & Plan  Likely multifactorial iso of cirrhosis   S/p TXA 1g IV  S/p IV vitamin K x 1  following    Hypotension- (present on admission)  Assessment & Plan  Hypotensive prior to arrival and in ED, resolved after crystalloid and PRBCs, resolved  following    Anemia- (present on admission)  Assessment & Plan  Acute blood loss due to GI bleed  S/p esophageal variceal banding  Some fluctuation  H/h stable  overnight but more melena this am, continue to follow  Cbc in am     Alcoholic cirrhosis of liver with ascites (HCC)- (present on admission)  Assessment & Plan  Monitor synthetic function  Avoid hepatotoxins  S/p paracentesis, 5L removed, albumin protocol  On lasix and aldactone for ascites  On lactulose for hepatic encephalopathy prophylaxis  Prognosis and long term plan discussed   Bmp in am          VTE prophylaxis: scd    I have performed a physical exam and reviewed and updated ROS and Plan today (8/19/2024). In review of yesterday's note (8/18/2024), there are no changes except as documented above.

## 2024-08-19 NOTE — CARE PLAN
The patient is Stable - Low risk of patient condition declining or worsening    Shift Goals  Clinical Goals: Pt will tolerate clear liquid diet throughout shift  Patient Goals: HIDA scan  Family Goals: JESUSITA    Progress made toward(s) clinical / shift goals:    Problem: Knowledge Deficit - Standard  Goal: Patient and family/care givers will demonstrate understanding of plan of care, disease process/condition, diagnostic tests and medications  Outcome: Progressing     Problem: Skin Integrity  Goal: Skin integrity is maintained or improved  Outcome: Progressing     Problem: Fall Risk  Goal: Patient will remain free from falls  Outcome: Progressing     Problem: Pain - Standard  Goal: Alleviation of pain or a reduction in pain to the patient’s comfort goal  Outcome: Progressing       Patient is not progressing towards the following goals:

## 2024-08-19 NOTE — CARE PLAN
The patient is Watcher - Medium risk of patient condition declining or worsening    Shift Goals  Clinical Goals: Pt will tolerate diet throughout shift.  Patient Goals: Rest  Family Goals: JESUSITA    Progress made toward(s) clinical / shift goals:  Pt tolerating diet, pt denies nausea/vomiting. Continues with fluid restriction.     Patient is not progressing towards the following goals: progressing.

## 2024-08-19 NOTE — CARE PLAN
The patient is Watcher - Medium risk of patient condition declining or worsening    Shift Goals  Clinical Goals: Pt will have all needs met prior and post HIDA scan.  Patient Goals: HIDA scan  Family Goals: JESUSITA    Progress made toward(s) clinical / shift goals:  HIDA scan complete, pt NPO prior and communication with GI, team for HIDA scan about pt recent eating prior to NPO, scan able to be completed per orders. Communication with MD following, pt with need to wait for results and POC for pt diet orders needed following.     Patient is not progressing towards the following goals:  Progressing.

## 2024-08-19 NOTE — PROGRESS NOTES
..Gastroenterology Progress Note               Author:  Oumou Mart, DNP,  APRN Date & Time Created: 8/19/2024 1:36 PM       Patient ID:  Name:             Kavita Freeman  YOB: 1965  Age:                 59 y.o.  female  MRN:               [id number]    Medical Decision Making, by Problem:  Active Hospital Problems    Diagnosis     Metabolic acidosis [E87.20]     Cholecystitis [K81.9]     Electrolyte abnormality [E87.8]     Hyponatremia [E87.1]     Prolonged QT interval [R94.31]     GIB (gastrointestinal bleeding) [K92.2]     Hypotension [I95.9]     Coagulopathy (HCC) [D68.9]     Anemia [D64.9]     Alcoholic cirrhosis of liver with ascites (HCC) [K70.31]      Presenting Chief Complaint:  GI bleeding    History of Present Illness:  This is a 59 year old female with history of alcohol misuse disorder, recent diagnosis of cirrhosis, GI bleeding secondary to esophageal varices who presented to the ED on 8/14/2024 with melena and coffee ground emesis. She was initially hypotensive with Hgb of 5. She was resuscitated with crystalloid infusions and PRBCs with improvement. She as admitted to the ICU and started on octreotide, IV pantoprazole, and ceftriaxone with planned endoscopy the next morning.     8/15/2024: EGD with GERD grade C on top of moderate-sized esophageal varices, it is the source of bleeding in the last 2 week. No active bleeding now, s/p banding x 7.     Interval History:  8/16/2024: Patient seen in the TICU. She feels well, no complaints. Had one small hard BM this morning. Hungry.  Hgb 8.1.  WBC 17<28. Paracentesis completed with 5L removed, albumin replenishment, negative for SBP. SAAG 2.2    MELD 3.0 - 25    8/17/2024: Having diarrhea, and mild RUQ pain. Hungry and would like to eat. WBC 15.8, Na 124, ammonia 74, neg peritoneal fluid.     8/18/2024: Patient seen, feels okay.  Still having right upper quadrant pain.  HIDA consistent with chronic cholecystitis or abnormal  gallbladder function..WBC 17.6, hgb 7.7, no BM documented. Alk phos slowly uptrending, AST/ALT/T. Bili downtrending. R factor 1.6 - cholestatic injury    8/19/2024: Patient seen, still with mild RUQ pain. One BM last night but feels like her head is clear. Labs reviewed. VSS.     Hospital Medications:  Current Facility-Administered Medications   Medication Dose Frequency Provider Last Rate Last Admin    omeprazole (PriLOSEC) capsule 20 mg  20 mg BID Angle Ortiz M.D.        lactulose 20 GM/30ML solution 30 mL  30 mL BID Oumou Mart, DNP,  APRN   30 mL at 08/19/24 0516    furosemide (Lasix) tablet 20 mg  20 mg Q DAY Armando Finch M.D.   20 mg at 08/19/24 0515    spironolactone (Aldactone) tablet 50 mg  50 mg Q EVENING Ant Christian M.D.   50 mg at 08/18/24 1727    sucralfate (Carafate) 1 GM/10ML suspension 1 g  1 g Q6HRS Jeremy M Gonda, M.D.   1 g at 08/19/24 1220    [Held by provider] senna-docusate (Pericolace Or Senokot S) 8.6-50 MG per tablet 2 Tablet  2 Tablet BID Clifford Carranza M.D.   2 Tablet at 08/16/24 1700    And    polyethylene glycol/lytes (Miralax) Packet 1 Packet  1 Packet QDAY PRN Clifford Carranza M.D.        cefTRIAXone (Rocephin) syringe 1,000 mg  1,000 mg Q24HRS Clifford Carranza M.D.   1,000 mg at 08/19/24 0516    ondansetron (Zofran) syringe/vial injection 4 mg  4 mg Q8HRS PRN Clifford Carranza M.D.   4 mg at 08/14/24 2107    acetaminophen (Tylenol) tablet 650 mg  650 mg Q6HRS PRN Clifford Carranza M.D.   650 mg at 08/17/24 2348   Last reviewed on 8/14/2024  3:43 PM by Conrado Maharaj       Review of Systems:  Review of Systems   Constitutional:  Negative for chills, fever and malaise/fatigue.   HENT:  Negative for hearing loss.    Eyes:  Negative for blurred vision.   Respiratory:  Negative for cough and shortness of breath.    Cardiovascular:  Positive for leg swelling. Negative for chest pain.   Gastrointestinal:  Positive for abdominal pain. Negative for blood in  "stool, constipation, diarrhea, heartburn, melena, nausea and vomiting.   Genitourinary:  Negative for dysuria.   Musculoskeletal:  Negative for back pain.   Skin:  Negative for rash.   Neurological:  Negative for dizziness and weakness.   Psychiatric/Behavioral:  Negative for depression.    All other systems reviewed and are negative.      Vital signs:  Weight/BMI: Body mass index is 24.64 kg/m².  /51   Pulse 65   Temp 36.9 °C (98.4 °F) (Temporal)   Resp 18   Ht 1.727 m (5' 8\")   Wt 73.5 kg (162 lb 0.6 oz)   SpO2 97%   Vitals:    08/18/24 1513 08/18/24 2100 08/19/24 0500 08/19/24 0725   BP: 118/49 115/50 110/54 107/51   Pulse: 71 74 72 65   Resp: 17 20 16 18   Temp: 37.3 °C (99.1 °F) 36.8 °C (98.2 °F) 36.7 °C (98.1 °F) 36.9 °C (98.4 °F)   TempSrc: Temporal Temporal Temporal Temporal   SpO2: 99% 96% 98% 97%   Weight:       Height:         Oxygen Therapy:  Pulse Oximetry: 97 %, O2 (LPM): 0, O2 Delivery Device: None - Room Air    Intake/Output Summary (Last 24 hours) at 8/19/2024 1336  Last data filed at 8/19/2024 1030  Gross per 24 hour   Intake 1320 ml   Output 0 ml   Net 1320 ml         Physical Exam:  Physical Exam  Vitals and nursing note reviewed.   Constitutional:       General: She is not in acute distress.     Appearance: Normal appearance.   HENT:      Head: Normocephalic and atraumatic.      Right Ear: External ear normal.      Left Ear: External ear normal.      Nose: Nose normal.      Mouth/Throat:      Mouth: Mucous membranes are moist.      Pharynx: Oropharynx is clear.   Eyes:      General: Scleral icterus present.   Cardiovascular:      Rate and Rhythm: Normal rate and regular rhythm.      Pulses: Normal pulses.      Heart sounds: Normal heart sounds.   Pulmonary:      Effort: Pulmonary effort is normal. No respiratory distress.      Breath sounds: Normal breath sounds.   Abdominal:      General: Bowel sounds are normal. There is distension.      Palpations: Abdomen is soft.      " Tenderness: There is no abdominal tenderness.   Musculoskeletal:         General: Normal range of motion.      Cervical back: Normal range of motion.      Right lower leg: Edema present.      Left lower leg: Edema present.   Skin:     General: Skin is warm and dry.      Capillary Refill: Capillary refill takes less than 2 seconds.      Coloration: Skin is jaundiced.   Neurological:      Mental Status: She is alert and oriented to person, place, and time.      Motor: Weakness present.   Psychiatric:         Mood and Affect: Mood normal.         Behavior: Behavior normal.       Labs:  Recent Labs     08/17/24 0452 08/18/24 0055 08/19/24  0905   SODIUM 124* 127* 129*   POTASSIUM 3.4* 3.6 3.8   CHLORIDE 99 100 99   CO2 18* 18* 18*   BUN 14 11 8   CREATININE 0.75 0.69 0.62   MAGNESIUM 1.8 1.7 1.6   PHOSPHORUS 2.1* 1.3* 2.5   CALCIUM 7.9* 7.7* 7.8*     Recent Labs     08/17/24 0452 08/18/24 0055 08/19/24  0905   ALTSGPT 109* 82* 71*   ASTSGOT 94* 71* 49*   ALKPHOSPHAT 71 104* 79   TBILIRUBIN 3.0* 2.2* 3.7*   GLUCOSE 100* 100* 143*     Recent Labs     08/17/24 0452 08/18/24 0055 08/19/24  0905   WBC 15.8* 17.6* 10.2   NEUTSPOLYS  --   --  76.50*   LYMPHOCYTES  --   --  12.10*   MONOCYTES  --   --  6.40   EOSINOPHILS  --   --  3.90   BASOPHILS  --   --  0.30   ASTSGOT 94* 71* 49*   ALTSGPT 109* 82* 71*   ALKPHOSPHAT 71 104* 79   TBILIRUBIN 3.0* 2.2* 3.7*     Recent Labs     08/17/24 0452 08/18/24 0055 08/19/24  0905   RBC 2.62* 2.48* 2.64*   HEMOGLOBIN 8.2*  8.3* 7.7* 8.3*   HEMATOCRIT 24.7* 23.4* 25.0*   PLATELETCT 154* 170 166     Recent Results (from the past 24 hour(s))   POCT glucose device results    Collection Time: 08/18/24  1:56 PM   Result Value Ref Range    POC Glucose, Blood 92 65 - 99 mg/dL   POCT glucose device results    Collection Time: 08/18/24  5:59 PM   Result Value Ref Range    POC Glucose, Blood 113 (H) 65 - 99 mg/dL   CBC WITH DIFFERENTIAL    Collection Time: 08/19/24  9:05 AM   Result Value  Ref Range    WBC 10.2 4.8 - 10.8 K/uL    RBC 2.64 (L) 4.20 - 5.40 M/uL    Hemoglobin 8.3 (L) 12.0 - 16.0 g/dL    Hematocrit 25.0 (L) 37.0 - 47.0 %    MCV 94.7 81.4 - 97.8 fL    MCH 31.4 27.0 - 33.0 pg    MCHC 33.2 32.2 - 35.5 g/dL    RDW 81.7 (H) 35.9 - 50.0 fL    Platelet Count 166 164 - 446 K/uL    MPV 9.8 9.0 - 12.9 fL    Neutrophils-Polys 76.50 (H) 44.00 - 72.00 %    Lymphocytes 12.10 (L) 22.00 - 41.00 %    Monocytes 6.40 0.00 - 13.40 %    Eosinophils 3.90 0.00 - 6.90 %    Basophils 0.30 0.00 - 1.80 %    Immature Granulocytes 0.80 0.00 - 0.90 %    Nucleated RBC 0.00 0.00 - 0.20 /100 WBC    Neutrophils (Absolute) 7.79 (H) 1.82 - 7.42 K/uL    Lymphs (Absolute) 1.23 1.00 - 4.80 K/uL    Monos (Absolute) 0.65 0.00 - 0.85 K/uL    Eos (Absolute) 0.40 0.00 - 0.51 K/uL    Baso (Absolute) 0.03 0.00 - 0.12 K/uL    Immature Granulocytes (abs) 0.08 0.00 - 0.11 K/uL    NRBC (Absolute) 0.00 K/uL   Comp Metabolic Panel    Collection Time: 08/19/24  9:05 AM   Result Value Ref Range    Sodium 129 (L) 135 - 145 mmol/L    Potassium 3.8 3.6 - 5.5 mmol/L    Chloride 99 96 - 112 mmol/L    Co2 18 (L) 20 - 33 mmol/L    Anion Gap 12.0 7.0 - 16.0    Glucose 143 (H) 65 - 99 mg/dL    Bun 8 8 - 22 mg/dL    Creatinine 0.62 0.50 - 1.40 mg/dL    Calcium 7.8 (L) 8.5 - 10.5 mg/dL    Correct Calcium 8.9 8.5 - 10.5 mg/dL    AST(SGOT) 49 (H) 12 - 45 U/L    ALT(SGPT) 71 (H) 2 - 50 U/L    Alkaline Phosphatase 79 30 - 99 U/L    Total Bilirubin 3.7 (H) 0.1 - 1.5 mg/dL    Albumin 2.6 (L) 3.2 - 4.9 g/dL    Total Protein 5.6 (L) 6.0 - 8.2 g/dL    Globulin 3.0 1.9 - 3.5 g/dL    A-G Ratio 0.9 g/dL   PHOSPHORUS    Collection Time: 08/19/24  9:05 AM   Result Value Ref Range    Phosphorus 2.5 2.5 - 4.5 mg/dL   MAGNESIUM    Collection Time: 08/19/24  9:05 AM   Result Value Ref Range    Magnesium 1.6 1.5 - 2.5 mg/dL   ESTIMATED GFR    Collection Time: 08/19/24  9:05 AM   Result Value Ref Range    GFR (CKD-EPI) 104 >60 mL/min/1.73 m 2       Radiology  Review:  NM-HEPATOBILIARY SCAN   Final Result      Gallbladder is visualized after morphine administration, suggest patency of the cystic duct.      Chronic cholecystitis or abnormal gallbladder function are in the differential.      US-LIVER AND VESSELS COMPLETE (COMBO)   Final Result         1.  Hepatomegaly   2.  Cirrhotic morphology of the liver.   3.  Gallbladder sludge with gallbladder wall thickening, nonspecific in the setting of hepatocellular disease. Consider acalculous cholecystitis as clinically appropriate. Could be further evaluated with HIDA scan as warranted.   4.  Common bile duct dilatation, consider causes of biliary obstruction with additional workup as clinically appropriate   5.  Splenomegaly   6.  Multiple hepatic cysts   7.  Moderate scattered abdominal ascites      IR-MIDLINE CATHETER INSERTION WO GUIDANCE > AGE 3   Final Result                  Ultrasound-guided midline placement performed by qualified nursing staff    as above.          CT-HEAD W/O   Final Result      No acute intracranial abnormality detected.               DX-CHEST-PORTABLE (1 VIEW)   Final Result      Mild left basilar opacification be atelectasis versus mild infiltrate.          MDM (Data Review):   -Records reviewed and summarized in current documentation  -I personally reviewed and interpreted the laboratory results  -I personally reviewed the radiology images    Assessment/Recommendations:  Acute blood loss anemia  Esophageal varices status post banding x 7  GERD grade C  Mild portal hypertensive gastropathy  Decompensated alcoholic cirrhosis  Ascites  Coagulopathy  Prolonged QTc  Hypertension  Leukocytosis  Hepatic cysts - monitor  Splenomegaly  Hepatofugal portal flow  Encephalopathy  Acalculous cholecystitis with CBD dilation    Recommendations:  HIDA scan reveals chronic cholecystitis, given elevated white count and right upper quadrant pain would consider surgical consultation and evaluation. Surgery  recommending outpatient follow up   Continue PPI BID and sucralfate  Continue 20 mg p.o. Lasix and 50 mg spironolactone, uptitrate as tolerated  Can't beta blockade as she is bradycardic - ?TIPS as an outpatient  Continue lactulose 30 mg BID titrated to 2-3 BM daily  2 gram sodium diet  Has follow up appointment with [facility] on 9/18    Lengthy discussion bedside with patient about management of her cirrhosis and importance of close outpatient follow up. She is very committed to maintaining her sobriety and staying healthy.     GI signing off. Please don't hesitate to reconsult for any questions or concerns.     Discussed with patient, Dr. Ortiz, Dr. Cardozo    .Oumou Mart, DNP,  APRN    Core Quality Measures   Reviewed items::  Labs, Medications and Radiology reports reviewed

## 2024-08-20 LAB
ALBUMIN SERPL BCP-MCNC: 2.7 G/DL (ref 3.2–4.9)
ALBUMIN/GLOB SERPL: 0.8 G/DL
ALP SERPL-CCNC: 95 U/L (ref 30–99)
ALT SERPL-CCNC: 62 U/L (ref 2–50)
ANION GAP SERPL CALC-SCNC: 11 MMOL/L (ref 7–16)
AST SERPL-CCNC: 41 U/L (ref 12–45)
BASOPHILS # BLD AUTO: 0.4 % (ref 0–1.8)
BASOPHILS # BLD: 0.04 K/UL (ref 0–0.12)
BILIRUB SERPL-MCNC: 2.8 MG/DL (ref 0.1–1.5)
BUN SERPL-MCNC: 9 MG/DL (ref 8–22)
CALCIUM ALBUM COR SERPL-MCNC: 9.2 MG/DL (ref 8.5–10.5)
CALCIUM SERPL-MCNC: 8.2 MG/DL (ref 8.5–10.5)
CHLORIDE SERPL-SCNC: 97 MMOL/L (ref 96–112)
CO2 SERPL-SCNC: 20 MMOL/L (ref 20–33)
CREAT SERPL-MCNC: 0.68 MG/DL (ref 0.5–1.4)
EOSINOPHIL # BLD AUTO: 0.49 K/UL (ref 0–0.51)
EOSINOPHIL NFR BLD: 4.6 % (ref 0–6.9)
ERYTHROCYTE [DISTWIDTH] IN BLOOD BY AUTOMATED COUNT: 80.6 FL (ref 35.9–50)
GFR SERPLBLD CREATININE-BSD FMLA CKD-EPI: 101 ML/MIN/1.73 M 2
GLOBULIN SER CALC-MCNC: 3.3 G/DL (ref 1.9–3.5)
GLUCOSE SERPL-MCNC: 110 MG/DL (ref 65–99)
HCT VFR BLD AUTO: 25.5 % (ref 37–47)
HGB BLD-MCNC: 8.3 G/DL (ref 12–16)
IMM GRANULOCYTES # BLD AUTO: 0.09 K/UL (ref 0–0.11)
IMM GRANULOCYTES NFR BLD AUTO: 0.8 % (ref 0–0.9)
LYMPHOCYTES # BLD AUTO: 1.69 K/UL (ref 1–4.8)
LYMPHOCYTES NFR BLD: 15.8 % (ref 22–41)
MCH RBC QN AUTO: 31 PG (ref 27–33)
MCHC RBC AUTO-ENTMCNC: 32.5 G/DL (ref 32.2–35.5)
MCV RBC AUTO: 95.1 FL (ref 81.4–97.8)
MONOCYTES # BLD AUTO: 1 K/UL (ref 0–0.85)
MONOCYTES NFR BLD AUTO: 9.3 % (ref 0–13.4)
NEUTROPHILS # BLD AUTO: 7.42 K/UL (ref 1.82–7.42)
NEUTROPHILS NFR BLD: 69.1 % (ref 44–72)
NRBC # BLD AUTO: 0 K/UL
NRBC BLD-RTO: 0 /100 WBC (ref 0–0.2)
PLATELET # BLD AUTO: 210 K/UL (ref 164–446)
PMV BLD AUTO: 9.9 FL (ref 9–12.9)
POTASSIUM SERPL-SCNC: 3.7 MMOL/L (ref 3.6–5.5)
PROT SERPL-MCNC: 6 G/DL (ref 6–8.2)
RBC # BLD AUTO: 2.68 M/UL (ref 4.2–5.4)
SODIUM SERPL-SCNC: 128 MMOL/L (ref 135–145)
WBC # BLD AUTO: 10.7 K/UL (ref 4.8–10.8)

## 2024-08-20 PROCEDURE — 99233 SBSQ HOSP IP/OBS HIGH 50: CPT | Performed by: INTERNAL MEDICINE

## 2024-08-20 PROCEDURE — 85025 COMPLETE CBC W/AUTO DIFF WBC: CPT

## 2024-08-20 PROCEDURE — A9270 NON-COVERED ITEM OR SERVICE: HCPCS | Performed by: HOSPITALIST

## 2024-08-20 PROCEDURE — 700102 HCHG RX REV CODE 250 W/ 637 OVERRIDE(OP): Performed by: INTERNAL MEDICINE

## 2024-08-20 PROCEDURE — 700111 HCHG RX REV CODE 636 W/ 250 OVERRIDE (IP): Performed by: EMERGENCY MEDICINE

## 2024-08-20 PROCEDURE — RXMED WILLOW AMBULATORY MEDICATION CHARGE: Performed by: INTERNAL MEDICINE

## 2024-08-20 PROCEDURE — A9270 NON-COVERED ITEM OR SERVICE: HCPCS | Performed by: INTERNAL MEDICINE

## 2024-08-20 PROCEDURE — 700102 HCHG RX REV CODE 250 W/ 637 OVERRIDE(OP): Performed by: STUDENT IN AN ORGANIZED HEALTH CARE EDUCATION/TRAINING PROGRAM

## 2024-08-20 PROCEDURE — 700102 HCHG RX REV CODE 250 W/ 637 OVERRIDE(OP): Performed by: HOSPITALIST

## 2024-08-20 PROCEDURE — 700101 HCHG RX REV CODE 250: Performed by: EMERGENCY MEDICINE

## 2024-08-20 PROCEDURE — 80053 COMPREHEN METABOLIC PANEL: CPT

## 2024-08-20 PROCEDURE — 770006 HCHG ROOM/CARE - MED/SURG/GYN SEMI*

## 2024-08-20 PROCEDURE — 700102 HCHG RX REV CODE 250 W/ 637 OVERRIDE(OP): Performed by: NURSE PRACTITIONER

## 2024-08-20 PROCEDURE — A9270 NON-COVERED ITEM OR SERVICE: HCPCS | Performed by: STUDENT IN AN ORGANIZED HEALTH CARE EDUCATION/TRAINING PROGRAM

## 2024-08-20 PROCEDURE — A9270 NON-COVERED ITEM OR SERVICE: HCPCS | Performed by: NURSE PRACTITIONER

## 2024-08-20 RX ORDER — ACETAMINOPHEN 325 MG/1
650 TABLET ORAL EVERY 6 HOURS PRN
Qty: 30 TABLET | Refills: 0 | Status: SHIPPED | OUTPATIENT
Start: 2024-08-20

## 2024-08-20 RX ORDER — SUCRALFATE ORAL 1 G/10ML
1 SUSPENSION ORAL EVERY 6 HOURS
Qty: 473 ML | Refills: 3 | Status: SHIPPED | OUTPATIENT
Start: 2024-08-20 | End: 2024-08-30 | Stop reason: SDUPTHER

## 2024-08-20 RX ORDER — LACTULOSE 20 G/30ML
30 SOLUTION ORAL 2 TIMES DAILY
Qty: 450 ML | Refills: 1 | Status: SHIPPED | OUTPATIENT
Start: 2024-08-20 | End: 2024-08-30 | Stop reason: SDUPTHER

## 2024-08-20 RX ORDER — FUROSEMIDE 20 MG
20 TABLET ORAL DAILY
Qty: 30 TABLET | Refills: 0 | Status: SHIPPED | OUTPATIENT
Start: 2024-08-21

## 2024-08-20 RX ADMIN — OMEPRAZOLE 20 MG: 20 CAPSULE, DELAYED RELEASE ORAL at 17:16

## 2024-08-20 RX ADMIN — OMEPRAZOLE 20 MG: 20 CAPSULE, DELAYED RELEASE ORAL at 05:34

## 2024-08-20 RX ADMIN — SUCRALFATE 1 G: 1 SUSPENSION ORAL at 05:36

## 2024-08-20 RX ADMIN — LACTULOSE 30 ML: 20 SOLUTION ORAL at 17:16

## 2024-08-20 RX ADMIN — FUROSEMIDE 20 MG: 20 TABLET ORAL at 05:34

## 2024-08-20 RX ADMIN — SPIRONOLACTONE 50 MG: 50 TABLET ORAL at 17:16

## 2024-08-20 RX ADMIN — SUCRALFATE 1 G: 1 SUSPENSION ORAL at 23:50

## 2024-08-20 RX ADMIN — SUCRALFATE 1 G: 1 SUSPENSION ORAL at 00:10

## 2024-08-20 RX ADMIN — SUCRALFATE 1 G: 1 SUSPENSION ORAL at 17:16

## 2024-08-20 RX ADMIN — CEFTRIAXONE SODIUM 1000 MG: 10 INJECTION, POWDER, FOR SOLUTION INTRAVENOUS at 05:34

## 2024-08-20 RX ADMIN — SUCRALFATE 1 G: 1 SUSPENSION ORAL at 12:57

## 2024-08-20 ASSESSMENT — ENCOUNTER SYMPTOMS
BACK PAIN: 0
CHILLS: 0
PALPITATIONS: 0
FALLS: 0
HEADACHES: 0
DOUBLE VISION: 0
SENSORY CHANGE: 0
SHORTNESS OF BREATH: 0
HEARTBURN: 0
WEAKNESS: 1
DIZZINESS: 0
DEPRESSION: 0
DIAPHORESIS: 0
EYE PAIN: 0
FOCAL WEAKNESS: 0
INSOMNIA: 0
NAUSEA: 0
ABDOMINAL PAIN: 1
FEVER: 0

## 2024-08-20 ASSESSMENT — PATIENT HEALTH QUESTIONNAIRE - PHQ9
2. FEELING DOWN, DEPRESSED, IRRITABLE, OR HOPELESS: NOT AT ALL
1. LITTLE INTEREST OR PLEASURE IN DOING THINGS: NOT AT ALL
SUM OF ALL RESPONSES TO PHQ9 QUESTIONS 1 AND 2: 0

## 2024-08-20 ASSESSMENT — LIFESTYLE VARIABLES: SUBSTANCE_ABUSE: 1

## 2024-08-20 NOTE — DISCHARGE PLANNING
Received Choice form at   Agency/Facility Name: [facility]  Referral sent per Choice form @ [id number]     [facility] declined the referral.  Non-contracted insurance provider.    @2250  [facility] faxed a referral to [facility]

## 2024-08-20 NOTE — PROGRESS NOTES
Review of pt DC orders, notification to pt. Communication with Jeni Stanley DO, pt inquiry if she should take lactulose at home and medication order plan for pt. F/u with hospitalist at rounds, states following labs for pt and plan to DC orders for pt to go home, plan for pt to DC home possibly tomorrow.

## 2024-08-20 NOTE — DISCHARGE PLANNING
RNCM attended IDT rounds and reviewed chart. [facility] declined. Messaged. DPA to seek another HH agency in area.

## 2024-08-20 NOTE — PROGRESS NOTES
[facility] Medicine Daily Progress Note    Date of Service  8/20/2024    Chief Complaint  Kavita Freeman is a 59 y.o. female admitted 8/14/2024 with GI bleed.    Hospital Course  Patient is a 59 year old female who presented to [facility] 8/14/2024 with an acute GI bleed. She has an extensive history of alcohol history, alcohol abuse and was recently diagnosed with cirrhosis. She had a history of prior GI bleeding and previously diagnosed esophageal varices. She presented to [facility] complaining of  several days of melena, coffee-ground emesis, and general fatigue. In the ER, she was found to be hypotensive so she was given volume resuscitation. She was also noted to have a hemoglobin of 5 so she was transfused PRBC. On 8/15 she had an EGD with esophageal varices, 7 esophageal bands were placed.  She also had a paracentesis performed in ICU removing a total of 5 L. She was given albumin post paracentesis.  The patient recently was admitted to the hospital where she had a left upper thigh perirectal abscess, required surgical intervention with I&D and debridement, at the time discharged for outpatient follow-up, she did not present to outpatient gastroenterology per her report.      Interval Problem Update    8/19 Axox3, she states she is feeling better, she reports small amount of melena this am, mild abdominal pain, some nausea but no emesis, will advance diet. Exam and vitals stable. If continues to improve hopefully home in am, discussed with GI. Pt has outpatient GI arranged already    8/20 generalized weakness, abd distension, reports intermittent discomfort, tolerating oral intake  Denies h/a or nausea    I have discussed this patient's plan of care and discharge plan at IDT rounds today with Case Management, Nursing, Nursing leadership, and other members of the IDT team.    Consultants/Specialty  GI  Critical care    Code Status  Full Code    Disposition  The patient is not medically cleared for discharge to  home or a post-acute facility.      I have placed the appropriate orders for post-discharge needs.    Review of Systems  Review of Systems   Constitutional:  Negative for chills, diaphoresis, fever and malaise/fatigue.   Eyes:  Negative for double vision and pain.   Respiratory:  Negative for shortness of breath.    Cardiovascular:  Negative for chest pain, palpitations and leg swelling.   Gastrointestinal:  Positive for abdominal pain and melena. Negative for heartburn and nausea.   Genitourinary:  Negative for dysuria and urgency.   Musculoskeletal:  Negative for back pain and falls.   Skin:  Negative for itching and rash.   Neurological:  Positive for weakness. Negative for dizziness, sensory change, focal weakness and headaches.   Psychiatric/Behavioral:  Positive for substance abuse. Negative for depression. The patient does not have insomnia.         Physical Exam  Temp:  [36.9 °C (98.5 °F)-37.4 °C (99.3 °F)] 36.9 °C (98.5 °F)  Pulse:  [76-89] 76  Resp:  [17-18] 17  BP: (108-125)/(54-69) 112/57  SpO2:  [95 %-98 %] 98 %    Physical Exam  Vitals reviewed.   Constitutional:       General: She is not in acute distress.     Appearance: She is ill-appearing. She is not toxic-appearing or diaphoretic.   HENT:      Head: Normocephalic and atraumatic.      Right Ear: External ear normal.      Left Ear: External ear normal.      Nose: Nose normal. No congestion.      Mouth/Throat:      Pharynx: No oropharyngeal exudate or posterior oropharyngeal erythema.   Eyes:      General: No scleral icterus.     Extraocular Movements: Extraocular movements intact.      Pupils: Pupils are equal, round, and reactive to light.   Cardiovascular:      Rate and Rhythm: Normal rate and regular rhythm.   Pulmonary:      Effort: Pulmonary effort is normal. No respiratory distress.      Breath sounds: Normal breath sounds. No wheezing or rales.   Abdominal:      General: Bowel sounds are normal. There is distension (much improved).       Palpations: Abdomen is soft. There is no mass.      Tenderness: There is no abdominal tenderness. There is no guarding or rebound.   Musculoskeletal:         General: No swelling or tenderness. Normal range of motion.      Cervical back: Normal range of motion and neck supple.      Right lower leg: No edema.      Left lower leg: No edema.   Skin:     General: Skin is warm and dry.   Neurological:      General: No focal deficit present.      Mental Status: She is alert and oriented to person, place, and time.      Cranial Nerves: No cranial nerve deficit.      Sensory: No sensory deficit.      Motor: No weakness.      Coordination: Coordination normal.   Psychiatric:         Mood and Affect: Mood normal.         Behavior: Behavior normal.         Thought Content: Thought content normal.         Judgment: Judgment normal.         Fluids    Intake/Output Summary (Last 24 hours) at 8/20/2024 1424  Last data filed at 8/20/2024 0930  Gross per 24 hour   Intake 500 ml   Output --   Net 500 ml       Laboratory  Recent Labs     08/18/24  0055 08/19/24  0905 08/20/24  0846   WBC 17.6* 10.2 10.7   RBC 2.48* 2.64* 2.68*   HEMOGLOBIN 7.7* 8.3* 8.3*   HEMATOCRIT 23.4* 25.0* 25.5*   MCV 94.4 94.7 95.1   MCH 31.0 31.4 31.0   MCHC 32.9 33.2 32.5   RDW 82.3* 81.7* 80.6*   PLATELETCT 170 166 210   MPV 10.1 9.8 9.9     Recent Labs     08/18/24  0055 08/19/24  0905 08/20/24  0846   SODIUM 127* 129* 128*   POTASSIUM 3.6 3.8 3.7   CHLORIDE 100 99 97   CO2 18* 18* 20   GLUCOSE 100* 143* 110*   BUN 11 8 9   CREATININE 0.69 0.62 0.68   CALCIUM 7.7* 7.8* 8.2*                     Imaging  NM-HEPATOBILIARY SCAN   Final Result      Gallbladder is visualized after morphine administration, suggest patency of the cystic duct.      Chronic cholecystitis or abnormal gallbladder function are in the differential.      US-LIVER AND VESSELS COMPLETE (COMBO)   Final Result         1.  Hepatomegaly   2.  Cirrhotic morphology of the liver.   3.  Gallbladder  sludge with gallbladder wall thickening, nonspecific in the setting of hepatocellular disease. Consider acalculous cholecystitis as clinically appropriate. Could be further evaluated with HIDA scan as warranted.   4.  Common bile duct dilatation, consider causes of biliary obstruction with additional workup as clinically appropriate   5.  Splenomegaly   6.  Multiple hepatic cysts   7.  Moderate scattered abdominal ascites      IR-MIDLINE CATHETER INSERTION WO GUIDANCE > AGE 3   Final Result                  Ultrasound-guided midline placement performed by qualified nursing staff    as above.          CT-HEAD W/O   Final Result      No acute intracranial abnormality detected.               DX-CHEST-PORTABLE (1 VIEW)   Final Result      Mild left basilar opacification be atelectasis versus mild infiltrate.           Assessment/Plan  * GIB (gastrointestinal bleeding)- (present on admission)  Assessment & Plan  Secondary to esophageal varices s/p bands x 7 8/15  GI following  clears  Serial H/H with conservative transfusion strategy  Continue IV Protonix twice daily  Continue IV Rocephin x 7 days  If stable and tolerating advancing diet may be able to transition to orals in the am     8/20 h/h ordered, f/u bmp and cbc  - ppi bid  Outpt GI f/u    Cholecystitis  Assessment & Plan  HIDA consistent with chronic cholecystitis  I discussed this with surgery Dr. Soria and she will follow up with them as an outpatient    Metabolic acidosis  Assessment & Plan  Related to cirrhosis  Improving   Following  Bmp in am     Electrolyte abnormality- (present on admission)  Assessment & Plan  Continue to replace and follow  Repeat labs in am     Prolonged QT interval- (present on admission)  Assessment & Plan  Avoid QT prolonging medications  Optimize electrolytes, continuous telemetry monitoring    Hyponatremia- (present on admission)  Assessment & Plan  Likely due to cirrhosis   Some fluctuation  Following  Am labs    Coagulopathy  (HCC)- (present on admission)  Assessment & Plan  Likely multifactorial iso of cirrhosis   S/p TXA 1g IV  S/p IV vitamin K x 1  following    Hypotension- (present on admission)  Assessment & Plan  Hypotensive prior to arrival and in ED, resolved after crystalloid and PRBCs, resolved  following    Anemia- (present on admission)  Assessment & Plan  Acute blood loss due to GI bleed  S/p esophageal variceal banding  Some fluctuation  H/h stable overnight but more melena this am, continue to follow  Cbc in am     Alcoholic cirrhosis of liver with ascites (HCC)- (present on admission)  Assessment & Plan  Monitor synthetic function  8/19 Avoid hepatotoxins  S/p paracentesis, 5L removed, albumin protocol  On lasix and aldactone for ascites  On lactulose for hepatic encephalopathy prophylaxis  Prognosis and long term plan discussed   Bmp in am     8/20  cont diuretics, encourage acitivity, na 129  - monitor           VTE prophylaxis: scd    I have performed a physical exam and reviewed and updated ROS and Plan today (8/20/2024). In review of yesterday's note (8/19/2024), there are no changes except as documented above.

## 2024-08-20 NOTE — CARE PLAN
The patient is Watcher - Medium risk of patient condition declining or worsening    Shift Goals  Clinical Goals: Pt will have all needs met prior to planned DC  Patient Goals: DC home  Family Goals: JESUSITA    Progress made toward(s) clinical / shift goals:  Pt to not DC home, labs continue with need of monitor, possible DC tomorrow f/u communication with hospitalist Jeni Stanley DO, if labs are more stable.     Patient is not progressing towards the following goals: Pt to not DC home per original plan, possible DC tomorrow, pt states understanding and communication with hospitalist.

## 2024-08-20 NOTE — CARE PLAN
The patient is Watcher - Medium risk of patient condition declining or worsening    Shift Goals  Clinical Goals: No sx/sy of bleeding, prevent further skin breakdown.  Patient Goals: rest and sleep  Family Goals: none present    Progress made toward(s) clinical / shift goals: No active bleeding noted. Redness an d excoriation on mid buttock noted, barrier cream applied. Advised on q2 turn. No further skin breakdown noted.     Patient is not progressing towards the following goals: N/A       airborne

## 2024-08-21 ENCOUNTER — PHARMACY VISIT (OUTPATIENT)
Dept: PHARMACY | Facility: MEDICAL CENTER | Age: 59
End: 2024-08-21
Payer: COMMERCIAL

## 2024-08-21 VITALS
BODY MASS INDEX: 24.56 KG/M2 | WEIGHT: 162.04 LBS | SYSTOLIC BLOOD PRESSURE: 99 MMHG | HEART RATE: 89 BPM | TEMPERATURE: 98.4 F | OXYGEN SATURATION: 98 % | DIASTOLIC BLOOD PRESSURE: 60 MMHG | HEIGHT: 68 IN | RESPIRATION RATE: 18 BRPM

## 2024-08-21 LAB
ANION GAP SERPL CALC-SCNC: 10 MMOL/L (ref 7–16)
ANISOCYTOSIS BLD QL SMEAR: ABNORMAL
BASOPHILS # BLD AUTO: 0.5 % (ref 0–1.8)
BASOPHILS # BLD: 0.06 K/UL (ref 0–0.12)
BUN SERPL-MCNC: 8 MG/DL (ref 8–22)
BURR CELLS BLD QL SMEAR: NORMAL
CALCIUM SERPL-MCNC: 7.9 MG/DL (ref 8.5–10.5)
CHLORIDE SERPL-SCNC: 99 MMOL/L (ref 96–112)
CO2 SERPL-SCNC: 19 MMOL/L (ref 20–33)
COMMENT 1642: NORMAL
CREAT SERPL-MCNC: 0.66 MG/DL (ref 0.5–1.4)
EOSINOPHIL # BLD AUTO: 0.62 K/UL (ref 0–0.51)
EOSINOPHIL NFR BLD: 5.4 % (ref 0–6.9)
ERYTHROCYTE [DISTWIDTH] IN BLOOD BY AUTOMATED COUNT: 80.6 FL (ref 35.9–50)
GFR SERPLBLD CREATININE-BSD FMLA CKD-EPI: 102 ML/MIN/1.73 M 2
GLUCOSE SERPL-MCNC: 119 MG/DL (ref 65–99)
HCT VFR BLD AUTO: 24.9 % (ref 37–47)
HGB BLD-MCNC: 7.9 G/DL (ref 12–16)
IMM GRANULOCYTES # BLD AUTO: 0.08 K/UL (ref 0–0.11)
IMM GRANULOCYTES NFR BLD AUTO: 0.7 % (ref 0–0.9)
LYMPHOCYTES # BLD AUTO: 2.21 K/UL (ref 1–4.8)
LYMPHOCYTES NFR BLD: 19.2 % (ref 22–41)
MACROCYTES BLD QL SMEAR: ABNORMAL
MCH RBC QN AUTO: 30.7 PG (ref 27–33)
MCHC RBC AUTO-ENTMCNC: 31.7 G/DL (ref 32.2–35.5)
MCV RBC AUTO: 96.9 FL (ref 81.4–97.8)
MICROCYTES BLD QL SMEAR: ABNORMAL
MONOCYTES # BLD AUTO: 1.3 K/UL (ref 0–0.85)
MONOCYTES NFR BLD AUTO: 11.3 % (ref 0–13.4)
MORPHOLOGY BLD-IMP: NORMAL
NEUTROPHILS # BLD AUTO: 7.27 K/UL (ref 1.82–7.42)
NEUTROPHILS NFR BLD: 62.9 % (ref 44–72)
NRBC # BLD AUTO: 0 K/UL
NRBC BLD-RTO: 0 /100 WBC (ref 0–0.2)
PLATELET # BLD AUTO: 211 K/UL (ref 164–446)
PLATELET BLD QL SMEAR: NORMAL
PMV BLD AUTO: 9.4 FL (ref 9–12.9)
POIKILOCYTOSIS BLD QL SMEAR: NORMAL
POLYCHROMASIA BLD QL SMEAR: NORMAL
POTASSIUM SERPL-SCNC: 3.4 MMOL/L (ref 3.6–5.5)
RBC # BLD AUTO: 2.57 M/UL (ref 4.2–5.4)
RBC BLD AUTO: PRESENT
SODIUM SERPL-SCNC: 128 MMOL/L (ref 135–145)
WBC # BLD AUTO: 11.5 K/UL (ref 4.8–10.8)

## 2024-08-21 PROCEDURE — 700102 HCHG RX REV CODE 250 W/ 637 OVERRIDE(OP): Performed by: INTERNAL MEDICINE

## 2024-08-21 PROCEDURE — A9270 NON-COVERED ITEM OR SERVICE: HCPCS | Mod: JZ

## 2024-08-21 PROCEDURE — 700102 HCHG RX REV CODE 250 W/ 637 OVERRIDE(OP): Mod: JZ

## 2024-08-21 PROCEDURE — A9270 NON-COVERED ITEM OR SERVICE: HCPCS | Performed by: NURSE PRACTITIONER

## 2024-08-21 PROCEDURE — A9270 NON-COVERED ITEM OR SERVICE: HCPCS | Performed by: INTERNAL MEDICINE

## 2024-08-21 PROCEDURE — A9270 NON-COVERED ITEM OR SERVICE: HCPCS | Performed by: HOSPITALIST

## 2024-08-21 PROCEDURE — 99239 HOSP IP/OBS DSCHRG MGMT >30: CPT | Performed by: INTERNAL MEDICINE

## 2024-08-21 PROCEDURE — 700102 HCHG RX REV CODE 250 W/ 637 OVERRIDE(OP): Performed by: STUDENT IN AN ORGANIZED HEALTH CARE EDUCATION/TRAINING PROGRAM

## 2024-08-21 PROCEDURE — A9270 NON-COVERED ITEM OR SERVICE: HCPCS | Performed by: STUDENT IN AN ORGANIZED HEALTH CARE EDUCATION/TRAINING PROGRAM

## 2024-08-21 PROCEDURE — 85025 COMPLETE CBC W/AUTO DIFF WBC: CPT

## 2024-08-21 PROCEDURE — 80048 BASIC METABOLIC PNL TOTAL CA: CPT

## 2024-08-21 PROCEDURE — 700102 HCHG RX REV CODE 250 W/ 637 OVERRIDE(OP): Performed by: HOSPITALIST

## 2024-08-21 PROCEDURE — 700102 HCHG RX REV CODE 250 W/ 637 OVERRIDE(OP): Performed by: NURSE PRACTITIONER

## 2024-08-21 RX ORDER — POTASSIUM CHLORIDE 1500 MG/1
20 TABLET, EXTENDED RELEASE ORAL ONCE
Status: COMPLETED | OUTPATIENT
Start: 2024-08-21 | End: 2024-08-21

## 2024-08-21 RX ADMIN — SUCRALFATE 1 G: 1 SUSPENSION ORAL at 04:49

## 2024-08-21 RX ADMIN — FUROSEMIDE 20 MG: 20 TABLET ORAL at 04:49

## 2024-08-21 RX ADMIN — OMEPRAZOLE 20 MG: 20 CAPSULE, DELAYED RELEASE ORAL at 04:49

## 2024-08-21 RX ADMIN — POTASSIUM CHLORIDE 20 MEQ: 1500 TABLET, EXTENDED RELEASE ORAL at 04:49

## 2024-08-21 RX ADMIN — LACTULOSE 30 ML: 20 SOLUTION ORAL at 04:49

## 2024-08-21 ASSESSMENT — PATIENT HEALTH QUESTIONNAIRE - PHQ9
2. FEELING DOWN, DEPRESSED, IRRITABLE, OR HOPELESS: NOT AT ALL
SUM OF ALL RESPONSES TO PHQ9 QUESTIONS 1 AND 2: 0
1. LITTLE INTEREST OR PLEASURE IN DOING THINGS: NOT AT ALL

## 2024-08-21 NOTE — DISCHARGE PLANNING
We are currently verifying this patient's insurance and benefits. This will be resolved ASAP. If you want this escalated for a faster response please call [phone] and press option #2. Thank you for your patience.     Respectfully,   [facility]

## 2024-08-21 NOTE — DISCHARGE PLANNING
ATTN: Case Management  RE: Referral for Home Health    Reason for referral denial: We are not in network with this patient's [facility] plan [employer]              Unfortunately, we are not able to accept this referral for the reason listed above. If further clarity is needed, our Transitional Care Specialists are available to discuss any barriers to service at [phone].      We look forward to collaborating with you in the future,  [facility] Team

## 2024-08-21 NOTE — DISCHARGE SUMMARY
Discharge Summary    CHIEF COMPLAINT ON ADMISSION  Chief Complaint   Patient presents with    Blood in Vomit     Pt BIBA bloody emesis. Syncopal event today, pt states head strike, denies blood thinners or daily ASA, no trauma to head, pt denies neck pain. GCS 15. Dried blood to nares/mouth.        Reason for Admission  EMS     Admission Date  8/14/2024    CODE STATUS  Prior    HPI & HOSPITAL COURSE    Patient is a 59 year old female who presented to [facility] 8/14/2024 with an acute GI bleed. She has an extensive history of alcohol history, alcohol abuse and was recently diagnosed with cirrhosis. She had a history of prior GI bleeding and previously diagnosed esophageal varices. She presented to [facility] complaining of  several days of melena, coffee-ground emesis, and general fatigue. In the ER, she was found to be hypotensive so she was given volume resuscitation. She was also noted to have a hemoglobin of 5 so she was transfused PRBC. On 8/15 she had an EGD with esophageal varices, 7 esophageal bands were placed.  She also had a paracentesis performed in ICU removing a total of 5 L. She was given albumin post paracentesis.  The patient recently was admitted to the hospital where she had a left upper thigh perirectal abscess, required surgical intervention with I&D and debridement, at the time discharged for outpatient follow-up, she did not present to outpatient gastroenterology per her report.      Patient reports significant improvement in symptoms.  She has noted to have mild abdominal distention and is tolerating Lasix diuretics.  She is currently on lactulose as well.  Patient is advised to follow-up closely with PCP for further adjustments in her medication as needed.  H&H appears stable.  An attempt was made at obtaining home health for therapy assistance, however patient was not excepted by referring facilities.  Patient states that she has family support and does not want outpatient therapy at this time.   Therefore she is discharged to home with family assistance.    Therefore, she is discharged in good and stable condition to home with close outpatient follow-up.    The patient met 2-midnight criteria for an inpatient stay at the time of discharge.    Discharge Date  8/21/24    FOLLOW UP ITEMS POST DISCHARGE  PCP in 1 week    DISCHARGE DIAGNOSES  Principal Problem:    GIB (gastrointestinal bleeding) (POA: Yes)  Active Problems:    Alcoholic cirrhosis of liver with ascites (HCC) (POA: Yes)    Anemia (POA: Yes)    Hypotension (POA: Yes)    Coagulopathy (HCC) (POA: Yes)    Hyponatremia (POA: Yes)    Prolonged QT interval (POA: Yes)    Electrolyte abnormality (POA: Yes)    Metabolic acidosis (POA: Unknown)    Cholecystitis (POA: Unknown)  Resolved Problems:    * No resolved hospital problems. *      FOLLOW UP  Future Appointments   Date Time Provider Department Center   8/28/2024 10:00 AM Galilea Vaca D.O. [facility] [place]  [address]  [place] [id number] [phone]  Schedule an appointment as soon as possible for a visit in 4 week(s)  Follow up with provider of choice to discuss gallbladder removal.      MEDICATIONS ON DISCHARGE     Medication List        START taking these medications        Instructions   acetaminophen 325 MG Tabs  Commonly known as: Tylenol   Take 2 Tablets by mouth every 6 hours as needed for Mild Pain, Moderate Pain or Fever.  Dose: 650 mg     furosemide 20 MG Tabs  Commonly known as: Lasix   Take 1 Tablet by mouth every day.  Dose: 20 mg     lactulose 10 GM/15ML Soln   Take 30 mL by mouth 2 times a day.  Dose: 30 mL     omeprazole 20 MG delayed-release capsule  Commonly known as: PriLOSEC   Take 1 Capsule by mouth 2 times a day.  Dose: 20 mg     sucralfate 1 GM/10ML Susp  Commonly known as: Carafate   Take 10 mL by mouth every 6 hours.  Dose: 1 g            CHANGE how you take these medications        Instructions   rosuvastatin 20  MG Tabs  What changed: when to take this  Commonly known as: Crestor   Take 1 Tablet by mouth every evening.  Dose: 20 mg            CONTINUE taking these medications        Instructions   spironolactone 50 MG Tabs  Commonly known as: Aldactone   Take 1 Tablet by mouth every day.  Dose: 50 mg            STOP taking these medications      DIPHENHYDRAMINE HCL PO     losartan 50 MG Tabs  Commonly known as: Cozaar              Allergies  Allergies   Allergen Reactions    Nitrofurantoin Vomiting    Sulfamethoxazole W-Trimethoprim Vomiting       DIET  No orders of the defined types were placed in this encounter.      ACTIVITY  As tolerated.  Weight bearing as tolerated    CONSULTATIONS  Critical care  GI      PROCEDURES  Paracentesis on August 16  EGD  status post banding on August 15  Midline placement on August 15    LABORATORY  Lab Results   Component Value Date    SODIUM 128 (L) 08/21/2024    POTASSIUM 3.4 (L) 08/21/2024    CHLORIDE 99 08/21/2024    CO2 19 (L) 08/21/2024    GLUCOSE 119 (H) 08/21/2024    BUN 8 08/21/2024    CREATININE 0.66 08/21/2024    CREATININE 0.8 08/07/2008        Lab Results   Component Value Date    WBC 11.5 (H) 08/21/2024    HEMOGLOBIN 7.9 (L) 08/21/2024    HEMATOCRIT 24.9 (L) 08/21/2024    PLATELETCT 211 08/21/2024        Total time of the discharge process exceeds 45 minutes.

## 2024-08-21 NOTE — CARE PLAN
The patient is Watcher - Medium risk of patient condition declining or worsening    Shift Goals  Clinical Goals: Attend all needs.  Patient Goals: discharge tomorrow  Family Goals: none present    Progress made toward(s) clinical / shift goals:  All needs attended and safety measures provided. Patient denies any pain, no reported BM.    Patient is not progressing towards the following goals: n/a

## 2024-08-21 NOTE — DISCHARGE PLANNING
[facility] declined.  [facility] census is full.    @1024  DPA faxed a referral to [facility].    [facility] declined referral.  Non-contracted insurance provider.    @1150  DPA faxed a referral to [facility].    [facility] declined.  Out of network.    @1418  DPA faxed a referral to [facility]

## 2024-08-21 NOTE — DISCHARGE PLANNING
Case Management Discharge Planning    Admission Date: 8/14/2024  GMLOS: 4.9  ALOS: 7    6-Clicks ADL Score: 24  6-Clicks Mobility Score: 18      Anticipated Discharge Dispo: Discharge Disposition: D/T to home under HHA care in anticipation of covered skilled care (06)    DME Needed: No    Action(s) Taken: Updated Provider/Nurse on Discharge Plan and Referral(s) sent RNCM attended IDT rounds and reviewed chart. [facility] and [facility] declined. DPA notified and sending referral to [facility]. Pt has family support available upon discharge to home.     Escalations Completed: Pending Discharge Destination    Medically Clear: No    Next Steps: Follow-up with medical team to discuss DC needs and barriers.    Barriers to Discharge: Medical clearance and Pending Placement

## 2024-08-22 NOTE — DISCHARGE PLANNING
@4077  DPA faxed a referral to [facility] via comm mgt, fax #[phone].    @1140  Agency/Facility Name: [facility]  Spoke To: Annalee  Outcome: Annalee will contact the  for an update and give this DPA  a call back.    @9383  Agency/Facility Name: [facility]  Spoke To: Day  Outcome: Pt can be accepted pro jed for nursing only.  They are working on getting their certification and can accept Medical SW and home aide.  DPA will first send to [facility] to see if they can accept the pt.      @3023  [facility] faxed a referral to [facility] via comm mgt, fax #[phone]

## 2024-08-23 NOTE — DISCHARGE PLANNING
5954  KAREEM received [facility]ed [facility] Pt was denied. KAREEM notified RN CM via Teams.

## 2024-08-28 ENCOUNTER — TELEPHONE (OUTPATIENT)
Dept: MEDICAL GROUP | Facility: LAB | Age: 59
End: 2024-08-28

## 2024-08-28 ENCOUNTER — OFFICE VISIT (OUTPATIENT)
Dept: MEDICAL GROUP | Facility: LAB | Age: 59
End: 2024-08-28
Payer: COMMERCIAL

## 2024-08-28 VITALS
WEIGHT: 189 LBS | DIASTOLIC BLOOD PRESSURE: 68 MMHG | SYSTOLIC BLOOD PRESSURE: 124 MMHG | HEART RATE: 81 BPM | TEMPERATURE: 97.5 F | OXYGEN SATURATION: 100 % | RESPIRATION RATE: 20 BRPM | BODY MASS INDEX: 28.64 KG/M2 | HEIGHT: 68 IN

## 2024-08-28 DIAGNOSIS — D64.9 ANEMIA, UNSPECIFIED TYPE: ICD-10-CM

## 2024-08-28 DIAGNOSIS — K70.31 ALCOHOLIC CIRRHOSIS OF LIVER WITH ASCITES (HCC): ICD-10-CM

## 2024-08-28 DIAGNOSIS — K76.82 HEPATIC ENCEPHALOPATHY (HCC): ICD-10-CM

## 2024-08-28 PROBLEM — I95.9 HYPOTENSION: Status: RESOLVED | Noted: 2024-08-14 | Resolved: 2024-08-28

## 2024-08-28 PROBLEM — Z51.89 WOUND CHECK, ABSCESS: Status: RESOLVED | Noted: 2024-06-27 | Resolved: 2024-08-28

## 2024-08-28 PROBLEM — D69.6 THROMBOCYTOPENIA (HCC): Status: RESOLVED | Noted: 2024-06-27 | Resolved: 2024-08-28

## 2024-08-28 PROCEDURE — 99214 OFFICE O/P EST MOD 30 MIN: CPT | Performed by: STUDENT IN AN ORGANIZED HEALTH CARE EDUCATION/TRAINING PROGRAM

## 2024-08-28 PROCEDURE — 3074F SYST BP LT 130 MM HG: CPT | Performed by: STUDENT IN AN ORGANIZED HEALTH CARE EDUCATION/TRAINING PROGRAM

## 2024-08-28 PROCEDURE — 3078F DIAST BP <80 MM HG: CPT | Performed by: STUDENT IN AN ORGANIZED HEALTH CARE EDUCATION/TRAINING PROGRAM

## 2024-08-28 RX ORDER — SPIRONOLACTONE 100 MG/1
100 TABLET, FILM COATED ORAL DAILY
Qty: 90 TABLET | Refills: 1 | Status: SHIPPED | OUTPATIENT
Start: 2024-08-28

## 2024-08-28 ASSESSMENT — ENCOUNTER SYMPTOMS
FEVER: 0
SHORTNESS OF BREATH: 1
COUGH: 0
VOMITING: 0
NAUSEA: 0
CHILLS: 0
DIARRHEA: 0
ABDOMINAL PAIN: 0
WEIGHT LOSS: 0

## 2024-08-28 ASSESSMENT — FIBROSIS 4 INDEX: FIB4 SCORE: 1.41

## 2024-08-28 NOTE — TELEPHONE ENCOUNTER
Called and left a voicemail for patient regarding appointment and need to be scheduled during today's Internet outage.

## 2024-08-28 NOTE — PROGRESS NOTES
"  Subjective:     Chief Complaint   Patient presents with    Hospital Follow-up     HPI:   Kavita is a 59 y.o. female who presents today for hospital follow-up.    Patient coming in for follow up after hospital stay. Was in the ICU for GI bleed. Had variceal banding and paracentesis with 5L removed. Reports more abdominal distension with discomfort, but not pain. Still with LE edema. Some mild SOB, no chest pain. Still admits to fogginess and some confusion. Taking lactulose 30ml BID. Unsure about her medications, but brings in a bag of them today.     In patient's bag:  Omeprazole 20mg daily  Lasix 20mg daily  Spironolactone 50mg daily  Lactulose 30ml BID (10g/15ml)  Sucralfate 10ml Q6hrs    She does not see her gastroenterologist until late September.    Review of Systems   Constitutional:  Negative for chills, fever, malaise/fatigue and weight loss.   Respiratory:  Positive for shortness of breath. Negative for cough.    Cardiovascular:  Positive for leg swelling. Negative for chest pain.   Gastrointestinal:  Negative for abdominal pain, diarrhea, nausea and vomiting.   Skin:  Negative for rash.       Objective:     Exam:  /68 (BP Location: Right arm, Patient Position: Sitting, BP Cuff Size: Adult)   Pulse 81   Temp 36.4 °C (97.5 °F) (Temporal)   Resp 20   Ht 1.727 m (5' 8\")   Wt 85.7 kg (189 lb)   SpO2 100%   BMI 28.74 kg/m²  Body mass index is 28.74 kg/m².    Physical Exam  Vitals reviewed.   Constitutional:       General: She is not in acute distress.     Appearance: Normal appearance. She is ill-appearing. She is not toxic-appearing.   HENT:      Head: Normocephalic and atraumatic.      Nose: Nose normal.      Mouth/Throat:      Mouth: Mucous membranes are moist.   Eyes:      General: Scleral icterus present.      Conjunctiva/sclera: Conjunctivae normal.   Cardiovascular:      Rate and Rhythm: Normal rate and regular rhythm.      Heart sounds: Normal heart sounds. No murmur heard.  Pulmonary: "      Effort: Pulmonary effort is normal. No respiratory distress.      Breath sounds: Normal breath sounds. No stridor. No wheezing, rhonchi or rales.   Abdominal:      General: Abdomen is flat and protuberant. There is distension.      Palpations: Abdomen is soft. There is fluid wave. There is no mass.      Tenderness: There is abdominal tenderness (minimal generalized). There is no guarding or rebound.   Musculoskeletal:      Cervical back: Normal range of motion and neck supple. No rigidity or tenderness.      Right lower le+ Pitting Edema present.      Left lower le+ Pitting Edema present.   Skin:     General: Skin is warm and dry.      Coloration: Skin is jaundiced.   Neurological:      General: No focal deficit present.      Mental Status: She is alert and oriented to person, place, and time.   Psychiatric:         Mood and Affect: Mood normal.         Behavior: Behavior normal.         Thought Content: Thought content normal.       Labs: Reviewed from 2024  Imaging: Reviewed from 2024    Assessment & Plan:     59 y.o. female with the following -     1. Alcoholic cirrhosis of liver with ascites (HCC)  Chronic cirrhosis with reaccumulation of ascites.  Plan on therapeutic paracentesis. Increase spironolactone to 100mg daily. Continue furosemide 20mg daily. Labs in 1 week after this change.  Patient to contact her gastroenterologist to see if she can move her appointment.  Gave ER precautions.  - spironolactone (ALDACTONE) 100 MG Tab; Take 1 Tablet by mouth every day.  Dispense: 90 Tablet; Refill: 1  - Comp Metabolic Panel; Future  - IR-PARACENTESIS, ABD WITH IMAGING; Future    furosemide (LASIX) 20 MG Tab, Take 1 Tablet by mouth every day., Disp: 30 Tablet, Rfl: 0    2. Anemia, unspecified type  Acute, secondary to GI bleed status post variceal banding.  No current concern for GI bleeding and continues with omeprazole 20 mg twice daily and Carafate 1 g every 6 hours.  Aware of ER precautions.    3.  Hepatic encephalopathy (HCC)  Acute, improved with lactulose but still with fogginess/confusion. Increase lactulose to 30ml three times a day, can increase up to 4 times a day after 2 days if still with residual confusion. If not working or causing too much diarrhea we can start rifaximin.     Return in about 2 weeks (around 9/11/2024), or if symptoms worsen or fail to improve.    Please note that this dictation was created using voice recognition software. I have made every reasonable attempt to correct obvious errors, but I expect that there are errors of grammar and possibly content that I did not discover before finalizing the note.

## 2024-08-30 ENCOUNTER — HOSPITAL ENCOUNTER (OUTPATIENT)
Dept: RADIOLOGY | Facility: MEDICAL CENTER | Age: 59
End: 2024-08-30
Attending: STUDENT IN AN ORGANIZED HEALTH CARE EDUCATION/TRAINING PROGRAM
Payer: COMMERCIAL

## 2024-08-30 DIAGNOSIS — K70.31 ALCOHOLIC CIRRHOSIS OF LIVER WITH ASCITES (HCC): ICD-10-CM

## 2024-08-30 DIAGNOSIS — K92.2 GASTROINTESTINAL HEMORRHAGE, UNSPECIFIED GASTROINTESTINAL HEMORRHAGE TYPE: ICD-10-CM

## 2024-08-30 DIAGNOSIS — K76.82 HEPATIC ENCEPHALOPATHY (HCC): ICD-10-CM

## 2024-08-30 PROBLEM — K61.1 PERIRECTAL ABSCESS: Status: RESOLVED | Noted: 2024-06-14 | Resolved: 2024-08-30

## 2024-08-30 PROCEDURE — 700111 HCHG RX REV CODE 636 W/ 250 OVERRIDE (IP): Mod: JZ

## 2024-08-30 PROCEDURE — 49083 ABD PARACENTESIS W/IMAGING: CPT

## 2024-08-30 PROCEDURE — P9047 ALBUMIN (HUMAN), 25%, 50ML: HCPCS | Mod: JZ

## 2024-08-30 RX ORDER — ALBUMIN (HUMAN) 12.5 G/50ML
50 SOLUTION INTRAVENOUS ONCE
Status: COMPLETED | OUTPATIENT
Start: 2024-08-30 | End: 2024-08-30

## 2024-08-30 RX ORDER — ALBUMIN (HUMAN) 12.5 G/50ML
SOLUTION INTRAVENOUS
Status: COMPLETED
Start: 2024-08-30 | End: 2024-08-30

## 2024-08-30 RX ORDER — SUCRALFATE ORAL 1 G/10ML
1 SUSPENSION ORAL EVERY 6 HOURS
Qty: 473 ML | Refills: 2 | Status: SHIPPED | OUTPATIENT
Start: 2024-08-30

## 2024-08-30 RX ORDER — LACTULOSE 20 G/30ML
30 SOLUTION ORAL 3 TIMES DAILY
Qty: 473 ML | Refills: 3 | Status: SHIPPED | OUTPATIENT
Start: 2024-08-30

## 2024-08-30 RX ADMIN — ALBUMIN (HUMAN) 50 G: 12.5 SOLUTION INTRAVENOUS at 10:15

## 2024-08-30 NOTE — PROGRESS NOTES
US guided therapeutic paracentesis done by Dr. Kurtz;(no H&P required as this is a NON SEDATION procedure) LLQ access site; dressing CDI; 9750mL obtained and 9750ml discarded. Pt tolerated the procedure well; pt hemodynamically stable pre/intra/post procedure. IV started, albumin 50g given per protocol, IV d/c'ed upon completion of albumin administration. All questions and concerns answered prior to d/c. Patient provided with all appropriate education for procedure. Pt d/c home.

## 2024-08-30 NOTE — TELEPHONE ENCOUNTER
Received request via: Pharmacy    Was the patient seen in the last year in this department? Yes    Does the patient have an active prescription (recently filled or refills available) for medication(s) requested? No    Pharmacy Name: [facility], [place]    Does the patient have long-term Plus and need 100-day supply? (This applies to ALL medications) Patient does not have SCP

## 2024-09-06 DIAGNOSIS — K70.31 ALCOHOLIC CIRRHOSIS OF LIVER WITH ASCITES (HCC): ICD-10-CM

## 2024-09-06 RX ORDER — FUROSEMIDE 20 MG
20 TABLET ORAL DAILY
Qty: 90 TABLET | Refills: 1 | Status: ON HOLD | OUTPATIENT
Start: 2024-09-06 | End: 2024-09-30

## 2024-09-06 NOTE — TELEPHONE ENCOUNTER
"Patient called in today stating that this medication did not help follow her \"from the hospital.  I let the patient know that this medication was sent to the [facility] pharmacy.  Patient states that she would like this sent over to the [facility] pharmacy.  "

## 2024-09-12 ENCOUNTER — OFFICE VISIT (OUTPATIENT)
Dept: MEDICAL GROUP | Facility: LAB | Age: 59
End: 2024-09-12
Payer: COMMERCIAL

## 2024-09-12 VITALS
DIASTOLIC BLOOD PRESSURE: 68 MMHG | WEIGHT: 199 LBS | SYSTOLIC BLOOD PRESSURE: 130 MMHG | TEMPERATURE: 97.5 F | RESPIRATION RATE: 20 BRPM | HEART RATE: 88 BPM | BODY MASS INDEX: 30.16 KG/M2 | OXYGEN SATURATION: 99 % | HEIGHT: 68 IN

## 2024-09-12 DIAGNOSIS — K76.82 HEPATIC ENCEPHALOPATHY (HCC): ICD-10-CM

## 2024-09-12 DIAGNOSIS — E87.6 HYPOKALEMIA: ICD-10-CM

## 2024-09-12 DIAGNOSIS — K70.31 ALCOHOLIC CIRRHOSIS OF LIVER WITH ASCITES (HCC): ICD-10-CM

## 2024-09-12 PROCEDURE — 99214 OFFICE O/P EST MOD 30 MIN: CPT | Performed by: STUDENT IN AN ORGANIZED HEALTH CARE EDUCATION/TRAINING PROGRAM

## 2024-09-12 PROCEDURE — 3078F DIAST BP <80 MM HG: CPT | Performed by: STUDENT IN AN ORGANIZED HEALTH CARE EDUCATION/TRAINING PROGRAM

## 2024-09-12 PROCEDURE — 3075F SYST BP GE 130 - 139MM HG: CPT | Performed by: STUDENT IN AN ORGANIZED HEALTH CARE EDUCATION/TRAINING PROGRAM

## 2024-09-12 ASSESSMENT — FIBROSIS 4 INDEX: FIB4 SCORE: 1.41

## 2024-09-13 ENCOUNTER — HOSPITAL ENCOUNTER (OUTPATIENT)
Dept: RADIOLOGY | Facility: MEDICAL CENTER | Age: 59
End: 2024-09-13
Attending: STUDENT IN AN ORGANIZED HEALTH CARE EDUCATION/TRAINING PROGRAM
Payer: COMMERCIAL

## 2024-09-13 ENCOUNTER — HOSPITAL ENCOUNTER (OUTPATIENT)
Facility: MEDICAL CENTER | Age: 59
End: 2024-09-13
Attending: STUDENT IN AN ORGANIZED HEALTH CARE EDUCATION/TRAINING PROGRAM | Admitting: STUDENT IN AN ORGANIZED HEALTH CARE EDUCATION/TRAINING PROGRAM
Payer: COMMERCIAL

## 2024-09-13 VITALS — OXYGEN SATURATION: 98 % | HEART RATE: 75 BPM | SYSTOLIC BLOOD PRESSURE: 125 MMHG | DIASTOLIC BLOOD PRESSURE: 61 MMHG

## 2024-09-13 VITALS
HEART RATE: 87 BPM | TEMPERATURE: 98 F | DIASTOLIC BLOOD PRESSURE: 50 MMHG | SYSTOLIC BLOOD PRESSURE: 101 MMHG | RESPIRATION RATE: 18 BRPM | OXYGEN SATURATION: 96 %

## 2024-09-13 DIAGNOSIS — K70.31 ALCOHOLIC CIRRHOSIS OF LIVER WITH ASCITES (HCC): ICD-10-CM

## 2024-09-13 PROBLEM — E83.42 HYPOMAGNESEMIA: Status: RESOLVED | Noted: 2024-08-14 | Resolved: 2024-09-13

## 2024-09-13 PROBLEM — K92.2 GIB (GASTROINTESTINAL BLEEDING): Status: RESOLVED | Noted: 2024-08-14 | Resolved: 2024-09-13

## 2024-09-13 PROCEDURE — P9047 ALBUMIN (HUMAN), 25%, 50ML: HCPCS | Mod: JZ | Performed by: NURSE PRACTITIONER

## 2024-09-13 PROCEDURE — 160047 HCHG PACU  - EA ADDL 30 MINS PHASE II

## 2024-09-13 PROCEDURE — 160035 HCHG PACU - 1ST 60 MINS PHASE I

## 2024-09-13 PROCEDURE — 49083 ABD PARACENTESIS W/IMAGING: CPT

## 2024-09-13 PROCEDURE — 700111 HCHG RX REV CODE 636 W/ 250 OVERRIDE (IP): Mod: JZ | Performed by: NURSE PRACTITIONER

## 2024-09-13 PROCEDURE — 160046 HCHG PACU - 1ST 60 MINS PHASE II

## 2024-09-13 PROCEDURE — 160002 HCHG RECOVERY MINUTES (STAT)

## 2024-09-13 RX ORDER — ALBUMIN (HUMAN) 12.5 G/50ML
75 SOLUTION INTRAVENOUS ONCE
Status: COMPLETED | OUTPATIENT
Start: 2024-09-13 | End: 2024-09-13

## 2024-09-13 RX ORDER — ALBUMIN (HUMAN) 12.5 G/50ML
75 SOLUTION INTRAVENOUS ONCE
Status: CANCELLED | OUTPATIENT
Start: 2024-09-13 | End: 2024-09-13

## 2024-09-13 RX ADMIN — ALBUMIN (HUMAN) 75 G: 0.25 INJECTION, SOLUTION INTRAVENOUS at 15:20

## 2024-09-13 ASSESSMENT — PAIN DESCRIPTION - PAIN TYPE: TYPE: SURGICAL PAIN

## 2024-09-13 ASSESSMENT — ENCOUNTER SYMPTOMS
CHILLS: 0
ABDOMINAL PAIN: 1
NAUSEA: 0
DIARRHEA: 0
WEIGHT LOSS: 0
FEVER: 0
SHORTNESS OF BREATH: 1
VOMITING: 0
COUGH: 0

## 2024-09-13 NOTE — OR NURSING
1436 patient arrived from radiology, report received, attached to monitoring. VSS, patient oxygenating well on room air, s/p paracentesis, orders in place for albumin administration, patient denies pain and nausea at the moment    1520 albumin infusion started, vss     1530 patient drinking water and tea tolerates well     1609 patient's mother, Veronika brought to bedside     1630 patient tolerating albumin infusion well, vss    1700 dc instructions discussed with patient and patients mother questions answered     1730 patient dc home in stable condition, piv dc tip intact, all belongings with patient and accounted for, escorted out via wheelchair with Zena ZALDIVAR, safety ensured

## 2024-09-13 NOTE — PATIENT INSTRUCTIONS
Labs due (no fasting) (in 1 week), we can adjust your spironolactone to 150mg daily now (can cut in half to take 25mg if BP borderline or start at 25mg)    Lactulose to 30ml three times a day, can increase up to 4 times a day after 2 days if still with residual confusion    Update me after your GI appointment    Ideal blood pressure <130/80 and at least <140/90.  If <100 for the top number we may be over treating.  Severe range >180/120, with symptoms=ER

## 2024-09-13 NOTE — PROGRESS NOTES
"Verbal consent was acquired by the patient to use [employer] ambient listening note generation during this visit Yes.    Subjective:     Chief Complaint   Patient presents with    Follow-Up     HPI:   Kavita is a 59 y.o. female who presents today for follow up.    History of Present Illness  The patient is a 58-year-old female who presents for follow-up.    She underwent a paracentesis procedure, which provided relief but she reports ongoing abdominal distention and now some mild shortness of breath.  Due to the distention she reports mild abdominal discomfort, has not had any measured fevers or chills.      She has not been great about adhering to her increased lactulose regimen TID as finds it challenging due to the increased fluid intake required.  She missed her dose earlier today and reports some mild confusion and some stable brain fog, her mother brought her into the clinic today.  Her leg swelling has improved, likely due to the use of compression socks.    She experiences labored breathing at night due to abdominal distension and has been unable to walk for long distances recently due to breathing difficulties. She has a scheduled appointment with Gastroenterology on 09/20/2024.    She discontinued rosuvastatin due to her cirrhosis diagnosis.     She occasionally experiences shooting pains in her rectum, where she previously had surgery. Requests an exam.    Review of Systems   Constitutional:  Negative for chills, fever, malaise/fatigue and weight loss.   Respiratory:  Positive for shortness of breath. Negative for cough.    Cardiovascular:  Positive for leg swelling. Negative for chest pain.   Gastrointestinal:  Positive for abdominal pain. Negative for diarrhea, nausea and vomiting.   Skin:  Negative for rash.     Objective:     Exam:  /68 (BP Location: Left arm, Patient Position: Sitting, BP Cuff Size: Adult)   Pulse 88   Temp 36.4 °C (97.5 °F) (Temporal)   Resp 20   Ht 1.727 m (5' 8\")   " Wt 90.3 kg (199 lb)   SpO2 99%   BMI 30.26 kg/m²  Body mass index is 30.26 kg/m².    Physical Exam  Vitals reviewed. Chaperone present: Declined chaperone..   Constitutional:       General: She is not in acute distress.     Appearance: Normal appearance. She is ill-appearing. She is not toxic-appearing.   HENT:      Head: Normocephalic and atraumatic.      Nose: Nose normal.      Mouth/Throat:      Mouth: Mucous membranes are moist.   Eyes:      General: Scleral icterus present.      Conjunctiva/sclera: Conjunctivae normal.   Cardiovascular:      Rate and Rhythm: Normal rate and regular rhythm.      Heart sounds: Normal heart sounds. No murmur heard.  Pulmonary:      Effort: Pulmonary effort is normal. No respiratory distress.      Breath sounds: Normal breath sounds. No stridor. No wheezing, rhonchi or rales.   Abdominal:      General: Abdomen is flat and protuberant. There is distension.      Palpations: Abdomen is soft. There is fluid wave. There is no mass.      Tenderness: There is abdominal tenderness (minimal generalized). There is no guarding or rebound.   Genitourinary:     Comments: External anal/rectal exam: no hemorrhoids, well healed surgical scar without discharge or erythema.  Musculoskeletal:      Cervical back: Normal range of motion and neck supple. No rigidity or tenderness.      Right lower leg: Edema (Trace pitting, wearing compression) present.      Left lower leg: Edema (Trace pitting, wearing compression.) present.   Skin:     General: Skin is warm and dry.      Coloration: Skin is jaundiced.   Neurological:      General: No focal deficit present.      Mental Status: She is alert and oriented to person, place, and time.   Psychiatric:         Mood and Affect: Mood normal.         Behavior: Behavior normal.         Thought Content: Thought content normal.       Assessment & Plan:     59 y.o. female with the following -     1. Alcoholic cirrhosis of liver with ascites (HCC)  2.  Hypokalemia  Chronic, unstable. Stat order for paracentesis, patient has an appointment 9/20 with gastroenterology, defer further management to specialist but patient will plan to follow-up with me plan pending after her appointment and as needed.  Patient has not yet gone for repeat labs.  With history of hypokalemia I am not concerned about need for urgent metabolic panel to trend K prior to increasing spironolactone, we will go ahead and increase up to 125 mg and if blood pressure can tolerate then she may titrate up to 150 mg daily (has 50 mg tablets at home).  CMP 1 week after this change.  She will continue with furosemide without changes for now.  Discussed ER precautions.  - IR-PARACENTESIS, ABD WITH IMAGING; Future  - Comp Metabolic Panel; Future  - MAGNESIUM; Future    furosemide (LASIX) 20 MG Tab, Take 1 Tablet by mouth every day., Disp: 90 Tablet, Rfl: 1    3. Hepatic encephalopathy (HCC)  Unstable given that patient is not consistently taking lactulose as prescribed.  Advised to be better with compliance with 3 times daily dosing and she is aware she can increase if needed to 4 times a day.  Continue care with gastroenterology.  Aware of ER precautions.    lactulose 20 GM/30ML Solution, Take 30 mL by mouth 3 times a day., Disp: 473 mL, Rfl: 3    Return if symptoms worsen or fail to improve.    Please note that this dictation was created using voice recognition software. I have made every reasonable attempt to correct obvious errors, but I expect that there are errors of grammar and possibly content that I did not discover before finalizing the note.

## 2024-09-13 NOTE — DISCHARGE INSTRUCTIONS
HOME CARE INSTRUCTIONS    ACTIVITY: Rest and take it easy for the first 24 hours.  A responsible adult is recommended to remain with you during that time.  It is normal to feel sleepy.  We encourage you to not do anything that requires balance, judgment or coordination.    FOR 24 HOURS DO NOT:  Drive, operate machinery or run household appliances.  Drink beer or alcoholic beverages.  Make important decisions or sign legal documents.    SPECIAL INSTRUCTIONS: see handout    DIET: To avoid nausea, slowly advance diet as tolerated, avoiding spicy or greasy foods for the first day.  Add more substantial food to your diet according to your physician's instructions. INCREASE FLUIDS AND FIBER TO AVOID CONSTIPATION.    SURGICAL DRESSING/BATHING: showers okay tomorrow    MEDICATIONS: Resume taking daily medication.  Take prescribed pain medication with food.  If no medication is prescribed, you may take non-aspirin pain medication if needed.  PAIN MEDICATION CAN BE VERY CONSTIPATING.  Take a stool softener or laxative such as senokot, pericolace, or milk of magnesia if needed.    .  Last pain medication given at ___________________.    A follow-up appointment should be arranged with your doctor; call to schedule.    You should CALL YOUR PHYSICIAN if you develop:  Fever greater than 101 degrees F.  Pain not relieved by medication, or persistent nausea or vomiting.  Excessive bleeding (blood soaking through dressing) or unexpected drainage from the wound.  Extreme redness or swelling around the incision site, drainage of pus or foul smelling drainage.  Inability to urinate or empty your bladder within 8 hours.      You should call 911 if you develop problems with breathing or chest pain.  If you are unable to contact your doctor or surgical center, you should go to the nearest emergency room or urgent care center.     MILD FLU-LIKE SYMPTOMS ARE NORMAL.  YOU MAY EXPERIENCE GENERALIZED MUSCLE ACHES, THROAT IRRITATION, HEADACHE  AND/OR SOME NAUSEA.    If any questions arise, call your doctor.  If your doctor is not available, please feel free to call the Surgical Center at [phone].  The Center is open Monday through Friday from 7AM to 7PM.      A registered nurse may call you a few days after your surgery to see how you are doing after your procedure.    You may also receive a survey in the mail within the next two weeks and we ask that you take a few moments to complete the survey and return it to us.  Our goal is to provide you with very good care and we value your comments.     Depression / Suicide Risk    As you are discharged from this [facility] facility, it is important to learn how to keep safe from harming yourself.    Recognize the warning signs:  Abrupt changes in personality, positive or negative- including increase in energy   Giving away possessions  Change in eating patterns- significant weight changes-  positive or negative  Change in sleeping patterns- unable to sleep or sleeping all the time   Unwillingness or inability to communicate  Depression  Unusual sadness, discouragement and loneliness  Talk of wanting to die  Neglect of personal appearance   Rebelliousness- reckless behavior  Withdrawal from people/activities they love  Confusion- inability to concentrate     If you or a loved one observes any of these behaviors or has concerns about self-harm, here's what you can do:  Talk about it- your feelings and reasons for harming yourself  Remove any means that you might use to hurt yourself (examples: pills, rope, extension cords, firearm)  Get professional help from the community (Mental Health, Substance Abuse, psychological counseling)  Do not be alone:Call your Safe Contact- someone whom you trust who will be there for you.  Call your local CRISIS HOTLINE 598-8044 or 939-976-7462  Call your local Children's Mobile Crisis Response Team [place] (206) 460-5817 or www.[employer]  Call the toll free  National Suicide Prevention Hotlines   National Suicide Prevention Lifeline 612-792-DFQT (4656)  [facility] Line Network 800-SUICIDE (952-0981)    I acknowledge receipt and understanding of these Home Care instructions.

## 2024-09-19 DIAGNOSIS — K70.31 ALCOHOLIC CIRRHOSIS OF LIVER WITH ASCITES (HCC): ICD-10-CM

## 2024-09-19 NOTE — TELEPHONE ENCOUNTER
Received request via: Pharmacy    Was the patient seen in the last year in this department? Yes    Does the patient have an active prescription (recently filled or refills available) for medication(s) requested? No    Pharmacy Name:   [facility] [place] - [address] [place] [address]  [address] [place] [id number]  Phone: [phone] Fax: [phone]    Does the patient have retirement Plus and need 100-day supply? (This applies to ALL medications) Patient does not have SCP

## 2024-09-25 ENCOUNTER — HOSPITAL ENCOUNTER (OUTPATIENT)
Dept: LAB | Facility: MEDICAL CENTER | Age: 59
End: 2024-09-25
Attending: INTERNAL MEDICINE
Payer: COMMERCIAL

## 2024-09-25 ENCOUNTER — HOSPITAL ENCOUNTER (OUTPATIENT)
Dept: RADIOLOGY | Facility: MEDICAL CENTER | Age: 59
End: 2024-09-25
Attending: INTERNAL MEDICINE
Payer: COMMERCIAL

## 2024-09-25 ENCOUNTER — APPOINTMENT (OUTPATIENT)
Dept: RADIOLOGY | Facility: MEDICAL CENTER | Age: 59
DRG: 432 | End: 2024-09-25
Attending: EMERGENCY MEDICINE
Payer: COMMERCIAL

## 2024-09-25 ENCOUNTER — HOSPITAL ENCOUNTER (OUTPATIENT)
Dept: LAB | Facility: MEDICAL CENTER | Age: 59
End: 2024-09-25
Attending: STUDENT IN AN ORGANIZED HEALTH CARE EDUCATION/TRAINING PROGRAM
Payer: COMMERCIAL

## 2024-09-25 ENCOUNTER — HOSPITAL ENCOUNTER (INPATIENT)
Facility: MEDICAL CENTER | Age: 59
LOS: 2 days | End: 2024-09-30
Attending: EMERGENCY MEDICINE | Admitting: INTERNAL MEDICINE
Payer: COMMERCIAL

## 2024-09-25 DIAGNOSIS — R10.84 GENERALIZED ABDOMINAL PAIN: ICD-10-CM

## 2024-09-25 DIAGNOSIS — K85.90 ACUTE PANCREATITIS, UNSPECIFIED COMPLICATION STATUS, UNSPECIFIED PANCREATITIS TYPE: ICD-10-CM

## 2024-09-25 DIAGNOSIS — K70.31 ALCOHOLIC CIRRHOSIS OF LIVER WITH ASCITES (HCC): ICD-10-CM

## 2024-09-25 DIAGNOSIS — E87.6 HYPOKALEMIA: ICD-10-CM

## 2024-09-25 DIAGNOSIS — F41.9 ANXIETY: ICD-10-CM

## 2024-09-25 DIAGNOSIS — D68.9 COAGULOPATHY (HCC): ICD-10-CM

## 2024-09-25 DIAGNOSIS — F17.200 TOBACCO USE DISORDER: ICD-10-CM

## 2024-09-25 DIAGNOSIS — E55.9 VITAMIN D DEFICIENCY: ICD-10-CM

## 2024-09-25 PROBLEM — R10.9 AP (ABDOMINAL PAIN): Status: ACTIVE | Noted: 2024-09-25

## 2024-09-25 LAB
25(OH)D3 SERPL-MCNC: 17 NG/ML (ref 30–100)
ALBUMIN SERPL BCP-MCNC: 3.3 G/DL (ref 3.2–4.9)
ALBUMIN SERPL BCP-MCNC: 3.3 G/DL (ref 3.2–4.9)
ALBUMIN SERPL BCP-MCNC: 4.1 G/DL (ref 3.2–4.9)
ALBUMIN/GLOB SERPL: 0.8 G/DL
ALBUMIN/GLOB SERPL: 1.1 G/DL
ALP SERPL-CCNC: 89 U/L (ref 30–99)
ALP SERPL-CCNC: 93 U/L (ref 30–99)
ALP SERPL-CCNC: 97 U/L (ref 30–99)
ALT SERPL-CCNC: 23 U/L (ref 2–50)
ALT SERPL-CCNC: 29 U/L (ref 2–50)
ALT SERPL-CCNC: 29 U/L (ref 2–50)
ANION GAP SERPL CALC-SCNC: 17 MMOL/L (ref 7–16)
ANION GAP SERPL CALC-SCNC: 17 MMOL/L (ref 7–16)
ANION GAP SERPL CALC-SCNC: 21 MMOL/L (ref 7–16)
ANISOCYTOSIS BLD QL SMEAR: ABNORMAL
AST SERPL-CCNC: 52 U/L (ref 12–45)
AST SERPL-CCNC: 55 U/L (ref 12–45)
AST SERPL-CCNC: 56 U/L (ref 12–45)
B-OH-BUTYR SERPL-MCNC: 0.21 MMOL/L (ref 0.02–0.27)
BASOPHILS # BLD AUTO: 0 % (ref 0–1.8)
BASOPHILS # BLD: 0 K/UL (ref 0–0.12)
BILIRUB CONJ SERPL-MCNC: 1.4 MG/DL (ref 0.1–0.5)
BILIRUB INDIRECT SERPL-MCNC: 2 MG/DL (ref 0–1)
BILIRUB SERPL-MCNC: 3.4 MG/DL (ref 0.1–1.5)
BILIRUB SERPL-MCNC: 3.4 MG/DL (ref 0.1–1.5)
BILIRUB SERPL-MCNC: 3.6 MG/DL (ref 0.1–1.5)
BUN SERPL-MCNC: 10 MG/DL (ref 8–22)
BUN SERPL-MCNC: 11 MG/DL (ref 8–22)
BUN SERPL-MCNC: 11 MG/DL (ref 8–22)
BURR CELLS BLD QL SMEAR: NORMAL
CALCIUM ALBUM COR SERPL-MCNC: 9.7 MG/DL (ref 8.5–10.5)
CALCIUM ALBUM COR SERPL-MCNC: 9.7 MG/DL (ref 8.5–10.5)
CALCIUM SERPL-MCNC: 9.1 MG/DL (ref 8.5–10.5)
CALCIUM SERPL-MCNC: 9.5 MG/DL (ref 8.5–10.5)
CALCIUM SERPL-MCNC: 9.8 MG/DL (ref 8.5–10.5)
CHLORIDE SERPL-SCNC: 100 MMOL/L (ref 96–112)
CHLORIDE SERPL-SCNC: 102 MMOL/L (ref 96–112)
CHLORIDE SERPL-SCNC: 99 MMOL/L (ref 96–112)
CO2 SERPL-SCNC: 14 MMOL/L (ref 20–33)
CO2 SERPL-SCNC: 17 MMOL/L (ref 20–33)
CO2 SERPL-SCNC: 17 MMOL/L (ref 20–33)
COMMENT 1642: NORMAL
CREAT SERPL-MCNC: 1 MG/DL (ref 0.5–1.4)
CREAT SERPL-MCNC: 1.13 MG/DL (ref 0.5–1.4)
CREAT SERPL-MCNC: 1.25 MG/DL (ref 0.5–1.4)
CRP SERPL HS-MCNC: 5.45 MG/DL (ref 0–0.75)
EKG IMPRESSION: NORMAL
EOSINOPHIL # BLD AUTO: 0.06 K/UL (ref 0–0.51)
EOSINOPHIL NFR BLD: 0.9 % (ref 0–6.9)
ERYTHROCYTE [DISTWIDTH] IN BLOOD BY AUTOMATED COUNT: 66.1 FL (ref 35.9–50)
ERYTHROCYTE [DISTWIDTH] IN BLOOD BY AUTOMATED COUNT: 69.2 FL (ref 35.9–50)
ERYTHROCYTE [SEDIMENTATION RATE] IN BLOOD BY WESTERGREN METHOD: 39 MM/HOUR (ref 0–25)
FERRITIN SERPL-MCNC: 379 NG/ML (ref 10–291)
FLUAV RNA SPEC QL NAA+PROBE: NEGATIVE
FLUBV RNA SPEC QL NAA+PROBE: NEGATIVE
GFR SERPLBLD CREATININE-BSD FMLA CKD-EPI: 50 ML/MIN/1.73 M 2
GFR SERPLBLD CREATININE-BSD FMLA CKD-EPI: 57 ML/MIN/1.73 M 2
GFR SERPLBLD CREATININE-BSD FMLA CKD-EPI: 66 ML/MIN/1.73 M 2
GLOBULIN SER CALC-MCNC: 3.7 G/DL (ref 1.9–3.5)
GLOBULIN SER CALC-MCNC: 4.4 G/DL (ref 1.9–3.5)
GLUCOSE SERPL-MCNC: 100 MG/DL (ref 65–99)
GLUCOSE SERPL-MCNC: 108 MG/DL (ref 65–99)
GLUCOSE SERPL-MCNC: 110 MG/DL (ref 65–99)
HBV CORE AB SERPL QL IA: NONREACTIVE
HBV SURFACE AB SERPL IA-ACNC: <3.5 MIU/ML (ref 0–10)
HCT VFR BLD AUTO: 29.2 % (ref 37–47)
HCT VFR BLD AUTO: 33 % (ref 37–47)
HGB BLD-MCNC: 10.8 G/DL (ref 12–16)
HGB BLD-MCNC: 9.8 G/DL (ref 12–16)
IMM GRANULOCYTES # BLD AUTO: 0.07 K/UL (ref 0–0.11)
IMM GRANULOCYTES NFR BLD AUTO: 1 % (ref 0–0.9)
INR PPP: 1.69 (ref 0.87–1.13)
IRON SATN MFR SERPL: 22 % (ref 15–55)
IRON SERPL-MCNC: 47 UG/DL (ref 40–170)
LACTATE SERPL-SCNC: 3.7 MMOL/L (ref 0.5–2)
LIPASE SERPL-CCNC: 187 U/L (ref 11–82)
LYMPHOCYTES # BLD AUTO: 0.71 K/UL (ref 1–4.8)
LYMPHOCYTES NFR BLD: 10.3 % (ref 22–41)
MACROCYTES BLD QL SMEAR: ABNORMAL
MAGNESIUM SERPL-MCNC: 1.5 MG/DL (ref 1.5–2.5)
MCH RBC QN AUTO: 30.4 PG (ref 27–33)
MCH RBC QN AUTO: 30.5 PG (ref 27–33)
MCHC RBC AUTO-ENTMCNC: 32.7 G/DL (ref 32.2–35.5)
MCHC RBC AUTO-ENTMCNC: 33.6 G/DL (ref 32.2–35.5)
MCV RBC AUTO: 91 FL (ref 81.4–97.8)
MCV RBC AUTO: 93 FL (ref 81.4–97.8)
MONOCYTES # BLD AUTO: 0.53 K/UL (ref 0–0.85)
MONOCYTES NFR BLD AUTO: 7.7 % (ref 0–13.4)
MORPHOLOGY BLD-IMP: NORMAL
NEUTROPHILS # BLD AUTO: 5.51 K/UL (ref 1.82–7.42)
NEUTROPHILS NFR BLD: 80.1 % (ref 44–72)
NRBC # BLD AUTO: 0 K/UL
NRBC BLD-RTO: 0 /100 WBC (ref 0–0.2)
PLATELET # BLD AUTO: 152 K/UL (ref 164–446)
PLATELET # BLD AUTO: 196 K/UL (ref 164–446)
PLATELET BLD QL SMEAR: NORMAL
PMV BLD AUTO: 9 FL (ref 9–12.9)
PMV BLD AUTO: 9.8 FL (ref 9–12.9)
POIKILOCYTOSIS BLD QL SMEAR: NORMAL
POTASSIUM SERPL-SCNC: 3.6 MMOL/L (ref 3.6–5.5)
POTASSIUM SERPL-SCNC: 3.8 MMOL/L (ref 3.6–5.5)
POTASSIUM SERPL-SCNC: 3.9 MMOL/L (ref 3.6–5.5)
PROT SERPL-MCNC: 7.6 G/DL (ref 6–8.2)
PROT SERPL-MCNC: 7.7 G/DL (ref 6–8.2)
PROT SERPL-MCNC: 7.8 G/DL (ref 6–8.2)
PROTHROMBIN TIME: 19.9 SEC (ref 12–14.6)
RBC # BLD AUTO: 3.21 M/UL (ref 4.2–5.4)
RBC # BLD AUTO: 3.55 M/UL (ref 4.2–5.4)
RBC BLD AUTO: PRESENT
RSV RNA SPEC QL NAA+PROBE: NEGATIVE
SARS-COV-2 RNA RESP QL NAA+PROBE: NOTDETECTED
SODIUM SERPL-SCNC: 134 MMOL/L (ref 135–145)
SODIUM SERPL-SCNC: 134 MMOL/L (ref 135–145)
SODIUM SERPL-SCNC: 136 MMOL/L (ref 135–145)
TIBC SERPL-MCNC: 217 UG/DL (ref 250–450)
TROPONIN T SERPL-MCNC: 30 NG/L (ref 6–19)
UIBC SERPL-MCNC: 170 UG/DL (ref 110–370)
VIT B12 SERPL-MCNC: >4000 PG/ML (ref 211–911)
WBC # BLD AUTO: 6.9 K/UL (ref 4.8–10.8)
WBC # BLD AUTO: 7.4 K/UL (ref 4.8–10.8)

## 2024-09-25 PROCEDURE — 83735 ASSAY OF MAGNESIUM: CPT

## 2024-09-25 PROCEDURE — 96376 TX/PRO/DX INJ SAME DRUG ADON: CPT

## 2024-09-25 PROCEDURE — 83540 ASSAY OF IRON: CPT

## 2024-09-25 PROCEDURE — 80053 COMPREHEN METABOLIC PANEL: CPT

## 2024-09-25 PROCEDURE — 700111 HCHG RX REV CODE 636 W/ 250 OVERRIDE (IP): Mod: JZ

## 2024-09-25 PROCEDURE — 85610 PROTHROMBIN TIME: CPT

## 2024-09-25 PROCEDURE — 0241U HCHG SARS-COV-2 COVID-19 NFCT DS RESP RNA 4 TRGT ED POC: CPT

## 2024-09-25 PROCEDURE — 86140 C-REACTIVE PROTEIN: CPT

## 2024-09-25 PROCEDURE — 80053 COMPREHEN METABOLIC PANEL: CPT | Mod: 91

## 2024-09-25 PROCEDURE — 85652 RBC SED RATE AUTOMATED: CPT

## 2024-09-25 PROCEDURE — 36415 COLL VENOUS BLD VENIPUNCTURE: CPT

## 2024-09-25 PROCEDURE — 80048 BASIC METABOLIC PNL TOTAL CA: CPT

## 2024-09-25 PROCEDURE — 82728 ASSAY OF FERRITIN: CPT

## 2024-09-25 PROCEDURE — 84484 ASSAY OF TROPONIN QUANT: CPT

## 2024-09-25 PROCEDURE — 84630 ASSAY OF ZINC: CPT

## 2024-09-25 PROCEDURE — 86708 HEPATITIS A ANTIBODY: CPT

## 2024-09-25 PROCEDURE — 74177 CT ABD & PELVIS W/CONTRAST: CPT

## 2024-09-25 PROCEDURE — 86706 HEP B SURFACE ANTIBODY: CPT

## 2024-09-25 PROCEDURE — 82010 KETONE BODYS QUAN: CPT

## 2024-09-25 PROCEDURE — 700111 HCHG RX REV CODE 636 W/ 250 OVERRIDE (IP): Mod: JZ | Performed by: INTERNAL MEDICINE

## 2024-09-25 PROCEDURE — 96375 TX/PRO/DX INJ NEW DRUG ADDON: CPT

## 2024-09-25 PROCEDURE — 80076 HEPATIC FUNCTION PANEL: CPT

## 2024-09-25 PROCEDURE — G0480 DRUG TEST DEF 1-7 CLASSES: HCPCS

## 2024-09-25 PROCEDURE — 83690 ASSAY OF LIPASE: CPT

## 2024-09-25 PROCEDURE — 82747 ASSAY OF FOLIC ACID RBC: CPT

## 2024-09-25 PROCEDURE — P9047 ALBUMIN (HUMAN), 25%, 50ML: HCPCS | Mod: JZ

## 2024-09-25 PROCEDURE — 83550 IRON BINDING TEST: CPT

## 2024-09-25 PROCEDURE — 99285 EMERGENCY DEPT VISIT HI MDM: CPT

## 2024-09-25 PROCEDURE — 82306 VITAMIN D 25 HYDROXY: CPT

## 2024-09-25 PROCEDURE — 84590 ASSAY OF VITAMIN A: CPT

## 2024-09-25 PROCEDURE — 85027 COMPLETE CBC AUTOMATED: CPT

## 2024-09-25 PROCEDURE — 700111 HCHG RX REV CODE 636 W/ 250 OVERRIDE (IP): Performed by: EMERGENCY MEDICINE

## 2024-09-25 PROCEDURE — 82607 VITAMIN B-12: CPT

## 2024-09-25 PROCEDURE — 86704 HEP B CORE ANTIBODY TOTAL: CPT

## 2024-09-25 PROCEDURE — 96374 THER/PROPH/DIAG INJ IV PUSH: CPT

## 2024-09-25 PROCEDURE — 85025 COMPLETE CBC W/AUTO DIFF WBC: CPT

## 2024-09-25 PROCEDURE — 99223 1ST HOSP IP/OBS HIGH 75: CPT | Performed by: INTERNAL MEDICINE

## 2024-09-25 PROCEDURE — A9270 NON-COVERED ITEM OR SERVICE: HCPCS | Performed by: INTERNAL MEDICINE

## 2024-09-25 PROCEDURE — 700102 HCHG RX REV CODE 250 W/ 637 OVERRIDE(OP): Performed by: INTERNAL MEDICINE

## 2024-09-25 PROCEDURE — 770006 HCHG ROOM/CARE - MED/SURG/GYN SEMI*

## 2024-09-25 PROCEDURE — 49083 ABD PARACENTESIS W/IMAGING: CPT

## 2024-09-25 PROCEDURE — 83605 ASSAY OF LACTIC ACID: CPT

## 2024-09-25 PROCEDURE — 700117 HCHG RX CONTRAST REV CODE 255: Performed by: EMERGENCY MEDICINE

## 2024-09-25 PROCEDURE — 93005 ELECTROCARDIOGRAM TRACING: CPT | Performed by: EMERGENCY MEDICINE

## 2024-09-25 RX ORDER — PROMETHAZINE HYDROCHLORIDE 25 MG/1
12.5-25 SUPPOSITORY RECTAL EVERY 4 HOURS PRN
Status: DISCONTINUED | OUTPATIENT
Start: 2024-09-25 | End: 2024-09-25

## 2024-09-25 RX ORDER — FUROSEMIDE 20 MG/1
20 TABLET ORAL DAILY
Status: DISCONTINUED | OUTPATIENT
Start: 2024-09-26 | End: 2024-09-27

## 2024-09-25 RX ORDER — LORAZEPAM 2 MG/ML
0.5 INJECTION INTRAMUSCULAR EVERY 4 HOURS PRN
Status: COMPLETED | OUTPATIENT
Start: 2024-09-25 | End: 2024-09-26

## 2024-09-25 RX ORDER — ALBUMIN (HUMAN) 12.5 G/50ML
SOLUTION INTRAVENOUS
Status: COMPLETED
Start: 2024-09-25 | End: 2024-09-25

## 2024-09-25 RX ORDER — PROCHLORPERAZINE EDISYLATE 5 MG/ML
5-10 INJECTION INTRAMUSCULAR; INTRAVENOUS EVERY 4 HOURS PRN
Status: DISCONTINUED | OUTPATIENT
Start: 2024-09-25 | End: 2024-09-25

## 2024-09-25 RX ORDER — AMOXICILLIN 250 MG
2 CAPSULE ORAL EVERY EVENING
Status: DISCONTINUED | OUTPATIENT
Start: 2024-09-25 | End: 2024-09-30 | Stop reason: HOSPADM

## 2024-09-25 RX ORDER — ONDANSETRON 2 MG/ML
4 INJECTION INTRAMUSCULAR; INTRAVENOUS ONCE
Status: COMPLETED | OUTPATIENT
Start: 2024-09-25 | End: 2024-09-25

## 2024-09-25 RX ORDER — ACETAMINOPHEN 325 MG/1
650 TABLET ORAL EVERY 8 HOURS PRN
Status: DISCONTINUED | OUTPATIENT
Start: 2024-09-25 | End: 2024-09-30 | Stop reason: HOSPADM

## 2024-09-25 RX ORDER — SODIUM CHLORIDE AND POTASSIUM CHLORIDE 150; 900 MG/100ML; MG/100ML
INJECTION, SOLUTION INTRAVENOUS CONTINUOUS
Status: DISCONTINUED | OUTPATIENT
Start: 2024-09-25 | End: 2024-09-27

## 2024-09-25 RX ORDER — PROMETHAZINE HYDROCHLORIDE 25 MG/1
12.5-25 TABLET ORAL EVERY 4 HOURS PRN
Status: DISCONTINUED | OUTPATIENT
Start: 2024-09-25 | End: 2024-09-25

## 2024-09-25 RX ORDER — HYDROMORPHONE HYDROCHLORIDE 1 MG/ML
0.5 INJECTION, SOLUTION INTRAMUSCULAR; INTRAVENOUS; SUBCUTANEOUS ONCE
Status: COMPLETED | OUTPATIENT
Start: 2024-09-25 | End: 2024-09-25

## 2024-09-25 RX ORDER — ONDANSETRON 2 MG/ML
4 INJECTION INTRAMUSCULAR; INTRAVENOUS EVERY 4 HOURS PRN
Status: DISCONTINUED | OUTPATIENT
Start: 2024-09-25 | End: 2024-09-25

## 2024-09-25 RX ORDER — MORPHINE SULFATE 4 MG/ML
4 INJECTION INTRAVENOUS ONCE
Status: COMPLETED | OUTPATIENT
Start: 2024-09-25 | End: 2024-09-25

## 2024-09-25 RX ORDER — ALBUMIN (HUMAN) 12.5 G/50ML
62.5 SOLUTION INTRAVENOUS ONCE
Status: COMPLETED | OUTPATIENT
Start: 2024-09-25 | End: 2024-09-25

## 2024-09-25 RX ORDER — METRONIDAZOLE 500 MG/100ML
500 INJECTION, SOLUTION INTRAVENOUS EVERY 12 HOURS
Status: DISCONTINUED | OUTPATIENT
Start: 2024-09-25 | End: 2024-09-27

## 2024-09-25 RX ORDER — NICOTINE 21 MG/24HR
21 PATCH, TRANSDERMAL 24 HOURS TRANSDERMAL
Status: DISCONTINUED | OUTPATIENT
Start: 2024-09-25 | End: 2024-09-30 | Stop reason: HOSPADM

## 2024-09-25 RX ORDER — NALTREXONE HYDROCHLORIDE 50 MG/1
50 TABLET, FILM COATED ORAL DAILY
Status: ON HOLD | COMMUNITY
Start: 2024-09-20 | End: 2024-09-30

## 2024-09-25 RX ORDER — POLYETHYLENE GLYCOL 3350 17 G/17G
1 POWDER, FOR SOLUTION ORAL
Status: DISCONTINUED | OUTPATIENT
Start: 2024-09-25 | End: 2024-09-30 | Stop reason: HOSPADM

## 2024-09-25 RX ORDER — ONDANSETRON 4 MG/1
4 TABLET, ORALLY DISINTEGRATING ORAL EVERY 4 HOURS PRN
Status: DISCONTINUED | OUTPATIENT
Start: 2024-09-25 | End: 2024-09-25

## 2024-09-25 RX ORDER — ENOXAPARIN SODIUM 100 MG/ML
40 INJECTION SUBCUTANEOUS DAILY
Status: DISCONTINUED | OUTPATIENT
Start: 2024-09-25 | End: 2024-09-30 | Stop reason: HOSPADM

## 2024-09-25 RX ORDER — LABETALOL HYDROCHLORIDE 5 MG/ML
10 INJECTION, SOLUTION INTRAVENOUS EVERY 4 HOURS PRN
Status: DISCONTINUED | OUTPATIENT
Start: 2024-09-25 | End: 2024-09-30 | Stop reason: HOSPADM

## 2024-09-25 RX ORDER — SUCRALFATE ORAL 1 G/10ML
1 SUSPENSION ORAL EVERY 6 HOURS
Status: DISCONTINUED | OUTPATIENT
Start: 2024-09-25 | End: 2024-09-30 | Stop reason: HOSPADM

## 2024-09-25 RX ORDER — LACTULOSE 10 G/15ML
30 SOLUTION ORAL 3 TIMES DAILY
Status: DISCONTINUED | OUTPATIENT
Start: 2024-09-25 | End: 2024-09-30 | Stop reason: HOSPADM

## 2024-09-25 RX ORDER — PROCHLORPERAZINE EDISYLATE 5 MG/ML
10 INJECTION INTRAMUSCULAR; INTRAVENOUS ONCE
Status: COMPLETED | OUTPATIENT
Start: 2024-09-25 | End: 2024-09-25

## 2024-09-25 RX ADMIN — ONDANSETRON 4 MG: 2 INJECTION INTRAMUSCULAR; INTRAVENOUS at 19:24

## 2024-09-25 RX ADMIN — METRONIDAZOLE 500 MG: 500 INJECTION, SOLUTION INTRAVENOUS at 23:46

## 2024-09-25 RX ADMIN — SENNOSIDES AND DOCUSATE SODIUM 2 TABLET: 50; 8.6 TABLET ORAL at 22:58

## 2024-09-25 RX ADMIN — SUCRALFATE 1 G: 1 SUSPENSION ORAL at 22:57

## 2024-09-25 RX ADMIN — HYDROMORPHONE HYDROCHLORIDE 0.5 MG: 1 INJECTION, SOLUTION INTRAMUSCULAR; INTRAVENOUS; SUBCUTANEOUS at 20:32

## 2024-09-25 RX ADMIN — IOHEXOL 90 ML: 350 INJECTION, SOLUTION INTRAVENOUS at 20:00

## 2024-09-25 RX ADMIN — HYDROMORPHONE HYDROCHLORIDE 0.5 MG: 1 INJECTION, SOLUTION INTRAMUSCULAR; INTRAVENOUS; SUBCUTANEOUS at 19:24

## 2024-09-25 RX ADMIN — ONDANSETRON 4 MG: 2 INJECTION INTRAMUSCULAR; INTRAVENOUS at 18:58

## 2024-09-25 RX ADMIN — MORPHINE SULFATE 4 MG: 4 INJECTION INTRAVENOUS at 18:58

## 2024-09-25 RX ADMIN — ENOXAPARIN SODIUM 40 MG: 100 INJECTION SUBCUTANEOUS at 22:57

## 2024-09-25 RX ADMIN — OMEPRAZOLE 20 MG: 20 CAPSULE, DELAYED RELEASE ORAL at 22:58

## 2024-09-25 RX ADMIN — ALBUMIN (HUMAN) 62.5 G: 12.5 SOLUTION INTRAVENOUS at 11:30

## 2024-09-25 RX ADMIN — FAMOTIDINE 20 MG: 10 INJECTION, SOLUTION INTRAVENOUS at 22:59

## 2024-09-25 RX ADMIN — PROCHLORPERAZINE EDISYLATE 10 MG: 5 INJECTION INTRAMUSCULAR; INTRAVENOUS at 21:12

## 2024-09-25 RX ADMIN — LACTULOSE 30 ML: 10 SOLUTION ORAL at 22:58

## 2024-09-25 ASSESSMENT — COGNITIVE AND FUNCTIONAL STATUS - GENERAL
MOBILITY SCORE: 24
DAILY ACTIVITIY SCORE: 24
SUGGESTED CMS G CODE MODIFIER DAILY ACTIVITY: CH
SUGGESTED CMS G CODE MODIFIER MOBILITY: CH

## 2024-09-25 ASSESSMENT — PAIN DESCRIPTION - PAIN TYPE
TYPE: ACUTE PAIN

## 2024-09-25 ASSESSMENT — PATIENT HEALTH QUESTIONNAIRE - PHQ9
8. MOVING OR SPEAKING SO SLOWLY THAT OTHER PEOPLE COULD HAVE NOTICED. OR THE OPPOSITE, BEING SO FIGETY OR RESTLESS THAT YOU HAVE BEEN MOVING AROUND A LOT MORE THAN USUAL: MORE THAN HALF THE DAYS
5. POOR APPETITE OR OVEREATING: SEVERAL DAYS
9. THOUGHTS THAT YOU WOULD BE BETTER OFF DEAD, OR OF HURTING YOURSELF: MORE THAN HALF THE DAYS
1. LITTLE INTEREST OR PLEASURE IN DOING THINGS: SEVERAL DAYS
4. FEELING TIRED OR HAVING LITTLE ENERGY: SEVERAL DAYS
SUM OF ALL RESPONSES TO PHQ9 QUESTIONS 1 AND 2: 2
3. TROUBLE FALLING OR STAYING ASLEEP OR SLEEPING TOO MUCH: SEVERAL DAYS
2. FEELING DOWN, DEPRESSED, IRRITABLE, OR HOPELESS: SEVERAL DAYS
7. TROUBLE CONCENTRATING ON THINGS, SUCH AS READING THE NEWSPAPER OR WATCHING TELEVISION: SEVERAL DAYS
SUM OF ALL RESPONSES TO PHQ QUESTIONS 1-9: 11
6. FEELING BAD ABOUT YOURSELF - OR THAT YOU ARE A FAILURE OR HAVE LET YOURSELF OR YOUR FAMILY DOWN: SEVERAL DAYS

## 2024-09-25 ASSESSMENT — ENCOUNTER SYMPTOMS
CHILLS: 0
BACK PAIN: 0
SPUTUM PRODUCTION: 0
FOCAL WEAKNESS: 0
HALLUCINATIONS: 0
DOUBLE VISION: 0
HEARTBURN: 0
SPEECH CHANGE: 0
POLYDIPSIA: 0
HEADACHES: 0
TREMORS: 0
NAUSEA: 1
VOMITING: 1
BLURRED VISION: 0
ORTHOPNEA: 0
FEVER: 0
HEMOPTYSIS: 0
PALPITATIONS: 0
PHOTOPHOBIA: 0
COUGH: 0
NERVOUS/ANXIOUS: 0
FLANK PAIN: 0
BRUISES/BLEEDS EASILY: 0
WEIGHT LOSS: 0
NECK PAIN: 0

## 2024-09-25 ASSESSMENT — SOCIAL DETERMINANTS OF HEALTH (SDOH)
WITHIN THE PAST 12 MONTHS, THE FOOD YOU BOUGHT JUST DIDN'T LAST AND YOU DIDN'T HAVE MONEY TO GET MORE: NEVER TRUE
WITHIN THE LAST YEAR, HAVE YOU BEEN KICKED, HIT, SLAPPED, OR OTHERWISE PHYSICALLY HURT BY YOUR PARTNER OR EX-PARTNER?: NO
WITHIN THE LAST YEAR, HAVE YOU BEEN HUMILIATED OR EMOTIONALLY ABUSED IN OTHER WAYS BY YOUR PARTNER OR EX-PARTNER?: NO
IN THE PAST 12 MONTHS, HAS THE ELECTRIC, GAS, OIL, OR WATER COMPANY THREATENED TO SHUT OFF SERVICE IN YOUR HOME?: NO
WITHIN THE LAST YEAR, HAVE YOU BEEN AFRAID OF YOUR PARTNER OR EX-PARTNER?: NO
WITHIN THE LAST YEAR, HAVE TO BEEN RAPED OR FORCED TO HAVE ANY KIND OF SEXUAL ACTIVITY BY YOUR PARTNER OR EX-PARTNER?: NO
WITHIN THE PAST 12 MONTHS, YOU WORRIED THAT YOUR FOOD WOULD RUN OUT BEFORE YOU GOT THE MONEY TO BUY MORE: NEVER TRUE

## 2024-09-25 ASSESSMENT — LIFESTYLE VARIABLES
TOTAL SCORE: 0
TOTAL SCORE: 0
DOES PATIENT WANT TO STOP DRINKING: NO
HOW MANY TIMES IN THE PAST YEAR HAVE YOU HAD 5 OR MORE DRINKS IN A DAY: 0
HAVE YOU EVER FELT YOU SHOULD CUT DOWN ON YOUR DRINKING: NO
CONSUMPTION TOTAL: NEGATIVE
EVER FELT BAD OR GUILTY ABOUT YOUR DRINKING: NO
TOTAL SCORE: 0
ON A TYPICAL DAY WHEN YOU DRINK ALCOHOL HOW MANY DRINKS DO YOU HAVE: 0
EVER HAD A DRINK FIRST THING IN THE MORNING TO STEADY YOUR NERVES TO GET RID OF A HANGOVER: NO
HAVE PEOPLE ANNOYED YOU BY CRITICIZING YOUR DRINKING: NO
SUBSTANCE_ABUSE: 0
AVERAGE NUMBER OF DAYS PER WEEK YOU HAVE A DRINK CONTAINING ALCOHOL: 0
ALCOHOL_USE: NO

## 2024-09-25 ASSESSMENT — FIBROSIS 4 INDEX
FIB4 SCORE: 2.97
FIB4 SCORE: 4.07

## 2024-09-25 NOTE — PROGRESS NOTES
US guided therapeutic paracentesis done by August OWENS ;(no H&P required as this is a NON SEDATION procedure) LLQ access site; dressing CDI; 9920 mL obtained and 9920 ml discarded. Pt tolerated the procedure well; pt hemodynamically stable pre/intra/post procedure. IV started, albumin 62.5g given per protocol, IV d/c'ed upon completion of albumin administration. All questions and concerns answered prior to d/c. Patient provided with all appropriate education for procedure. Pt d/c home.

## 2024-09-26 ENCOUNTER — APPOINTMENT (OUTPATIENT)
Dept: RADIOLOGY | Facility: MEDICAL CENTER | Age: 59
DRG: 432 | End: 2024-09-26
Attending: INTERNAL MEDICINE
Payer: COMMERCIAL

## 2024-09-26 PROBLEM — F17.200 TOBACCO USE DISORDER: Status: RESOLVED | Noted: 2024-09-26 | Resolved: 2024-09-26

## 2024-09-26 PROBLEM — F17.200 TOBACCO USE DISORDER: Status: ACTIVE | Noted: 2024-09-26

## 2024-09-26 LAB
ALBUMIN SERPL BCP-MCNC: 3.5 G/DL (ref 3.2–4.9)
ALBUMIN/GLOB SERPL: 1 G/DL
ALP SERPL-CCNC: 74 U/L (ref 30–99)
ALT SERPL-CCNC: 23 U/L (ref 2–50)
ANION GAP SERPL CALC-SCNC: 13 MMOL/L (ref 7–16)
APPEARANCE FLD: NORMAL
APPEARANCE UR: CLEAR
AST SERPL-CCNC: 51 U/L (ref 12–45)
BASOPHILS # BLD AUTO: 0 % (ref 0–1.8)
BASOPHILS # BLD: 0 K/UL (ref 0–0.12)
BILIRUB SERPL-MCNC: 3.3 MG/DL (ref 0.1–1.5)
BILIRUB UR QL STRIP.AUTO: NEGATIVE
BODY FLD TYPE: NORMAL
BUN SERPL-MCNC: 9 MG/DL (ref 8–22)
CALCIUM ALBUM COR SERPL-MCNC: 9.5 MG/DL (ref 8.5–10.5)
CALCIUM SERPL-MCNC: 9.1 MG/DL (ref 8.5–10.5)
CELLS FLD: 3
CHLORIDE SERPL-SCNC: 102 MMOL/L (ref 96–112)
CHOLEST SERPL-MCNC: 117 MG/DL (ref 100–199)
CO2 SERPL-SCNC: 19 MMOL/L (ref 20–33)
COLOR FLD: YELLOW
COLOR UR: ABNORMAL
CREAT SERPL-MCNC: 0.98 MG/DL (ref 0.5–1.4)
CYTOLOGY REG CYTOL: NORMAL
EOSINOPHIL # BLD AUTO: 0.04 K/UL (ref 0–0.51)
EOSINOPHIL NFR BLD: 0.5 % (ref 0–6.9)
EOSINOPHIL NFR FLD: 1 %
ERYTHROCYTE [DISTWIDTH] IN BLOOD BY AUTOMATED COUNT: 67.1 FL (ref 35.9–50)
EST. AVERAGE GLUCOSE BLD GHB EST-MCNC: 82 MG/DL
GFR SERPLBLD CREATININE-BSD FMLA CKD-EPI: 67 ML/MIN/1.73 M 2
GLOBULIN SER CALC-MCNC: 3.4 G/DL (ref 1.9–3.5)
GLUCOSE SERPL-MCNC: 111 MG/DL (ref 65–99)
GLUCOSE UR STRIP.AUTO-MCNC: NEGATIVE MG/DL
GRAM STN SPEC: NORMAL
HBA1C MFR BLD: 4.5 % (ref 4–5.6)
HCT VFR BLD AUTO: 28.5 % (ref 37–47)
HDLC SERPL-MCNC: 40 MG/DL
HGB BLD-MCNC: 9.4 G/DL (ref 12–16)
IMM GRANULOCYTES # BLD AUTO: 0.06 K/UL (ref 0–0.11)
IMM GRANULOCYTES NFR BLD AUTO: 0.7 % (ref 0–0.9)
KETONES UR STRIP.AUTO-MCNC: NEGATIVE MG/DL
LACTATE SERPL-SCNC: 2.4 MMOL/L (ref 0.5–2)
LACTATE SERPL-SCNC: 3.8 MMOL/L (ref 0.5–2)
LDLC SERPL CALC-MCNC: 63 MG/DL
LEUKOCYTE ESTERASE UR QL STRIP.AUTO: NEGATIVE
LYMPHOCYTES # BLD AUTO: 0.7 K/UL (ref 1–4.8)
LYMPHOCYTES NFR BLD: 8.3 % (ref 22–41)
LYMPHOCYTES NFR FLD: 66 %
MCH RBC QN AUTO: 29.9 PG (ref 27–33)
MCHC RBC AUTO-ENTMCNC: 33 G/DL (ref 32.2–35.5)
MCV RBC AUTO: 90.8 FL (ref 81.4–97.8)
MICRO URNS: ABNORMAL
MONOCYTES # BLD AUTO: 0.77 K/UL (ref 0–0.85)
MONOCYTES NFR BLD AUTO: 9.1 % (ref 0–13.4)
MONOS+MACROS NFR FLD MANUAL: 26 %
NEUTROPHILS # BLD AUTO: 6.87 K/UL (ref 1.82–7.42)
NEUTROPHILS NFR BLD: 81.4 % (ref 44–72)
NEUTROPHILS NFR FLD: 6 %
NITRITE UR QL STRIP.AUTO: NEGATIVE
NRBC # BLD AUTO: 0 K/UL
NRBC BLD-RTO: 0 /100 WBC (ref 0–0.2)
NUC CELL # FLD: 1028 CELLS/UL
PH UR STRIP.AUTO: 5.5 [PH] (ref 5–8)
PLATELET # BLD AUTO: 162 K/UL (ref 164–446)
PMV BLD AUTO: 9.3 FL (ref 9–12.9)
POTASSIUM SERPL-SCNC: 4.1 MMOL/L (ref 3.6–5.5)
PROCALCITONIN SERPL-MCNC: 0.14 NG/ML
PROT SERPL-MCNC: 6.9 G/DL (ref 6–8.2)
PROT UR QL STRIP: NEGATIVE MG/DL
RBC # BLD AUTO: 3.14 M/UL (ref 4.2–5.4)
RBC # FLD: 3000 CELLS/UL
RBC UR QL AUTO: NEGATIVE
SIGNIFICANT IND 70042: NORMAL
SITE SITE: NORMAL
SODIUM SERPL-SCNC: 134 MMOL/L (ref 135–145)
SOURCE SOURCE: NORMAL
SP GR UR STRIP.AUTO: >=1.045
TRIGL SERPL-MCNC: 69 MG/DL (ref 0–149)
UROBILINOGEN UR STRIP.AUTO-MCNC: 0.2 MG/DL
WBC # BLD AUTO: 8.4 K/UL (ref 4.8–10.8)

## 2024-09-26 PROCEDURE — 87070 CULTURE OTHR SPECIMN AEROBIC: CPT

## 2024-09-26 PROCEDURE — 96372 THER/PROPH/DIAG INJ SC/IM: CPT

## 2024-09-26 PROCEDURE — 83036 HEMOGLOBIN GLYCOSYLATED A1C: CPT

## 2024-09-26 PROCEDURE — 88305 TISSUE EXAM BY PATHOLOGIST: CPT

## 2024-09-26 PROCEDURE — A9270 NON-COVERED ITEM OR SERVICE: HCPCS | Performed by: STUDENT IN AN ORGANIZED HEALTH CARE EDUCATION/TRAINING PROGRAM

## 2024-09-26 PROCEDURE — 87205 SMEAR GRAM STAIN: CPT

## 2024-09-26 PROCEDURE — 80061 LIPID PANEL: CPT

## 2024-09-26 PROCEDURE — 700102 HCHG RX REV CODE 250 W/ 637 OVERRIDE(OP): Performed by: STUDENT IN AN ORGANIZED HEALTH CARE EDUCATION/TRAINING PROGRAM

## 2024-09-26 PROCEDURE — 85025 COMPLETE CBC W/AUTO DIFF WBC: CPT

## 2024-09-26 PROCEDURE — 96365 THER/PROPH/DIAG IV INF INIT: CPT

## 2024-09-26 PROCEDURE — 87015 SPECIMEN INFECT AGNT CONCNTJ: CPT

## 2024-09-26 PROCEDURE — 89051 BODY FLUID CELL COUNT: CPT

## 2024-09-26 PROCEDURE — 80053 COMPREHEN METABOLIC PANEL: CPT

## 2024-09-26 PROCEDURE — 88112 CYTOPATH CELL ENHANCE TECH: CPT

## 2024-09-26 PROCEDURE — 36415 COLL VENOUS BLD VENIPUNCTURE: CPT

## 2024-09-26 PROCEDURE — 700111 HCHG RX REV CODE 636 W/ 250 OVERRIDE (IP): Performed by: INTERNAL MEDICINE

## 2024-09-26 PROCEDURE — 700102 HCHG RX REV CODE 250 W/ 637 OVERRIDE(OP): Performed by: INTERNAL MEDICINE

## 2024-09-26 PROCEDURE — G0378 HOSPITAL OBSERVATION PER HR: HCPCS

## 2024-09-26 PROCEDURE — 99232 SBSQ HOSP IP/OBS MODERATE 35: CPT | Mod: 25 | Performed by: STUDENT IN AN ORGANIZED HEALTH CARE EDUCATION/TRAINING PROGRAM

## 2024-09-26 PROCEDURE — 83605 ASSAY OF LACTIC ACID: CPT

## 2024-09-26 PROCEDURE — 81003 URINALYSIS AUTO W/O SCOPE: CPT

## 2024-09-26 PROCEDURE — 700101 HCHG RX REV CODE 250: Performed by: INTERNAL MEDICINE

## 2024-09-26 PROCEDURE — 96375 TX/PRO/DX INJ NEW DRUG ADDON: CPT

## 2024-09-26 PROCEDURE — 49083 ABD PARACENTESIS W/IMAGING: CPT

## 2024-09-26 PROCEDURE — 99406 BEHAV CHNG SMOKING 3-10 MIN: CPT | Performed by: STUDENT IN AN ORGANIZED HEALTH CARE EDUCATION/TRAINING PROGRAM

## 2024-09-26 PROCEDURE — A9270 NON-COVERED ITEM OR SERVICE: HCPCS | Performed by: INTERNAL MEDICINE

## 2024-09-26 PROCEDURE — 84145 PROCALCITONIN (PCT): CPT

## 2024-09-26 RX ORDER — OXYCODONE HYDROCHLORIDE 5 MG/1
5 TABLET ORAL EVERY 4 HOURS PRN
Status: DISCONTINUED | OUTPATIENT
Start: 2024-09-26 | End: 2024-09-30 | Stop reason: HOSPADM

## 2024-09-26 RX ORDER — LIDOCAINE HYDROCHLORIDE 10 MG/ML
INJECTION, SOLUTION EPIDURAL; INFILTRATION; INTRACAUDAL; PERINEURAL
Status: DISPENSED
Start: 2024-09-26 | End: 2024-09-27

## 2024-09-26 RX ORDER — DIPHENHYDRAMINE HCL 25 MG
25 TABLET ORAL EVERY 6 HOURS PRN
Status: DISCONTINUED | OUTPATIENT
Start: 2024-09-26 | End: 2024-09-30 | Stop reason: HOSPADM

## 2024-09-26 RX ORDER — OXYCODONE HYDROCHLORIDE 10 MG/1
10 TABLET ORAL EVERY 4 HOURS PRN
Status: DISCONTINUED | OUTPATIENT
Start: 2024-09-26 | End: 2024-09-30 | Stop reason: HOSPADM

## 2024-09-26 RX ORDER — VITAMIN B COMPLEX
1000 TABLET ORAL DAILY
Status: DISCONTINUED | OUTPATIENT
Start: 2024-09-26 | End: 2024-09-30 | Stop reason: HOSPADM

## 2024-09-26 RX ADMIN — FAMOTIDINE 20 MG: 10 INJECTION, SOLUTION INTRAVENOUS at 05:23

## 2024-09-26 RX ADMIN — OXYCODONE HYDROCHLORIDE 10 MG: 10 TABLET ORAL at 16:57

## 2024-09-26 RX ADMIN — POTASSIUM CHLORIDE AND SODIUM CHLORIDE: 900; 150 INJECTION, SOLUTION INTRAVENOUS at 17:09

## 2024-09-26 RX ADMIN — SUCRALFATE 1 G: 1 SUSPENSION ORAL at 23:25

## 2024-09-26 RX ADMIN — OMEPRAZOLE 20 MG: 20 CAPSULE, DELAYED RELEASE ORAL at 05:22

## 2024-09-26 RX ADMIN — LORAZEPAM 0.5 MG: 2 INJECTION INTRAMUSCULAR; INTRAVENOUS at 02:31

## 2024-09-26 RX ADMIN — FUROSEMIDE 20 MG: 20 TABLET ORAL at 05:23

## 2024-09-26 RX ADMIN — METRONIDAZOLE 500 MG: 500 INJECTION, SOLUTION INTRAVENOUS at 05:20

## 2024-09-26 RX ADMIN — CEFTRIAXONE SODIUM 2000 MG: 10 INJECTION, POWDER, FOR SOLUTION INTRAVENOUS at 05:16

## 2024-09-26 RX ADMIN — LACTULOSE 30 ML: 10 SOLUTION ORAL at 16:56

## 2024-09-26 RX ADMIN — ENOXAPARIN SODIUM 40 MG: 100 INJECTION SUBCUTANEOUS at 16:56

## 2024-09-26 RX ADMIN — SUCRALFATE 1 G: 1 SUSPENSION ORAL at 12:09

## 2024-09-26 RX ADMIN — LACTULOSE 30 ML: 10 SOLUTION ORAL at 12:09

## 2024-09-26 RX ADMIN — OMEPRAZOLE 20 MG: 20 CAPSULE, DELAYED RELEASE ORAL at 16:57

## 2024-09-26 RX ADMIN — SUCRALFATE 1 G: 1 SUSPENSION ORAL at 16:56

## 2024-09-26 RX ADMIN — POTASSIUM CHLORIDE AND SODIUM CHLORIDE: 900; 150 INJECTION, SOLUTION INTRAVENOUS at 00:51

## 2024-09-26 RX ADMIN — LACTULOSE 30 ML: 10 SOLUTION ORAL at 05:22

## 2024-09-26 RX ADMIN — SPIRONOLACTONE 150 MG: 25 TABLET ORAL at 05:21

## 2024-09-26 RX ADMIN — SUCRALFATE 1 G: 1 SUSPENSION ORAL at 05:22

## 2024-09-26 RX ADMIN — METRONIDAZOLE 500 MG: 500 INJECTION, SOLUTION INTRAVENOUS at 17:11

## 2024-09-26 RX ADMIN — FAMOTIDINE 20 MG: 10 INJECTION, SOLUTION INTRAVENOUS at 16:57

## 2024-09-26 RX ADMIN — Medication 1000 UNITS: at 08:13

## 2024-09-26 ASSESSMENT — PAIN DESCRIPTION - PAIN TYPE
TYPE: ACUTE PAIN

## 2024-09-26 ASSESSMENT — ENCOUNTER SYMPTOMS: ABDOMINAL PAIN: 1

## 2024-09-26 NOTE — PROGRESS NOTES
Patient arrived to the floor on a gurney. She walked to the restroom and to the bed. Patient is Aox4, RA, and nauseous.

## 2024-09-26 NOTE — ASSESSMENT & PLAN NOTE
With portal hypertension, ascites on CT imaging  Maddrey score 39.7 poor prognosis and may benefit from glucocorticoid therapy.  Child-Garza score 10 points (life expectancy 1 to 3 years)  Continue spironolactone (100 > 150), Lasix (40 >60), lactulose, prednisone.    Patient to likely benefit from nonselective beta-blocker, however the patient's blood pressure is 100 systolic this morning.  Will add nonselective beta-blocker as patient is able to tolerate.  No indication for urgent TIPS per GI.  High-protein, low-sodium diet  Patient undergo paracentesis prior to discharge.  Patient desires liver transplant longitudinally

## 2024-09-26 NOTE — CARE PLAN
Pt AO x 4.  Pt denies pain coverage needed during initial assessment.  Call light and belongings within reach.  Bed locked and in lowest position.  Hourly rounding.  Needs currently met.     The patient is Stable - Low risk of patient condition declining or worsening    Shift Goals  Clinical Goals: pain and nausea control, safety  Patient Goals: comfort, pain and nausea control  Family Goals: pratibha    Progress made toward(s) clinical / shift goals:      Problem: Pain - Standard  Goal: Alleviation of pain or a reduction in pain to the patient’s comfort goal  Outcome: Progressing     Problem: Knowledge Deficit - Standard  Goal: Patient and family/care givers will demonstrate understanding of plan of care, disease process/condition, diagnostic tests and medications  Outcome: Progressing     Problem: Psychosocial  Goal: Patient's ability to identify and develop effective coping behaviors will improve  Outcome: Progressing     Problem: Communication  Goal: The ability to communicate needs accurately and effectively will improve  Outcome: Progressing     Problem: Safety  Goal: Will remain free from injury  Outcome: Progressing       Patient is not progressing towards the following goals:

## 2024-09-26 NOTE — DIETARY
"Nutrition Services: Initial Assessment     Day 1 of admit. Kavita Freeman is a 59 y.o. female with admitting DX of Pancreatitis, unspecified pancreatitis type [K85.90]     Consult received for MST score >/= 3, unintentional weight loss, poor appetite.     Nutrition Assessment:      Height: 172.7 cm (5' 8\")  Weight: 67.3 kg (148 lb 5.9 oz)  Weight taken via bed scale  Body mass index is 22.56 kg/m². BMI classification: Normal.  Wt Readings from Last 6 Encounters:   09/25/24 67.3 kg (148 lb 5.9 oz)   09/12/24 90.3 kg (199 lb)   08/28/24 85.7 kg (189 lb)   08/17/24 73.5 kg (162 lb 0.6 oz)   06/19/24 83.6 kg (184 lb 4.9 oz)   05/06/24 78 kg (172 lb)      Objective:  Pertinent medical hx:   Past Medical History:   Diagnosis Date    Acute cystitis with hematuria 09/21/2016    Acute kidney injury (HCC) 06/14/2024    Allergy     Anxiety     Arrhythmia     for tachycardia    Back pain     Depression     Treated by Dr Karla Whatley    Depression     GIB (gastrointestinal bleeding) 08/14/2024    Hyperlipidemia     Hypertension     Hypomagnesemia 08/14/2024    Hypotension 07/15/2016    Indigestion     Lightheadedness 12/17/2015    Necrotizing fasciitis ([facility]) 06/14/2024    Other chest pain 12/17/2015    Pain 02/24/2015    right shoulder 6/10    Perirectal abscess 06/14/2024    PSVT (paroxysmal supraventricular tachycardia) ([facility]) 06/24/2013    Psychiatric disorder     anxiety     Septic shock ([facility]) 06/14/2024    Thrombocytopenia ([facility]) 06/27/2024    Tobacco abuse 05/03/2013    URI (upper respiratory infection) 09/21/2013    Urinary tract infection, site not specified     Recurrent UTI's    UTI (lower urinary tract infection) 09/21/2013    Wound check, abscess 06/27/2024     Pertinent labs:   Lab Results   Component Value Date/Time    SODIUM 134 (L) 09/26/2024 02:23 AM    POTASSIUM 4.1 09/26/2024 02:23 AM    CO2 19 (L) 09/26/2024 02:23 AM    CHLORIDE 102 09/26/2024 02:23 AM    BUN 9 09/26/2024 02:23 AM    CREATININE 0.98 " 09/26/2024 02:23 AM    CREATININE 0.8 08/07/2008 05:00 AM    CALCIUM 9.1 09/26/2024 02:23 AM    PHOSPHORUS 2.5 08/19/2024 09:05 AM    MAGNESIUM 1.5 09/25/2024 09:11 AM    GLUCOSE 111 (H) 09/26/2024 02:23 AM      Pertinent meds:   Scheduled Medications   Medication Dose Frequency    vitamin D3  1,000 Units DAILY    enoxaparin (LOVENOX) injection  40 mg DAILY AT 1800    senna-docusate  2 Tablet Q EVENING    famotidine  20 mg BID    furosemide  20 mg DAILY    lactulose  30 mL TID    omeprazole  20 mg BID    sucralfate  1 g Q6HRS    spironolactone  150 mg DAILY    cefTRIAXone (ROCEPHIN) IV  2,000 mg Q24HRS    metroNIDAZOLE (FLAGYL) IV  500 mg Q12HRS    nicotine  21 mg Daily-0600      Skin/wounds: none documented    Food Allergies: none documented  Last BM: 09/25/24 (PTA) per flowsheets     Current diet order:   Clear liquids. PO intake not yet documented in ADLs.     RD visited with patient via beside. Patient reported experiencing nausea and poor appetite over the past two weeks, without episodes of vomiting or diarrhea, though pt experienced dry heaving one week prior to admission. Patient also reported weight loss over past few months but was unable to specify an amount, nothing that her clothes have been fitting looser. Patient stated she stopped consuming beer and wine in January 2024 and noticed a decrease in appetite since then, resulting in reduced food intake. Pt drinks two Ensures daily at home and is willing to to try Ensure Clear TID. Additionally, pt expressed difficulty with eating due to uncertainty about low-sodium food options for managing cirrhosis. Patient was not feeling well during visit and requested a handout on low-sodium foods for discharge.       Subjective:   Patient reported UBW: Pt unsure    Dietary recall/energy intake: <75% of meals x 1 month. This indicates insufficient energy intake.     Nutrition Focused Physical Exam (NFPE)   Weight loss: -6.2 kg (8%) x 1 month. RD suspects this change  related to fluid accumulation.  Muscle mass: Moderate loss of temporal, dorsal hand, and clavicle.  Subcutaneous fat: Moderate loss orbital.   Fluid Accumulation: none documented  Reduced  Strength: N/A in acute care setting.     Nutrition Diagnosis:      Moderate malnutrition in the context of chronic illness related to cirrhosis as evidenced by <75% of estimated energy requirements for >1 month, moderate muscle loss (temporal, dorsal hand, and clavicle) and mild subcutaneous fat loss (orbital).     Will Voalte MD.     Nutrition Interventions:      Encourage >50% of meals.   Recommend Ensure Clear (provides 240 calories, 8 g protein per 8 fl oz) TID.  Document meals as % intake in ADLs'.   Patient aware of active plan of care as appropriate.     Nutrition Monitoring and Evaluation:     Monitor nutrition POC  Additional fluids per MD/DO  Monitor vital signs pertinent to nutrition       RD following and will provide updated recommendations as indicated.

## 2024-09-26 NOTE — DISCHARGE PLANNING
Patient rolled back to observation/outpatient status per attending   MD determination (Dr. Crenshaw) and UR committee MD secondary review (Dr. Gray).   Status Update completed.

## 2024-09-26 NOTE — DISCHARGE INSTR - DIET
Cirrhosis Nutrition Therapy (2021)    Cirrhosis is the result of scarring in your liver. The liver is important because it converts the food you eat into energy and helps to filter  out waste products. Symptoms of liver disease can include decreased appetite, feeling full quickly, and unwanted weight loss. Nutrition  and physical activity will both be important in helping you maintain your muscle mass and strength.  Eating a diet that is high in calories and protein will help prevent loss of muscle and strength. Your body can become undernourished  quickly when you have cirrhosis, so it is very important to avoid going long periods of time without eating.  Tips  The most important goal is to meet your calorie and protein needs.  You should aim to eat __________ calories each day.  You should aim to eat __________ grams of protein each day.  Eat 4-6 times per day or every 1-2 hours.  Always include a snack before bedtime and eat in the morning right away when you wake up.  Add protein to your diet. Some strategies:  Eat protein from a variety of sources with each meal or snack, such as dairy, eggs, beans/legumes, nuts/nut-butters, soy  products, and meat/poultry/fish.  Choose dairy or fortified soy products to help you meet your protein needs.  Eat high-calorie/high-protein supplements like protein bars or shakes.  Try to include foods that will increase your fiber intake.  Aim to eat at least 5 servings of vegetables each day.  Choose whole grains (such as wheat bread, quinoa, oats, brown rice) more often.  Limit your salt intake to less than 2000 milligrams per day if you are retaining fluid in your arms, legs, feet, and/or stomach.   Restricting salt intake is less of a concern if you aren’t getting enough calories and protein. Your registered dietitian nutritionist  (RDN) will provide you with information on how to lower your salt intake if needed.   Stay as active as possible to prevent loss of muscle and  strength. Go for a walk or participate in exercises your health care  provider has recommended.  Foodborne illnesses may cause severe infections in people with cirrhosis. Use the Food Safety Nutrition Therapy for help with safe  food handling.

## 2024-09-26 NOTE — ED NOTES
Pt. Medicated per MAR for continued nausea/dry-heaving.  Pt. Reports that her pain remains tolerable at a 6/10.

## 2024-09-26 NOTE — H&P
Hospital Medicine History & Physical Note    Date of Service  9/25/2024    Primary Care Physician  Galilea Vaca D.O.      Code Status  Full Code    Chief Complaint  Chief Complaint   Patient presents with    Abdominal Pain    N/V       History of Presenting Illness  Kavita Freeman is a 59 y.o. female with history of alcoholic liver cirrhosis with portal hypertension, ascites, hepatic encephalopathy, GI bleed in August 2024 requiring RBC transfusion, perirectal abscess status post I&D, anxiety and depression, paroxysmal SVT, necrotizing fasciitis, who presented 9/25/2024 with complaining of sudden onset of diffuse abdominal pain, that developed 1 hour after large-volume scheduled therapeutic paracentesis almost 10 L at IR clinic earlier this morning, associated with nausea and vomiting.  Vital stable, afebrile, on room air  No WBC elevation noted in CBC.  Hemoglobin 9.8, down from 10.8 in the morning earlier today.  Chemistry showed acidosis with bicarb 14, anion gap 21, total bilirubin 3.4 which is about baseline, lipase is elevated at 187, troponin 30, EKG showed sinus rhythm heart rate 87 without acute T/ST changes.  Patient further was evaluated with CT of the abdomen pelvis with contrast that did not reveal acute abnormalities.  There is still moderate scattered ascites.  No gallstone, no biliary dilation.  Mild colonic wall thickening due to portal hypertension versus colitis.  Patient states she had alcohol drink 2 months ago last time    On my exam patient has rebound tenderness diffusely, therefore I concerned about possible iatrogenic peritonitis, will order diagnostic paracentesis and will start ceftriaxone/Flagyl    I discussed the plan of care with patient and ERP .    Review of Systems  Review of Systems   Constitutional:  Negative for chills, fever and weight loss.   HENT:  Negative for ear pain, hearing loss and tinnitus.    Eyes:  Negative for blurred vision, double vision and photophobia.    Respiratory:  Negative for cough, hemoptysis and sputum production.    Cardiovascular:  Negative for chest pain, palpitations and orthopnea.   Gastrointestinal:  Positive for nausea and vomiting. Negative for heartburn.   Genitourinary:  Negative for dysuria, flank pain, frequency and hematuria.   Musculoskeletal:  Negative for back pain, joint pain and neck pain.   Skin:  Negative for itching and rash.   Neurological:  Negative for tremors, speech change, focal weakness and headaches.   Endo/Heme/Allergies:  Negative for environmental allergies and polydipsia. Does not bruise/bleed easily.   Psychiatric/Behavioral:  Negative for hallucinations and substance abuse. The patient is not nervous/anxious.        Past Medical History   has a past medical history of Acute cystitis with hematuria (09/21/2016), Acute kidney injury ([facility]) (06/14/2024), Allergy, Anxiety, Arrhythmia, Back pain, Depression, Depression, GIB (gastrointestinal bleeding) (08/14/2024), Hyperlipidemia, Hypertension, Hypomagnesemia (08/14/2024), Hypotension (07/15/2016), Indigestion, Lightheadedness (12/17/2015), Necrotizing fasciitis ([facility]) (06/14/2024), Other chest pain (12/17/2015), Pain (02/24/2015), Perirectal abscess (06/14/2024), PSVT (paroxysmal supraventricular tachycardia) ([facility]) (06/24/2013), Psychiatric disorder, Septic shock ([facility]) (06/14/2024), Thrombocytopenia ([facility]) (06/27/2024), Tobacco abuse (05/03/2013), URI (upper respiratory infection) (09/21/2013), Urinary tract infection, site not specified, UTI (lower urinary tract infection) (09/21/2013), and Wound check, abscess (06/27/2024).    Surgical History   has a past surgical history that includes breast biopsy (7/18/08); shoulder arthroscopy (3/23/2009); tubal ligation; gyn surgery (1995); shoulder arthroscopy (2005); other (6/2014); shoulder arthroscopy w/ rotator cuff repair (3/9/2015); trigger finger release (3/9/2015); open reduction; pr i&d perirectal abscess (6/14/2024); pr i&d  perirectal abscess (N/A, 6/17/2024); pr upper gi endoscopy,diagnosis (N/A, 8/15/2024); and pr upper gi endoscopy,ligat varix (N/A, 8/15/2024).     Family History  family history includes Cancer in her paternal grandmother; Hypertension in her brother, father, and mother; Stroke in her mother.   Family history reviewed with patient. There is no family history that is pertinent to the chief complaint.     Social History   reports that she has been smoking cigarettes. She started smoking about 30 years ago. She has a 12.5 pack-year smoking history. She has never used smokeless tobacco. She reports current drug use. Drug: Inhaled. She reports that she does not drink alcohol.    Allergies  Allergies   Allergen Reactions    Nitrofurantoin Vomiting    Sulfamethoxazole W-Trimethoprim Vomiting       Medications  Prior to Admission Medications   Prescriptions Last Dose Informant Patient Reported? Taking?   acetaminophen (TYLENOL) 325 MG Tab   No No   Sig: Take 2 Tablets by mouth every 6 hours as needed for Mild Pain, Moderate Pain or Fever.   Patient not taking: Reported on 8/28/2024   furosemide (LASIX) 20 MG Tab   No No   Sig: Take 1 Tablet by mouth every day.   lactulose 20 GM/30ML Solution   No No   Sig: Take 30 mL by mouth 3 times a day.   omeprazole (PRILOSEC) 20 MG delayed-release capsule   No No   Sig: Take 1 Capsule by mouth 2 times a day.   spironolactone (ALDACTONE) 100 MG Tab   No No   Sig: Take 1 Tablet by mouth every day.   sucralfate (CARAFATE) 1 GM/10ML Suspension   No No   Sig: Take 10 mL by mouth every 6 hours.      Facility-Administered Medications: None       Physical Exam  Temp:  [36.1 °C (97 °F)] 36.1 °C (97 °F)  Pulse:  [83-89] 87  Resp:  [20-23] 22  BP: (150-169)/(61-82) 155/68  SpO2:  [98 %-99 %] 99 %  Blood Pressure: (!) 155/68   Temperature: 36.1 °C (97 °F)   Pulse: 87   Respiration: (!) 22   Pulse Oximetry: 99 %       Physical Exam  Vitals and nursing note reviewed.   Constitutional:       General:  "She is not in acute distress.     Appearance: Normal appearance.   HENT:      Head: Normocephalic and atraumatic.      Nose: Nose normal.      Mouth/Throat:      Mouth: Mucous membranes are moist.   Eyes:      Extraocular Movements: Extraocular movements intact.      Pupils: Pupils are equal, round, and reactive to light.   Cardiovascular:      Rate and Rhythm: Normal rate and regular rhythm.   Pulmonary:      Effort: Pulmonary effort is normal.      Breath sounds: Normal breath sounds.   Abdominal:      General: Abdomen is flat. There is no distension.      Tenderness: There is generalized abdominal tenderness. There is rebound. There is no guarding.   Musculoskeletal:         General: No swelling or deformity. Normal range of motion.      Cervical back: Normal range of motion and neck supple.   Skin:     General: Skin is warm and dry.   Neurological:      General: No focal deficit present.      Mental Status: She is alert and oriented to person, place, and time.   Psychiatric:         Mood and Affect: Mood normal.         Behavior: Behavior normal.         Laboratory:  Recent Labs     09/25/24  0911 09/25/24  1823   WBC 7.4 6.9   RBC 3.55* 3.21*   HEMOGLOBIN 10.8* 9.8*   HEMATOCRIT 33.0* 29.2*   MCV 93.0 91.0   MCH 30.4 30.5   MCHC 32.7 33.6   RDW 69.2* 66.1*   PLATELETCT 196 152*   MPV 9.8 9.0     Recent Labs     09/25/24  0911 09/25/24  1823   SODIUM 136  134* 134*   POTASSIUM 3.9  3.8 3.6   CHLORIDE 102  100 99   CO2 17*  17* 14*   GLUCOSE 110*  108* 100*   BUN 11  11 10   CREATININE 1.25  1.13 1.00   CALCIUM 9.1  9.5 9.8     Recent Labs     09/25/24  0911 09/25/24  1823   ALTSGPT 29  29 23   ASTSGOT 56*  55* 52*   ALKPHOSPHAT 93  97 89   TBILIRUBIN 3.6*  3.4* 3.4*   DBILIRUBIN 1.4*  --    LIPASE  --  187*   GLUCOSE 110*  108* 100*     Recent Labs     09/25/24  0911   INR 1.69*     No results for input(s): \"NTPROBNP\" in the last 72 hours.      Recent Labs     09/25/24  1841   TROPONINT 30* "       Imaging:  CT-ABDOMEN-PELVIS WITH   Final Result         1.  Mild colonic wall thickening from the cecum to the hepatic flexure, can be associated with changes of portal hypertension, consider component of colitis.   2.  Hepatomegaly and changes of cirrhosis   3.  Atherosclerosis and atherosclerotic coronary artery disease   4.  Trace right pleural effusion   5.  Small pericardial effusion   6.  Moderate scattered abdominal ascites            Assessment/Plan:  Justification for Admission Status  I anticipate this patient will require at least two midnights for appropriate medical management, necessitating inpatient admission because pancreatitis    Patient will need a Med/Surg bed on MEDICAL service .  The need is secondary to pancreatitis.    * Pancreatitis, unspecified pancreatitis type- (present on admission)  Assessment & Plan  Etiology is unclear.  Denies recent alcohol use.  CT of the abdomen negative for gallstones or biliary dilation  Will treat supportively with IV fluids, clear liquid diet as tolerated and antiacids  Oxycodone, IV morphine as needed    AP (abdominal pain)  Assessment & Plan  Due to rebound tenderness on exam will start empiric antibiotics ceftriaxone and Flagyl and order diagnostic paracentesis to rule out iatrogenic peritonitis in this case  Pain control  IV fluid support    Anemia- (present on admission)  Assessment & Plan  Probably combination of iron deficiency and anemia of chronic disease  Hemoglobin 9.8, dropped from 10.8 in the morning before paracentesis  History of GI bleed in August 2024 noted  At this time no signs of bleeding observed  Repeat CBC        Hepatic encephalopathy (HCC)- (present on admission)  Assessment & Plan  Resume lactulose    Metabolic acidosis- (present on admission)  Assessment & Plan  Bicarb is only 14 with anion gap 21.  Will check lactic acid, BHB    Prolonged QT interval- (present on admission)  Assessment & Plan  QTc 520  Will use Ativan for  nausea and vomiting    Coagulopathy (HCC)- (present on admission)  Assessment & Plan  INR 1.69    Alcoholic cirrhosis of liver with ascites (HCC)- (present on admission)  Assessment & Plan  With portal hypertension, ascites, hepatic encephalopathy  Continue spironolactone, Lasix, lactulose  Encourage sobriety        VTE prophylaxis: enoxaparin ppx

## 2024-09-26 NOTE — ED NOTES
Pt. Medicated per mar and assisted to position of comfort.  Denies any further needs at this time.  Call light in reach.  Safety measures maintained.

## 2024-09-26 NOTE — ASSESSMENT & PLAN NOTE
Anticipate related to ascites vs abdominal wall soreness after undergoing multiple paracentesis  Multimodal pain management

## 2024-09-26 NOTE — DISCHARGE PLANNING
@1335 Code 44 Form 2 was delivered to patient, and an explanation was provided. An opportunity for questions was provided and answered.

## 2024-09-26 NOTE — PROGRESS NOTES
[facility] Medicine Daily Progress Note    Date of Service  9/26/2024    Chief Complaint  Kavita Freeman is a 59 y.o. female with a past medical history significant for alcohol use disorder, liver cirrhosis with portal hypertension, ascites, hepatic encephalopathy, and GIB (s/p RBC transfusion 8/24), perirectal abscess (s/p I&D), MDD, BINDU, SVT, h/o necrotizing fasciitis admitted 9/25/2024 with abdominal pain following a large-volume scheduled therapeutic paracentesis (approximately 10 L) at [facility].    Hospital Course  9/25: Patient presented to the emergency department after undergoing large-volume therapeutic centesis (nearly 10 L) in [facility] this morning.  Diagnostic paracentesis pending.  on C3 Flagyl for concern for SBP.    Interval Problem Update  No leukocytosis  Hemoglobin/hematocrit 9.8/29.2 > 9.4/28.5  Platelets 152 > 162 sed rate 39  9/25 CMP sodium 134, bicarb 14, anion gap 21, glucose 100, AST 52  Lipase 27  Lactic acid 3.7  Protein 30  Beta hydroxybutyric acid 0.21  Urinalysis reveals specific gravity of greater than or equal to 1.045, otherwise negative  Vitamin D 17, low  Ferritin 379, high  Vitamin B12 greater than 4000  Hepatitis B antibody less than 3.5  Hepatitis B core antibody nonreactive  CRP 5.45  Respiratory viral panel negative  9/25 CT abdomen pelvis revealed mild colonic wall thickening from cecum to hepatic flexure which may be associated with changes of portal hypertension the colitis.  Hepatomegaly and changes of cirrhosis.  Atherosclerosis.  Trace right pleural effusion.  Small pericardial effusion.  Moderate scattered abdominal ascites.  Tolerating oral intake  Previous bowel movement 9/25    The patient reports that she has not consumed any alcohol for multiple months.  She does not have a known history of pancreatitis.  She reports that the 10 L of fluid removed is significantly more than previous outpatient paracentesis.  She says she usually gets albumin following these  paracentesis and she did again this time as well.  She did report some abdominal pain following the paracentesis which is abnormal for her.  She said this happened 1 other time and it resolved without any medical intervention.    I have discussed this patient's plan of care and discharge plan at IDT rounds today with Case Management, Nursing, Nursing leadership, and other members of the IDT team.    Consultants/Specialty  IR    Code Status  Full Code    Disposition    Anticipate discharge to: home with close outpatient follow-up    I have placed the appropriate orders for post-discharge needs.    Review of Systems  Review of Systems   Gastrointestinal:  Positive for abdominal pain.        Physical Exam  Temp:  [36.1 °C (97 °F)-37 °C (98.6 °F)] 37 °C (98.6 °F)  Pulse:  [70-89] 70  Resp:  [16-24] 18  BP: (111-169)/(55-82) 135/73  SpO2:  [94 %-99 %] 95 %    Physical Exam  Constitutional:       Appearance: She is normal weight.   Eyes:      Extraocular Movements: Extraocular movements intact.   Pulmonary:      Effort: No respiratory distress.      Breath sounds: Normal breath sounds.   Abdominal:      General: There is distension.      Palpations: Abdomen is soft.   Skin:     General: Skin is warm and dry.      Capillary Refill: Capillary refill takes less than 2 seconds.   Neurological:      General: No focal deficit present.      Mental Status: She is alert and oriented to person, place, and time.         Fluids    Intake/Output Summary (Last 24 hours) at 9/26/2024 1716  Last data filed at 9/26/2024 1700  Gross per 24 hour   Intake 1400 ml   Output --   Net 1400 ml        Laboratory  Recent Labs     09/25/24  0911 09/25/24  1823 09/26/24  0223   WBC 7.4 6.9 8.4   RBC 3.55* 3.21* 3.14*   HEMOGLOBIN 10.8* 9.8* 9.4*   HEMATOCRIT 33.0* 29.2* 28.5*   MCV 93.0 91.0 90.8   MCH 30.4 30.5 29.9   MCHC 32.7 33.6 33.0   RDW 69.2* 66.1* 67.1*   PLATELETCT 196 152* 162*   MPV 9.8 9.0 9.3     Recent Labs     09/25/24  0911  "09/25/24  1823 09/26/24  0223   SODIUM 136  134* 134* 134*   POTASSIUM 3.9  3.8 3.6 4.1   CHLORIDE 102  100 99 102   CO2 17*  17* 14* 19*   GLUCOSE 110*  108* 100* 111*   BUN 11  11 10 9   CREATININE 1.25  1.13 1.00 0.98   CALCIUM 9.1  9.5 9.8 9.1     Recent Labs     09/25/24  0911   INR 1.69*         Recent Labs     09/26/24  0223   TRIGLYCERIDE 69   HDL 40   LDL 63       Imaging  CT-ABDOMEN-PELVIS WITH   Final Result         1.  Mild colonic wall thickening from the cecum to the hepatic flexure, can be associated with changes of portal hypertension, consider component of colitis.   2.  Hepatomegaly and changes of cirrhosis   3.  Atherosclerosis and atherosclerotic coronary artery disease   4.  Trace right pleural effusion   5.  Small pericardial effusion   6.  Moderate scattered abdominal ascites      US-PARACENTESIS, ABD WITH IMAGING    (Results Pending)        Assessment/Plan  Kavita Freeman is a 59 y.o. female with a past medical history significant for alcohol use disorder, liver cirrhosis with portal hypertension, ascites, hepatic encephalopathy, and GIB (s/p RBC transfusion 8/24), perirectal abscess (s/p I&D), MDD, BINDU, SVT, h/o necrotizing fasciitis admitted 9/25/2024 with abdominal pain following a large-volume scheduled therapeutic paracentesis (approximately 10 L) at [facility].    * Pancreatitis, unspecified pancreatitis type- (present on admission)  Assessment & Plan  CT abdomen pelvis revealed \"unremarkable\" pancreas  No evidence of gallstones or biliary dilatation  Lipid profile pending  IV fluids  Multimodal pain control  Clear liquid diet, advance as tolerated      AP (abdominal pain)  Assessment & Plan  Concern for iatrogenic peritonitis versus SBP versus pancreatitis versus colitis  Diagnostic paracentesis pending  On ceftriaxone and Flagyl  Multimodal pain control  Clear liquid diet  IVF    Alcoholic cirrhosis of liver with ascites (HCC)- (present on admission)  Assessment & " Plan  With portal hypertension, ascites, hepatic encephalopathy  Continue spironolactone, Lasix, lactulose    Prolonged QT interval- (present on admission)  Assessment & Plan  Ativan and Benadryl for antiemetic properties    Anemia- (present on admission)  Assessment & Plan  History of GI bleed in August 2024  No evidence of bleeding at this time  Hemodynamically stable  H&H stable  Monitor hemoglobin with CBC        Hepatic encephalopathy (HCC)- (present on admission)  Assessment & Plan  Resume lactulose  Titrate to 3 stools per day    Metabolic acidosis- (present on admission)  Assessment & Plan  Lactic acid 3.7  Beta hydroxybutyric acid 0.21  A1c pending  Continue IV fluid    Coagulopathy (HCC)- (present on admission)  Assessment & Plan  INR 1.69    Smoking- (present on admission)  Assessment & Plan  Tobacco cessation encouraged  Continue NicoDerm patches  I spent approximately 4 minutes discussing tobacco cessation and evidence-based cessation products with her.    Vitamin D deficiency- (present on admission)  Assessment & Plan  Present  Vitamin D replacement initiated         VTE prophylaxis:    heparin ppx      I have performed a physical exam and reviewed and updated ROS and Plan today (9/26/2024). In review of yesterday's note (9/25/2024), there are no changes except as documented above.

## 2024-09-26 NOTE — ED TRIAGE NOTES
Kavita Freeman  59 y.o. female    Chief Complaint   Patient presents with    Abdominal Pain    N/V     Pt states had a paracentesis at 10am this morning, states since then has had diffuse abd pain, nausea and vomiting, states dry heaving now. Denies feeling like this after previous paracentesis, states they did take off more than normal.     Vitals:    09/25/24 1805   BP: (!) 169/82   Pulse: 88   Resp: 20   Temp: 36.1 °C (97 °F)   SpO2: 98%       Triage process explained to patient, apologized for wait time, and returned to lobby.  Pt informed to notify staff of any change in condition.

## 2024-09-26 NOTE — PROGRESS NOTES
4 Eyes Skin Assessment Completed by KAYLEY SUAREZ and KAYLEY ARTEAGA.    Head WDL  Ears WDL  Nose WDL  Mouth WDL  Neck WDL  Breast/Chest WDL  Shoulder Blades WDL  Spine WDL  (R) Arm/Elbow/Hand WDL  (L) Arm/Elbow/Hand WDL  Abdomen covered Incision LLQ from paracentesis  Groin WDL  Scrotum/Coccyx/Buttocks WDL  (R) Leg dry and discolored   (L) Leg dry and discolored  (R) Heel/Foot/Toe dry, flaky - scab on ankle   (L) Heel/Foot/Toe dry, flaky                         Devices In Places PIV      Interventions In Place N/A    Possible Skin Injury No    Pictures Uploaded Into Epic Yes  Wound Consult Placed N/A  RN Wound Prevention Protocol Ordered No

## 2024-09-26 NOTE — ED PROVIDER NOTES
ED Provider Note    Primary care provider: Galilea Vaca D.O.    CHIEF COMPLAINT  Chief Complaint   Patient presents with    Abdominal Pain    N/V       HPI  Kavita Freeman is a 59 y.o. female who presents to the Emergency Department nausea, vomiting and abdominal pain.  The patient states that she did not really feel well this morning, did have some nausea and vomiting.  Went into IR for her regularly scheduled paracentesis, had almost 10 L removed which is much more than she usually has taken off, roughly 30 minutes after this she started developing some abdominal pain.  The vomiting has also persisted.  Denies any fevers, chills, chest pain, shortness of breath.      External Record Review: Patient underwent ultrasound-guided paracentesis by interventional radiology at 10 AM today.  They removed 9920 milliliters, she received 62.5 g of albumin.    Patient was hospitalized here in August of this year with a GI bleed.  Recent diagnosis of cirrhosis.  She had a hemoglobin of 5, received multiple transfusions, EGD showed esophageal varices which were banded.  She also had a perirectal abscess that was treated with I&D.    REVIEW OF SYSTEMS  See HPI.     PAST MEDICAL HISTORY   has a past medical history of Acute cystitis with hematuria (09/21/2016), Acute kidney injury (HCC) (06/14/2024), Allergy, Anxiety, Arrhythmia, Back pain, Depression, Depression, GIB (gastrointestinal bleeding) (08/14/2024), Hyperlipidemia, Hypertension, Hypomagnesemia (08/14/2024), Hypotension (07/15/2016), Indigestion, Lightheadedness (12/17/2015), Necrotizing fasciitis ([facility]) (06/14/2024), Other chest pain (12/17/2015), Pain (02/24/2015), Perirectal abscess (06/14/2024), PSVT (paroxysmal supraventricular tachycardia) ([facility]) (06/24/2013), Psychiatric disorder, Septic shock ([facility]) (06/14/2024), Thrombocytopenia ([facility]) (06/27/2024), Tobacco abuse (05/03/2013), URI (upper respiratory infection) (09/21/2013), Urinary tract infection, site not  "specified, UTI (lower urinary tract infection) (2013), and Wound check, abscess (2024).    SURGICAL HISTORY   has a past surgical history that includes breast biopsy (08); shoulder arthroscopy (3/23/2009); tubal ligation; gyn surgery (); shoulder arthroscopy (); other (2014); shoulder arthroscopy w/ rotator cuff repair (3/9/2015); trigger finger release (3/9/2015); open reduction; i&d perirectal abscess (2024); i&d perirectal abscess (N/A, 2024); upper gi endoscopy,diagnosis (N/A, 8/15/2024); and upper gi endoscopy,ligat varix (N/A, 8/15/2024).    SOCIAL HISTORY  Social History     Tobacco Use    Smoking status: Every Day     Current packs/day: 0.00     Average packs/day: 0.5 packs/day for 25.0 years (12.5 ttl pk-yrs)     Types: Cigarettes     Start date: 1994     Last attempt to quit: 2016     Years since quittin.3    Smokeless tobacco: Never    Tobacco comments:     1-2 cigarettes a day   Vaping Use    Vaping status: Never Used   Substance Use Topics    Alcohol use: No     Alcohol/week: 1.5 - 2.0 oz     Types: 3 - 4 Cans of beer per week    Drug use: Yes     Types: Inhaled     Comment: marijuana      Social History     Substance and Sexual Activity   Drug Use Yes    Types: Inhaled    Comment: marijuana       FAMILY HISTORY  Family History   Problem Relation Age of Onset    Hypertension Mother     Stroke Mother     Hypertension Father         ? valvular heart     Hypertension Brother     Cancer Paternal Grandmother         breast cancer       CURRENT MEDICATIONS  Reviewed.  See Encounter Summary.     ALLERGIES  Allergies   Allergen Reactions    Nitrofurantoin Vomiting    Sulfamethoxazole W-Trimethoprim Vomiting       PHYSICAL EXAM  VITAL SIGNS: BP (!) 146/65   Pulse 86   Temp 36.1 °C (97 °F) (Temporal)   Resp (!) 24   Ht 1.727 m (5' 8\")   Wt 67.6 kg (149 lb 0.5 oz)   SpO2 96%   BMI 22.66 kg/m²   Constitutional: Awake, alert in no apparent distress.  " Ill-appearing.  HENT: Normocephalic, Bilateral external ears normal. Nose normal.   Eyes: Conjunctiva normal, non-icteric, EOMI.    Thorax & Lungs: Tachypneic, Clear to ascultation bilaterally.  Cardiovascular: Regular rate, Regular rhythm, No murmurs, rubs or gallops. Bilateral pulses symmetrical.   Abdomen:  Soft, mild diffuse tenderness, nondistended, normal active bowel sounds.  No guarding.  :    Skin: Visualized skin is  Dry, No erythema, No rash.   Musculoskeletal:   No cyanosis, clubbing or edema. No leg asymmetry.   Neurologic: Alert, Grossly non-focal.   Psychiatric: Normal affect, Normal mood  Lymphatic:      EKG   12 lead Interpreted by me  Rhythm:  Normal sinus rhythm   Rate: 88  Axis: normal  Ectopy: none  Conduction: normal  ST Segments: no acute change  T Waves: no acute change  Clinical Impression: Borderline prolonged QT, nonspecific ST changes, otherwise unremarkable EKG without acute ischemic changes       RADIOLOGY  I have independently interpreted the diagnostic imaging associated with this visit and am waiting the final reading from the radiologist.   My preliminary interpretation is as follows: Mild ascites, no signs of inflammation.  No free air.    Radiologist interpretation:   CT-ABDOMEN-PELVIS WITH   Final Result         1.  Mild colonic wall thickening from the cecum to the hepatic flexure, can be associated with changes of portal hypertension, consider component of colitis.   2.  Hepatomegaly and changes of cirrhosis   3.  Atherosclerosis and atherosclerotic coronary artery disease   4.  Trace right pleural effusion   5.  Small pericardial effusion   6.  Moderate scattered abdominal ascites          COURSE & MEDICAL DECISION MAKING  Pertinent Labs & Imaging studies reviewed. (See chart for details)    COURSE & MEDICAL DECISION MAKING  Pertinent Labs & Imaging studies reviewed. (See chart for details)    Differential diagnoses include but are not limited to: Viral syndrome,  perforation, sepsis, ACS, unlikely SBP    6:36 PM - Nursing notes reviewed, patient seen and examined at bedside.    Discussion of management with other medical personnel: Discussed with Dr. Brizuela will evaluate the patient for admission.      Decision tools and prescription drugs considered including, but not limited to: Heart score 2    Decision Making:  This is a pleasant 59 y.o. year old female who presents with nausea, vomiting and abdominal pain.  The patient is status post paracentesis.  CT does not show any complications of the paracentesis, notably no free air, no signs of perforation.  She is not peritonitic on examination.  There is some evidence of colitis, it is possible the patient is having a viral syndrome.  Her lipase also returned elevated today.  From a pancreatitis severity score, its fairly low.  However she has required multiple doses of IV pain medications and antiemetics, at this time she is not stable to go home.    Plan will be to admit her for bowel rest, IV fluids, pain control.  Do not suspect SBP, patient has no SIRS criteria at this time, no significant ascitic fluid after her large-volume paracentesis today.  Also, if there was an infection caused by a large-volume paracentesis, this should take several days to present, not acutely in the same day.    Patient be hospitalized in guarded condition.      FINAL IMPRESSION  1. Acute pancreatitis, unspecified complication status, unspecified pancreatitis type

## 2024-09-26 NOTE — CARE PLAN
The patient is Stable - Low risk of patient condition declining or worsening    Shift Goals  Clinical Goals: Pain and nausea management this shift  Patient Goals: Comfort, pain relief  Family Goals: JESUSITA    Progress made toward(s) clinical / shift goals:    Pt A&O4. Pt c/o mild to moderate abdominal pain this shift. New PRNs added. Safe environment maintained, and pt free from falls or injury this shift. All needs met at this time.     Problem: Pain - Standard  Goal: Alleviation of pain or a reduction in pain to the patient’s comfort goal  Outcome: Progressing     Problem: Knowledge Deficit - Standard  Goal: Patient and family/care givers will demonstrate understanding of plan of care, disease process/condition, diagnostic tests and medications  Outcome: Progressing     Problem: Provide Safe Environment  Goal: Suicide environmental safety, protocols, policies, and practices will be implemented  Outcome: Progressing     Problem: Safety  Goal: Will remain free from injury  Outcome: Progressing       Patient is not progressing towards the following goals:

## 2024-09-26 NOTE — ED NOTES
PT. Returned from CT at this time.  Pt. Reports no improvement in pain after 2nd dose of pain mediation.  ERP aware.  Will continue to monitor pt.

## 2024-09-26 NOTE — ED NOTES
Med rec complete per patient  Allergies reviewed  Outpatient antibiotics in the last 30 days? No   Anticoagulants taken in the last 14 days? No    Crestor discontinued after last hospital visit 9/13/24    Pharm[place] patient utilizes: [facility] [place] [facility]

## 2024-09-27 ENCOUNTER — APPOINTMENT (OUTPATIENT)
Dept: RADIOLOGY | Facility: MEDICAL CENTER | Age: 59
DRG: 432 | End: 2024-09-27
Payer: COMMERCIAL

## 2024-09-27 LAB
ANION GAP SERPL CALC-SCNC: 12 MMOL/L (ref 7–16)
BUN SERPL-MCNC: 5 MG/DL (ref 8–22)
CALCIUM SERPL-MCNC: 8.4 MG/DL (ref 8.5–10.5)
CHLORIDE SERPL-SCNC: 102 MMOL/L (ref 96–112)
CO2 SERPL-SCNC: 16 MMOL/L (ref 20–33)
CREAT SERPL-MCNC: 0.74 MG/DL (ref 0.5–1.4)
EKG IMPRESSION: NORMAL
ERYTHROCYTE [DISTWIDTH] IN BLOOD BY AUTOMATED COUNT: 68.5 FL (ref 35.9–50)
FOLATE RBC-MCNC: 1075 NG/ML
GFR SERPLBLD CREATININE-BSD FMLA CKD-EPI: 94 ML/MIN/1.73 M 2
GLUCOSE SERPL-MCNC: 75 MG/DL (ref 65–99)
HAV AB SER QL IA: POSITIVE
HCT VFR BLD AUTO: 27.5 % (ref 37–47)
HGB BLD-MCNC: 8.9 G/DL (ref 12–16)
MCH RBC QN AUTO: 29.9 PG (ref 27–33)
MCHC RBC AUTO-ENTMCNC: 32.4 G/DL (ref 32.2–35.5)
MCV RBC AUTO: 92.3 FL (ref 81.4–97.8)
PLATELET # BLD AUTO: 150 K/UL (ref 164–446)
PMV BLD AUTO: 9.5 FL (ref 9–12.9)
POTASSIUM SERPL-SCNC: 3.8 MMOL/L (ref 3.6–5.5)
RBC # BLD AUTO: 2.98 M/UL (ref 4.2–5.4)
SODIUM SERPL-SCNC: 130 MMOL/L (ref 135–145)
WBC # BLD AUTO: 7.7 K/UL (ref 4.8–10.8)
ZINC SERPL-MCNC: 48.1 UG/DL (ref 60–120)

## 2024-09-27 PROCEDURE — 85027 COMPLETE CBC AUTOMATED: CPT

## 2024-09-27 PROCEDURE — 96366 THER/PROPH/DIAG IV INF ADDON: CPT

## 2024-09-27 PROCEDURE — 71045 X-RAY EXAM CHEST 1 VIEW: CPT

## 2024-09-27 PROCEDURE — A9270 NON-COVERED ITEM OR SERVICE: HCPCS

## 2024-09-27 PROCEDURE — A9270 NON-COVERED ITEM OR SERVICE: HCPCS | Performed by: STUDENT IN AN ORGANIZED HEALTH CARE EDUCATION/TRAINING PROGRAM

## 2024-09-27 PROCEDURE — 96372 THER/PROPH/DIAG INJ SC/IM: CPT

## 2024-09-27 PROCEDURE — 36415 COLL VENOUS BLD VENIPUNCTURE: CPT

## 2024-09-27 PROCEDURE — G0378 HOSPITAL OBSERVATION PER HR: HCPCS

## 2024-09-27 PROCEDURE — 93010 ELECTROCARDIOGRAM REPORT: CPT | Performed by: INTERNAL MEDICINE

## 2024-09-27 PROCEDURE — 700102 HCHG RX REV CODE 250 W/ 637 OVERRIDE(OP): Performed by: INTERNAL MEDICINE

## 2024-09-27 PROCEDURE — 700101 HCHG RX REV CODE 250: Performed by: INTERNAL MEDICINE

## 2024-09-27 PROCEDURE — 96376 TX/PRO/DX INJ SAME DRUG ADON: CPT

## 2024-09-27 PROCEDURE — 80048 BASIC METABOLIC PNL TOTAL CA: CPT

## 2024-09-27 PROCEDURE — 93005 ELECTROCARDIOGRAM TRACING: CPT

## 2024-09-27 PROCEDURE — 700111 HCHG RX REV CODE 636 W/ 250 OVERRIDE (IP): Performed by: STUDENT IN AN ORGANIZED HEALTH CARE EDUCATION/TRAINING PROGRAM

## 2024-09-27 PROCEDURE — 99233 SBSQ HOSP IP/OBS HIGH 50: CPT | Performed by: STUDENT IN AN ORGANIZED HEALTH CARE EDUCATION/TRAINING PROGRAM

## 2024-09-27 PROCEDURE — 700102 HCHG RX REV CODE 250 W/ 637 OVERRIDE(OP)

## 2024-09-27 PROCEDURE — 700111 HCHG RX REV CODE 636 W/ 250 OVERRIDE (IP): Performed by: INTERNAL MEDICINE

## 2024-09-27 PROCEDURE — 700102 HCHG RX REV CODE 250 W/ 637 OVERRIDE(OP): Performed by: STUDENT IN AN ORGANIZED HEALTH CARE EDUCATION/TRAINING PROGRAM

## 2024-09-27 PROCEDURE — A9270 NON-COVERED ITEM OR SERVICE: HCPCS | Performed by: INTERNAL MEDICINE

## 2024-09-27 RX ORDER — PREDNISONE 20 MG/1
40 TABLET ORAL DAILY
Status: DISCONTINUED | OUTPATIENT
Start: 2024-09-27 | End: 2024-09-30 | Stop reason: HOSPADM

## 2024-09-27 RX ORDER — CYCLOBENZAPRINE HCL 10 MG
5 TABLET ORAL ONCE
Status: COMPLETED | OUTPATIENT
Start: 2024-09-27 | End: 2024-09-27

## 2024-09-27 RX ORDER — FUROSEMIDE 40 MG/1
40 TABLET ORAL DAILY
Status: DISCONTINUED | OUTPATIENT
Start: 2024-09-27 | End: 2024-09-29

## 2024-09-27 RX ADMIN — SUCRALFATE 1 G: 1 SUSPENSION ORAL at 18:11

## 2024-09-27 RX ADMIN — SPIRONOLACTONE 150 MG: 25 TABLET ORAL at 04:38

## 2024-09-27 RX ADMIN — OMEPRAZOLE 20 MG: 20 CAPSULE, DELAYED RELEASE ORAL at 04:48

## 2024-09-27 RX ADMIN — Medication 1000 UNITS: at 04:48

## 2024-09-27 RX ADMIN — SUCRALFATE 1 G: 1 SUSPENSION ORAL at 23:30

## 2024-09-27 RX ADMIN — FAMOTIDINE 20 MG: 10 INJECTION, SOLUTION INTRAVENOUS at 18:12

## 2024-09-27 RX ADMIN — LACTULOSE 30 ML: 10 SOLUTION ORAL at 18:11

## 2024-09-27 RX ADMIN — SUCRALFATE 1 G: 1 SUSPENSION ORAL at 11:59

## 2024-09-27 RX ADMIN — SUCRALFATE 1 G: 1 SUSPENSION ORAL at 04:49

## 2024-09-27 RX ADMIN — OXYCODONE HYDROCHLORIDE 10 MG: 10 TABLET ORAL at 08:12

## 2024-09-27 RX ADMIN — OXYCODONE HYDROCHLORIDE 10 MG: 10 TABLET ORAL at 13:13

## 2024-09-27 RX ADMIN — CEFTRIAXONE SODIUM 2000 MG: 10 INJECTION, POWDER, FOR SOLUTION INTRAVENOUS at 04:50

## 2024-09-27 RX ADMIN — OXYCODONE HYDROCHLORIDE 5 MG: 5 TABLET ORAL at 18:13

## 2024-09-27 RX ADMIN — LACTULOSE 30 ML: 10 SOLUTION ORAL at 04:49

## 2024-09-27 RX ADMIN — FUROSEMIDE 40 MG: 40 TABLET ORAL at 18:13

## 2024-09-27 RX ADMIN — OMEPRAZOLE 20 MG: 20 CAPSULE, DELAYED RELEASE ORAL at 18:12

## 2024-09-27 RX ADMIN — FAMOTIDINE 20 MG: 10 INJECTION, SOLUTION INTRAVENOUS at 04:49

## 2024-09-27 RX ADMIN — OXYCODONE HYDROCHLORIDE 10 MG: 10 TABLET ORAL at 00:34

## 2024-09-27 RX ADMIN — CYCLOBENZAPRINE 5 MG: 10 TABLET, FILM COATED ORAL at 06:12

## 2024-09-27 RX ADMIN — FUROSEMIDE 20 MG: 20 TABLET ORAL at 04:39

## 2024-09-27 RX ADMIN — POTASSIUM CHLORIDE AND SODIUM CHLORIDE: 900; 150 INJECTION, SOLUTION INTRAVENOUS at 08:11

## 2024-09-27 RX ADMIN — OXYCODONE HYDROCHLORIDE 10 MG: 10 TABLET ORAL at 22:17

## 2024-09-27 RX ADMIN — METRONIDAZOLE 500 MG: 500 INJECTION, SOLUTION INTRAVENOUS at 04:55

## 2024-09-27 RX ADMIN — ENOXAPARIN SODIUM 40 MG: 100 INJECTION SUBCUTANEOUS at 18:13

## 2024-09-27 RX ADMIN — LACTULOSE 30 ML: 10 SOLUTION ORAL at 11:59

## 2024-09-27 RX ADMIN — PREDNISONE 40 MG: 20 TABLET ORAL at 18:13

## 2024-09-27 ASSESSMENT — ENCOUNTER SYMPTOMS: ABDOMINAL PAIN: 1

## 2024-09-27 ASSESSMENT — PAIN DESCRIPTION - PAIN TYPE
TYPE: ACUTE PAIN

## 2024-09-27 NOTE — PROGRESS NOTES
Patient c/o pressure in the chest. She describes it as feeling like trapped air no other s/s. Patient is stable. Messages Gaurav Lainez with update. Fatou ordered EGK and CXR.

## 2024-09-27 NOTE — PROGRESS NOTES
"Hospital Medicine Daily Progress Note    Date of Service  9/27/2024    Chief Complaint  Kavita Freeman is a 59 y.o. female with a past medical history significant for alcohol use disorder, liver cirrhosis with portal hypertension, ascites, hepatic encephalopathy, and GIB (s/p RBC transfusion 8/24), perirectal abscess (s/p I&D), MDD, BINDU, SVT, h/o necrotizing fasciitis admitted 9/25/2024 with abdominal pain following a large-volume scheduled therapeutic paracentesis (approximately 10 L) at [facility]     Hospital Course  No notes on file    Interval Problem Update  Reviewed 24-hour events:  The patient notified the nurse of a sensation that there was \"air trapped in her chest.\"  She was evaluated by the nursing and nocturnist.  Twelve-lead EKG revealed normal sinus rhythm without ST elevations or depressions.  Chest x-ray revealed no acute abnormality.  Vital signs within normal limits.  Patient was provided one-time dose of Flexeril.  No leukocytosis  Hemoglobin 9.4 > 8.9  Platelets 162 > 150  Sodium 130  Bicarb 16  BUN 5  Lactic acid 3.8 > 2.4  A1c 4.5  Total cholesterol 117, LDL 63, triglycerides 69  Ascitic fluid: Yellow, hazy, total nucleated cells 1028, total red blood cells 3000, polys 6, lymphs 66, eosinophils 1, mono macrophages 26, lining cells 3  Urinalysis with specific gravity greater than 1.045  Ascitic fluid Gram stain with moderate white blood cells and no organisms.  Chest x-ray completed and revealed no acute cardiopulmonary disease  EKG completed revealed sinus rhythm at a rate of 80, QTc 472.  9/25 CT scan again reviewed and revealed mild colonic wall thickening from the cecum to the hepatic flexure which can be associated with changes of portal hypertension versus a component of colitis; hepatomegaly and changes of cirrhosis; atherosclerosis and coronary artery disease; right pleural effusion (trace); small pericardial effusion; moderate scattered abdominal ascites.  Afebrile, " hemodynamically stable, no oxygen requirements  Previous bowel movement 9/26  Tolerating oral intake    I have discussed this patient's plan of care and discharge plan at IDT rounds today with Case Management, Nursing, Nursing leadership, and other members of the IDT team.    Consultants/Specialty  None    Code Status  Full Code    Disposition  The patient is not medically cleared for discharge to home or a post-acute facility.  Anticipate discharge to: home with close outpatient follow-up    I have placed the appropriate orders for post-discharge needs.    Review of Systems  Review of Systems   Gastrointestinal:  Positive for abdominal pain.        Physical Exam  Temp:  [36.6 °C (97.8 °F)-37.2 °C (99 °F)] 37.2 °C (99 °F)  Pulse:  [72-85] 85  Resp:  [16-18] 17  BP: (116-135)/(60-70) 135/70  SpO2:  [95 %-97 %] 96 %    Physical Exam  Constitutional:       General: She is awake.      Appearance: She is underweight. She is ill-appearing.   HENT:      Head: Normocephalic and atraumatic.   Pulmonary:      Effort: Pulmonary effort is normal. No respiratory distress.   Abdominal:      General: There is distension.   Skin:     General: Skin is dry.      Coloration: Skin is jaundiced.   Neurological:      General: No focal deficit present.      Mental Status: She is alert and oriented to person, place, and time.   Psychiatric:         Behavior: Behavior is cooperative.         Fluids    Intake/Output Summary (Last 24 hours) at 9/27/2024 2124  Last data filed at 9/27/2024 1953  Gross per 24 hour   Intake 360 ml   Output --   Net 360 ml        Laboratory  Recent Labs     09/25/24 1823 09/26/24 0223 09/27/24  0132   WBC 6.9 8.4 7.7   RBC 3.21* 3.14* 2.98*   HEMOGLOBIN 9.8* 9.4* 8.9*   HEMATOCRIT 29.2* 28.5* 27.5*   MCV 91.0 90.8 92.3   MCH 30.5 29.9 29.9   MCHC 33.6 33.0 32.4   RDW 66.1* 67.1* 68.5*   PLATELETCT 152* 162* 150*   MPV 9.0 9.3 9.5     Recent Labs     09/25/24 1823 09/26/24 0223 09/27/24  0132   SODIUM 134* 134*  "130*   POTASSIUM 3.6 4.1 3.8   CHLORIDE 99 102 102   CO2 14* 19* 16*   GLUCOSE 100* 111* 75   BUN 10 9 5*   CREATININE 1.00 0.98 0.74   CALCIUM 9.8 9.1 8.4*     Recent Labs     09/25/24  0911   INR 1.69*         Recent Labs     09/26/24  0223   TRIGLYCERIDE 69   HDL 40   LDL 63       Imaging  DX-CHEST-PORTABLE (1 VIEW)   Final Result         1.  No acute cardiopulmonary disease.      US-PARACENTESIS, ABD WITH IMAGING   Final Result      1. Ultrasound-guided diagnostic and therapeutic therapeutic paracentesis of the left lower quadrant of the abdominal wall.      2. 3,000 mL of fluid withdrawn.      CT-ABDOMEN-PELVIS WITH   Final Result         1.  Mild colonic wall thickening from the cecum to the hepatic flexure, can be associated with changes of portal hypertension, consider component of colitis.   2.  Hepatomegaly and changes of cirrhosis   3.  Atherosclerosis and atherosclerotic coronary artery disease   4.  Trace right pleural effusion   5.  Small pericardial effusion   6.  Moderate scattered abdominal ascites           Assessment/Plan  Kavita Freeman is a 59 y.o. female with a past medical history significant for alcohol use disorder, liver cirrhosis with portal hypertension, ascites, hepatic encephalopathy, and GIB (s/p RBC transfusion 8/24), perirectal abscess (s/p I&D), MDD, BINDU, SVT, h/o necrotizing fasciitis admitted 9/25/2024 with abdominal pain following a large-volume scheduled therapeutic paracentesis (approximately 10 L) at [facility]     * Pancreatitis, unspecified pancreatitis type- (present on admission)  Assessment & Plan  CT abdomen pelvis revealed \"unremarkable\" pancreas  No evidence of gallstones or biliary dilatation  Lipid profile without hypertriglyceridemia  IV fluids  Multimodal pain control  Diet as tolerated      AP (abdominal pain)  Assessment & Plan  Concern for iatrogenic peritonitis versus spontaneous bacterial peritonitis versus pancreatitis versus colitis  The cell count for " the acetic fluid was returned.  Although there has been no organisms found in the Gram culture, I went ahead and calculated the PMN cell count as SBP is a cellular diagnosis.  Total nucleated cells(1028) multiplied by (0.06) equals 61.68 which is less than 250 decrease my concern for spontaneous bacterial peritonitis.  Of note, this patient has no leukocytosis nor fevers.  She is hemodynamically stable with no tachycardia, tachypnea, nor hypotension.   DC abx    Alcoholic cirrhosis of liver with ascites (HCC)- (present on admission)  Assessment & Plan  With portal hypertension, ascites, hepatic encephalopathy  Patient reports that she feels that her ascites is returning  Continue spironolactone, Lasix, lactulose  Will start prednisone today  If the frequency in which the patient's need for paracentesis increases, will consider GI consultation for TIPS  High-protein, low-sodium diet    Prolonged QT interval- (present on admission)  Assessment & Plan  No longer present on EKG  Monitor  Ativan and Benadryl for antiemetic properties    Anemia- (present on admission)  Assessment & Plan  History of GI bleed in August 2024  No evidence of bleeding at this time  Hemodynamically stable  H&H stable  Monitor hemoglobin with CBC        Hepatic encephalopathy (HCC)- (present on admission)  Assessment & Plan  Resume lactulose  Titrate to 3 stools per day    Metabolic acidosis- (present on admission)  Assessment & Plan  Lactic acid downtrending  Continue IV fluid    Coagulopathy (HCC)- (present on admission)  Assessment & Plan  INR 1.69    Smoking- (present on admission)  Assessment & Plan  Tobacco cessation encouraged  Continue NicoDerm patches    Vitamin D deficiency- (present on admission)  Assessment & Plan  Present  Vitamin D replacement initiated         VTE prophylaxis:   SCDs/TEDs      I have performed a physical exam and reviewed and updated ROS and Plan today (9/27/2024). In review of yesterday's note (9/26/2024), there  are no changes except as documented above.

## 2024-09-27 NOTE — CARE PLAN
Pt AO x 4.  Pt denies pain during initial assessment.  Call light and belongings within reach.  Bed locked and in lowest position.  Hourly rounding.  Needs currently met.       The patient is Stable - Low risk of patient condition declining or worsening    Shift Goals  Clinical Goals: walk around halls, safety and comfort  Patient Goals: walk and comfort  Family Goals: pratibha    Progress made toward(s) clinical / shift goals:      Problem: Pain - Standard  Goal: Alleviation of pain or a reduction in pain to the patient’s comfort goal  Outcome: Progressing     Problem: Knowledge Deficit - Standard  Goal: Patient and family/care givers will demonstrate understanding of plan of care, disease process/condition, diagnostic tests and medications  Outcome: Progressing     Problem: Psychosocial  Goal: Patient's ability to identify and develop effective coping behaviors will improve  Outcome: Progressing     Problem: Safety  Goal: Will remain free from injury  Outcome: Progressing       Patient is not progressing towards the following goals:

## 2024-09-27 NOTE — PROGRESS NOTES
"NOC HOSPITALIST CROSS COVER    Notified by RN regarding patient complaining of feeling like she has \"air trapped in her chest.\" Patient seen and examined at bedside.  She reports stiffness and tightness in her chest and between her shoulder blades.  The pain is exacerbated by moving her shoulders backwards or forwards or taking a deep breath.  Slight worsening of palpation.  Twelve-lead EKG normal sinus rhythm without significant ST elevations or depressions.  Chest x-ray with no acute cardiopulmonary abnormalities.  Patient does report that tightness has been ongoing since she was dry heaving a couple of days ago.    Vitals:    09/27/24 0438   BP: 129/60   Pulse: 78   Resp: 17   Temp: 36.9 °C (98.5 °F)   SpO2: 97%      Plan:  # Chest tightness  -Query musculoskeletal versus referred pain from severe abdominal distention  -Trial one-time dose of Flexeril    -----------------------------------------------------------------------------------------------------------    Electronically signed by:  Gaurav Lainez, CHANDANA, ROMAN, SHANTE-BC  Hospitalist Services         "

## 2024-09-28 ENCOUNTER — APPOINTMENT (OUTPATIENT)
Dept: RADIOLOGY | Facility: MEDICAL CENTER | Age: 59
DRG: 432 | End: 2024-09-28
Attending: STUDENT IN AN ORGANIZED HEALTH CARE EDUCATION/TRAINING PROGRAM
Payer: COMMERCIAL

## 2024-09-28 PROBLEM — R07.89 CHEST PRESSURE: Status: ACTIVE | Noted: 2024-09-28

## 2024-09-28 LAB
ALBUMIN SERPL BCP-MCNC: 3.2 G/DL (ref 3.2–4.9)
ALBUMIN/GLOB SERPL: 1.1 G/DL
ALP SERPL-CCNC: 89 U/L (ref 30–99)
ALT SERPL-CCNC: 26 U/L (ref 2–50)
ANION GAP SERPL CALC-SCNC: 13 MMOL/L (ref 7–16)
AST SERPL-CCNC: 45 U/L (ref 12–45)
BILIRUB SERPL-MCNC: 2.1 MG/DL (ref 0.1–1.5)
BUN SERPL-MCNC: 4 MG/DL (ref 8–22)
CALCIUM ALBUM COR SERPL-MCNC: 9.2 MG/DL (ref 8.5–10.5)
CALCIUM SERPL-MCNC: 8.6 MG/DL (ref 8.5–10.5)
CHLORIDE SERPL-SCNC: 102 MMOL/L (ref 96–112)
CO2 SERPL-SCNC: 18 MMOL/L (ref 20–33)
CREAT SERPL-MCNC: 0.81 MG/DL (ref 0.5–1.4)
ERYTHROCYTE [DISTWIDTH] IN BLOOD BY AUTOMATED COUNT: 68.4 FL (ref 35.9–50)
GFR SERPLBLD CREATININE-BSD FMLA CKD-EPI: 84 ML/MIN/1.73 M 2
GLOBULIN SER CALC-MCNC: 3 G/DL (ref 1.9–3.5)
GLUCOSE SERPL-MCNC: 160 MG/DL (ref 65–99)
HCT VFR BLD AUTO: 27.6 % (ref 37–47)
HGB BLD-MCNC: 9.1 G/DL (ref 12–16)
MCH RBC QN AUTO: 30.6 PG (ref 27–33)
MCHC RBC AUTO-ENTMCNC: 33 G/DL (ref 32.2–35.5)
MCV RBC AUTO: 92.9 FL (ref 81.4–97.8)
PLATELET # BLD AUTO: 128 K/UL (ref 164–446)
PMV BLD AUTO: 9.6 FL (ref 9–12.9)
POTASSIUM SERPL-SCNC: 4.2 MMOL/L (ref 3.6–5.5)
PROT SERPL-MCNC: 6.2 G/DL (ref 6–8.2)
RBC # BLD AUTO: 2.97 M/UL (ref 4.2–5.4)
SODIUM SERPL-SCNC: 133 MMOL/L (ref 135–145)
TEST NAME 95000: NORMAL
WBC # BLD AUTO: 5.4 K/UL (ref 4.8–10.8)

## 2024-09-28 PROCEDURE — 700111 HCHG RX REV CODE 636 W/ 250 OVERRIDE (IP): Performed by: STUDENT IN AN ORGANIZED HEALTH CARE EDUCATION/TRAINING PROGRAM

## 2024-09-28 PROCEDURE — A9270 NON-COVERED ITEM OR SERVICE: HCPCS | Performed by: INTERNAL MEDICINE

## 2024-09-28 PROCEDURE — 770006 HCHG ROOM/CARE - MED/SURG/GYN SEMI*

## 2024-09-28 PROCEDURE — A9270 NON-COVERED ITEM OR SERVICE: HCPCS | Performed by: STUDENT IN AN ORGANIZED HEALTH CARE EDUCATION/TRAINING PROGRAM

## 2024-09-28 PROCEDURE — 700111 HCHG RX REV CODE 636 W/ 250 OVERRIDE (IP): Mod: JZ | Performed by: INTERNAL MEDICINE

## 2024-09-28 PROCEDURE — 36415 COLL VENOUS BLD VENIPUNCTURE: CPT

## 2024-09-28 PROCEDURE — 700102 HCHG RX REV CODE 250 W/ 637 OVERRIDE(OP): Performed by: INTERNAL MEDICINE

## 2024-09-28 PROCEDURE — 85027 COMPLETE CBC AUTOMATED: CPT

## 2024-09-28 PROCEDURE — 700102 HCHG RX REV CODE 250 W/ 637 OVERRIDE(OP): Performed by: STUDENT IN AN ORGANIZED HEALTH CARE EDUCATION/TRAINING PROGRAM

## 2024-09-28 PROCEDURE — 96376 TX/PRO/DX INJ SAME DRUG ADON: CPT

## 2024-09-28 PROCEDURE — 80053 COMPREHEN METABOLIC PANEL: CPT

## 2024-09-28 PROCEDURE — 99233 SBSQ HOSP IP/OBS HIGH 50: CPT | Performed by: STUDENT IN AN ORGANIZED HEALTH CARE EDUCATION/TRAINING PROGRAM

## 2024-09-28 PROCEDURE — 99222 1ST HOSP IP/OBS MODERATE 55: CPT | Performed by: INTERNAL MEDICINE

## 2024-09-28 RX ORDER — HYDROXYZINE HYDROCHLORIDE 50 MG/1
25 TABLET, FILM COATED ORAL 3 TIMES DAILY PRN
Status: DISCONTINUED | OUTPATIENT
Start: 2024-09-28 | End: 2024-09-30 | Stop reason: HOSPADM

## 2024-09-28 RX ORDER — HYDROXYZINE HYDROCHLORIDE 50 MG/1
25 TABLET, FILM COATED ORAL ONCE
Status: COMPLETED | OUTPATIENT
Start: 2024-09-28 | End: 2024-09-28

## 2024-09-28 RX ADMIN — OXYCODONE HYDROCHLORIDE 5 MG: 5 TABLET ORAL at 19:58

## 2024-09-28 RX ADMIN — FAMOTIDINE 20 MG: 10 INJECTION, SOLUTION INTRAVENOUS at 17:30

## 2024-09-28 RX ADMIN — SUCRALFATE 1 G: 1 SUSPENSION ORAL at 05:23

## 2024-09-28 RX ADMIN — OXYCODONE HYDROCHLORIDE 5 MG: 5 TABLET ORAL at 07:24

## 2024-09-28 RX ADMIN — SPIRONOLACTONE 150 MG: 25 TABLET ORAL at 05:25

## 2024-09-28 RX ADMIN — SENNOSIDES AND DOCUSATE SODIUM 2 TABLET: 50; 8.6 TABLET ORAL at 17:29

## 2024-09-28 RX ADMIN — ENOXAPARIN SODIUM 40 MG: 100 INJECTION SUBCUTANEOUS at 17:29

## 2024-09-28 RX ADMIN — SUCRALFATE 1 G: 1 SUSPENSION ORAL at 17:29

## 2024-09-28 RX ADMIN — SUCRALFATE 1 G: 1 SUSPENSION ORAL at 12:32

## 2024-09-28 RX ADMIN — HYDROXYZINE HYDROCHLORIDE 25 MG: 50 TABLET, FILM COATED ORAL at 12:32

## 2024-09-28 RX ADMIN — OMEPRAZOLE 20 MG: 20 CAPSULE, DELAYED RELEASE ORAL at 05:24

## 2024-09-28 RX ADMIN — LACTULOSE 30 ML: 10 SOLUTION ORAL at 17:29

## 2024-09-28 RX ADMIN — OMEPRAZOLE 20 MG: 20 CAPSULE, DELAYED RELEASE ORAL at 17:29

## 2024-09-28 RX ADMIN — HYDROXYZINE HYDROCHLORIDE 25 MG: 50 TABLET, FILM COATED ORAL at 19:58

## 2024-09-28 RX ADMIN — LACTULOSE 30 ML: 10 SOLUTION ORAL at 12:32

## 2024-09-28 RX ADMIN — FUROSEMIDE 40 MG: 40 TABLET ORAL at 05:25

## 2024-09-28 RX ADMIN — PREDNISONE 40 MG: 20 TABLET ORAL at 05:24

## 2024-09-28 RX ADMIN — Medication 1000 UNITS: at 05:24

## 2024-09-28 RX ADMIN — LACTULOSE 30 ML: 10 SOLUTION ORAL at 05:23

## 2024-09-28 RX ADMIN — FAMOTIDINE 20 MG: 10 INJECTION, SOLUTION INTRAVENOUS at 05:23

## 2024-09-28 ASSESSMENT — PAIN DESCRIPTION - PAIN TYPE
TYPE: ACUTE PAIN

## 2024-09-28 ASSESSMENT — FIBROSIS 4 INDEX: FIB4 SCORE: 3.93

## 2024-09-28 NOTE — CARE PLAN
"The patient is Stable - Low risk of patient condition declining or worsening    Shift Goals  Clinical Goals: Pain management, pt safety  Patient Goals: Pain control, rest  Family Goals: JESUSITA    Progress made toward(s) clinical / shift goals:  Patient alert and oriented x4, c/o pain, administered prn medication per MAR. Patient OOB to bathroom and ambulating hallway to help with pain. Patient states having multiple loose stools, \"want to continue taking lactulose.\" Patient tolerating regular diet. Patient able to make needs known, resting comfortably, bed in lowest position, call light within reach.      Problem: Pain - Standard  Goal: Alleviation of pain or a reduction in pain to the patient’s comfort goal  Outcome: Progressing     Problem: Knowledge Deficit - Standard  Goal: Patient and family/care givers will demonstrate understanding of plan of care, disease process/condition, diagnostic tests and medications  Outcome: Progressing     Problem: Fall Risk  Goal: Patient will remain free from falls  Outcome: Progressing       Patient is not progressing towards the following goals:      "

## 2024-09-28 NOTE — ASSESSMENT & PLAN NOTE
Recurrent chest pressure  Chest x-ray negative 9/27, no evidence of myocardial infarction on EKG 9/27  Echocardiogram pending

## 2024-09-28 NOTE — CARE PLAN
The patient is Stable - Low risk of patient condition declining or worsening    Shift Goals  Clinical Goals: pain mgmt, GI recs, echo, PT/OT  Patient Goals: feel better, less anxiety  Family Goals: JESUSITA    Progress made toward(s) clinical / shift goals:      Problem: Pain - Standard  Goal: Alleviation of pain or a reduction in pain to the patient’s comfort goal  Outcome: Progressing  Note: Pain mgmt with PRN oxy.     Problem: Knowledge Deficit - Standard  Goal: Patient and family/care givers will demonstrate understanding of plan of care, disease process/condition, diagnostic tests and medications  Outcome: Progressing  Note: Paracentesis planned today. Hydroxyzine added for PRN anxiety.

## 2024-09-28 NOTE — CARE PLAN
The patient is Stable - Low risk of patient condition declining or worsening    Shift Goals  Clinical Goals: Maintain patient safety, monitor tolerance to diet this shift  Patient Goals: pain control, mobility  Family Goals: JESUSITA    Progress made toward(s) clinical / shift goals:    Pt is A&O4. Pt c/o moderate to severe abdominal pain, PRNs given per MAR. Pt tolerating diet well. Free from falls or injury. Pt ambulating independently in hallway. All needs met at this time.     Problem: Pain - Standard  Goal: Alleviation of pain or a reduction in pain to the patient’s comfort goal  Outcome: Progressing     Problem: Knowledge Deficit - Standard  Goal: Patient and family/care givers will demonstrate understanding of plan of care, disease process/condition, diagnostic tests and medications  Outcome: Progressing     Problem: Provide Safe Environment  Goal: Suicide environmental safety, protocols, policies, and practices will be implemented  Outcome: Progressing     Problem: Safety  Goal: Will remain free from injury  Outcome: Progressing     Problem: Fall Risk  Goal: Patient will remain free from falls  Outcome: Progressing       Patient is not progressing towards the following goals:

## 2024-09-28 NOTE — CONSULTS
Date of Consultation:  9/28/2024    Patient: : Kavita Freeman  MRN: [id number]    Referring Physician:  Dr. Smita Crenshaw MD.      GI:Bahman Vences M.D.     Reason for Consultation: TIPS    History of Present Illness:   The patient is 59 years old female with medical history of alcohol related liver injury, cirrhosis, variceal bleeding s/p upper GI scope and banding last month, presented to hospital for recurrent ascites formation.  GI team is consulted for possible TIPS.    Based on chart review, the patient was only prescribed Lasix 20 mg, spironolactone 150 mg a day, prn lactulose; there is no evidence of beta-blocker or other liver specific medication.    GI team saw the patient at the bedside; the patient continued to have distended abdomen.  No evidence of overt encephalopathy at this time.        Past Medical History:   Diagnosis Date    GIB (gastrointestinal bleeding) 08/14/2024    Hypomagnesemia 08/14/2024    Wound check, abscess 06/27/2024    Thrombocytopenia (HCC) 06/27/2024    Septic shock (HCC) 06/14/2024    Acute kidney injury (HCC) 06/14/2024    Necrotizing fasciitis (HCC) 06/14/2024    Perirectal abscess 06/14/2024    Acute cystitis with hematuria 09/21/2016    Hypotension 07/15/2016    Lightheadedness 12/17/2015    Other chest pain 12/17/2015    Pain 02/24/2015    right shoulder 6/10    UTI (lower urinary tract infection) 09/21/2013    URI (upper respiratory infection) 09/21/2013    PSVT (paroxysmal supraventricular tachycardia) (HCC) 06/24/2013    Tobacco abuse 05/03/2013    Allergy     Anxiety     Arrhythmia     for tachycardia    Back pain     Depression     Treated by Dr Karla Whatley    Depression     Hyperlipidemia     Hypertension     Indigestion     Psychiatric disorder     anxiety     Urinary tract infection, site not specified     Recurrent UTI's         Past Surgical History:   Procedure Laterality Date    AR UPPER GI ENDOSCOPY,DIAGNOSIS N/A 8/15/2024    Procedure: GASTROSCOPY;   Surgeon: Bahman Vences M.D.;  Location: SURGERY SAME DAY [facility];  Service: Gastroenterology    IA UPPER GI ENDOSCOPY,LIGAT VARIX N/A 8/15/2024    Procedure: GASTROSCOPY, WITH BANDING;  Surgeon: Bahman Vences M.D.;  Location: SURGERY SAME DAY [facility];  Service: Gastroenterology    IA I&D PERIRECTAL ABSCESS N/A 2024    Procedure: INCISION AND DRAINAGE, ABSCESS, PERIRECTAL;  Surgeon: Corey Sierra M.D.;  Location: SURGERY [facility];  Service: Thoracic    IA I&D PERIRECTAL ABSCESS  2024    Procedure: INCISION AND DRAINAGE, ABSCESS, PERIRECTAL;  Surgeon: Kelton Orellana M.D.;  Location: SURGERY [facility];  Service: General    SHOULDER ARTHROSCOPY W/ ROTATOR CUFF REPAIR  3/9/2015    Performed by Corey Terry M.D. at SURGERY [facility] ORS    TRIGGER FINGER RELEASE  3/9/2015    Performed by Corey Terry M.D. at SURGERY [facility] ORS    OTHER  2014    broken ribs- plates & Pins right front 4-8    SHOULDER ARTHROSCOPY  3/23/2009    Performed by COREY TERRY at SURGERY [facility] ORS    BREAST BIOPSY  08    Performed by MARY DE LA CRUZ at SURGERY SAME DAY [facility] ORS    SHOULDER ARTHROSCOPY      GYN SURGERY      tubal ligation    OPEN REDUCTION      flail chest    TUBAL LIGATION         Family History   Problem Relation Age of Onset    Hypertension Mother     Stroke Mother     Hypertension Father         ? valvular heart     Hypertension Brother     Cancer Paternal Grandmother         breast cancer       Social History     Socioeconomic History    Marital status:    Tobacco Use    Smoking status: Every Day     Current packs/day: 0.00     Average packs/day: 0.5 packs/day for 25.0 years (12.5 ttl pk-yrs)     Types: Cigarettes     Start date: 1994     Last attempt to quit: 2016     Years since quittin.3    Smokeless tobacco: Never    Tobacco comments:     1-2 cigarettes a day   Vaping Use    Vaping status: Never Used   Substance and Sexual  Activity    Alcohol use: No     Alcohol/week: 1.5 - 2.0 oz     Types: 3 - 4 Cans of beer per week    Drug use: Yes     Types: Inhaled     Comment: marijuana    Sexual activity: Yes     Partners: Male   Social History Narrative    ** Merged History Encounter **          Social Determinants of Health     Food Insecurity: No Food Insecurity (9/25/2024)    Hunger Vital Sign     Worried About Running Out of Food in the Last Year: Never true     Ran Out of Food in the Last Year: Never true   Transportation Needs: No Transportation Needs (9/25/2024)    PRAPARE - Transportation     Lack of Transportation (Medical): No     Lack of Transportation (Non-Medical): No   Recent Concern: Transportation Needs - Unmet Transportation Needs (8/14/2024)    PRAPARE - Transportation     Lack of Transportation (Medical): Yes     Lack of Transportation (Non-Medical): No   Intimate Partner Violence: Not At Risk (9/25/2024)    Humiliation, Afraid, Rape, and Kick questionnaire     Fear of Current or Ex-Partner: No     Emotionally Abused: No     Physically Abused: No     Sexually Abused: No   Housing Stability: Low Risk  (9/25/2024)    Housing Stability Vital Sign     Unable to Pay for Housing in the Last Year: No     Number of Times Moved in the Last Year: 1     Homeless in the Last Year: No           Physical Exam:  Vitals:    09/27/24 1953 09/27/24 2217 09/27/24 2330 09/28/24 0339   BP: 135/70   107/48   Pulse: 85   76   Resp: 17 17 16 16   Temp: 37.2 °C (99 °F)   36.8 °C (98.3 °F)   TempSrc: Temporal   Temporal   SpO2: 96%   95%   Weight:       Height:           Constitutional: No fevers.  Eyes: No symptoms reported.   Ears/Nose/Throat/Mouth: No choking reported.  Cardiovascular: No chest pain reported.   Respiratory: Denies shortness of breath.  Gastrointestinal/Hepatic: Per H/P.   Skin/Breast: No new rash reported.   Psychiatric: No complaints    HEENT: grossly normal.  Slightly jaundiced.  Cardiovascular: Please refer to primary team's  daily assessment.   Lungs: Please refer to primary team's daily assessment.   Abdomen: Distended, no evidence of acute abdomen.  Skin: No erythema, No rash.  Neurologic: Alert & oriented x 3, Normal motor function, No focal deficits noted.  Some shaking of her hands while using cell phone.  PSY: stable mood.           Labs:  Recent Labs     09/26/24 0223 09/27/24 0132 09/28/24 0213   WBC 8.4 7.7 5.4   RBC 3.14* 2.98* 2.97*   HEMOGLOBIN 9.4* 8.9* 9.1*   HEMATOCRIT 28.5* 27.5* 27.6*   MCV 90.8 92.3 92.9   MCH 29.9 29.9 30.6   MCHC 33.0 32.4 33.0   RDW 67.1* 68.5* 68.4*   PLATELETCT 162* 150* 128*   MPV 9.3 9.5 9.6     Recent Labs     09/26/24 0223 09/27/24 0132 09/28/24 0213   SODIUM 134* 130* 133*   POTASSIUM 4.1 3.8 4.2   CHLORIDE 102 102 102   CO2 19* 16* 18*   GLUCOSE 111* 75 160*   BUN 9 5* 4*     Recent Labs     09/25/24 0911   INR 1.69*       Recent Labs     09/25/24  0911 09/25/24  1823 09/26/24 0223 09/28/24 0213   ASTSGOT 56*  55* 52* 51* 45   ALTSGPT 29  29 23 23 26   TBILIRUBIN 3.6*  3.4* 3.4* 3.3* 2.1*   IBILIRUBIN 2.0*  --   --   --    DBILIRUBIN 1.4*  --   --   --    ALKPHOSPHAT 93  97 89 74 89   GLOBULIN 4.4* 3.7* 3.4 3.0   INR 1.69*  --   --   --          Imaging:  US-PARACENTESIS, ABD WITH IMAGING  Narrative: 9/26/2024 2:33 PM    HISTORY/REASON FOR EXAM:  Large volume ascites with abdominal discomfort    TECHNIQUE/EXAM DESCRIPTION:  Ultrasound-guided paracentesis.    COMPARISON:  None    PROCEDURE:  Informed consent was obtained. A timeout was taken. Ascites was localized with real-time ultrasound. The left lower quadrant of the abdominal wall was prepared and draped in a sterile manner. Following local anesthesia with 1% lidocaine, a 5   Serbian Yueh pigtail catheter was advanced into the peritoneal cavity with trocar technique. Ascites was drained. The patient tolerated the procedure well with no evidence of complication.    FINDINGS:  Ascites is present.    Fluid was  sent to the  laboratory.    Fluid character: straw colored  Impression: 1. Ultrasound-guided diagnostic and therapeutic therapeutic paracentesis of the left lower quadrant of the abdominal wall.    2. 3,000 mL of fluid withdrawn.  DX-CHEST-PORTABLE (1 VIEW)  Narrative:   9/27/2024 4:57 AM    HISTORY/REASON FOR EXAM:  Air trapping chest.    TECHNIQUE/EXAM DESCRIPTION:  Single AP view of the chest.    COMPARISON:    FINDINGS:    The cardiac silhouette appears within normal limits.    The mediastinal contour appears within normal limits.  The central pulmonary vasculature appears normal.    The lungs appear well expanded bilaterally.  Bilateral lungs are clear.    No significant pleural effusions are identified.    ORIF of multiple right ribs are seen. Otherwise the bony structures appear age-appropriate.  Impression: 1.  No acute cardiopulmonary disease.            Impressions:  The patient is 59 years old female with medical history of alcohol related liver injury, cirrhosis, variceal bleeding s/p upper GI scope and banding last month, presented to hospital for recurrent ascites formation.  GI team is consulted for possible TIPS.    The patient is not indicated for urgent TIPS.  We understand the refractory ascites difficult for the patient however patient still has room to increase her Lasix/spironolactone.    We will suggest to go up to 40 to 60 mg Lasix a day and continue spironolactone at 150 mg a day.    Consider carvedilol/beta-blocker, for the patient.     If more paracentesis/tapping is needed during this admission, agreed to continue albumin replacement.    GI team signed off, please call us back for new questions.          This note was generated using voice recognition software which has a small chance of producing errors of grammar and possibly content. I have made every reasonable attempt to find and correct any obvious errors, but expect that some may not be found prior to finalization of this note.

## 2024-09-28 NOTE — PROGRESS NOTES
Hospital Medicine Daily Progress Note    Date of Service  9/28/2024    Chief Complaint  Kavita Freeman is a 59 y.o. female with a past medical history significant for alcohol use disorder, liver cirrhosis with portal hypertension, ascites, hepatic encephalopathy, and GIB (s/p RBC transfusion 8/24), perirectal abscess (s/p I&D), MDD, BINDU, SVT, h/o necrotizing fasciitis admitted 9/25/2024 with abdominal pain following a large-volume scheduled therapeutic paracentesis (approximately 10 L) at IR [facility]; acute decompensated liver failure.    Hospital Course  9/25: Patient presented to the emergency department after undergoing large-volume therapeutic centesis (nearly 10 L) in IR clinic this morning. Diagnostic paracentesis pending. on C3 Flagyl for empiric coverage of SBP.  On Lasix, luciano, and lactulose.  9/26: On C3, Flagyl.  Underwent diagnostic paracentesis with 3 L of fluid withdrawn.  Ascites fluid cytology pending. On Lasix, luciano, and lactulose.  9/27: SBP ruled out based off of ascitic fluid cytology.  Empiric antibiotics discontinued.  Madrey score 39.7 which is a poor prognosis and may benefit from glucocorticoid therapy.  Started on prednisone. On Lasix, luciano, and lactulose.  Left-sided chest pain reported.  Chest x-ray negative.  Twelve-lead EKG negative.  9/28: GI consulted to assess for benefits of TIPS procedure.  Recurrent chest pressure again this morning.  Echocardiogram ordered.    Interval Problem Update  No leukocytosis  Hemoglobin stable  Sodium 130 > 133  Bicarb 16 > 18  GFR 94 > 84  Total bilirubin 3.3 > 2.1  Blood from peritoneal fluid cytology reviewed  Urinalysis reviewed  9/26 ascitic fluid reviewed again and with no organisms  Respiratory viral panel negative  9/25 Maddrey score 39.7 poor prognosis and may benefit from glucocorticoid therapy  Afebrile, hemodynamically stable, no oxygen requirements  Tolerating oral intake well  Last bowel movement 9/27    I have discussed this  patient's plan of care and discharge plan at IDT rounds today with Case Management, Nursing, Nursing leadership, and other members of the IDT team.    Consultants/Specialty  GI    Code Status  Full Code    Disposition    Anticipate discharge to: home with close outpatient follow-up    I have placed the appropriate orders for post-discharge needs.    Review of Systems  Review of Systems   All other systems reviewed and are negative.       Physical Exam  Temp:  [36.2 °C (97.2 °F)-37.2 °C (99 °F)] 36.2 °C (97.2 °F)  Pulse:  [74-85] 74  Resp:  [16-17] 17  BP: (107-135)/(48-70) 116/64  SpO2:  [94 %-96 %] 94 %    Physical Exam  Vitals reviewed.   Constitutional:       Appearance: She is ill-appearing.   Pulmonary:      Effort: No respiratory distress.      Breath sounds: Normal breath sounds.   Abdominal:      General: There is distension.   Skin:     General: Skin is dry.      Coloration: Skin is jaundiced.   Neurological:      Mental Status: She is alert.         Fluids    Intake/Output Summary (Last 24 hours) at 9/28/2024 1652  Last data filed at 9/28/2024 0720  Gross per 24 hour   Intake 600 ml   Output --   Net 600 ml        Laboratory  Recent Labs     09/26/24 0223 09/27/24  0132 09/28/24 0213   WBC 8.4 7.7 5.4   RBC 3.14* 2.98* 2.97*   HEMOGLOBIN 9.4* 8.9* 9.1*   HEMATOCRIT 28.5* 27.5* 27.6*   MCV 90.8 92.3 92.9   MCH 29.9 29.9 30.6   MCHC 33.0 32.4 33.0   RDW 67.1* 68.5* 68.4*   PLATELETCT 162* 150* 128*   MPV 9.3 9.5 9.6     Recent Labs     09/26/24 0223 09/27/24  0132 09/28/24  0213   SODIUM 134* 130* 133*   POTASSIUM 4.1 3.8 4.2   CHLORIDE 102 102 102   CO2 19* 16* 18*   GLUCOSE 111* 75 160*   BUN 9 5* 4*   CREATININE 0.98 0.74 0.81   CALCIUM 9.1 8.4* 8.6               Recent Labs     09/26/24  0223   TRIGLYCERIDE 69   HDL 40   LDL 63       Imaging  DX-CHEST-PORTABLE (1 VIEW)   Final Result         1.  No acute cardiopulmonary disease.      US-PARACENTESIS, ABD WITH IMAGING   Final Result      1.  "Ultrasound-guided diagnostic and therapeutic therapeutic paracentesis of the left lower quadrant of the abdominal wall.      2. 3,000 mL of fluid withdrawn.      CT-ABDOMEN-PELVIS WITH   Final Result         1.  Mild colonic wall thickening from the cecum to the hepatic flexure, can be associated with changes of portal hypertension, consider component of colitis.   2.  Hepatomegaly and changes of cirrhosis   3.  Atherosclerosis and atherosclerotic coronary artery disease   4.  Trace right pleural effusion   5.  Small pericardial effusion   6.  Moderate scattered abdominal ascites      EC-ECHOCARDIOGRAM COMPLETE W/O CONT    (Results Pending)   US-PARACENTESIS, ABD WITH IMAGING    (Results Pending)        Assessment/Plan  Kavita Freeman is a 59 y.o. female with a past medical history significant for alcohol use disorder, liver cirrhosis with portal hypertension, ascites, hepatic encephalopathy, and GIB (s/p RBC transfusion 8/24), perirectal abscess (s/p I&D), MDD, BINDU, SVT, h/o necrotizing fasciitis admitted 9/25/2024 with abdominal pain following a large-volume scheduled therapeutic paracentesis (approximately 10 L) at [facility]; acute decompensated liver failure.    * Pancreatitis, unspecified pancreatitis type- (present on admission)  Assessment & Plan  CT abdomen pelvis revealed \"unremarkable\" pancreas  No evidence of gallstones or biliary dilatation  Lipid profile without hypertriglyceridemia  IV fluids  Multimodal pain control  Diet as tolerated      AP (abdominal pain)  Assessment & Plan  Anticipate related to ascites vs abdominal wall soreness after undergoing multiple paracentesis  Multimodal pain management    Alcoholic cirrhosis of liver with ascites (HCC)- (present on admission)  Assessment & Plan  With portal hypertension, ascites on CT imaging  Maddrey score 39.7 poor prognosis and may benefit from glucocorticoid therapy.  Child-Garza score 10 points (life expectancy 1 to 3 years)  Continue " spironolactone, Lasix, lactulose, prednisone  High-protein, low-sodium diet  GI to assess for benefits of TIPS versus medication management alone  Patient desires liver transplant longitudinally  GI following    Chest pressure  Assessment & Plan  Recurrent chest pressure  Chest x-ray negative 9/27, no evidence of myocardial infarction on EKG 9/27  Echocardiogram pending    Prolonged QT interval- (present on admission)  Assessment & Plan  No longer present on EKG  Monitor  Ativan and Benadryl for antiemetic properties    Anemia- (present on admission)  Assessment & Plan  No evidence of bleeding.  Hemodynamically stable      Hepatic encephalopathy (HCC)- (present on admission)  Assessment & Plan  Resume lactulose  Titrate to 3 stools per day    Metabolic acidosis- (present on admission)  Assessment & Plan  Lactic acid downtrending  Continue IV fluid    Coagulopathy (HCC)- (present on admission)  Assessment & Plan  INR 1.69    Smoking- (present on admission)  Assessment & Plan  Tobacco cessation encouraged  Continue NicoDerm patches    Vitamin D deficiency- (present on admission)  Assessment & Plan  Present  Vitamin D replacement initiated         VTE prophylaxis:    enoxaparin ppx      I have performed a physical exam and reviewed and updated ROS and Plan today (9/28/2024). In review of yesterday's note (9/27/2024), there are no changes except as documented above.

## 2024-09-29 ENCOUNTER — APPOINTMENT (OUTPATIENT)
Dept: RADIOLOGY | Facility: MEDICAL CENTER | Age: 59
DRG: 432 | End: 2024-09-29
Attending: STUDENT IN AN ORGANIZED HEALTH CARE EDUCATION/TRAINING PROGRAM
Payer: COMMERCIAL

## 2024-09-29 ENCOUNTER — PATIENT MESSAGE (OUTPATIENT)
Dept: MEDICAL GROUP | Facility: LAB | Age: 59
End: 2024-09-29
Payer: COMMERCIAL

## 2024-09-29 DIAGNOSIS — F17.211 NICOTINE DEPENDENCE, CIGARETTES, IN REMISSION: ICD-10-CM

## 2024-09-29 LAB
ALBUMIN SERPL BCP-MCNC: 2.9 G/DL (ref 3.2–4.9)
ALBUMIN/GLOB SERPL: 1 G/DL
ALP SERPL-CCNC: 85 U/L (ref 30–99)
ALT SERPL-CCNC: 26 U/L (ref 2–50)
ANION GAP SERPL CALC-SCNC: 13 MMOL/L (ref 7–16)
ANNOTATION COMMENT IMP: ABNORMAL
AST SERPL-CCNC: 38 U/L (ref 12–45)
BACTERIA FLD AEROBE CULT: NORMAL
BILIRUB SERPL-MCNC: 1.7 MG/DL (ref 0.1–1.5)
BUN SERPL-MCNC: 8 MG/DL (ref 8–22)
CALCIUM ALBUM COR SERPL-MCNC: 9.8 MG/DL (ref 8.5–10.5)
CALCIUM SERPL-MCNC: 8.9 MG/DL (ref 8.5–10.5)
CHLORIDE SERPL-SCNC: 102 MMOL/L (ref 96–112)
CO2 SERPL-SCNC: 20 MMOL/L (ref 20–33)
CREAT SERPL-MCNC: 0.64 MG/DL (ref 0.5–1.4)
GFR SERPLBLD CREATININE-BSD FMLA CKD-EPI: 103 ML/MIN/1.73 M 2
GLOBULIN SER CALC-MCNC: 3 G/DL (ref 1.9–3.5)
GLUCOSE SERPL-MCNC: 110 MG/DL (ref 65–99)
GRAM STN SPEC: NORMAL
POTASSIUM SERPL-SCNC: 3.9 MMOL/L (ref 3.6–5.5)
PROT SERPL-MCNC: 5.9 G/DL (ref 6–8.2)
RETINYL PALMITATE SERPL-MCNC: <0.02 MG/L (ref 0–0.1)
SIGNIFICANT IND 70042: NORMAL
SITE SITE: NORMAL
SODIUM SERPL-SCNC: 135 MMOL/L (ref 135–145)
SOURCE SOURCE: NORMAL
VIT A SERPL-MCNC: <0.06 MG/L (ref 0.3–1.2)

## 2024-09-29 PROCEDURE — 700102 HCHG RX REV CODE 250 W/ 637 OVERRIDE(OP): Performed by: INTERNAL MEDICINE

## 2024-09-29 PROCEDURE — 99232 SBSQ HOSP IP/OBS MODERATE 35: CPT | Performed by: STUDENT IN AN ORGANIZED HEALTH CARE EDUCATION/TRAINING PROGRAM

## 2024-09-29 PROCEDURE — 700111 HCHG RX REV CODE 636 W/ 250 OVERRIDE (IP): Mod: JZ | Performed by: INTERNAL MEDICINE

## 2024-09-29 PROCEDURE — A9270 NON-COVERED ITEM OR SERVICE: HCPCS | Performed by: STUDENT IN AN ORGANIZED HEALTH CARE EDUCATION/TRAINING PROGRAM

## 2024-09-29 PROCEDURE — C1729 CATH, DRAINAGE: HCPCS

## 2024-09-29 PROCEDURE — 97162 PT EVAL MOD COMPLEX 30 MIN: CPT

## 2024-09-29 PROCEDURE — 80053 COMPREHEN METABOLIC PANEL: CPT

## 2024-09-29 PROCEDURE — 700111 HCHG RX REV CODE 636 W/ 250 OVERRIDE (IP): Performed by: STUDENT IN AN ORGANIZED HEALTH CARE EDUCATION/TRAINING PROGRAM

## 2024-09-29 PROCEDURE — 36415 COLL VENOUS BLD VENIPUNCTURE: CPT

## 2024-09-29 PROCEDURE — A9270 NON-COVERED ITEM OR SERVICE: HCPCS | Performed by: INTERNAL MEDICINE

## 2024-09-29 PROCEDURE — 770006 HCHG ROOM/CARE - MED/SURG/GYN SEMI*

## 2024-09-29 PROCEDURE — 700102 HCHG RX REV CODE 250 W/ 637 OVERRIDE(OP): Performed by: STUDENT IN AN ORGANIZED HEALTH CARE EDUCATION/TRAINING PROGRAM

## 2024-09-29 PROCEDURE — 97535 SELF CARE MNGMENT TRAINING: CPT

## 2024-09-29 RX ORDER — FUROSEMIDE 20 MG/1
20 TABLET ORAL ONCE
Status: COMPLETED | OUTPATIENT
Start: 2024-09-29 | End: 2024-09-29

## 2024-09-29 RX ORDER — ONDANSETRON 4 MG/1
4 TABLET, ORALLY DISINTEGRATING ORAL EVERY 4 HOURS PRN
Status: DISCONTINUED | OUTPATIENT
Start: 2024-09-29 | End: 2024-09-30 | Stop reason: HOSPADM

## 2024-09-29 RX ADMIN — FAMOTIDINE 20 MG: 10 INJECTION, SOLUTION INTRAVENOUS at 04:22

## 2024-09-29 RX ADMIN — SUCRALFATE 1 G: 1 SUSPENSION ORAL at 17:29

## 2024-09-29 RX ADMIN — FAMOTIDINE 20 MG: 10 INJECTION, SOLUTION INTRAVENOUS at 17:29

## 2024-09-29 RX ADMIN — HYDROXYZINE HYDROCHLORIDE 25 MG: 50 TABLET, FILM COATED ORAL at 00:15

## 2024-09-29 RX ADMIN — SUCRALFATE 1 G: 1 SUSPENSION ORAL at 00:13

## 2024-09-29 RX ADMIN — PREDNISONE 40 MG: 20 TABLET ORAL at 04:22

## 2024-09-29 RX ADMIN — OMEPRAZOLE 20 MG: 20 CAPSULE, DELAYED RELEASE ORAL at 17:29

## 2024-09-29 RX ADMIN — ACETAMINOPHEN 650 MG: 325 TABLET ORAL at 06:27

## 2024-09-29 RX ADMIN — FUROSEMIDE 20 MG: 20 TABLET ORAL at 08:43

## 2024-09-29 RX ADMIN — SUCRALFATE 1 G: 1 SUSPENSION ORAL at 12:33

## 2024-09-29 RX ADMIN — HYDROXYZINE HYDROCHLORIDE 25 MG: 50 TABLET, FILM COATED ORAL at 12:37

## 2024-09-29 RX ADMIN — OXYCODONE HYDROCHLORIDE 10 MG: 10 TABLET ORAL at 12:32

## 2024-09-29 RX ADMIN — LACTULOSE 30 ML: 10 SOLUTION ORAL at 04:22

## 2024-09-29 RX ADMIN — LACTULOSE 30 ML: 10 SOLUTION ORAL at 12:33

## 2024-09-29 RX ADMIN — FUROSEMIDE 40 MG: 40 TABLET ORAL at 04:22

## 2024-09-29 RX ADMIN — OMEPRAZOLE 20 MG: 20 CAPSULE, DELAYED RELEASE ORAL at 04:23

## 2024-09-29 RX ADMIN — DIPHENHYDRAMINE HYDROCHLORIDE 25 MG: 25 TABLET ORAL at 19:53

## 2024-09-29 RX ADMIN — LACTULOSE 30 ML: 10 SOLUTION ORAL at 17:28

## 2024-09-29 RX ADMIN — Medication 1000 UNITS: at 04:23

## 2024-09-29 RX ADMIN — SPIRONOLACTONE 150 MG: 25 TABLET ORAL at 04:22

## 2024-09-29 RX ADMIN — HYDROXYZINE HYDROCHLORIDE 25 MG: 50 TABLET, FILM COATED ORAL at 19:53

## 2024-09-29 RX ADMIN — OXYCODONE HYDROCHLORIDE 10 MG: 10 TABLET ORAL at 00:15

## 2024-09-29 RX ADMIN — ONDANSETRON 4 MG: 4 TABLET, ORALLY DISINTEGRATING ORAL at 15:21

## 2024-09-29 RX ADMIN — SUCRALFATE 1 G: 1 SUSPENSION ORAL at 04:22

## 2024-09-29 ASSESSMENT — COGNITIVE AND FUNCTIONAL STATUS - GENERAL
SUGGESTED CMS G CODE MODIFIER MOBILITY: CH
MOBILITY SCORE: 24

## 2024-09-29 ASSESSMENT — PAIN DESCRIPTION - PAIN TYPE
TYPE: ACUTE PAIN

## 2024-09-29 ASSESSMENT — ENCOUNTER SYMPTOMS: ABDOMINAL PAIN: 1

## 2024-09-29 ASSESSMENT — GAIT ASSESSMENTS
GAIT LEVEL OF ASSIST: SUPERVISED
DEVIATION: BRADYKINETIC
DISTANCE (FEET): 200

## 2024-09-29 NOTE — CARE PLAN
The patient is Stable - Low risk of patient condition declining or worsening    Shift Goals  Clinical Goals: Pain management, monitor labs, pt safety  Patient Goals: Rest and comfort  Family Goals: JESUSITA    Progress made toward(s) clinical / shift goals:  Patient alert and oriented x4. Patient with abdominal pain, administered PRN medications per MAR. Patient OOB to bathroom and ambulating hallway to help alleviate pain. Patient respirations even and unlabored, denies SOB, on room air. Patient able to make needs known, calling appropriately. Patient resting comfortably, bed in lowest position, call light within reach.      Problem: Pain - Standard  Goal: Alleviation of pain or a reduction in pain to the patient’s comfort goal  Outcome: Progressing     Problem: Knowledge Deficit - Standard  Goal: Patient and family/care givers will demonstrate understanding of plan of care, disease process/condition, diagnostic tests and medications  Outcome: Progressing     Problem: Safety  Goal: Will remain free from injury  Outcome: Progressing       Patient is not progressing towards the following goals:

## 2024-09-29 NOTE — PROGRESS NOTES
[facility] Medicine Daily Progress Note    Date of Service  9/29/2024    Chief Complaint  Kavita Freeman is a 59 y.o. female with a past medical history significant for alcohol use disorder, liver cirrhosis with portal hypertension, ascites, hepatic encephalopathy, and GIB (s/p RBC transfusion 8/24), perirectal abscess (s/p I&D), MDD, BINDU, SVT, h/o necrotizing fasciitis admitted 9/25/2024 with abdominal pain following a large-volume scheduled therapeutic paracentesis (approximately 10 L) at IR [facility]; acute decompensated liver failure.     Hospital Course  9/25: Patient presented to the emergency department after undergoing large-volume therapeutic centesis (nearly 10 L) in IR clinic this morning. Diagnostic paracentesis pending. on C3 Flagyl for empiric coverage of SBP.  On Lasix, luciano, and lactulose.  9/26: On C3, Flagyl.  Underwent diagnostic paracentesis with 3 L of fluid withdrawn.  Ascites fluid cytology pending. On Lasix, luciano, and lactulose.  9/27: SBP ruled out based off of ascitic fluid cytology.  Empiric antibiotics discontinued.  Madrey score 39.7 which is a poor prognosis and may benefit from glucocorticoid therapy.  Started on prednisone. On Lasix, luciano, and lactulose.  Left-sided chest pain reported.  Chest x-ray negative.  Twelve-lead EKG negative.  9/28: GI consulted and provided medication management recommendations.  No indication for urgent TIPS.  Recurrent chest pressure again this morning.  Echocardiogram ordered.  9/29: Patient underwent therapeutic paracentesis.  Patient requesting connection with outpatient psychologist.  Patient given appointment with Dr. Varela at [facility].    Interval Problem Update  Reviewed 24-hour events:  No leukocytosis   Hemoglobin 8.9 > 9.1  Electrolytes WNL  GFR 84 > 103  Total bilirubin 2.1 > 1.7  Afebrile, hemodynamically stable, no oxygen requirements  Tolerating oral intake  Bowel movement 9/28    I have discussed this patient's plan of care and  discharge plan at IDT rounds today with Case Management, Nursing, Nursing leadership, and other members of the IDT team.    Consultants/Specialty  GI    Code Status  Full Code    Disposition  The patient is not medically cleared for discharge to home or a post-acute facility.  Anticipate discharge to: home with close outpatient follow-up    I have placed the appropriate orders for post-discharge needs.    Review of Systems  Review of Systems   Gastrointestinal:  Positive for abdominal pain.        Physical Exam  Temp:  [36.4 °C (97.5 °F)-37.3 °C (99.2 °F)] 36.8 °C (98.2 °F)  Pulse:  [66-92] 85  Resp:  [17-18] 18  BP: (101-138)/(45-75) 127/73  SpO2:  [94 %-97 %] 96 %    Physical Exam  Vitals and nursing note reviewed.   Constitutional:       General: She is not in acute distress.  HENT:      Head: Normocephalic and atraumatic.   Eyes:      Extraocular Movements: Extraocular movements intact.   Pulmonary:      Effort: Pulmonary effort is normal. No respiratory distress.   Abdominal:      General: There is no distension.   Skin:     Coloration: Skin is jaundiced.   Neurological:      General: No focal deficit present.      Mental Status: She is alert and oriented to person, place, and time. Mental status is at baseline.   Psychiatric:         Behavior: Behavior normal.         Thought Content: Thought content normal.         Judgment: Judgment normal.         Fluids    Intake/Output Summary (Last 24 hours) at 9/29/2024 2440  Last data filed at 9/29/2024 1400  Gross per 24 hour   Intake 1980 ml   Output --   Net 1980 ml        Laboratory  Recent Labs     09/27/24 0132 09/28/24 0213   WBC 7.7 5.4   RBC 2.98* 2.97*   HEMOGLOBIN 8.9* 9.1*   HEMATOCRIT 27.5* 27.6*   MCV 92.3 92.9   MCH 29.9 30.6   MCHC 32.4 33.0   RDW 68.5* 68.4*   PLATELETCT 150* 128*   MPV 9.5 9.6     Recent Labs     09/27/24 0132 09/28/24 0213 09/29/24  0449   SODIUM 130* 133* 135   POTASSIUM 3.8 4.2 3.9   CHLORIDE 102 102 102   CO2 16* 18* 20    GLUCOSE 75 160* 110*   BUN 5* 4* 8   CREATININE 0.74 0.81 0.64   CALCIUM 8.4* 8.6 8.9                   Imaging  DX-CHEST-PORTABLE (1 VIEW)   Final Result         1.  No acute cardiopulmonary disease.      US-PARACENTESIS, ABD WITH IMAGING   Final Result      1. Ultrasound-guided diagnostic and therapeutic therapeutic paracentesis of the left lower quadrant of the abdominal wall.      2. 3,000 mL of fluid withdrawn.      CT-ABDOMEN-PELVIS WITH   Final Result         1.  Mild colonic wall thickening from the cecum to the hepatic flexure, can be associated with changes of portal hypertension, consider component of colitis.   2.  Hepatomegaly and changes of cirrhosis   3.  Atherosclerosis and atherosclerotic coronary artery disease   4.  Trace right pleural effusion   5.  Small pericardial effusion   6.  Moderate scattered abdominal ascites      EC-ECHOCARDIOGRAM COMPLETE W/O CONT    (Results Pending)   US-PARACENTESIS, ABD WITH IMAGING    (Results Pending)        Assessment/Plan  Kavita Freeman is a 59 y.o. female with a past medical history significant for alcohol use disorder, liver cirrhosis with portal hypertension, ascites, hepatic encephalopathy, and GIB (s/p RBC transfusion 8/24), perirectal abscess (s/p I&D), MDD, BINDU, SVT, h/o necrotizing fasciitis admitted 9/25/2024 with abdominal pain following a large-volume scheduled therapeutic paracentesis (approximately 10 L) at [facility] acute decompensated liver failure.     * Pancreatitis, unspecified pancreatitis type- (present on admission)  Assessment & Plan  Resolved      AP (abdominal pain)  Assessment & Plan  Anticipate related to ascites vs abdominal wall soreness after undergoing multiple paracentesis  Multimodal pain management    Alcoholic cirrhosis of liver with ascites (HCC)- (present on admission)  Assessment & Plan  With portal hypertension, ascites on CT imaging  Maddrey score 39.7 poor prognosis and may benefit from glucocorticoid  therapy.  Child-Garza score 10 points (life expectancy 1 to 3 years)  Continue spironolactone (100 > 150), Lasix (40 >60), lactulose, prednisone.    Patient to likely benefit from nonselective beta-blocker, however the patient's blood pressure is 100 systolic this morning.  Will add nonselective beta-blocker as patient is able to tolerate.  No indication for urgent TIPS per GI.  High-protein, low-sodium diet  Patient undergo paracentesis prior to discharge.  Patient desires liver transplant longitudinally      Chest pressure  Assessment & Plan  Recurrent chest pressure  Chest x-ray negative 9/27, no evidence of myocardial infarction on EKG 9/27  Echocardiogram pending    Prolonged QT interval- (present on admission)  Assessment & Plan  Resolved    Anemia- (present on admission)  Assessment & Plan  No evidence of bleeding.  Hemodynamically stable      Hepatic encephalopathy (HCC)- (present on admission)  Assessment & Plan  Oriented and answering questions appropriately  Continue lactulose, titrate to 3 stools per day    Metabolic acidosis- (present on admission)  Assessment & Plan  Likely secondary to lactic acidosis  Downtrended    Coagulopathy (HCC)- (present on admission)  Assessment & Plan  INR 1.69    Smoking- (present on admission)  Assessment & Plan  Tobacco cessation encouraged  Continue NicoDerm patches    Vitamin D deficiency- (present on admission)  Assessment & Plan  Present  Vitamin D replacement initiated         VTE prophylaxis:   SCDs/TEDs      I have performed a physical exam and reviewed and updated ROS and Plan today (9/29/2024). In review of yesterday's note (9/28/2024), there are no changes except as documented above.

## 2024-09-29 NOTE — THERAPY
Physical Therapy   Initial Evaluation     Patient Name: Kavita Freeman  Age:  59 y.o., Sex:  female  Medical Record #: [id number]  Today's Date: 9/29/2024     Precautions  Precautions: (P) Fall Risk    Assessment  Patient is  59 y.o. female with a past medical history significant for alcohol use disorder, liver cirrhosis with portal hypertension, ascites, hepatic encephalopathy, and GIB (s/p RBC transfusion 8/24), perirectal abscess (s/p I&D), MDD, BINDU, SVT, h/o necrotizing fasciitis admitted 9/25/2024 with abdominal pain following a large-volume scheduled therapeutic paracentesis (approximately 10 L) at [facility]; acute decompensated liver failure.Pt demo's safe and steady gait without aD. Has DME at home if needed. Pt cleared on stairs. Safe to D/C home with friends to assist as able.      Plan    Physical Therapy Initial Treatment Plan   Duration: (P) Discharge Needs Only       Discharge Recommendations: (P) Anticipate that the patient will have no further physical therapy needs after discharge from the hospital          09/29/24 1302   Time In/Time Out   Therapy Start Time 1332   Therapy End Time 1402   Total Therapy Time 30   Charge Group   PT Evaluation PT Evaluation Mod   PT Self Care / Home Evaluation (Units) 1   Total Time Spent   PT Total Time Yes   PT Evaluation Time Spent (Mins) 20   PT Self Care/Home Evaluation Time Spent (Mins) 10    Services   Is patient using  services for this encounter? No   Initial Contact Note    Initial Contact Note Order Received and Verified, Evaluation Only - Patient Does Not Require Further Acute Physical Therapy at this Time.  However, May Benefit from Post Acute Therapy for Higher Level Functional Deficits.   Precautions   Precautions Fall Risk   Vitals   O2 Delivery Device None - Room Air   Pain 0 - 10 Group   Location Abdomen   Location Orientation Left   Therapist Pain Assessment Nurse Notified;Post Activity Pain Same as Prior to Activity;3    Prior Living Situation   Prior Services None;Home-Independent   Housing / Facility 1 Story House   Steps Into Home 2   Steps In Home 0   Equipment Owned Front-Wheel Walker   Lives with - Patient's Self Care Capacity Significant Other   Comments pt reports less than desirable home situation and will be moving closer to town. Pt feels very isolated   Prior Level of Functional Mobility   Bed Mobility Independent   Transfer Status Independent   Ambulation Independent   Ambulation Distance household   Assistive Devices Used None   Comments MD told pt that she can no longer drive   Cognition    Speech/ Communication Delayed Responses   Comments emotional labile, agreeable and excited for new beginings.   Passive ROM Lower Body   Passive ROM Lower Body WDL   Active ROM Lower Body    Active ROM Lower Body  WDL   Strength Lower Body   Lower Body Strength  WDL   Comments functional fatiques easily   Sensation Lower Body   Lower Extremity Sensation   WDL   Coordination Lower Body    Coordination Lower Body  WDL   Other Treatments   Other Treatments Provided paced activity , home safety with good understanding.   Balance Assessment   Sitting Balance (Static) Good   Sitting Balance (Dynamic) Fair +   Standing Balance (Static) Fair   Standing Balance (Dynamic) Fair   Weight Shift Sitting Good   Weight Shift Standing Good   Comments non AD, no overt LOB   Bed Mobility    Supine to Sit Supervised   Sit to Supine Supervised   Gait Analysis   Gait Level Of Assist Supervised   Assistive Device None   Distance (Feet) 200   # of Times Distance was Traveled 2   Deviation Bradykinetic   # of Stairs Climbed 5   Level of Assist with Stairs Supervised   Functional Mobility   Sit to Stand Standby Assist   Bed, Chair, Wheelchair Transfer Standby Assist   6 Clicks Assessment - How much HELP from from another person do you currently need... (If the patient hasn't done an activity recently, how much help from another person do you think he/she  would need if he/she tried?)   Turning from your back to your side while in a flat bed without using bedrails? 4   Moving from lying on your back to sitting on the side of a flat bed without using bedrails? 4   Moving to and from a bed to a chair (including a wheelchair)? 4   Standing up from a chair using your arms (e.g., wheelchair, or bedside chair)? 4   Walking in hospital room? 4   Climbing 3-5 steps with a railing? 4   6 clicks Mobility Score 24   Activity Tolerance   Sitting Edge of Bed 15 mins   Standing 7 mins   Comments limited by randolph   Education Group   Role of Physical Therapist Patient Response Patient;Acceptance;Explanation;Verbal Demonstration   Physical Therapy Initial Treatment Plan    Duration Discharge Needs Only   Anticipated Discharge Equipment and Recommendations   Discharge Recommendations Anticipate that the patient will have no further physical therapy needs after discharge from the hospital   Interdisciplinary Plan of Care Collaboration   IDT Collaboration with  Nursing   Patient Position at End of Therapy Edge of Bed;Phone within Reach;Tray Table within Reach;Call Light within Reach;Family / Friend in Room   Collaboration Comments re; eval   Session Information   Date / Session Number  9/29/24- D/C needs

## 2024-09-29 NOTE — CARE PLAN
The patient is Stable - Low risk of patient condition declining or worsening    Shift Goals  Clinical Goals: Pain management, monitor labs, pt safety  Patient Goals: Rest and comfort  Family Goals: JESUSITA    Progress made toward(s) clinical / shift goals:  Pt is progressing and trying to manage her anxiety.    PIV infiltrated; removed and new PIV placed in right hand.

## 2024-09-30 ENCOUNTER — PHARMACY VISIT (OUTPATIENT)
Dept: PHARMACY | Facility: MEDICAL CENTER | Age: 59
End: 2024-09-30
Payer: COMMERCIAL

## 2024-09-30 VITALS
RESPIRATION RATE: 18 BRPM | HEIGHT: 68 IN | TEMPERATURE: 98.4 F | HEART RATE: 94 BPM | WEIGHT: 153.88 LBS | OXYGEN SATURATION: 95 % | SYSTOLIC BLOOD PRESSURE: 136 MMHG | DIASTOLIC BLOOD PRESSURE: 80 MMHG | BODY MASS INDEX: 23.32 KG/M2

## 2024-09-30 PROBLEM — R10.9 AP (ABDOMINAL PAIN): Status: RESOLVED | Noted: 2024-09-25 | Resolved: 2024-09-30

## 2024-09-30 PROBLEM — R07.89 CHEST PRESSURE: Status: RESOLVED | Noted: 2024-09-28 | Resolved: 2024-09-30

## 2024-09-30 PROBLEM — E87.20 METABOLIC ACIDOSIS: Status: RESOLVED | Noted: 2024-08-18 | Resolved: 2024-09-30

## 2024-09-30 PROBLEM — K76.82 HEPATIC ENCEPHALOPATHY (HCC): Status: RESOLVED | Noted: 2024-08-30 | Resolved: 2024-09-30

## 2024-09-30 PROBLEM — R94.31 PROLONGED QT INTERVAL: Status: RESOLVED | Noted: 2024-08-15 | Resolved: 2024-09-30

## 2024-09-30 PROBLEM — K85.90 PANCREATITIS, UNSPECIFIED PANCREATITIS TYPE: Status: RESOLVED | Noted: 2024-09-25 | Resolved: 2024-09-30

## 2024-09-30 LAB
ANION GAP SERPL CALC-SCNC: 8 MMOL/L (ref 7–16)
BUN SERPL-MCNC: 11 MG/DL (ref 8–22)
CALCIUM SERPL-MCNC: 8.8 MG/DL (ref 8.5–10.5)
CHLORIDE SERPL-SCNC: 103 MMOL/L (ref 96–112)
CO2 SERPL-SCNC: 23 MMOL/L (ref 20–33)
CREAT SERPL-MCNC: 0.93 MG/DL (ref 0.5–1.4)
GFR SERPLBLD CREATININE-BSD FMLA CKD-EPI: 72 ML/MIN/1.73 M 2
GLUCOSE SERPL-MCNC: 106 MG/DL (ref 65–99)
POTASSIUM SERPL-SCNC: 4 MMOL/L (ref 3.6–5.5)
SODIUM SERPL-SCNC: 134 MMOL/L (ref 135–145)

## 2024-09-30 PROCEDURE — 700111 HCHG RX REV CODE 636 W/ 250 OVERRIDE (IP): Mod: JZ | Performed by: INTERNAL MEDICINE

## 2024-09-30 PROCEDURE — A9270 NON-COVERED ITEM OR SERVICE: HCPCS | Performed by: STUDENT IN AN ORGANIZED HEALTH CARE EDUCATION/TRAINING PROGRAM

## 2024-09-30 PROCEDURE — 700111 HCHG RX REV CODE 636 W/ 250 OVERRIDE (IP): Performed by: STUDENT IN AN ORGANIZED HEALTH CARE EDUCATION/TRAINING PROGRAM

## 2024-09-30 PROCEDURE — 80048 BASIC METABOLIC PNL TOTAL CA: CPT

## 2024-09-30 PROCEDURE — 99239 HOSP IP/OBS DSCHRG MGMT >30: CPT | Performed by: STUDENT IN AN ORGANIZED HEALTH CARE EDUCATION/TRAINING PROGRAM

## 2024-09-30 PROCEDURE — 700102 HCHG RX REV CODE 250 W/ 637 OVERRIDE(OP): Performed by: STUDENT IN AN ORGANIZED HEALTH CARE EDUCATION/TRAINING PROGRAM

## 2024-09-30 PROCEDURE — RXMED WILLOW AMBULATORY MEDICATION CHARGE: Performed by: STUDENT IN AN ORGANIZED HEALTH CARE EDUCATION/TRAINING PROGRAM

## 2024-09-30 PROCEDURE — 700102 HCHG RX REV CODE 250 W/ 637 OVERRIDE(OP): Performed by: INTERNAL MEDICINE

## 2024-09-30 PROCEDURE — A9270 NON-COVERED ITEM OR SERVICE: HCPCS | Performed by: INTERNAL MEDICINE

## 2024-09-30 RX ORDER — SIMETHICONE 125 MG
125 TABLET,CHEWABLE ORAL 3 TIMES DAILY PRN
Qty: 120 TABLET | Refills: 0 | Status: SHIPPED | OUTPATIENT
Start: 2024-09-30 | End: 2024-10-30

## 2024-09-30 RX ORDER — SIMETHICONE 125 MG
125 TABLET,CHEWABLE ORAL ONCE
Status: COMPLETED | OUTPATIENT
Start: 2024-09-30 | End: 2024-09-30

## 2024-09-30 RX ORDER — SPIRONOLACTONE 50 MG/1
150 TABLET, FILM COATED ORAL DAILY
Qty: 90 TABLET | Refills: 0 | Status: ON HOLD | OUTPATIENT
Start: 2024-10-01 | End: 2024-10-31

## 2024-09-30 RX ORDER — HYDROXYZINE HYDROCHLORIDE 25 MG/1
25 TABLET, FILM COATED ORAL 3 TIMES DAILY PRN
Qty: 30 TABLET | Refills: 0 | Status: SHIPPED | OUTPATIENT
Start: 2024-09-30 | End: 2024-10-09 | Stop reason: SDUPTHER

## 2024-09-30 RX ORDER — PREDNISONE 20 MG/1
20 TABLET ORAL DAILY
Qty: 28 TABLET | Refills: 0 | Status: SHIPPED | OUTPATIENT
Start: 2024-09-27 | End: 2024-10-28

## 2024-09-30 RX ORDER — FUROSEMIDE 20 MG/1
60 TABLET ORAL DAILY
Qty: 90 TABLET | Refills: 0 | Status: ON HOLD | OUTPATIENT
Start: 2024-10-01 | End: 2024-10-31

## 2024-09-30 RX ORDER — NICOTINE 21 MG/24HR
1 PATCH, TRANSDERMAL 24 HOURS TRANSDERMAL EVERY 24 HOURS
Qty: 28 PATCH | Refills: 0 | Status: SHIPPED | OUTPATIENT
Start: 2024-09-30 | End: 2024-10-30

## 2024-09-30 RX ORDER — SIMETHICONE 125 MG
125 TABLET,CHEWABLE ORAL 3 TIMES DAILY PRN
Status: DISCONTINUED | OUTPATIENT
Start: 2024-09-30 | End: 2024-09-30 | Stop reason: HOSPADM

## 2024-09-30 RX ADMIN — OMEPRAZOLE 20 MG: 20 CAPSULE, DELAYED RELEASE ORAL at 05:01

## 2024-09-30 RX ADMIN — LACTULOSE 30 ML: 10 SOLUTION ORAL at 05:00

## 2024-09-30 RX ADMIN — SIMETHICONE 125 MG: 125 TABLET, CHEWABLE ORAL at 13:33

## 2024-09-30 RX ADMIN — FAMOTIDINE 20 MG: 10 INJECTION, SOLUTION INTRAVENOUS at 04:54

## 2024-09-30 RX ADMIN — SUCRALFATE 1 G: 1 SUSPENSION ORAL at 12:44

## 2024-09-30 RX ADMIN — OXYCODONE HYDROCHLORIDE 10 MG: 10 TABLET ORAL at 04:59

## 2024-09-30 RX ADMIN — HYDROXYZINE HYDROCHLORIDE 25 MG: 50 TABLET, FILM COATED ORAL at 09:48

## 2024-09-30 RX ADMIN — HYDROXYZINE HYDROCHLORIDE 25 MG: 50 TABLET, FILM COATED ORAL at 04:56

## 2024-09-30 RX ADMIN — DIPHENHYDRAMINE HYDROCHLORIDE 25 MG: 25 TABLET ORAL at 02:10

## 2024-09-30 RX ADMIN — PREDNISONE 40 MG: 20 TABLET ORAL at 04:55

## 2024-09-30 RX ADMIN — FUROSEMIDE 60 MG: 40 TABLET ORAL at 05:07

## 2024-09-30 RX ADMIN — SPIRONOLACTONE 150 MG: 25 TABLET ORAL at 04:58

## 2024-09-30 RX ADMIN — SUCRALFATE 1 G: 1 SUSPENSION ORAL at 00:12

## 2024-09-30 RX ADMIN — OXYCODONE HYDROCHLORIDE 10 MG: 10 TABLET ORAL at 09:48

## 2024-09-30 RX ADMIN — LACTULOSE 30 ML: 10 SOLUTION ORAL at 12:44

## 2024-09-30 RX ADMIN — SUCRALFATE 1 G: 1 SUSPENSION ORAL at 04:55

## 2024-09-30 RX ADMIN — Medication 1000 UNITS: at 04:55

## 2024-09-30 ASSESSMENT — PAIN DESCRIPTION - PAIN TYPE
TYPE: ACUTE PAIN
TYPE: ACUTE PAIN

## 2024-09-30 NOTE — PROGRESS NOTES
Kavita Freeman has been provided discharge instructions, to include follow up care, home medications, and activity/diet reviewed. Understanding verbalized. IV removed. Catheter tip intact, bleeding controlled. Arm band removed. Medications given from pharmacy. Pt with cab voucher, call placed to arrange ride. Pt out of DC at this time with personal belongings.

## 2024-09-30 NOTE — DISCHARGE SUMMARY
Discharge Summary    CHIEF COMPLAINT ON ADMISSION  Chief Complaint   Patient presents with    Abdominal Pain    N/V       Reason for Admission  Nausea/Vomiting/Abd Pain     Admission Date  9/25/2024    CODE STATUS  Full Code    HPI & HOSPITAL COURSE  Kavita Freeman is a 59 y.o. female with a past medical history significant for alcohol use disorder, liver cirrhosis with portal hypertension, ascites, hepatic encephalopathy, and GIB (s/p RBC transfusion 8/24), perirectal abscess (s/p I&D), MDD, BINDU, SVT, h/o necrotizing fasciitis admitted 9/25/2024 with abdominal pain following a large-volume scheduled therapeutic paracentesis (approximately 10 L) at  clinic; acute decompensated liver failure.     The patient was evaluated in the emergency department secondary to abdominal pain.  She went to  for regularly scheduled a paracentesis where she had normal 10 L of ascitic fluid removed.  Thereafter, she experienced abdominal pain.  CT imaging revealed no complications of paracentesis nor perforation.  Lipase was minimally elevated.    The patient underwent a diagnostic paracentesis in which 3L were withdrawn.  Ascitic fluid was sent for evaluation of SBP.  The patient was placed on empiric ceftriaxone and Flagyl.  Thereafter, the results of the ascitic fluid study were negative and she was discontinued from empiric antibiotics.      The patient demonstrated a Maddrey score of 39.7 has a poor prognosis and may to benefit from glucocorticoid therapy.  As such, she was started on prednisone for 28 days.      Additionally, she was continued on lactulose.      She had reported some chest pain  and her cardiac workup was negative at that time.      GI was consulted to assess for the patient would be a candidate for TIPS procedure.  GI recommended against TIPS and provided recommendations for medication management instead.  The patient underwent an additional therapeutic paracentesis.     The patient expressed interest  in establish care with a psychologist.  As such, she was made an appointment with the [facility] psychologist Dr. Varela.    Therefore, she is discharged in good and stable condition to home with close outpatient follow-up.    The patient recovered much more quickly than anticipated on admission.    Discharge Date  09/30/24     FOLLOW UP ITEMS POST DISCHARGE  Patient is encouraged to follow-up with her gastroenterologist to discuss lactulose, spironolactone and furosemide dosing.  She may also benefit from continued outpatient paracentesis on a regular basis.    The patient is encouraged to undergo CMP and blood pressure monitoring following discharge as the doses of furosemide and spironolactone have increased and may result in electrolyte abnormalities.    Patient is here to discuss liver transplant with her gastroenterologist in the future.    The patient is urged to establish care with Dr. Angeles Varela, [facility] clinic psychologist, or her preferred therapist of choice.    Please discuss chronic GERD and neuropathy symptoms with primary care physician.    Lease discuss steroid taper following 28-day regimen of prednisone in the setting of an elevated Maddrey score.    The patient reported chest pain during the admission admission.  Ischemic workup was negative.  Consider assessing ASCVD score and discussing prevention strategies including medication management with primary care physician.    DISCHARGE DIAGNOSES  Principal Problem (Resolved):    Pancreatitis, unspecified pancreatitis type (POA: Yes)  Active Problems:    Alcoholic cirrhosis of liver with ascites (HCC) (POA: Yes)    Anemia (POA: Yes)    Coagulopathy (HCC) (POA: Yes)    Vitamin D deficiency (POA: Yes)    Smoking (POA: Yes)  Resolved Problems:    AP (abdominal pain) (POA: Unknown)    Prolonged QT interval (POA: Yes)    Chest pressure (POA: Unknown)    Metabolic acidosis (POA: Yes)    Hepatic encephalopathy (HCC) (POA: Yes)    Tobacco use disorder (POA:  Unknown)      FOLLOW UP  Future Appointments   Date Time Provider Department Center   10/2/2024  5:20 PM [facility]   10/7/2024  1:00 PM Angeles Varela, Ph.D. [facility]     Galilea Vaca D.O.  [address] [address]  [phone]    Follow up  Previously scheduled appointment on 10/2 at 520pm virtually      MEDICATIONS ON DISCHARGE     Medication List        START taking these medications        Instructions   hydrOXYzine HCl 25 MG Tabs  Commonly known as: Atarax   Take 1 Tablet by mouth 3 times a day as needed for Anxiety.  Dose: 25 mg     nicotine 21 MG/24HR Pt24  Commonly known as: Nicoderm   Place 1 Patch on the skin every 24 hours for 30 days.  Dose: 1 Patch     predniSONE 20 MG Tabs  Commonly known as: Deltasone   Take 1 Tablet by mouth every day for 28 days.  Dose: 20 mg     simethicone 125 MG chewable tablet  Commonly known as: Mylicon   Chew 1 Tablet 3 times a day as needed for Flatulence for up to 30 days.  Dose: 125 mg     vitamin D 1000 UNIT Tabs  Start taking on: October 1, 2024  Commonly known as: Cholecalciferol   Take 1 Tablet by mouth every day for 30 days.  Dose: 1,000 Units            CHANGE how you take these medications        Instructions   furosemide 20 MG Tabs  Start taking on: October 1, 2024  What changed: how much to take  Commonly known as: Lasix   Take 3 Tablets by mouth every day for 30 days.  Dose: 60 mg     spironolactone 50 MG Tabs  Start taking on: October 1, 2024  What changed:   medication strength  how much to take  Commonly known as: Aldactone   Take 3 Tablets by mouth every day for 30 days.  Dose: 150 mg            CONTINUE taking these medications        Instructions   acetaminophen 325 MG Tabs  Commonly known as: Tylenol   Take 2 Tablets by mouth every 6 hours as needed for Mild Pain, Moderate Pain or Fever.  Dose: 650 mg     lactulose 20 GM/30ML Soln   Take 30 mL by mouth 3 times a day.  Dose: 30 mL      omeprazole 20 MG delayed-release capsule  Commonly known as: PriLOSEC   Take 1 Capsule by mouth 2 times a day.  Dose: 20 mg     sucralfate 1 GM/10ML Susp  Commonly known as: Carafate   Take 10 mL by mouth every 6 hours.  Dose: 1 g            STOP taking these medications      naltrexone 50 MG Tabs  Commonly known as: Depade              Allergies  Allergies   Allergen Reactions    Nitrofurantoin Vomiting    Sulfamethoxazole W-Trimethoprim Vomiting       DIET  Orders Placed This Encounter   Procedures    Diet Order Diet: Regular (low salt); Nutrient modifications: (optional): High Protein     Standing Status:   Standing     Number of Occurrences:   1     Order Specific Question:   Diet:     Answer:   Regular [1]     Comments:   low salt     Order Specific Question:   Nutrient modifications: (optional)     Answer:   High Protein [4]       ACTIVITY  As tolerated.  Weight bearing as tolerated    CONSULTATIONS  GI    PROCEDURES  None    LABORATORY  Lab Results   Component Value Date    SODIUM 134 (L) 09/30/2024    POTASSIUM 4.0 09/30/2024    CHLORIDE 103 09/30/2024    CO2 23 09/30/2024    GLUCOSE 106 (H) 09/30/2024    BUN 11 09/30/2024    CREATININE 0.93 09/30/2024    CREATININE 0.8 08/07/2008        Lab Results   Component Value Date    WBC 5.4 09/28/2024    HEMOGLOBIN 9.1 (L) 09/28/2024    HEMATOCRIT 27.6 (L) 09/28/2024    PLATELETCT 128 (L) 09/28/2024      US-PARACENTESIS, ABD WITH IMAGING   Final Result      1. Ultrasound-guided therapeutic paracentesis of the left lower quadrant of the abdominal wall.      2. 3150 mL of fluid withdrawn.      DX-CHEST-PORTABLE (1 VIEW)   Final Result         1.  No acute cardiopulmonary disease.      US-PARACENTESIS, ABD WITH IMAGING   Final Result      1. Ultrasound-guided diagnostic and therapeutic therapeutic paracentesis of the left lower quadrant of the abdominal wall.      2. 3,000 mL of fluid withdrawn.      CT-ABDOMEN-PELVIS WITH   Final Result         1.  Mild colonic wall  thickening from the cecum to the hepatic flexure, can be associated with changes of portal hypertension, consider component of colitis.   2.  Hepatomegaly and changes of cirrhosis   3.  Atherosclerosis and atherosclerotic coronary artery disease   4.  Trace right pleural effusion   5.  Small pericardial effusion   6.  Moderate scattered abdominal ascites            Total time of the discharge process exceeds 41 minutes.

## 2024-09-30 NOTE — DIETARY
"Nutrition Update: Follow-up for Adequate PO Intake  Day 2 of admit.  Kavita Freeman is a 59 y.o. female with admitting DX of Pancreatitis, unspecified pancreatitis type [K85.90].    Current Diet: Regular, low salt, high protein, Ensure Clear TID    Per ADLs, pt consuming % of most meals and supplements consumed.     Wt stable, +stooling.    Malnutrition risk (9/26): \"Moderate malnutrition in the context of chronic illness related to cirrhosis as evidenced by <75% of estimated energy requirements for >1 month, moderate muscle loss (temporal, dorsal hand, and clavicle) and mild subcutaneous fat loss (orbital).\"    Problem: Nutritional:  Goal: Achieve adequate nutritional intake  Description: Patient will consume >50% of meals  Outcome: met      RD available PRN    "

## 2024-09-30 NOTE — CARE PLAN
The patient is Stable - Low risk of patient condition declining or worsening    Shift Goals  Clinical Goals: Pain and nausea control within the shift  Patient Goals: Rest, Pain Control, Discharge tomorrow  Family Goals: JESUSITA    Progress made toward(s) clinical / shift goals:  Medicated per MAR for complaints of nausea and pain, with some relief verbalized. Able to make needs known, call light within easy reach. Reinforced safety teachings, up to self. Refused bed alarm despite education and encouragement.     Patient is not progressing towards the following goals:      Problem: Gastrointestinal Irritability  Goal: Nausea and vomiting will be absent or improve  Description: Target End Date:  Prior to discharge or change in level of care    Document on I/O and Assessment flowsheets    1.  Assess for history, duration, frequency, severity, and potential precipitating factors  2.  Administer antiemetics as ordered  3.  Emesis basin within easy reach of the patient, but out of sight if psychogenic component  4.  Eliminate strong odors from surroundings  5.  Introduce cold water, ice chips, carmen products, and room temperature broth or bouillon if tolerated and appropriate to the patient's diet  6.  Encourage small amounts of bland, simple foods like broth, rice, bananas, Jell-O, crackers or toast if tolerated and appropriate to patient's diet  7.  Position the patient upright while eating and for 1 to 2 hours post-meal  8.  Encourage nonpharmacological nausea control techniques such as relaxation, guided imagery, music therapy, distraction, or deep breathing exercises  Outcome: Not Progressing  Goal: Diarrhea will be absent or improved  Description: Target End Date:  Prior to discharge or change in level of care    1.  Assess for abdominal discomfort, pain, cramping, frequency, urgency, loose or liquid stools, and hyperactive bowel sensations.  2.  Evaluate pattern of defecation  3.  Administer antidiarrheal agents per  order  4. Culture stool, per order  5.  Inquire about food intolerances, medications, change in eating pattern and current stressors  6.  Assess for fecal impaction  7.  Assess hydration status  8.  Assess condition of perianal skin  9.  Loss of bowel elimination control can lead of feelings of decreased self esteem.  Examine the emotional impact of illness, hospitalization and/or accidents.  Outcome: Not Progressing  Note: Frequent Bm with use of Lactulose.

## 2024-09-30 NOTE — DISCHARGE PLANNING
Case Management Discharge Planning    Admission Date: 9/25/2024  GMLOS: 4.9  ALOS: 2    6-Clicks ADL Score: 24  6-Clicks Mobility Score: 24      Anticipated Discharge Dispo: Discharge Disposition: Discharged to home/self care (01)    DME Needed: No    Action(s) Taken: Updated Provider/Nurse on Discharge Plan  RNCM discussed pt during IDT rounds. CM discussed discharge plan with Dr. Crenshaw. Met with pt at bedside to discuss discharge plan for today. Pt would like to discharge to specific address: [address] [place] [id number]. Pt does not have transportation available RNCM provided cab voucher to primary RN. MD states that pt will discharge @1600 once medically cleared.    Escalations Completed: None    Medically Clear: No    Next Steps: Follow-up with medical team to discuss DC needs and barriers.    Barriers to Discharge: Transportation

## 2024-09-30 NOTE — CARE PLAN
The patient is Stable - Low risk of patient condition declining or worsening    Shift Goals  Clinical Goals: rest reposition, nausea control, pain control      Progress made toward(s) clinical / shift goals:  pt. Ambulating around the unit, tolerates meds  and food. Prn hydroxyzine and oxycodone given. Poc discussed with pt. Needs attended.        Patient is not progressing towards the following goals:

## 2024-10-01 ENCOUNTER — TELEPHONE (OUTPATIENT)
Dept: MEDICAL GROUP | Facility: LAB | Age: 59
End: 2024-10-01
Payer: COMMERCIAL

## 2024-10-02 ENCOUNTER — APPOINTMENT (OUTPATIENT)
Dept: MEDICAL GROUP | Facility: LAB | Age: 59
End: 2024-10-02
Payer: COMMERCIAL

## 2024-10-02 ENCOUNTER — TELEPHONE (OUTPATIENT)
Dept: VASCULAR LAB | Facility: MEDICAL CENTER | Age: 59
End: 2024-10-02

## 2024-10-04 NOTE — DOCUMENTATION QUERY
"                                                                         [facility]                                                                       Query Response Note      PATIENT:               ROE JJ  ACCT #:                  [id number]  MRN:                     [id number]  :                      1965  ADMIT DATE:       2024 6:20 PM  DISCH DATE:        2024 4:44 PM  RESPONDING  PROVIDER #:        [id number]           QUERY TEXT:    \"Moderate muscle loss (temporal, dorsal hand, and clavicle) and mild subcutaneous fat loss (orbital).\"  is noted in the RD note .  Please clarify the nutritional status based upon the clinical indicators and treatment.    The patient's Clinical Indicators include:  Clinical Findings:    -  note: Estrellita Castellanos RD  \"Consult received for MST score >/= 3, unintentional weight loss, poor appetite.\"    \"Nutrition Diagnosis:    Moderate malnutrition in the context of chronic illness related to cirrhosis as evidenced by <75% of estimated energy requirements for >1 month, moderate muscle loss (temporal, dorsal hand, and clavicle) and mild subcutaneous fat loss (orbital). \"    \"  Muscle mass: Moderate loss of temporal, dorsal hand, and clavicle.      Subcutaneous fat: Moderate loss orbital.\"    \"Height: 172.7 cm (5' 8\")  Weight: 67.3 kg (148 lb 5.9 oz)  Weight taken via bed scale  Body mass index is 22.56 kg/m².\"    Risk factors: Pancreatitis, alcoholic cirrhosis with ascites, portal HTN, ascites, anemia, coagulopathy, metabolic ascites. vit D deficiency; nausea and vomiting    Treatment:  RD consult, Ensure clear, encourage > 50% of meals, document % of intake, monitor nutrition, additional fluids per MD/DO  vit D3 daily; zofran    Note: If you agree with a diagnosis listed above, please remember to include it in your concurrent daily documentation and onto the Discharge Summary.    Please contact me with any questions:    Angle ADAMS RN CCDS  [facility] " [email or web address]  [email or web address]  [email or web address] via [email or web address]  Options provided:   -- Malnutrition, Moderate Protein Calorie   -- Malnutrition, Mild Protein Calorie   -- Insignificant Findings   -- Other explanation, (please specify other explanation)      Query created by: Angle Alvarez on 9/30/2024 10:26 AM    RESPONSE TEXT:    Malnutrition, Moderate Protein Calorie          Electronically signed by:  RICKY RICHTER MD 10/4/2024 4:04 AM

## 2024-10-07 ENCOUNTER — TELEMEDICINE (OUTPATIENT)
Dept: MEDICAL GROUP | Facility: CLINIC | Age: 59
End: 2024-10-07
Payer: COMMERCIAL

## 2024-10-07 ENCOUNTER — PATIENT MESSAGE (OUTPATIENT)
Dept: MEDICAL GROUP | Facility: LAB | Age: 59
End: 2024-10-07

## 2024-10-07 DIAGNOSIS — F10.21 ALCOHOL DEPENDENCE IN REMISSION (HCC): ICD-10-CM

## 2024-10-07 PROCEDURE — 99215 OFFICE O/P EST HI 40 MIN: CPT | Performed by: PSYCHOLOGIST

## 2024-10-09 ENCOUNTER — HOSPITAL ENCOUNTER (OUTPATIENT)
Dept: RADIOLOGY | Facility: MEDICAL CENTER | Age: 59
End: 2024-10-09
Attending: INTERNAL MEDICINE
Payer: COMMERCIAL

## 2024-10-09 DIAGNOSIS — K72.90 HEPATIC ENCEPHALOPATHY IN FULMINANT HEPATIC FAILURE (HCC): ICD-10-CM

## 2024-10-09 DIAGNOSIS — E87.1 HYPONATREMIA: ICD-10-CM

## 2024-10-09 DIAGNOSIS — K70.31 ALCOHOLIC CIRRHOSIS OF LIVER WITH ASCITES (HCC): ICD-10-CM

## 2024-10-09 DIAGNOSIS — K76.82 HEPATIC ENCEPHALOPATHY IN FULMINANT HEPATIC FAILURE (HCC): ICD-10-CM

## 2024-10-09 DIAGNOSIS — F41.9 ANXIETY: ICD-10-CM

## 2024-10-09 PROCEDURE — P9047 ALBUMIN (HUMAN), 25%, 50ML: HCPCS | Mod: JZ

## 2024-10-09 PROCEDURE — 49083 ABD PARACENTESIS W/IMAGING: CPT

## 2024-10-09 PROCEDURE — 700111 HCHG RX REV CODE 636 W/ 250 OVERRIDE (IP): Mod: JZ

## 2024-10-09 RX ORDER — ALBUMIN (HUMAN) 12.5 G/50ML
SOLUTION INTRAVENOUS
Status: COMPLETED
Start: 2024-10-09 | End: 2024-10-09

## 2024-10-09 RX ORDER — ALBUMIN (HUMAN) 12.5 G/50ML
50 SOLUTION INTRAVENOUS ONCE
Status: COMPLETED | OUTPATIENT
Start: 2024-10-09 | End: 2024-10-09

## 2024-10-09 RX ADMIN — ALBUMIN (HUMAN) 50 G: 12.5 SOLUTION INTRAVENOUS at 11:30

## 2024-10-10 RX ORDER — HYDROXYZINE HYDROCHLORIDE 25 MG/1
25 TABLET, FILM COATED ORAL 3 TIMES DAILY PRN
Qty: 30 TABLET | Refills: 0 | Status: SHIPPED | OUTPATIENT
Start: 2024-10-10 | End: 2024-10-21 | Stop reason: SDUPTHER

## 2024-10-14 DIAGNOSIS — K76.82 HEPATIC ENCEPHALOPATHY (HCC): ICD-10-CM

## 2024-10-14 RX ORDER — LACTULOSE 10 G/15ML
SOLUTION ORAL
Qty: 473 ML | Refills: 0 | Status: SHIPPED | OUTPATIENT
Start: 2024-10-14 | End: 2024-10-27

## 2024-10-15 DIAGNOSIS — K70.31 ALCOHOLIC CIRRHOSIS OF LIVER WITH ASCITES (HCC): ICD-10-CM

## 2024-10-21 ENCOUNTER — OFFICE VISIT (OUTPATIENT)
Dept: MEDICAL GROUP | Facility: LAB | Age: 59
End: 2024-10-21
Payer: COMMERCIAL

## 2024-10-21 VITALS
OXYGEN SATURATION: 99 % | SYSTOLIC BLOOD PRESSURE: 130 MMHG | TEMPERATURE: 99.1 F | HEIGHT: 68 IN | DIASTOLIC BLOOD PRESSURE: 70 MMHG | HEART RATE: 131 BPM | BODY MASS INDEX: 25.31 KG/M2 | WEIGHT: 167 LBS | RESPIRATION RATE: 20 BRPM

## 2024-10-21 DIAGNOSIS — L29.9 PRURITUS: ICD-10-CM

## 2024-10-21 DIAGNOSIS — Z09 HOSPITAL DISCHARGE FOLLOW-UP: ICD-10-CM

## 2024-10-21 DIAGNOSIS — Z23 NEED FOR VACCINATION: ICD-10-CM

## 2024-10-21 DIAGNOSIS — F41.9 ANXIETY: ICD-10-CM

## 2024-10-21 DIAGNOSIS — K70.31 ALCOHOLIC CIRRHOSIS OF LIVER WITH ASCITES (HCC): ICD-10-CM

## 2024-10-21 DIAGNOSIS — M79.2 NEUROPATHIC PAIN OF LOWER EXTREMITY: ICD-10-CM

## 2024-10-21 PROCEDURE — 90472 IMMUNIZATION ADMIN EACH ADD: CPT | Performed by: STUDENT IN AN ORGANIZED HEALTH CARE EDUCATION/TRAINING PROGRAM

## 2024-10-21 PROCEDURE — 3075F SYST BP GE 130 - 139MM HG: CPT | Performed by: STUDENT IN AN ORGANIZED HEALTH CARE EDUCATION/TRAINING PROGRAM

## 2024-10-21 PROCEDURE — 90677 PCV20 VACCINE IM: CPT | Performed by: STUDENT IN AN ORGANIZED HEALTH CARE EDUCATION/TRAINING PROGRAM

## 2024-10-21 PROCEDURE — 90746 HEPB VACCINE 3 DOSE ADULT IM: CPT | Performed by: STUDENT IN AN ORGANIZED HEALTH CARE EDUCATION/TRAINING PROGRAM

## 2024-10-21 PROCEDURE — 99215 OFFICE O/P EST HI 40 MIN: CPT | Mod: 25 | Performed by: STUDENT IN AN ORGANIZED HEALTH CARE EDUCATION/TRAINING PROGRAM

## 2024-10-21 PROCEDURE — 90656 IIV3 VACC NO PRSV 0.5 ML IM: CPT | Performed by: STUDENT IN AN ORGANIZED HEALTH CARE EDUCATION/TRAINING PROGRAM

## 2024-10-21 PROCEDURE — 3078F DIAST BP <80 MM HG: CPT | Performed by: STUDENT IN AN ORGANIZED HEALTH CARE EDUCATION/TRAINING PROGRAM

## 2024-10-21 PROCEDURE — 90471 IMMUNIZATION ADMIN: CPT | Performed by: STUDENT IN AN ORGANIZED HEALTH CARE EDUCATION/TRAINING PROGRAM

## 2024-10-21 RX ORDER — HYDROXYZINE HYDROCHLORIDE 25 MG/1
25 TABLET, FILM COATED ORAL 3 TIMES DAILY PRN
Qty: 270 TABLET | Refills: 1 | Status: ON HOLD | OUTPATIENT
Start: 2024-10-21

## 2024-10-21 RX ORDER — GABAPENTIN 100 MG/1
200 CAPSULE ORAL NIGHTLY
Status: ON HOLD | COMMUNITY
Start: 2024-10-17

## 2024-10-21 RX ORDER — FLUOXETINE 10 MG/1
10 CAPSULE ORAL EVERY MORNING
Status: ON HOLD | COMMUNITY
Start: 2024-10-17

## 2024-10-21 ASSESSMENT — ENCOUNTER SYMPTOMS
FEVER: 0
ABDOMINAL PAIN: 0
NAUSEA: 0
DIARRHEA: 0
PALPITATIONS: 0
NERVOUS/ANXIOUS: 1
INSOMNIA: 1
SHORTNESS OF BREATH: 1
WEIGHT LOSS: 0
COUGH: 0
VOMITING: 0
BLOOD IN STOOL: 0
CHILLS: 0
CLAUDICATION: 0

## 2024-10-21 ASSESSMENT — FIBROSIS 4 INDEX: FIB4 SCORE: 3.32

## 2024-10-22 ASSESSMENT — ENCOUNTER SYMPTOMS
BACK PAIN: 0
TINGLING: 1
SENSORY CHANGE: 1

## 2024-10-23 ENCOUNTER — HOSPITAL ENCOUNTER (OUTPATIENT)
Dept: RADIOLOGY | Facility: MEDICAL CENTER | Age: 59
End: 2024-10-23
Attending: INTERNAL MEDICINE
Payer: COMMERCIAL

## 2024-10-23 VITALS — HEART RATE: 99 BPM | SYSTOLIC BLOOD PRESSURE: 137 MMHG | OXYGEN SATURATION: 93 % | DIASTOLIC BLOOD PRESSURE: 70 MMHG

## 2024-10-23 DIAGNOSIS — E87.1 HYPONATREMIA: ICD-10-CM

## 2024-10-23 DIAGNOSIS — K70.31 ALCOHOLIC CIRRHOSIS OF LIVER WITH ASCITES (HCC): ICD-10-CM

## 2024-10-23 DIAGNOSIS — K72.90 HEPATIC ENCEPHALOPATHY IN FULMINANT HEPATIC FAILURE (HCC): ICD-10-CM

## 2024-10-23 DIAGNOSIS — K76.82 HEPATIC ENCEPHALOPATHY IN FULMINANT HEPATIC FAILURE (HCC): ICD-10-CM

## 2024-10-23 PROCEDURE — C1729 CATH, DRAINAGE: HCPCS

## 2024-10-25 DIAGNOSIS — K76.82 HEPATIC ENCEPHALOPATHY (HCC): ICD-10-CM

## 2024-10-27 RX ORDER — LACTULOSE 10 G/15ML
SOLUTION ORAL
Qty: 473 ML | Refills: 3 | Status: ON HOLD | OUTPATIENT
Start: 2024-10-27

## 2024-10-31 ENCOUNTER — HOSPITAL ENCOUNTER (INPATIENT)
Facility: MEDICAL CENTER | Age: 59
End: 2024-10-31
Attending: EMERGENCY MEDICINE | Admitting: INTERNAL MEDICINE
Payer: COMMERCIAL

## 2024-10-31 VITALS
WEIGHT: 137.79 LBS | RESPIRATION RATE: 26 BRPM | OXYGEN SATURATION: 94 % | BODY MASS INDEX: 20.88 KG/M2 | HEIGHT: 68 IN | HEART RATE: 94 BPM | TEMPERATURE: 96.5 F | SYSTOLIC BLOOD PRESSURE: 119 MMHG | DIASTOLIC BLOOD PRESSURE: 61 MMHG

## 2024-10-31 DIAGNOSIS — D72.829 LEUKOCYTOSIS, UNSPECIFIED TYPE: ICD-10-CM

## 2024-10-31 DIAGNOSIS — K65.2 SBP (SPONTANEOUS BACTERIAL PERITONITIS) (HCC): ICD-10-CM

## 2024-10-31 DIAGNOSIS — A41.9 SEPSIS, DUE TO UNSPECIFIED ORGANISM, UNSPECIFIED WHETHER ACUTE ORGAN DYSFUNCTION PRESENT (HCC): ICD-10-CM

## 2024-10-31 DIAGNOSIS — R00.2 PALPITATIONS: ICD-10-CM

## 2024-10-31 DIAGNOSIS — E16.2 HYPOGLYCEMIA: ICD-10-CM

## 2024-10-31 DIAGNOSIS — N17.9 AKI (ACUTE KIDNEY INJURY) (HCC): ICD-10-CM

## 2024-10-31 DIAGNOSIS — E87.1 HYPONATREMIA: ICD-10-CM

## 2024-10-31 PROBLEM — R74.01 TRANSAMINITIS: Status: ACTIVE | Noted: 2024-10-31

## 2024-10-31 PROBLEM — E87.29 HIGH ANION GAP METABOLIC ACIDOSIS: Status: ACTIVE | Noted: 2024-08-18

## 2024-10-31 PROBLEM — R65.21 SEPTIC SHOCK (HCC): Status: ACTIVE | Noted: 2024-10-31

## 2024-10-31 PROBLEM — Z71.89 ADVANCE CARE PLANNING: Status: ACTIVE | Noted: 2024-10-31

## 2024-10-31 LAB
ALBUMIN FLD-MCNC: 0.6 G/DL
ALBUMIN SERPL BCP-MCNC: 2.7 G/DL (ref 3.2–4.9)
ALBUMIN/GLOB SERPL: 0.6 G/DL
ALP SERPL-CCNC: 135 U/L (ref 30–99)
ALT SERPL-CCNC: 120 U/L (ref 2–50)
AMYLASE FLD-CCNC: 57 U/L
ANION GAP SERPL CALC-SCNC: 18 MMOL/L (ref 7–16)
ANION GAP SERPL CALC-SCNC: 20 MMOL/L (ref 7–16)
ANISOCYTOSIS BLD QL SMEAR: ABNORMAL
APPEARANCE FLD: NORMAL
AST SERPL-CCNC: 373 U/L (ref 12–45)
BACTERIA BLD CULT: NORMAL
BACTERIA BLD CULT: NORMAL
BACTERIA FLD AEROBE CULT: NORMAL
BASOPHILS # BLD AUTO: 0 % (ref 0–1.8)
BASOPHILS # BLD: 0 K/UL (ref 0–0.12)
BILIRUB SERPL-MCNC: 6.1 MG/DL (ref 0.1–1.5)
BODY FLD TYPE: NORMAL
BODY FLD TYPE: NORMAL
BUN SERPL-MCNC: 37 MG/DL (ref 8–22)
BUN SERPL-MCNC: 42 MG/DL (ref 8–22)
BURR CELLS BLD QL SMEAR: NORMAL
CALCIUM ALBUM COR SERPL-MCNC: 10.3 MG/DL (ref 8.5–10.5)
CALCIUM SERPL-MCNC: 8.9 MG/DL (ref 8.5–10.5)
CALCIUM SERPL-MCNC: 9.3 MG/DL (ref 8.5–10.5)
CHLORIDE SERPL-SCNC: 81 MMOL/L (ref 96–112)
CHLORIDE SERPL-SCNC: 82 MMOL/L (ref 96–112)
CO2 SERPL-SCNC: 16 MMOL/L (ref 20–33)
CO2 SERPL-SCNC: 18 MMOL/L (ref 20–33)
COLOR FLD: YELLOW
CREAT SERPL-MCNC: 1.85 MG/DL (ref 0.5–1.4)
CREAT SERPL-MCNC: 2.11 MG/DL (ref 0.5–1.4)
EOSINOPHIL # BLD AUTO: 0 K/UL (ref 0–0.51)
EOSINOPHIL NFR BLD: 0 % (ref 0–6.9)
ERYTHROCYTE [DISTWIDTH] IN BLOOD BY AUTOMATED COUNT: 57.6 FL (ref 35.9–50)
GFR SERPLBLD CREATININE-BSD FMLA CKD-EPI: 27 ML/MIN/1.73 M 2
GFR SERPLBLD CREATININE-BSD FMLA CKD-EPI: 31 ML/MIN/1.73 M 2
GLOBULIN SER CALC-MCNC: 4.7 G/DL (ref 1.9–3.5)
GLUCOSE BLD STRIP.AUTO-MCNC: 115 MG/DL (ref 65–99)
GLUCOSE BLD STRIP.AUTO-MCNC: 133 MG/DL (ref 65–99)
GLUCOSE BLD STRIP.AUTO-MCNC: 138 MG/DL (ref 65–99)
GLUCOSE BLD STRIP.AUTO-MCNC: 162 MG/DL (ref 65–99)
GLUCOSE BLD STRIP.AUTO-MCNC: 51 MG/DL (ref 65–99)
GLUCOSE BLD STRIP.AUTO-MCNC: 53 MG/DL (ref 65–99)
GLUCOSE FLD-MCNC: 5 MG/DL
GLUCOSE SERPL-MCNC: 51 MG/DL (ref 65–99)
GLUCOSE SERPL-MCNC: 98 MG/DL (ref 65–99)
GRAM STN SPEC: NORMAL
GRAM STN SPEC: NORMAL
HCT VFR BLD AUTO: 32.6 % (ref 37–47)
HCT VFR BLD AUTO: 39.3 % (ref 37–47)
HGB BLD-MCNC: 11 G/DL (ref 12–16)
HGB BLD-MCNC: 13.3 G/DL (ref 12–16)
INR PPP: 2.55 (ref 0.87–1.13)
LACTATE SERPL-SCNC: 5.4 MMOL/L (ref 0.5–2)
LACTATE SERPL-SCNC: 6.8 MMOL/L (ref 0.5–2)
LACTATE SERPL-SCNC: 8.1 MMOL/L (ref 0.5–2)
LG PLATELETS BLD QL SMEAR: NORMAL
LIPASE SERPL-CCNC: 47 U/L (ref 11–82)
LYMPHOCYTES # BLD AUTO: 0 K/UL (ref 1–4.8)
LYMPHOCYTES NFR BLD: 0 % (ref 22–41)
LYMPHOCYTES NFR FLD: 6 %
MACROCYTES BLD QL SMEAR: ABNORMAL
MANUAL DIFF BLD: ABNORMAL
MCH RBC QN AUTO: 29.1 PG (ref 27–33)
MCHC RBC AUTO-ENTMCNC: 33.8 G/DL (ref 32.2–35.5)
MCV RBC AUTO: 86 FL (ref 81.4–97.8)
METAMYELOCYTES NFR BLD MANUAL: 3 %
MICROCYTES BLD QL SMEAR: ABNORMAL
MONOCYTES # BLD AUTO: 0.68 K/UL (ref 0–0.85)
MONOCYTES NFR BLD AUTO: 1.5 % (ref 0–13.4)
MONOS+MACROS NFR FLD MANUAL: 14 %
MORPHOLOGY BLD-IMP: NORMAL
NEUTROPHILS # BLD AUTO: 43.17 K/UL (ref 1.82–7.42)
NEUTROPHILS NFR BLD: 83.7 % (ref 44–72)
NEUTROPHILS NFR FLD: 80 %
NEUTS BAND NFR BLD MANUAL: 11.8 % (ref 0–10)
NRBC # BLD AUTO: 0.03 K/UL
NRBC BLD-RTO: 0.1 /100 WBC (ref 0–0.2)
NUC CELL # FLD: NORMAL CELLS/UL
PLATELET # BLD AUTO: 313 K/UL (ref 164–446)
PLATELET BLD QL SMEAR: NORMAL
PMV BLD AUTO: 11.1 FL (ref 9–12.9)
POIKILOCYTOSIS BLD QL SMEAR: NORMAL
POLYCHROMASIA BLD QL SMEAR: NORMAL
POTASSIUM SERPL-SCNC: 4.4 MMOL/L (ref 3.6–5.5)
POTASSIUM SERPL-SCNC: 4.9 MMOL/L (ref 3.6–5.5)
PROT SERPL-MCNC: 7.4 G/DL (ref 6–8.2)
PROTHROMBIN TIME: 27.6 SEC (ref 12–14.6)
RBC # BLD AUTO: 4.57 M/UL (ref 4.2–5.4)
RBC # FLD: 9000 CELLS/UL
RBC BLD AUTO: PRESENT
SCHISTOCYTES BLD QL SMEAR: NORMAL
SIGNIFICANT IND 70042: NORMAL
SITE SITE: NORMAL
SODIUM SERPL-SCNC: 117 MMOL/L (ref 135–145)
SODIUM SERPL-SCNC: 118 MMOL/L (ref 135–145)
SOURCE SOURCE: NORMAL
WBC # BLD AUTO: 45.2 K/UL (ref 4.8–10.8)
WBC TOXIC VACUOLES BLD QL SMEAR: NORMAL

## 2024-10-31 PROCEDURE — 700102 HCHG RX REV CODE 250 W/ 637 OVERRIDE(OP): Performed by: INTERNAL MEDICINE

## 2024-10-31 PROCEDURE — 89051 BODY FLUID CELL COUNT: CPT

## 2024-10-31 PROCEDURE — C1729 CATH, DRAINAGE: HCPCS

## 2024-10-31 PROCEDURE — 87040 BLOOD CULTURE FOR BACTERIA: CPT | Mod: 91

## 2024-10-31 PROCEDURE — 49082 ABD PARACENTESIS: CPT

## 2024-10-31 PROCEDURE — 87150 DNA/RNA AMPLIFIED PROBE: CPT

## 2024-10-31 PROCEDURE — 307738 PARACENTESIS ABDOMINAL KIT

## 2024-10-31 PROCEDURE — 99291 CRITICAL CARE FIRST HOUR: CPT | Performed by: INTERNAL MEDICINE

## 2024-10-31 PROCEDURE — 80053 COMPREHEN METABOLIC PANEL: CPT

## 2024-10-31 PROCEDURE — 96366 THER/PROPH/DIAG IV INF ADDON: CPT

## 2024-10-31 PROCEDURE — 99285 EMERGENCY DEPT VISIT HI MDM: CPT

## 2024-10-31 PROCEDURE — 36415 COLL VENOUS BLD VENIPUNCTURE: CPT

## 2024-10-31 PROCEDURE — P9047 ALBUMIN (HUMAN), 25%, 50ML: HCPCS | Mod: JZ | Performed by: EMERGENCY MEDICINE

## 2024-10-31 PROCEDURE — 87015 SPECIMEN INFECT AGNT CONCNTJ: CPT

## 2024-10-31 PROCEDURE — 96365 THER/PROPH/DIAG IV INF INIT: CPT

## 2024-10-31 PROCEDURE — 87070 CULTURE OTHR SPECIMN AEROBIC: CPT

## 2024-10-31 PROCEDURE — 85027 COMPLETE CBC AUTOMATED: CPT

## 2024-10-31 PROCEDURE — 96368 THER/DIAG CONCURRENT INF: CPT

## 2024-10-31 PROCEDURE — 85014 HEMATOCRIT: CPT

## 2024-10-31 PROCEDURE — 87205 SMEAR GRAM STAIN: CPT

## 2024-10-31 PROCEDURE — 85007 BL SMEAR W/DIFF WBC COUNT: CPT

## 2024-10-31 PROCEDURE — 96375 TX/PRO/DX INJ NEW DRUG ADDON: CPT

## 2024-10-31 PROCEDURE — 700111 HCHG RX REV CODE 636 W/ 250 OVERRIDE (IP): Mod: JZ | Performed by: EMERGENCY MEDICINE

## 2024-10-31 PROCEDURE — 700105 HCHG RX REV CODE 258: Performed by: INTERNAL MEDICINE

## 2024-10-31 PROCEDURE — 85018 HEMOGLOBIN: CPT

## 2024-10-31 PROCEDURE — 700111 HCHG RX REV CODE 636 W/ 250 OVERRIDE (IP): Performed by: INTERNAL MEDICINE

## 2024-10-31 PROCEDURE — 82150 ASSAY OF AMYLASE: CPT

## 2024-10-31 PROCEDURE — 83690 ASSAY OF LIPASE: CPT

## 2024-10-31 PROCEDURE — 82042 OTHER SOURCE ALBUMIN QUAN EA: CPT

## 2024-10-31 PROCEDURE — 87186 SC STD MICRODIL/AGAR DIL: CPT | Mod: 91

## 2024-10-31 PROCEDURE — 82945 GLUCOSE OTHER FLUID: CPT

## 2024-10-31 PROCEDURE — 770000 HCHG ROOM/CARE - INTERMEDIATE ICU *

## 2024-10-31 PROCEDURE — 87077 CULTURE AEROBIC IDENTIFY: CPT | Mod: 91

## 2024-10-31 PROCEDURE — 85610 PROTHROMBIN TIME: CPT

## 2024-10-31 PROCEDURE — 82962 GLUCOSE BLOOD TEST: CPT | Mod: 91

## 2024-10-31 PROCEDURE — A9270 NON-COVERED ITEM OR SERVICE: HCPCS | Performed by: INTERNAL MEDICINE

## 2024-10-31 PROCEDURE — 0W9G3ZZ DRAINAGE OF PERITONEAL CAVITY, PERCUTANEOUS APPROACH: ICD-10-PCS | Performed by: EMERGENCY MEDICINE

## 2024-10-31 PROCEDURE — 83605 ASSAY OF LACTIC ACID: CPT | Mod: 91

## 2024-10-31 PROCEDURE — 700101 HCHG RX REV CODE 250: Performed by: EMERGENCY MEDICINE

## 2024-10-31 PROCEDURE — 80048 BASIC METABOLIC PNL TOTAL CA: CPT

## 2024-10-31 RX ORDER — CEFTRIAXONE 2 G/1
2000 INJECTION, POWDER, FOR SOLUTION INTRAMUSCULAR; INTRAVENOUS ONCE
Status: COMPLETED | OUTPATIENT
Start: 2024-10-31 | End: 2024-10-31

## 2024-10-31 RX ORDER — DEXTROSE MONOHYDRATE 100 MG/ML
INJECTION, SOLUTION INTRAVENOUS ONCE
Status: DISCONTINUED | OUTPATIENT
Start: 2024-10-31 | End: 2024-10-31

## 2024-10-31 RX ORDER — LACTULOSE 10 G/15ML
30 SOLUTION ORAL 3 TIMES DAILY
Status: DISCONTINUED | OUTPATIENT
Start: 2024-10-31 | End: 2024-11-02

## 2024-10-31 RX ORDER — OXYCODONE HYDROCHLORIDE 5 MG/1
2.5 TABLET ORAL
Status: DISCONTINUED | OUTPATIENT
Start: 2024-10-31 | End: 2024-11-10

## 2024-10-31 RX ORDER — LIDOCAINE 4 G/G
1 PATCH TOPICAL EVERY 24 HOURS
COMMUNITY

## 2024-10-31 RX ORDER — AZITHROMYCIN 250 MG/1
500 TABLET, FILM COATED ORAL DAILY
Status: DISCONTINUED | OUTPATIENT
Start: 2024-11-01 | End: 2024-11-01

## 2024-10-31 RX ORDER — PROCHLORPERAZINE EDISYLATE 5 MG/ML
5-10 INJECTION INTRAMUSCULAR; INTRAVENOUS EVERY 4 HOURS PRN
Status: DISCONTINUED | OUTPATIENT
Start: 2024-10-31 | End: 2024-11-16

## 2024-10-31 RX ORDER — THIAMINE HYDROCHLORIDE 100 MG/ML
100 INJECTION, SOLUTION INTRAMUSCULAR; INTRAVENOUS DAILY
Status: COMPLETED | OUTPATIENT
Start: 2024-11-01 | End: 2024-11-03

## 2024-10-31 RX ORDER — LIDOCAINE 4 G/G
1 PATCH TOPICAL EVERY 24 HOURS
Status: DISCONTINUED | OUTPATIENT
Start: 2024-11-01 | End: 2024-11-16

## 2024-10-31 RX ORDER — PROMETHAZINE HYDROCHLORIDE 25 MG/1
12.5-25 TABLET ORAL EVERY 4 HOURS PRN
Status: DISCONTINUED | OUTPATIENT
Start: 2024-10-31 | End: 2024-11-13

## 2024-10-31 RX ORDER — OCTREOTIDE ACETATE 100 UG/ML
50 INJECTION, SOLUTION INTRAVENOUS; SUBCUTANEOUS ONCE
Status: COMPLETED | OUTPATIENT
Start: 2024-10-31 | End: 2024-10-31

## 2024-10-31 RX ORDER — PANTOPRAZOLE SODIUM 40 MG/10ML
40 INJECTION, POWDER, LYOPHILIZED, FOR SOLUTION INTRAVENOUS 2 TIMES DAILY
Status: DISCONTINUED | OUTPATIENT
Start: 2024-10-31 | End: 2024-11-08

## 2024-10-31 RX ORDER — ONDANSETRON 2 MG/ML
4 INJECTION INTRAMUSCULAR; INTRAVENOUS ONCE
Status: COMPLETED | OUTPATIENT
Start: 2024-10-31 | End: 2024-10-31

## 2024-10-31 RX ORDER — FLUOXETINE 10 MG/1
10 CAPSULE ORAL EVERY MORNING
Status: DISCONTINUED | OUTPATIENT
Start: 2024-11-01 | End: 2024-11-13

## 2024-10-31 RX ORDER — GABAPENTIN 100 MG/1
200 CAPSULE ORAL NIGHTLY
Status: DISCONTINUED | OUTPATIENT
Start: 2024-10-31 | End: 2024-11-10

## 2024-10-31 RX ORDER — ONDANSETRON 4 MG/1
4 TABLET, ORALLY DISINTEGRATING ORAL EVERY 4 HOURS PRN
Status: DISCONTINUED | OUTPATIENT
Start: 2024-10-31 | End: 2024-11-13

## 2024-10-31 RX ORDER — ACETAMINOPHEN 500 MG
500 TABLET ORAL EVERY 6 HOURS PRN
COMMUNITY

## 2024-10-31 RX ORDER — HYDROXYZINE HYDROCHLORIDE 25 MG/1
25 TABLET, FILM COATED ORAL 3 TIMES DAILY PRN
Status: DISCONTINUED | OUTPATIENT
Start: 2024-10-31 | End: 2024-11-10

## 2024-10-31 RX ORDER — ACETAMINOPHEN 325 MG/1
650 TABLET ORAL EVERY 6 HOURS PRN
Status: DISCONTINUED | OUTPATIENT
Start: 2024-10-31 | End: 2024-11-13

## 2024-10-31 RX ORDER — MIDODRINE HYDROCHLORIDE 5 MG/1
5 TABLET ORAL
Status: DISCONTINUED | OUTPATIENT
Start: 2024-11-01 | End: 2024-11-08

## 2024-10-31 RX ORDER — PROMETHAZINE HYDROCHLORIDE 25 MG/1
12.5-25 SUPPOSITORY RECTAL EVERY 4 HOURS PRN
Status: DISCONTINUED | OUTPATIENT
Start: 2024-10-31 | End: 2024-11-16

## 2024-10-31 RX ORDER — SUCRALFATE ORAL 1 G/10ML
1 SUSPENSION ORAL EVERY 6 HOURS
Status: DISCONTINUED | OUTPATIENT
Start: 2024-10-31 | End: 2024-11-10

## 2024-10-31 RX ORDER — HYDROMORPHONE HYDROCHLORIDE 1 MG/ML
0.25 INJECTION, SOLUTION INTRAMUSCULAR; INTRAVENOUS; SUBCUTANEOUS
Status: DISCONTINUED | OUTPATIENT
Start: 2024-10-31 | End: 2024-11-10

## 2024-10-31 RX ORDER — DEXTROSE MONOHYDRATE 25 G/50ML
25 INJECTION, SOLUTION INTRAVENOUS
Status: DISCONTINUED | OUTPATIENT
Start: 2024-10-31 | End: 2024-11-16 | Stop reason: HOSPADM

## 2024-10-31 RX ORDER — GUAIFENESIN/DEXTROMETHORPHAN 100-10MG/5
10 SYRUP ORAL EVERY 6 HOURS PRN
Status: DISCONTINUED | OUTPATIENT
Start: 2024-10-31 | End: 2024-11-10

## 2024-10-31 RX ORDER — OXYCODONE HYDROCHLORIDE 5 MG/1
5 TABLET ORAL
Status: DISCONTINUED | OUTPATIENT
Start: 2024-10-31 | End: 2024-11-10

## 2024-10-31 RX ORDER — DEXTROSE MONOHYDRATE 25 G/50ML
50 INJECTION, SOLUTION INTRAVENOUS ONCE
Status: COMPLETED | OUTPATIENT
Start: 2024-10-31 | End: 2024-10-31

## 2024-10-31 RX ORDER — ALBUMIN (HUMAN) 12.5 G/50ML
50 SOLUTION INTRAVENOUS ONCE
Status: COMPLETED | OUTPATIENT
Start: 2024-10-31 | End: 2024-10-31

## 2024-10-31 RX ORDER — ONDANSETRON 2 MG/ML
4 INJECTION INTRAMUSCULAR; INTRAVENOUS EVERY 4 HOURS PRN
Status: DISCONTINUED | OUTPATIENT
Start: 2024-10-31 | End: 2024-11-16 | Stop reason: HOSPADM

## 2024-10-31 RX ORDER — PREDNISONE 20 MG/1
20 TABLET ORAL DAILY
COMMUNITY

## 2024-10-31 RX ADMIN — HYDROMORPHONE HYDROCHLORIDE 0.25 MG: 1 INJECTION, SOLUTION INTRAMUSCULAR; INTRAVENOUS; SUBCUTANEOUS at 20:36

## 2024-10-31 RX ADMIN — ONDANSETRON 4 MG: 2 INJECTION INTRAMUSCULAR; INTRAVENOUS at 19:30

## 2024-10-31 RX ADMIN — CEFTRIAXONE SODIUM 2000 MG: 2 INJECTION, POWDER, FOR SOLUTION INTRAMUSCULAR; INTRAVENOUS at 17:43

## 2024-10-31 RX ADMIN — OXYCODONE 5 MG: 5 TABLET ORAL at 23:41

## 2024-10-31 RX ADMIN — OCTREOTIDE ACETATE 50 MCG/HR: 200 INJECTION, SOLUTION INTRAVENOUS; SUBCUTANEOUS at 18:15

## 2024-10-31 RX ADMIN — ALBUMIN (HUMAN) 50 G: 0.25 INJECTION, SOLUTION INTRAVENOUS at 17:03

## 2024-10-31 RX ADMIN — SUCRALFATE ORAL 1 G: 1 SUSPENSION ORAL at 23:43

## 2024-10-31 RX ADMIN — GABAPENTIN 200 MG: 100 CAPSULE ORAL at 22:34

## 2024-10-31 RX ADMIN — DEXTROSE MONOHYDRATE 50 ML: 25 INJECTION, SOLUTION INTRAVENOUS at 17:11

## 2024-10-31 RX ADMIN — OCTREOTIDE ACETATE 50 MCG: 100 INJECTION, SOLUTION INTRAVENOUS; SUBCUTANEOUS at 18:53

## 2024-10-31 RX ADMIN — PANTOPRAZOLE SODIUM 40 MG: 40 INJECTION, POWDER, FOR SOLUTION INTRAVENOUS at 20:33

## 2024-10-31 ASSESSMENT — ENCOUNTER SYMPTOMS
COUGH: 1
NAUSEA: 1
HEADACHES: 0
MYALGIAS: 0
SHORTNESS OF BREATH: 1
ABDOMINAL PAIN: 1
CHILLS: 1
VOMITING: 1
HEMOPTYSIS: 1
DEPRESSION: 0
EYE PAIN: 0
FEVER: 1
DIZZINESS: 0
WEAKNESS: 1

## 2024-10-31 ASSESSMENT — PAIN DESCRIPTION - PAIN TYPE
TYPE: ACUTE PAIN

## 2024-10-31 ASSESSMENT — FIBROSIS 4 INDEX
FIB4 SCORE: 3.32
FIB4 SCORE: 6.2

## 2024-10-31 NOTE — ED PROVIDER NOTES
ED Provider Note    CHIEF COMPLAINT  Chief Complaint   Patient presents with    Abdominal Pain     Patient arrives for paracentesis due to inability to schedule appointment. Pt reports 10/10 abd cramping pain and pressure.    BP repeated multiple times 87/57 in triage. Pt skin appears green tinted.        EXTERNAL RECORDS REVIEWED  10/23/2024, outpatient paracentesis, therapeutic, 8400 mL    HPI/ROS    Kavita Freeman is a 59 y.o. female who presents for evaluation of abdominal distention requesting paracentesis.  History of liver disease, her most recent paracentesis was about a week ago.  She reports this was a more rapid reaccumulation of ascites than she is used to experiencing but also notes he did not treat her with albumin last time as she is used to having.  She has abdominal discomfort and shortness of breath, both of which are very typical for her related to her ascites.  No fever.  She also reports that she just underwent esophageal banding, has not had any bleeding since then.  No other acute complaints.    PAST MEDICAL HISTORY   has a past medical history of Acute cystitis with hematuria (09/21/2016), Acute kidney injury (HCC) (06/14/2024), Allergy, Anxiety, Arrhythmia, Back pain, Depression, Depression, GIB (gastrointestinal bleeding) (08/14/2024), Hyperlipidemia, Hypertension, Hypomagnesemia (08/14/2024), Hypotension (07/15/2016), Indigestion, Lightheadedness (12/17/2015), Macrocytosis (10/26/2016), Necrotizing fasciitis ([facility]) (06/14/2024), Other chest pain (12/17/2015), Pain (02/24/2015), Perirectal abscess (06/14/2024), PSVT (paroxysmal supraventricular tachycardia) ([facility]) (06/24/2013), Psychiatric disorder, Septic shock ([facility]) (06/14/2024), Thrombocytopenia ([facility]) (06/27/2024), Tobacco abuse (05/03/2013), URI (upper respiratory infection) (09/21/2013), Urinary tract infection, site not specified, UTI (lower urinary tract infection) (09/21/2013), and Wound check, abscess (06/27/2024).    SURGICAL  HISTORY   has a past surgical history that includes breast biopsy (08); shoulder arthroscopy (3/23/2009); tubal ligation; gyn surgery (); shoulder arthroscopy (); other (2014); shoulder arthroscopy w/ rotator cuff repair (3/9/2015); trigger finger release (3/9/2015); open reduction; i&d perirectal abscess (2024); i&d perirectal abscess (N/A, 2024); upper gi endoscopy,diagnosis (N/A, 8/15/2024); and upper gi endoscopy,ligat varix (N/A, 8/15/2024).    FAMILY HISTORY  Family History   Problem Relation Age of Onset    Hypertension Mother     Stroke Mother     Hypertension Father         ? valvular heart     Hypertension Brother     Cancer Paternal Grandmother         breast cancer       SOCIAL HISTORY  Social History     Tobacco Use    Smoking status: Every Day     Current packs/day: 0.00     Average packs/day: 0.5 packs/day for 25.0 years (12.5 ttl pk-yrs)     Types: Cigarettes     Start date: 1994     Last attempt to quit: 2016     Years since quittin.4    Smokeless tobacco: Never    Tobacco comments:     1-2 cigarettes a day   Vaping Use    Vaping status: Never Used   Substance and Sexual Activity    Alcohol use: No     Alcohol/week: 1.5 - 2.0 oz     Types: 3 - 4 Cans of beer per week    Drug use: Not Currently    Sexual activity: Yes     Partners: Male       CURRENT MEDICATIONS  Home Medications       Reviewed by Terra Samano RKATIE (Registered Nurse) on 10/31/24 at 1440  Med List Status: Partial     Medication Last Dose Status   acetaminophen (TYLENOL) 325 MG Tab  Active   FLUoxetine (PROZAC) 10 MG Cap  Active   furosemide (LASIX) 20 MG Tab  Active   gabapentin (NEURONTIN) 100 MG Cap  Active   hydrOXYzine HCl (ATARAX) 25 MG Tab  Active   lactulose (CONSTULOSE) 10 GM/15ML Solution  Active   omeprazole (PRILOSEC) 20 MG delayed-release capsule  Active   spironolactone (ALDACTONE) 50 MG Tab  Active   sucralfate (CARAFATE) 1 GM/10ML Suspension  Active   vitamin D  "(CHOLECALCIFEROL) 1000 UNIT Tab  Active                    ALLERGIES  Allergies   Allergen Reactions    Nitrofurantoin Vomiting    Sulfamethoxazole W-Trimethoprim Vomiting       PHYSICAL EXAM  VITAL SIGNS: /57   Pulse 84   Temp 35.9 °C (96.7 °F) (Temporal)   Resp (!) 21   Ht 1.727 m (5' 8\")   Wt 66.2 kg (146 lb)   SpO2 91%   BMI 22.20 kg/m²    Constitutional: Chronically ill-appearing, awake, alert  HENT: Normocephalic, no obvious evidence of acute trauma.  Eyes: Scleral icterus.  Thorax & Lungs: Normal nonlabored respirations. I appreciate no wheezing, rhonchi or rales. There is normal air movement.  Upon cardiac ascultation I appreciate a regular heart rhythm and a normal rate.   Abdomen: Distended, significant generalized tenderness with rebound tenderness.    Skin: The exposed portions of skin reveal no obvious rash or other abnormalities.    EKG/LABS  Results for orders placed or performed during the hospital encounter of 10/31/24   CBC with Differential    Collection Time: 10/31/24  2:55 PM   Result Value Ref Range    WBC 45.2 (H) 4.8 - 10.8 K/uL    RBC 4.57 4.20 - 5.40 M/uL    Hemoglobin 13.3 12.0 - 16.0 g/dL    Hematocrit 39.3 37.0 - 47.0 %    MCV 86.0 81.4 - 97.8 fL    MCH 29.1 27.0 - 33.0 pg    MCHC 33.8 32.2 - 35.5 g/dL    RDW 57.6 (H) 35.9 - 50.0 fL    Platelet Count 313 164 - 446 K/uL    MPV 11.1 9.0 - 12.9 fL    Neutrophils-Polys 83.70 (H) 44.00 - 72.00 %    Lymphocytes 0.00 (L) 22.00 - 41.00 %    Monocytes 1.50 0.00 - 13.40 %    Eosinophils 0.00 0.00 - 6.90 %    Basophils 0.00 0.00 - 1.80 %    Nucleated RBC 0.10 0.00 - 0.20 /100 WBC    Neutrophils (Absolute) 43.17 (H) 1.82 - 7.42 K/uL    Lymphs (Absolute) 0.00 (L) 1.00 - 4.80 K/uL    Monos (Absolute) 0.68 0.00 - 0.85 K/uL    Eos (Absolute) 0.00 0.00 - 0.51 K/uL    Baso (Absolute) 0.00 0.00 - 0.12 K/uL    NRBC (Absolute) 0.03 K/uL    Anisocytosis 1+     Macrocytosis 2+ (A)     Microcytosis 1+    Complete Metabolic Panel    Collection Time: " 10/31/24  2:55 PM   Result Value Ref Range    Sodium 117 (LL) 135 - 145 mmol/L    Potassium 4.9 3.6 - 5.5 mmol/L    Chloride 81 (L) 96 - 112 mmol/L    Co2 16 (L) 20 - 33 mmol/L    Anion Gap 20.0 (H) 7.0 - 16.0    Glucose 51 (LL) 65 - 99 mg/dL    Bun 37 (H) 8 - 22 mg/dL    Creatinine 2.11 (H) 0.50 - 1.40 mg/dL    Calcium 9.3 8.5 - 10.5 mg/dL    Correct Calcium 10.3 8.5 - 10.5 mg/dL    AST(SGOT) 373 (H) 12 - 45 U/L    ALT(SGPT) 120 (H) 2 - 50 U/L    Alkaline Phosphatase 135 (H) 30 - 99 U/L    Total Bilirubin 6.1 (H) 0.1 - 1.5 mg/dL    Albumin 2.7 (L) 3.2 - 4.9 g/dL    Total Protein 7.4 6.0 - 8.2 g/dL    Globulin 4.7 (H) 1.9 - 3.5 g/dL    A-G Ratio 0.6 g/dL   Lipase    Collection Time: 10/31/24  2:55 PM   Result Value Ref Range    Lipase 47 11 - 82 U/L   DIFFERENTIAL MANUAL    Collection Time: 10/31/24  2:55 PM   Result Value Ref Range    Bands-Stabs 11.80 (H) 0.00 - 10.00 %    Metamyelocytes 3.00 %    Manual Diff Status PERFORMED    PERIPHERAL SMEAR REVIEW    Collection Time: 10/31/24  2:55 PM   Result Value Ref Range    Peripheral Smear Review see below    PLATELET ESTIMATE    Collection Time: 10/31/24  2:55 PM   Result Value Ref Range    Plt Estimation Normal    MORPHOLOGY    Collection Time: 10/31/24  2:55 PM   Result Value Ref Range    RBC Morphology Present     Large Platelets 1+     Polychromia 1+     Poikilocytosis 2+     Schistocytes 1+     Echinocytes 1+     Toxic Vacuoles Few    ESTIMATED GFR    Collection Time: 10/31/24  2:55 PM   Result Value Ref Range    GFR (CKD-EPI) 27 (A) >60 mL/min/1.73 m 2   Prothrombin Time    Collection Time: 10/31/24  2:55 PM   Result Value Ref Range    PT 27.6 (H) 12.0 - 14.6 sec    INR 2.55 (H) 0.87 - 1.13   FLUID CELL COUNT    Collection Time: 10/31/24  4:23 PM   Result Value Ref Range    Fluid Type Ascites     Color-Body Fluid Yellow     Character-Body Fluid Cloudy     Total RBC Count 9000 cells/uL    FL Total Nucleated Cells 37709 cells/uL    Polys 80 %    Lymphs 6 %    Fl  Mono Macrophages 14 %   FLUID ALBUMIN    Collection Time: 10/31/24  4:23 PM   Result Value Ref Range    Fluid Type Ascites     Albumin 0.6 g/dL   FLUID AMYLASE    Collection Time: 10/31/24  4:23 PM   Result Value Ref Range    Body Fluid Amylase 57 U/L   FLUID GLUCOSE    Collection Time: 10/31/24  4:23 PM   Result Value Ref Range    Glucose, Fluid 5 mg/dL   POCT glucose device results    Collection Time: 10/31/24  4:35 PM   Result Value Ref Range    POC Glucose, Blood 53 (L) 65 - 99 mg/dL   Lactic Acid    Collection Time: 10/31/24  4:45 PM   Result Value Ref Range    Lactic Acid 8.1 (HH) 0.5 - 2.0 mmol/L   POCT glucose device results    Collection Time: 10/31/24  5:08 PM   Result Value Ref Range    POC Glucose, Blood 51 (L) 65 - 99 mg/dL   POCT glucose device results    Collection Time: 10/31/24  5:33 PM   Result Value Ref Range    POC Glucose, Blood 162 (H) 65 - 99 mg/dL   POCT glucose device results    Collection Time: 10/31/24  5:50 PM   Result Value Ref Range    POC Glucose, Blood 133 (H) 65 - 99 mg/dL   POCT glucose device results    Collection Time: 10/31/24  6:19 PM   Result Value Ref Range    POC Glucose, Blood 138 (H) 65 - 99 mg/dL   POCT glucose device results    Collection Time: 10/31/24  7:00 PM   Result Value Ref Range    POC Glucose, Blood 115 (H) 65 - 99 mg/dL        RADIOLOGY/PROCEDURES       Point of Care Ultrasound    Indication: Ascites    Consent: The patient provided verbal consent for this procedure.    Procedure: The patient was placed in the supine position with the head of the bed slightly elevated and bedside ultrasound was utilized to identify Free Fluid Identified without nearby bowel or critical anatomy and image retained as below.  Static image obtained with skin marked. The skin over the puncture site in the left lower quadrant region was prepped with betadine and draped in a sterile fashion. Local anesthesia was obtained by infiltration using 1% Lidocaine without epinephrine. A  paracentesis catheter was then advanced into the abdominal cavity over a needle and the needle was withdrawn. Fluid was returned which was cloudy.  A total volume of 5000 mL was withdrawn which was sent to the lab for appropriate testing. The catheter was then withdrawn and a sterile dressing was placed over the site.     The patient tolerated the procedure well.    Complications: None    Image retained through [facility] as seen below:         This study is a limited ultrasound examination performed and interpreted to evaluate for limited conditions as outlined above. There may be other clinically important information contained in the images that is outside this scope. When clinically warranted, a comprehensive ultrasound through the appropriate department is considered.       COURSE & MEDICAL DECISION MAKING    ASSESSMENT, COURSE AND PLAN  Care Narrative: 59-year-old female with known liver disease coming in for paracentesis.  Most recent one was 1 week ago at which time 8 L was removed.  Rapid reaccumulation of her ascites, of note did not receive albumin last time.  She feels short of breath and also has abdominal pain and distention, on exam there is concern for peritonitis  Triage blood work has been ordered.  Paracentesis will be performed.  She will be reevaluated.      Paracentesis performed, about 5 L of fluid removed.  Quite cloudy, afterwards the patient's pain actually worsened as did her tenderness on exam.  Concerning for SBP.  Blood work reveals a leukocytosis greater than 45,000.  Also quite hyponatremic with sodium 117.  She was treated with albumin for her care status post paracentesis as well as the hyponatremia.  Also has an EFRAIN with a GFR of 27.  Additionally she was noted to be hypoglycemic, provided with juice repeat finger stick revealed no improvement since she was then treated with D50.  Treated with ceftriaxone, the patient is admitted to the hospital in guarded condition      CRITICAL  CARE  The very real possibilty of a deterioration of this patient's condition required the highest level of my preparedness for sudden, emergent intervention.  The critical care time associated with the care of the patient was 39 minutes. Review chart for interventions. This time is exclusive of any other billable procedures.        DISPOSITION AND DISCUSSIONS  I have discussed management of the patient with the following physicians and ALONSO's: Dr. Gonzales, admitting hospitalist      FINAL DIAGNOSIS  1. SBP (spontaneous bacterial peritonitis) ([facility])    2. Leukocytosis, unspecified type    3. Hyponatremia    4. Hypoglycemia    5. EFRAIN (acute kidney injury) ([facility])         Electronically signed by: Gregorio Aparicio M.D., 10/31/2024 3:18 PM       EOMI; PERRL; no drainage or redness

## 2024-10-31 NOTE — ED TRIAGE NOTES
Chief Complaint   Patient presents with    Abdominal Pain     Patient arrives for paracentesis due to inability to schedule appointment. Pt reports 10/10 abd cramping pain and pressure.    BP repeated multiple times 87/57 in triage. Pt skin appears green tinted.      Pt in wheelchair to triage for above complaint.      Pt is alert/oriented and follows commands. Pt speaking in full sentences and responds appropriately to questions. Respirations are even and unlabored.

## 2024-11-01 PROBLEM — E43 SEVERE PROTEIN-CALORIE MALNUTRITION (GOMEZ: LESS THAN 60% OF STANDARD WEIGHT) (HCC): Status: ACTIVE | Noted: 2024-01-01

## 2024-11-01 PROBLEM — R78.81 BACTEREMIA DUE TO GRAM-NEGATIVE BACTERIA: Status: ACTIVE | Noted: 2024-01-01

## 2024-11-01 LAB
ALBUMIN SERPL BCP-MCNC: 2.8 G/DL (ref 3.2–4.9)
ALBUMIN/GLOB SERPL: 0.8 G/DL
ALP SERPL-CCNC: 115 U/L (ref 30–99)
ALT SERPL-CCNC: 100 U/L (ref 2–50)
ANION GAP SERPL CALC-SCNC: 14 MMOL/L (ref 7–16)
ANION GAP SERPL CALC-SCNC: 17 MMOL/L (ref 7–16)
AST SERPL-CCNC: 311 U/L (ref 12–45)
BILIRUB SERPL-MCNC: 5.6 MG/DL (ref 0.1–1.5)
BUN SERPL-MCNC: 46 MG/DL (ref 8–22)
BUN SERPL-MCNC: 46 MG/DL (ref 8–22)
BUN SERPL-MCNC: 52 MG/DL (ref 8–22)
BUN SERPL-MCNC: 55 MG/DL (ref 8–22)
CALCIUM ALBUM COR SERPL-MCNC: 9.7 MG/DL (ref 8.5–10.5)
CALCIUM SERPL-MCNC: 8.4 MG/DL (ref 8.5–10.5)
CALCIUM SERPL-MCNC: 8.5 MG/DL (ref 8.5–10.5)
CALCIUM SERPL-MCNC: 8.7 MG/DL (ref 8.5–10.5)
CALCIUM SERPL-MCNC: 8.7 MG/DL (ref 8.5–10.5)
CHLORIDE SERPL-SCNC: 82 MMOL/L (ref 96–112)
CHLORIDE SERPL-SCNC: 84 MMOL/L (ref 96–112)
CO2 SERPL-SCNC: 21 MMOL/L (ref 20–33)
CORTIS SERPL-MCNC: 60.1 UG/DL (ref 0–23)
CREAT SERPL-MCNC: 1.61 MG/DL (ref 0.5–1.4)
CREAT SERPL-MCNC: 1.61 MG/DL (ref 0.5–1.4)
CREAT SERPL-MCNC: 1.9 MG/DL (ref 0.5–1.4)
CREAT SERPL-MCNC: 1.96 MG/DL (ref 0.5–1.4)
ERYTHROCYTE [DISTWIDTH] IN BLOOD BY AUTOMATED COUNT: 56.9 FL (ref 35.9–50)
GFR SERPLBLD CREATININE-BSD FMLA CKD-EPI: 29 ML/MIN/1.73 M 2
GFR SERPLBLD CREATININE-BSD FMLA CKD-EPI: 30 ML/MIN/1.73 M 2
GFR SERPLBLD CREATININE-BSD FMLA CKD-EPI: 37 ML/MIN/1.73 M 2
GFR SERPLBLD CREATININE-BSD FMLA CKD-EPI: NORMAL ML/MIN/1.73 M 2
GLOBULIN SER CALC-MCNC: 3.7 G/DL (ref 1.9–3.5)
GLUCOSE BLD STRIP.AUTO-MCNC: 165 MG/DL (ref 65–99)
GLUCOSE BLD STRIP.AUTO-MCNC: 62 MG/DL (ref 65–99)
GLUCOSE BLD STRIP.AUTO-MCNC: 73 MG/DL (ref 65–99)
GLUCOSE SERPL-MCNC: 51 MG/DL (ref 65–99)
GLUCOSE SERPL-MCNC: 54 MG/DL (ref 65–99)
GLUCOSE SERPL-MCNC: 69 MG/DL (ref 65–99)
GLUCOSE SERPL-MCNC: 69 MG/DL (ref 65–99)
HCT VFR BLD AUTO: 29 % (ref 37–47)
HCT VFR BLD AUTO: 31.6 % (ref 37–47)
HCT VFR BLD AUTO: 33.2 % (ref 37–47)
HGB BLD-MCNC: 10.1 G/DL (ref 12–16)
HGB BLD-MCNC: 10.9 G/DL (ref 12–16)
HGB BLD-MCNC: 11.2 G/DL (ref 12–16)
MCH RBC QN AUTO: 29.7 PG (ref 27–33)
MCHC RBC AUTO-ENTMCNC: 34.8 G/DL (ref 32.2–35.5)
MCV RBC AUTO: 85.3 FL (ref 81.4–97.8)
OSMOLALITY UR: 349 MOSM/KG H2O (ref 300–900)
PLATELET # BLD AUTO: 213 K/UL (ref 164–446)
PMV BLD AUTO: 10.4 FL (ref 9–12.9)
POTASSIUM SERPL-SCNC: 5.3 MMOL/L (ref 3.6–5.5)
POTASSIUM SERPL-SCNC: 5.3 MMOL/L (ref 3.6–5.5)
POTASSIUM SERPL-SCNC: 5.7 MMOL/L (ref 3.6–5.5)
POTASSIUM SERPL-SCNC: 5.7 MMOL/L (ref 3.6–5.5)
PROT SERPL-MCNC: 6.5 G/DL (ref 6–8.2)
RBC # BLD AUTO: 3.4 M/UL (ref 4.2–5.4)
SODIUM SERPL-SCNC: 117 MMOL/L (ref 135–145)
SODIUM SERPL-SCNC: 117 MMOL/L (ref 135–145)
SODIUM SERPL-SCNC: 119 MMOL/L (ref 135–145)
SODIUM SERPL-SCNC: 120 MMOL/L (ref 135–145)
SODIUM UR-SCNC: <20 MMOL/L
WBC # BLD AUTO: 39 K/UL (ref 4.8–10.8)

## 2024-11-01 PROCEDURE — 160036 HCHG PACU - EA ADDL 30 MINS PHASE I: Performed by: SPECIALIST

## 2024-11-01 PROCEDURE — 160048 HCHG OR STATISTICAL LEVEL 1-5: Performed by: SPECIALIST

## 2024-11-01 PROCEDURE — 83935 ASSAY OF URINE OSMOLALITY: CPT

## 2024-11-01 PROCEDURE — 80053 COMPREHEN METABOLIC PANEL: CPT

## 2024-11-01 PROCEDURE — 99233 SBSQ HOSP IP/OBS HIGH 50: CPT | Performed by: HOSPITALIST

## 2024-11-01 PROCEDURE — 43235 EGD DIAGNOSTIC BRUSH WASH: CPT | Performed by: SPECIALIST

## 2024-11-01 PROCEDURE — 85027 COMPLETE CBC AUTOMATED: CPT

## 2024-11-01 PROCEDURE — 160009 HCHG ANES TIME/MIN: Performed by: SPECIALIST

## 2024-11-01 PROCEDURE — 160002 HCHG RECOVERY MINUTES (STAT): Performed by: SPECIALIST

## 2024-11-01 PROCEDURE — 160035 HCHG PACU - 1ST 60 MINS PHASE I: Performed by: SPECIALIST

## 2024-11-01 PROCEDURE — 0DJ08ZZ INSPECTION OF UPPER INTESTINAL TRACT, VIA NATURAL OR ARTIFICIAL OPENING ENDOSCOPIC: ICD-10-PCS | Performed by: SPECIALIST

## 2024-11-01 PROCEDURE — 700111 HCHG RX REV CODE 636 W/ 250 OVERRIDE (IP): Performed by: INTERNAL MEDICINE

## 2024-11-01 PROCEDURE — 700105 HCHG RX REV CODE 258: Performed by: INTERNAL MEDICINE

## 2024-11-01 PROCEDURE — A9270 NON-COVERED ITEM OR SERVICE: HCPCS | Performed by: INTERNAL MEDICINE

## 2024-11-01 PROCEDURE — 80048 BASIC METABOLIC PNL TOTAL CA: CPT

## 2024-11-01 PROCEDURE — A9270 NON-COVERED ITEM OR SERVICE: HCPCS | Performed by: STUDENT IN AN ORGANIZED HEALTH CARE EDUCATION/TRAINING PROGRAM

## 2024-11-01 PROCEDURE — 82533 TOTAL CORTISOL: CPT

## 2024-11-01 PROCEDURE — 85014 HEMATOCRIT: CPT | Mod: 91

## 2024-11-01 PROCEDURE — 99222 1ST HOSP IP/OBS MODERATE 55: CPT | Performed by: INTERNAL MEDICINE

## 2024-11-01 PROCEDURE — 160202 HCHG ENDO MINUTES - 1ST 30 MINS LEVEL 3: Performed by: SPECIALIST

## 2024-11-01 PROCEDURE — 700101 HCHG RX REV CODE 250: Performed by: INTERNAL MEDICINE

## 2024-11-01 PROCEDURE — 770000 HCHG ROOM/CARE - INTERMEDIATE ICU *

## 2024-11-01 PROCEDURE — 85018 HEMOGLOBIN: CPT

## 2024-11-01 PROCEDURE — 700102 HCHG RX REV CODE 250 W/ 637 OVERRIDE(OP): Performed by: INTERNAL MEDICINE

## 2024-11-01 PROCEDURE — 82962 GLUCOSE BLOOD TEST: CPT | Mod: 91

## 2024-11-01 PROCEDURE — 84300 ASSAY OF URINE SODIUM: CPT

## 2024-11-01 PROCEDURE — 700102 HCHG RX REV CODE 250 W/ 637 OVERRIDE(OP): Performed by: STUDENT IN AN ORGANIZED HEALTH CARE EDUCATION/TRAINING PROGRAM

## 2024-11-01 RX ORDER — HALOPERIDOL 5 MG/ML
1 INJECTION INTRAMUSCULAR
Status: DISCONTINUED | OUTPATIENT
Start: 2024-11-01 | End: 2024-11-01 | Stop reason: HOSPADM

## 2024-11-01 RX ORDER — SODIUM CHLORIDE 1 G/1
1 TABLET ORAL ONCE
Status: COMPLETED | OUTPATIENT
Start: 2024-11-01 | End: 2024-11-01

## 2024-11-01 RX ORDER — DIPHENHYDRAMINE HYDROCHLORIDE 50 MG/ML
12.5 INJECTION INTRAMUSCULAR; INTRAVENOUS
Status: DISCONTINUED | OUTPATIENT
Start: 2024-11-01 | End: 2024-11-01 | Stop reason: HOSPADM

## 2024-11-01 RX ORDER — ONDANSETRON 2 MG/ML
4 INJECTION INTRAMUSCULAR; INTRAVENOUS
Status: DISCONTINUED | OUTPATIENT
Start: 2024-11-01 | End: 2024-11-01 | Stop reason: HOSPADM

## 2024-11-01 RX ADMIN — SUCRALFATE ORAL 1 G: 1 SUSPENSION ORAL at 06:33

## 2024-11-01 RX ADMIN — DEXTROSE MONOHYDRATE 25 G: 25 INJECTION, SOLUTION INTRAVENOUS at 18:43

## 2024-11-01 RX ADMIN — LIDOCAINE 1 PATCH: 4 PATCH TOPICAL at 06:35

## 2024-11-01 RX ADMIN — CEFTRIAXONE SODIUM 2000 MG: 10 INJECTION, POWDER, FOR SOLUTION INTRAVENOUS at 06:34

## 2024-11-01 RX ADMIN — MIDODRINE HYDROCHLORIDE 5 MG: 5 TABLET ORAL at 12:15

## 2024-11-01 RX ADMIN — MIDODRINE HYDROCHLORIDE 5 MG: 5 TABLET ORAL at 16:34

## 2024-11-01 RX ADMIN — AZITHROMYCIN DIHYDRATE 500 MG: 250 TABLET ORAL at 06:34

## 2024-11-01 RX ADMIN — GABAPENTIN 200 MG: 100 CAPSULE ORAL at 20:53

## 2024-11-01 RX ADMIN — SUCRALFATE ORAL 1 G: 1 SUSPENSION ORAL at 17:25

## 2024-11-01 RX ADMIN — SODIUM CHLORIDE 1 G: 1 TABLET ORAL at 06:34

## 2024-11-01 RX ADMIN — OXYCODONE 2.5 MG: 5 TABLET ORAL at 12:30

## 2024-11-01 RX ADMIN — LACTULOSE 30 ML: 10 SOLUTION ORAL at 17:25

## 2024-11-01 RX ADMIN — FLUOXETINE HYDROCHLORIDE 10 MG: 10 CAPSULE ORAL at 06:34

## 2024-11-01 RX ADMIN — OCTREOTIDE ACETATE 50 MCG/HR: 200 INJECTION, SOLUTION INTRAVENOUS; SUBCUTANEOUS at 20:54

## 2024-11-01 RX ADMIN — PANTOPRAZOLE SODIUM 40 MG: 40 INJECTION, POWDER, FOR SOLUTION INTRAVENOUS at 06:33

## 2024-11-01 RX ADMIN — THIAMINE HYDROCHLORIDE 100 MG: 100 INJECTION, SOLUTION INTRAMUSCULAR; INTRAVENOUS at 06:32

## 2024-11-01 RX ADMIN — LACTULOSE 30 ML: 10 SOLUTION ORAL at 06:33

## 2024-11-01 RX ADMIN — PANTOPRAZOLE SODIUM 40 MG: 40 INJECTION, POWDER, FOR SOLUTION INTRAVENOUS at 17:25

## 2024-11-01 ASSESSMENT — ENCOUNTER SYMPTOMS
ABDOMINAL PAIN: 1
PALPITATIONS: 0
COUGH: 0
NAUSEA: 0
SHORTNESS OF BREATH: 0
DIZZINESS: 0
WEAKNESS: 1
LOSS OF CONSCIOUSNESS: 0
DIARRHEA: 0
BACK PAIN: 0
HEADACHES: 0
CHILLS: 0
VOMITING: 0
FEVER: 0

## 2024-11-01 ASSESSMENT — PAIN DESCRIPTION - PAIN TYPE
TYPE: ACUTE PAIN
TYPE: SURGICAL PAIN
TYPE: ACUTE PAIN

## 2024-11-01 NOTE — ANESTHESIA PREPROCEDURE EVALUATION
Case: [id number] Date/Time: 11/01/24 0822    Procedure: GASTROSCOPY (Esophagus)    Anesthesia type: MAC    Pre-op diagnosis: Cirrhosis, GI bleeding    Location: [facility] ROOM 26 / SURGERY SAME DAY [facility]    Surgeons: Lis Cardozo M.D.            Relevant Problems   NEURO   (positive) Migraine without aura and without status migrainosus, not intractable      CARDIAC   (positive) Essential hypertension   (positive) Mesenteric varices   (positive) Migraine without aura and without status migrainosus, not intractable   (positive) PSVT (paroxysmal supraventricular tachycardia) (HCC)   (positive) Peritoneal varices         (positive) Acute renal failure (ARF) (HCC)   (positive) Alcoholic cirrhosis of liver with ascites (HCC)      Other   (positive) Splenomegaly       Physical Exam    Airway   Mallampati: II  TM distance: >3 FB  Neck ROM: full       Cardiovascular - normal exam  Rhythm: regular  Rate: normal  (-) murmur     Dental - normal exam           Pulmonary - normal exam  Breath sounds clear to auscultation     Abdominal    Neurological - normal exam                   Anesthesia Plan    ASA 3   ASA physical status 3 criteria: alcohol and/or substance dependence or abuse, active hepatitis and other (comment)    Plan - MAC               Induction: intravenous    Postoperative Plan: Postoperative administration of opioids is intended.    Pertinent diagnostic labs and testing reviewed    Informed Consent:    Anesthetic plan and risks discussed with patient.    Use of blood products discussed with: patient whom consented to blood products.            normal...

## 2024-11-01 NOTE — ED NOTES
D10 not yet received from central pharmacy. BG 51. Order changed to D50, same administered as pt not able to tolerate juice due to lack of appetite/nausea.

## 2024-11-01 NOTE — PROCEDURES
OPERATIVE REPORT    PATIENT:   Kavita Freeman   1965       PREOPERATIVE DIAGNOSES/INDICATIONS: GI bleed    POSTOPERATIVE DIAGNOSIS: three esophageal bands still in place, food and fluid in stomach and another band. Portal hypertensive gastropathy    PROCEDURE:  ESOPHAGOGASTRODUODENOSCOPY    PHYSICIAN:  Lis Cardozo MD    ANESTHESIA:  Per anesthesiologist.    ESTIMATED BLOOD LOSS:nil    LOCATION: [facility]    CONSENT:  OBTAINED. The risks, benefits and alternatives of the procedure were discussed in details. The risks include and are not limited to bleeding, infection, perforation, missed lesions, and sedations risks (cardiopulmonary compromise and allergic reaction to medications).    DESCRIPTION: The patient presented to the procedure room.  After routine checkup was performed, patient was brought into the endoscopy suite.  Patient was placed on his left lateral decubitus position. A bite block was placed in patient's mouth. Patient was sedated by anesthesia.  Vital signs were monitored throughout the procedure.  Oxygenation support was provided throughout the procedure. Upper endoscope was inserted into patient's mouth and advanced to the second portion of the duodenum under direct visualization.      Once the site was reached and examined, the upper endoscope was withdrawn.  Retroflexion was made within the stomach.  The stomach was decompressed, scope was withdrawn and the procedure was terminated.     The patient tolerated the procedure well.  There were no immediate complications.    OPERATIVE FINDINGS:    1. Esophagus:  three esophageal bands still adhered to esophagus. There was an area at 34 cm the looked like band had been placed recently but now flat but no loner there and a nodule at same area that looked like tissue.   2. Stomach: full of food and fluid. One band in fluid. Portal hypertensive gastropathy  3. Duodenum: abnormal mucosa but cannot biopsy due to INR and no vit k  given    IMPRESSION/RECOMMENDATIONS:  Esophageal Bands in place which I have never seen before after 2 1/2 months. Patient swears she had EGD with banding yesterday?   Portal hypertensive gastropathy  Appear encephalopathic - trat       PPI  Octreotide for today of 72 hours  Vit k  Recommend follow up EGD in 4 to 6 weeks  Beta blockade as outpt     This note has been transcribed with digital voice recognition software and although it has been reviewed may contain grammatical or word errors

## 2024-11-01 NOTE — CARE PLAN
The patient is Watcher - Medium risk of patient condition declining or worsening    Shift Goals  Clinical Goals: Hemodynamic stability, pain management  Patient Goals: Rest, pain control  Family Goals: JESUSITA    Progress made toward(s) clinical / shift goals:    Problem: Pain - Standard  Goal: Alleviation of pain or a reduction in pain to the patient’s comfort goal  11/1/2024 1349 by Lorin Castro R.N.  Outcome: Progressing  11/1/2024 1349 by Lorin Castro R.N.  Outcome: Progressing     Problem: Knowledge Deficit - Standard  Goal: Patient and family/care givers will demonstrate understanding of plan of care, disease process/condition, diagnostic tests and medications  11/1/2024 1349 by Lorin Castro R.N.  Outcome: Progressing  11/1/2024 1349 by Lorin Castro R.N.  Outcome: Progressing     Problem: Hemodynamics  Goal: Patient's hemodynamics, fluid balance and neurologic status will be stable or improve  11/1/2024 1349 by Lorin Castro R.N.  Outcome: Progressing  11/1/2024 1349 by Lorin Castro R.N.  Outcome: Progressing     Problem: Fluid Volume  Goal: Fluid volume balance will be maintained  11/1/2024 1349 by Lorin Castro R.N.  Outcome: Progressing  11/1/2024 1349 by Lorin Castro R.N.  Outcome: Progressing     Problem: Urinary - Renal Perfusion  Goal: Ability to achieve and maintain adequate renal perfusion and functioning will improve  11/1/2024 1349 by Lorin Castro R.N.  Outcome: Progressing  11/1/2024 1349 by Lorin Castro R.N.  Outcome: Progressing     Problem: Respiratory  Goal: Patient will achieve/maintain optimum respiratory ventilation and gas exchange  11/1/2024 1349 by Lorin Castro R.N.  Outcome: Progressing  11/1/2024 1349 by Lorin Castro R.N.  Outcome: Progressing     Problem: Mechanical Ventilation  Goal: Safe management of artificial airway and ventilation  11/1/2024 1349 by Lorin Castro R.N.  Outcome: Progressing  11/1/2024 1349 by Lorin Castro,  R.N.  Outcome: Progressing  Goal: Successful weaning off mechanical ventilator, spontaneously maintains adequate gas exchange  Outcome: Progressing  Goal: Patient will be able to express needs and understand communication  Outcome: Progressing     Problem: Physical Regulation  Goal: Diagnostic test results will improve  Outcome: Progressing  Goal: Signs and symptoms of infection will decrease  Outcome: Progressing     Problem: Skin Integrity  Goal: Skin integrity is maintained or improved  Outcome: Progressing     Problem: Fall Risk  Goal: Patient will remain free from falls  Outcome: Progressing       Patient is not progressing towards the following goals:

## 2024-11-01 NOTE — PROGRESS NOTES
59-year-old female with decompensated alcoholic liver cirrhosis,esophageal varices noted with abdominal pain/ distention past 1 week.  Also reports intermittent chills , some hemoptysis, fevers, shortness of breath.  She Hypotensive, tachypneic and labs were significant for elevated white count, LFTS, hyponatremia,  new onset EFRAIN concerns for intra-abdominal infection/SBP/HRS.  diagnostic/therapeutic paracentesis was done by ER provider concerning for intra-abdominal infection . She did get albumin bolus which improved her blood pressure, started on octreotide drip, PPI for concerns of GI bleed.      Evaluation her maps were over 80.     - She  will benefit from therapy for possible HRS type 1 , albumin plus midodrine along with octreotide / SBP treatment with abx   - can be admitted to IMCU for closer monitoring

## 2024-11-01 NOTE — CARE PLAN
The patient is Watcher - Medium risk of patient condition declining or worsening    Shift Goals  Clinical Goals: Hemodynamics  Patient Goals: Rest, Pain control  Family Goals: JESUSITA    Progress made toward(s) clinical / shift goals:    Problem: Hemodynamics  Goal: Patient's hemodynamics, fluid balance and neurologic status will be stable or improve  Description: Target End Date:  Prior to discharge or change in level of care    Document on Assessment and I/O flowsheet templates    1.  Monitor vital signs, pulse oximetry and cardiac monitor per provider order and/or policy  2.  Maintain blood pressure per provider order  3.  Hemodynamic monitoring per provider order  4.  Manage IV fluids and IV infusions  5.  Monitor intake and output  6.  Daily weights per unit policy or provider order  7.  Assess peripheral pulses and capillary refill  8.  Assess color and body temperature  9.  Position patient for maximum circulation/cardiac output  10. Monitor for signs/symptoms of excessive bleeding  11. Assess mental status, restlessness and changes in level of consciousness  12. Monitor temperature and report fever or hypothermia to provider immediately. Consideration of targeted temperature management.  Outcome: Progressing  Note: SR, MAP>65/SBP>90, warm extremities     Problem: Pain - Standard  Goal: Alleviation of pain or a reduction in pain to the patient’s comfort goal  Description: Target End Date:  Prior to discharge or change in level of care    Document on Vitals flowsheet    1.  Document pain using the appropriate pain scale per order or unit policy  2.  Educate and implement non-pharmacologic comfort measures (i.e. relaxation, distraction, massage, cold/heat therapy, etc.)  3.  Pain management medications as ordered  4.  Reassess pain after pain med administration per policy  5.  If opiods administered assess patient's response to pain medication is appropriate per POSS sedation scale  6.  Follow pain management plan  developed in collaboration with patient and interdisciplinary team (including palliative care or pain specialists if applicable)  Outcome: Progressing  Note: Pain frequently assessed. Medicated per MAR with good relief

## 2024-11-01 NOTE — ANESTHESIA POSTPROCEDURE EVALUATION
Patient: Kavita Freeman    Procedure Summary       Date: 11/01/24 Room / Location: [facility] ROOM 26 / SURGERY SAME DAY [facility]    Anesthesia Start: 1030 Anesthesia Stop: 1048    Procedure: GASTROSCOPY (Esophagus) Diagnosis: (Previously Banded Esophageal Varices, Retained Food, Poral Hypertensive Gastropathy)    Surgeons: Lis Cardozo M.D. Responsible Provider: Remy Pizarro M.D.    Anesthesia Type: MAC ASA Status: 3            Final Anesthesia Type: MAC  Last vitals  BP   Blood Pressure: (!) 79/43    Temp   36.7 °C (98.1 °F)    Pulse   86   Resp   16    SpO2   97 %      Anesthesia Post Evaluation    Patient location during evaluation: PACU  Patient participation: complete - patient participated  Level of consciousness: awake and alert    Airway patency: patent  Anesthetic complications: no  Cardiovascular status: hemodynamically stable  Respiratory status: acceptable  Hydration status: euvolemic    PONV: none          No notable events documented.     Nurse Pain Score: 2 (NPRS)

## 2024-11-01 NOTE — ASSESSMENT & PLAN NOTE
Status post paracentesis  Giving albumin per protocol  Fluid studies pending  Rocephin  Hold home Lasix and Aldactone for hypotension  Continue lactulose, no hepatic encephalopathy on admission  INR still pending for meld calculation

## 2024-11-01 NOTE — ASSESSMENT & PLAN NOTE
This is Septic shock Present on admission  SIRS criteria identified on my evaluation include: Tachypnea, with respirations greater than 20 per minute and Leukocytosis, with WBC greater than 12,000  Clinical indicators of end organ dysfunction include Hypotension with systolic blood pressure less than 90 or MAP less than 65, Lactic Acid greater than 2, Acute Renal Failure, with a finding of creatinine greater than 2 (patient does not have ESRD or CKD, and Acute Liver Failure, with bilirubin greater than 2  Indicators of septic shock include: Sepsis present and initial lactate level result is greater than or equal to 4mmol/L   Sources is: SBP, pneumonia   Sepsis protocol initiated  Crystalloid Fluid Administration: Resuscitation volume of albumin ordered. Reason that resuscitation volume of less than 30ml/kg was ordered concern for fluid overload  IV antibiotics as appropriate for source of sepsis  Reassessment: I have reassessed the patient's hemodynamic status    Paracentesis performed with cloudy fluid removed, fluid studies pending  Chest x-ray shows left lower lobe opacity possible pneumonitis  Continue Rocephin, azithromycin  Blood cultures pending  Lactic 8.1

## 2024-11-01 NOTE — ASSESSMENT & PLAN NOTE
Creatinine 2.11 on admission  Likely due to sepsis  Avoid nephrotoxic medications  Abdominal ultrasound pending  Daily BMP

## 2024-11-01 NOTE — OR NURSING
1044 -Pt arrived from OR, report received. Connected to monitor, VSS. On 6L mask. Pt asleep, respirations even and unlabored. Pt BP 79/43 on arrival anesthesia aware.     1103 pt remains drowsy arouse to voice.     1114 pt tolerating sips of water. Complains of mild soreness to abdomen.       1137 report to Lorin ZALDIVAR on IMCU.       1147 pt with RN back to room. Bedside handoff completed.

## 2024-11-01 NOTE — CONSULTS
Date of Consultation:  11/1/2024    Patient: : Kavita Freeman  MRN: [id number]    Referring Physician:  Dr. Quang Mclaughlin DO.      GI:Bahman Vences M.D.     Reason for Consultation: Cirrhosis, GI bleeding.    History of Present Illness:   59 years old female with medical history of alcohol-related liver injury, cirrhosis, decompensation with variceal bleeding and refractory ascites formation, mild to moderate encephalopathy.  Presented to hospital this time for fluid accumulation, acute kidney injury.  GI team is consulted for recent GI bleeding    The patient has primary GI liver doctor in [place] and she is waiting for the transplantation evaluation at [facility] in the coming 2 months.  We do not have any records to support this plan, however the patient is very certain about the [facility] appointment.    For the bleeding, the patient had nausea vomiting in the last 2 days, the patient saw blood and also some coffee-ground like vomitus.  Did not appear a lot of tension on the bowel movement but at least no large amount of tarry stool was noted.  Last upper GI scope August 15 with variceal bleeding and the banding.        Past Medical History:   Diagnosis Date    GIB (gastrointestinal bleeding) 08/14/2024    Hypomagnesemia 08/14/2024    Wound check, abscess 06/27/2024    Thrombocytopenia (HCC) 06/27/2024    Septic shock (HCC) 06/14/2024    Acute kidney injury (HCC) 06/14/2024    Necrotizing fasciitis (HCC) 06/14/2024    Perirectal abscess 06/14/2024    Macrocytosis 10/26/2016    Acute cystitis with hematuria 09/21/2016    Hypotension 07/15/2016    Lightheadedness 12/17/2015    Other chest pain 12/17/2015    Pain 02/24/2015    right shoulder 6/10    UTI (lower urinary tract infection) 09/21/2013    URI (upper respiratory infection) 09/21/2013    PSVT (paroxysmal supraventricular tachycardia) ([facility]) 06/24/2013    Tobacco abuse 05/03/2013    Allergy     Anxiety     Arrhythmia     for tachycardia    Back  pain     Depression     Treated by Dr Karla Whatley    Depression     Hyperlipidemia     Hypertension     Indigestion     Psychiatric disorder     anxiety     Urinary tract infection, site not specified     Recurrent UTI's         Past Surgical History:   Procedure Laterality Date    MS UPPER GI ENDOSCOPY,DIAGNOSIS N/A 8/15/2024    Procedure: GASTROSCOPY;  Surgeon: Bahman Vences M.D.;  Location: SURGERY SAME DAY [facility];  Service: Gastroenterology    MS UPPER GI ENDOSCOPY,LIGAT VARIX N/A 8/15/2024    Procedure: GASTROSCOPY, WITH BANDING;  Surgeon: Bahman Vences M.D.;  Location: SURGERY SAME DAY [facility];  Service: Gastroenterology    MS I&D PERIRECTAL ABSCESS N/A 6/17/2024    Procedure: INCISION AND DRAINAGE, ABSCESS, PERIRECTAL;  Surgeon: Corey Sierra M.D.;  Location: SURGERY [facility];  Service: Thoracic    MS I&D PERIRECTAL ABSCESS  6/14/2024    Procedure: INCISION AND DRAINAGE, ABSCESS, PERIRECTAL;  Surgeon: Kelton Orellana M.D.;  Location: SURGERY [facility];  Service: General    SHOULDER ARTHROSCOPY W/ ROTATOR CUFF REPAIR  3/9/2015    Performed by Corey Terry M.D. at SURGERY [facility] ORS    TRIGGER FINGER RELEASE  3/9/2015    Performed by Corey Terry M.D. at SURGERY [facility] ORS    OTHER  6/2014    broken ribs- plates & Pins right front 4-8    SHOULDER ARTHROSCOPY  3/23/2009    Performed by COREY TERRY at SURGERY [facility] ORS    BREAST BIOPSY  7/18/08    Performed by MARY DE LA CRUZ at SURGERY SAME DAY [facility] ORS    SHOULDER ARTHROSCOPY  2005    GYN SURGERY  1995    tubal ligation    OPEN REDUCTION      flail chest    TUBAL LIGATION         Family History   Problem Relation Age of Onset    Hypertension Mother     Stroke Mother     Hypertension Father         ? valvular heart     Hypertension Brother     Cancer Paternal Grandmother         breast cancer       Social History     Socioeconomic History    Marital status:    Tobacco Use    Smoking status: Every  Day     Current packs/day: 0.00     Average packs/day: 0.5 packs/day for 25.0 years (12.5 ttl pk-yrs)     Types: Cigarettes     Start date: 1994     Last attempt to quit: 2016     Years since quittin.4    Smokeless tobacco: Never    Tobacco comments:     1-2 cigarettes a day   Vaping Use    Vaping status: Never Used   Substance and Sexual Activity    Alcohol use: No     Alcohol/week: 1.5 - 2.0 oz     Types: 3 - 4 Cans of beer per week    Drug use: Not Currently    Sexual activity: Yes     Partners: Male   Social History Narrative    ** Merged History Encounter **          Social Determinants of Health     Food Insecurity: No Food Insecurity (2024)    Hunger Vital Sign     Worried About Running Out of Food in the Last Year: Never true     Ran Out of Food in the Last Year: Never true   Transportation Needs: No Transportation Needs (2024)    PRAPARE - Transportation     Lack of Transportation (Medical): No     Lack of Transportation (Non-Medical): No   Recent Concern: Transportation Needs - Unmet Transportation Needs (2024)    PRAPARE - Transportation     Lack of Transportation (Medical): Yes     Lack of Transportation (Non-Medical): No   Intimate Partner Violence: Not At Risk (2024)    Humiliation, Afraid, Rape, and Kick questionnaire     Fear of Current or Ex-Partner: No     Emotionally Abused: No     Physically Abused: No     Sexually Abused: No   Housing Stability: Low Risk  (2024)    Housing Stability Vital Sign     Unable to Pay for Housing in the Last Year: No     Number of Times Moved in the Last Year: 1     Homeless in the Last Year: No           Physical Exam:  Vitals:    10/31/24 2200 10/31/24 2300 24 0000 24 0100   BP: 111/58 119/61 113/55 105/55   Pulse: 94 94 92 92   Resp: (!) 21 (!) 26 (!) 30 (!) 29   Temp: 35.8 °C (96.5 °F)  (!) 35.7 °C (96.2 °F)    TempSrc: Oral  Oral    SpO2: 93% 94% 94% 91%   Weight: 62.5 kg (137 lb 12.6 oz)      Height: 1.727 m (5'  "8\")          Constitutional: No fevers.  Eyes: No symptoms reported.   Ears/Nose/Throat/Mouth: No choking reported.  Cardiovascular: No chest pain reported.   Respiratory: Denies shortness of breath.  Gastrointestinal/Hepatic: Per H/P.   Skin/Breast: No new rash reported.   Psychiatric: No complaints    HEENT: grossly normal.  Slightly jaundiced.  Cardiovascular: Please refer to primary team's daily assessment.   Lungs: Please refer to primary team's daily assessment.   Abdomen: Soft, distended, no acute abdomen.  Skin: No erythema, No rash.  Lower limbs: normal, no pitting edema.   Neurologic: Alert & oriented x 2 Normal motor function, No focal deficits noted.  PSY: stable mood.         Labs:  Recent Labs     10/31/24  1455 10/31/24  2113 11/01/24  0310   WBC 45.2*  --  39.0*   RBC 4.57  --  3.40*   HEMOGLOBIN 13.3 11.0* 10.1*   HEMATOCRIT 39.3 32.6* 29.0*   MCV 86.0  --  85.3   MCH 29.1  --  29.7   MCHC 33.8  --  34.8   RDW 57.6*  --  56.9*   PLATELETCT 313  --  213   MPV 11.1  --  10.4     Recent Labs     10/31/24  2113 11/01/24  0310 11/01/24  0445   SODIUM 118* *  117*   POTASSIUM 4.4 RR 5.3  5.3   CHLORIDE 82* RR 82*  82*   CO2 18* RR 21  21   GLUCOSE 98 RR 69  69   BUN 42* RR 46*  46*     Recent Labs     10/31/24  1455   INR 2.55*       Recent Labs     10/31/24  1455 11/01/24 0310 11/01/24  0445   ASTSGOT 373* *   ALTSGPT 120* *   TBILIRUBIN 6.1* RR 5.6*   ALKPHOSPHAT 135* *   GLOBULIN 4.7* RR 3.7*   INR 2.55*  --   --          Imaging:  DX-CHEST-PORTABLE (1 VIEW)  Narrative: 10/31/2024 5:15 PM    HISTORY/REASON FOR EXAM:  Sepsis.    TECHNIQUE/EXAM DESCRIPTION AND NUMBER OF VIEWS:  Single portable view of the chest.    COMPARISON: 9/27/2024    FINDINGS:    The mediastinal and cardiac silhouette is unremarkable.    There is atherosclerosis of the aorta.    There is hazy parenchymal opacity in the left lower lobe and linear opacity in the right lower lobe.    There is no " significant pleural effusion.    There is no visible pneumothorax.    There are right lateral rib fractures, status post ORIF similar to the prior study.  Impression: 1.  Hazy left lower lobe opacity could represent atelectasis or pneumonitis.  2.  There is linear scarring or atelectasis in the right lung base.            Impressions:  59 years old female with medical history of alcohol-related liver injury, cirrhosis, decompensation with variceal bleeding and refractory ascites formation, mild to moderate encephalopathy.  Presented to hospital this time for fluid accumulation, acute kidney injury.  GI team is consulted for recent GI bleeding    The patient has primary GI liver doctor in [place] and she is waiting for the transplantation evaluation at [facility] in the coming 2 months.  We do not have any records to support this plan, however the patient is very certain about the [facility] appointment.    Please continue IV PPI, octreotide, n.p.o.  GI team will try to perform upper GI scope today with Dr. Brice.    Diagnosis: upper gastrointestinal bleeding.   Procedure: Esophagogastroduodenoscopy with bleeding control including banding.       Inpatient GI team will continue to follow up.       This note was generated using voice recognition software which has a small chance of producing errors of grammar and possibly content. I have made every reasonable attempt to find and correct any obvious errors, but expect that some may not be found prior to finalization of this note.

## 2024-11-01 NOTE — ED NOTES
Lab called - critical lactic 8.1. Dr. Gonzales notified via Voalte.   Hide Include Location In Plan Question?: No Detail Level: Simple

## 2024-11-01 NOTE — H&P
Hospital Medicine History & Physical Note    Date of Service  10/31/2024    Primary Care Physician  Galilea Vaca D.O.    Consultants  GI    Provider: Dr. Vences    Code Status  DNAR/DNI    Chief Complaint  Chief Complaint   Patient presents with    Abdominal Pain     Patient arrives for paracentesis due to inability to schedule appointment. Pt reports 10/10 abd cramping pain and pressure.    BP repeated multiple times 87/57 in triage. Pt skin appears green tinted.        History of Presenting Illness  Kavita Freeman is a 59 y.o. female with past medical history of alcoholic cirrhosis, esophageal varices status post banding, who presented 10/31/2024 for a paracentesis.  Patient reported worsening abdominal distention, last paracentesis was 1 week ago.  Initially she denied any other symptoms to the ERP.  On my evaluation patient does admit to intermittent fevers and chills for the last 2 weeks.  She has been having abdominal pain with dry heaving and coughing up blood.  She reports the last time she coughed up blood was yesterday.  She does report recent esophageal banding.  She was able to see her outpatient GI, Dr. Houser and has an appointment with [facility] January 3 for transplant consideration.  She has not had any alcohol since June.  In general patient has been feeling unwell with shortness of breath and coughing.  The pain in her abdomen worsened after paracentesis.  She also reports low appetite due to ongoing nausea.    In the ER BP low initially 87/57 with tachypnea.  Labs significant for WBC 45, sodium 117, chloride 81, CO2 16, anion gap 20, glucose 51, creatinine 2.11, GFR 27, , , alk phos 135, tbili 6.1.  Discussed with ERP who performed paracentesis, cloudy fluid removed and patient given albumin high concern for SBP.  Discussed with GI, start octreotide and IV PPI with tentative plan for EGD tomorrow    I discussed the plan of care with patient, bedside RN, GI, and ERP  .    Review of Systems  Review of Systems   Constitutional:  Positive for chills, fever and malaise/fatigue.   HENT:  Negative for congestion.    Eyes:  Negative for pain.   Respiratory:  Positive for cough, hemoptysis and shortness of breath.    Cardiovascular:  Negative for chest pain and leg swelling.   Gastrointestinal:  Positive for abdominal pain, nausea and vomiting.   Genitourinary:  Negative for dysuria.   Musculoskeletal:  Negative for myalgias.   Skin:  Negative for rash.   Neurological:  Positive for weakness. Negative for dizziness and headaches.   Psychiatric/Behavioral:  Negative for depression.    All other systems reviewed and are negative.      Past Medical History   has a past medical history of Acute cystitis with hematuria (09/21/2016), Acute kidney injury ([facility]) (06/14/2024), Allergy, Anxiety, Arrhythmia, Back pain, Depression, Depression, GIB (gastrointestinal bleeding) (08/14/2024), Hyperlipidemia, Hypertension, Hypomagnesemia (08/14/2024), Hypotension (07/15/2016), Indigestion, Lightheadedness (12/17/2015), Macrocytosis (10/26/2016), Necrotizing fasciitis ([facility]) (06/14/2024), Other chest pain (12/17/2015), Pain (02/24/2015), Perirectal abscess (06/14/2024), PSVT (paroxysmal supraventricular tachycardia) ([facility]) (06/24/2013), Psychiatric disorder, Septic shock ([facility]) (06/14/2024), Thrombocytopenia ([facility]) (06/27/2024), Tobacco abuse (05/03/2013), URI (upper respiratory infection) (09/21/2013), Urinary tract infection, site not specified, UTI (lower urinary tract infection) (09/21/2013), and Wound check, abscess (06/27/2024).    Surgical History   has a past surgical history that includes breast biopsy (7/18/08); shoulder arthroscopy (3/23/2009); tubal ligation; gyn surgery (1995); shoulder arthroscopy (2005); other (6/2014); shoulder arthroscopy w/ rotator cuff repair (3/9/2015); trigger finger release (3/9/2015); open reduction; pr i&d perirectal abscess (6/14/2024); pr i&d perirectal abscess (N/A,  6/17/2024); pr upper gi endoscopy,diagnosis (N/A, 8/15/2024); and pr upper gi endoscopy,ligat varix (N/A, 8/15/2024).     Family History  family history includes Cancer in her paternal grandmother; Hypertension in her brother, father, and mother; Stroke in her mother.   Family history reviewed with patient. There is no family history that is pertinent to the chief complaint.     Social History   reports that she has been smoking cigarettes. She started smoking about 30 years ago. She has a 12.5 pack-year smoking history. She has never used smokeless tobacco. She reports that she does not currently use drugs. She reports that she does not drink alcohol.    Allergies  Allergies   Allergen Reactions    Nitrofurantoin Vomiting    Sulfamethoxazole W-Trimethoprim Vomiting       Medications  Prior to Admission Medications   Prescriptions Last Dose Informant Patient Reported? Taking?   FLUoxetine (PROZAC) 10 MG Cap   Yes No   Sig: Take 10 mg by mouth every morning.   acetaminophen (TYLENOL) 325 MG Tab   No No   Sig: Take 2 Tablets by mouth every 6 hours as needed for Mild Pain, Moderate Pain or Fever.   furosemide (LASIX) 20 MG Tab   No No   Sig: Take 3 Tablets by mouth every day for 30 days.   gabapentin (NEURONTIN) 100 MG Cap   Yes No   Sig: Take 200 mg by mouth every evening.   hydrOXYzine HCl (ATARAX) 25 MG Tab   No No   Sig: Take 1 Tablet by mouth 3 times a day as needed for Anxiety or Itching.   lactulose (CONSTULOSE) 10 GM/15ML Solution   No No   Sig: TAKE 30 ML BY MOUTH THREE TIMES DAILY   omeprazole (PRILOSEC) 20 MG delayed-release capsule   No No   Sig: Take 1 capsule by mouth twice daily   spironolactone (ALDACTONE) 50 MG Tab   No No   Sig: Take 3 Tablets by mouth every day for 30 days.   sucralfate (CARAFATE) 1 GM/10ML Suspension   No No   Sig: Take 10 mL by mouth every 6 hours.   vitamin D (CHOLECALCIFEROL) 1000 UNIT Tab   No No   Sig: Take 1 Tablet by mouth every day for 30 days.      Facility-Administered  Medications: None       Physical Exam  Temp:  [35.9 °C (96.7 °F)] 35.9 °C (96.7 °F)  Pulse:  [84-92] 92  Resp:  [18-21] 20  BP: ()/(51-57) 134/56  SpO2:  [89 %-96 %] 91 %  Blood Pressure: 97/55   Temperature: 35.9 °C (96.7 °F)   Pulse: 85   Respiration: 20   Pulse Oximetry: 91 %       Physical Exam  Vitals and nursing note reviewed.   Constitutional:       General: She is in acute distress.      Appearance: She is ill-appearing and toxic-appearing.   HENT:      Head: Normocephalic and atraumatic.      Right Ear: External ear normal.      Left Ear: External ear normal.      Nose: Nose normal.      Mouth/Throat:      Mouth: Mucous membranes are dry.      Pharynx: Oropharynx is clear.   Eyes:      General: Scleral icterus present.      Extraocular Movements: Extraocular movements intact.      Pupils: Pupils are equal, round, and reactive to light.   Cardiovascular:      Rate and Rhythm: Normal rate and regular rhythm.      Pulses: Normal pulses.   Pulmonary:      Effort: Pulmonary effort is normal. No respiratory distress.      Breath sounds: Rales present. No wheezing.   Abdominal:      General: There is distension.      Palpations: Abdomen is soft.      Tenderness: There is abdominal tenderness. There is guarding and rebound.   Musculoskeletal:         General: Normal range of motion.      Cervical back: Neck supple.      Right lower leg: No edema.      Left lower leg: No edema.   Skin:     General: Skin is warm and dry.      Coloration: Skin is jaundiced.   Neurological:      General: No focal deficit present.      Mental Status: She is alert and oriented to person, place, and time.      Cranial Nerves: No cranial nerve deficit.   Psychiatric:         Mood and Affect: Mood is depressed. Affect is flat and tearful.         Behavior: Behavior normal.         Thought Content: Thought content normal.         Laboratory:  Recent Labs     10/31/24  1455   WBC 45.2*   RBC 4.57   HEMOGLOBIN 13.3   HEMATOCRIT 39.3  "  MCV 86.0   MCH 29.1   MCHC 33.8   RDW 57.6*   PLATELETCT 313   MPV 11.1     Recent Labs     10/31/24  1455   SODIUM 117*   POTASSIUM 4.9   CHLORIDE 81*   CO2 16*   GLUCOSE 51*   BUN 37*   CREATININE 2.11*   CALCIUM 9.3     Recent Labs     10/31/24  1455   ALTSGPT 120*   ASTSGOT 373*   ALKPHOSPHAT 135*   TBILIRUBIN 6.1*   LIPASE 47   GLUCOSE 51*         No results for input(s): \"NTPROBNP\" in the last 72 hours.      No results for input(s): \"TROPONINT\" in the last 72 hours.    Imaging:  DX-CHEST-PORTABLE (1 VIEW)   Final Result      1.  Hazy left lower lobe opacity could represent atelectasis or pneumonitis.   2.  There is linear scarring or atelectasis in the right lung base.      US-ABDOMEN COMPLETE SURVEY    (Results Pending)       X-Ray:  I have personally reviewed the images and compared with prior images.  EKG:  I have personally reviewed the images and compared with prior images.    Assessment/Plan:  Justification for Admission Status  I anticipate this patient will require at least two midnights for appropriate medical management, necessitating inpatient admission because Patient with septic shock due to SBP and possible pneumonia.  Lactic acid 8 on admission.  Status post paracentesis, currently getting albumin resuscitation.  Also complained of coughing up blood last time yesterday, GI consulted started PPI and octreotide with tentative plan for scope tomorrow.  also with significant hypoglycemia and hyponatremia.        * Septic shock (HCC)- (present on admission)  Assessment & Plan  This is Septic shock Present on admission  SIRS criteria identified on my evaluation include: Tachypnea, with respirations greater than 20 per minute and Leukocytosis, with WBC greater than 12,000  Clinical indicators of end organ dysfunction include Hypotension with systolic blood pressure less than 90 or MAP less than 65, Lactic Acid greater than 2, Acute Renal Failure, with a finding of creatinine greater than 2 (patient " does not have ESRD or CKD, and Acute Liver Failure, with bilirubin greater than 2  Indicators of septic shock include: Sepsis present and initial lactate level result is greater than or equal to 4mmol/L   Sources is: SBP, pneumonia   Sepsis protocol initiated  Crystalloid Fluid Administration: Resuscitation volume of albumin ordered. Reason that resuscitation volume of less than 30ml/kg was ordered concern for fluid overload  IV antibiotics as appropriate for source of sepsis  Reassessment: I have reassessed the patient's hemodynamic status    Paracentesis performed with cloudy fluid removed, fluid studies pending  Chest x-ray shows left lower lobe opacity possible pneumonitis  Continue Rocephin, azithromycin  Blood cultures pending  Lactic 8.1     Hyponatremia- (present on admission)  Assessment & Plan  Sodium 117 on admission, patient's mentation intact  Every 6 hours BMP  Paracentesis already performed and D50 given for hypoglycemia  Repeat pending  Avoid overcorrection goal NA 6 to 8 mEq in 24 hours    Acute renal failure (ARF) (HCC)- (present on admission)  Assessment & Plan  Creatinine 2.11 on admission  Likely due to sepsis  Avoid nephrotoxic medications  Abdominal ultrasound pending  Daily BMP    Alcoholic cirrhosis of liver with ascites (HCC)- (present on admission)  Assessment & Plan  Status post paracentesis  Giving albumin per protocol  Fluid studies pending  Rocephin  Hold home Lasix and Aldactone for hypotension  Continue lactulose, no hepatic encephalopathy on admission  INR still pending for meld calculation    Advance care planning- (present on admission)  Assessment & Plan  I discussed advance care planning for at least 16 minutes with the patient, including diagnosis, prognosis, plan of care, risks and benefits of any therapies that could be offered, as well as alternatives including palliation and hospice as appropriate.  The patient has opted DNR/DNI.  Time spent was exclusive of evaluation and  management of other separately billable procedures.  Start time: 5:30 End time: 5:46    Hypoglycemia- (present on admission)  Assessment & Plan  Likely due to poor oral intake as patient reports reduced appetite  -Patient also finished prednisolone 2 days ago, could be adrenal insufficiency  D50 as needed    Transaminitis- (present on admission)  Assessment & Plan  Patient reports she already finished a 28-day course of prednisone at home  Likely worsening due to sepsis  Daily CMP  Ultrasound pending    High anion gap metabolic acidosis- (present on admission)  Assessment & Plan  Due to lactic acidosis likely from right liver disease and sepsis  IV thiamine  Getting albumin resuscitation    Depression with anxiety- (present on admission)  Assessment & Plan  Continue home fluoxetine        VTE prophylaxis: SCDs/TEDs    Patient is critically ill.   The patient has: septic shock   The vital organ system that is affected is the: Circulatory, liver, kidneys  If untreated there is a high chance of deterioration into and eventually death.  The critical care that I am providing today is: Patient with septic shock due to SBP and possible pneumonia.  Lactic acid 8 on admission.  Status post paracentesis, currently getting albumin resuscitation.  Also complained of coughing up blood last time yesterday, GI consulted started PPI and octreotide with tentative plan for scope tomorrow.  also with significant hypoglycemia and hyponatremia.  The critical care that has been undertaken is medically complex.  There has been no overlap in critical care time.  Critical Care Time not including procedures: 55 mins

## 2024-11-01 NOTE — ED NOTES
Lab called - critically low glucose 51. ERP notified by telephone. Pt sipping juice, remains alert.

## 2024-11-01 NOTE — ASSESSMENT & PLAN NOTE
Likely due to poor oral intake as patient reports reduced appetite  -Patient also finished prednisolone 2 days ago, could be adrenal insufficiency  D50 as needed

## 2024-11-01 NOTE — ASSESSMENT & PLAN NOTE
Due to lactic acidosis likely from right liver disease and sepsis  IV thiamine  Getting albumin resuscitation

## 2024-11-01 NOTE — PROGRESS NOTES
[facility] Medicine Daily Progress Note    Date of Service  11/1/2024    Chief Complaint  Kavita Freeman is a 59 y.o. female admitted 10/31/2024 with needs paracentesis    Hospital Course  58yo PMHx ETOH abuse sober x 4 months, alcoholic cirrhosis, esophageal varices s/p banding.  Pt presented requesting a paracentesis and with fever and chills.  In ED WBCs 45, Na 117, CO2 16, AG 20, creat 2.1.  Para done in ED; cloudy fluid noted with nucleated cells >34k    Interval Problem Update  Feeling better, less belly pain.  No N or V.  No other complaints at this point    Sinus 90s  SBP 80-100s  On 1 LNC  AFebrile  UOP 250ml/12hrs    I have discussed this patient's plan of care and discharge plan at IDT rounds today with Case Management, Nursing, Nursing leadership, and other members of the IDT team.    Consultants/Specialty  GI    Code Status  DNAR/DNI    Disposition  The patient is not medically cleared for discharge to home or a post-acute facility.      I have placed the appropriate orders for post-discharge needs.    Review of Systems  Review of Systems   Constitutional:  Negative for chills and fever.   Respiratory:  Negative for cough and shortness of breath.    Cardiovascular:  Negative for chest pain, palpitations and leg swelling.   Gastrointestinal:  Positive for abdominal pain. Negative for diarrhea, nausea and vomiting.   Genitourinary:  Negative for dysuria and urgency.   Musculoskeletal:  Negative for back pain.   Skin:  Negative for rash.   Neurological:  Positive for weakness. Negative for dizziness, loss of consciousness and headaches.        Physical Exam  Temp:  [35.7 °C (96.2 °F)-35.9 °C (96.7 °F)] 35.7 °C (96.2 °F)  Pulse:  [84-94] 92  Resp:  [18-30] 29  BP: ()/(51-62) 105/55  SpO2:  [89 %-96 %] 91 %    Physical Exam  Constitutional:       General: She is not in acute distress.     Appearance: Normal appearance. She is well-developed. She is cachectic. She is not diaphoretic.      Comments:  Temporal wasting   HENT:      Head: Normocephalic and atraumatic.   Eyes:      General: Scleral icterus present.   Neck:      Vascular: No JVD.   Cardiovascular:      Rate and Rhythm: Normal rate and regular rhythm.      Heart sounds: Murmur heard.   Pulmonary:      Effort: Pulmonary effort is normal. No respiratory distress.      Breath sounds: No stridor. No wheezing or rales.   Abdominal:      General: There is distension.      Palpations: Abdomen is soft.      Tenderness: There is no abdominal tenderness. There is no guarding or rebound.      Comments: Soft  +fluid wave   Skin:     General: Skin is warm and dry.      Capillary Refill: Capillary refill takes less than 2 seconds.      Coloration: Skin is jaundiced.      Findings: No rash.   Neurological:      Mental Status: She is oriented to person, place, and time.   Psychiatric:         Mood and Affect: Mood normal.         Behavior: Behavior normal.         Thought Content: Thought content normal.         Fluids    Intake/Output Summary (Last 24 hours) at 11/1/2024 0726  Last data filed at 11/1/2024 0000  Gross per 24 hour   Intake 257.5 ml   Output 4400 ml   Net -4142.5 ml        Laboratory  Recent Labs     10/31/24  1455 10/31/24  2113 11/01/24  0310   WBC 45.2*  --  39.0*   RBC 4.57  --  3.40*   HEMOGLOBIN 13.3 11.0* 10.1*   HEMATOCRIT 39.3 32.6* 29.0*   MCV 86.0  --  85.3   MCH 29.1  --  29.7   MCHC 33.8  --  34.8   RDW 57.6*  --  56.9*   PLATELETCT 313  --  213   MPV 11.1  --  10.4     Recent Labs     10/31/24  2113 11/01/24  0310 11/01/24  0445   SODIUM 118* *  117*   POTASSIUM 4.4 RR 5.3  5.3   CHLORIDE 82* RR 82*  82*   CO2 18* RR 21  21   GLUCOSE 98 RR 69  69   BUN 42* RR 46*  46*   CREATININE 1.85* RR 1.61*  1.61*   CALCIUM 8.9 RR 8.7  8.7     Recent Labs     10/31/24  1455   INR 2.55*               Imaging  DX-CHEST-PORTABLE (1 VIEW)   Final Result      1.  Hazy left lower lobe opacity could represent atelectasis or pneumonitis.   2.   There is linear scarring or atelectasis in the right lung base.      US-ABDOMEN COMPLETE SURVEY    (Results Pending)        Assessment/Plan  * Septic shock (HCC)- (present on admission)  Assessment & Plan  Present on admission  Source SBP  Gram Neg rods in blood  DC Azithro  Cont Ceftriaxone  Follow cultures for final ID and sensitivities  Cont midodrine    Severe protein-calorie malnutrition ([place]: less than 60% of standard weight) (HCC)  Assessment & Plan  Nutrition consult    Bacteremia due to Gram-negative bacteria  Assessment & Plan  Source SBP  S/p tap of 5000ml 10/31  Ceftriaxone  Follow cultures for final ID and sensitivities    Advance care planning- (present on admission)  Assessment & Plan  I discussed advance care planning for at least 16 minutes with the patient, including diagnosis, prognosis, plan of care, risks and benefits of any therapies that could be offered, as well as alternatives including palliation and hospice as appropriate.  The patient has opted DNR/DNI.  Time spent was exclusive of evaluation and management of other separately billable procedures.  Start time: 5:30 End time: 5:46    Hypoglycemia- (present on admission)  Assessment & Plan  Likely due to poor oral intake as patient reports reduced appetite, lack of glucneogenesis due to cirrhosis  -Patient also finished prednisolone 2 days ago, could be adrenal insufficiency  Reg diet  Nutrition consult  Close monitoring of BG    Transaminitis- (present on admission)  Assessment & Plan  Patient reports she already finished a 28-day course of prednisone at home  Likely worsening due to sepsis/SBP  Daily CMP  Ultrasound pending    High anion gap metabolic acidosis- (present on admission)  Assessment & Plan  Due to lactic acidosis likely from right liver disease and sepsis  IV thiamine  resolved    Hyponatremia- (present on admission)  Assessment & Plan  Sodium 117 on admission  Hypervolemic hypoNa  Resume IV lasix after EGD   Q6hr Na  Cont  [facility] level of care as pt is high risk for over correction, Sz or neurologic deterioration    Acute renal failure (ARF) (HCC)- (present on admission)  Assessment & Plan  Creatinine 2.11 on admission  HRS vs sepsis vs hypovlemia  Of note pt had a paracentesis one week prior to presentation with no albumin  Creat improving today  On midodrine/octreotide  UOP 250ml/12hrs: given body weight of 60kg that is low but not oliguric output  Likely due to sepsis  Avoid nephrotoxic medications  Abdominal ultrasound pending  Daily BMP    Alcoholic cirrhosis of liver with ascites (HCC)- (present on admission)  Assessment & Plan  F/b Dr [facility]er [facility]  ETOH induced, sober since June  Has been referred to [facility] service with appointment in June  Based on lab from today MELD 32  Status post paracentesis 5000ml 10/31  S/p albumin following tap  SBP based on fluid cell count  Rocephin  On midodrine 5mg TID  Continue lactulose, no hepatic encephalopathy on admission  Resume lasix cautitiously given pressures    Depression with anxiety- (present on admission)  Assessment & Plan  Continue home fluoxetine         VTE prophylaxis: VTE Selection    I have performed a physical exam and reviewed and updated ROS and Plan today (11/1/2024). In review of yesterday's note (10/31/2024), there are no changes except as documented above.

## 2024-11-01 NOTE — PROGRESS NOTES
4 Eyes Skin Assessment Completed by KAYLEY Padgett and KAYLEY Cueva.    Head WDL  Ears WDL  Nose WDL  Mouth WDL  Neck WDL  Breast/Chest WDL  Shoulder Blades WDL  Spine WDL  (R) Arm/Elbow/Hand WDL  (L) Arm/Elbow/Hand WDL  Abdomen Bruising  Groin WDL  Scrotum/Coccyx/Buttocks Redness and Blanching  (R) Leg- Scab and Bruising  (L) Leg Scab and Bruising  (R) Heel/Foot/Toe Redness and Blanching  (L) Heel/Foot/Toe Redness and Blanching          Devices In Places ECG, Blood Pressure Cuff, Pulse Ox, and Nasal Cannula      Interventions In Place NC W/Ear Foams, Heel Mepilex, Optifoam, Q2 Turns, and Low Air Loss Mattress    Possible Skin Injury No    Pictures Uploaded Into Epic N/A  Wound Consult Placed N/A  RN Wound Prevention Protocol Ordered No

## 2024-11-01 NOTE — PROGRESS NOTES
Hypoglycemia Intervention    Hypoglycemia protocol intervention:  Blood glucose 62 at 1347.  Intervention: 8 oz of fruit juice   Repeat blood glucose 73 at 1450

## 2024-11-01 NOTE — ASSESSMENT & PLAN NOTE
11/10/2024  Source SBP  S/p tap of 5000ml 10/31  Salmonella species based on PCR: sent to state lab for final ID and sensitivities  Check TTE  Repeat CT 11/3 did not show any focal infection/abscess/perf  Repeat cutlures 11/3 remain neg thus far  She remains afebrile  Now completed IV ceftriaxone.  Ultrasound-guided paracentesis ordered and Levaquin ordered to evaluate for ongoing infection.

## 2024-11-01 NOTE — ASSESSMENT & PLAN NOTE
Sodium 117 on admission, patient's mentation intact  Every 6 hours BMP  Paracentesis already performed and D50 given for hypoglycemia  Repeat pending  Avoid overcorrection goal NA 6 to 8 mEq in 24 hours

## 2024-11-01 NOTE — ASSESSMENT & PLAN NOTE
Patient reports she already finished a 28-day course of prednisone at home  Likely worsening due to sepsis  Daily CMP  Ultrasound pending

## 2024-11-01 NOTE — OR NURSING
Pt resting quietly in preop.  AO x 4.  Various small bruises to lower legs, some small cuts and in healing stages.  Pt's overall skin color is a little dark, similar to a sun tan perhaps with some yellow undertones.  Pt states she has been this dark about 2 weeks.  Some c/o itchy skin but she states that they are giving her something for this.  Eye  color is white with the slightest bit of yellow noted .    Dressing to right heel intact.  Ready to go to OR

## 2024-11-01 NOTE — ASSESSMENT & PLAN NOTE
I discussed advance care planning for at least 16 minutes with the patient, including diagnosis, prognosis, plan of care, risks and benefits of any therapies that could be offered, as well as alternatives including palliation and hospice as appropriate.  The patient has opted DNR/DNI.  Time spent was exclusive of evaluation and management of other separately billable procedures.  Start time: 5:30 End time: 5:46

## 2024-11-01 NOTE — ANESTHESIA TIME REPORT
Anesthesia Start and Stop Event Times       Date Time Event    11/1/2024 1030 Ready for Procedure     1030 Anesthesia Start     1048 Anesthesia Stop          Responsible Staff  11/01/24      Name Role Begin End    eRmy Pizarro M.D. Anesth 1030 1048          Overtime Reason:  no overtime (within assigned shift)    Comments:

## 2024-11-01 NOTE — PROGRESS NOTES
4 Eyes Skin Assessment Completed by KAYLEY Garrido and KAYLEY Jackson.    Head WDL  Ears WDL  Nose WDL  Mouth Dry   Neck WDL  Breast/Chest WDL  Shoulder Blades Redness and Blanching  Spine Redness and Blanching  (R) Arm/Elbow/Hand Redness and Blanching  (L) Arm/Elbow/Hand Redness and Blanching  Abdomen WDL, LLQ paracentesis site   Groin WDL  Scrotum/Coccyx/Buttocks Redness and Blanching    (R) Leg Scab    (L) Leg Scab    (R) Heel/Foot/Toe Redness and Blanching, Slow to matheus  (L) Heel/Foot/Toe Redness and Blanching, Slow to matheus          Devices In Places ECG, Tele Box, Blood Pressure Cuff, and Nasal Cannula      Interventions In Place Gray Ear Foams, Heel Mepilex, TAP System, Pillows, Q2 Turns, Low Air Loss Mattress, Dri-Alessandro Pads, Heels Loaded W/Pillows, and Pressure Redistribution Mattress    Possible Skin Injury No    Pictures Uploaded Into Epic N/A  Wound Consult Placed N/A  RN Wound Prevention Protocol Ordered No

## 2024-11-02 PROBLEM — G93.40 ACUTE ENCEPHALOPATHY: Status: ACTIVE | Noted: 2024-01-01

## 2024-11-02 LAB
ANION GAP SERPL CALC-SCNC: 11 MMOL/L (ref 7–16)
ANION GAP SERPL CALC-SCNC: 14 MMOL/L (ref 7–16)
ANION GAP SERPL CALC-SCNC: 15 MMOL/L (ref 7–16)
ANION GAP SERPL CALC-SCNC: 15 MMOL/L (ref 7–16)
BUN SERPL-MCNC: 55 MG/DL (ref 8–22)
BUN SERPL-MCNC: 56 MG/DL (ref 8–22)
BUN SERPL-MCNC: 57 MG/DL (ref 8–22)
BUN SERPL-MCNC: 57 MG/DL (ref 8–22)
CALCIUM SERPL-MCNC: 11.8 MG/DL (ref 8.5–10.5)
CALCIUM SERPL-MCNC: 8.5 MG/DL (ref 8.5–10.5)
CALCIUM SERPL-MCNC: 8.8 MG/DL (ref 8.5–10.5)
CALCIUM SERPL-MCNC: 8.9 MG/DL (ref 8.5–10.5)
CHLORIDE SERPL-SCNC: 82 MMOL/L (ref 96–112)
CHLORIDE SERPL-SCNC: 83 MMOL/L (ref 96–112)
CHLORIDE SERPL-SCNC: 83 MMOL/L (ref 96–112)
CHLORIDE SERPL-SCNC: 85 MMOL/L (ref 96–112)
CO2 SERPL-SCNC: 17 MMOL/L (ref 20–33)
CO2 SERPL-SCNC: 20 MMOL/L (ref 20–33)
CO2 SERPL-SCNC: 21 MMOL/L (ref 20–33)
CO2 SERPL-SCNC: 22 MMOL/L (ref 20–33)
CREAT SERPL-MCNC: 1.72 MG/DL (ref 0.5–1.4)
CREAT SERPL-MCNC: 1.86 MG/DL (ref 0.5–1.4)
CREAT SERPL-MCNC: 1.92 MG/DL (ref 0.5–1.4)
CREAT SERPL-MCNC: 2.08 MG/DL (ref 0.5–1.4)
EKG IMPRESSION: NORMAL
GFR SERPLBLD CREATININE-BSD FMLA CKD-EPI: 27 ML/MIN/1.73 M 2
GFR SERPLBLD CREATININE-BSD FMLA CKD-EPI: 30 ML/MIN/1.73 M 2
GFR SERPLBLD CREATININE-BSD FMLA CKD-EPI: 31 ML/MIN/1.73 M 2
GFR SERPLBLD CREATININE-BSD FMLA CKD-EPI: 34 ML/MIN/1.73 M 2
GLUCOSE SERPL-MCNC: 107 MG/DL (ref 65–99)
GLUCOSE SERPL-MCNC: 131 MG/DL (ref 65–99)
GLUCOSE SERPL-MCNC: 134 MG/DL (ref 65–99)
GLUCOSE SERPL-MCNC: 149 MG/DL (ref 65–99)
HCT VFR BLD AUTO: 31.5 % (ref 37–47)
HGB BLD-MCNC: 11 G/DL (ref 12–16)
POTASSIUM SERPL-SCNC: 4.8 MMOL/L (ref 3.6–5.5)
POTASSIUM SERPL-SCNC: 5.2 MMOL/L (ref 3.6–5.5)
POTASSIUM SERPL-SCNC: 5.4 MMOL/L (ref 3.6–5.5)
POTASSIUM SERPL-SCNC: 6.4 MMOL/L (ref 3.6–5.5)
SODIUM SERPL-SCNC: 116 MMOL/L (ref 135–145)
SODIUM SERPL-SCNC: 117 MMOL/L (ref 135–145)
SODIUM SERPL-SCNC: 117 MMOL/L (ref 135–145)
SODIUM SERPL-SCNC: 118 MMOL/L (ref 135–145)

## 2024-11-02 PROCEDURE — 93005 ELECTROCARDIOGRAM TRACING: CPT | Performed by: HOSPITALIST

## 2024-11-02 PROCEDURE — A9270 NON-COVERED ITEM OR SERVICE: HCPCS | Performed by: HOSPITALIST

## 2024-11-02 PROCEDURE — 93010 ELECTROCARDIOGRAM REPORT: CPT | Performed by: INTERNAL MEDICINE

## 2024-11-02 PROCEDURE — A9270 NON-COVERED ITEM OR SERVICE: HCPCS | Performed by: INTERNAL MEDICINE

## 2024-11-02 PROCEDURE — 85014 HEMATOCRIT: CPT

## 2024-11-02 PROCEDURE — 770000 HCHG ROOM/CARE - INTERMEDIATE ICU *

## 2024-11-02 PROCEDURE — 80048 BASIC METABOLIC PNL TOTAL CA: CPT

## 2024-11-02 PROCEDURE — 700111 HCHG RX REV CODE 636 W/ 250 OVERRIDE (IP): Performed by: HOSPITALIST

## 2024-11-02 PROCEDURE — 700102 HCHG RX REV CODE 250 W/ 637 OVERRIDE(OP): Performed by: INTERNAL MEDICINE

## 2024-11-02 PROCEDURE — 85018 HEMOGLOBIN: CPT

## 2024-11-02 PROCEDURE — 99233 SBSQ HOSP IP/OBS HIGH 50: CPT | Performed by: HOSPITALIST

## 2024-11-02 PROCEDURE — A9270 NON-COVERED ITEM OR SERVICE: HCPCS | Performed by: STUDENT IN AN ORGANIZED HEALTH CARE EDUCATION/TRAINING PROGRAM

## 2024-11-02 PROCEDURE — 700101 HCHG RX REV CODE 250: Performed by: INTERNAL MEDICINE

## 2024-11-02 PROCEDURE — 700105 HCHG RX REV CODE 258: Performed by: INTERNAL MEDICINE

## 2024-11-02 PROCEDURE — 51798 US URINE CAPACITY MEASURE: CPT

## 2024-11-02 PROCEDURE — 700102 HCHG RX REV CODE 250 W/ 637 OVERRIDE(OP): Performed by: STUDENT IN AN ORGANIZED HEALTH CARE EDUCATION/TRAINING PROGRAM

## 2024-11-02 PROCEDURE — 700102 HCHG RX REV CODE 250 W/ 637 OVERRIDE(OP): Performed by: HOSPITALIST

## 2024-11-02 PROCEDURE — 99232 SBSQ HOSP IP/OBS MODERATE 35: CPT | Performed by: NURSE PRACTITIONER

## 2024-11-02 PROCEDURE — 700111 HCHG RX REV CODE 636 W/ 250 OVERRIDE (IP): Performed by: INTERNAL MEDICINE

## 2024-11-02 RX ORDER — FUROSEMIDE 10 MG/ML
40 INJECTION INTRAMUSCULAR; INTRAVENOUS ONCE
Status: COMPLETED | OUTPATIENT
Start: 2024-11-02 | End: 2024-11-02

## 2024-11-02 RX ORDER — LACTULOSE 10 G/15ML
45 SOLUTION ORAL 3 TIMES DAILY
Status: DISCONTINUED | OUTPATIENT
Start: 2024-11-02 | End: 2024-11-03

## 2024-11-02 RX ORDER — FUROSEMIDE 20 MG/1
20 TABLET ORAL
Status: DISCONTINUED | OUTPATIENT
Start: 2024-11-02 | End: 2024-11-02

## 2024-11-02 RX ADMIN — LACTULOSE 45 ML: 10 SOLUTION ORAL at 18:05

## 2024-11-02 RX ADMIN — SUCRALFATE ORAL 1 G: 1 SUSPENSION ORAL at 23:12

## 2024-11-02 RX ADMIN — LIDOCAINE 1 PATCH: 4 PATCH TOPICAL at 05:47

## 2024-11-02 RX ADMIN — PANTOPRAZOLE SODIUM 40 MG: 40 INJECTION, POWDER, FOR SOLUTION INTRAVENOUS at 18:05

## 2024-11-02 RX ADMIN — CEFTRIAXONE SODIUM 2000 MG: 10 INJECTION, POWDER, FOR SOLUTION INTRAVENOUS at 06:07

## 2024-11-02 RX ADMIN — PANTOPRAZOLE SODIUM 40 MG: 40 INJECTION, POWDER, FOR SOLUTION INTRAVENOUS at 05:45

## 2024-11-02 RX ADMIN — FUROSEMIDE 20 MG: 20 TABLET ORAL at 01:21

## 2024-11-02 RX ADMIN — MIDODRINE HYDROCHLORIDE 5 MG: 5 TABLET ORAL at 18:05

## 2024-11-02 RX ADMIN — SUCRALFATE ORAL 1 G: 1 SUSPENSION ORAL at 07:22

## 2024-11-02 RX ADMIN — MIDODRINE HYDROCHLORIDE 5 MG: 5 TABLET ORAL at 07:22

## 2024-11-02 RX ADMIN — FUROSEMIDE 40 MG: 10 INJECTION INTRAMUSCULAR; INTRAVENOUS at 07:22

## 2024-11-02 RX ADMIN — GABAPENTIN 200 MG: 100 CAPSULE ORAL at 19:40

## 2024-11-02 RX ADMIN — MIDODRINE HYDROCHLORIDE 5 MG: 5 TABLET ORAL at 11:24

## 2024-11-02 RX ADMIN — SUCRALFATE ORAL 1 G: 1 SUSPENSION ORAL at 11:24

## 2024-11-02 RX ADMIN — RIFAXIMIN 550 MG: 550 TABLET ORAL at 18:05

## 2024-11-02 RX ADMIN — FLUOXETINE HYDROCHLORIDE 10 MG: 10 CAPSULE ORAL at 05:46

## 2024-11-02 RX ADMIN — LACTULOSE 45 ML: 10 SOLUTION ORAL at 11:24

## 2024-11-02 RX ADMIN — FUROSEMIDE 40 MG: 10 INJECTION INTRAMUSCULAR; INTRAVENOUS at 15:32

## 2024-11-02 RX ADMIN — SUCRALFATE ORAL 1 G: 1 SUSPENSION ORAL at 18:05

## 2024-11-02 RX ADMIN — THIAMINE HYDROCHLORIDE 100 MG: 100 INJECTION, SOLUTION INTRAMUSCULAR; INTRAVENOUS at 05:45

## 2024-11-02 RX ADMIN — RIFAXIMIN 550 MG: 550 TABLET ORAL at 11:24

## 2024-11-02 RX ADMIN — LACTULOSE 30 ML: 10 SOLUTION ORAL at 05:46

## 2024-11-02 ASSESSMENT — PAIN DESCRIPTION - PAIN TYPE
TYPE: ACUTE PAIN

## 2024-11-02 ASSESSMENT — ENCOUNTER SYMPTOMS
INSOMNIA: 0
HEADACHES: 0
NAUSEA: 0
CHILLS: 0
DIARRHEA: 0
CONSTIPATION: 0
BLOOD IN STOOL: 0
WEAKNESS: 1
NERVOUS/ANXIOUS: 0
ABDOMINAL PAIN: 1
VOMITING: 0
SORE THROAT: 0
MYALGIAS: 0
FLANK PAIN: 0
SHORTNESS OF BREATH: 0
FEVER: 0
COUGH: 0

## 2024-11-02 ASSESSMENT — FIBROSIS 4 INDEX: FIB4 SCORE: 8.32

## 2024-11-02 NOTE — PROGRESS NOTES
Hypoglycemia Intervention    Hypoglycemia protocol intervention:  Blood glucose 51 at 1724.  Intervention: 25 g IV dextrose per MAR   Repeat blood glucose 165 at 1845.

## 2024-11-02 NOTE — PROGRESS NOTES
Gastroenterology Progress Note               Author:  YANA Pierre Date & Time Created: 11/2/2024 11:02 AM       Patient ID:  Name:             Kavita Freeman  YOB: 1965  Age:                 59 y.o.  female  MRN:               [id number]        Medical Decision Making, by Problem:  Active Hospital Problems    Diagnosis     Acute encephalopathy [G93.40]     Bacteremia due to Gram-negative bacteria [R78.81]     Severe protein-calorie malnutrition ([place]: less than 60% of standard weight) (HCC) [E43]     Septic shock (HCC) [A41.9, R65.21]     Transaminitis [R74.01]     Hypoglycemia [E16.2]     Advance care planning [Z71.89]     High anion gap metabolic acidosis [E87.29]     Hyponatremia [E87.1]     Alcoholic cirrhosis of liver with ascites (HCC) [K70.31]     Acute renal failure (ARF) (HCC) [N17.9]     Depression with anxiety [F41.8]            Presenting Chief Complaint:  Cirrhosis, GI bleeding.     History of Present Illness:   59 years old female with medical history of alcohol-related liver injury, cirrhosis, decompensation with variceal bleeding and refractory ascites formation, mild to moderate encephalopathy.  Presented to hospital this time for fluid accumulation, acute kidney injury.  GI team is consulted for recent GI bleeding     The patient has primary GI liver doctor in [place] and she is waiting for the transplantation evaluation at [facility] in the coming 2 months.  We do not have any records to support this plan, however the patient is very certain about the [facility] appointment.     For the bleeding, the patient had nausea vomiting in the last 2 days, the patient saw blood and also some coffee-ground like vomitus.  Did not appear a lot of tension on the bowel movement but at least no large amount of tarry stool was noted.  Last upper GI scope August 15 with variceal bleeding and the banding.            Interval History:  11/2/2024: Patient seen at bedside.  Awake,  alert.  Discussed EGD findings with patient.  Inquired regarding last EGD as, per chart review, last EGD was 2-1/2 months ago.  Patient unable to discern if she has had EGD since last documented.  Positive asterixis.  Denies bowel movements overnight.  Started on rifaximin and lactulose.  Hemoglobin stable.  Abdomen tender with light palpation.  No WBC this morning, but yesterday was 39. ultrasound abdomen pending.        Hospital Medications:  Current Facility-Administered Medications   Medication Dose Frequency Provider Last Rate Last Admin    lactulose 20 GM/30ML solution 45 mL  45 mL TID Quang Durham D.O.        riFAXIMin (Xifaxan) tablet 550 mg  550 mg BID Quang Durham D.O.        acetaminophen (Tylenol) tablet 650 mg  650 mg Q6HRS PRN Adilia Gonzales D.O.        Pharmacy Consult Request ...Pain Management Review 1 Each  1 Each PHARMACY TO DOSE Adilia Gonzales D.O.        oxyCODONE immediate-release (Roxicodone) tablet 2.5 mg  2.5 mg Q3HRS PRN ASTRID Raza.O.   2.5 mg at 11/01/24 1230    Or    oxyCODONE immediate-release (Roxicodone) tablet 5 mg  5 mg Q3HRS PRN ASTRID Raza.O.   5 mg at 10/31/24 2341    Or    HYDROmorphone (Dilaudid) injection 0.25 mg  0.25 mg Q3HRS PRN ASTRID Raza.O.   0.25 mg at 10/31/24 2036    ondansetron (Zofran) syringe/vial injection 4 mg  4 mg Q4HRS PRN TRUDY RazaO.        ondansetron (Zofran ODT) dispertab 4 mg  4 mg Q4HRS PRN Adilia Gonzales D.O.        promethazine (Phenergan) tablet 12.5-25 mg  12.5-25 mg Q4HRS PRN TRUDY RazaO.        promethazine (Phenergan) suppository 12.5-25 mg  12.5-25 mg Q4HRS PRN Adilia Gonzales D.O.        prochlorperazine (Compazine) injection 5-10 mg  5-10 mg Q4HRS PRN Adilia Gonzales D.O.        cefTRIAXone (Rocephin) syringe 2,000 mg  2,000 mg Q24HRS Adilia Gonzales D.O.   2,000 mg at 11/02/24 0607    guaiFENesin dextromethorphan (Robitussin DM) 100-10 MG/5ML syrup 10 mL  10 mL Q6HRS PRN  Adilia Gonzales D.O.        pantoprazole (Protonix) injection 40 mg  40 mg BID Adilia Gonzales, D.O.   40 mg at 11/02/24 0545    octreotide (SandoSTATIN) 1,250 mcg in  mL Infusion  50 mcg/hr Continuous Adilia Gonzales D.O. 10 mL/hr at 11/01/24 2054 50 mcg/hr at 11/01/24 2054    thiamine (B-1) injection 100 mg  100 mg DAILY Adilia Gonzales D.O.   100 mg at 11/02/24 0545    dextrose 50% (D50W) injection 25 g  25 g Q15 MIN PRN Adilia Gonzales D.O.   25 g at 11/01/24 1843    FLUoxetine (PROzac) capsule 10 mg  10 mg QAM Adilia Gonzales, D.O.   10 mg at 11/02/24 0546    gabapentin (Neurontin) capsule 200 mg  200 mg Nightly Adilia Gonzales D.O.   200 mg at 11/01/24 2053    hydrOXYzine HCl (Atarax) tablet 25 mg  25 mg TID PRN Adilia Gonzales D.O.        lidocaine (Asperflex) 4 % patch 1 Patch  1 Patch Q24HRS ASTRID Raza.O.   1 Patch at 11/02/24 0547    sucralfate (Carafate) 1 GM/10ML suspension 1 g  1 g Q6HRS Adilia Gonzales D.O.   1 g at 11/02/24 0722    midodrine (Proamatine) tablet 5 mg  5 mg TID WITH MEALS Adilia Gonzales D.O.   5 mg at 11/02/24 0722   Last reviewed on 11/1/2024  9:17 AM by Celine Bustamante R.N.       Review of Systems:  Review of Systems   Constitutional:  Positive for malaise/fatigue. Negative for chills and fever.   HENT:  Negative for congestion and sore throat.    Respiratory:  Negative for cough and shortness of breath.    Cardiovascular:  Negative for chest pain and leg swelling.   Gastrointestinal:  Positive for abdominal pain. Negative for blood in stool, constipation, diarrhea, melena, nausea and vomiting.   Genitourinary:  Negative for dysuria and flank pain.   Musculoskeletal:  Negative for joint pain and myalgias.   Neurological:  Positive for weakness. Negative for headaches.   Psychiatric/Behavioral:  The patient is not nervous/anxious and does not have insomnia.    All other systems reviewed and are negative.        Vital signs:  Weight/BMI: Body mass index is 21.02  "kg/m².  /57   Pulse 88   Temp 37.1 °C (98.7 °F) (Temporal)   Resp (!) 32   Ht 1.727 m (5' 8\")   Wt 62.7 kg (138 lb 3.7 oz)   SpO2 96%   Vitals:    11/02/24 0400 11/02/24 0600 11/02/24 0700 11/02/24 0800   BP: 111/56 101/59 104/56 109/57   Pulse: 90 97 92 88   Resp: (!) 22 (!) 22 (!) 30 (!) 32   Temp: 37.1 °C (98.7 °F)      TempSrc: Temporal   Bladder   SpO2: 94% 90% 94% 96%   Weight:       Height:         Oxygen Therapy:  Pulse Oximetry: 96 %, O2 (LPM): 4, O2 Delivery Device: Nasal Cannula    Intake/Output Summary (Last 24 hours) at 11/2/2024 1102  Last data filed at 11/2/2024 0800  Gross per 24 hour   Intake 245.15 ml   Output 1100 ml   Net -854.85 ml         Physical Exam:  Physical Exam  Vitals and nursing note reviewed.   Constitutional:       General: She is awake. She is not in acute distress.     Appearance: She is ill-appearing.   HENT:      Head: Normocephalic.      Nose: Nose normal. No congestion.      Mouth/Throat:      Mouth: Mucous membranes are moist.      Pharynx: Oropharynx is clear. No oropharyngeal exudate.   Eyes:      General: Scleral icterus present.      Extraocular Movements: Extraocular movements intact.      Conjunctiva/sclera: Conjunctivae normal.   Cardiovascular:      Rate and Rhythm: Normal rate and regular rhythm.      Pulses: Normal pulses.      Heart sounds: Normal heart sounds.   Pulmonary:      Effort: Pulmonary effort is normal. No respiratory distress.      Breath sounds: Normal breath sounds.   Abdominal:      General: Abdomen is flat. Bowel sounds are normal. There is no distension.      Palpations: Abdomen is soft.      Tenderness: There is abdominal tenderness. There is no guarding.      Comments: +fluid wave   Musculoskeletal:      Right lower leg: No edema.      Left lower leg: No edema.   Skin:     General: Skin is warm and dry.      Capillary Refill: Capillary refill takes less than 2 seconds.      Coloration: Skin is jaundiced.   Neurological:      Mental " Status: She is alert. She is disoriented.      Comments: Aaox2, +asterixis   Psychiatric:         Mood and Affect: Mood normal.         Behavior: Behavior normal. Behavior is cooperative.             Labs:  Recent Labs     11/01/24 1724 11/02/24 0017 11/02/24  0612   SODIUM 119* 117* 116*   POTASSIUM 5.7* 5.4 5.2   CHLORIDE 84* 82* 83*   CO2 21 20 22   BUN 55* 56* 57*   CREATININE 1.90* 1.92* 2.08*   CALCIUM 8.5 8.5 8.8     Recent Labs     10/31/24  1455 10/31/24  2113 11/01/24  0310 11/01/24  0445 11/01/24  1230 11/01/24 1724 11/02/24 0017 11/02/24  0612   ALTSGPT 120*  --  *  --   --   --   --    ASTSGOT 373*  --  *  --   --   --   --    ALKPHOSPHAT 135*  --  *  --   --   --   --    TBILIRUBIN 6.1*  --  RR 5.6*  --   --   --   --    LIPASE 47  --   --   --   --   --   --   --    GLUCOSE 51*   < > RR 69  69   < > 51* 149* 107*    < > = values in this interval not displayed.     Recent Labs     10/31/24  1455 11/01/24  0310 11/01/24  0445   WBC 45.2* 39.0*  --    NEUTSPOLYS 83.70*  --   --    LYMPHOCYTES 0.00*  --   --    MONOCYTES 1.50  --   --    EOSINOPHILS 0.00  --   --    BASOPHILS 0.00  --   --    ASTSGOT 373* *   ALTSGPT 120* *   ALKPHOSPHAT 135* *   TBILIRUBIN 6.1* RR 5.6*     Recent Labs     10/31/24  1455 10/31/24  2113 11/01/24  0310 11/01/24  1230 11/01/24  2100 11/02/24  0730   RBC 4.57  --  3.40*  --   --   --    HEMOGLOBIN 13.3   < > 10.1* 10.9* 11.2* 11.0*   HEMATOCRIT 39.3   < > 29.0* 31.6* 33.2* 31.5*   PLATELETCT 313  --  213  --   --   --    PROTHROMBTM 27.6*  --   --   --   --   --    INR 2.55*  --   --   --   --   --     < > = values in this interval not displayed.     Recent Results (from the past 24 hour(s))   Basic Metabolic Panel (BMP)    Collection Time: 11/01/24 12:30 PM   Result Value Ref Range    Sodium 120 (L) 135 - 145 mmol/L    Potassium 5.7 (H) 3.6 - 5.5 mmol/L    Chloride 82 (L) 96 - 112 mmol/L    Co2 21 20 - 33 mmol/L    Glucose 54 (LL) 65  - 99 mg/dL    Bun 52 (H) 8 - 22 mg/dL    Creatinine 1.96 (H) 0.50 - 1.40 mg/dL    Calcium 8.4 (L) 8.5 - 10.5 mg/dL    Anion Gap 17.0 (H) 7.0 - 16.0   HEMOGLOBIN AND HEMATOCRIT    Collection Time: 11/01/24 12:30 PM   Result Value Ref Range    Hemoglobin 10.9 (L) 12.0 - 16.0 g/dL    Hematocrit 31.6 (L) 37.0 - 47.0 %   ESTIMATED GFR    Collection Time: 11/01/24 12:30 PM   Result Value Ref Range    GFR (CKD-EPI) 29 (A) >60 mL/min/1.73 m 2   POCT glucose device results    Collection Time: 11/01/24  1:47 PM   Result Value Ref Range    POC Glucose, Blood 62 (L) 65 - 99 mg/dL   OSMOLALITY URINE    Collection Time: 11/01/24  2:47 PM   Result Value Ref Range    Osmolality Urine 349 300 - 900 mOsm/kg H2O   URINE SODIUM RANDOM    Collection Time: 11/01/24  2:47 PM   Result Value Ref Range    Sodium, Urine -per volume <20 mmol/L   POCT glucose device results    Collection Time: 11/01/24  2:50 PM   Result Value Ref Range    POC Glucose, Blood 73 65 - 99 mg/dL   Basic Metabolic Panel (BMP)    Collection Time: 11/01/24  5:24 PM   Result Value Ref Range    Sodium 119 (LL) 135 - 145 mmol/L    Potassium 5.7 (H) 3.6 - 5.5 mmol/L    Chloride 84 (L) 96 - 112 mmol/L    Co2 21 20 - 33 mmol/L    Glucose 51 (LL) 65 - 99 mg/dL    Bun 55 (H) 8 - 22 mg/dL    Creatinine 1.90 (H) 0.50 - 1.40 mg/dL    Calcium 8.5 8.5 - 10.5 mg/dL    Anion Gap 14.0 7.0 - 16.0   ESTIMATED GFR    Collection Time: 11/01/24  5:24 PM   Result Value Ref Range    GFR (CKD-EPI) 30 (A) >60 mL/min/1.73 m 2   POCT glucose device results    Collection Time: 11/01/24  7:12 PM   Result Value Ref Range    POC Glucose, Blood 165 (H) 65 - 99 mg/dL   HEMOGLOBIN AND HEMATOCRIT    Collection Time: 11/01/24  9:00 PM   Result Value Ref Range    Hemoglobin 11.2 (L) 12.0 - 16.0 g/dL    Hematocrit 33.2 (L) 37.0 - 47.0 %   Basic Metabolic Panel (BMP)    Collection Time: 11/02/24 12:17 AM   Result Value Ref Range    Sodium 117 (LL) 135 - 145 mmol/L    Potassium 5.4 3.6 - 5.5 mmol/L     Chloride 82 (L) 96 - 112 mmol/L    Co2 20 20 - 33 mmol/L    Glucose 149 (H) 65 - 99 mg/dL    Bun 56 (H) 8 - 22 mg/dL    Creatinine 1.92 (H) 0.50 - 1.40 mg/dL    Calcium 8.5 8.5 - 10.5 mg/dL    Anion Gap 15.0 7.0 - 16.0   ESTIMATED GFR    Collection Time: 11/02/24 12:17 AM   Result Value Ref Range    GFR (CKD-EPI) 30 (A) >60 mL/min/1.73 m 2   Basic Metabolic Panel (BMP)    Collection Time: 11/02/24  6:12 AM   Result Value Ref Range    Sodium 116 (LL) 135 - 145 mmol/L    Potassium 5.2 3.6 - 5.5 mmol/L    Chloride 83 (L) 96 - 112 mmol/L    Co2 22 20 - 33 mmol/L    Glucose 107 (H) 65 - 99 mg/dL    Bun 57 (H) 8 - 22 mg/dL    Creatinine 2.08 (H) 0.50 - 1.40 mg/dL    Calcium 8.8 8.5 - 10.5 mg/dL    Anion Gap 11.0 7.0 - 16.0   ESTIMATED GFR    Collection Time: 11/02/24  6:12 AM   Result Value Ref Range    GFR (CKD-EPI) 27 (A) >60 mL/min/1.73 m 2   HEMOGLOBIN AND HEMATOCRIT    Collection Time: 11/02/24  7:30 AM   Result Value Ref Range    Hemoglobin 11.0 (L) 12.0 - 16.0 g/dL    Hematocrit 31.5 (L) 37.0 - 47.0 %       Radiology Review:  DX-CHEST-PORTABLE (1 VIEW)   Final Result      1.  Hazy left lower lobe opacity could represent atelectasis or pneumonitis.   2.  There is linear scarring or atelectasis in the right lung base.      US-ABDOMEN COMPLETE SURVEY    (Results Pending)         MDM (Data Review):   -Records reviewed and summarized in current documentation  -I personally reviewed and interpreted the laboratory results  -I personally reviewed the radiology images    Assessment/Recommendations:  Portal hypertensive gastropathy  Esophageal banding's in situ  Hepatic encephalopathy  Cirrhosis  Hematemesis  History of decompensated liver disease with variceal bleeding, refractory ascites    Plan:  Abdominal ultrasound results are pending.  Patient with fluid wave, suspicious for ascites.  Additionally, patient with leukocytosis of 39 yesterday and abdominal tenderness.  Concerning for possible SBP.  If ascites present,  recommend paracentesis for fluid analysis.  Continue octreotide 72 hours post EGD  Continue PPI twice daily  Continue midodrine  Continue antibiotics for SBP prophylaxis  Agree with starting rifaximin and lactulose for encephalopathy.  Please titrate lactulose to achieve 3 loose bowel movements per day.  Vitamin K  Patient will need repeat EGD in 4-6 weeks  Nonselective beta-blocker as outpatient  After ultrasound, okay to have liquid diet from GI standpoint.  Overall, recommend 2 g/day sodium diet    GI will follow    Discussed with patient, Dr. Cardozo        Core Quality Measures   Reviewed items::  Labs, Medications and Radiology reports reviewed

## 2024-11-02 NOTE — PROGRESS NOTES
[facility] Medicine Daily Progress Note    Date of Service  11/2/2024    Chief Complaint  Kavita Freeman is a 59 y.o. female admitted 10/31/2024 with needs paracentesis    Hospital Course  58yo PMHx ETOH abuse sober x 4 months, alcoholic cirrhosis, esophageal varices s/p banding.  Pt presented requesting a paracentesis and with fever and chills.  In ED WBCs 45, Na 117, CO2 16, AG 20, creat 2.1.  Para done in ED; cloudy fluid noted with nucleated cells >34k    Interval Problem Update  Feeling better, less belly pain.  ROS limited by confusion    Sinus 90s  SBP 80-100s  On 4 LNC  AFebrile  UOP 1100ml/24hrs    I have discussed this patient's plan of care and discharge plan at IDT rounds today with Case Management, Nursing, Nursing leadership, and other members of the IDT team.    Consultants/Specialty  GI    Code Status  DNAR/DNI    Disposition  Medically Cleared  I have placed the appropriate orders for post-discharge needs.    Review of Systems  Review of Systems   Gastrointestinal:  Positive for abdominal pain.        Physical Exam  Temp:  [36.1 °C (97 °F)-37.1 °C (98.7 °F)] 37.1 °C (98.7 °F)  Pulse:  [] 97  Resp:  [14-43] 22  BP: ()/(34-59) 101/59  SpO2:  [90 %-97 %] 90 %    Physical Exam  Constitutional:       General: She is not in acute distress.     Appearance: Normal appearance. She is well-developed. She is cachectic. She is not diaphoretic.      Comments: Temporal wasting   HENT:      Head: Normocephalic and atraumatic.   Eyes:      General: Scleral icterus present.   Neck:      Vascular: JVD present.   Cardiovascular:      Rate and Rhythm: Normal rate and regular rhythm.      Heart sounds: Murmur heard.   Pulmonary:      Effort: Pulmonary effort is normal. No respiratory distress.      Breath sounds: No stridor. No wheezing or rales.   Abdominal:      General: There is distension.      Palpations: Abdomen is soft.      Tenderness: There is no abdominal tenderness. There is no guarding or  rebound.      Comments: Soft  +fluid wave   Musculoskeletal:      Right lower leg: No edema.      Left lower leg: No edema.   Skin:     General: Skin is warm and dry.      Capillary Refill: Capillary refill takes less than 2 seconds.      Coloration: Skin is jaundiced.      Findings: No rash.   Neurological:      Mental Status: She is oriented to person, place, and time.   Psychiatric:         Mood and Affect: Mood normal.         Behavior: Behavior normal.         Thought Content: Thought content normal.         Fluids    Intake/Output Summary (Last 24 hours) at 11/2/2024 0630  Last data filed at 11/2/2024 0607  Gross per 24 hour   Intake --   Output 1100 ml   Net -1100 ml        Laboratory  Recent Labs     10/31/24  1455 10/31/24  2113 11/01/24  0310 11/01/24  1230 11/01/24  2100   WBC 45.2*  --  39.0*  --   --    RBC 4.57  --  3.40*  --   --    HEMOGLOBIN 13.3   < > 10.1* 10.9* 11.2*   HEMATOCRIT 39.3   < > 29.0* 31.6* 33.2*   MCV 86.0  --  85.3  --   --    MCH 29.1  --  29.7  --   --    MCHC 33.8  --  34.8  --   --    RDW 57.6*  --  56.9*  --   --    PLATELETCT 313  --  213  --   --    MPV 11.1  --  10.4  --   --     < > = values in this interval not displayed.     Recent Labs     11/01/24  1230 11/01/24  1724 11/02/24  0017   SODIUM 120* 119* 117*   POTASSIUM 5.7* 5.7* 5.4   CHLORIDE 82* 84* 82*   CO2 21 21 20   GLUCOSE 54* 51* 149*   BUN 52* 55* 56*   CREATININE 1.96* 1.90* 1.92*   CALCIUM 8.4* 8.5 8.5     Recent Labs     10/31/24  1455   INR 2.55*               Imaging  DX-CHEST-PORTABLE (1 VIEW)   Final Result      1.  Hazy left lower lobe opacity could represent atelectasis or pneumonitis.   2.  There is linear scarring or atelectasis in the right lung base.      US-ABDOMEN COMPLETE SURVEY    (Results Pending)        Assessment/Plan  * Septic shock (HCC)- (present on admission)  Assessment & Plan  Present on admission  Source SBP  Gram Neg rods in blood, Salmonella species by PCR.  Final ID and  sensitivities pending  Second organism G+C neg for staph a.  Suspect contaminant  Cont Ceftriaxone  Follow cultures for final ID and sensitivities  Cont midodrine    Severe protein-calorie malnutrition ([place]: less than 60% of standard weight) ([facility])  Assessment & Plan  Nutrition consult    Bacteremia due to Gram-negative bacteria  Assessment & Plan  Source SBP  S/p tap of 5000ml 10/31  Salmonella species based on PCR  Ceftriaxone  Follow cultures for final ID and sensitivities    Advance care planning- (present on admission)  Assessment & Plan  DNR/I based on discussion with my partner    Hypoglycemia- (present on admission)  Assessment & Plan  Likely due to poor oral intake as patient reports reduced appetite, lack of glucneogenesis due to cirrhosis  -Patient also finished prednisolone 2 days ago, could be adrenal insufficiency  Reg diet  Nutrition consult  Close monitoring of BG    Transaminitis- (present on admission)  Assessment & Plan  Patient reports she already finished a 28-day course of prednisone at home  Likely worsening due to sepsis/SBP  Daily CMP  Ultrasound neg for PVT, did show cirrhosis. GB with some sludge and non specific wall thickening.  Can be normal in setting of ascites      High anion gap metabolic acidosis- (present on admission)  Assessment & Plan  Due to lactic acidosis likely from right liver disease and sepsis  IV thiamine  resolved    Hyponatremia- (present on admission)  Assessment & Plan  Sodium 117 on admission  Elena<20  UOsm>300  Hypervolemic hypoNa  Iv lasix 40mg  Q6hr Na  Cont IMCU level of care as pt is high risk for over correction, Sz or neurologic deterioration    Acute renal failure (ARF) ([facility])- (present on admission)  Assessment & Plan  Creatinine 2.11 on admission  HRS vs sepsis vs hypovlemia  Of note pt had a paracentesis one week prior to presentation with no albumin  Creat improving today  On midodrine/octreotide  Good UOP 1100ml/24hrs  Likely due to sepsis  Avoid  nephrotoxic medications  Abdominal ultrasound pending  Daily BMP    Alcoholic cirrhosis of liver with ascites (HCC)- (present on admission)  Assessment & Plan  F/b Dr [facility]  ETOH induced, sober since June  Has been referred to [facility] transplant service with appointment in June  Based on lab from today MELD 32  Status post paracentesis 5000ml 10/31  S/p albumin following tap  SBP based on fluid cell count  Rocephin  On midodrine 5mg TID  No BM in 48hrs and more confused today: start Rifaximin and increase dose of lactulose    Depression with anxiety- (present on admission)  Assessment & Plan  Continue home fluoxetine         VTE prophylaxis: VTE Selection    I have performed a physical exam and reviewed and updated ROS and Plan today (11/2/2024). In review of yesterday's note (11/1/2024), there are no changes except as documented above.

## 2024-11-02 NOTE — CARE PLAN
The patient is Watcher - Medium risk of patient condition declining or worsening    Shift Goals  Clinical Goals: hemodynamic stability, monitor BMPs  Patient Goals: sleep  Family Goals: JESUSITA    Progress made toward(s) clinical / shift goals:    Problem: Pain - Standard  Goal: Alleviation of pain or a reduction in pain to the patient’s comfort goal  Outcome: Progressing     Problem: Knowledge Deficit - Standard  Goal: Patient and family/care givers will demonstrate understanding of plan of care, disease process/condition, diagnostic tests and medications  Outcome: Progressing     Problem: Hemodynamics  Goal: Patient's hemodynamics, fluid balance and neurologic status will be stable or improve  Outcome: Progressing     Problem: Respiratory  Goal: Patient will achieve/maintain optimum respiratory ventilation and gas exchange  Outcome: Progressing

## 2024-11-03 PROBLEM — K55.069 SUPERIOR MESENTERIC VEIN THROMBOSIS (HCC): Status: ACTIVE | Noted: 2024-01-01

## 2024-11-03 LAB
ALBUMIN SERPL BCP-MCNC: 2.6 G/DL (ref 3.2–4.9)
ALBUMIN SERPL BCP-MCNC: 2.6 G/DL (ref 3.2–4.9)
ALBUMIN/GLOB SERPL: 0.7 G/DL
ALBUMIN/GLOB SERPL: 0.7 G/DL
ALP SERPL-CCNC: 147 U/L (ref 30–99)
ALP SERPL-CCNC: 152 U/L (ref 30–99)
ALT SERPL-CCNC: 123 U/L (ref 2–50)
ALT SERPL-CCNC: 130 U/L (ref 2–50)
ANION GAP SERPL CALC-SCNC: 16 MMOL/L (ref 7–16)
ANION GAP SERPL CALC-SCNC: 19 MMOL/L (ref 7–16)
ANION GAP SERPL CALC-SCNC: 19 MMOL/L (ref 7–16)
ANISOCYTOSIS BLD QL SMEAR: ABNORMAL
APTT PPP: 47.4 SEC (ref 24.7–36)
AST SERPL-CCNC: 321 U/L (ref 12–45)
AST SERPL-CCNC: 384 U/L (ref 12–45)
BACTERIA BLD CULT: ABNORMAL
BASOPHILS # BLD AUTO: 0 % (ref 0–1.8)
BASOPHILS # BLD: 0 K/UL (ref 0–0.12)
BILIRUB SERPL-MCNC: 6.1 MG/DL (ref 0.1–1.5)
BILIRUB SERPL-MCNC: 6.5 MG/DL (ref 0.1–1.5)
BUN SERPL-MCNC: 54 MG/DL (ref 8–22)
BUN SERPL-MCNC: 56 MG/DL (ref 8–22)
BUN SERPL-MCNC: 57 MG/DL (ref 8–22)
BURR CELLS BLD QL SMEAR: NORMAL
CALCIUM ALBUM COR SERPL-MCNC: 10.1 MG/DL (ref 8.5–10.5)
CALCIUM ALBUM COR SERPL-MCNC: 10.2 MG/DL (ref 8.5–10.5)
CALCIUM SERPL-MCNC: 9 MG/DL (ref 8.5–10.5)
CALCIUM SERPL-MCNC: 9.1 MG/DL (ref 8.5–10.5)
CALCIUM SERPL-MCNC: 9.3 MG/DL (ref 8.5–10.5)
CHLORIDE SERPL-SCNC: 85 MMOL/L (ref 96–112)
CHLORIDE SERPL-SCNC: 88 MMOL/L (ref 96–112)
CHLORIDE SERPL-SCNC: 92 MMOL/L (ref 96–112)
CO2 SERPL-SCNC: 17 MMOL/L (ref 20–33)
CO2 SERPL-SCNC: 18 MMOL/L (ref 20–33)
CO2 SERPL-SCNC: 21 MMOL/L (ref 20–33)
CREAT SERPL-MCNC: 1.02 MG/DL (ref 0.5–1.4)
CREAT SERPL-MCNC: 1.17 MG/DL (ref 0.5–1.4)
CREAT SERPL-MCNC: 1.35 MG/DL (ref 0.5–1.4)
EOSINOPHIL # BLD AUTO: 0 K/UL (ref 0–0.51)
EOSINOPHIL NFR BLD: 0 % (ref 0–6.9)
ERYTHROCYTE [DISTWIDTH] IN BLOOD BY AUTOMATED COUNT: 54.1 FL (ref 35.9–50)
GFR SERPLBLD CREATININE-BSD FMLA CKD-EPI: 46 ML/MIN/1.73 M 2
GFR SERPLBLD CREATININE-BSD FMLA CKD-EPI: 54 ML/MIN/1.73 M 2
GFR SERPLBLD CREATININE-BSD FMLA CKD-EPI: 64 ML/MIN/1.73 M 2
GLOBULIN SER CALC-MCNC: 4 G/DL (ref 1.9–3.5)
GLOBULIN SER CALC-MCNC: 4 G/DL (ref 1.9–3.5)
GLUCOSE BLD STRIP.AUTO-MCNC: 76 MG/DL (ref 65–99)
GLUCOSE BLD STRIP.AUTO-MCNC: 94 MG/DL (ref 65–99)
GLUCOSE SERPL-MCNC: 112 MG/DL (ref 65–99)
GLUCOSE SERPL-MCNC: 81 MG/DL (ref 65–99)
GLUCOSE SERPL-MCNC: 96 MG/DL (ref 65–99)
HCT VFR BLD AUTO: 35 % (ref 37–47)
HGB BLD-MCNC: 12.2 G/DL (ref 12–16)
INR PPP: 2.35 (ref 0.87–1.13)
LYMPHOCYTES # BLD AUTO: 0.43 K/UL (ref 1–4.8)
LYMPHOCYTES NFR BLD: 1.6 % (ref 22–41)
MACROCYTES BLD QL SMEAR: ABNORMAL
MANUAL DIFF BLD: NORMAL
MCH RBC QN AUTO: 28.9 PG (ref 27–33)
MCHC RBC AUTO-ENTMCNC: 34.9 G/DL (ref 32.2–35.5)
MCV RBC AUTO: 82.9 FL (ref 81.4–97.8)
MONOCYTES # BLD AUTO: 2.45 K/UL (ref 0–0.85)
MONOCYTES NFR BLD AUTO: 9.2 % (ref 0–13.4)
MORPHOLOGY BLD-IMP: NORMAL
MYELOCYTES NFR BLD MANUAL: 1.7 %
NEUTROPHILS # BLD AUTO: 23.28 K/UL (ref 1.82–7.42)
NEUTROPHILS NFR BLD: 87.5 % (ref 44–72)
NRBC # BLD AUTO: 0.03 K/UL
NRBC BLD-RTO: 0.1 /100 WBC (ref 0–0.2)
PLATELET # BLD AUTO: 192 K/UL (ref 164–446)
PLATELET BLD QL SMEAR: NORMAL
PMV BLD AUTO: 9.8 FL (ref 9–12.9)
POIKILOCYTOSIS BLD QL SMEAR: NORMAL
POTASSIUM SERPL-SCNC: 3.9 MMOL/L (ref 3.6–5.5)
POTASSIUM SERPL-SCNC: 4 MMOL/L (ref 3.6–5.5)
POTASSIUM SERPL-SCNC: 4.2 MMOL/L (ref 3.6–5.5)
PROT SERPL-MCNC: 6.6 G/DL (ref 6–8.2)
PROT SERPL-MCNC: 6.6 G/DL (ref 6–8.2)
PROTHROMBIN TIME: 25.9 SEC (ref 12–14.6)
RBC # BLD AUTO: 4.22 M/UL (ref 4.2–5.4)
RBC BLD AUTO: PRESENT
SIGNIFICANT IND 70042: ABNORMAL
SIGNIFICANT IND 70042: ABNORMAL
SITE SITE: ABNORMAL
SITE SITE: ABNORMAL
SODIUM SERPL-SCNC: 121 MMOL/L (ref 135–145)
SODIUM SERPL-SCNC: 125 MMOL/L (ref 135–145)
SODIUM SERPL-SCNC: 129 MMOL/L (ref 135–145)
SOURCE SOURCE: ABNORMAL
SOURCE SOURCE: ABNORMAL
TARGETS BLD QL SMEAR: NORMAL
UFH PPP CHRO-ACNC: <0.1 IU/ML
WBC # BLD AUTO: 26.6 K/UL (ref 4.8–10.8)

## 2024-11-03 PROCEDURE — 87040 BLOOD CULTURE FOR BACTERIA: CPT

## 2024-11-03 PROCEDURE — 700105 HCHG RX REV CODE 258: Performed by: INTERNAL MEDICINE

## 2024-11-03 PROCEDURE — 99233 SBSQ HOSP IP/OBS HIGH 50: CPT | Performed by: HOSPITALIST

## 2024-11-03 PROCEDURE — 700101 HCHG RX REV CODE 250: Performed by: INTERNAL MEDICINE

## 2024-11-03 PROCEDURE — 80053 COMPREHEN METABOLIC PANEL: CPT

## 2024-11-03 PROCEDURE — 82962 GLUCOSE BLOOD TEST: CPT

## 2024-11-03 PROCEDURE — 700111 HCHG RX REV CODE 636 W/ 250 OVERRIDE (IP): Mod: JZ | Performed by: HOSPITALIST

## 2024-11-03 PROCEDURE — A9270 NON-COVERED ITEM OR SERVICE: HCPCS | Performed by: HOSPITALIST

## 2024-11-03 PROCEDURE — 700102 HCHG RX REV CODE 250 W/ 637 OVERRIDE(OP): Performed by: INTERNAL MEDICINE

## 2024-11-03 PROCEDURE — 99232 SBSQ HOSP IP/OBS MODERATE 35: CPT | Performed by: NURSE PRACTITIONER

## 2024-11-03 PROCEDURE — 700111 HCHG RX REV CODE 636 W/ 250 OVERRIDE (IP): Performed by: INTERNAL MEDICINE

## 2024-11-03 PROCEDURE — 85520 HEPARIN ASSAY: CPT

## 2024-11-03 PROCEDURE — A9270 NON-COVERED ITEM OR SERVICE: HCPCS | Performed by: INTERNAL MEDICINE

## 2024-11-03 PROCEDURE — 85027 COMPLETE CBC AUTOMATED: CPT

## 2024-11-03 PROCEDURE — 700102 HCHG RX REV CODE 250 W/ 637 OVERRIDE(OP): Performed by: HOSPITALIST

## 2024-11-03 PROCEDURE — 770000 HCHG ROOM/CARE - INTERMEDIATE ICU *

## 2024-11-03 PROCEDURE — 85007 BL SMEAR W/DIFF WBC COUNT: CPT

## 2024-11-03 PROCEDURE — 85730 THROMBOPLASTIN TIME PARTIAL: CPT

## 2024-11-03 PROCEDURE — 85610 PROTHROMBIN TIME: CPT

## 2024-11-03 PROCEDURE — 80048 BASIC METABOLIC PNL TOTAL CA: CPT

## 2024-11-03 RX ORDER — LACTULOSE 10 G/15ML
45 SOLUTION ORAL 4 TIMES DAILY
Status: DISCONTINUED | OUTPATIENT
Start: 2024-11-03 | End: 2024-11-08

## 2024-11-03 RX ORDER — LACTULOSE 10 G/15ML
45 SOLUTION ORAL 4 TIMES DAILY
Status: DISCONTINUED | OUTPATIENT
Start: 2024-11-03 | End: 2024-11-03

## 2024-11-03 RX ORDER — LACTULOSE 10 G/15ML
300 SOLUTION ORAL EVERY 4 HOURS
Status: DISPENSED | OUTPATIENT
Start: 2024-11-03 | End: 2024-11-04

## 2024-11-03 RX ORDER — HEPARIN SODIUM 5000 [USP'U]/100ML
0-30 INJECTION, SOLUTION INTRAVENOUS CONTINUOUS
Status: DISCONTINUED | OUTPATIENT
Start: 2024-11-03 | End: 2024-11-08

## 2024-11-03 RX ORDER — HEPARIN SODIUM 1000 [USP'U]/ML
80 INJECTION, SOLUTION INTRAVENOUS; SUBCUTANEOUS ONCE
Status: COMPLETED | OUTPATIENT
Start: 2024-11-03 | End: 2024-11-03

## 2024-11-03 RX ORDER — HEPARIN SODIUM 1000 [USP'U]/ML
40 INJECTION, SOLUTION INTRAVENOUS; SUBCUTANEOUS PRN
Status: DISCONTINUED | OUTPATIENT
Start: 2024-11-03 | End: 2024-11-08

## 2024-11-03 RX ORDER — FUROSEMIDE 10 MG/ML
40 INJECTION INTRAMUSCULAR; INTRAVENOUS ONCE
Status: COMPLETED | OUTPATIENT
Start: 2024-11-03 | End: 2024-11-03

## 2024-11-03 RX ADMIN — LACTULOSE 45 ML: 10 SOLUTION ORAL at 13:10

## 2024-11-03 RX ADMIN — FLUOXETINE HYDROCHLORIDE 10 MG: 10 CAPSULE ORAL at 05:18

## 2024-11-03 RX ADMIN — PANTOPRAZOLE SODIUM 40 MG: 40 INJECTION, POWDER, FOR SOLUTION INTRAVENOUS at 05:18

## 2024-11-03 RX ADMIN — RIFAXIMIN 550 MG: 550 TABLET ORAL at 05:18

## 2024-11-03 RX ADMIN — PANTOPRAZOLE SODIUM 40 MG: 40 INJECTION, POWDER, FOR SOLUTION INTRAVENOUS at 17:21

## 2024-11-03 RX ADMIN — GABAPENTIN 200 MG: 100 CAPSULE ORAL at 22:12

## 2024-11-03 RX ADMIN — SUCRALFATE ORAL 1 G: 1 SUSPENSION ORAL at 05:21

## 2024-11-03 RX ADMIN — SUCRALFATE ORAL 1 G: 1 SUSPENSION ORAL at 23:47

## 2024-11-03 RX ADMIN — RIFAXIMIN 550 MG: 550 TABLET ORAL at 17:22

## 2024-11-03 RX ADMIN — OXYCODONE 5 MG: 5 TABLET ORAL at 05:19

## 2024-11-03 RX ADMIN — CEFTRIAXONE SODIUM 2000 MG: 10 INJECTION, POWDER, FOR SOLUTION INTRAVENOUS at 06:15

## 2024-11-03 RX ADMIN — HEPARIN SODIUM 18 UNITS/KG/HR: 5000 INJECTION, SOLUTION INTRAVENOUS at 16:44

## 2024-11-03 RX ADMIN — OCTREOTIDE ACETATE 50 MCG/HR: 200 INJECTION, SOLUTION INTRAVENOUS; SUBCUTANEOUS at 01:22

## 2024-11-03 RX ADMIN — HEPARIN SODIUM 5000 UNITS: 1000 INJECTION, SOLUTION INTRAVENOUS; SUBCUTANEOUS at 16:39

## 2024-11-03 RX ADMIN — LACTULOSE 300 ML: 10 SOLUTION ORAL; RECTAL at 15:58

## 2024-11-03 RX ADMIN — LACTULOSE 45 ML: 10 SOLUTION ORAL at 05:22

## 2024-11-03 RX ADMIN — FUROSEMIDE 40 MG: 10 INJECTION INTRAMUSCULAR; INTRAVENOUS at 08:00

## 2024-11-03 RX ADMIN — THIAMINE HYDROCHLORIDE 100 MG: 100 INJECTION, SOLUTION INTRAMUSCULAR; INTRAVENOUS at 05:20

## 2024-11-03 RX ADMIN — LIDOCAINE 1 PATCH: 4 PATCH TOPICAL at 05:20

## 2024-11-03 RX ADMIN — LACTULOSE 45 ML: 10 SOLUTION ORAL at 17:20

## 2024-11-03 RX ADMIN — SUCRALFATE ORAL 1 G: 1 SUSPENSION ORAL at 13:10

## 2024-11-03 RX ADMIN — SUCRALFATE ORAL 1 G: 1 SUSPENSION ORAL at 17:21

## 2024-11-03 ASSESSMENT — PAIN DESCRIPTION - PAIN TYPE
TYPE: ACUTE PAIN

## 2024-11-03 ASSESSMENT — ENCOUNTER SYMPTOMS
VOMITING: 0
NAUSEA: 0
FEVER: 0
COUGH: 0
FLANK PAIN: 0
SORE THROAT: 0
ABDOMINAL PAIN: 1
CHILLS: 0
HEADACHES: 0
SHORTNESS OF BREATH: 0
NERVOUS/ANXIOUS: 1
DIARRHEA: 0
WEAKNESS: 1
CONSTIPATION: 0
BLOOD IN STOOL: 0
INSOMNIA: 0
MYALGIAS: 0

## 2024-11-03 NOTE — PROGRESS NOTES
Hospital Medicine Daily Progress Note    Date of Service  11/3/2024    Chief Complaint  Kavita Freeman is a 59 y.o. female admitted 10/31/2024 with needs paracentesis    Hospital Course  58yo PMHx ETOH abuse sober x 4 months, alcoholic cirrhosis, esophageal varices s/p banding.  Pt presented requesting a paracentesis and with fever and chills.  In ED WBCs 45, Na 117, CO2 16, AG 20, creat 2.1.  Para done in ED; cloudy fluid noted with nucleated cells >34k    Interval Problem Update  ROS limited by confusion.    DW pt's parents at the bedside x 35 mins  DW GI    Sinus 90s  SBP 90-110s  On 6 LNC  AFebrile  UOP 1300ml/24hrs on lasix 40mg IV BID    I have discussed this patient's plan of care and discharge plan at IDT rounds today with Case Management, Nursing, Nursing leadership, and other members of the IDT team.    Consultants/Specialty  GI    Code Status  DNAR/DNI    Disposition  The patient is not medically cleared for discharge to home or a post-acute facility.      I have placed the appropriate orders for post-discharge needs.    Review of Systems  Review of Systems   Gastrointestinal:  Positive for abdominal pain.        Physical Exam  Temp:  [36 °C (96.8 °F)] 36 °C (96.8 °F)  Pulse:  [] 100  Resp:  [29-69] 43  BP: ()/(55-79) 122/79  SpO2:  [90 %-96 %] 91 %    Physical Exam  Constitutional:       General: She is not in acute distress.     Appearance: Normal appearance. She is well-developed. She is cachectic. She is not diaphoretic.      Comments: Temporal wasting   HENT:      Head: Normocephalic and atraumatic.   Eyes:      General: Scleral icterus present.   Neck:      Vascular: JVD present.   Cardiovascular:      Rate and Rhythm: Normal rate and regular rhythm.      Heart sounds: Murmur heard.   Pulmonary:      Effort: Pulmonary effort is normal. No respiratory distress.      Breath sounds: No stridor. No wheezing or rales.   Abdominal:      General: There is distension.      Palpations:  Abdomen is soft.      Tenderness: There is abdominal tenderness. There is no guarding or rebound.      Comments: Soft  +fluid wave   Musculoskeletal:      Right lower leg: No edema.      Left lower leg: No edema.   Skin:     General: Skin is warm and dry.      Capillary Refill: Capillary refill takes less than 2 seconds.      Coloration: Skin is jaundiced.      Findings: No rash.   Neurological:      Comments: Pt is alert but O to self only  Does recognize her family when they arrive   Psychiatric:         Mood and Affect: Mood normal.         Behavior: Behavior normal.         Fluids    Intake/Output Summary (Last 24 hours) at 11/3/2024 0629  Last data filed at 11/3/2024 0558  Gross per 24 hour   Intake 695.1 ml   Output 1300 ml   Net -604.9 ml        Laboratory  Recent Labs     10/31/24  1455 10/31/24  2113 11/01/24  0310 11/01/24  1230 11/01/24  2100 11/02/24  0730   WBC 45.2*  --  39.0*  --   --   --    RBC 4.57  --  3.40*  --   --   --    HEMOGLOBIN 13.3   < > 10.1* 10.9* 11.2* 11.0*   HEMATOCRIT 39.3   < > 29.0* 31.6* 33.2* 31.5*   MCV 86.0  --  85.3  --   --   --    MCH 29.1  --  29.7  --   --   --    MCHC 33.8  --  34.8  --   --   --    RDW 57.6*  --  56.9*  --   --   --    PLATELETCT 313  --  213  --   --   --    MPV 11.1  --  10.4  --   --   --     < > = values in this interval not displayed.     Recent Labs     11/02/24  1145 11/02/24  1530 11/03/24  0408   SODIUM 117* 118* 121*   POTASSIUM 6.4* 4.8 4.2   CHLORIDE 85* 83* 85*   CO2 17* 21 17*   GLUCOSE 131* 134* 81   BUN 55* 57* 57*   CREATININE 1.86* 1.72* 1.35   CALCIUM 11.8* 8.9 9.0     Recent Labs     10/31/24  1455   INR 2.55*               Imaging  US-ABDOMEN COMPLETE SURVEY   Final Result      1.  Cirrhosis with stigmata of portal hypertension including splenomegaly, recanalized umbilical vein and ascites.   2.  Gallbladder sludge and stones. Gallbladder wall thickening is nonspecific and could be related to liver disease but correlate clinically  for evidence of acute cholecystitis. No biliary dilatation.         DX-CHEST-PORTABLE (1 VIEW)   Final Result      1.  Hazy left lower lobe opacity could represent atelectasis or pneumonitis.   2.  There is linear scarring or atelectasis in the right lung base.           Assessment/Plan  * Septic shock (HCC)- (present on admission)  Assessment & Plan  Present on admission  Source SBP  Gram Neg rods in blood, Salmonella species by PCR.  Sent to stat lab for fiinal ID and sensitivities   Second organism G+C neg for staph a.  Suspect contaminant  Cont Ceftriaxone  Have been able to titrate off of midodrine  Repeat cultures today  Repeat CT did not show any perf, abscess etc  DW ID who agree with current treatment plan    Superior mesenteric vein thrombosis (HCC)  Assessment & Plan  New finding on CT 11/3  DW GI  Start IV Heparin Xa protocol  Will review with [facility]    Severe protein-calorie malnutrition ([place]: less than 60% of standard weight) (HCC)  Assessment & Plan  Nutrition consult    Bacteremia due to Gram-negative bacteria  Assessment & Plan  Source SBP  S/p tap of 5000ml 10/31  Salmonella species based on PCR: sent to state lab for final ID and sensitivities  Check TTE  CT did not show any focal infection/abscess/perf  Ceftriaxone      Advance care planning- (present on admission)  Assessment & Plan  DNR/I based on discussion with my partner    Hypoglycemia- (present on admission)  Assessment & Plan  Likely due to poor oral intake as patient reports reduced appetite, lack of glucneogenesis due to cirrhosis  -Patient also finished prednisolone 2 days ago, could be adrenal insufficiency  Reg diet  Nutrition consult  Close monitoring of BG    Transaminitis- (present on admission)  Assessment & Plan  Patient reports she already finished a 28-day course of prednisone at home  Likely worsening due to sepsis/SBP  Daily CMP  Ultrasound neg for PVT, did show cirrhosis. GB with some sludge and non specific wall  thickening.  Can be normal in setting of ascites      High anion gap metabolic acidosis- (present on admission)  Assessment & Plan  Due to lactic acidosis likely from right liver disease and sepsis  IV thiamine  resolved    Hyponatremia- (present on admission)  Assessment & Plan  Sodium 117 on admission  Elena<20  UOsm>300  Hypervolemic hypoNa  Iv lasix 40mg BID  Q6hr Na  Na coming up slowly; 121 today  Cont IMCU level of care as pt is high risk for over correction, Sz or neurologic deterioration    Acute renal failure (ARF) (HCC)- (present on admission)  Assessment & Plan  Creatinine 2.11 on admission  HRS vs sepsis vs hypovlemia  Of note pt had a paracentesis one week prior to presentation with no albumin  Creat improving over last 48hrs  Completed octreotide  DC midodrina as SBP consistently 110-120  Good UOP   Likely due to sepsis  Avoid nephrotoxic medications  No evidence of obstruction on CT  Daily BMP    Alcoholic cirrhosis of liver with ascites (HCC)- (present on admission)  Assessment & Plan  F/b [facility]  ETOH induced, sober since June  Has been [facility] to [facility] service with appointment in June  Based on lab from today MELD 32 on presentation  Status post paracentesis 5000ml 10/31  S/p albumin following tap  SBP based on fluid cell count  Rocephin  Trial off of midodrine  No BM in 72hrs and more confused today.  Picture is complicated by possible ileus noted on CT  -cont rifaximin  -increase lactulose po from 45mg TID to q4   -start lactulose retention enema  Reaching out to [facility]    Depression with anxiety- (present on admission)  Assessment & Plan  Continue home fluoxetine         VTE prophylaxis: VTE Selection    I have performed a physical exam and reviewed and updated ROS and Plan today (11/3/2024). In review of yesterday's note (11/2/2024), there are no changes except as documented above.

## 2024-11-03 NOTE — CARE PLAN
The patient is Watcher - Medium risk of patient condition declining or worsening    Shift Goals  Clinical Goals: hemodynamic stability, monitor BMPs  Patient Goals: sleep  Family Goals: JESUSITA    Progress made toward(s) clinical / shift goals:  HD stable.     Patient is not progressing towards the following goals: becoming more encephalopathic, no BM      Problem: Knowledge Deficit - Standard  Goal: Patient and family/care givers will demonstrate understanding of plan of care, disease process/condition, diagnostic tests and medications  Outcome: Not Progressing     Problem: Knowledge Deficit - Standard  Goal: Patient and family/care givers will demonstrate understanding of plan of care, disease process/condition, diagnostic tests and medications  Outcome: Not Progressing     Problem: Pain - Standard  Goal: Alleviation of pain or a reduction in pain to the patient’s comfort goal  Outcome: Progressing     Problem: Hemodynamics  Goal: Patient's hemodynamics, fluid balance and neurologic status will be stable or improve  Outcome: Progressing     Problem: Urinary - Renal Perfusion  Goal: Ability to achieve and maintain adequate renal perfusion and functioning will improve  Outcome: Progressing     Problem: Skin Integrity  Goal: Skin integrity is maintained or improved  Outcome: Progressing

## 2024-11-03 NOTE — WOUND TEAM
[facility]  Inpatient Services  Wound and Skin Care Brief Evaluation    Admission Date: 10/31/2024     Last order of IP CONSULT TO WOUND CARE was found on 11/2/2024 from Hospital Encounter on 10/31/2024     HPI, PMH, SH: Reviewed    Chief Complaint   Patient presents with    Abdominal Pain     Patient arrives for paracentesis due to inability to schedule appointment. Pt reports 10/10 abd cramping pain and pressure.    BP repeated multiple times 87/57 in triage. Pt skin appears green tinted.      Diagnosis: Sepsis (HCC) [A41.9]    Unit where seen by Wound Team: [id number]     Wound consult placed regarding sacrum. Chart and images reviewed. This discussed with bedside RN .   SACRUM - pt has a moisture fissure in the distal gluteal cleft barbara anal and some blanching erythema on sacrum.  Orders for barrier paste written  No pressure injuries or advanced wound care needs identified. Wound consult completed. No further follow up unless indicated and consulted.          PREVENTATIVE INTERVENTIONS:    Q shift Thad - performed per nursing policy  Q shift pressure point assessments - performed per nursing policy    Surface/Positioning  ICU Low Airloss - Currently in Place  TAPs Turning system - Currently in Place    Offloading/Redistribution  Sacral offloading dressing (Silicone dressing) - Ordered  Heel offloading dressing (Silicone dressing) - Currently in Place

## 2024-11-03 NOTE — CARE PLAN
Pt seen today by: Lizabeth Mckeon MD    Home health and self care DRESSING INSTRUCTIONS: 9/20/22 Wound care supplies ordered from [employer]        Wound location: Left lateral ankle    Dressings to be changed daily and as needed if soiled or not intact.      Cleanse wound with wound cleanser or saline  Apply thera honey gel to the wound bed  Cover with   exudry dressing and secure with silvia       Compression with: N/A    Return visit: October 4th at 10:00am      Wound may have been debrided in clinic: if so, WHAT YOU NEED TO KNOW:    Debridement is the removal of infected, damaged, or dead tissue so a wound can heal properly. Your wound may need more than one debridement. Debridement can cause bleeding, and a small amount of blood is expected.  AFTER A DEBRIDEMENT:    Keep your wound clean and dry. Do not remove the dressing unless instructed.  Follow the wound care orders provided to you or your home health care provider.  If you have pain, take over the counter pain relievers or pain medication if prescribed.  Elevate the wound and limit excessive activity to prevent bleeding and/or swelling in your wound.  If you see blood coming through the dressing, apply gauze and tape over the dressing and hold firm pressure to the wound with your hand for 5-10 minutes continuously, without peeking, to help the bleeding stop.  Contact [facility] wound care team at [phone] or go to the nearest Emergency department if:    You have a fever greater than 101 taken by mouth.  Your pain gets worse or does not go away, even after taking your regular pain medicine.  Your skin around your wound is red, hot, swollen, or draining pus.  You have bleeding that continues to come through the dressing after holding pressure for 10 minutes            Nutrition:  The current daily value (%DV) for protein is 50 grams per day and is meant as a general goal for most people. Further increasing your dietary protein intake is very  The patient is Watcher - Medium risk of patient condition declining or worsening    Shift Goals  Clinical Goals: hemodynamic stability, monitor BMPs  Patient Goals: sleep  Family Goals: JESUSITA    Progress made toward(s) clinical / shift goals:      Problem: Pain - Standard  Goal: Alleviation of pain or a reduction in pain to the patient’s comfort goal  Outcome: Progressing     Problem: Hemodynamics  Goal: Patient's hemodynamics, fluid balance and neurologic status will be stable or improve  Outcome: Progressing     Problem: Urinary - Renal Perfusion  Goal: Ability to achieve and maintain adequate renal perfusion and functioning will improve  Outcome: Progressing     Problem: Fall Risk  Goal: Patient will remain free from falls  Outcome: Progressing       Patient is not progressing towards the following goals:       important for wound healing. Typically one needs over 100g of protein per day to help with wound healing needs.  If you are a dialysis patient or have problems with your kidneys, talk to your Nephrologist about how much protein you can take in with your condition.  Examples of high protein items that can be added to your diet include: eggs, chicken, red meats, almonds, cottage cheese, Greek yogurt, beans, and peanut butter.  Fortified protein bars, shakes and drinks can add 15-30 additional grams of protein per serving.   Also add:   1 daily general multivitamin   Pierre : 1 packet twice daily   Vitamin C : 500mg twice daily   Zinc 220 mg daily  Vit D : once daily        Call our [facility] wound clinic for questions/concerns a [phone] .

## 2024-11-03 NOTE — ASSESSMENT & PLAN NOTE
11/10/2024  New finding on CT 11/3  GI is following her.  I discussed plan of care with GI team.  Now she is on therapeutic Eliquis currently on loading dose.  Hemoglobin remained stable.  Bleeding from her right nostril has resolved.  Ordered CBC for tomorrow morning.

## 2024-11-03 NOTE — PROGRESS NOTES
Gastroenterology Progress Note               Author:  YANA Pierre Date & Time Created: 11/3/2024 11:00 AM       Patient ID:  Name:             Kavita Freeman  YOB: 1965  Age:                 59 y.o.  female  MRN:               [id number]        Medical Decision Making, by Problem:  Active Hospital Problems    Diagnosis     Acute encephalopathy [G93.40]     Bacteremia due to Gram-negative bacteria [R78.81]     Severe protein-calorie malnutrition ([place]: less than 60% of standard weight) (HCC) [E43]     Septic shock (HCC) [A41.9, R65.21]     Transaminitis [R74.01]     Hypoglycemia [E16.2]     Advance care planning [Z71.89]     High anion gap metabolic acidosis [E87.29]     Hyponatremia [E87.1]     Alcoholic cirrhosis of liver with ascites (HCC) [K70.31]     Acute renal failure (ARF) (HCC) [N17.9]     Depression with anxiety [F41.8]            Presenting Chief Complaint:  Cirrhosis, GI bleeding.     History of Present Illness:   59 years old female with medical history of alcohol-related liver injury, cirrhosis, decompensation with variceal bleeding and refractory ascites formation, mild to moderate encephalopathy.  Presented to hospital this time for fluid accumulation, acute kidney injury.  GI team is consulted for recent GI bleeding     The patient has primary GI liver doctor in [place] and she is waiting for the transplantation evaluation at [facility] in the coming 2 months.  We do not have any records to support this plan, however the patient is very certain about the [facility] appointment.     For the bleeding, the patient had nausea vomiting in the last 2 days, the patient saw blood and also some coffee-ground like vomitus.  Did not appear a lot of tension on the bowel movement but at least no large amount of tarry stool was noted.  Last upper GI scope August 15 with variceal bleeding and the banding.            Interval History:  11/2/2024: Patient seen at bedside.  Awake,  alert.  Discussed EGD findings with patient.  Inquired regarding last EGD as, per chart review, last EGD was 2-1/2 months ago.  Patient unable to discern if she has had EGD since last documented.  Positive asterixis.  Denies bowel movements overnight.  Started on rifaximin and lactulose.  Hemoglobin stable.  Abdomen tender with light palpation.  No WBC this morning, but yesterday was 39. ultrasound abdomen pending.      11/3/2024: Patient seen at bedside.  Disoriented, unable to answer orientation questions.  Abdomen distended, significantly increase in tenderness this morning, hypoactive bowel sounds and tympany with percussion.  CT abdomen and CBC pending.  No bowel movements overnight.    Update 1222: CT abdomen with findings including fluid-filled small bowel and colonic loops favoring ileus, no evidence of bowel perforation, cirrhosis with portal hypertension, moderate to large volume ascites, nonocclusive SMV thrombosis, cholelithiasis, subtle findings concerning for pulmonary emboli.      Hospital Medications:  Current Facility-Administered Medications   Medication Dose Frequency Provider Last Rate Last Admin    [START ON 11/4/2024] cefTRIAXone (Rocephin) syringe 2,000 mg  2,000 mg Q24HRS TRUDY BrumfieldOSandy        lactulose 20 GM/30ML solution 45 mL  45 mL TID ASTRID [email or web address]   45 mL at 11/03/24 0522    riFAXIMin (Xifaxan) tablet 550 mg  550 mg BID ASTRID [email or web address].OSandy   550 mg at 11/03/24 0518    acetaminophen (Tylenol) tablet 650 mg  650 mg Q6HRS PRN Adilia Gonzales D.O.        Pharmacy Consult Request ...Pain Management Review 1 Each  1 Each PHARMACY TO DOSE Adilia Gonzales D.O.        oxyCODONE immediate-release (Roxicodone) tablet 2.5 mg  2.5 mg Q3HRS PRN TRUDY RazaOSandy   2.5 mg at 11/01/24 1230    Or    oxyCODONE immediate-release (Roxicodone) tablet 5 mg  5 mg Q3HRS PRN TRUDY RazaO.   5 mg at 11/03/24 0519    Or    HYDROmorphone (Dilaudid) injection 0.25  mg  0.25 mg Q3HRS PRN Adilia Gonzales, D.O.   0.25 mg at 10/31/24 2036    ondansetron (Zofran) syringe/vial injection 4 mg  4 mg Q4HRS PRN Adilia Gonzales D.O.        ondansetron (Zofran ODT) dispertab 4 mg  4 mg Q4HRS PRN Adilia Gonzales D.O.        promethazine (Phenergan) tablet 12.5-25 mg  12.5-25 mg Q4HRS PRN Adilia Gonzales, D.O.        promethazine (Phenergan) suppository 12.5-25 mg  12.5-25 mg Q4HRS PRN Adilia Gonzales D.O.        prochlorperazine (Compazine) injection 5-10 mg  5-10 mg Q4HRS PRN Adilia Gonzales D.O.        guaiFENesin dextromethorphan (Robitussin DM) 100-10 MG/5ML syrup 10 mL  10 mL Q6HRS PRN Adilia Gonzales D.O.        pantoprazole (Protonix) injection 40 mg  40 mg BID Adilia Gonzales D.O.   40 mg at 11/03/24 0518    octreotide (SandoSTATIN) 1,250 mcg in  mL Infusion  50 mcg/hr Continuous Adilia Gonzales D.O. 10 mL/hr at 11/03/24 0122 50 mcg/hr at 11/03/24 0122    dextrose 50% (D50W) injection 25 g  25 g Q15 MIN PRN Adilia Gonzales D.O.   25 g at 11/01/24 1843    FLUoxetine (PROzac) capsule 10 mg  10 mg QAM Adilia Gonzales, D.O.   10 mg at 11/03/24 0518    gabapentin (Neurontin) capsule 200 mg  200 mg Nightly Adilia Gonzales, D.O.   200 mg at 11/02/24 1940    hydrOXYzine HCl (Atarax) tablet 25 mg  25 mg TID PRN Adilia Gonzales D.O.        lidocaine (Asperflex) 4 % patch 1 Patch  1 Patch Q24HRS TRUDY RazaO.   1 Patch at 11/03/24 0520    sucralfate (Carafate) 1 GM/10ML suspension 1 g  1 g Q6HRS Adilia Gonzales D.O.   1 g at 11/03/24 0521    midodrine (Proamatine) tablet 5 mg  5 mg TID WITH MEALS Adilia Gonzales D.O.   5 mg at 11/02/24 1805   Last reviewed on 11/1/2024  9:17 AM by Celine Bustamante R.N.       Review of Systems:  Review of Systems   Constitutional:  Positive for malaise/fatigue. Negative for chills and fever.   HENT:  Negative for congestion and sore throat.    Respiratory:  Negative for cough and shortness of breath.    Cardiovascular:  Negative for  "chest pain and leg swelling.   Gastrointestinal:  Positive for abdominal pain. Negative for blood in stool, constipation, diarrhea, melena, nausea and vomiting.   Genitourinary:  Negative for dysuria and flank pain.   Musculoskeletal:  Negative for joint pain and myalgias.   Neurological:  Positive for weakness. Negative for headaches.   Psychiatric/Behavioral:  The patient is nervous/anxious. The patient does not have insomnia.    All other systems reviewed and are negative.        Vital signs:  Weight/BMI: Body mass index is 21.02 kg/m².  /73   Pulse 100   Temp 36 °C (96.8 °F) (Bladder)   Resp (!) 61   Ht 1.727 m (5' 8\")   Wt 62.7 kg (138 lb 3.7 oz)   SpO2 93%   Vitals:    11/03/24 0600 11/03/24 0700 11/03/24 0800 11/03/24 0900   BP: 110/71 122/66 122/74 121/73   Pulse: 97 (!) 101 96 100   Resp: (!) 56 (!) 42 (!) 62 (!) 61   Temp:       TempSrc:       SpO2: 94% 90% 93% 93%   Weight:       Height:         Oxygen Therapy:  Pulse Oximetry: 93 %, O2 (LPM): 6, O2 Delivery Device: Nasal Cannula    Intake/Output Summary (Last 24 hours) at 11/3/2024 1100  Last data filed at 11/3/2024 0911  Gross per 24 hour   Intake 449.95 ml   Output 1550 ml   Net -1100.05 ml         Physical Exam:  Physical Exam  Vitals and nursing note reviewed.   Constitutional:       General: She is awake. She is in acute distress.      Appearance: She is ill-appearing.   HENT:      Head: Normocephalic.      Nose: Nose normal. No congestion.      Mouth/Throat:      Comments: Old dark blood in mouth  Eyes:      General: Scleral icterus present.      Extraocular Movements: Extraocular movements intact.      Conjunctiva/sclera: Conjunctivae normal.   Cardiovascular:      Rate and Rhythm: Normal rate and regular rhythm.      Pulses: Normal pulses.      Heart sounds: Murmur heard.   Pulmonary:      Effort: Pulmonary effort is normal. No respiratory distress.      Breath sounds: Normal breath sounds.   Abdominal:      General: Abdomen is flat. " Bowel sounds are normal. There is no distension.      Palpations: Abdomen is soft.      Tenderness: There is abdominal tenderness. There is no guarding.   Musculoskeletal:      Right lower leg: No edema.      Left lower leg: No edema.   Skin:     General: Skin is warm and dry.      Capillary Refill: Capillary refill takes less than 2 seconds.      Coloration: Skin is jaundiced.   Neurological:      Mental Status: She is alert. She is disoriented.   Psychiatric:         Behavior: Behavior is cooperative.             Labs:  Recent Labs     11/02/24  1145 11/02/24  1530 11/03/24  0408   SODIUM 117* 118* 121*   POTASSIUM 6.4* 4.8 4.2   CHLORIDE 85* 83* 85*   CO2 17* 21 17*   BUN 55* 57* 57*   CREATININE 1.86* 1.72* 1.35   CALCIUM 11.8* 8.9 9.0     Recent Labs     10/31/24  1455 10/31/24  2113 11/01/24  0310 11/01/24  0445 11/01/24  1230 11/02/24  1145 11/02/24  1530 11/03/24  0408   ALTSGPT 120*  --  *  --   --   --  130*   ASTSGOT 373*  --  *  --   --   --  384*   ALKPHOSPHAT 135*  --  *  --   --   --  147*   TBILIRUBIN 6.1*  --  RR 5.6*  --   --   --  6.1*   LIPASE 47  --   --   --   --   --   --   --    GLUCOSE 51*   < > RR 69  69   < > 131* 134* 81    < > = values in this interval not displayed.     Recent Labs     10/31/24  1455 11/01/24 0310 11/01/24 0445 11/03/24  0408   WBC 45.2* 39.0*  --   --    NEUTSPOLYS 83.70*  --   --   --    LYMPHOCYTES 0.00*  --   --   --    MONOCYTES 1.50  --   --   --    EOSINOPHILS 0.00  --   --   --    BASOPHILS 0.00  --   --   --    ASTSGOT 373* * 384*   ALTSGPT 120* * 130*   ALKPHOSPHAT 135* * 147*   TBILIRUBIN 6.1* RR 5.6* 6.1*     Recent Labs     10/31/24  1455 10/31/24  2113 11/01/24  0310 11/01/24  1230 11/01/24  2100 11/02/24  0730   RBC 4.57  --  3.40*  --   --   --    HEMOGLOBIN 13.3   < > 10.1* 10.9* 11.2* 11.0*   HEMATOCRIT 39.3   < > 29.0* 31.6* 33.2* 31.5*   PLATELETCT 313  --  213  --   --   --    PROTHROMBTM 27.6*  --   --   --    --   --    INR 2.55*  --   --   --   --   --     < > = values in this interval not displayed.     Recent Results (from the past 24 hours)   EKG    Collection Time: 24  3:06 PM   Result Value Ref Range    Report       [facility]    Test Date:  2024  Pt Name:    ROE JJ              Department: [facility]  MRN:        [id number]                      Room:       [facility] [place]  Gender:     Female                       Technician: SHERIDAN  :        1965                   Requested By:JAMES WILLIAM  Order #:    [id number]                    Reading MD: Huan Masters MD    Measurements  Intervals                                Axis  Rate:       86                           P:          27  AR:         119                          QRS:        7  QRSD:       100                          T:          0  QT:         410  QTc:        491    Interpretive Statements  Sinus rhythm  Left ventricular hypertrophy with repol abnormalities  prolonged QT interval  Compared to ECG 2024 05:20:04  No significant changes  Electronically Signed On 2024 15:39:59 PDT by Huan Masters MD     Basic Metabolic Panel    Collection Time: 24  3:30 PM   Result Value Ref Range    Sodium 118 (LL) 135 - 145 mmol/L    Potassium 4.8 3.6 - 5.5 mmol/L    Chloride 83 (L) 96 - 112 mmol/L    Co2 21 20 - 33 mmol/L    Glucose 134 (H) 65 - 99 mg/dL    Bun 57 (H) 8 - 22 mg/dL    Creatinine 1.72 (H) 0.50 - 1.40 mg/dL    Calcium 8.9 8.5 - 10.5 mg/dL    Anion Gap 14.0 7.0 - 16.0   ESTIMATED GFR    Collection Time: 24  3:30 PM   Result Value Ref Range    GFR (CKD-EPI) 34 (A) >60 mL/min/1.73 m 2   POCT glucose device results    Collection Time: 24  1:22 AM   Result Value Ref Range    POC Glucose, Blood 94 65 - 99 mg/dL   Comp Metabolic Panel    Collection Time: 24  4:08 AM   Result Value Ref Range    Sodium 121 (L) 135 - 145 mmol/L    Potassium 4.2 3.6 - 5.5 mmol/L    Chloride 85 (L) 96 - 112 mmol/L    Co2 17  (L) 20 - 33 mmol/L    Anion Gap 19.0 (H) 7.0 - 16.0    Glucose 81 65 - 99 mg/dL    Bun 57 (H) 8 - 22 mg/dL    Creatinine 1.35 0.50 - 1.40 mg/dL    Calcium 9.0 8.5 - 10.5 mg/dL    Correct Calcium 10.1 8.5 - 10.5 mg/dL    AST(SGOT) 384 (H) 12 - 45 U/L    ALT(SGPT) 130 (H) 2 - 50 U/L    Alkaline Phosphatase 147 (H) 30 - 99 U/L    Total Bilirubin 6.1 (H) 0.1 - 1.5 mg/dL    Albumin 2.6 (L) 3.2 - 4.9 g/dL    Total Protein 6.6 6.0 - 8.2 g/dL    Globulin 4.0 (H) 1.9 - 3.5 g/dL    A-G Ratio 0.7 g/dL   ESTIMATED GFR    Collection Time: 11/03/24  4:08 AM   Result Value Ref Range    GFR (CKD-EPI) 46 (A) >60 mL/min/1.73 m 2   POCT glucose device results    Collection Time: 11/03/24  5:41 AM   Result Value Ref Range    POC Glucose, Blood 76 65 - 99 mg/dL       Radiology Review:  US-ABDOMEN COMPLETE SURVEY   Final Result      1.  Cirrhosis with stigmata of portal hypertension including splenomegaly, recanalized umbilical vein and ascites.   2.  Gallbladder sludge and stones. Gallbladder wall thickening is nonspecific and could be related to liver disease but correlate clinically for evidence of acute cholecystitis. No biliary dilatation.         DX-CHEST-PORTABLE (1 VIEW)   Final Result      1.  Hazy left lower lobe opacity could represent atelectasis or pneumonitis.   2.  There is linear scarring or atelectasis in the right lung base.      CT-ABDOMEN-PELVIS W/O    (Results Pending)   EC-ECHOCARDIOGRAM COMPLETE W/O CONT    (Results Pending)         MDM (Data Review):   -Records reviewed and summarized in current documentation  -I personally reviewed and interpreted the laboratory results  -I personally reviewed the radiology images    Assessment/Recommendations:  Acute liver failure- MELD 3.0- 38 on arrival (10/31/2024); 26.8% 90-day survival. Child-Garza Class C  SMV thrombosis  Portal hypertensive gastropathy  Ileus  Cirrhosis with ascites  Hepatic encephalopathy  Coagulopathy  Esophageal varices with banding's in  situ  Hematemesis  Severe protein calorie malnutrition  History of decompensated liver disease with variceal bleeding, refractory ascites  Salmonella bacteremia  sepsis        Plan:  Abdominal ultrasound results with findings consistent with portal hypertension including splenomegaly, umbilical vein recanalization and ascites. Concerning for SBP.   Continue octreotide 72 hours post EGD  Continue PPI IV twice daily  Continue antibiotics for SBP prophylaxis  Consider NG tube placement for decompression.   NPO due to ileus  INR ordered to reassess coagulopathy- last was 10/31/2024 2.55 (increased from 1.69 9/25/2024).   Start heparin given SMV thrombosis, possible PE  Recommend reaching out to transplant center for higher level of care.     Long term if patient survives admission:   Patient will need repeat EGD in 4-6 weeks  Nonselective beta-blocker as outpatient    GI will follow    Discussed with patient, nursing, Dr. Hyde, Dr. Garcia      Core Quality Measures   Reviewed items::  Labs, Medications and Radiology reports reviewed

## 2024-11-04 LAB
ANION GAP SERPL CALC-SCNC: 15 MMOL/L (ref 7–16)
BACTERIA FLD AEROBE CULT: ABNORMAL
BACTERIA FLD AEROBE CULT: ABNORMAL
BUN SERPL-MCNC: 56 MG/DL (ref 8–22)
CALCIUM SERPL-MCNC: 9.4 MG/DL (ref 8.5–10.5)
CHLORIDE SERPL-SCNC: 92 MMOL/L (ref 96–112)
CO2 SERPL-SCNC: 20 MMOL/L (ref 20–33)
CREAT SERPL-MCNC: 1.3 MG/DL (ref 0.5–1.4)
ERYTHROCYTE [DISTWIDTH] IN BLOOD BY AUTOMATED COUNT: 55.2 FL (ref 35.9–50)
GFR SERPLBLD CREATININE-BSD FMLA CKD-EPI: 48 ML/MIN/1.73 M 2
GLUCOSE SERPL-MCNC: 118 MG/DL (ref 65–99)
GRAM STN SPEC: ABNORMAL
HCT VFR BLD AUTO: 35.7 % (ref 37–47)
HGB BLD-MCNC: 12.5 G/DL (ref 12–16)
LV EJECT FRACT  99904: 75
LV EJECT FRACT MOD 2C 99903: 75.84
LV EJECT FRACT MOD 4C 99902: 73.14
LV EJECT FRACT MOD BP 99901: 75.38
MCH RBC QN AUTO: 29.5 PG (ref 27–33)
MCHC RBC AUTO-ENTMCNC: 35 G/DL (ref 32.2–35.5)
MCV RBC AUTO: 84.2 FL (ref 81.4–97.8)
PLATELET # BLD AUTO: 197 K/UL (ref 164–446)
PMV BLD AUTO: 10.8 FL (ref 9–12.9)
POTASSIUM SERPL-SCNC: 4 MMOL/L (ref 3.6–5.5)
RBC # BLD AUTO: 4.24 M/UL (ref 4.2–5.4)
SIGNIFICANT IND 70042: ABNORMAL
SITE SITE: ABNORMAL
SODIUM SERPL-SCNC: 127 MMOL/L (ref 135–145)
SOURCE SOURCE: ABNORMAL
UFH PPP CHRO-ACNC: 0.23 IU/ML
UFH PPP CHRO-ACNC: 0.29 IU/ML
UFH PPP CHRO-ACNC: 0.33 IU/ML
WBC # BLD AUTO: 29.8 K/UL (ref 4.8–10.8)

## 2024-11-04 PROCEDURE — 700102 HCHG RX REV CODE 250 W/ 637 OVERRIDE(OP): Performed by: HOSPITALIST

## 2024-11-04 PROCEDURE — 700101 HCHG RX REV CODE 250: Performed by: INTERNAL MEDICINE

## 2024-11-04 PROCEDURE — 700111 HCHG RX REV CODE 636 W/ 250 OVERRIDE (IP): Performed by: HOSPITALIST

## 2024-11-04 PROCEDURE — A9270 NON-COVERED ITEM OR SERVICE: HCPCS | Performed by: INTERNAL MEDICINE

## 2024-11-04 PROCEDURE — 99233 SBSQ HOSP IP/OBS HIGH 50: CPT | Performed by: HOSPITALIST

## 2024-11-04 PROCEDURE — 700111 HCHG RX REV CODE 636 W/ 250 OVERRIDE (IP): Mod: JZ | Performed by: HOSPITALIST

## 2024-11-04 PROCEDURE — 83735 ASSAY OF MAGNESIUM: CPT

## 2024-11-04 PROCEDURE — A9270 NON-COVERED ITEM OR SERVICE: HCPCS | Performed by: HOSPITALIST

## 2024-11-04 PROCEDURE — 99232 SBSQ HOSP IP/OBS MODERATE 35: CPT | Performed by: NURSE PRACTITIONER

## 2024-11-04 PROCEDURE — 85027 COMPLETE CBC AUTOMATED: CPT

## 2024-11-04 PROCEDURE — 700102 HCHG RX REV CODE 250 W/ 637 OVERRIDE(OP): Performed by: INTERNAL MEDICINE

## 2024-11-04 PROCEDURE — 700105 HCHG RX REV CODE 258: Performed by: HOSPITALIST

## 2024-11-04 PROCEDURE — 80048 BASIC METABOLIC PNL TOTAL CA: CPT

## 2024-11-04 PROCEDURE — 700111 HCHG RX REV CODE 636 W/ 250 OVERRIDE (IP): Mod: JZ | Performed by: INTERNAL MEDICINE

## 2024-11-04 PROCEDURE — 85520 HEPARIN ASSAY: CPT | Mod: 91

## 2024-11-04 PROCEDURE — 770000 HCHG ROOM/CARE - INTERMEDIATE ICU *

## 2024-11-04 RX ORDER — FUROSEMIDE 20 MG/1
40 TABLET ORAL
Status: DISCONTINUED | OUTPATIENT
Start: 2024-11-04 | End: 2024-11-08

## 2024-11-04 RX ORDER — OXYMETAZOLINE HYDROCHLORIDE 0.05 G/100ML
2 SPRAY NASAL 2 TIMES DAILY
Status: COMPLETED | OUTPATIENT
Start: 2024-11-04 | End: 2024-11-07

## 2024-11-04 RX ORDER — SPIRONOLACTONE 25 MG/1
50 TABLET ORAL
Status: DISCONTINUED | OUTPATIENT
Start: 2024-11-04 | End: 2024-11-06

## 2024-11-04 RX ADMIN — HEPARIN SODIUM 2500 UNITS: 1000 INJECTION, SOLUTION INTRAVENOUS; SUBCUTANEOUS at 01:37

## 2024-11-04 RX ADMIN — SUCRALFATE ORAL 1 G: 1 SUSPENSION ORAL at 05:11

## 2024-11-04 RX ADMIN — RIFAXIMIN 550 MG: 550 TABLET ORAL at 17:47

## 2024-11-04 RX ADMIN — ONDANSETRON 4 MG: 2 INJECTION INTRAMUSCULAR; INTRAVENOUS at 10:19

## 2024-11-04 RX ADMIN — RIFAXIMIN 550 MG: 550 TABLET ORAL at 05:11

## 2024-11-04 RX ADMIN — FLUOXETINE HYDROCHLORIDE 10 MG: 10 CAPSULE ORAL at 05:11

## 2024-11-04 RX ADMIN — LACTULOSE 45 ML: 10 SOLUTION ORAL at 13:22

## 2024-11-04 RX ADMIN — PANTOPRAZOLE SODIUM 40 MG: 40 INJECTION, POWDER, FOR SOLUTION INTRAVENOUS at 17:47

## 2024-11-04 RX ADMIN — HEPARIN SODIUM 22 UNITS/KG/HR: 5000 INJECTION, SOLUTION INTRAVENOUS at 12:30

## 2024-11-04 RX ADMIN — LIDOCAINE 1 PATCH: 4 PATCH TOPICAL at 05:11

## 2024-11-04 RX ADMIN — SUCRALFATE ORAL 1 G: 1 SUSPENSION ORAL at 13:22

## 2024-11-04 RX ADMIN — CEFTRIAXONE SODIUM 2000 MG: 10 INJECTION, POWDER, FOR SOLUTION INTRAVENOUS at 05:17

## 2024-11-04 RX ADMIN — OCTREOTIDE ACETATE 50 MCG/HR: 200 INJECTION, SOLUTION INTRAVENOUS; SUBCUTANEOUS at 01:28

## 2024-11-04 RX ADMIN — PANTOPRAZOLE SODIUM 40 MG: 40 INJECTION, POWDER, FOR SOLUTION INTRAVENOUS at 05:11

## 2024-11-04 RX ADMIN — SPIRONOLACTONE 50 MG: 25 TABLET ORAL at 13:22

## 2024-11-04 RX ADMIN — HEPARIN SODIUM 2500 UNITS: 1000 INJECTION, SOLUTION INTRAVENOUS; SUBCUTANEOUS at 10:12

## 2024-11-04 RX ADMIN — SUCRALFATE ORAL 1 G: 1 SUSPENSION ORAL at 17:47

## 2024-11-04 RX ADMIN — PROMETHAZINE HYDROCHLORIDE 25 MG: 25 TABLET ORAL at 11:16

## 2024-11-04 RX ADMIN — PROCHLORPERAZINE EDISYLATE 10 MG: 5 INJECTION INTRAMUSCULAR; INTRAVENOUS at 12:28

## 2024-11-04 RX ADMIN — FUROSEMIDE 40 MG: 20 TABLET ORAL at 13:22

## 2024-11-04 ASSESSMENT — PAIN DESCRIPTION - PAIN TYPE
TYPE: ACUTE PAIN
TYPE: ACUTE PAIN

## 2024-11-04 ASSESSMENT — COGNITIVE AND FUNCTIONAL STATUS - GENERAL
TURNING FROM BACK TO SIDE WHILE IN FLAT BAD: A LITTLE
PERSONAL GROOMING: A LOT
MOVING FROM LYING ON BACK TO SITTING ON SIDE OF FLAT BED: A LITTLE
SUGGESTED CMS G CODE MODIFIER DAILY ACTIVITY: CL
DAILY ACTIVITIY SCORE: 12
MOVING TO AND FROM BED TO CHAIR: A LOT
STANDING UP FROM CHAIR USING ARMS: A LOT
CLIMB 3 TO 5 STEPS WITH RAILING: A LOT
WALKING IN HOSPITAL ROOM: A LOT
DRESSING REGULAR UPPER BODY CLOTHING: A LOT
DRESSING REGULAR LOWER BODY CLOTHING: A LOT
SUGGESTED CMS G CODE MODIFIER MOBILITY: CL
MOBILITY SCORE: 14
HELP NEEDED FOR BATHING: A LOT
TOILETING: A LOT
EATING MEALS: A LOT

## 2024-11-04 ASSESSMENT — ENCOUNTER SYMPTOMS: ABDOMINAL PAIN: 1

## 2024-11-04 ASSESSMENT — FIBROSIS 4 INDEX: FIB4 SCORE: 8.59

## 2024-11-04 NOTE — DIETARY
"Nutrition Services: Initial Assessment     Day 4 of admit. Kavita Freeman is a 59 y.o. female with admitting DX of Sepsis ([facility]) [A41.9]     Consult received for MD diagnosis of malnutrition: \"([place]: less than 60% of standard weight)\"     Nutrition Assessment:      Height: 172.7 cm (5' 8\")  Weight: 64 kg (141 lb 1.5 oz)  Weight taken via bed scale  Body mass index is 21.45 kg/m². BMI classification: Normal.  Wt Readings from Last 11 Encounters:   11/04/24 64 kg (141 lb 1.5 oz)   10/21/24 75.8 kg (167 lb)   09/28/24 69.8 kg (153 lb 14.1 oz)   09/12/24 90.3 kg (199 lb)   08/28/24 85.7 kg (189 lb)   08/17/24 73.5 kg (162 lb 0.6 oz)   06/19/24 83.6 kg (184 lb 4.9 oz)   05/06/24 78 kg (172 lb)   02/16/24 81.6 kg (180 lb)   01/30/24 82.6 kg (182 lb)   01/30/24 83 kg (183 lb)       Objective:  Pertinent medical hx:   Past Medical History:   Diagnosis Date    Acute cystitis with hematuria 09/21/2016    Acute kidney injury (HCC) 06/14/2024    Allergy     Anxiety     Arrhythmia     for tachycardia    Back pain     Depression     Treated by Dr Karla Whatley    Depression     GIB (gastrointestinal bleeding) 08/14/2024    Hyperlipidemia     Hypertension     Hypomagnesemia 08/14/2024    Hypotension 07/15/2016    Indigestion     Lightheadedness 12/17/2015    Macrocytosis 10/26/2016    Necrotizing fasciitis ([facility]) 06/14/2024    Other chest pain 12/17/2015    Pain 02/24/2015    right shoulder 6/10    Perirectal abscess 06/14/2024    PSVT (paroxysmal supraventricular tachycardia) ([facility]) 06/24/2013    Psychiatric disorder     anxiety     Septic shock ([facility]) 06/14/2024    Thrombocytopenia ([facility]) 06/27/2024    Tobacco abuse 05/03/2013    URI (upper respiratory infection) 09/21/2013    Urinary tract infection, site not specified     Recurrent UTI's    UTI (lower urinary tract infection) 09/21/2013    Wound check, abscess 06/27/2024     Pertinent labs: Na 127, glu 118, GFR 48  Pertinent meds: lasix, neurontin, lactulose (held), " "midodrine (held), PRN zofran, protonix, phenergan, aldactone, carafate  Skin/wounds: sacral moisture fissure per Wound Team note 11/2   Food Allergies: NKFA  Last BM: 11/04/24 per flowsheets  A&O x 1 overnight per flowsheets     Current diet order:   Regular diet; <25% x1 meal    Subjective:   Pt sleeping soundly on RD attempted visit; even rise/fall of chest observed. Did not attempt to wake pt as she is documented to be A&O x1.  Admit screen not completed for PO intake/wt hx PTA    Nutrition Focused Physical Exam (NFPE)   Weight loss: Significant wt fluctuations, but overall trend is down in the past year, 24.3% wt loss in 10 months, 16.7% in 5 months - severe wt loss, unclear if any aspect of ascites changes impacting wt loss percentages. RD suspects this change related to poor nutritional intake in setting of alcoholic cirrhosis of liver w/ likely some component of ascites/fluid status changes impacting wt loss %.   Unable to fully assess physical markers  Muscle mass: Mild loss - flat/slightly depressed temporals  Subcutaneous fat: Mild loss - some hollowing of orbital region  Fluid Accumulation: none documented  Reduced  Strength: N/A in acute care setting.     Nutrition Diagnosis:      Per RD note 9/26/24: \"Moderate malnutrition in the context of chronic illness related to cirrhosis as evidenced by <75% of estimated energy requirements for >1 month, moderate muscle loss (temporal, dorsal hand, and clavicle) and mild subcutaneous fat loss (orbital).\" Moderate chronic malnutrition r/t cirrhosis is an ongoing dx AEB pt continues to present w/ mild muscle wasting and mild fat wasting.     Nutrition Interventions:      Provide Ensure Compact (provides 220 calories, 9 g protein per 4 fl oz) TID w/ meals to bolster nutritional intake in setting of malnutrition dx.  As feasible and pt available, follow up for full NFPE.  Patient aware of active plan of care as appropriate.     Nutrition Monitoring and Evaluation: "     Monitor nutrition POC  Additional fluids per MD/DO  Monitor wt trends, PO intake of meals and supplements.       RD following and will provide updated recommendations as indicated.

## 2024-11-04 NOTE — CARE PLAN
The patient is Watcher - Medium risk of patient condition declining or worsening    Shift Goals  Clinical Goals: hemodynamic stability, monitor BMPs  Patient Goals: sleep  Family Goals: pratibha    Progress made toward(s) clinical / shift goals:  vitals stable, serial labs, minimal oral intake. No c/o pain.       Problem: Pain - Standard  Goal: Alleviation of pain or a reduction in pain to the patient’s comfort goal  Outcome: Progressing     Problem: Skin Integrity  Goal: Skin integrity is maintained or improved  Outcome: Progressing     Problem: Fall Risk  Goal: Patient will remain free from falls  Outcome: Progressing       Patient is not progressing towards the following goals:

## 2024-11-04 NOTE — PROGRESS NOTES
Gastroenterology Progress Note               Author:  YANA Pierre Date & Time Created: 11/4/2024 1:06 PM       Patient ID:  Name:             Kavita Freeman  YOB: 1965  Age:                 59 y.o.  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     History of Present Illness:   59 years old female with medical history of alcohol-related liver injury, cirrhosis, decompensation with variceal bleeding and refractory ascites formation, mild to moderate encephalopathy.  Presented to hospital this time for fluid accumulation, acute kidney injury.  GI team is consulted for recent GI bleeding     The patient has primary GI liver doctor in [place] and she is waiting for the transplantation evaluation at [facility] in the coming 2 months.  We do not have any records to support this plan, however the patient is very certain about the [facility] appointment.     For the bleeding, the patient had nausea vomiting in the last 2 days, the patient saw blood and also some coffee-ground like vomitus.  Did not appear a lot of tension on the bowel movement but at least no large amount of tarry stool was noted.  Last upper GI scope August 15 with variceal bleeding and the banding.            Interval  History:  11/2/2024: Patient seen at bedside.  Awake, alert.  Discussed EGD findings with patient.  Inquired regarding last EGD as, per chart review, last EGD was 2-1/2 months ago.  Patient unable to discern if she has had EGD since last documented.  Positive asterixis.  Denies bowel movements overnight.  Started on rifaximin and lactulose.  Hemoglobin stable.  Abdomen tender with light palpation.  No WBC this morning, but yesterday was 39. ultrasound abdomen pending.      11/3/2024: Patient seen at bedside.  Disoriented, unable to answer orientation questions.  Abdomen distended, significantly increase in tenderness this morning, hypoactive bowel sounds and tympany with percussion.  CT abdomen and CBC pending.  No bowel movements overnight.    Update 1222: CT abdomen with findings including fluid-filled small bowel and colonic loops favoring ileus, no evidence of bowel perforation, cirrhosis with portal hypertension, moderate to large volume ascites, nonocclusive SMV thrombosis, cholelithiasis, subtle findings concerning for pulmonary emboli.      11/4/2024: Patient seen at bedside.  Disoriented, oriented to self only.  Abdomen remains distended, tender with light palpation, hypoactive bowel sounds.  Patient with multiple brown bowel movements overnight.  Started on heparin yesterday.  WBC 29.8, hemoglobin stable at 12.5, platelets 197.  Sodium 127, BUN 56, creatinine improved to 1.3.  Albumin yesterday 2.6.  INR yesterday 2.35.  MELD 3.0 as of 11/3/2024     Hospital Medications:  Current Facility-Administered Medications   Medication Dose Frequency Provider Last Rate Last Admin    furosemide (Lasix) tablet 40 mg  40 mg Q DAY Quang Durham D.O.        spironolactone (Aldactone) tablet 50 mg  50 mg Q DAY TRUDY BrumfieldOSandy        oxymetazoline (Afrin) 0.05 % nasal spray 2 Spray  2 [place] BID Quang Durham D.O.        cefTRIAXone (Rocephin) syringe 2,000 mg  2,000 mg Q24HRS Quang  TRUDY DurhamOSandy   2,000 mg at 11/04/24 0517    heparin infusion 25,000 units in 500 mL 0.45% NACL  0-30 Units/kg/hr Continuous Quang Durham D.O. 27.6 mL/hr at 11/04/24 1230 22 Units/kg/hr at 11/04/24 1230    heparin injection 2,500 Units  40 Units/kg PRN TRUDY BrumfieldOSandy   2,500 Units at 11/04/24 1012    lactulose 20 GM/30ML solution 45 mL  45 mL 4X/DAY ASTRID Brumfield.OSandy   45 mL at 11/03/24 1720    riFAXIMin (Xifaxan) tablet 550 mg  550 mg BID TRUDY BrumfieldOSandy   550 mg at 11/04/24 0511    acetaminophen (Tylenol) tablet 650 mg  650 mg Q6HRS PRN Adilia Gonzales D.O.        Pharmacy Consult Request ...Pain Management Review 1 Each  1 Each PHARMACY TO DOSE ASTRID Raza.ADDIE.        oxyCODONE immediate-release (Roxicodone) tablet 2.5 mg  2.5 mg Q3HRS PRN TRUDY RazaOSandy   2.5 mg at 11/01/24 1230    Or    oxyCODONE immediate-release (Roxicodone) tablet 5 mg  5 mg Q3HRS PRN ASTRID Raza.O.   5 mg at 11/03/24 0519    Or    HYDROmorphone (Dilaudid) injection 0.25 mg  0.25 mg Q3HRS PRN ASTRID Raza.O.   0.25 mg at 10/31/24 2036    ondansetron (Zofran) syringe/vial injection 4 mg  4 mg Q4HRS PRN ASTRID Raza.O.   4 mg at 11/04/24 1019    ondansetron (Zofran ODT) dispertab 4 mg  4 mg Q4HRS PRN TRUDY RazaO.        promethazine (Phenergan) tablet 12.5-25 mg  12.5-25 mg Q4HRS PRN TRUDY RazaO.   25 mg at 11/04/24 1116    promethazine (Phenergan) suppository 12.5-25 mg  12.5-25 mg Q4HRS PRN ASTRID Raza.O.        prochlorperazine (Compazine) injection 5-10 mg  5-10 mg Q4HRS PRN Adilia Gonzales D.O.   10 mg at 11/04/24 1228    guaiFENesin dextromethorphan (Robitussin DM) 100-10 MG/5ML syrup 10 mL  10 mL Q6HRS PRN ASTRID Raza.O.        pantoprazole (Protonix) injection 40 mg  40 mg BID Adilia Gonzales D.O.   40 mg at 11/04/24 0511    dextrose 50% (D50W) injection 25 g  25 g Q15 MIN PRN ASTRID Raza.O.   25 g at  "11/01/24 1843    FLUoxetine (PROzac) capsule 10 mg  10 mg QAM Adilia Gonzales, D.O.   10 mg at 11/04/24 0511    gabapentin (Neurontin) capsule 200 mg  200 mg Nightly Adilia Gonzales, D.O.   200 mg at 11/03/24 2212    hydrOXYzine HCl (Atarax) tablet 25 mg  25 mg TID PRN Adilia Gonzales, D.O.        lidocaine (Asperflex) 4 % patch 1 Patch  1 Patch Q24HRS Adilia Gonzales, D.O.   1 Patch at 11/04/24 0511    sucralfate (Carafate) 1 GM/10ML suspension 1 g  1 g Q6HRS Adilia Gonzales, D.O.   1 g at 11/04/24 0511    [Held by provider] midodrine (Proamatine) tablet 5 mg  5 mg TID WITH MEALS Adilia Gonzales, D.O.   5 mg at 11/02/24 1805   Last reviewed on 11/1/2024  9:17 AM by Celine Bustamante R.N.       Review of Systems:  Review of Systems   Unable to perform ROS: Critical illness         Vital signs:  Weight/BMI: Body mass index is 21.45 kg/m².  /74   Pulse (!) 109   Temp 36 °C (96.8 °F) (Bladder)   Resp (!) 55   Ht 1.727 m (5' 8\")   Wt 64 kg (141 lb 1.5 oz)   SpO2 93%   Vitals:    11/04/24 1000 11/04/24 1100 11/04/24 1200 11/04/24 1300   BP: (!) 143/85 90/62 124/82 108/74   Pulse: (!) 103 (!) 109 (!) 103 (!) 109   Resp: (!) 29 (!) 46 (!) 48 (!) 55   Temp:       TempSrc:   Bladder    SpO2: 96% 92% 93% 93%   Weight:       Height:         Oxygen Therapy:  Pulse Oximetry: 93 %, O2 (LPM): 6, O2 Delivery Device: Nasal Cannula    Intake/Output Summary (Last 24 hours) at 11/4/2024 1306  Last data filed at 11/4/2024 1200  Gross per 24 hour   Intake 791.62 ml   Output 1050 ml   Net -258.38 ml         Physical Exam:  Physical Exam  Vitals and nursing note reviewed.   Constitutional:       General: She is in acute distress.      Appearance: She is cachectic. She is ill-appearing and toxic-appearing.   HENT:      Head: Normocephalic.      Nose: Nose normal. No congestion.      Mouth/Throat:      Comments: Old dark blood in mouth  Eyes:      General: Scleral icterus present.      Extraocular Movements: Extraocular movements " intact.      Conjunctiva/sclera: Conjunctivae normal.   Cardiovascular:      Rate and Rhythm: Normal rate and regular rhythm.      Pulses: Normal pulses.      Heart sounds: Murmur heard.   Pulmonary:      Effort: Pulmonary effort is normal. No respiratory distress.      Breath sounds: Normal breath sounds.   Abdominal:      Comments: +tympany with percussion, hypoactive bowel sounds     Musculoskeletal:      Right lower leg: No edema.      Left lower leg: No edema.   Skin:     General: Skin is warm and dry.      Capillary Refill: Capillary refill takes less than 2 seconds.      Coloration: Skin is jaundiced.   Neurological:      Mental Status: She is disoriented.             Labs:  Recent Labs     11/03/24 1456 11/03/24 2230 11/04/24 0430   SODIUM 125* 129* 127*   POTASSIUM 3.9 4.0 4.0   CHLORIDE 88* 92* 92*   CO2 18* 21 20   BUN 54* 56* 56*   CREATININE 1.17 1.02 1.30   CALCIUM 9.1 9.3 9.4     Recent Labs     11/03/24  0408 11/03/24 1456 11/03/24 2230 11/04/24 0430   ALTSGPT 130* 123*  --   --    ASTSGOT 384* 321*  --   --    ALKPHOSPHAT 147* 152*  --   --    TBILIRUBIN 6.1* 6.5*  --   --    GLUCOSE 81 96 112* 118*     Recent Labs     11/03/24 0408 11/03/24 1456 11/04/24 0430   WBC  --  26.6* 29.8*   NEUTSPOLYS  --  87.50*  --    LYMPHOCYTES  --  1.60*  --    MONOCYTES  --  9.20  --    EOSINOPHILS  --  0.00  --    BASOPHILS  --  0.00  --    ASTSGOT 384* 321*  --    ALTSGPT 130* 123*  --    ALKPHOSPHAT 147* 152*  --    TBILIRUBIN 6.1* 6.5*  --      Recent Labs     11/02/24 0730 11/03/24 1456 11/04/24 0430   RBC  --  4.22 4.24   HEMOGLOBIN 11.0* 12.2 12.5   HEMATOCRIT 31.5* 35.0* 35.7*   PLATELETCT  --  192 197   PROTHROMBTM  --  25.9*  --    APTT  --  47.4*  --    INR  --  2.35*  --      Recent Results (from the past 24 hours)   BLOOD CULTURE    Collection Time: 11/03/24  2:56 PM    Specimen: Peripheral; Blood   Result Value Ref Range    Significant Indicator NEG     Source BLD     Site PERIPHERAL      Culture Result       No Growth  Note: Blood cultures are incubated for 5 days and  are monitored continuously.Positive blood cultures  are called to the RN and reported as soon as  they are identified.     BLOOD CULTURE    Collection Time: 11/03/24  2:56 PM    Specimen: Peripheral; Blood   Result Value Ref Range    Significant Indicator NEG     Source BLD     Site PERIPHERAL     Culture Result       No Growth  Note: Blood cultures are incubated for 5 days and  are monitored continuously.Positive blood cultures  are called to the RN and reported as soon as  they are identified.     aPTT    Collection Time: 11/03/24  2:56 PM   Result Value Ref Range    APTT 47.4 (H) 24.7 - 36.0 sec   Prothrombin Time    Collection Time: 11/03/24  2:56 PM   Result Value Ref Range    PT 25.9 (H) 12.0 - 14.6 sec    INR 2.35 (H) 0.87 - 1.13   Heparin Xa (Unfractionated)    Collection Time: 11/03/24  2:56 PM   Result Value Ref Range    Heparin Xa (UFH) <0.10 IU/mL   Comp Metabolic Panel    Collection Time: 11/03/24  2:56 PM   Result Value Ref Range    Sodium 125 (L) 135 - 145 mmol/L    Potassium 3.9 3.6 - 5.5 mmol/L    Chloride 88 (L) 96 - 112 mmol/L    Co2 18 (L) 20 - 33 mmol/L    Anion Gap 19.0 (H) 7.0 - 16.0    Glucose 96 65 - 99 mg/dL    Bun 54 (H) 8 - 22 mg/dL    Creatinine 1.17 0.50 - 1.40 mg/dL    Calcium 9.1 8.5 - 10.5 mg/dL    Correct Calcium 10.2 8.5 - 10.5 mg/dL    AST(SGOT) 321 (H) 12 - 45 U/L    ALT(SGPT) 123 (H) 2 - 50 U/L    Alkaline Phosphatase 152 (H) 30 - 99 U/L    Total Bilirubin 6.5 (H) 0.1 - 1.5 mg/dL    Albumin 2.6 (L) 3.2 - 4.9 g/dL    Total Protein 6.6 6.0 - 8.2 g/dL    Globulin 4.0 (H) 1.9 - 3.5 g/dL    A-G Ratio 0.7 g/dL   CBC WITH DIFFERENTIAL    Collection Time: 11/03/24  2:56 PM   Result Value Ref Range    WBC 26.6 (H) 4.8 - 10.8 K/uL    RBC 4.22 4.20 - 5.40 M/uL    Hemoglobin 12.2 12.0 - 16.0 g/dL    Hematocrit 35.0 (L) 37.0 - 47.0 %    MCV 82.9 81.4 - 97.8 fL    MCH 28.9 27.0 - 33.0 pg    MCHC 34.9 32.2 -  35.5 g/dL    RDW 54.1 (H) 35.9 - 50.0 fL    Platelet Count 192 164 - 446 K/uL    MPV 9.8 9.0 - 12.9 fL    Neutrophils-Polys 87.50 (H) 44.00 - 72.00 %    Lymphocytes 1.60 (L) 22.00 - 41.00 %    Monocytes 9.20 0.00 - 13.40 %    Eosinophils 0.00 0.00 - 6.90 %    Basophils 0.00 0.00 - 1.80 %    Nucleated RBC 0.10 0.00 - 0.20 /100 WBC    Neutrophils (Absolute) 23.28 (H) 1.82 - 7.42 K/uL    Lymphs (Absolute) 0.43 (L) 1.00 - 4.80 K/uL    Monos (Absolute) 2.45 (H) 0.00 - 0.85 K/uL    Eos (Absolute) 0.00 0.00 - 0.51 K/uL    Baso (Absolute) 0.00 0.00 - 0.12 K/uL    NRBC (Absolute) 0.03 K/uL    Anisocytosis 1+     Macrocytosis 2+ (A)    ESTIMATED GFR    Collection Time: 11/03/24  2:56 PM   Result Value Ref Range    GFR (CKD-EPI) 54 (A) >60 mL/min/1.73 m 2   DIFFERENTIAL MANUAL    Collection Time: 11/03/24  2:56 PM   Result Value Ref Range    Myelocytes 1.70 %    Manual Diff Status PERFORMED    PERIPHERAL SMEAR REVIEW    Collection Time: 11/03/24  2:56 PM   Result Value Ref Range    Peripheral Smear Review see below    PLATELET ESTIMATE    Collection Time: 11/03/24  2:56 PM   Result Value Ref Range    Plt Estimation Normal    MORPHOLOGY    Collection Time: 11/03/24  2:56 PM   Result Value Ref Range    RBC Morphology Present     Poikilocytosis 1+     Target Cells 1+     Echinocytes 1+    EC-ECHOCARDIOGRAM COMPLETE W/O CONT    Collection Time: 11/03/24  8:06 PM   Result Value Ref Range    Eject.Frac. MOD BP 75.38     Eject.Frac. MOD 4C 73.14     Eject.Frac. MOD 2C 75.84     Left Ventrical Ejection Fraction 75    Basic Metabolic Panel    Collection Time: 11/03/24 10:30 PM   Result Value Ref Range    Sodium 129 (L) 135 - 145 mmol/L    Potassium 4.0 3.6 - 5.5 mmol/L    Chloride 92 (L) 96 - 112 mmol/L    Co2 21 20 - 33 mmol/L    Glucose 112 (H) 65 - 99 mg/dL    Bun 56 (H) 8 - 22 mg/dL    Creatinine 1.02 0.50 - 1.40 mg/dL    Calcium 9.3 8.5 - 10.5 mg/dL    Anion Gap 16.0 7.0 - 16.0   ESTIMATED GFR    Collection Time: 11/03/24 10:30 PM    Result Value Ref Range    GFR (CKD-EPI) 64 >60 mL/min/1.73 m 2   Heparin Anti-Xa    Collection Time: 11/04/24 12:42 AM   Result Value Ref Range    Heparin Xa (UFH) 0.23 IU/mL   CBC WITHOUT DIFFERENTIAL    Collection Time: 11/04/24  4:30 AM   Result Value Ref Range    WBC 29.8 (H) 4.8 - 10.8 K/uL    RBC 4.24 4.20 - 5.40 M/uL    Hemoglobin 12.5 12.0 - 16.0 g/dL    Hematocrit 35.7 (L) 37.0 - 47.0 %    MCV 84.2 81.4 - 97.8 fL    MCH 29.5 27.0 - 33.0 pg    MCHC 35.0 32.2 - 35.5 g/dL    RDW 55.2 (H) 35.9 - 50.0 fL    Platelet Count 197 164 - 446 K/uL    MPV 10.8 9.0 - 12.9 fL   Basic Metabolic Panel    Collection Time: 11/04/24  4:30 AM   Result Value Ref Range    Sodium 127 (L) 135 - 145 mmol/L    Potassium 4.0 3.6 - 5.5 mmol/L    Chloride 92 (L) 96 - 112 mmol/L    Co2 20 20 - 33 mmol/L    Glucose 118 (H) 65 - 99 mg/dL    Bun 56 (H) 8 - 22 mg/dL    Creatinine 1.30 0.50 - 1.40 mg/dL    Calcium 9.4 8.5 - 10.5 mg/dL    Anion Gap 15.0 7.0 - 16.0   ESTIMATED GFR    Collection Time: 11/04/24  4:30 AM   Result Value Ref Range    GFR (CKD-EPI) 48 (A) >60 mL/min/1.73 m 2   Heparin Xa (Unfractionated)    Collection Time: 11/04/24  8:45 AM   Result Value Ref Range    Heparin Xa (UFH) 0.29 IU/mL       Radiology Review:  EC-ECHOCARDIOGRAM COMPLETE W/O CONT   Final Result      CT-ABDOMEN-PELVIS W/O   Final Result      1.  Dilated fluid-filled small bowel and colonic loops favoring ileus.   2.  No evidence of bowel perforation.   3.  Hepatic cirrhosis with portal hypertension and moderate to large volume ascites.   4.  Nonocclusive superior mesenteric vein thrombosis.   5.  Cholelithiasis.   6.  Bibasilar atelectasis with minimal bilateral pleural fluid.   7.  Subtle findings concerning for pulmonary emboli.      These findings were electronically conveyed to JAMES WILLIAM on 11/3/2024 11:50 AM.         US-ABDOMEN COMPLETE SURVEY   Final Result      1.  Cirrhosis with stigmata of portal hypertension including splenomegaly,  recanalized umbilical vein and ascites.   2.  Gallbladder sludge and stones. Gallbladder wall thickening is nonspecific and could be related to liver disease but correlate clinically for evidence of acute cholecystitis. No biliary dilatation.         DX-CHEST-PORTABLE (1 VIEW)   Final Result      1.  Hazy left lower lobe opacity could represent atelectasis or pneumonitis.   2.  There is linear scarring or atelectasis in the right lung base.            MDM (Data Review):   -Records reviewed and summarized in current documentation  -I personally reviewed and interpreted the laboratory results  -I personally reviewed the radiology images    Assessment/Recommendations:  Acute liver failure- MELD 3.0- 38 on arrival (10/31/2024); 26.8% 90-day survival. Child-Garza Class C  SMV thrombosis  Portal hypertensive gastropathy  Ileus  Cirrhosis with ascites  SBP  Hepatic encephalopathy  Coagulopathy  Esophageal varices with banding's in situ  Hematemesis  Severe protein calorie malnutrition  History of decompensated liver disease with variceal bleeding, refractory ascites  Salmonella bacteremia  sepsis        Plan:  Continue octreotide 72 hours post EGD  Continue PPI IV twice daily  Continue antibiotics for SBP  Consider NG tube placement for decompression.   NPO due to ileus- change PO meds to IV/other routes  Change to Lactulose to enemas  Heparin for SMV thrombosis/ possible PE  Serial abdominal exams  Daily abd xrays  Recommend reaching out to transplant center for higher level of care- [facility] declined. Reached out to [facility] who will expand search.       Long term if patient survives admission:   Patient will need repeat EGD in 4-6 weeks  Nonselective beta-blocker as outpatient    GI will follow    Discussed with patient, nursing, Dr. Cardozo, Dr. Garcia      Core Quality Measures   Reviewed items::  Labs, Medications and Radiology reports reviewed

## 2024-11-04 NOTE — PROGRESS NOTES
[facility] report received, taking over patient cares. Patient in bed, appears to be sleeping without s/sx of distress. Equal and even chest rise and fall. Patient with bed alarm in place. O2 via mask and blow by, per report, patient not leaving oxygen mask on. Bed in lowest locked position at this time. Call light in reach. Will monitor.

## 2024-11-04 NOTE — ASSESSMENT & PLAN NOTE
11/10/2024  hypoNa vs HE vs sepsis or more likely components of both  Remain encephalopathic.  Have been agressively titrating up her HE meds and overnight she has started to have BMs  Treating sepsis  Significantly improved.

## 2024-11-04 NOTE — CARE PLAN
The patient is Watcher - Medium risk of patient condition declining or worsening    Shift Goals  Clinical Goals: hemodynamic stability, monitor BMPs  Patient Goals: sleep  Family Goals: pratibha    Progress made toward(s) clinical / shift goals:    Problem: Pain - Standard  Goal: Alleviation of pain or a reduction in pain to the patient’s comfort goal  Outcome: Progressing     Problem: Knowledge Deficit - Standard  Goal: Patient and family/care givers will demonstrate understanding of plan of care, disease process/condition, diagnostic tests and medications  Outcome: Progressing     Problem: Hemodynamics  Goal: Patient's hemodynamics, fluid balance and neurologic status will be stable or improve  Outcome: Progressing     Problem: Respiratory  Goal: Patient will achieve/maintain optimum respiratory ventilation and gas exchange  Outcome: Progressing     Problem: Physical Regulation  Goal: Diagnostic test results will improve  Outcome: Progressing  Goal: Signs and symptoms of infection will decrease  Outcome: Progressing     Problem: Skin Integrity  Goal: Skin integrity is maintained or improved  Outcome: Progressing     Problem: Fall Risk  Goal: Patient will remain free from falls  Outcome: Progressing       Patient is not progressing towards the following goals:N/A

## 2024-11-04 NOTE — CARE PLAN
The patient is Watcher - Medium risk of patient condition declining or worsening    Shift Goals  Clinical Goals: hemodynamic stability, monitor BMPs  Patient Goals: sleep  Family Goals: pratibha    Progress made toward(s) clinical / shift goals:  yes    Patient is not progressing towards the following goals:      Problem: Pain - Standard  Goal: Alleviation of pain or a reduction in pain to the patient’s comfort goal  Outcome: Not Progressing     Problem: Knowledge Deficit - Standard  Goal: Patient and family/care givers will demonstrate understanding of plan of care, disease process/condition, diagnostic tests and medications  Outcome: Not Progressing     Problem: Respiratory  Goal: Patient will achieve/maintain optimum respiratory ventilation and gas exchange  Outcome: Not Progressing     Problem: Pain - Standard  Goal: Alleviation of pain or a reduction in pain to the patient’s comfort goal  Outcome: Not Progressing     Problem: Knowledge Deficit - Standard  Goal: Patient and family/care givers will demonstrate understanding of plan of care, disease process/condition, diagnostic tests and medications  Outcome: Not Progressing     Problem: Respiratory  Goal: Patient will achieve/maintain optimum respiratory ventilation and gas exchange  Outcome: Not Progressing     Problem: Hemodynamics  Goal: Patient's hemodynamics, fluid balance and neurologic status will be stable or improve  Outcome: Progressing     Problem: Fluid Volume  Goal: Fluid volume balance will be maintained  Outcome: Progressing     Problem: Urinary - Renal Perfusion  Goal: Ability to achieve and maintain adequate renal perfusion and functioning will improve  Outcome: Progressing     Problem: Skin Integrity  Goal: Skin integrity is maintained or improved  Outcome: Progressing     Problem: Fall Risk  Goal: Patient will remain free from falls  Outcome: Progressing

## 2024-11-04 NOTE — PROGRESS NOTES
Hospital Medicine Daily Progress Note    Date of Service  11/4/2024    Chief Complaint  Kavita Freeman is a 59 y.o. female admitted 10/31/2024 with needs paracentesis    Hospital Course  58yo PMHx ETOH abuse sober x 4 months, alcoholic cirrhosis, esophageal varices s/p banding.  Pt presented requesting a paracentesis and with fever and chills.  In ED WBCs 45, Na 117, CO2 16, AG 20, creat 2.1.  Para done in ED; cloudy fluid noted with nucleated cells >34k    Interval Problem Update  ROS limited by confusion.      Sinus 90-100s  SBP 120s  On 6 LNC  AFebrile  UOP 1000ml/24hrs on lasix 40mg IV BID  Multiple BM's overnight    I have discussed this patient's plan of care and discharge plan at IDT rounds today with Case Management, Nursing, Nursing leadership, and other members of the IDT team.    Consultants/Specialty  GI    Code Status  DNAR/DNI    Disposition  Medically Cleared  I have placed the appropriate orders for post-discharge needs.    Review of Systems  Review of Systems   Unable to perform ROS: Mental status change   Gastrointestinal:  Positive for abdominal pain.        Physical Exam  Temp:  [36 °C (96.8 °F)-36.2 °C (97.1 °F)] 36 °C (96.8 °F)  Pulse:  [] 101  Resp:  [14-62] 57  BP: (108-156)/(61-91) 134/87  SpO2:  [90 %-95 %] 93 %    Physical Exam  Constitutional:       General: She is not in acute distress.     Appearance: Normal appearance. She is well-developed. She is cachectic. She is not diaphoretic.      Comments: Temporal wasting   HENT:      Head: Normocephalic and atraumatic.   Eyes:      General: Scleral icterus present.   Neck:      Vascular: JVD present.   Cardiovascular:      Rate and Rhythm: Normal rate and regular rhythm.      Heart sounds: Murmur heard.   Pulmonary:      Effort: Pulmonary effort is normal. No respiratory distress.      Breath sounds: No stridor. No wheezing or rales.   Abdominal:      General: There is distension.      Palpations: Abdomen is soft.      Tenderness:  There is abdominal tenderness. There is no guarding or rebound.      Comments: Soft  +fluid wave   Musculoskeletal:      Right lower leg: No edema.      Left lower leg: No edema.   Skin:     General: Skin is warm and dry.      Capillary Refill: Capillary refill takes less than 2 seconds.      Coloration: Skin is jaundiced.      Findings: No rash.   Neurological:      Comments: Pt is alert but O to self only  Does recognize her family when they arrive         Fluids    Intake/Output Summary (Last 24 hours) at 11/4/2024 0700  Last data filed at 11/4/2024 0600  Gross per 24 hour   Intake 0 ml   Output 1000 ml   Net -1000 ml        Laboratory  Recent Labs     11/02/24  0730 11/03/24  1456 11/04/24  0430   WBC  --  26.6* 29.8*   RBC  --  4.22 4.24   HEMOGLOBIN 11.0* 12.2 12.5   HEMATOCRIT 31.5* 35.0* 35.7*   MCV  --  82.9 84.2   MCH  --  28.9 29.5   MCHC  --  34.9 35.0   RDW  --  54.1* 55.2*   PLATELETCT  --  192 197   MPV  --  9.8 10.8     Recent Labs     11/03/24  1456 11/03/24  2230 11/04/24  0430   SODIUM 125* 129* 127*   POTASSIUM 3.9 4.0 4.0   CHLORIDE 88* 92* 92*   CO2 18* 21 20   GLUCOSE 96 112* 118*   BUN 54* 56* 56*   CREATININE 1.17 1.02 1.30   CALCIUM 9.1 9.3 9.4     Recent Labs     11/03/24  1456   APTT 47.4*   INR 2.35*               Imaging  EC-ECHOCARDIOGRAM COMPLETE W/O CONT   Final Result      CT-ABDOMEN-PELVIS W/O   Final Result      1.  Dilated fluid-filled small bowel and colonic loops favoring ileus.   2.  No evidence of bowel perforation.   3.  Hepatic cirrhosis with portal hypertension and moderate to large volume ascites.   4.  Nonocclusive superior mesenteric vein thrombosis.   5.  Cholelithiasis.   6.  Bibasilar atelectasis with minimal bilateral pleural fluid.   7.  Subtle findings concerning for pulmonary emboli.      These findings were electronically conveyed to JAMES WILLIAM on 11/3/2024 11:50 AM.         US-ABDOMEN COMPLETE SURVEY   Final Result      1.  Cirrhosis with stigmata  of portal hypertension including splenomegaly, recanalized umbilical vein and ascites.   2.  Gallbladder sludge and stones. Gallbladder wall thickening is nonspecific and could be related to liver disease but correlate clinically for evidence of acute cholecystitis. No biliary dilatation.         DX-CHEST-PORTABLE (1 VIEW)   Final Result      1.  Hazy left lower lobe opacity could represent atelectasis or pneumonitis.   2.  There is linear scarring or atelectasis in the right lung base.           Assessment/Plan  * Septic shock (HCC)- (present on admission)  Assessment & Plan  Present on admission  Source SBP  Gram Neg rods in blood, Salmonella species by PCR.  Sent to stat lab for fiinal ID and sensitivities   Second organism G+C neg for staph a.  Suspect contaminant  Cont Ceftriaxone  Have been able to titrate off of midodrine  Repeat cultures today  Repeat CT 11/3 did not show any perf, abscess etc  DW ID who agree with current treatment plan  Repeat blood cultures from 11/3 neg thus far    Superior mesenteric vein thrombosis (HCC)  Assessment & Plan  New finding on CT 11/3  DW GI  Start IV Heparin Xa protocol: 0.23 Xa level, running at 20units/Kg/hr and titrating up  Will review with [facility]    Acute encephalopathy  Assessment & Plan  hypoNa vs HE vs sepsis or more likely components of both  Na now in upper 120s  Have been agressively titrating up her HE meds and overnight she has started to have BMs  Treating sepsis    Severe protein-calorie malnutrition ([place]: less than 60% of standard weight) ([facility])  Assessment & Plan  Nutrition consult    Bacteremia due to Gram-negative bacteria  Assessment & Plan  Source SBP  S/p tap of 5000ml 10/31  Salmonella species based on PCR: sent to state lab for final ID and sensitivities  Check TTE  Repeat CT 11/3 did not show any focal infection/abscess/perf  Repeat cutlures 11/3 remain neg thus far  Ceftriaxone      Advance care planning- (present on admission)  Assessment &  Plan  DNR/I based on discussion with my partner    Hypoglycemia- (present on admission)  Assessment & Plan  Likely due to poor oral intake as patient reports reduced appetite, lack of glucneogenesis due to cirrhosis  -Patient also finished prednisolone 2 days ago, could be adrenal insufficiency  Reg diet  Nutrition consult  Close monitoring of BG    Transaminitis- (present on admission)  Assessment & Plan  Patient reports she already finished a 28-day course of prednisone at home  Likely worsening due to sepsis/SBP  Daily CMP  Ultrasound neg for PVT, did show cirrhosis. GB with some sludge and non specific wall thickening.  Can be normal in setting of ascites      High anion gap metabolic acidosis- (present on admission)  Assessment & Plan  Due to lactic acidosis likely from right liver disease and sepsis  IV thiamine  resolved    Hyponatremia- (present on admission)  Assessment & Plan  Sodium 117 on admission  Elena<20  UOsm>300  Hypervolemic hypoNa  Has responded to IV lasix and now in upper 120s  Change IV lasix to po and start spironolactone  BMP qday  Cont IMCU level of care as pt is high risk for over correction, Sz or neurologic deterioration    Acute renal failure (ARF) (HCC)- (present on admission)  Assessment & Plan  Creatinine 2.11 on admission  HRS vs sepsis vs hypovlemia  Of note pt had a paracentesis one week prior to presentation with no albumin  Good UOP   Likely due to sepsis  Avoid nephrotoxic medications  No evidence of obstruction on CT  Daily BMP  Creat now 1.3; still elevated given her muscle wasting    Alcoholic cirrhosis of liver with ascites (HCC)- (present on admission)  Assessment & Plan  F/b [facility]  ETOH induced, sober since June  Has been referred to [facility] transplant service with appointment in June  Based on lab from today MELD 32 on presentation  Status post paracentesis 5000ml 10/31  S/p albumin following tap  SBP based on fluid cell count  Rocephin  Trial off of  midodrine  No BM in 72hrs and more confused today.  Picture is complicated by possible ileus noted on CT  -cont rifaximin  -increase lactulose po from 45mg TID to q4   -start lactulose retention enema  [facility] declined transfer    11/4  After increasing lactulose yesterday has had multiple BMs overnight  DC IV lasix and start po 40mg qday  Start spironolactone 50mg qday    Depression with anxiety- (present on admission)  Assessment & Plan  Continue home fluoxetine         VTE prophylaxis: VTE Selection    I have performed a physical exam and reviewed and updated ROS and Plan today (11/4/2024). In review of yesterday's note (11/3/2024), there are no changes except as documented above.

## 2024-11-05 PROBLEM — R09.02 HYPOXIA: Status: ACTIVE | Noted: 2024-01-01

## 2024-11-05 PROBLEM — E87.20 LACTIC ACIDOSIS: Status: ACTIVE | Noted: 2024-01-01

## 2024-11-05 LAB
ALBUMIN SERPL BCP-MCNC: 2.6 G/DL (ref 3.2–4.9)
ALBUMIN/GLOB SERPL: 0.7 G/DL
ALP SERPL-CCNC: 153 U/L (ref 30–99)
ALT SERPL-CCNC: 89 U/L (ref 2–50)
AMMONIA PLAS-SCNC: 41 UMOL/L (ref 11–45)
ANION GAP SERPL CALC-SCNC: 19 MMOL/L (ref 7–16)
AST SERPL-CCNC: 193 U/L (ref 12–45)
BASE EXCESS BLDA CALC-SCNC: 0 MMOL/L (ref -4–3)
BILIRUB SERPL-MCNC: 8 MG/DL (ref 0.1–1.5)
BODY TEMPERATURE: 35.9 CENTIGRADE
BUN SERPL-MCNC: 61 MG/DL (ref 8–22)
CALCIUM ALBUM COR SERPL-MCNC: 10.6 MG/DL (ref 8.5–10.5)
CALCIUM SERPL-MCNC: 9.5 MG/DL (ref 8.5–10.5)
CHLORIDE SERPL-SCNC: 97 MMOL/L (ref 96–112)
CO2 SERPL-SCNC: 16 MMOL/L (ref 20–33)
CREAT SERPL-MCNC: 1.54 MG/DL (ref 0.5–1.4)
ERYTHROCYTE [DISTWIDTH] IN BLOOD BY AUTOMATED COUNT: 53.8 FL (ref 35.9–50)
GFR SERPLBLD CREATININE-BSD FMLA CKD-EPI: 39 ML/MIN/1.73 M 2
GLOBULIN SER CALC-MCNC: 4 G/DL (ref 1.9–3.5)
GLUCOSE SERPL-MCNC: 70 MG/DL (ref 65–99)
HCO3 BLDA-SCNC: 19 MMOL/L (ref 17–25)
HCT VFR BLD AUTO: 36.9 % (ref 37–47)
HGB BLD-MCNC: 12.8 G/DL (ref 12–16)
INHALED O2 FLOW RATE: ABNORMAL L/MIN
INR PPP: 3.64 (ref 0.87–1.13)
LACTATE SERPL-SCNC: 2.9 MMOL/L (ref 0.5–2)
LACTATE SERPL-SCNC: 3.9 MMOL/L (ref 0.5–2)
LACTATE SERPL-SCNC: 6.8 MMOL/L (ref 0.5–2)
LACTATE SERPL-SCNC: 8.3 MMOL/L (ref 0.5–2)
MAGNESIUM SERPL-MCNC: 2.3 MG/DL (ref 1.5–2.5)
MCH RBC QN AUTO: 28.7 PG (ref 27–33)
MCHC RBC AUTO-ENTMCNC: 34.7 G/DL (ref 32.2–35.5)
MCV RBC AUTO: 82.7 FL (ref 81.4–97.8)
PCO2 BLDA: 18.4 MMHG (ref 26–37)
PCO2 TEMP ADJ BLDA: 17.5 MMHG (ref 26–37)
PH BLDA: 7.62 [PH] (ref 7.4–7.5)
PH TEMP ADJ BLDA: 7.64 [PH] (ref 7.4–7.5)
PHOSPHATE SERPL-MCNC: 2.6 MG/DL (ref 2.5–4.5)
PLATELET # BLD AUTO: 180 K/UL (ref 164–446)
PMV BLD AUTO: 10.4 FL (ref 9–12.9)
PO2 BLDA: 25.4 MMHG (ref 64–87)
PO2 TEMP ADJ BLDA: 23.5 MMHG (ref 64–87)
POTASSIUM SERPL-SCNC: 4.2 MMOL/L (ref 3.6–5.5)
PROT SERPL-MCNC: 6.6 G/DL (ref 6–8.2)
PROTHROMBIN TIME: 36.5 SEC (ref 12–14.6)
RBC # BLD AUTO: 4.46 M/UL (ref 4.2–5.4)
SAO2 % BLDA: 54.3 % (ref 93–99)
SODIUM SERPL-SCNC: 132 MMOL/L (ref 135–145)
SODIUM UR-SCNC: <20 MMOL/L
UFH PPP CHRO-ACNC: 0.26 IU/ML
UFH PPP CHRO-ACNC: 0.32 IU/ML
UFH PPP CHRO-ACNC: 0.35 IU/ML
WBC # BLD AUTO: 28.9 K/UL (ref 4.8–10.8)

## 2024-11-05 PROCEDURE — 99291 CRITICAL CARE FIRST HOUR: CPT | Performed by: INTERNAL MEDICINE

## 2024-11-05 PROCEDURE — 700111 HCHG RX REV CODE 636 W/ 250 OVERRIDE (IP): Performed by: INTERNAL MEDICINE

## 2024-11-05 PROCEDURE — 770000 HCHG ROOM/CARE - INTERMEDIATE ICU *

## 2024-11-05 PROCEDURE — 80053 COMPREHEN METABOLIC PANEL: CPT

## 2024-11-05 PROCEDURE — 85520 HEPARIN ASSAY: CPT

## 2024-11-05 PROCEDURE — 700102 HCHG RX REV CODE 250 W/ 637 OVERRIDE(OP): Performed by: HOSPITALIST

## 2024-11-05 PROCEDURE — 84300 ASSAY OF URINE SODIUM: CPT

## 2024-11-05 PROCEDURE — 700111 HCHG RX REV CODE 636 W/ 250 OVERRIDE (IP): Mod: JZ | Performed by: INTERNAL MEDICINE

## 2024-11-05 PROCEDURE — 700111 HCHG RX REV CODE 636 W/ 250 OVERRIDE (IP): Performed by: HOSPITALIST

## 2024-11-05 PROCEDURE — 700101 HCHG RX REV CODE 250: Performed by: INTERNAL MEDICINE

## 2024-11-05 PROCEDURE — 84100 ASSAY OF PHOSPHORUS: CPT

## 2024-11-05 PROCEDURE — 99232 SBSQ HOSP IP/OBS MODERATE 35: CPT | Performed by: NURSE PRACTITIONER

## 2024-11-05 PROCEDURE — 700105 HCHG RX REV CODE 258: Performed by: INTERNAL MEDICINE

## 2024-11-05 PROCEDURE — 85027 COMPLETE CBC AUTOMATED: CPT

## 2024-11-05 PROCEDURE — A9270 NON-COVERED ITEM OR SERVICE: HCPCS | Performed by: HOSPITALIST

## 2024-11-05 PROCEDURE — G0480 DRUG TEST DEF 1-7 CLASSES: HCPCS

## 2024-11-05 PROCEDURE — 700105 HCHG RX REV CODE 258: Performed by: HOSPITALIST

## 2024-11-05 PROCEDURE — 700102 HCHG RX REV CODE 250 W/ 637 OVERRIDE(OP): Performed by: INTERNAL MEDICINE

## 2024-11-05 PROCEDURE — 85610 PROTHROMBIN TIME: CPT

## 2024-11-05 PROCEDURE — 82803 BLOOD GASES ANY COMBINATION: CPT

## 2024-11-05 PROCEDURE — A9270 NON-COVERED ITEM OR SERVICE: HCPCS | Performed by: INTERNAL MEDICINE

## 2024-11-05 PROCEDURE — 82140 ASSAY OF AMMONIA: CPT

## 2024-11-05 PROCEDURE — 83605 ASSAY OF LACTIC ACID: CPT | Mod: 91

## 2024-11-05 RX ORDER — THIAMINE HYDROCHLORIDE 100 MG/ML
100 INJECTION, SOLUTION INTRAMUSCULAR; INTRAVENOUS DAILY
Status: COMPLETED | OUTPATIENT
Start: 2024-11-05 | End: 2024-11-07

## 2024-11-05 RX ORDER — SODIUM CHLORIDE 9 MG/ML
INJECTION, SOLUTION INTRAVENOUS CONTINUOUS
Status: ACTIVE | OUTPATIENT
Start: 2024-11-05 | End: 2024-11-06

## 2024-11-05 RX ADMIN — OXYMETAZOLINE HYDROCHLORIDE 2 SPRAY: 0.5 SPRAY NASAL at 05:33

## 2024-11-05 RX ADMIN — OXYMETAZOLINE HYDROCHLORIDE 2 SPRAY: 0.5 SPRAY NASAL at 17:58

## 2024-11-05 RX ADMIN — LIDOCAINE 1 PATCH: 4 PATCH TOPICAL at 05:30

## 2024-11-05 RX ADMIN — RIFAXIMIN 550 MG: 550 TABLET ORAL at 05:12

## 2024-11-05 RX ADMIN — HEPARIN SODIUM 24 UNITS/KG/HR: 5000 INJECTION, SOLUTION INTRAVENOUS at 04:49

## 2024-11-05 RX ADMIN — HYDROMORPHONE HYDROCHLORIDE 0.25 MG: 1 INJECTION, SOLUTION INTRAMUSCULAR; INTRAVENOUS; SUBCUTANEOUS at 09:21

## 2024-11-05 RX ADMIN — SODIUM CHLORIDE: 9 INJECTION, SOLUTION INTRAVENOUS at 18:03

## 2024-11-05 RX ADMIN — FLUOXETINE HYDROCHLORIDE 10 MG: 10 CAPSULE ORAL at 05:12

## 2024-11-05 RX ADMIN — HEPARIN SODIUM 2500 UNITS: 1000 INJECTION, SOLUTION INTRAVENOUS; SUBCUTANEOUS at 03:46

## 2024-11-05 RX ADMIN — FUROSEMIDE 40 MG: 20 TABLET ORAL at 05:12

## 2024-11-05 RX ADMIN — SUCRALFATE ORAL 1 G: 1 SUSPENSION ORAL at 05:12

## 2024-11-05 RX ADMIN — PANTOPRAZOLE SODIUM 40 MG: 40 INJECTION, POWDER, FOR SOLUTION INTRAVENOUS at 05:12

## 2024-11-05 RX ADMIN — HYDROMORPHONE HYDROCHLORIDE 0.25 MG: 1 INJECTION, SOLUTION INTRAMUSCULAR; INTRAVENOUS; SUBCUTANEOUS at 19:59

## 2024-11-05 RX ADMIN — PANTOPRAZOLE SODIUM 40 MG: 40 INJECTION, POWDER, FOR SOLUTION INTRAVENOUS at 17:58

## 2024-11-05 RX ADMIN — THIAMINE HYDROCHLORIDE 100 MG: 100 INJECTION, SOLUTION INTRAMUSCULAR; INTRAVENOUS at 17:58

## 2024-11-05 RX ADMIN — CEFTRIAXONE SODIUM 2000 MG: 10 INJECTION, POWDER, FOR SOLUTION INTRAVENOUS at 05:13

## 2024-11-05 RX ADMIN — HYDROMORPHONE HYDROCHLORIDE 0.25 MG: 1 INJECTION, SOLUTION INTRAMUSCULAR; INTRAVENOUS; SUBCUTANEOUS at 13:26

## 2024-11-05 RX ADMIN — HEPARIN SODIUM 24 UNITS/KG/HR: 5000 INJECTION, SOLUTION INTRAVENOUS at 21:24

## 2024-11-05 RX ADMIN — SPIRONOLACTONE 50 MG: 25 TABLET ORAL at 05:13

## 2024-11-05 ASSESSMENT — PAIN DESCRIPTION - PAIN TYPE
TYPE: ACUTE PAIN

## 2024-11-05 ASSESSMENT — FIBROSIS 4 INDEX: FIB4 SCORE: 8.37

## 2024-11-05 NOTE — PROGRESS NOTES
Hospital Medicine Daily Progress Note    Date of Service  11/5/2024    Chief Complaint  Kavita Freeman is a 59 y.o. female admitted 10/31/2024 with needs paracentesis    Hospital Course  58yo PMHx ETOH abuse sober x 4 months, alcoholic cirrhosis, esophageal varices s/p banding.  Pt presented requesting a paracentesis and with fever and chills.  In ED WBCs 45, Na 117, CO2 16, AG 20, creat 2.1.  Para done in ED; cloudy fluid noted with nucleated cells >34k    Interval Problem Update    11/05/24    I evaluated and examined her at the bedside.  She was in respiratory distress and tachypneic.  I obtained ABG that showed significant hypoxia.  She also found to have severe lactic acidosis.  I discussed plan of care with intensivist Dr. Turcios.  Patient is DNR/DNI.  I reviewed her again at the bedside and discussed plan of care with patient's mother and explained her about her current medical condition.  I reevaluated her again at the bedside and discussed plan of care with patient's brother and sister-in-law and mother again.  Current oxygen saturation is 98%, current blood pressure is 158/89, pulse of 108.  I am continuing heparin gtt. and I am titrating as per Anti Xa levels and monitor for signs of bleeding.    Addendum 4:24 PM     I reevaluated her again at the bedside.  Now patient's son at the bedside discussed plan of care with him and regarding her current medical condition.  I discussed with him regarding advance care planning.  I have made him that he is currently DNR/DNI.  I gave both options that if family would like to change her CODE STATUS to full code and I also discussed about comfort care/hospice.  I updated him regarding multiorgan involvement and overall clinically not responding to treatment.      I have discussed this patient's plan of care and discharge plan at IDT rounds today with Case Management, Nursing, Nursing leadership, and other members of the IDT  team.    Consultants/Specialty  GI    Code Status  DNAR/DNI    Disposition  The patient is not medically cleared for discharge to home or a post-acute facility.      I have placed the appropriate orders for post-discharge needs.    Review of Systems  Review of Systems   Unable to perform ROS: Mental acuity        Physical Exam  Temp:  [37.5 °C (99.5 °F)] 37.5 °C (99.5 °F)  Pulse:  [] 108  Resp:  [13-64] 54  BP: ()/() 158/89  SpO2:  [89 %-99 %] 98 %    Physical Exam  Vitals reviewed.   Constitutional:       General: She is in acute distress.      Appearance: Normal appearance. She is ill-appearing.      Comments: Cachectic   HENT:      Head: Normocephalic and atraumatic.      Nose: No congestion.   Eyes:      General: Scleral icterus present.         Right eye: No discharge.         Left eye: No discharge.      Pupils: Pupils are equal, round, and reactive to light.   Cardiovascular:      Rate and Rhythm: Normal rate and regular rhythm.      Pulses: Normal pulses.      Heart sounds: Normal heart sounds. No murmur heard.  Pulmonary:      Effort: Respiratory distress present.      Breath sounds: No stridor.      Comments: Tachypneic  Abdominal:      General: Bowel sounds are normal. There is distension.      Palpations: Abdomen is soft.      Tenderness: There is no abdominal tenderness.   Musculoskeletal:         General: No swelling or tenderness. Normal range of motion.      Cervical back: Normal range of motion. No rigidity.   Skin:     General: Skin is warm.      Capillary Refill: Capillary refill takes less than 2 seconds.      Coloration: Skin is jaundiced. Skin is not pale.      Findings: No bruising.   Neurological:      General: No focal deficit present.      Mental Status: She is alert and oriented to person, place, and time.      Cranial Nerves: No cranial nerve deficit.   Psychiatric:         Mood and Affect: Mood normal.         Behavior: Behavior normal.         Fluids    Intake/Output  Summary (Last 24 hours) at 11/5/2024 0802  Last data filed at 11/5/2024 0748  Gross per 24 hour   Intake 183.59 ml   Output 1000 ml   Net -816.41 ml        Laboratory  Recent Labs     11/03/24  1456 11/04/24  0430   WBC 26.6* 29.8*   RBC 4.22 4.24   HEMOGLOBIN 12.2 12.5   HEMATOCRIT 35.0* 35.7*   MCV 82.9 84.2   MCH 28.9 29.5   MCHC 34.9 35.0   RDW 54.1* 55.2*   PLATELETCT 192 197   MPV 9.8 10.8     Recent Labs     11/03/24  1456 11/03/24  2230 11/04/24  0430   SODIUM 125* 129* 127*   POTASSIUM 3.9 4.0 4.0   CHLORIDE 88* 92* 92*   CO2 18* 21 20   GLUCOSE 96 112* 118*   BUN 54* 56* 56*   CREATININE 1.17 1.02 1.30   CALCIUM 9.1 9.3 9.4     Recent Labs     11/03/24  1456   APTT 47.4*   INR 2.35*               Imaging  EC-ECHOCARDIOGRAM COMPLETE W/O CONT   Final Result      CT-ABDOMEN-PELVIS W/O   Final Result      1.  Dilated fluid-filled small bowel and colonic loops favoring ileus.   2.  No evidence of bowel perforation.   3.  Hepatic cirrhosis with portal hypertension and moderate to large volume ascites.   4.  Nonocclusive superior mesenteric vein thrombosis.   5.  Cholelithiasis.   6.  Bibasilar atelectasis with minimal bilateral pleural fluid.   7.  Subtle findings concerning for pulmonary emboli.      These findings were electronically conveyed to JAMES WILLIAM on 11/3/2024 11:50 AM.         US-ABDOMEN COMPLETE SURVEY   Final Result      1.  Cirrhosis with stigmata of portal hypertension including splenomegaly, recanalized umbilical vein and ascites.   2.  Gallbladder sludge and stones. Gallbladder wall thickening is nonspecific and could be related to liver disease but correlate clinically for evidence of acute cholecystitis. No biliary dilatation.         DX-CHEST-PORTABLE (1 VIEW)   Final Result      1.  Hazy left lower lobe opacity could represent atelectasis or pneumonitis.   2.  There is linear scarring or atelectasis in the right lung base.           Assessment/Plan  * Septic shock (HCC)-  (present on admission)  Assessment & Plan  Present on admission  Source SBP  Gram Neg rods in blood, Salmonella species by PCR.  Sent to stat lab for fiinal ID and sensitivities   Second organism G+C neg for staph a.  Suspect contaminant  Cont Ceftriaxone  Have been able to titrate off of midodrine  Repeat cultures today  Repeat CT 11/3 did not show any perf, abscess etc  Previously it was discussed with ID who agree with current treatment plan  Repeat blood cultures negative to date.    Lactic acidosis  Assessment & Plan  She found to have severe lactic acidosis with lactic acid level of 8.3.    Hypoxia  Assessment & Plan  She found to have significant hypoxia on ABG her pO2 is 25.4.  She is DNR/DNI.    Superior mesenteric vein thrombosis (HCC)  Assessment & Plan  New finding on CT 11/3  GI is following her.  I am continuing heparin gtt. and I am titrating as per Anti Xa levels and monitor for signs of bleeding.      Acute encephalopathy  Assessment & Plan  hypoNa vs HE vs sepsis or more likely components of both  Na now in upper 120s  Have been agressively titrating up her HE meds and overnight she has started to have BMs  Treating sepsis  Continue to monitor closely.    Severe protein-calorie malnutrition ([place]: less than 60% of standard weight) (HCC)  Assessment & Plan  Nutrition consult    Bacteremia due to Gram-negative bacteria  Assessment & Plan  Source SBP  S/p tap of 5000ml 10/31  Salmonella species based on PCR: sent to state lab for final ID and sensitivities  Check TTE  Repeat CT 11/3 did not show any focal infection/abscess/perf  Repeat cutlures 11/3 remain neg thus far  Ceftriaxone      Advance care planning- (present on admission)  Assessment & Plan  Currently she is DNR/DNI and I had a lengthy discussion with patient's mother at the bedside and later with patient's brother sister-in-law.  I discussed problem by problem and explained them overall very guarded prognosis due to multiorgan  involvement.    Hypoglycemia- (present on admission)  Assessment & Plan  Likely due to poor oral intake as patient reports reduced appetite, lack of glucneogenesis due to cirrhosis  -Patient also finished prednisolone 2 days ago, could be adrenal insufficiency  Reg diet  Nutrition consult  Close monitoring of BG  Ordered CMP this morning.    Transaminitis- (present on admission)  Assessment & Plan  Patient reports she already finished a 28-day course of prednisone at home  Likely worsening due to sepsis/SBP  Ultrasound neg for PVT, did show cirrhosis. GB with some sludge and non specific wall thickening.  Can be normal in setting of ascites.  Ordered CMP this morning.      High anion gap metabolic acidosis- (present on admission)  Assessment & Plan  Due to lactic acidosis likely from right liver disease and sepsis  IV thiamine  resolved    Hyponatremia- (present on admission)  Assessment & Plan  Sodium 117 on admission  Elena<20  UOsm>300  Hypervolemic hypoNa  Has responded to IV lasix and now in upper 120s  Change IV lasix to po and start spironolactone  Last sodium was 127.  I ordered CMP this morning.    Acute renal failure (ARF) (HCC)- (present on admission)  Assessment & Plan  Creatinine 2.11 on admission  HRS vs sepsis vs hypovlemia  Of note pt had a paracentesis one week prior to presentation with no albumin  Good UOP   Likely due to sepsis  Avoid nephrotoxic medications  No evidence of obstruction on CT  Ordered CMP this morning.  Creat now 1.3; still elevated given her muscle wasting      Alcoholic cirrhosis of liver with ascites (HCC)- (present on admission)  Assessment & Plan  F/b [facility]  ETOH induced, sober since June  Has been referred to [facility] transplant service with appointment in June   MELD 32 on presentation  Status post paracentesis 5000ml 10/31  S/p albumin following tap  SBP based on fluid cell count  Rocephin  Trial off of midodrine  No BM in 72hrs and more confused today.   Picture is complicated by possible ileus noted on CT  -cont rifaximin  -increase lactulose po from 45mg TID to q4   -start lactulose retention enema  [facility] declined transfer    Continue lactulose  I am continue Lasix 40mg qday  Continue spironolactone 50mg qday    Depression with anxiety- (present on admission)  Assessment & Plan  Continue home fluoxetine        Patient is critically ill.   The patient continues to have: Respiratory distress with tachypnea and severe lactic acidosis.  Septic shock  The vital organ system that is affected is the: Pulmonary  If untreated there is a high chance of deterioration into: Cardiovascular collapse due to lactic acidosis and severe hypoxia on ABG  And eventually death.   The critical care that I am providing today is: I obtained a stat ABG and lactic acid.  She found to have severe hypoxia with pO2 of 25.4 on ABG.  I discussed with intensivist Dr. Lua at the bedside.  The critical that has been undertaken is medically complex.   There has been no overlap in critical care time.   Critical Care Time not including procedures: 39 minutes       VTE prophylaxis:   SCDs/TEDs      I have performed a physical exam and reviewed and updated ROS and Plan today (11/5/2024). In review of yesterday's note (11/4/2024), there are no changes except as documented above.

## 2024-11-05 NOTE — PROGRESS NOTES
Critical lab called regarding patient having a lactic of 8.3 and  PO2 of 25.4. Read back complete. MD notified.

## 2024-11-05 NOTE — ASSESSMENT & PLAN NOTE
Overall her mentation has improved significantly.  Change her CODE STATUS from DNR to full code on November 7, 2024.

## 2024-11-05 NOTE — ASSESSMENT & PLAN NOTE
11/10/2024  She found to have severe lactic acidosis with lactic acid level of 8.3.  Lactic acid level is trending down.  Now hemodynamically stable.

## 2024-11-05 NOTE — DISCHARGE PLANNING
Case Management Discharge Planning    Admission Date: 10/31/2024  GMLOS: 4.9  ALOS: 5    6-Clicks ADL Score: 12  6-Clicks Mobility Score: 14  PT and/or OT Eval ordered: Yes  Post-acute Referrals Ordered: No  Post-acute Choice Obtained: No  Has referral(s) been sent to post-acute provider:  No      Anticipated Discharge Dispo: Discharge Disposition: Discharged to home/self care (01)    DME Needed: No    Action(s) Taken: OTHER  Patient discussed in IDT rounds. Plan for goals of care discussion today, with likely transitioning to comfort care.    Escalations Completed: None    Medically Clear: No    Next Steps: Continue to follow for CM needs.    Barriers to Discharge: Medical clearance    Is the patient up for discharge tomorrow: No

## 2024-11-05 NOTE — CARE PLAN
The patient is Watcher - Medium risk of patient condition declining or worsening    Shift Goals  Clinical Goals: hemodynamics, monitor labs  Patient Goals: rest. comfort  Family Goals: pratibha    Progress made toward(s) clinical / shift goals:    Problem: Pain - Standard  Goal: Alleviation of pain or a reduction in pain to the patient’s comfort goal  Outcome: Progressing  Note: Pt educated to pain scale and pain assessed. Pt medicated per MAR with PRN medication. Non-pharmacological pain management techniques encouraged.      Problem: Knowledge Deficit - Standard  Goal: Patient and family/care givers will demonstrate understanding of plan of care, disease process/condition, diagnostic tests and medications  Outcome: Progressing  Note: Discuss and review POC with patient/family. Re-educate as needed.     Problem: Skin Integrity  Goal: Skin integrity is maintained or improved  Outcome: Progressing  Note: Assess skin and monitor for skin breakdown. Alleviate pressure to bony prominences and provide assistance with turning, repositioning, ROM and mobility as appropriate.      Problem: Fall Risk  Goal: Patient will remain free from falls  Outcome: Progressing       Patient is not progressing towards the following goals:

## 2024-11-05 NOTE — PROGRESS NOTES
Gastroenterology Progress Note               Author:  Oumou Mart APRN Date & Time Created: 11/5/2024 7:16 AM       Patient ID:  Name:             Kavita Freeman  YOB: 1965  Age:                 59 y.o.  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     History of Present Illness:   59 years old female with medical history of alcohol-related liver injury, cirrhosis, decompensation with variceal bleeding and refractory ascites formation, mild to moderate encephalopathy.  Presented to hospital this time for fluid accumulation, acute kidney injury.  GI team is consulted for recent GI bleeding     The patient has primary GI liver doctor in [place] and she is waiting for the transplantation evaluation at [facility] in the coming 2 months.  We do not have any records to support this plan, however the patient is very certain about the [facility] appointment.     For the bleeding, the patient had nausea vomiting in the last 2 days, the patient saw blood and also some coffee-ground like vomitus.  Tarry stool was noted.  Last upper GI scope August 15 with variceal bleeding and banding.     Interval History:  11/2/2024: Patient seen at bedside.  Awake, alert.  Discussed EGD findings with patient.  Inquired  regarding last EGD as, per chart review, last EGD was 2-1/2 months ago.  Patient unable to discern if she has had EGD since last documented.  Positive asterixis.  Denies bowel movements overnight.  Started on rifaximin and lactulose.  Hemoglobin stable.  Abdomen tender with light palpation.  No WBC this morning, but yesterday was 39. ultrasound abdomen pending.      11/3/2024: Patient seen at bedside.  Disoriented, unable to answer orientation questions.  Abdomen distended, significantly increase in tenderness this morning, hypoactive bowel sounds and tympany with percussion.  CT abdomen and CBC pending.  No bowel movements overnight.    Update 1222: CT abdomen with findings including fluid-filled small bowel and colonic loops favoring ileus, no evidence of bowel perforation, cirrhosis with portal hypertension, moderate to large volume ascites, nonocclusive SMV thrombosis, cholelithiasis, subtle findings concerning for pulmonary emboli.      11/4/2024: Patient seen at bedside.  Disoriented, oriented to self only.  Abdomen remains distended, tender with light palpation, hypoactive bowel sounds.  Patient with multiple brown bowel movements overnight.  Started on heparin yesterday.  WBC 29.8, hemoglobin stable at 12.5, platelets 197.  Sodium 127, BUN 56, creatinine improved to 1.3.  Albumin yesterday 2.6.  INR yesterday 2.35.  MELD 3.0 as of 11/3/2024 31    11/5/2024: Patient seen and examined.  Appears to be in distress, tachypneic, unable to participate in interview.  Grimaces when I touch her abdomen.  I was concerned and got Dr. Charles who then examined the patient.  We ordered KUB, chest x-ray, ABG, further labs.  3 BM last 24 hours.     Found to have significant lactic acidosis, INR 3.64 (difficult to interpret in the context of heparin drip), ABG with acute respiratory alkalosis.  Creatinine increasing to 1.54, T. bili 8.  KUB dilated loops of bowel suspicious for gastroenteritis or inflammatory changes.  Chest  x-ray with atelectasis.      Hospital Medications:  Current Facility-Administered Medications   Medication Dose Frequency Provider Last Rate Last Admin    furosemide (Lasix) tablet 40 mg  40 mg Q DAY Quang Durham D.O.   40 mg at 11/05/24 0512    spironolactone (Aldactone) tablet 50 mg  50 mg Q DAY Quang Durham D.O.   50 mg at 11/05/24 0513    oxymetazoline (Afrin) 0.05 % nasal spray 2 Spray  2 [place] BID ASTRID Brumfield.O.   2 [place] at 11/05/24 0533    cefTRIAXone (Rocephin) syringe 2,000 mg  2,000 mg Q24HRS ASTRID Brumfield.O.   2,000 mg at 11/05/24 0513    heparin infusion 25,000 units in 500 mL 0.45% NACL  0-30 Units/kg/hr Continuous ASTRID Brumfield.OSandy 30.1 mL/hr at 11/05/24 0449 24 Units/kg/hr at 11/05/24 0449    heparin injection 2,500 Units  40 Units/kg PRN ASTRID Brumfield.OSandy   2,500 Units at 11/05/24 0346    lactulose 20 GM/30ML solution 45 mL  45 mL 4X/DAY ASTRID Brumfield.O.   45 mL at 11/04/24 1322    riFAXIMin (Xifaxan) tablet 550 mg  550 mg BID ASTRID Brumfield.OSandy   550 mg at 11/05/24 0512    acetaminophen (Tylenol) tablet 650 mg  650 mg Q6HRS PRN Adilia Gonzales D.O.        Pharmacy Consult Request ...Pain Management Review 1 Each  1 Each PHARMACY TO DOSE Adilia Gonzales D.O.        oxyCODONE immediate-release (Roxicodone) tablet 2.5 mg  2.5 mg Q3HRS PRN TRUDY RazaO.   2.5 mg at 11/01/24 1230    Or    oxyCODONE immediate-release (Roxicodone) tablet 5 mg  5 mg Q3HRS PRN TRUDY RazaO.   5 mg at 11/03/24 0519    Or    HYDROmorphone (Dilaudid) injection 0.25 mg  0.25 mg Q3HRS PRN ASTRID Raaz.O.   0.25 mg at 10/31/24 2036    ondansetron (Zofran) syringe/vial injection 4 mg  4 mg Q4HRS PRN ASTRID Raza.O.   4 mg at 11/04/24 1019    ondansetron (Zofran ODT) dispertab 4 mg  4 mg Q4HRS PRN ASTRID Raza.O.        promethazine (Phenergan) tablet 12.5-25 mg  12.5-25 mg Q4HRS PRN TRUDY RazaO.   " 25 mg at 11/04/24 1116    promethazine (Phenergan) suppository 12.5-25 mg  12.5-25 mg Q4HRS PRN Adilia Gonzales D.O.        prochlorperazine (Compazine) injection 5-10 mg  5-10 mg Q4HRS PRN Adilia Gonzales, D.O.   10 mg at 11/04/24 1228    guaiFENesin dextromethorphan (Robitussin DM) 100-10 MG/5ML syrup 10 mL  10 mL Q6HRS PRN Adilia Gonzales, D.O.        pantoprazole (Protonix) injection 40 mg  40 mg BID Adilia Gonzales, D.O.   40 mg at 11/05/24 0512    dextrose 50% (D50W) injection 25 g  25 g Q15 MIN PRN Adilia Gonzales D.O.   25 g at 11/01/24 1843    FLUoxetine (PROzac) capsule 10 mg  10 mg QAM Adilia Gonzales, D.O.   10 mg at 11/05/24 0512    gabapentin (Neurontin) capsule 200 mg  200 mg Nightly Adilia Gonzales, D.O.   200 mg at 11/03/24 2212    hydrOXYzine HCl (Atarax) tablet 25 mg  25 mg TID PRN Adilia Gonzales D.O.        lidocaine (Asperflex) 4 % patch 1 Patch  1 Patch Q24HRS Adilia Gonzales D.O.   1 Patch at 11/05/24 0530    sucralfate (Carafate) 1 GM/10ML suspension 1 g  1 g Q6HRS Adilia Gonzales, D.O.   1 g at 11/05/24 0512    [Held by provider] midodrine (Proamatine) tablet 5 mg  5 mg TID WITH MEALS Adilia Gonzales D.O.   5 mg at 11/02/24 1805   Last reviewed on 11/1/2024  9:17 AM by Celine Bustamante R.N.       Review of Systems:  Review of Systems   Unable to perform ROS: Critical illness       Vital signs:  Weight/BMI: Body mass index is 21.52 kg/m².  BP (!) 158/89   Pulse (!) 108   Temp 37.5 °C (99.5 °F) (Bladder)   Resp (!) 54   Ht 1.727 m (5' 8\")   Wt 64.2 kg (141 lb 8.6 oz)   SpO2 98%   Vitals:    11/05/24 0200 11/05/24 0300 11/05/24 0400 11/05/24 0500   BP: 136/83 (!) 157/83 (!) 141/81 (!) 158/89   Pulse: (!) 103 (!) 112 (!) 105 (!) 108   Resp: 16 13 (!) 33 (!) 54   Temp:       TempSrc:       SpO2: 97% 96% 96% 98%   Weight:   64.2 kg (141 lb 8.6 oz)    Height:         Oxygen Therapy:  Pulse Oximetry: 98 %, O2 (LPM): 6, O2 Delivery Device: Oxymask    Intake/Output Summary (Last 24 hours) " at 11/5/2024 0716  Last data filed at 11/5/2024 0500  Gross per 24 hour   Intake 791.62 ml   Output 1100 ml   Net -308.38 ml       Physical Exam  Vitals and nursing note reviewed.   Constitutional:       General: She is in acute distress.      Appearance: She is cachectic. She is ill-appearing and toxic-appearing.   HENT:      Head: Normocephalic.      Nose: Nose normal. No congestion.      Mouth/Throat:      Mouth: Mucous membranes are dry.      Comments: Old dark blood in mouth  Eyes:      General: Scleral icterus present.      Extraocular Movements: Extraocular movements intact.      Conjunctiva/sclera: Conjunctivae normal.   Cardiovascular:      Rate and Rhythm: Normal rate and regular rhythm.      Pulses: Normal pulses.      Heart sounds: Murmur heard.   Pulmonary:      Effort: Respiratory distress present.   Abdominal:      Tenderness: There is abdominal tenderness.      Comments: +tympany with percussion, hypoactive bowel sounds     Musculoskeletal:      Right lower leg: No edema.      Left lower leg: No edema.   Skin:     General: Skin is warm and dry.      Capillary Refill: Capillary refill takes less than 2 seconds.      Coloration: Skin is jaundiced.   Neurological:      Mental Status: She is disoriented.      Motor: Weakness present.         Labs:  Recent Labs     11/03/24 1456 11/03/24 2230 11/04/24 0430   SODIUM 125* 129* 127*   POTASSIUM 3.9 4.0 4.0   CHLORIDE 88* 92* 92*   CO2 18* 21 20   BUN 54* 56* 56*   CREATININE 1.17 1.02 1.30   CALCIUM 9.1 9.3 9.4     Recent Labs     11/03/24 0408 11/03/24 1456 11/03/24 2230 11/04/24  0430   ALTSGPT 130* 123*  --   --    ASTSGOT 384* 321*  --   --    ALKPHOSPHAT 147* 152*  --   --    TBILIRUBIN 6.1* 6.5*  --   --    GLUCOSE 81 96 112* 118*     Recent Labs     11/03/24 0408 11/03/24 1456 11/04/24 0430   WBC  --  26.6* 29.8*   NEUTSPOLYS  --  87.50*  --    LYMPHOCYTES  --  1.60*  --    MONOCYTES  --  9.20  --    EOSINOPHILS  --  0.00  --    BASOPHILS  --   0.00  --    ASTSGOT 384* 321*  --    ALTSGPT 130* 123*  --    ALKPHOSPHAT 147* 152*  --    TBILIRUBIN 6.1* 6.5*  --      Recent Labs     11/03/24  1456 11/04/24  0430   RBC 4.22 4.24   HEMOGLOBIN 12.2 12.5   HEMATOCRIT 35.0* 35.7*   PLATELETCT 192 197   PROTHROMBTM 25.9*  --    APTT 47.4*  --    INR 2.35*  --      Recent Results (from the past 24 hours)   Heparin Xa (Unfractionated)    Collection Time: 11/04/24  8:45 AM   Result Value Ref Range    Heparin Xa (UFH) 0.29 IU/mL   Heparin Anti-Xa    Collection Time: 11/04/24  5:23 PM   Result Value Ref Range    Heparin Xa (UFH) 0.33 IU/mL   Heparin Xa (Unfractionated)    Collection Time: 11/05/24  2:21 AM   Result Value Ref Range    Heparin Xa (UFH) 0.26 IU/mL       Radiology Review:  EC-ECHOCARDIOGRAM COMPLETE W/O CONT   Final Result      CT-ABDOMEN-PELVIS W/O   Final Result      1.  Dilated fluid-filled small bowel and colonic loops favoring ileus.   2.  No evidence of bowel perforation.   3.  Hepatic cirrhosis with portal hypertension and moderate to large volume ascites.   4.  Nonocclusive superior mesenteric vein thrombosis.   5.  Cholelithiasis.   6.  Bibasilar atelectasis with minimal bilateral pleural fluid.   7.  Subtle findings concerning for pulmonary emboli.      These findings were electronically conveyed to JAMES WILLIAM on 11/3/2024 11:50 AM.         US-ABDOMEN COMPLETE SURVEY   Final Result      1.  Cirrhosis with stigmata of portal hypertension including splenomegaly, recanalized umbilical vein and ascites.   2.  Gallbladder sludge and stones. Gallbladder wall thickening is nonspecific and could be related to liver disease but correlate clinically for evidence of acute cholecystitis. No biliary dilatation.         DX-CHEST-PORTABLE (1 VIEW)   Final Result      1.  Hazy left lower lobe opacity could represent atelectasis or pneumonitis.   2.  There is linear scarring or atelectasis in the right lung base.            MDM (Data Review):    -Records reviewed and summarized in current documentation  -I personally reviewed and interpreted the laboratory results  -I personally reviewed the radiology images    Assessment/Recommendations:  Acute liver failure- MELD 3.0- 38 on arrival (10/31/2024); 26.8% 90-day survival. Child-Garza Class C  SMV thrombosis  Portal hypertensive gastropathy  Ileus  Cirrhosis with ascites  SBP  Hepatic encephalopathy  Coagulopathy  Esophageal varices with banding in situ  Hematemesis  Severe protein calorie malnutrition  History of decompensated liver disease with variceal bleeding, refractory ascites  Salmonella bacteremia - pending review by State lab  Sepsis  EFRAIN HRS  Respiratory alkalosis    Plan:  Significant clinical decompensation overnight.  Consistent with multiorgan failure.  Discussed with [facility] hepatology who have agreed to accept pending ICU acceptance.  Attended lengthy bedside discussion with family in collaboration with Dr. Solorzano.  Randa shows DNR/DNI when she was alert.  Significant concern that she would not be able to transfer to [place] without being intubated  Family discussing amongst themselves regarding next steps: reverse DNI/DNR and pursue transfer vs comfort care vs home with hospice    Completed 72 hours Octreotide  Low sodium diet  Continue PPI IV twice daily  Continue ceftriaxone for SBP and salmonella bacteremia  Continue lactulose and Rifaximin    Heparin for SMV thrombosis/ possible PE  Serial abdominal exams    Long term if patient survives admission:   Patient will need repeat EGD in 4-6 weeks  Nonselective beta-blocker as outpatient    GI will follow    Discussed with patient' family, nursing, Dr. Solorzano, Dr. Hyde    ..Oumou Mart, CHANDANA,  APRN      Core Quality Measures   Reviewed items::  Labs, Medications and Radiology reports reviewed

## 2024-11-05 NOTE — PROGRESS NOTES
Assumed care of patient. Patient A n O to self only. Patient is restless in bed. Patient on monitors. VSS. Patient medicated per MAR for pain. Fall precautions place. Bed alarm on.

## 2024-11-05 NOTE — CARE PLAN
The patient is Watcher - Medium risk of patient condition declining or worsening    Shift Goals  Clinical Goals: vitals stable, labs stable,  Patient Goals: sleep, comfort  Family Goals: pratibha    Progress made toward(s) clinical / shift goals:  yes    Patient is not progressing towards the following goals:      Problem: Knowledge Deficit - Standard  Goal: Patient and family/care givers will demonstrate understanding of plan of care, disease process/condition, diagnostic tests and medications  Outcome: Not Progressing     Problem: Fluid Volume  Goal: Fluid volume balance will be maintained  Outcome: Not Progressing     Problem: Respiratory  Goal: Patient will achieve/maintain optimum respiratory ventilation and gas exchange  Outcome: Not Progressing     Problem: Skin Integrity  Goal: Skin integrity is maintained or improved  Outcome: Not Progressing     Problem: Knowledge Deficit - Standard  Goal: Patient and family/care givers will demonstrate understanding of plan of care, disease process/condition, diagnostic tests and medications  Outcome: Not Progressing     Problem: Fluid Volume  Goal: Fluid volume balance will be maintained  Outcome: Not Progressing     Problem: Respiratory  Goal: Patient will achieve/maintain optimum respiratory ventilation and gas exchange  Outcome: Not Progressing     Problem: Skin Integrity  Goal: Skin integrity is maintained or improved  Outcome: Not Progressing     Problem: Pain - Standard  Goal: Alleviation of pain or a reduction in pain to the patient’s comfort goal  Outcome: Progressing     Problem: Hemodynamics  Goal: Patient's hemodynamics, fluid balance and neurologic status will be stable or improve  Outcome: Progressing     Problem: Urinary - Renal Perfusion  Goal: Ability to achieve and maintain adequate renal perfusion and functioning will improve  Outcome: Progressing     Problem: Fall Risk  Goal: Patient will remain free from falls  Outcome: Progressing

## 2024-11-06 LAB
ALBUMIN SERPL BCP-MCNC: 2.4 G/DL (ref 3.2–4.9)
ALBUMIN/GLOB SERPL: 0.6 G/DL
ALP SERPL-CCNC: 143 U/L (ref 30–99)
ALT SERPL-CCNC: 72 U/L (ref 2–50)
ANION GAP SERPL CALC-SCNC: 13 MMOL/L (ref 7–16)
APPEARANCE UR: CLEAR
AST SERPL-CCNC: 153 U/L (ref 12–45)
BACTERIA #/AREA URNS HPF: ABNORMAL /HPF
BILIRUB SERPL-MCNC: 9.8 MG/DL (ref 0.1–1.5)
BILIRUB UR QL STRIP.AUTO: ABNORMAL
BUN SERPL-MCNC: 68 MG/DL (ref 8–22)
CALCIUM ALBUM COR SERPL-MCNC: 10.9 MG/DL (ref 8.5–10.5)
CALCIUM SERPL-MCNC: 9.6 MG/DL (ref 8.5–10.5)
CASTS URNS QL MICRO: ABNORMAL /LPF (ref 0–2)
CHLORIDE SERPL-SCNC: 102 MMOL/L (ref 96–112)
CO2 SERPL-SCNC: 20 MMOL/L (ref 20–33)
COLOR UR: ABNORMAL
CREAT SERPL-MCNC: 1.63 MG/DL (ref 0.5–1.4)
EPITHELIAL CELLS 1715: ABNORMAL /HPF (ref 0–5)
ERYTHROCYTE [DISTWIDTH] IN BLOOD BY AUTOMATED COUNT: 58.4 FL (ref 35.9–50)
GFR SERPLBLD CREATININE-BSD FMLA CKD-EPI: 36 ML/MIN/1.73 M 2
GLOBULIN SER CALC-MCNC: 3.7 G/DL (ref 1.9–3.5)
GLUCOSE SERPL-MCNC: 81 MG/DL (ref 65–99)
GLUCOSE UR STRIP.AUTO-MCNC: NEGATIVE MG/DL
HCT VFR BLD AUTO: 34.7 % (ref 37–47)
HGB BLD-MCNC: 11.5 G/DL (ref 12–16)
KETONES UR STRIP.AUTO-MCNC: NEGATIVE MG/DL
LEUKOCYTE ESTERASE UR QL STRIP.AUTO: ABNORMAL
MAGNESIUM SERPL-MCNC: 2.4 MG/DL (ref 1.5–2.5)
MCH RBC QN AUTO: 28.6 PG (ref 27–33)
MCHC RBC AUTO-ENTMCNC: 33.1 G/DL (ref 32.2–35.5)
MCV RBC AUTO: 86.3 FL (ref 81.4–97.8)
MICRO URNS: ABNORMAL
NITRITE UR QL STRIP.AUTO: NEGATIVE
PH UR STRIP.AUTO: 5.5 [PH] (ref 5–8)
PHOSPHATE SERPL-MCNC: 4.8 MG/DL (ref 2.5–4.5)
PLATELET # BLD AUTO: 163 K/UL (ref 164–446)
PMV BLD AUTO: 10.2 FL (ref 9–12.9)
POTASSIUM SERPL-SCNC: 3.8 MMOL/L (ref 3.6–5.5)
PROT SERPL-MCNC: 6.1 G/DL (ref 6–8.2)
PROT UR QL STRIP: NEGATIVE MG/DL
RBC # BLD AUTO: 4.02 M/UL (ref 4.2–5.4)
RBC # URNS HPF: ABNORMAL /HPF (ref 0–2)
RBC UR QL AUTO: ABNORMAL
SODIUM SERPL-SCNC: 135 MMOL/L (ref 135–145)
SP GR UR STRIP.AUTO: 1.01
UFH PPP CHRO-ACNC: 0.34 IU/ML
UROBILINOGEN UR STRIP.AUTO-MCNC: 1 EU/DL
WBC # BLD AUTO: 36.6 K/UL (ref 4.8–10.8)
WBC #/AREA URNS HPF: ABNORMAL /HPF

## 2024-11-06 PROCEDURE — 770000 HCHG ROOM/CARE - INTERMEDIATE ICU *

## 2024-11-06 PROCEDURE — 99232 SBSQ HOSP IP/OBS MODERATE 35: CPT | Performed by: NURSE PRACTITIONER

## 2024-11-06 PROCEDURE — 700111 HCHG RX REV CODE 636 W/ 250 OVERRIDE (IP): Mod: JZ | Performed by: INTERNAL MEDICINE

## 2024-11-06 PROCEDURE — A9270 NON-COVERED ITEM OR SERVICE: HCPCS | Performed by: INTERNAL MEDICINE

## 2024-11-06 PROCEDURE — 700102 HCHG RX REV CODE 250 W/ 637 OVERRIDE(OP): Performed by: INTERNAL MEDICINE

## 2024-11-06 PROCEDURE — 84100 ASSAY OF PHOSPHORUS: CPT

## 2024-11-06 PROCEDURE — A9270 NON-COVERED ITEM OR SERVICE: HCPCS | Performed by: HOSPITALIST

## 2024-11-06 PROCEDURE — 92610 EVALUATE SWALLOWING FUNCTION: CPT

## 2024-11-06 PROCEDURE — 99497 ADVNCD CARE PLAN 30 MIN: CPT | Performed by: INTERNAL MEDICINE

## 2024-11-06 PROCEDURE — 700111 HCHG RX REV CODE 636 W/ 250 OVERRIDE (IP): Performed by: HOSPITALIST

## 2024-11-06 PROCEDURE — 99233 SBSQ HOSP IP/OBS HIGH 50: CPT | Mod: 25 | Performed by: INTERNAL MEDICINE

## 2024-11-06 PROCEDURE — P9047 ALBUMIN (HUMAN), 25%, 50ML: HCPCS | Mod: JZ | Performed by: INTERNAL MEDICINE

## 2024-11-06 PROCEDURE — 700102 HCHG RX REV CODE 250 W/ 637 OVERRIDE(OP): Performed by: HOSPITALIST

## 2024-11-06 PROCEDURE — 700111 HCHG RX REV CODE 636 W/ 250 OVERRIDE (IP): Performed by: INTERNAL MEDICINE

## 2024-11-06 PROCEDURE — 700101 HCHG RX REV CODE 250: Performed by: INTERNAL MEDICINE

## 2024-11-06 PROCEDURE — 83735 ASSAY OF MAGNESIUM: CPT

## 2024-11-06 PROCEDURE — 700105 HCHG RX REV CODE 258: Performed by: INTERNAL MEDICINE

## 2024-11-06 PROCEDURE — 85520 HEPARIN ASSAY: CPT

## 2024-11-06 PROCEDURE — 80053 COMPREHEN METABOLIC PANEL: CPT

## 2024-11-06 PROCEDURE — 81001 URINALYSIS AUTO W/SCOPE: CPT

## 2024-11-06 PROCEDURE — 85027 COMPLETE CBC AUTOMATED: CPT

## 2024-11-06 RX ORDER — ALBUMIN (HUMAN) 12.5 G/50ML
75 SOLUTION INTRAVENOUS DAILY
Status: DISCONTINUED | OUTPATIENT
Start: 2024-11-06 | End: 2024-11-06

## 2024-11-06 RX ORDER — SODIUM CHLORIDE 9 MG/ML
INJECTION, SOLUTION INTRAVENOUS CONTINUOUS
Status: ACTIVE | OUTPATIENT
Start: 2024-11-06 | End: 2024-11-07

## 2024-11-06 RX ORDER — ALBUMIN (HUMAN) 12.5 G/50ML
62.5 SOLUTION INTRAVENOUS EVERY EVENING
Status: COMPLETED | OUTPATIENT
Start: 2024-11-06 | End: 2024-11-07

## 2024-11-06 RX ADMIN — RIFAXIMIN 550 MG: 550 TABLET ORAL at 17:28

## 2024-11-06 RX ADMIN — SUCRALFATE ORAL 1 G: 1 SUSPENSION ORAL at 05:30

## 2024-11-06 RX ADMIN — SUCRALFATE ORAL 1 G: 1 SUSPENSION ORAL at 11:26

## 2024-11-06 RX ADMIN — CEFTRIAXONE SODIUM 2000 MG: 10 INJECTION, POWDER, FOR SOLUTION INTRAVENOUS at 05:30

## 2024-11-06 RX ADMIN — HYDROMORPHONE HYDROCHLORIDE 0.25 MG: 1 INJECTION, SOLUTION INTRAMUSCULAR; INTRAVENOUS; SUBCUTANEOUS at 03:11

## 2024-11-06 RX ADMIN — PANTOPRAZOLE SODIUM 40 MG: 40 INJECTION, POWDER, FOR SOLUTION INTRAVENOUS at 05:30

## 2024-11-06 RX ADMIN — SODIUM CHLORIDE: 9 INJECTION, SOLUTION INTRAVENOUS at 09:13

## 2024-11-06 RX ADMIN — OXYMETAZOLINE HYDROCHLORIDE 2 SPRAY: 0.5 SPRAY NASAL at 17:29

## 2024-11-06 RX ADMIN — LACTULOSE 45 ML: 10 SOLUTION ORAL at 11:26

## 2024-11-06 RX ADMIN — SUCRALFATE ORAL 1 G: 1 SUSPENSION ORAL at 17:28

## 2024-11-06 RX ADMIN — GABAPENTIN 200 MG: 100 CAPSULE ORAL at 21:28

## 2024-11-06 RX ADMIN — ALBUMIN (HUMAN) 62.5 G: 0.25 INJECTION, SOLUTION INTRAVENOUS at 17:33

## 2024-11-06 RX ADMIN — LIDOCAINE 1 PATCH: 4 PATCH TOPICAL at 05:30

## 2024-11-06 RX ADMIN — HEPARIN SODIUM 24 UNITS/KG/HR: 5000 INJECTION, SOLUTION INTRAVENOUS at 13:52

## 2024-11-06 RX ADMIN — OXYMETAZOLINE HYDROCHLORIDE 2 SPRAY: 0.5 SPRAY NASAL at 05:30

## 2024-11-06 RX ADMIN — THIAMINE HYDROCHLORIDE 100 MG: 100 INJECTION, SOLUTION INTRAMUSCULAR; INTRAVENOUS at 05:30

## 2024-11-06 RX ADMIN — SUCRALFATE ORAL 1 G: 1 SUSPENSION ORAL at 23:31

## 2024-11-06 RX ADMIN — PANTOPRAZOLE SODIUM 40 MG: 40 INJECTION, POWDER, FOR SOLUTION INTRAVENOUS at 17:28

## 2024-11-06 ASSESSMENT — PAIN DESCRIPTION - PAIN TYPE
TYPE: ACUTE PAIN

## 2024-11-06 ASSESSMENT — FIBROSIS 4 INDEX: FIB4 SCORE: 6.48

## 2024-11-06 NOTE — CARE PLAN
The patient is Unstable - High likelihood or risk of patient condition declining or worsening    Shift Goals  Clinical Goals: monitor vitals, monitor labs  Patient Goals: comfort, rest  Family Goals: pratibha    Progress made toward(s) clinical / shift goals:  yes    Patient is not progressing towards the following goals:      Problem: Knowledge Deficit - Standard  Goal: Patient and family/care givers will demonstrate understanding of plan of care, disease process/condition, diagnostic tests and medications  Outcome: Not Progressing     Problem: Skin Integrity  Goal: Skin integrity is maintained or improved  Outcome: Not Progressing     Problem: Knowledge Deficit - Standard  Goal: Patient and family/care givers will demonstrate understanding of plan of care, disease process/condition, diagnostic tests and medications  Outcome: Not Progressing     Problem: Skin Integrity  Goal: Skin integrity is maintained or improved  Outcome: Not Progressing     Problem: Pain - Standard  Goal: Alleviation of pain or a reduction in pain to the patient’s comfort goal  Outcome: Progressing     Problem: Hemodynamics  Goal: Patient's hemodynamics, fluid balance and neurologic status will be stable or improve  Outcome: Progressing     Problem: Fluid Volume  Goal: Fluid volume balance will be maintained  Outcome: Progressing     Problem: Urinary - Renal Perfusion  Goal: Ability to achieve and maintain adequate renal perfusion and functioning will improve  Outcome: Progressing     Problem: Respiratory  Goal: Patient will achieve/maintain optimum respiratory ventilation and gas exchange  Outcome: Progressing     Problem: Mechanical Ventilation  Goal: Safe management of artificial airway and ventilation  Outcome: Progressing  Goal: Successful weaning off mechanical ventilator, spontaneously maintains adequate gas exchange  Outcome: Progressing  Goal: Patient will be able to express needs and understand communication  Outcome: Progressing      Problem: Physical Regulation  Goal: Diagnostic test results will improve  Outcome: Progressing  Goal: Signs and symptoms of infection will decrease  Outcome: Progressing     Problem: Fall Risk  Goal: Patient will remain free from falls  Outcome: Progressing

## 2024-11-06 NOTE — CARE PLAN
The patient is Watcher - Medium risk of patient condition declining or worsening    Shift Goals  Clinical Goals: monitor labs, GOC discussion, increased PO  Patient Goals: drink juice  Family Goals: stay updated    Progress made toward(s) clinical / shift goals:      Problem: Pain - Standard  Goal: Alleviation of pain or a reduction in pain to the patient’s comfort goal  Outcome: Progressing  Note: Pain mgmt with repositioning, pt declining pain medication.     Problem: Knowledge Deficit - Standard  Goal: Patient and family/care givers will demonstrate understanding of plan of care, disease process/condition, diagnostic tests and medications  Outcome: Progressing  Note: Increased wakefulness today, SLP cleared for soft/bite sized diet. Improved PO fluid intake. Cont'd pagan strict I+O per MD order. Weaned to room air. X1 soft BM today. Added on IVF.

## 2024-11-06 NOTE — PROGRESS NOTES
Hospital Medicine Daily Progress Note    Date of Service  11/6/2024    Chief Complaint  Kavita Freeman is a 59 y.o. female admitted 10/31/2024 with needs paracentesis    Hospital Course  60yo PMHx ETOH abuse sober x 4 months, alcoholic cirrhosis, esophageal varices s/p banding.  Pt presented requesting a paracentesis and with fever and chills.  In ED WBCs 45, Na 117, CO2 16, AG 20, creat 2.1.  Para done in ED; cloudy fluid noted with nucleated cells >34k    Interval Problem Update    11/05/24    I evaluated and examined her at the bedside.  She was in respiratory distress and tachypneic.  I obtained ABG that showed significant hypoxia.  She also found to have severe lactic acidosis.  I discussed plan of care with intensivist Dr. Turcios.  Patient is DNR/DNI.  I reviewed her again at the bedside and discussed plan of care with patient's mother and explained her about her current medical condition.  I reevaluated her again at the bedside and discussed plan of care with patient's brother and sister-in-law and mother again.  Current oxygen saturation is 98%, current blood pressure is 158/89, pulse of 108.  I am continuing heparin gtt. and I am titrating as per Anti Xa levels and monitor for signs of bleeding.    Addendum 4:24 PM     I reevaluated her again at the bedside.  Now patient's son at the bedside discussed plan of care with him and regarding her current medical condition.  I discussed with him regarding advance care planning.  I have made him that he is currently DNR/DNI.  I gave both options that if family would like to change her CODE STATUS to full code and I also discussed about comfort care/hospice.  I updated him regarding multiorgan involvement and overall clinically not responding to treatment.    11/06/24    I evaluated and examined her at the bedside.  Clinically she looks slightly more alert and she is able to follow commands but she remains confused.  Currently she is requiring a liter of oxygen  via OxyMask to maintain oxygen saturation.  CMP showed BUN of 68 and creatinine of 1.63.  I am continuing IV fluid.  Liver enzymes remain elevated total bilirubin of 9.8.  Lactic acid level is trending down last lactic acid was 2.9.  Repeat blood culture negative to date  I started on albumin due to concern for hepatorenal syndrome.    I evaluated her again at the bedside this morning and discussed plan of care with patient's son at the bedside and updated him regarding plan of care.  I explained him difference between full code and DNR.  I also explained him that family can change her CODE STATUS if they would like to as patient cannot participate in advance care planning at this time.    Later this afternoon I met with several family members at the bedside and admitted them regarding her current medical condition and discussed with them regarding CODE STATUS also updated them regarding overall poor prognosis as she has multiorgan involvement.  Family expressed understanding.      I have discussed this patient's plan of care and discharge plan at IDT rounds today with Case Management, Nursing, Nursing leadership, and other members of the IDT team.    Consultants/Specialty  GI    Code Status  DNAR/DNI    Disposition  The patient is not medically cleared for discharge to home or a post-acute facility.      I have placed the appropriate orders for post-discharge needs.    Review of Systems  Review of Systems   Unable to perform ROS: Mental acuity        Physical Exam  Pulse:  [] 104  Resp:  [13-40] 17  BP: (110-137)/(61-84) 117/62  SpO2:  [91 %-98 %] 97 %    Physical Exam  Vitals reviewed.   Constitutional:       General: She is in acute distress.      Appearance: She is ill-appearing.      Comments: Cachectic  Slightly more alert this morning and she is following commands.   HENT:      Head: Normocephalic and atraumatic.      Nose: No congestion.   Eyes:      General: Scleral icterus present.         Right eye:  No discharge.         Left eye: No discharge.      Pupils: Pupils are equal, round, and reactive to light.   Cardiovascular:      Rate and Rhythm: Normal rate and regular rhythm.      Pulses: Normal pulses.      Heart sounds: Normal heart sounds. No murmur heard.  Pulmonary:      Effort: Respiratory distress present.      Breath sounds: No stridor.      Comments: Tachypneic  Abdominal:      General: Bowel sounds are normal. There is distension.      Palpations: Abdomen is soft.      Tenderness: There is no abdominal tenderness.   Musculoskeletal:         General: No swelling or tenderness. Normal range of motion.      Cervical back: Normal range of motion. No rigidity.   Skin:     General: Skin is warm.      Capillary Refill: Capillary refill takes less than 2 seconds.      Coloration: Skin is jaundiced. Skin is not pale.      Findings: No bruising.   Neurological:      General: No focal deficit present.      Cranial Nerves: No cranial nerve deficit.      Comments: She looks slightly more alert this morning and she is able to follow commands.   Psychiatric:         Mood and Affect: Mood normal.         Behavior: Behavior normal.         Fluids    Intake/Output Summary (Last 24 hours) at 11/6/2024 0821  Last data filed at 11/5/2024 2000  Gross per 24 hour   Intake 0 ml   Output 500 ml   Net -500 ml        Laboratory  Recent Labs     11/04/24  0430 11/05/24  0846 11/06/24  0431   WBC 29.8* 28.9* 36.6*   RBC 4.24 4.46 4.02*   HEMOGLOBIN 12.5 12.8 11.5*   HEMATOCRIT 35.7* 36.9* 34.7*   MCV 84.2 82.7 86.3   MCH 29.5 28.7 28.6   MCHC 35.0 34.7 33.1   RDW 55.2* 53.8* 58.4*   PLATELETCT 197 180 163*   MPV 10.8 10.4 10.2     Recent Labs     11/04/24  0430 11/05/24  0846 11/06/24  0431   SODIUM 127* 132* 135   POTASSIUM 4.0 4.2 3.8   CHLORIDE 92* 97 102   CO2 20 16* 20   GLUCOSE 118* 70 81   BUN 56* 61* 68*   CREATININE 1.30 1.54* 1.63*   CALCIUM 9.4 9.5 9.6     Recent Labs     11/03/24  1456 11/05/24  1358   APTT 47.4*  --     INR 2.35* 3.64*               Imaging  DX-CHEST-PORTABLE (1 VIEW)   Final Result      1.  No acute cardiac or pulmonary abnormalities are identified.      2.  Slight left basilar atelectasis.      [id number] VIEW   Final Result      1.  Several markedly dilated loops of small bowel in the mid abdomen suspicious for gastroenteritis or inflammatory change. Recommend follow-up abdominal series to rule out evolving small bowel obstruction.      EC-ECHOCARDIOGRAM COMPLETE W/O CONT   Final Result      CT-ABDOMEN-PELVIS W/O   Final Result      1.  Dilated fluid-filled small bowel and colonic loops favoring ileus.   2.  No evidence of bowel perforation.   3.  Hepatic cirrhosis with portal hypertension and moderate to large volume ascites.   4.  Nonocclusive superior mesenteric vein thrombosis.   5.  Cholelithiasis.   6.  Bibasilar atelectasis with minimal bilateral pleural fluid.   7.  Subtle findings concerning for pulmonary emboli.      These findings were electronically conveyed to JAMES WILLIAM on 11/3/2024 11:50 AM.         US-ABDOMEN COMPLETE SURVEY   Final Result      1.  Cirrhosis with stigmata of portal hypertension including splenomegaly, recanalized umbilical vein and ascites.   2.  Gallbladder sludge and stones. Gallbladder wall thickening is nonspecific and could be related to liver disease but correlate clinically for evidence of acute cholecystitis. No biliary dilatation.         DX-CHEST-PORTABLE (1 VIEW)   Final Result      1.  Hazy left lower lobe opacity could represent atelectasis or pneumonitis.   2.  There is linear scarring or atelectasis in the right lung base.           Assessment/Plan  * Septic shock (HCC)- (present on admission)  Assessment & Plan  Present on admission  Source SBP  Gram Neg rods in blood, Salmonella species by PCR.  Sent to stat lab for fiinal ID and sensitivities   Second organism G+C neg for staph a.  Suspect contaminant  Cont Ceftriaxone  Have been able to  titrate off of midodrine  Repeat cultures today  Repeat CT 11/3 did not show any perf, abscess etc  Previously it was discussed with ID who agree with current treatment plan  Repeat blood cultures negative to date.  Continue to monitor in IMCU    Lactic acidosis  Assessment & Plan  She found to have severe lactic acidosis with lactic acid level of 8.3.  Lactic acid level is trending down.    Hypoxia  Assessment & Plan  She found to have significant hypoxia on ABG her pO2 is 25.4.  She is DNR/DNI.    Superior mesenteric vein thrombosis (HCC)  Assessment & Plan  New finding on CT 11/3  GI is following her.  She remains on heparin infusion.  I am continuing heparin gtt. and I am titrating as per Anti Xa levels and monitor for signs of bleeding.        Acute encephalopathy  Assessment & Plan  hypoNa vs HE vs sepsis or more likely components of both  Remain encephalopathic.  Have been agressively titrating up her HE meds and overnight she has started to have BMs  Treating sepsis  Continue to monitor closely.    Severe protein-calorie malnutrition ([place]: less than 60% of standard weight) ([facility])  Assessment & Plan  Nutrition consult    Bacteremia due to Gram-negative bacteria  Assessment & Plan  Source SBP  S/p tap of 5000ml 10/31  Salmonella species based on PCR: sent to state lab for final ID and sensitivities  Check TTE  Repeat CT 11/3 did not show any focal infection/abscess/perf  Repeat cutlures 11/3 remain neg thus far  She remains afebrile  Ceftriaxone      Advance care planning- (present on admission)  Assessment & Plan  Currently she is DNR/DNI and I had a lengthy discussion with patient's mother at the bedside and later with patient's brother sister-in-law.  I discussed problem by problem and explained them overall very guarded prognosis due to multiorgan involvement.    11/06/24  I evaluated her again at the bedside this morning and discussed plan of care with patient's son at the bedside and updated him  regarding plan of care.  I explained him difference between full code and DNR.  I also explained him that family can change her CODE STATUS if they would like to as patient cannot participate in advance care planning at this time.    Later this afternoon I met with several family members at the bedside and admitted them regarding her current medical condition and discussed with them regarding CODE STATUS also updated them regarding overall poor prognosis as she has multiorgan involvement.  Family expressed understanding.  Time spent 18 minutes      Hypoglycemia- (present on admission)  Assessment & Plan  Likely due to poor oral intake as patient reports reduced appetite, lack of glucneogenesis due to cirrhosis  -Patient also finished prednisolone 2 days ago, could be adrenal insufficiency  Reg diet  Nutrition consult  Close monitoring of BG  Ordered CMP this morning.    Transaminitis- (present on admission)  Assessment & Plan  Patient reports she already finished a 28-day course of prednisone at home  Likely worsening due to sepsis/SBP  Ultrasound neg for PVT, did show cirrhosis. GB with some sludge and non specific wall thickening.  Can be normal in setting of ascites.  Liver enzymes remain elevated bilirubin worsening.  Ordered CMP for tomorrow.      High anion gap metabolic acidosis- (present on admission)  Assessment & Plan  Due to lactic acidosis likely from right liver disease and sepsis  IV thiamine  resolved    Hyponatremia- (present on admission)  Assessment & Plan  Sodium 117 on admission  Elena<20  UOsm>300  Hypervolemic hypoNa  Has responded to IV lasix and now in upper 120s  Change IV lasix to po and start spironolactone  Current sodium level is 135  I ordered CMP this morning.    Acute renal failure (ARF) (HCC)- (present on admission)  Assessment & Plan  Creatinine 2.11 on admission  HRS vs sepsis vs hypovlemia  Of note pt had a paracentesis one week prior to presentation with no albumin  Good UOP    Likely due to sepsis  Avoid nephrotoxic medications  No evidence of obstruction on CT  Ordered CMP this morning.  Creat now 1.63; still elevated given her muscle wasting  Due to concern for hepatorenal syndrome I started her on albumin.  Started on IV fluid      Alcoholic cirrhosis of liver with ascites (HCC)- (present on admission)  Assessment & Plan  F/b Dr [facility]  ETOH induced, sober since June  Has been referred to [facility] transplant service with appointment in June   MELD 32 on presentation  Status post paracentesis 5000ml 10/31  S/p albumin following tap  SBP based on fluid cell count  Rocephin  Trial off of midodrine  No BM in 72hrs and more confused today.  Picture is complicated by possible ileus noted on CT  -cont rifaximin  -increase lactulose po from 45mg TID to q4   -start lactulose retention enema  [facility] declined transfer    Continue lactulose  Discontinued Lasix due to abnormal kidney functions and very poor oral intake  Discontinue spironolactone  I started her on IV fluid.    Depression with anxiety- (present on admission)  Assessment & Plan  Continue home fluoxetine      I discussed plan of care during multidisciplinary rounds regarding patient's current medical condition and plan of care.      VTE prophylaxis:   SCDs/TEDs      I have performed a physical exam and reviewed and updated ROS and Plan today (11/6/2024). In review of yesterday's note (11/5/2024), there are no changes except as documented above.

## 2024-11-06 NOTE — PROGRESS NOTES
Gastroenterology Progress Note               Author:  Oumou OWENS Date & Time Created: 11/6/2024 7:05 AM       Patient ID:  Name:             Kavita Freeman  YOB: 1965  Age:                 59 y.o.  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     History of Present Illness:   59 years old female with medical history of alcohol-related liver injury, cirrhosis, decompensation with variceal bleeding and refractory ascites formation, mild to moderate encephalopathy.  Presented to hospital this time for fluid accumulation, acute kidney injury.  GI team is consulted for recent GI bleeding     The patient has primary GI liver doctor in [place] and she is waiting for the transplantation evaluation at [facility] in the coming 2 months.  We do not have any records to support this plan, however the patient is very certain about the [facility] appointment.     For the bleeding, the patient had nausea vomiting in the last 2 days, the patient saw blood and also some coffee-ground like vomitus.  Tarry stool was noted.  Last upper GI scope August 15 with variceal bleeding and banding.     Interval History:  11/2/2024: Patient seen at bedside.  Awake, alert.   Discussed EGD findings with patient.  Inquired regarding last EGD as, per chart review, last EGD was 2-1/2 months ago.  Patient unable to discern if she has had EGD since last documented.  Positive asterixis.  Denies bowel movements overnight.  Started on rifaximin and lactulose.  Hemoglobin stable.  Abdomen tender with light palpation.  No WBC this morning, but yesterday was 39. ultrasound abdomen pending.      11/3/2024: Patient seen at bedside.  Disoriented, unable to answer orientation questions.  Abdomen distended, significantly increase in tenderness this morning, hypoactive bowel sounds and tympany with percussion.  CT abdomen and CBC pending.  No bowel movements overnight.    Update 1222: CT abdomen with findings including fluid-filled small bowel and colonic loops favoring ileus, no evidence of bowel perforation, cirrhosis with portal hypertension, moderate to large volume ascites, nonocclusive SMV thrombosis, cholelithiasis, subtle findings concerning for pulmonary emboli.      11/4/2024: Patient seen at bedside.  Disoriented, oriented to self only.  Abdomen remains distended, tender with light palpation, hypoactive bowel sounds.  Patient with multiple brown bowel movements overnight.  Started on heparin yesterday.  WBC 29.8, hemoglobin stable at 12.5, platelets 197.  Sodium 127, BUN 56, creatinine improved to 1.3.  Albumin yesterday 2.6.  INR yesterday 2.35.  MELD 3.0 as of 11/3/2024 31    11/5/2024: Patient seen and examined.  Appears to be in distress, tachypneic, unable to participate in interview.  Grimaces when I touch her abdomen.  I was concerned and got Dr. Charles who then examined the patient.  We ordered KUB, chest x-ray, ABG, further labs.  3 BM last 24 hours.     Found to have significant lactic acidosis, INR 3.64 (difficult to interpret in the context of heparin drip), ABG with acute respiratory alkalosis.  Creatinine increasing to 1.54, T. bili 8.  KUB dilated loops of bowel suspicious for  gastroenteritis or inflammatory changes.  Chest x-ray with atelectasis.      11/6/2024: Patient seen in collaboration with Dr. Solorzano. Son at bedside, all questions answered. WBC now 36.6, T. Bili 9.8, creatinine uptrending. MELD 3.0 - 37 (Although INR not reliable in the setting of heparin drip)    Hospital Medications:  Current Facility-Administered Medications   Medication Dose Frequency Provider Last Rate Last Admin    thiamine (B-1) injection 100 mg  100 mg DAILY Claudia Solorzano M.D.   100 mg at 11/06/24 0530    [Held by provider] furosemide (Lasix) tablet 40 mg  40 mg Q DAY Quang Durham D.O.   40 mg at 11/05/24 0512    spironolactone (Aldactone) tablet 50 mg  50 mg Q DAY Quang Durham D.O.   50 mg at 11/05/24 0513    oxymetazoline (Afrin) 0.05 % nasal spray 2 Spray  2 [place] BID Quang Durham D.O.   2 [place] at 11/06/24 0530    cefTRIAXone (Rocephin) syringe 2,000 mg  2,000 mg Q24HRS Claudia Solorzano M.D.   2,000 mg at 11/06/24 0530    heparin infusion 25,000 units in 500 mL 0.45% NACL  0-30 Units/kg/hr Continuous Quang Durham D.O. 30.1 mL/hr at 11/06/24 0656 24.003 Units/kg/hr at 11/06/24 0656    heparin injection 2,500 Units  40 Units/kg PRN Quang Durham D.O.   2,500 Units at 11/05/24 0346    lactulose 20 GM/30ML solution 45 mL  45 mL 4X/DAY Quang Durham D.O.   45 mL at 11/04/24 1322    riFAXIMin (Xifaxan) tablet 550 mg  550 mg BID Quang Durham D.O.   550 mg at 11/05/24 0512    acetaminophen (Tylenol) tablet 650 mg  650 mg Q6HRS PRN Adilia Gonzales D.O.        Pharmacy Consult Request ...Pain Management Review 1 Each  1 Each PHARMACY TO DOSE ASTRID Raza.ADDIE.        oxyCODONE immediate-release (Roxicodone) tablet 2.5 mg  2.5 mg Q3HRS PRN ASTRID Raza.OSandy   2.5 mg at 11/01/24 1230    Or    oxyCODONE immediate-release (Roxicodone) tablet 5 mg  5 mg Q3HRS PRN ASTRID Raza.O.   5 mg at 11/03/24 0519    Or     "HYDROmorphone (Dilaudid) injection 0.25 mg  0.25 mg Q3HRS PRN Adilia Gonzales, D.O.   0.25 mg at 11/06/24 0311    ondansetron (Zofran) syringe/vial injection 4 mg  4 mg Q4HRS PRN Adilia Gonzales, D.O.   4 mg at 11/04/24 1019    ondansetron (Zofran ODT) dispertab 4 mg  4 mg Q4HRS PRN Adilia Gonzales, D.O.        promethazine (Phenergan) tablet 12.5-25 mg  12.5-25 mg Q4HRS PRN Adilia Gonzales, D.O.   25 mg at 11/04/24 1116    promethazine (Phenergan) suppository 12.5-25 mg  12.5-25 mg Q4HRS PRN Adilia Gonzales, D.O.        prochlorperazine (Compazine) injection 5-10 mg  5-10 mg Q4HRS PRN Adilia Gonzales, D.O.   10 mg at 11/04/24 1228    guaiFENesin dextromethorphan (Robitussin DM) 100-10 MG/5ML syrup 10 mL  10 mL Q6HRS PRN Adilia Gonzales, D.O.        pantoprazole (Protonix) injection 40 mg  40 mg BID Adilia Gonzales, D.O.   40 mg at 11/06/24 0530    dextrose 50% (D50W) injection 25 g  25 g Q15 MIN PRN Adilia Gonzales, D.O.   25 g at 11/01/24 1843    FLUoxetine (PROzac) capsule 10 mg  10 mg QAM Adilia Gonzales, D.O.   10 mg at 11/05/24 0512    gabapentin (Neurontin) capsule 200 mg  200 mg Nightly Adilia Gonzales, D.O.   200 mg at 11/03/24 2212    hydrOXYzine HCl (Atarax) tablet 25 mg  25 mg TID PRN Adilia Gonzales, D.O.        lidocaine (Asperflex) 4 % patch 1 Patch  1 Patch Q24HRS Adilia Gonzaels, D.O.   1 Patch at 11/06/24 0530    sucralfate (Carafate) 1 GM/10ML suspension 1 g  1 g Q6HRS TRUDY RazaOSandy   1 g at 11/06/24 0530    [Held by provider] midodrine (Proamatine) tablet 5 mg  5 mg TID WITH MEALS Adilia Gonzales D.O.   5 mg at 11/02/24 1805   Last reviewed on 11/1/2024  9:17 AM by Celine Bustamante RSandyN.       Review of Systems:  Review of Systems   Unable to perform ROS: Critical illness       Vital signs:  Weight/BMI: Body mass index is 20.62 kg/m².  /61   Pulse (!) 102   Temp 37.5 °C (99.5 °F) (Bladder)   Resp (!) 25   Ht 1.727 m (5' 8\")   Wt 61.5 kg (135 lb 9.3 oz)   SpO2 98%   Vitals:    " 11/06/24 0400 11/06/24 0414 11/06/24 0500 11/06/24 0600   BP: 133/75  132/69 118/61   Pulse: 96  98 (!) 102   Resp: 14  13 (!) 25   Temp:       TempSrc:       SpO2: 94%  97% 98%   Weight:  61.5 kg (135 lb 9.3 oz)     Height:         Oxygen Therapy:  Pulse Oximetry: 98 %, O2 (LPM): 3, O2 Delivery Device: Oxymask    Intake/Output Summary (Last 24 hours) at 11/6/2024 0705  Last data filed at 11/5/2024 2000  Gross per 24 hour   Intake 0 ml   Output 600 ml   Net -600 ml       Physical Exam  Vitals and nursing note reviewed.   Constitutional:       General: She is in acute distress.      Appearance: She is cachectic. She is ill-appearing and toxic-appearing.   HENT:      Head: Normocephalic.      Nose: Nose normal. No congestion.      Mouth/Throat:      Mouth: Mucous membranes are dry.      Comments: Old dark blood in mouth  Eyes:      General: Scleral icterus present.      Extraocular Movements: Extraocular movements intact.      Conjunctiva/sclera: Conjunctivae normal.   Cardiovascular:      Rate and Rhythm: Normal rate and regular rhythm.      Pulses: Normal pulses.      Heart sounds: Murmur heard.   Pulmonary:      Effort: Respiratory distress present.   Abdominal:      Tenderness: There is abdominal tenderness.      Comments: +tympany with percussion, hypoactive bowel sounds     Musculoskeletal:      Right lower leg: No edema.      Left lower leg: No edema.   Skin:     General: Skin is warm and dry.      Capillary Refill: Capillary refill takes less than 2 seconds.      Coloration: Skin is jaundiced.   Neurological:      Mental Status: She is disoriented.      Motor: Weakness present.         Labs:  Recent Labs     11/03/24  2230 11/04/24  0430 11/05/24  0846   SODIUM 129* 127* 132*   POTASSIUM 4.0 4.0 4.2   CHLORIDE 92* 92* 97   CO2 21 20 16*   BUN 56* 56* 61*   CREATININE 1.02 1.30 1.54*   MAGNESIUM  --  2.3  --    PHOSPHORUS  --   --  2.6   CALCIUM 9.3 9.4 9.5     Recent Labs     11/03/24  1456 11/03/24 2230  11/04/24 0430 11/05/24 0846   ALTSGPT 123*  --   --  89*   ASTSGOT 321*  --   --  193*   ALKPHOSPHAT 152*  --   --  153*   TBILIRUBIN 6.5*  --   --  8.0*   GLUCOSE 96 112* 118* 70     Recent Labs     11/03/24  1456 11/04/24 0430 11/05/24  0846 11/06/24  0431   WBC 26.6* 29.8* 28.9* 36.6*   NEUTSPOLYS 87.50*  --   --   --    LYMPHOCYTES 1.60*  --   --   --    MONOCYTES 9.20  --   --   --    EOSINOPHILS 0.00  --   --   --    BASOPHILS 0.00  --   --   --    ASTSGOT 321*  --  193*  --    ALTSGPT 123*  --  89*  --    ALKPHOSPHAT 152*  --  153*  --    TBILIRUBIN 6.5*  --  8.0*  --      Recent Labs     11/03/24  1456 11/04/24 0430 11/05/24  0846 11/05/24  1358 11/06/24  0431   RBC 4.22 4.24 4.46  --  4.02*   HEMOGLOBIN 12.2 12.5 12.8  --  11.5*   HEMATOCRIT 35.0* 35.7* 36.9*  --  34.7*   PLATELETCT 192 197 180  --  163*   PROTHROMBTM 25.9*  --   --  36.5*  --    APTT 47.4*  --   --   --   --    INR 2.35*  --   --  3.64*  --      Recent Results (from the past 24 hours)   CBC WITHOUT DIFFERENTIAL    Collection Time: 11/05/24  8:46 AM   Result Value Ref Range    WBC 28.9 (H) 4.8 - 10.8 K/uL    RBC 4.46 4.20 - 5.40 M/uL    Hemoglobin 12.8 12.0 - 16.0 g/dL    Hematocrit 36.9 (L) 37.0 - 47.0 %    MCV 82.7 81.4 - 97.8 fL    MCH 28.7 27.0 - 33.0 pg    MCHC 34.7 32.2 - 35.5 g/dL    RDW 53.8 (H) 35.9 - 50.0 fL    Platelet Count 180 164 - 446 K/uL    MPV 10.4 9.0 - 12.9 fL   Comp Metabolic Panel    Collection Time: 11/05/24  8:46 AM   Result Value Ref Range    Sodium 132 (L) 135 - 145 mmol/L    Potassium 4.2 3.6 - 5.5 mmol/L    Chloride 97 96 - 112 mmol/L    Co2 16 (L) 20 - 33 mmol/L    Anion Gap 19.0 (H) 7.0 - 16.0    Glucose 70 65 - 99 mg/dL    Bun 61 (H) 8 - 22 mg/dL    Creatinine 1.54 (H) 0.50 - 1.40 mg/dL    Calcium 9.5 8.5 - 10.5 mg/dL    Correct Calcium 10.6 (H) 8.5 - 10.5 mg/dL    AST(SGOT) 193 (H) 12 - 45 U/L    ALT(SGPT) 89 (H) 2 - 50 U/L    Alkaline Phosphatase 153 (H) 30 - 99 U/L    Total Bilirubin 8.0 (H) 0.1 - 1.5  mg/dL    Albumin 2.6 (L) 3.2 - 4.9 g/dL    Total Protein 6.6 6.0 - 8.2 g/dL    Globulin 4.0 (H) 1.9 - 3.5 g/dL    A-G Ratio 0.7 g/dL   PHOSPHORUS    Collection Time: 11/05/24  8:46 AM   Result Value Ref Range    Phosphorus 2.6 2.5 - 4.5 mg/dL   ESTIMATED GFR    Collection Time: 11/05/24  8:46 AM   Result Value Ref Range    GFR (CKD-EPI) 39 (A) >60 mL/min/1.73 m 2   LACTIC ACID    Collection Time: 11/05/24  9:44 AM   Result Value Ref Range    Lactic Acid 8.3 (HH) 0.5 - 2.0 mmol/L   URINE SODIUM RANDOM    Collection Time: 11/05/24  9:45 AM   Result Value Ref Range    Sodium, Urine -per volume <20 mmol/L   AMMONIA    Collection Time: 11/05/24  9:50 AM   Result Value Ref Range    Ammonia 41 11 - 45 umol/L   Heparin Xa (Unfractionated)    Collection Time: 11/05/24  9:50 AM   Result Value Ref Range    Heparin Xa (UFH) 0.35 IU/mL   ABG - LAB    Collection Time: 11/05/24 10:21 AM   Result Value Ref Range    Ph 7.62 (H) 7.40 - 7.50    Pco2 18.4 (L) 26.0 - 37.0 mmHg    Po2 25.4 (LL) 64.0 - 87.0 mmHg    O2 Saturation 54.3 (L) 93.0 - 99.0 %    Hco3 19 17 - 25 mmol/L    Base Excess 0 -4 - 3 mmol/L    Body Temp 35.9 Centigrade    O2 Therapy RA     Ph -TC 7.64 (H) 7.40 - 7.50    Pco2 -TC 17.5 (L) 26.0 - 37.0 mmHg    Po2 -TC 23.5 (LL) 64.0 - 87.0 mmHg   LACTIC ACID    Collection Time: 11/05/24  1:58 PM   Result Value Ref Range    Lactic Acid 6.8 (HH) 0.5 - 2.0 mmol/L   Prothrombin Time    Collection Time: 11/05/24  1:58 PM   Result Value Ref Range    PT 36.5 (H) 12.0 - 14.6 sec    INR 3.64 (H) 0.87 - 1.13   Heparin Xa (Unfractionated)    Collection Time: 11/05/24  4:10 PM   Result Value Ref Range    Heparin Xa (UFH) 0.32 IU/mL   LACTIC ACID    Collection Time: 11/05/24  6:35 PM   Result Value Ref Range    Lactic Acid 3.9 (H) 0.5 - 2.0 mmol/L   LACTIC ACID    Collection Time: 11/05/24 10:48 PM   Result Value Ref Range    Lactic Acid 2.9 (H) 0.5 - 2.0 mmol/L   CBC WITHOUT DIFFERENTIAL    Collection Time: 11/06/24  4:31 AM   Result  Value Ref Range    WBC 36.6 (H) 4.8 - 10.8 K/uL    RBC 4.02 (L) 4.20 - 5.40 M/uL    Hemoglobin 11.5 (L) 12.0 - 16.0 g/dL    Hematocrit 34.7 (L) 37.0 - 47.0 %    MCV 86.3 81.4 - 97.8 fL    MCH 28.6 27.0 - 33.0 pg    MCHC 33.1 32.2 - 35.5 g/dL    RDW 58.4 (H) 35.9 - 50.0 fL    Platelet Count 163 (L) 164 - 446 K/uL    MPV 10.2 9.0 - 12.9 fL   Heparin Anti-Xa    Collection Time: 11/06/24  4:31 AM   Result Value Ref Range    Heparin Xa (UFH) 0.34 IU/mL       Radiology Review:  DX-CHEST-PORTABLE (1 VIEW)   Final Result      1.  No acute cardiac or pulmonary abnormalities are identified.      2.  Slight left basilar atelectasis.      TF-OHSIROR-4 VIEW   Final Result      1.  Several markedly dilated loops of small bowel in the mid abdomen suspicious for gastroenteritis or inflammatory change. Recommend follow-up abdominal series to rule out evolving small bowel obstruction.      EC-ECHOCARDIOGRAM COMPLETE W/O CONT   Final Result      CT-ABDOMEN-PELVIS W/O   Final Result      1.  Dilated fluid-filled small bowel and colonic loops favoring ileus.   2.  No evidence of bowel perforation.   3.  Hepatic cirrhosis with portal hypertension and moderate to large volume ascites.   4.  Nonocclusive superior mesenteric vein thrombosis.   5.  Cholelithiasis.   6.  Bibasilar atelectasis with minimal bilateral pleural fluid.   7.  Subtle findings concerning for pulmonary emboli.      These findings were electronically conveyed to JAMES WILLIAM on 11/3/2024 11:50 AM.         US-ABDOMEN COMPLETE SURVEY   Final Result      1.  Cirrhosis with stigmata of portal hypertension including splenomegaly, recanalized umbilical vein and ascites.   2.  Gallbladder sludge and stones. Gallbladder wall thickening is nonspecific and could be related to liver disease but correlate clinically for evidence of acute cholecystitis. No biliary dilatation.         DX-CHEST-PORTABLE (1 VIEW)   Final Result      1.  Hazy left lower lobe opacity could  represent atelectasis or pneumonitis.   2.  There is linear scarring or atelectasis in the right lung base.            MDM (Data Review):   -Records reviewed and summarized in current documentation  -I personally reviewed and interpreted the laboratory results  -I personally reviewed the radiology images    Assessment/Recommendations:  Acute liver failure- MELD 3.0- 38 on arrival (10/31/2024); 26.8% 90-day survival. Child-Garza Class C  SMV thrombosis  Portal hypertensive gastropathy  Ileus  Cirrhosis with ascites  SBP  Hepatic encephalopathy  Coagulopathy  Esophageal varices with banding in situ  Hematemesis  Severe protein calorie malnutrition  History of decompensated liver disease with variceal bleeding, refractory ascites  Salmonella bacteremia - pending review by State lab  Sepsis  EFRAIN HRS  Respiratory alkalosis    Plan:  11/5/2024  Significant clinical decompensation overnight.  Consistent with multiorgan failure.  Discussed with [facility] hepatology who have agreed to accept pending ICU acceptance.  Attended lengthy bedside discussion with family in collaboration with Dr. Solorzano.  Randa shows DNR/DNI when she was alert.  Significant concern that she would not be able to transfer to [facility] without being intubated  Family discussing amongst themselves regarding next steps: reverse DNI/DNR and pursue transfer vs comfort care vs home with hospice    Completed 72 hours Octreotide  Low sodium diet  Continue PPI IV twice daily  Continue ceftriaxone for SBP and salmonella bacteremia  Continue lactulose and Rifaximin    Heparin for SMV thrombosis/ possible PE  Serial abdominal exams    11/6/2024  [facility] for transplant consideration  Now with EFRAIN  Continue IV fluids, obtain UA and renal ultrasound  Check am creatinine, if no improvement start 1 G/kg albumin x 3 days    Long term if patient survives admission:   Patient will need repeat EGD in 4-6 weeks  Nonselective beta-blocker as outpatient    GI  will follow    Discussed with patient' family, nursing, Dr. Solorzano, Dr. Hyde    ..Oumou Mart, CHANDANA,  APRN    Core Quality Measures   Reviewed items::  Labs, Medications and Radiology reports reviewed

## 2024-11-06 NOTE — THERAPY
"Speech Language Pathology   Clinical Swallow Evaluation     Patient Name: Kavita Freeman  AGE:  59 y.o., SEX:  female  Medical Record #: [id number]  Date of Service: 11/6/2024    History of Present Illness  60 y/o F admit for paracentesis w/ fever and chills. Pt w/ encephalopathy and a bedside swallow eval was ordered.     PMHx: ETOH, esophageal varices s/p banding    CXR 11/5: \"1.  No acute cardiac or pulmonary abnormalities are identified. 2.  Slight left basilar atelectasis\"    General Information:  Vitals  O2 Delivery Device: None - Room Air  Level of Consciousness: Alert, Awake  Patient Behaviors: Confused     Follows Directives: Yes    Prior Living Situation & Level of Function:  Lives with - Patient's Self Care Capacity:  (Pt unable to report, unknown PLOF)     Communication: Pt unable to report, unknown PLOF  Swallowing: Pt unable to report, unknown PLOF     Oral Mechanism Evaluation:  Dentition: Natural dentition (Unable to assess posterior dentition as pt was not able to fully open her mouth for therapist)   Facial Symmetry: Equal  Facial Sensation: Equal     Labial Observations: WFL   Lingual Observations: Midline  Motor Speech: WNL  Comments: Oral mech impacted by mentation       Laryngeal Function:  Secretion Management: Adequate  Voice Quality:  (Reduced in vocal intensity, mild)     Cough: Perceptually WNL     Subjective  Pt seen A&Ox1 (oriented x2 when given choices of three), saturating on room air. There was dried blood noted in her oral cavity- pt reported w/ a nosebleed last night. Oral care was provided    Assessment  Current Method of Nutrition: Oral diet (Clears)  Positioning: Chaves's (60-90 degrees)  Bolus Administration: Patient, SLP    O2 Delivery Device: None - Room Air  Factor(s) Affecting Performance: Impaired mental status  Tracheostomy : No     Swallowing Trials:  Swallowing Trials  Ice: WFL  Thin Liquid (TN0): WFL  Pureed (PU4): WFL  Soft & Bite Sized (SB6): WFL  Regular (RG7): " "Impaired    Comments: Pt cleared to try solid foods per MD. After oral care, pt sat up for PO trials. She self-fed w/ close assistance d/t rapid rate & volume of intake. Oral stage c/b prolonged mastication w/ xerostomia impacting clearance of solids. No upper airway wetness, throat clearing or coughing was noted. Pt w/ taking rapid sips of 4 oz of liquids until hand over hand assist was provided. She denied globus sensation.     Clinical Impressions  Pt w/ oral stage impacted by xerostomia and mentation. She would benefit from a modified diet w/ 1:1 feeding assistance and general swallow precautions. ST to f/u for diet advancement as mentation improves.     Recommendations  Diet Consistency: Thin/all Liquids, Soft & Bite-Sized Solids  Instrumentation: None indicated at this time  Medication: As tolerated  Supervision: Direct supervision during meals  Positioning: Fully upright and midline during oral intake, Meals sitting upright in a chair, as tolerated  Risk Management : Small bites/sips, Slow rate of intake  Oral Care: Q4h    SLP Treatment Plan  Treatment Plan: Dysphagia Treatment  SLP Frequency: 3x Per Week  Estimated Duration: Until Therapy Goals Met    Anticipated Discharge Needs  Discharge Recommendations: Anticipate that the patient will have no further speech therapy needs after discharge from the hospital   Therapy Recommendations Upon DC: Not Indicated      Patient / Family Goals  Patient / Family Goal #1: \"I am thirsty\"  Short Term Goals  Short Term Goal # 1: Pt will tolerate the safest, least restrictive diet w/ no signs of aspiration related pulmonary complications  Short Term Goal # 2: Pt will demo timely and functional oral clearance to justify diet advancement    Mackenzie Pisano, EMELYN   "

## 2024-11-07 LAB
ALBUMIN SERPL BCP-MCNC: 2.9 G/DL (ref 3.2–4.9)
ALBUMIN/GLOB SERPL: 1 G/DL
ALP SERPL-CCNC: 112 U/L (ref 30–99)
ALT SERPL-CCNC: 48 U/L (ref 2–50)
ANION GAP SERPL CALC-SCNC: 11 MMOL/L (ref 7–16)
ANION GAP SERPL CALC-SCNC: 13 MMOL/L (ref 7–16)
AST SERPL-CCNC: 112 U/L (ref 12–45)
BILIRUB SERPL-MCNC: 9.6 MG/DL (ref 0.1–1.5)
BUN SERPL-MCNC: 46 MG/DL (ref 8–22)
BUN SERPL-MCNC: 56 MG/DL (ref 8–22)
CALCIUM ALBUM COR SERPL-MCNC: 9.7 MG/DL (ref 8.5–10.5)
CALCIUM SERPL-MCNC: 8.4 MG/DL (ref 8.5–10.5)
CALCIUM SERPL-MCNC: 8.8 MG/DL (ref 8.5–10.5)
CHLORIDE SERPL-SCNC: 101 MMOL/L (ref 96–112)
CHLORIDE SERPL-SCNC: 96 MMOL/L (ref 96–112)
CO2 SERPL-SCNC: 19 MMOL/L (ref 20–33)
CO2 SERPL-SCNC: 20 MMOL/L (ref 20–33)
CREAT SERPL-MCNC: 1.07 MG/DL (ref 0.5–1.4)
CREAT SERPL-MCNC: 1.38 MG/DL (ref 0.5–1.4)
ERYTHROCYTE [DISTWIDTH] IN BLOOD BY AUTOMATED COUNT: 57.8 FL (ref 35.9–50)
GFR SERPLBLD CREATININE-BSD FMLA CKD-EPI: 45 ML/MIN/1.73 M 2
GFR SERPLBLD CREATININE-BSD FMLA CKD-EPI: 60 ML/MIN/1.73 M 2
GLOBULIN SER CALC-MCNC: 2.8 G/DL (ref 1.9–3.5)
GLUCOSE SERPL-MCNC: 104 MG/DL (ref 65–99)
GLUCOSE SERPL-MCNC: 77 MG/DL (ref 65–99)
HCT VFR BLD AUTO: 28.6 % (ref 37–47)
HCT VFR BLD AUTO: 29.1 % (ref 37–47)
HGB BLD-MCNC: 9.6 G/DL (ref 12–16)
HGB BLD-MCNC: 9.8 G/DL (ref 12–16)
MAGNESIUM SERPL-MCNC: 1.8 MG/DL (ref 1.5–2.5)
MCH RBC QN AUTO: 28.8 PG (ref 27–33)
MCHC RBC AUTO-ENTMCNC: 33.6 G/DL (ref 32.2–35.5)
MCV RBC AUTO: 85.9 FL (ref 81.4–97.8)
PHOSPHATE SERPL-MCNC: 2 MG/DL (ref 2.5–4.5)
PLATELET # BLD AUTO: 112 K/UL (ref 164–446)
PMV BLD AUTO: 10.5 FL (ref 9–12.9)
POTASSIUM SERPL-SCNC: 3.4 MMOL/L (ref 3.6–5.5)
POTASSIUM SERPL-SCNC: 3.5 MMOL/L (ref 3.6–5.5)
PROT SERPL-MCNC: 5.7 G/DL (ref 6–8.2)
RBC # BLD AUTO: 3.33 M/UL (ref 4.2–5.4)
SODIUM SERPL-SCNC: 127 MMOL/L (ref 135–145)
SODIUM SERPL-SCNC: 133 MMOL/L (ref 135–145)
UFH PPP CHRO-ACNC: 0.12 IU/ML
UFH PPP CHRO-ACNC: 0.12 IU/ML
UFH PPP CHRO-ACNC: 0.19 IU/ML
WBC # BLD AUTO: 26.9 K/UL (ref 4.8–10.8)

## 2024-11-07 PROCEDURE — 700102 HCHG RX REV CODE 250 W/ 637 OVERRIDE(OP): Performed by: INTERNAL MEDICINE

## 2024-11-07 PROCEDURE — A9270 NON-COVERED ITEM OR SERVICE: HCPCS | Performed by: INTERNAL MEDICINE

## 2024-11-07 PROCEDURE — 85018 HEMOGLOBIN: CPT

## 2024-11-07 PROCEDURE — 83735 ASSAY OF MAGNESIUM: CPT

## 2024-11-07 PROCEDURE — 770000 HCHG ROOM/CARE - INTERMEDIATE ICU *

## 2024-11-07 PROCEDURE — 85027 COMPLETE CBC AUTOMATED: CPT

## 2024-11-07 PROCEDURE — 80048 BASIC METABOLIC PNL TOTAL CA: CPT

## 2024-11-07 PROCEDURE — 99232 SBSQ HOSP IP/OBS MODERATE 35: CPT | Performed by: NURSE PRACTITIONER

## 2024-11-07 PROCEDURE — 99233 SBSQ HOSP IP/OBS HIGH 50: CPT | Mod: 25 | Performed by: INTERNAL MEDICINE

## 2024-11-07 PROCEDURE — 99497 ADVNCD CARE PLAN 30 MIN: CPT | Performed by: INTERNAL MEDICINE

## 2024-11-07 PROCEDURE — 85520 HEPARIN ASSAY: CPT | Mod: 91

## 2024-11-07 PROCEDURE — 700111 HCHG RX REV CODE 636 W/ 250 OVERRIDE (IP): Performed by: INTERNAL MEDICINE

## 2024-11-07 PROCEDURE — 84100 ASSAY OF PHOSPHORUS: CPT

## 2024-11-07 PROCEDURE — 700105 HCHG RX REV CODE 258: Performed by: INTERNAL MEDICINE

## 2024-11-07 PROCEDURE — 700111 HCHG RX REV CODE 636 W/ 250 OVERRIDE (IP): Performed by: HOSPITALIST

## 2024-11-07 PROCEDURE — P9047 ALBUMIN (HUMAN), 25%, 50ML: HCPCS | Mod: JZ | Performed by: INTERNAL MEDICINE

## 2024-11-07 PROCEDURE — A9270 NON-COVERED ITEM OR SERVICE: HCPCS | Performed by: HOSPITALIST

## 2024-11-07 PROCEDURE — 80053 COMPREHEN METABOLIC PANEL: CPT

## 2024-11-07 PROCEDURE — 85014 HEMATOCRIT: CPT

## 2024-11-07 PROCEDURE — 700102 HCHG RX REV CODE 250 W/ 637 OVERRIDE(OP): Performed by: HOSPITALIST

## 2024-11-07 RX ORDER — MAGNESIUM SULFATE HEPTAHYDRATE 40 MG/ML
2 INJECTION, SOLUTION INTRAVENOUS ONCE
Status: COMPLETED | OUTPATIENT
Start: 2024-11-07 | End: 2024-11-07

## 2024-11-07 RX ORDER — OXYMETAZOLINE HYDROCHLORIDE 0.05 G/100ML
2 SPRAY NASAL 2 TIMES DAILY
Status: DISPENSED | OUTPATIENT
Start: 2024-11-07 | End: 2024-11-10

## 2024-11-07 RX ORDER — POTASSIUM CHLORIDE 1500 MG/1
40 TABLET, EXTENDED RELEASE ORAL ONCE
Status: COMPLETED | OUTPATIENT
Start: 2024-11-07 | End: 2024-11-07

## 2024-11-07 RX ADMIN — SUCRALFATE ORAL 1 G: 1 SUSPENSION ORAL at 05:23

## 2024-11-07 RX ADMIN — SUCRALFATE ORAL 1 G: 1 SUSPENSION ORAL at 13:09

## 2024-11-07 RX ADMIN — POTASSIUM CHLORIDE 40 MEQ: 1500 TABLET, EXTENDED RELEASE ORAL at 08:39

## 2024-11-07 RX ADMIN — PANTOPRAZOLE SODIUM 40 MG: 40 INJECTION, POWDER, FOR SOLUTION INTRAVENOUS at 05:24

## 2024-11-07 RX ADMIN — HEPARIN SODIUM 24 UNITS/KG/HR: 5000 INJECTION, SOLUTION INTRAVENOUS at 05:52

## 2024-11-07 RX ADMIN — HEPARIN SODIUM 2500 UNITS: 1000 INJECTION, SOLUTION INTRAVENOUS; SUBCUTANEOUS at 14:37

## 2024-11-07 RX ADMIN — LACTULOSE 45 ML: 10 SOLUTION ORAL at 08:39

## 2024-11-07 RX ADMIN — OXYMETAZOLINE HYDROCHLORIDE 2 SPRAY: 0.5 SPRAY NASAL at 13:38

## 2024-11-07 RX ADMIN — HEPARIN SODIUM 2500 UNITS: 1000 INJECTION, SOLUTION INTRAVENOUS; SUBCUTANEOUS at 21:31

## 2024-11-07 RX ADMIN — RIFAXIMIN 550 MG: 550 TABLET ORAL at 05:25

## 2024-11-07 RX ADMIN — SODIUM CHLORIDE: 9 INJECTION, SOLUTION INTRAVENOUS at 04:50

## 2024-11-07 RX ADMIN — OXYMETAZOLINE HYDROCHLORIDE 2 SPRAY: 0.5 SPRAY NASAL at 18:21

## 2024-11-07 RX ADMIN — DIBASIC SODIUM PHOSPHATE, MONOBASIC POTASSIUM PHOSPHATE AND MONOBASIC SODIUM PHOSPHATE 500 MG: 852; 155; 130 TABLET ORAL at 08:39

## 2024-11-07 RX ADMIN — THIAMINE HYDROCHLORIDE 100 MG: 100 INJECTION, SOLUTION INTRAMUSCULAR; INTRAVENOUS at 05:24

## 2024-11-07 RX ADMIN — ALBUMIN (HUMAN) 62.5 G: 0.25 INJECTION, SOLUTION INTRAVENOUS at 18:26

## 2024-11-07 RX ADMIN — DIBASIC SODIUM PHOSPHATE, MONOBASIC POTASSIUM PHOSPHATE AND MONOBASIC SODIUM PHOSPHATE 500 MG: 852; 155; 130 TABLET ORAL at 13:08

## 2024-11-07 RX ADMIN — DIBASIC SODIUM PHOSPHATE, MONOBASIC POTASSIUM PHOSPHATE AND MONOBASIC SODIUM PHOSPHATE 500 MG: 852; 155; 130 TABLET ORAL at 23:13

## 2024-11-07 RX ADMIN — DIBASIC SODIUM PHOSPHATE, MONOBASIC POTASSIUM PHOSPHATE AND MONOBASIC SODIUM PHOSPHATE 500 MG: 852; 155; 130 TABLET ORAL at 18:05

## 2024-11-07 RX ADMIN — RIFAXIMIN 550 MG: 550 TABLET ORAL at 18:05

## 2024-11-07 RX ADMIN — OXYMETAZOLINE HYDROCHLORIDE 2 SPRAY: 0.5 SPRAY NASAL at 05:26

## 2024-11-07 RX ADMIN — GABAPENTIN 200 MG: 100 CAPSULE ORAL at 20:23

## 2024-11-07 RX ADMIN — FLUOXETINE HYDROCHLORIDE 10 MG: 10 CAPSULE ORAL at 05:23

## 2024-11-07 RX ADMIN — SUCRALFATE ORAL 1 G: 1 SUSPENSION ORAL at 18:05

## 2024-11-07 RX ADMIN — CEFTRIAXONE SODIUM 2000 MG: 10 INJECTION, POWDER, FOR SOLUTION INTRAVENOUS at 05:24

## 2024-11-07 RX ADMIN — PANTOPRAZOLE SODIUM 40 MG: 40 INJECTION, POWDER, FOR SOLUTION INTRAVENOUS at 18:05

## 2024-11-07 RX ADMIN — SUCRALFATE ORAL 1 G: 1 SUSPENSION ORAL at 23:14

## 2024-11-07 RX ADMIN — LACTULOSE 45 ML: 10 SOLUTION ORAL at 13:09

## 2024-11-07 RX ADMIN — HEPARIN SODIUM 32 UNITS/KG/HR: 5000 INJECTION, SOLUTION INTRAVENOUS at 20:02

## 2024-11-07 RX ADMIN — MAGNESIUM SULFATE HEPTAHYDRATE 2 G: 2 INJECTION, SOLUTION INTRAVENOUS at 08:43

## 2024-11-07 ASSESSMENT — ENCOUNTER SYMPTOMS
BLOOD IN STOOL: 0
CONSTIPATION: 0
CHILLS: 0
FOCAL WEAKNESS: 0
NAUSEA: 0
DEPRESSION: 0
DOUBLE VISION: 0
PHOTOPHOBIA: 0
SENSORY CHANGE: 0
NECK PAIN: 0
WEIGHT LOSS: 0
EYE PAIN: 0
DIARRHEA: 0
HALLUCINATIONS: 0
TINGLING: 0
DIZZINESS: 0
WEAKNESS: 1
BLURRED VISION: 0
VOMITING: 0
ABDOMINAL PAIN: 0
BACK PAIN: 0
SHORTNESS OF BREATH: 0
COUGH: 0
HEADACHES: 0
PALPITATIONS: 0
TREMORS: 0
MYALGIAS: 0
ORTHOPNEA: 0
SPUTUM PRODUCTION: 0
FEVER: 0
SPEECH CHANGE: 0
MEMORY LOSS: 1
TREMORS: 1
HEARTBURN: 0

## 2024-11-07 ASSESSMENT — PAIN DESCRIPTION - PAIN TYPE
TYPE: ACUTE PAIN

## 2024-11-07 ASSESSMENT — LIFESTYLE VARIABLES: SUBSTANCE_ABUSE: 0

## 2024-11-07 ASSESSMENT — FIBROSIS 4 INDEX: FIB4 SCORE: 6.31

## 2024-11-07 NOTE — DOCUMENTATION QUERY
"                                                                         [facility]                                                                       Query Response Note      PATIENT:               ROE JJ  ACCT #:                  [id number]  MRN:                     [id number]  :                      1965  ADMIT DATE:       10/31/2024 2:44 PM  DISCH DATE:          RESPONDING  PROVIDER #:        [id number]           QUERY TEXT:    \"Severe protein -calorie malnutrition\" is documented in the record.  Per Registered Dietician Assessment  on - documented \"moderate chronic malnutrition.\"  Please clarify the nutritional status based upon the clinical indicators and treatment.     Note: If a query response diagnosis is provided, please include it in your daily notes and discharge summary.    The patient's Clinical Indicators include:  Clinical Findings:    -  note- Jeni - 'Consult received for MD diagnosis of malnutrition\"    \"Nutrition Diagnosis:    Per RD note 24: \"Moderate malnutrition in the context of chronic illness related to cirrhosis as evidenced by <75% of estimated energy requirements for >1 month, moderate muscle loss (temporal, dorsal hand, and clavicle) and  mild subcutaneous fat loss (orbital).\" Moderate chronic malnutrition r/t cirrhosis is an ongoing dx AEB pt continues to present w/ mild muscle wasting and mild fat wasting. \"    \"Body mass index is 21.45 kg/m².\"    Risk factors: Malnutrition, septic shock, SBP; lactic acidosis; alcoholic cirrhosis with ascites; hypoxia, superior mesenteric vein thrombosis; encephalopathy; bacteremia; EFRAIN; hyponatremia;     Treatment:  RD consult, ensure compact TID; monitor nutrition, additional fluids per MD/DO;     Please contact me with any questions:    Angle ADAMS RN CCDS  CDI [facility]  [email or web address]  Angle Alvarez via Voalte  Options provided:   -- Malnutrition, Moderate Protein Calorie   -- Malnutrition, " Severe Protein Calorie   -- Other explanation, (please specify other explanation)      Query created by: Angle Alvarez on 11/6/2024 5:55 PM    RESPONSE TEXT:    Malnutrition, Moderate Protein Calorie          Electronically signed by:  WALKER FARLEY MD 11/7/2024 3:57 PM

## 2024-11-07 NOTE — PROGRESS NOTES
Gastroenterology Progress Note               Author:  Oumou OWENS Date & Time Created: 11/7/2024 7:11 AM       Patient ID:  Name:             Kavita Freeman  YOB: 1965  Age:                 59 y.o.  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     History of Present Illness:   59 years old female with medical history of alcohol-related liver injury, cirrhosis, decompensation with variceal bleeding and refractory ascites formation, mild to moderate encephalopathy.  Presented to hospital this time for fluid accumulation, acute kidney injury.  GI team is consulted for recent GI bleeding     The patient has primary GI liver doctor in [place] and she is waiting for the transplantation evaluation at [facility] in the coming 2 months.  We do not have any records to support this plan, however the patient is very certain about the [facility] appointment.     For the bleeding, the patient had nausea vomiting in the last 2 days, the patient saw blood and also some coffee-ground like vomitus.  Tarry stool was noted.  Last upper GI scope August 15 with variceal bleeding and banding.     Interval History:  11/2/2024: Patient seen at bedside.  Awake, alert.   Discussed EGD findings with patient.  Inquired regarding last EGD as, per chart review, last EGD was 2-1/2 months ago.  Patient unable to discern if she has had EGD since last documented.  Positive asterixis.  Denies bowel movements overnight.  Started on rifaximin and lactulose.  Hemoglobin stable.  Abdomen tender with light palpation.  No WBC this morning, but yesterday was 39. ultrasound abdomen pending.      11/3/2024: Patient seen at bedside.  Disoriented, unable to answer orientation questions.  Abdomen distended, significantly increase in tenderness this morning, hypoactive bowel sounds and tympany with percussion.  CT abdomen and CBC pending.  No bowel movements overnight.    Update 1222: CT abdomen with findings including fluid-filled small bowel and colonic loops favoring ileus, no evidence of bowel perforation, cirrhosis with portal hypertension, moderate to large volume ascites, nonocclusive SMV thrombosis, cholelithiasis, subtle findings concerning for pulmonary emboli.      11/4/2024: Patient seen at bedside.  Disoriented, oriented to self only.  Abdomen remains distended, tender with light palpation, hypoactive bowel sounds.  Patient with multiple brown bowel movements overnight.  Started on heparin yesterday.  WBC 29.8, hemoglobin stable at 12.5, platelets 197.  Sodium 127, BUN 56, creatinine improved to 1.3.  Albumin yesterday 2.6.  INR yesterday 2.35.  MELD 3.0 as of 11/3/2024 31    11/5/2024: Patient seen and examined.  Appears to be in distress, tachypneic, unable to participate in interview.  Grimaces when I touch her abdomen.  I was concerned and got Dr. Charles who then examined the patient.  We ordered KUB, chest x-ray, ABG, further labs.  3 BM last 24 hours.     Found to have significant lactic acidosis, INR 3.64 (difficult to interpret in the context of heparin drip), ABG with acute respiratory alkalosis.  Creatinine increasing to 1.54, T. bili 8.  KUB dilated loops of bowel suspicious for  gastroenteritis or inflammatory changes.  Chest x-ray with atelectasis.      11/6/2024: Patient seen in collaboration with Dr. Solorzano. Son at bedside, all questions answered. WBC now 36.6, T. Bili 9.8, creatinine uptrending. MELD 3.0 - 37 (Although INR not reliable in the setting of heparin drip)    11/7/2024: Patient seen with mom and dad at bedside.  Significantly improved, alert and oriented and eating food.  She remembered me from when I met her before.  WBC 26.9, hgb 9.6, plt 112, cr 1.38. Urinary casts present. Renal US negative.     Hospital Medications:  Current Facility-Administered Medications   Medication Dose Frequency Provider Last Rate Last Admin    NS infusion   Continuous Claudia Solorzano M.D. 83 mL/hr at 11/07/24 0450 New Bag at 11/07/24 0450    albumin human 25% solution 62.5 g  62.5 g Q EVENING Claudia Solorzano M.D. 150 mL/hr at 11/06/24 1733 62.5 g at 11/06/24 1733    [Held by provider] furosemide (Lasix) tablet 40 mg  40 mg Q DAY Quang Durham D.O.   40 mg at 11/05/24 0512    oxymetazoline (Afrin) 0.05 % nasal spray 2 Spray  2 [place] BID Quang Durham D.O.   2 [place] at 11/07/24 0526    cefTRIAXone (Rocephin) syringe 2,000 mg  2,000 mg Q24HRS Claudia Solorzano M.D.   2,000 mg at 11/07/24 0524    heparin infusion 25,000 units in 500 mL 0.45% NACL  0-30 Units/kg/hr Continuous Qunag Durham D.OSandy 35.1 mL/hr at 11/07/24 0646 28 Units/kg/hr at 11/07/24 0646    heparin injection 2,500 Units  40 Units/kg PRN Quang Durham D.O.   2,500 Units at 11/05/24 0346    lactulose 20 GM/30ML solution 45 mL  45 mL 4X/DAY Quang Durham D.O.   45 mL at 11/06/24 1126    riFAXIMin (Xifaxan) tablet 550 mg  550 mg BID TRUDY BrumfieldOSandy   550 mg at 11/07/24 0525    acetaminophen (Tylenol) tablet 650 mg  650 mg Q6HRS PRN Adilia Gonzales D.O.        Pharmacy Consult Request ...Pain Management Review 1 Each  1 Each PHARMACY TO DOSE Adilia Gonzales,  D.O.        oxyCODONE immediate-release (Roxicodone) tablet 2.5 mg  2.5 mg Q3HRS PRN Adilia Gonzales, D.O.   2.5 mg at 11/01/24 1230    Or    oxyCODONE immediate-release (Roxicodone) tablet 5 mg  5 mg Q3HRS PRN Adilia Gonzales, D.O.   5 mg at 11/03/24 0519    Or    HYDROmorphone (Dilaudid) injection 0.25 mg  0.25 mg Q3HRS PRN Adilia Gonzales, D.O.   0.25 mg at 11/06/24 0311    ondansetron (Zofran) syringe/vial injection 4 mg  4 mg Q4HRS PRN Adilia Gonzales, D.O.   4 mg at 11/04/24 1019    ondansetron (Zofran ODT) dispertab 4 mg  4 mg Q4HRS PRN Adilia Gonzales D.O.        promethazine (Phenergan) tablet 12.5-25 mg  12.5-25 mg Q4HRS PRN Adilia Gonzales, D.O.   25 mg at 11/04/24 1116    promethazine (Phenergan) suppository 12.5-25 mg  12.5-25 mg Q4HRS PRN Adilia Gonzales, D.O.        prochlorperazine (Compazine) injection 5-10 mg  5-10 mg Q4HRS PRN Adilia Gonzales, D.O.   10 mg at 11/04/24 1228    guaiFENesin dextromethorphan (Robitussin DM) 100-10 MG/5ML syrup 10 mL  10 mL Q6HRS PRN Adilia Gonzales, D.O.        pantoprazole (Protonix) injection 40 mg  40 mg BID Adilia Gonzales, D.O.   40 mg at 11/07/24 0524    dextrose 50% (D50W) injection 25 g  25 g Q15 MIN PRN Adilia Gonzales D.O.   25 g at 11/01/24 1843    FLUoxetine (PROzac) capsule 10 mg  10 mg QAM Adilia Gonzales, D.O.   10 mg at 11/07/24 0523    gabapentin (Neurontin) capsule 200 mg  200 mg Nightly Adilia Gonzales D.O.   200 mg at 11/06/24 2128    hydrOXYzine HCl (Atarax) tablet 25 mg  25 mg TID PRN Adilia Gonzales D.O.        lidocaine (Asperflex) 4 % patch 1 Patch  1 Patch Q24HRS ASTRID Raza.O.   1 Patch at 11/06/24 0530    sucralfate (Carafate) 1 GM/10ML suspension 1 g  1 g Q6HRS Adilia Gonzales D.O.   1 g at 11/07/24 0523    [Held by provider] midodrine (Proamatine) tablet 5 mg  5 mg TID WITH MEALS Adilia Gonzales D.O.   5 mg at 11/02/24 1805   Last reviewed on 11/1/2024  9:17 AM by Celine Bustamante, RSandyN.       Review of Systems:  Review of  "Systems   Constitutional:  Positive for malaise/fatigue. Negative for chills and fever.   HENT:  Negative for hearing loss.    Eyes:  Negative for blurred vision.   Respiratory:  Negative for cough and shortness of breath.    Cardiovascular:  Negative for chest pain and leg swelling.   Gastrointestinal:  Negative for abdominal pain, blood in stool, constipation, diarrhea, heartburn, melena, nausea and vomiting.   Genitourinary:  Negative for dysuria.   Musculoskeletal:  Negative for back pain.   Skin:  Negative for rash.   Neurological:  Positive for tremors and weakness. Negative for dizziness.   Psychiatric/Behavioral:  Positive for memory loss. Negative for depression.    All other systems reviewed and are negative.      Vital signs:  Weight/BMI: Body mass index is 21.79 kg/m².  /66   Pulse 94   Temp 37.5 °C (99.5 °F) (Bladder)   Resp (!) 22   Ht 1.727 m (5' 8\")   Wt 65 kg (143 lb 4.8 oz)   SpO2 94%   Vitals:    11/06/24 1300 11/06/24 1500 11/06/24 1745 11/07/24 0026   BP: 118/75  113/66    Pulse: 85 92 94    Resp: 20 (!) 21 (!) 22    Temp:       TempSrc:       SpO2: 95% 93% 94%    Weight:    65 kg (143 lb 4.8 oz)   Height:         Oxygen Therapy:  Pulse Oximetry: 94 %, O2 (LPM): 0, O2 Delivery Device: None - Room Air    Intake/Output Summary (Last 24 hours) at 11/7/2024 0711  Last data filed at 11/7/2024 0450  Gross per 24 hour   Intake 1440 ml   Output 1825 ml   Net -385 ml       Physical Exam  Vitals and nursing note reviewed.   Constitutional:       General: She is not in acute distress.     Appearance: She is cachectic. She is ill-appearing.   HENT:      Head: Normocephalic.      Nose: Nose normal. No congestion.   Eyes:      General: Scleral icterus present.      Extraocular Movements: Extraocular movements intact.      Conjunctiva/sclera: Conjunctivae normal.   Cardiovascular:      Rate and Rhythm: Normal rate and regular rhythm.      Pulses: Normal pulses.      Heart sounds: Murmur heard. "   Pulmonary:      Effort: Pulmonary effort is normal.      Comments: Diminished throughout  Abdominal:      Tenderness: There is abdominal tenderness.      Comments:      Musculoskeletal:      Right lower leg: No edema.      Left lower leg: No edema.   Skin:     General: Skin is warm and dry.      Capillary Refill: Capillary refill takes less than 2 seconds.      Coloration: Skin is jaundiced.   Neurological:      Mental Status: She is oriented to person, place, and time.      Motor: Weakness present.       Labs:  Recent Labs     11/05/24  0846 11/06/24 0431 11/07/24 0215   SODIUM 132* 135 133*   POTASSIUM 4.2 3.8 3.4*   CHLORIDE 97 102 101   CO2 16* 20 19*   BUN 61* 68* 56*   CREATININE 1.54* 1.63* 1.38   MAGNESIUM  --  2.4 1.8   PHOSPHORUS 2.6 4.8* 2.0*   CALCIUM 9.5 9.6 8.8     Recent Labs     11/05/24  0846 11/06/24 0431 11/07/24 0215   ALTSGPT 89* 72* 48   ASTSGOT 193* 153* 112*   ALKPHOSPHAT 153* 143* 112*   TBILIRUBIN 8.0* 9.8* 9.6*   GLUCOSE 70 81 104*     Recent Labs     11/05/24  0846 11/06/24 0431 11/07/24 0215   WBC 28.9* 36.6* 26.9*   ASTSGOT 193* 153* 112*   ALTSGPT 89* 72* 48   ALKPHOSPHAT 153* 143* 112*   TBILIRUBIN 8.0* 9.8* 9.6*     Recent Labs     11/05/24  0846 11/05/24  1358 11/06/24 0431 11/07/24 0215   RBC 4.46  --  4.02* 3.33*   HEMOGLOBIN 12.8  --  11.5* 9.6*   HEMATOCRIT 36.9*  --  34.7* 28.6*   PLATELETCT 180  --  163* 112*   PROTHROMBTM  --  36.5*  --   --    INR  --  3.64*  --   --      Recent Results (from the past 24 hours)   URINALYSIS    Collection Time: 11/06/24  4:00 PM    Specimen: Urine, Herrera Cath   Result Value Ref Range    Color Dark Yellow     Character Clear     Specific Gravity 1.015 <1.035    Ph 5.5 5.0 - 8.0    Glucose Negative Negative mg/dL    Ketones Negative Negative mg/dL    Protein Negative Negative mg/dL    Bilirubin Moderate (A) Negative    Urobilinogen, Urine 1.0 <=1.0 EU/dL    Nitrite Negative Negative    Leukocyte Esterase Trace (A) Negative    Occult  Blood Large (A) Negative    Micro Urine Req Microscopic    URINE MICROSCOPIC (W/UA)    Collection Time: 11/06/24  4:00 PM   Result Value Ref Range    WBC 0-2 /hpf    RBC 21-50 (A) 0 - 2 /hpf    Bacteria None Seen None /hpf    Epithelial Cells 3-5 0 - 5 /hpf    Urine Casts 6-10 (A) 0 - 2 /lpf   PHOSPHORUS    Collection Time: 11/07/24  2:15 AM   Result Value Ref Range    Phosphorus 2.0 (L) 2.5 - 4.5 mg/dL   MAGNESIUM    Collection Time: 11/07/24  2:15 AM   Result Value Ref Range    Magnesium 1.8 1.5 - 2.5 mg/dL   Comp Metabolic Panel    Collection Time: 11/07/24  2:15 AM   Result Value Ref Range    Sodium 133 (L) 135 - 145 mmol/L    Potassium 3.4 (L) 3.6 - 5.5 mmol/L    Chloride 101 96 - 112 mmol/L    Co2 19 (L) 20 - 33 mmol/L    Anion Gap 13.0 7.0 - 16.0    Glucose 104 (H) 65 - 99 mg/dL    Bun 56 (H) 8 - 22 mg/dL    Creatinine 1.38 0.50 - 1.40 mg/dL    Calcium 8.8 8.5 - 10.5 mg/dL    Correct Calcium 9.7 8.5 - 10.5 mg/dL    AST(SGOT) 112 (H) 12 - 45 U/L    ALT(SGPT) 48 2 - 50 U/L    Alkaline Phosphatase 112 (H) 30 - 99 U/L    Total Bilirubin 9.6 (H) 0.1 - 1.5 mg/dL    Albumin 2.9 (L) 3.2 - 4.9 g/dL    Total Protein 5.7 (L) 6.0 - 8.2 g/dL    Globulin 2.8 1.9 - 3.5 g/dL    A-G Ratio 1.0 g/dL   CBC WITHOUT DIFFERENTIAL    Collection Time: 11/07/24  2:15 AM   Result Value Ref Range    WBC 26.9 (H) 4.8 - 10.8 K/uL    RBC 3.33 (L) 4.20 - 5.40 M/uL    Hemoglobin 9.6 (L) 12.0 - 16.0 g/dL    Hematocrit 28.6 (L) 37.0 - 47.0 %    MCV 85.9 81.4 - 97.8 fL    MCH 28.8 27.0 - 33.0 pg    MCHC 33.6 32.2 - 35.5 g/dL    RDW 57.8 (H) 35.9 - 50.0 fL    Platelet Count 112 (L) 164 - 446 K/uL    MPV 10.5 9.0 - 12.9 fL   ESTIMATED GFR    Collection Time: 11/07/24  2:15 AM   Result Value Ref Range    GFR (CKD-EPI) 45 (A) >60 mL/min/1.73 m 2   Heparin Xa (Unfractionated)    Collection Time: 11/07/24  5:07 AM   Result Value Ref Range    Heparin Xa (UFH) 0.12 IU/mL       Radiology Review:  US-RENAL   Final Result      1.  No hydronephrosis.   2.   Ascites.      DX-CHEST-PORTABLE (1 VIEW)   Final Result      1.  No acute cardiac or pulmonary abnormalities are identified.      2.  Slight left basilar atelectasis.      [id number] VIEW   Final Result      1.  Several markedly dilated loops of small bowel in the mid abdomen suspicious for gastroenteritis or inflammatory change. Recommend follow-up abdominal series to rule out evolving small bowel obstruction.      EC-ECHOCARDIOGRAM COMPLETE W/O CONT   Final Result      CT-ABDOMEN-PELVIS W/O   Final Result      1.  Dilated fluid-filled small bowel and colonic loops favoring ileus.   2.  No evidence of bowel perforation.   3.  Hepatic cirrhosis with portal hypertension and moderate to large volume ascites.   4.  Nonocclusive superior mesenteric vein thrombosis.   5.  Cholelithiasis.   6.  Bibasilar atelectasis with minimal bilateral pleural fluid.   7.  Subtle findings concerning for pulmonary emboli.      These findings were electronically conveyed to JAMES WILLIAM on 11/3/2024 11:50 AM.         US-ABDOMEN COMPLETE SURVEY   Final Result      1.  Cirrhosis with stigmata of portal hypertension including splenomegaly, recanalized umbilical vein and ascites.   2.  Gallbladder sludge and stones. Gallbladder wall thickening is nonspecific and could be related to liver disease but correlate clinically for evidence of acute cholecystitis. No biliary dilatation.         DX-CHEST-PORTABLE (1 VIEW)   Final Result      1.  Hazy left lower lobe opacity could represent atelectasis or pneumonitis.   2.  There is linear scarring or atelectasis in the right lung base.        MDM (Data Review):   -Records reviewed and summarized in current documentation  -I personally reviewed and interpreted the laboratory results  -I personally reviewed the radiology images    Assessment/Recommendations:  Acute liver failure- MELD 3.0- 38 on arrival (10/31/2024); 26.8% 90-day survival. Child-Garza Class C  SMV thrombosis  Portal  hypertensive gastropathy  Ileus  Cirrhosis with ascites  SBP  Hepatic encephalopathy  Coagulopathy  Esophageal varices with banding in situ  Hematemesis  Severe protein calorie malnutrition  History of decompensated liver disease with variceal bleeding, refractory ascites  Salmonella bacteremia - pending review by State lab  Sepsis  EFRAIN HRS  Respiratory alkalosis    Plan:  11/5/2024  Significant clinical decompensation overnight.  Consistent with multiorgan failure.  Discussed with [facility] hepatology who have agreed to accept pending ICU acceptance.  Attended lengthy bedside discussion with family in collaboration with Dr. Solorzano.  Randa shows DNR/DNI when she was alert.  Significant concern that she would not be able to transfer to [facility] without being intubated  Family discussing amongst themselves regarding next steps: reverse DNI/DNR and pursue transfer vs comfort care vs home with hospice    Completed 72 hours Octreotide  Low sodium diet  Continue PPI IV twice daily  Continue ceftriaxone for SBP and salmonella bacteremia  Continue lactulose and Rifaximin    Heparin for SMV thrombosis/ possible PE  Serial abdominal exams    11/6/2024  [facility] for [facility] consideration  Now with EFRAIN  Continue IV fluids, obtain UA and renal ultrasound  Check am creatinine, if no improvement start 1 G/kg albumin x 3 days    11/7/2024:  UA with casts, renal US negative  Creatinine improved on fluids and IV albumin   Will continue current plan of care    Long term if patient survives admission:   Patient will need repeat EGD in 4-6 weeks  Nonselective beta-blocker as outpatient    GI will follow    Discussed with patient' family, nursing, Dr. Solorzano, Dr. Hyde    ..Oumou Mart, CHANDANA,  APRN    Core Quality Measures   Reviewed items::  Labs, Medications and Radiology reports reviewed

## 2024-11-07 NOTE — DISCHARGE PLANNING
Care Transition Team Assessment  Patient is a 59 year old female admitted for Septic Shock. Please see patient's H&P for prior medical history.   Patient lives in a mobile home with her significant other.   Prior to admission patient was independent with ADL's and IADL's. She does not use any DME at baseline. Patient's PCP is Dr.Heather Vaca.  Patient's preferred pharmacy is [facility] in [place]. Patient does have a history of ETOH abuse. Patient's family is a good support for her.       Information Source  Orientation Level: Oriented to person, Disoriented to time, Disoriented to situation, Disoriented to place  Information Given By: Patient  Who is responsible for making decisions for patient? : Patient    Readmission Evaluation  Is this a readmission?: No    Elopement Risk  Legal Hold: No  Ambulatory or Self Mobile in Wheelchair: No-Not an Elopement Risk  Elopement Risk: Not at Risk for Elopement    Interdisciplinary Discharge Planning  Lives with - Patient's Self Care Capacity:  (Pt unable to report, unknown PLOF)  Patient or legal guardian wants to designate a caregiver: No    Discharge Preparedness  What is your plan after discharge?: Home with help  What are your discharge supports?: Partner  Prior Functional Level: Independent with Activities of Daily Living, Independent with Medication Management    Functional Assesment  Prior Functional Level: Independent with Activities of Daily Living, Independent with Medication Management    Finances  Financial Barriers to Discharge: No  Prescription Coverage: Yes    Advance Directive  Advance Directive?: None    Domestic Abuse  Have you ever been the victim of abuse or violence?: No  Physical Abuse or Sexual Abuse: No  Verbal Abuse or Emotional Abuse: No    Psychological Assessment  History of Substance Abuse: None  History of Psychiatric Problems: No  Non-compliant with Treatment: No  Newly Diagnosed Illness: Yes    Discharge Risks or Barriers  Discharge risks or  barriers?: Complex medical needs  Patient risk factors: Mental health    Anticipated Discharge Information  Discharge Disposition: Discharged to home/self care (01)

## 2024-11-07 NOTE — CARE PLAN
The patient is Stable - Low risk of patient condition declining or worsening    Shift Goals  Clinical Goals: Ensure adequate tissue oxygenation and prevent respiratory failure.  Ensure adequate tissue oxygenation and prevent respiratory failure. Maintain adequate fluid and electrolyte balance, particularly in the context of sepsis and alcohol use. Address the psychological needs of the patient and family during critical illness.  Patient Goals: Rest  Family Goals: Updates      Problem: Pain - Standard  Goal: Alleviation of pain or a reduction in pain to the patient’s comfort goal  Outcome: Progressing     Problem: Knowledge Deficit - Standard  Goal: Patient and family/care givers will demonstrate understanding of plan of care, disease process/condition, diagnostic tests and medications  Outcome: Progressing     Problem: Hemodynamics  Goal: Patient's hemodynamics, fluid balance and neurologic status will be stable or improve  Outcome: Progressing     Problem: Fluid Volume  Goal: Fluid volume balance will be maintained  Outcome: Progressing     Problem: Urinary - Renal Perfusion  Goal: Ability to achieve and maintain adequate renal perfusion and functioning will improve  Outcome: Progressing     Problem: Respiratory  Goal: Patient will achieve/maintain optimum respiratory ventilation and gas exchange  Outcome: Progressing     Problem: Skin Integrity  Goal: Skin integrity is maintained or improved  Outcome: Progressing     Problem: Fall Risk  Goal: Patient will remain free from falls  Outcome: Progressing

## 2024-11-07 NOTE — PROGRESS NOTES
Hospital Medicine Daily Progress Note    Date of Service  11/7/2024    Chief Complaint  Kavita Freeman is a 59 y.o. female admitted 10/31/2024 with needs paracentesis    Hospital Course  58yo PMHx ETOH abuse sober x 4 months, alcoholic cirrhosis, esophageal varices s/p banding.  Pt presented requesting a paracentesis and with fever and chills.  In ED WBCs 45, Na 117, CO2 16, AG 20, creat 2.1.  Para done in ED; cloudy fluid noted with nucleated cells >34k    Interval Problem Update    11/05/24    I evaluated and examined her at the bedside.  She was in respiratory distress and tachypneic.  I obtained ABG that showed significant hypoxia.  She also found to have severe lactic acidosis.  I discussed plan of care with intensivist Dr. Turcios.  Patient is DNR/DNI.  I reviewed her again at the bedside and discussed plan of care with patient's mother and explained her about her current medical condition.  I reevaluated her again at the bedside and discussed plan of care with patient's brother and sister-in-law and mother again.  Current oxygen saturation is 98%, current blood pressure is 158/89, pulse of 108.  I am continuing heparin gtt. and I am titrating as per Anti Xa levels and monitor for signs of bleeding.    Addendum 4:24 PM     I reevaluated her again at the bedside.  Now patient's son at the bedside discussed plan of care with him and regarding her current medical condition.  I discussed with him regarding advance care planning.  I have made him that he is currently DNR/DNI.  I gave both options that if family would like to change her CODE STATUS to full code and I also discussed about comfort care/hospice.  I updated him regarding multiorgan involvement and overall clinically not responding to treatment.    11/06/24    I evaluated and examined her at the bedside.  Clinically she looks slightly more alert and she is able to follow commands but she remains confused.  Currently she is requiring a liter of oxygen  via OxyMask to maintain oxygen saturation.  CMP showed BUN of 68 and creatinine of 1.63.  I am continuing IV fluid.  Liver enzymes remain elevated total bilirubin of 9.8.  Lactic acid level is trending down last lactic acid was 2.9.  Repeat blood culture negative to date  I started on albumin due to concern for hepatorenal syndrome.    I evaluated her again at the bedside this morning and discussed plan of care with patient's son at the bedside and updated him regarding plan of care.  I explained him difference between full code and DNR.  I also explained him that family can change her CODE STATUS if they would like to as patient cannot participate in advance care planning at this time.    Later this afternoon I met with several family members at the bedside and admitted them regarding her current medical condition and discussed with them regarding CODE STATUS also updated them regarding overall poor prognosis as she has multiorgan involvement.  Family expressed understanding.    11/07/24    I evaluated and examined her at the bedside.  This morning she was significantly alert and able to answer my questions appropriately.  Patient's parents and son at the bedside.  She has been having mild bleeding from her right nostril and I ordered Afrin spray.  Renal function has significantly improved with the help of albumin administration.  I am continuing heparin gtt. and I am titrating as per Anti Xa levels and monitor for signs of bleeding.    Today I discussed CODE STATUS with patient as now she is alert and oriented and able to answer questions.  I explained her CODE STATUS and explained the difference between full code and DNR.  Patient initially expressed that she does not want to be in vegetative state and wants patient family explained her and I also explained her at the same time.  Patient expressed that she would like to change her CODE STATUS to full code but she does not want to be in vegetative state for  long-term but she would like us to try to bring her back if her heart stopped beating or she stopped breathing.  After discussion and patient request I change her CODE STATUS from full code to DNR.  I updated bedside RN as well.      I have discussed this patient's plan of care and discharge plan at IDT rounds today with Case Management, Nursing, Nursing leadership, and other members of the IDT team.    Consultants/Specialty  GI    Code Status  Full Code    Disposition  The patient is not medically cleared for discharge to home or a post-acute facility.      I have placed the appropriate orders for post-discharge needs.    Review of Systems  Review of Systems   Constitutional:  Positive for malaise/fatigue. Negative for chills, fever and weight loss.   HENT:  Positive for nosebleeds. Negative for hearing loss and tinnitus.    Eyes:  Negative for blurred vision, double vision, photophobia and pain.   Respiratory:  Negative for cough, sputum production and shortness of breath.    Cardiovascular:  Negative for chest pain, palpitations, orthopnea and leg swelling.   Gastrointestinal:  Negative for abdominal pain, constipation, diarrhea, nausea and vomiting.   Genitourinary:  Negative for dysuria, frequency and urgency.   Musculoskeletal:  Negative for back pain, joint pain, myalgias and neck pain.   Skin:  Negative for rash.   Neurological:  Positive for weakness. Negative for dizziness, tingling, tremors, sensory change, speech change, focal weakness and headaches.   Psychiatric/Behavioral:  Negative for hallucinations and substance abuse.    All other systems reviewed and are negative.       Physical Exam  Pulse:  [82-94] 83  Resp:  [22-37] 22  BP: (110-124)/(58-66) 112/59  SpO2:  [90 %-95 %] 95 %    Physical Exam  Vitals reviewed.   Constitutional:       General: She is in acute distress.      Appearance: She is ill-appearing.      Comments: Cachectic  Significantly more alert this morning.   HENT:      Head:  Normocephalic and atraumatic.      Nose: No congestion.      Comments: Slight epistaxis from right nostril  Eyes:      General: Scleral icterus present.         Right eye: No discharge.         Left eye: No discharge.      Pupils: Pupils are equal, round, and reactive to light.   Cardiovascular:      Rate and Rhythm: Normal rate and regular rhythm.      Pulses: Normal pulses.      Heart sounds: Normal heart sounds. No murmur heard.  Pulmonary:      Effort: Respiratory distress present.      Breath sounds: No stridor.      Comments: Tachypneic  Abdominal:      General: Bowel sounds are normal. There is distension.      Palpations: Abdomen is soft.      Tenderness: There is no abdominal tenderness.   Musculoskeletal:         General: No swelling or tenderness. Normal range of motion.      Cervical back: Normal range of motion. No rigidity.   Skin:     General: Skin is warm.      Capillary Refill: Capillary refill takes less than 2 seconds.      Coloration: Skin is jaundiced. Skin is not pale.      Findings: No bruising.   Neurological:      General: No focal deficit present.      Mental Status: She is oriented to person, place, and time.      Cranial Nerves: No cranial nerve deficit.      Comments: She is alert and oriented and able to answer my questions appropriately and she is following commands.   Psychiatric:         Mood and Affect: Mood normal.         Behavior: Behavior normal.         Fluids    Intake/Output Summary (Last 24 hours) at 11/7/2024 1558  Last data filed at 11/7/2024 1455  Gross per 24 hour   Intake 1291.81 ml   Output 1775 ml   Net -483.19 ml        Laboratory  Recent Labs     11/05/24  0846 11/06/24  0431 11/07/24  0215 11/07/24  0950   WBC 28.9* 36.6* 26.9*  --    RBC 4.46 4.02* 3.33*  --    HEMOGLOBIN 12.8 11.5* 9.6* 9.8*   HEMATOCRIT 36.9* 34.7* 28.6* 29.1*   MCV 82.7 86.3 85.9  --    MCH 28.7 28.6 28.8  --    MCHC 34.7 33.1 33.6  --    RDW 53.8* 58.4* 57.8*  --    PLATELETCT 180 163* 112*   --    MPV 10.4 10.2 10.5  --      Recent Labs     11/06/24  0431 11/07/24  0215 11/07/24  0950   SODIUM 135 133* 127*   POTASSIUM 3.8 3.4* 3.5*   CHLORIDE 102 101 96   CO2 20 19* 20   GLUCOSE 81 104* 77   BUN 68* 56* 46*   CREATININE 1.63* 1.38 1.07   CALCIUM 9.6 8.8 8.4*     Recent Labs     11/05/24  1358   INR 3.64*               Imaging  US-RENAL   Final Result      1.  No hydronephrosis.   2.  Ascites.      DX-CHEST-PORTABLE (1 VIEW)   Final Result      1.  No acute cardiac or pulmonary abnormalities are identified.      2.  Slight left basilar atelectasis.      NA-AXRBCHR-0 VIEW   Final Result      1.  Several markedly dilated loops of small bowel in the mid abdomen suspicious for gastroenteritis or inflammatory change. Recommend follow-up abdominal series to rule out evolving small bowel obstruction.      EC-ECHOCARDIOGRAM COMPLETE W/O CONT   Final Result      CT-ABDOMEN-PELVIS W/O   Final Result      1.  Dilated fluid-filled small bowel and colonic loops favoring ileus.   2.  No evidence of bowel perforation.   3.  Hepatic cirrhosis with portal hypertension and moderate to large volume ascites.   4.  Nonocclusive superior mesenteric vein thrombosis.   5.  Cholelithiasis.   6.  Bibasilar atelectasis with minimal bilateral pleural fluid.   7.  Subtle findings concerning for pulmonary emboli.      These findings were electronically conveyed to JAMES WILLIAM on 11/3/2024 11:50 AM.         US-ABDOMEN COMPLETE SURVEY   Final Result      1.  Cirrhosis with stigmata of portal hypertension including splenomegaly, recanalized umbilical vein and ascites.   2.  Gallbladder sludge and stones. Gallbladder wall thickening is nonspecific and could be related to liver disease but correlate clinically for evidence of acute cholecystitis. No biliary dilatation.         DX-CHEST-PORTABLE (1 VIEW)   Final Result      1.  Hazy left lower lobe opacity could represent atelectasis or pneumonitis.   2.  There is linear  scarring or atelectasis in the right lung base.           Assessment/Plan  * Septic shock (HCC)- (present on admission)  Assessment & Plan  Present on admission  Source SBP  Gram Neg rods in blood, Salmonella species by PCR.  Sent to stat lab for fiinal ID and sensitivities   Second organism G+C neg for staph a.  Suspect contaminant  Cont Ceftriaxone  Have been able to titrate off of midodrine  Repeat cultures today  Repeat CT 11/3 did not show any perf, abscess etc  Previously it was discussed with ID who agree with current treatment plan  Repeat blood cultures negative to date.  Her blood pressure is currently stable.  I am continuing IV antibiotics.    Lactic acidosis  Assessment & Plan  She found to have severe lactic acidosis with lactic acid level of 8.3.  Lactic acid level is trending down.  Now hemodynamically stable.    Hypoxia  Assessment & Plan  Overall her mentation has improved significantly.  Change her CODE STATUS from DNR to full code on November 7, 2024.    Superior mesenteric vein thrombosis (HCC)  Assessment & Plan  New finding on CT 11/3  GI is following her.  I discussed plan of care with GI team.  I am continuing heparin gtt. and I am titrating as per Anti Xa levels and monitor for signs of bleeding.  She has mild bleeding from her right nostril.  Ordered repeat hemoglobin and it remained stable.        Acute encephalopathy  Assessment & Plan  hypoNa vs HE vs sepsis or more likely components of both  Remain encephalopathic.  Have been agressively titrating up her HE meds and overnight she has started to have BMs  Treating sepsis  Now significantly improved she is able to answer my questions appropriately.    Severe protein-calorie malnutrition ([place]: less than 60% of standard weight) ([facility])  Assessment & Plan  Nutrition consult    Bacteremia due to Gram-negative bacteria  Assessment & Plan  Source SBP  S/p tap of 5000ml 10/31  Salmonella species based on PCR: sent to state lab for final ID and  sensitivities  Check TTE  Repeat CT 11/3 did not show any focal infection/abscess/perf  Repeat cutlures 11/3 remain neg thus far  She remains afebrile  I am continue IV antibiotics.      Advance care planning- (present on admission)  Assessment & Plan  Currently she is DNR/DNI and I had a lengthy discussion with patient's mother at the bedside and later with patient's brother sister-in-law.  I discussed problem by problem and explained them overall very guarded prognosis due to multiorgan involvement.    11/06/24  I evaluated her again at the bedside this morning and discussed plan of care with patient's son at the bedside and updated him regarding plan of care.  I explained him difference between full code and DNR.  I also explained him that family can change her CODE STATUS if they would like to as patient cannot participate in advance care planning at this time.    Later this afternoon I met with several family members at the bedside and admitted them regarding her current medical condition and discussed with them regarding CODE STATUS also updated them regarding overall poor prognosis as she has multiorgan involvement.  Family expressed understanding.  Time spent 18 minutes    11/07/24    Today I discussed CODE STATUS with patient as now she is alert and oriented and able to answer questions.  I explained her CODE STATUS and explained the difference between full code and DNR.  Patient initially expressed that she does not want to be in vegetative state and wants patient family explained her and I also explained her at the same time.  Patient expressed that she would like to change her CODE STATUS to full code but she does not want to be in vegetative state for long-term but she would like us to try to bring her back if her heart stopped beating or she stopped breathing.  After discussion and patient request I change her CODE STATUS from full code to DNR.  I updated bedside RN as well.  Time spent 17  minutes    Hypoglycemia- (present on admission)  Assessment & Plan  Likely due to poor oral intake as patient reports reduced appetite, lack of glucneogenesis due to cirrhosis  -Patient also finished prednisolone 2 days ago, could be adrenal insufficiency  Reg diet  Nutrition consult  Close monitoring of BG  Ordered CMP this morning.    Transaminitis- (present on admission)  Assessment & Plan  Patient reports she already finished a 28-day course of prednisone at home  Likely worsening due to sepsis/SBP  Ultrasound neg for PVT, did show cirrhosis. GB with some sludge and non specific wall thickening.  Can be normal in setting of ascites.  Liver enzymes remain elevated bilirubin worsening.  Ordered CMP for tomorrow.  Overall mentation has significantly improved.      High anion gap metabolic acidosis- (present on admission)  Assessment & Plan  Due to lactic acidosis likely from right liver disease and sepsis  IV thiamine  resolved    Hyponatremia- (present on admission)  Assessment & Plan  Sodium 117 on admission  Elena<20  UOsm>300  Hypervolemic hypoNa  Has responded to IV lasix and now in upper 120s.   Change IV lasix to po and start spironolactone  Current sodium level is 127  I ordered CMP for tomorrow morning.    Acute renal failure (ARF) (HCC)- (present on admission)  Assessment & Plan  Creatinine 2.11 on admission  HRS vs sepsis vs hypovlemia  Of note pt had a paracentesis one week prior to presentation with no albumin  Good UOP   Likely due to sepsis  Avoid nephrotoxic medications  No evidence of obstruction on CT  Creat now 1.07; still elevated given her muscle wasting  Due to concern for hepatorenal syndrome I started her on albumin.  Overall renal function is significantly improved  Order lab workup for tomorrow.      Alcoholic cirrhosis of liver with ascites (HCC)- (present on admission)  Assessment & Plan  F/b Dr [facility]  ETOH induced, sober since June  Has been referred to [facility] transplant  service with appointment in [place]   MELD 32 on presentation  Status post paracentesis 5000ml 10/31  S/p albumin following tap  SBP based on fluid cell count  Rocephin  Trial off of midodrine  No BM in 72hrs and more confused today.  Picture is complicated by possible ileus noted on CT  -cont rifaximin  -increase lactulose po from 45mg TID to q4   -start lactulose retention enema  [facility] declined transfer    Continue lactulose  Discontinued Lasix due to abnormal kidney functions and very poor oral intake  Discontinue spironolactone  She responded well to IV fluid and albumin.    Depression with anxiety- (present on admission)  Assessment & Plan  Continue home fluoxetine    I discussed plan of care during multidisciplinary rounds regarding patient's current medical condition and plan of care.        VTE prophylaxis: I am continuing heparin gtt. and I am titrating as per Anti Xa levels and monitor for signs of bleeding.      I have performed a physical exam and reviewed and updated ROS and Plan today (11/7/2024). In review of yesterday's note (11/6/2024), there are no changes except as documented above.

## 2024-11-07 NOTE — PROGRESS NOTES
Assumed care of patient and received report from KAYLEY Whittaker. Assessment completed.Pt A&Ox 1. Respirations are even and unlabored on room air. Call light and belongings are within reach. Bed at the lowest position and bed alarm on. Tele monitor in place. POC updated. Pt educated on room and call light, pt verbalized understanding. Needs met.

## 2024-11-07 NOTE — CARE PLAN
The patient is Watcher - Medium risk of patient condition declining or worsening    Shift Goals  Clinical Goals: monitor labs, hemodynamic stability  Patient Goals: rest  Family Goals: not present    Problem: Knowledge Deficit - Standard  Goal: Patient and family/care givers will demonstrate understanding of plan of care, disease process/condition, diagnostic tests and medications  Outcome: Progressing     Problem: Hemodynamics  Goal: Patient's hemodynamics, fluid balance and neurologic status will be stable or improve  Outcome: Progressing

## 2024-11-08 LAB
ALBUMIN SERPL BCP-MCNC: 3 G/DL (ref 3.2–4.9)
ALBUMIN/GLOB SERPL: 1.2 G/DL
ALP SERPL-CCNC: 101 U/L (ref 30–99)
ALT SERPL-CCNC: 31 U/L (ref 2–50)
ANION GAP SERPL CALC-SCNC: 13 MMOL/L (ref 7–16)
AST SERPL-CCNC: 84 U/L (ref 12–45)
BACTERIA BLD CULT: NORMAL
BACTERIA BLD CULT: NORMAL
BILIRUB SERPL-MCNC: 10.9 MG/DL (ref 0.1–1.5)
BUN SERPL-MCNC: 32 MG/DL (ref 8–22)
CALCIUM ALBUM COR SERPL-MCNC: 9.3 MG/DL (ref 8.5–10.5)
CALCIUM SERPL-MCNC: 8.5 MG/DL (ref 8.5–10.5)
CHLORIDE SERPL-SCNC: 97 MMOL/L (ref 96–112)
CO2 SERPL-SCNC: 20 MMOL/L (ref 20–33)
CREAT SERPL-MCNC: 0.44 MG/DL (ref 0.5–1.4)
ERYTHROCYTE [DISTWIDTH] IN BLOOD BY AUTOMATED COUNT: 57.2 FL (ref 35.9–50)
GFR SERPLBLD CREATININE-BSD FMLA CKD-EPI: 112 ML/MIN/1.73 M 2
GLOBULIN SER CALC-MCNC: 2.5 G/DL (ref 1.9–3.5)
GLUCOSE SERPL-MCNC: 78 MG/DL (ref 65–99)
HCT VFR BLD AUTO: 26.6 % (ref 37–47)
HGB BLD-MCNC: 9.1 G/DL (ref 12–16)
LABORATORY REPORT: NORMAL
MAGNESIUM SERPL-MCNC: 1.9 MG/DL (ref 1.5–2.5)
MCH RBC QN AUTO: 29.4 PG (ref 27–33)
MCHC RBC AUTO-ENTMCNC: 34.2 G/DL (ref 32.2–35.5)
MCV RBC AUTO: 86.1 FL (ref 81.4–97.8)
PETH INTERPRETATION NL11780: NORMAL
PHOSPHATE SERPL-MCNC: 2.3 MG/DL (ref 2.5–4.5)
PLATELET # BLD AUTO: 89 K/UL (ref 164–446)
PLATELETS.RETICULATED NFR BLD AUTO: 7 % (ref 0.6–13.1)
PLPETH BLD-MCNC: <10 NG/ML
PMV BLD AUTO: 10.2 FL (ref 9–12.9)
POPETH BLD-MCNC: <10 NG/ML
POTASSIUM SERPL-SCNC: 3.5 MMOL/L (ref 3.6–5.5)
PROT SERPL-MCNC: 5.5 G/DL (ref 6–8.2)
RBC # BLD AUTO: 3.09 M/UL (ref 4.2–5.4)
SIGNIFICANT IND 70042: NORMAL
SIGNIFICANT IND 70042: NORMAL
SITE SITE: NORMAL
SITE SITE: NORMAL
SODIUM SERPL-SCNC: 130 MMOL/L (ref 135–145)
SOURCE SOURCE: NORMAL
SOURCE SOURCE: NORMAL
UFH PPP CHRO-ACNC: 0.18 IU/ML
UFH PPP CHRO-ACNC: 0.19 IU/ML
WBC # BLD AUTO: 22.1 K/UL (ref 4.8–10.8)

## 2024-11-08 PROCEDURE — 700102 HCHG RX REV CODE 250 W/ 637 OVERRIDE(OP): Performed by: NURSE PRACTITIONER

## 2024-11-08 PROCEDURE — A9270 NON-COVERED ITEM OR SERVICE: HCPCS | Performed by: NURSE PRACTITIONER

## 2024-11-08 PROCEDURE — 99233 SBSQ HOSP IP/OBS HIGH 50: CPT | Performed by: INTERNAL MEDICINE

## 2024-11-08 PROCEDURE — A9270 NON-COVERED ITEM OR SERVICE: HCPCS | Performed by: INTERNAL MEDICINE

## 2024-11-08 PROCEDURE — 700105 HCHG RX REV CODE 258: Performed by: INTERNAL MEDICINE

## 2024-11-08 PROCEDURE — 36415 COLL VENOUS BLD VENIPUNCTURE: CPT

## 2024-11-08 PROCEDURE — 84100 ASSAY OF PHOSPHORUS: CPT

## 2024-11-08 PROCEDURE — 80053 COMPREHEN METABOLIC PANEL: CPT

## 2024-11-08 PROCEDURE — 85520 HEPARIN ASSAY: CPT

## 2024-11-08 PROCEDURE — 700102 HCHG RX REV CODE 250 W/ 637 OVERRIDE(OP): Performed by: INTERNAL MEDICINE

## 2024-11-08 PROCEDURE — 85027 COMPLETE CBC AUTOMATED: CPT

## 2024-11-08 PROCEDURE — 83735 ASSAY OF MAGNESIUM: CPT

## 2024-11-08 PROCEDURE — 99232 SBSQ HOSP IP/OBS MODERATE 35: CPT | Performed by: NURSE PRACTITIONER

## 2024-11-08 PROCEDURE — 700102 HCHG RX REV CODE 250 W/ 637 OVERRIDE(OP): Performed by: HOSPITALIST

## 2024-11-08 PROCEDURE — 85055 RETICULATED PLATELET ASSAY: CPT

## 2024-11-08 PROCEDURE — 700111 HCHG RX REV CODE 636 W/ 250 OVERRIDE (IP): Mod: JZ | Performed by: HOSPITALIST

## 2024-11-08 PROCEDURE — 770000 HCHG ROOM/CARE - INTERMEDIATE ICU *

## 2024-11-08 PROCEDURE — A9270 NON-COVERED ITEM OR SERVICE: HCPCS | Performed by: HOSPITALIST

## 2024-11-08 PROCEDURE — 700101 HCHG RX REV CODE 250: Performed by: INTERNAL MEDICINE

## 2024-11-08 PROCEDURE — 700111 HCHG RX REV CODE 636 W/ 250 OVERRIDE (IP): Performed by: INTERNAL MEDICINE

## 2024-11-08 RX ORDER — SPIRONOLACTONE 25 MG/1
50 TABLET ORAL
Status: DISCONTINUED | OUTPATIENT
Start: 2024-11-08 | End: 2024-11-09

## 2024-11-08 RX ORDER — FUROSEMIDE 20 MG/1
20 TABLET ORAL
Status: DISCONTINUED | OUTPATIENT
Start: 2024-11-08 | End: 2024-11-09

## 2024-11-08 RX ORDER — LACTULOSE 10 G/15ML
30 SOLUTION ORAL 3 TIMES DAILY
Status: DISCONTINUED | OUTPATIENT
Start: 2024-11-08 | End: 2024-11-10

## 2024-11-08 RX ORDER — POTASSIUM CHLORIDE 1500 MG/1
40 TABLET, EXTENDED RELEASE ORAL ONCE
Status: COMPLETED | OUTPATIENT
Start: 2024-11-08 | End: 2024-11-08

## 2024-11-08 RX ADMIN — OXYMETAZOLINE HYDROCHLORIDE 2 SPRAY: 0.5 SPRAY NASAL at 17:59

## 2024-11-08 RX ADMIN — SUCRALFATE ORAL 1 G: 1 SUSPENSION ORAL at 11:50

## 2024-11-08 RX ADMIN — DIBASIC SODIUM PHOSPHATE, MONOBASIC POTASSIUM PHOSPHATE AND MONOBASIC SODIUM PHOSPHATE 250 MG: 852; 155; 130 TABLET ORAL at 08:43

## 2024-11-08 RX ADMIN — LACTULOSE 45 ML: 10 SOLUTION ORAL at 08:42

## 2024-11-08 RX ADMIN — FLUOXETINE HYDROCHLORIDE 10 MG: 10 CAPSULE ORAL at 04:47

## 2024-11-08 RX ADMIN — APIXABAN 10 MG: 5 TABLET, FILM COATED ORAL at 17:59

## 2024-11-08 RX ADMIN — CEFTRIAXONE SODIUM 2000 MG: 10 INJECTION, POWDER, FOR SOLUTION INTRAVENOUS at 04:47

## 2024-11-08 RX ADMIN — SUCRALFATE ORAL 1 G: 1 SUSPENSION ORAL at 04:47

## 2024-11-08 RX ADMIN — POTASSIUM CHLORIDE 40 MEQ: 1500 TABLET, EXTENDED RELEASE ORAL at 08:43

## 2024-11-08 RX ADMIN — RIFAXIMIN 550 MG: 550 TABLET ORAL at 17:59

## 2024-11-08 RX ADMIN — FUROSEMIDE 20 MG: 20 TABLET ORAL at 13:19

## 2024-11-08 RX ADMIN — DIBASIC SODIUM PHOSPHATE, MONOBASIC POTASSIUM PHOSPHATE AND MONOBASIC SODIUM PHOSPHATE 250 MG: 852; 155; 130 TABLET ORAL at 18:00

## 2024-11-08 RX ADMIN — OXYMETAZOLINE HYDROCHLORIDE 2 SPRAY: 0.5 SPRAY NASAL at 04:47

## 2024-11-08 RX ADMIN — GABAPENTIN 200 MG: 100 CAPSULE ORAL at 20:31

## 2024-11-08 RX ADMIN — PANTOPRAZOLE SODIUM 40 MG: 40 INJECTION, POWDER, FOR SOLUTION INTRAVENOUS at 04:47

## 2024-11-08 RX ADMIN — HEPARIN SODIUM 2500 UNITS: 1000 INJECTION, SOLUTION INTRAVENOUS; SUBCUTANEOUS at 03:43

## 2024-11-08 RX ADMIN — HEPARIN SODIUM 34 UNITS/KG/HR: 5000 INJECTION, SOLUTION INTRAVENOUS at 08:48

## 2024-11-08 RX ADMIN — SPIRONOLACTONE 50 MG: 25 TABLET ORAL at 13:20

## 2024-11-08 RX ADMIN — LIDOCAINE 1 PATCH: 4 PATCH TOPICAL at 04:46

## 2024-11-08 RX ADMIN — APIXABAN 10 MG: 5 TABLET, FILM COATED ORAL at 11:50

## 2024-11-08 RX ADMIN — RIFAXIMIN 550 MG: 550 TABLET ORAL at 04:47

## 2024-11-08 RX ADMIN — DIBASIC SODIUM PHOSPHATE, MONOBASIC POTASSIUM PHOSPHATE AND MONOBASIC SODIUM PHOSPHATE 250 MG: 852; 155; 130 TABLET ORAL at 11:50

## 2024-11-08 RX ADMIN — OMEPRAZOLE 20 MG: 20 CAPSULE, DELAYED RELEASE ORAL at 17:59

## 2024-11-08 RX ADMIN — SUCRALFATE ORAL 1 G: 1 SUSPENSION ORAL at 17:59

## 2024-11-08 ASSESSMENT — ENCOUNTER SYMPTOMS
EYE PAIN: 0
TREMORS: 0
HEADACHES: 0
CONSTIPATION: 0
SPEECH CHANGE: 0
ORTHOPNEA: 0
DIARRHEA: 0
DIZZINESS: 0
BACK PAIN: 0
HEARTBURN: 0
DOUBLE VISION: 0
PHOTOPHOBIA: 0
WEAKNESS: 1
ABDOMINAL PAIN: 0
COUGH: 0
HALLUCINATIONS: 0
MEMORY LOSS: 1
SENSORY CHANGE: 0
FOCAL WEAKNESS: 0
TREMORS: 1
MYALGIAS: 0
BLOOD IN STOOL: 0
SPUTUM PRODUCTION: 0
VOMITING: 0
NECK PAIN: 0
DEPRESSION: 0
PALPITATIONS: 0
BLURRED VISION: 0
NAUSEA: 0
SHORTNESS OF BREATH: 0
FEVER: 0
CHILLS: 0
TINGLING: 0
WEIGHT LOSS: 0

## 2024-11-08 ASSESSMENT — LIFESTYLE VARIABLES: SUBSTANCE_ABUSE: 0

## 2024-11-08 ASSESSMENT — PAIN DESCRIPTION - PAIN TYPE
TYPE: ACUTE PAIN

## 2024-11-08 ASSESSMENT — FIBROSIS 4 INDEX
FIB4 SCORE: 9.66
FIB4 SCORE: 9.66

## 2024-11-08 NOTE — CARE PLAN
Problem: Hemodynamics  Goal: Patient's hemodynamics, fluid balance and neurologic status will be stable or improve  Outcome: Progressing     Problem: Fluid Volume  Goal: Fluid volume balance will be maintained  Outcome: Progressing     Problem: Skin Integrity  Goal: Skin integrity is maintained or improved  Outcome: Progressing   The patient is Stable - Low risk of patient condition declining or worsening    Shift Goals  Clinical Goals: hemodynamicaly stable  Patient Goals: rest  Family Goals: Updates

## 2024-11-08 NOTE — PROGRESS NOTES
Patient heparin xa not therapeutic, per dosing protocol she needs to increase to 36 units/kg/hr on heparin drip. Unable to increase to 36 units/kg/hr because it exceeds hard upper limit of 35 units/kg/hr on IV pump. Talked to pharmacy and they are unsure how to bypass this. Charge and MD made aware.     Per MD continue at current rate and recheck heparin xa lab in 6 hours

## 2024-11-08 NOTE — DIETARY
Nutrition Update:    Day 7 of admit.  Kavita Freeman is a 59 y.o. female with admitting DX of Sepsis  Patient being followed to optimize nutrition.    Diet adjusted per SLP on 11/6 to Level 6 - soft and bite sized, thin liquid, 2 gm Sodium with 1:1 supervision. Providing Ensure Compact with all meals. Recorded PO intake has been variable from <25% to % of meals and supplements.    Problem: Nutritional:  Goal: Achieve adequate nutritional intake  Description: Patient will consume >50% of meals  Outcome: Progressing    RD following

## 2024-11-08 NOTE — DIETARY
Brief Nutrition Note:  Met w/ pt at bedside, who appeared frail and jaundiced. She reported a UBW of 177# 1 month ago with current poor appetite. Family had brought in fast food and she was sipping on a shake. Pt reports diet at home consisted of 2-3 meals with snacks. She reported she enjoys the Ensure Compact Chocolate she has been receiving. She likes chicken and fish - will update dietary preferences in [facility]. Family member later reported that pt had been restricting food intake in a desire to reduce belly size but has been eating more since this admit after being told her abdomen had fluid accumulation.     Nutrition Focused Physical Exam (NFPE)   Weight loss: Significant wt fluctuations, but overall trend is down in the past year, 24.3% wt loss in 10 months, 16.7% in 5 months - severe wt loss, suspect fluid changes r/t ascites impacting wt loss percentages.   NFPE findings:  Muscle mass: moderate loss - flat/slightly depressed temporals and triceps; severely depressed interosseous; severe wasting at shoulder   Subcutaneous fat: moderate loss - some hollowing of orbital region, moderate loss at triceps and arm. Legs not assessed d/t pain.  Fluid Accumulation: Note net I/O since admit is -9.2 L w/ no present edema; pt with ascites.   Reduced  Strength: N/A in acute care setting.    Wt Readings from Last 10 Encounters:   11/08/24 68.6 kg (151 lb 3.8 oz)   10/21/24 75.8 kg (167 lb)   09/28/24 69.8 kg (153 lb 14.1 oz)   09/12/24 90.3 kg (199 lb)   08/28/24 85.7 kg (189 lb)   08/17/24 73.5 kg (162 lb 0.6 oz)   06/19/24 83.6 kg (184 lb 4.9 oz)   05/06/24 78 kg (172 lb)   02/16/24 81.6 kg (180 lb)   01/30/24 82.6 kg (182 lb)     Moderate malnutrition in context of chronic illness related to ascites 2/2 cirrhosis as evidenced by <75% PO intake, severe fluid accumulation, moderate fat and muscle wasting on NFPE.

## 2024-11-08 NOTE — PROGRESS NOTES
Gastroenterology Progress Note               Author:  Oumou OWENS Date & Time Created: 11/8/2024 7:02 AM       Patient ID:  Name:             Kavita Freeman  YOB: 1965  Age:                 59 y.o.  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     History of Present Illness:   59 years old female with medical history of alcohol-related liver injury, cirrhosis, decompensation with variceal bleeding and refractory ascites formation, mild to moderate encephalopathy.  Presented to hospital this time for fluid accumulation, acute kidney injury.  GI team is consulted for recent GI bleeding     The patient has primary GI liver doctor in [place] and she is waiting for the transplantation evaluation at [facility] in the coming 2 months.  We do not have any records to support this plan, however the patient is very certain about the [facility] appointment.     For the bleeding, the patient had nausea vomiting in the last 2 days, the patient saw blood and also some coffee-ground like vomitus.  Tarry stool was noted.  Last upper GI scope August 15 with variceal bleeding and banding.     Interval History:  11/2/2024: Patient seen at bedside.  Awake, alert.   Discussed EGD findings with patient.  Inquired regarding last EGD as, per chart review, last EGD was 2-1/2 months ago.  Patient unable to discern if she has had EGD since last documented.  Positive asterixis.  Denies bowel movements overnight.  Started on rifaximin and lactulose.  Hemoglobin stable.  Abdomen tender with light palpation.  No WBC this morning, but yesterday was 39. ultrasound abdomen pending.      11/3/2024: Patient seen at bedside.  Disoriented, unable to answer orientation questions.  Abdomen distended, significantly increase in tenderness this morning, hypoactive bowel sounds and tympany with percussion.  CT abdomen and CBC pending.  No bowel movements overnight.    Update 1222: CT abdomen with findings including fluid-filled small bowel and colonic loops favoring ileus, no evidence of bowel perforation, cirrhosis with portal hypertension, moderate to large volume ascites, nonocclusive SMV thrombosis, cholelithiasis, subtle findings concerning for pulmonary emboli.      11/4/2024: Patient seen at bedside.  Disoriented, oriented to self only.  Abdomen remains distended, tender with light palpation, hypoactive bowel sounds.  Patient with multiple brown bowel movements overnight.  Started on heparin yesterday.  WBC 29.8, hemoglobin stable at 12.5, platelets 197.  Sodium 127, BUN 56, creatinine improved to 1.3.  Albumin yesterday 2.6.  INR yesterday 2.35.  MELD 3.0 as of 11/3/2024 31    11/5/2024: Patient seen and examined.  Appears to be in distress, tachypneic, unable to participate in interview.  Grimaces when I touch her abdomen.  I was concerned and got Dr. Charles who then examined the patient.  We ordered KUB, chest x-ray, ABG, further labs.  3 BM last 24 hours.     Found to have significant lactic acidosis, INR 3.64 (difficult to interpret in the context of heparin drip), ABG with acute respiratory alkalosis.  Creatinine increasing to 1.54, T. bili 8.  KUB dilated loops of bowel suspicious for  gastroenteritis or inflammatory changes.  Chest x-ray with atelectasis.      11/6/2024: Patient seen in collaboration with Dr. Solorzano. Son at bedside, all questions answered. WBC now 36.6, T. Bili 9.8, creatinine uptrending. MELD 3.0 - 37 (Although INR not reliable in the setting of heparin drip)    11/7/2024: Patient seen with mom and dad at bedside.  Significantly improved, alert and oriented and eating food.  She remembered me from when I met her before.  WBC 26.9, hgb 9.6, plt 112, cr 1.38. Urinary casts present. Renal US negative.     11/8/2024: Patient seen with family bedside. Continues to feel better, eating but doesn't really like the food. Numerous bowel movements overnight. Worsening abdominal distention with mild tenderness. WBC continues to improve, platelets worse at 89, hgb downtrended to 9.1, cr 0.44, t. Bili 10.9    Hospital Medications:  Current Facility-Administered Medications   Medication Dose Frequency Provider Last Rate Last Admin    oxymetazoline (Afrin) 0.05 % nasal spray 2 Spray  2 Spray BID Claudia Solorzano M.D.   2 [place] at 11/08/24 0447    [Held by provider] furosemide (Lasix) tablet 40 mg  40 mg Q DAY Quang Durham D.O.   40 mg at 11/05/24 0512    cefTRIAXone (Rocephin) syringe 2,000 mg  2,000 mg Q24HRS Claudia Solorzano M.D.   2,000 mg at 11/08/24 0447    heparin infusion 25,000 units in 500 mL 0.45% NACL  0-30 Units/kg/hr Continuous Quang Durham D.O. 42.6 mL/hr at 11/07/24 2133 34 Units/kg/hr at 11/07/24 2133    heparin injection 2,500 Units  40 Units/kg PRN Quang Durham D.O.   2,500 Units at 11/08/24 0343    lactulose 20 GM/30ML solution 45 mL  45 mL 4X/DAY Quang Durham D.O.   45 mL at 11/07/24 1309    riFAXIMin (Xifaxan) tablet 550 mg  550 mg BID Quang Durhma D.O.   550 mg at 11/08/24 0447    acetaminophen (Tylenol) tablet 650 mg  650 mg Q6HRS PRN Adilia Gonzales D.O.        oxyCODONE immediate-release (Roxicodone)  tablet 2.5 mg  2.5 mg Q3HRS PRN Adilia Gonzales, D.O.   2.5 mg at 11/01/24 1230    Or    oxyCODONE immediate-release (Roxicodone) tablet 5 mg  5 mg Q3HRS PRN Adilia Gonzales, D.O.   5 mg at 11/03/24 0519    Or    HYDROmorphone (Dilaudid) injection 0.25 mg  0.25 mg Q3HRS PRN Adilia Gonzales, D.O.   0.25 mg at 11/06/24 0311    ondansetron (Zofran) syringe/vial injection 4 mg  4 mg Q4HRS PRN Adilia Gonzales, D.O.   4 mg at 11/04/24 1019    ondansetron (Zofran ODT) dispertab 4 mg  4 mg Q4HRS PRN Adilia Gonzales, D.O.        promethazine (Phenergan) tablet 12.5-25 mg  12.5-25 mg Q4HRS PRN Adilia Gonzales, D.O.   25 mg at 11/04/24 1116    promethazine (Phenergan) suppository 12.5-25 mg  12.5-25 mg Q4HRS PRN Adilia Gonzales, D.O.        prochlorperazine (Compazine) injection 5-10 mg  5-10 mg Q4HRS PRN Adilia Gonzales, D.O.   10 mg at 11/04/24 1228    guaiFENesin dextromethorphan (Robitussin DM) 100-10 MG/5ML syrup 10 mL  10 mL Q6HRS PRN Adilia Gonzales, D.O.        pantoprazole (Protonix) injection 40 mg  40 mg BID Adilia MARKUS Gonzales, D.O.   40 mg at 11/08/24 0447    dextrose 50% (D50W) injection 25 g  25 g Q15 MIN PRN Adilia Gonzales, D.O.   25 g at 11/01/24 1843    FLUoxetine (PROzac) capsule 10 mg  10 mg QAM Adilia Gonzales, D.O.   10 mg at 11/08/24 0447    gabapentin (Neurontin) capsule 200 mg  200 mg Nightly Adilia Gonzales D.O.   200 mg at 11/07/24 2023    hydrOXYzine HCl (Atarax) tablet 25 mg  25 mg TID PRN ASTRID Raza.O.        lidocaine (Asperflex) 4 % patch 1 Patch  1 Patch Q24HRS ASTRID Raza.O.   1 Patch at 11/08/24 0446    sucralfate (Carafate) 1 GM/10ML suspension 1 g  1 g Q6HRS Adilia Gonzales D.O.   1 g at 11/08/24 0447    [Held by provider] midodrine (Proamatine) tablet 5 mg  5 mg TID WITH MEALS ASTRID Raza.O.   5 mg at 11/02/24 1805   Last reviewed on 11/1/2024  9:17 AM by Celine Bustamante, R.N.       Review of Systems:  Review of Systems   Constitutional:  Positive for malaise/fatigue.  "Negative for chills and fever.   HENT:  Negative for hearing loss.    Eyes:  Negative for blurred vision.   Respiratory:  Negative for cough and shortness of breath.    Cardiovascular:  Negative for chest pain and leg swelling.   Gastrointestinal:  Negative for abdominal pain, blood in stool, constipation, diarrhea, heartburn, melena, nausea and vomiting.   Genitourinary:  Negative for dysuria.   Musculoskeletal:  Negative for back pain.   Skin:  Negative for rash.   Neurological:  Positive for tremors and weakness. Negative for dizziness.   Psychiatric/Behavioral:  Positive for memory loss. Negative for depression.    All other systems reviewed and are negative.      Vital signs:  Weight/BMI: Body mass index is 23 kg/m².  /57   Pulse 80   Temp 37.5 °C (99.5 °F) (Bladder)   Resp (!) 22   Ht 1.727 m (5' 8\")   Wt 68.6 kg (151 lb 3.8 oz)   SpO2 89%   Vitals:    11/08/24 0100 11/08/24 0300 11/08/24 0400 11/08/24 0600   BP: 116/57  112/56 109/57   Pulse: 80  79 80   Resp: (!) 24  (!) 25 (!) 22   Temp:       TempSrc:       SpO2: 89%  88% 89%   Weight:  68.6 kg (151 lb 3.8 oz)     Height:         Oxygen Therapy:  Pulse Oximetry: 89 %, O2 (LPM): 3, O2 Delivery Device: None - Room Air    Intake/Output Summary (Last 24 hours) at 11/8/2024 0702  Last data filed at 11/8/2024 0457  Gross per 24 hour   Intake 1740.9 ml   Output 2600 ml   Net -859.1 ml       Physical Exam  Vitals and nursing note reviewed.   Constitutional:       General: She is not in acute distress.     Appearance: She is cachectic. She is ill-appearing.   HENT:      Head: Normocephalic.      Nose: Nose normal. No congestion.   Eyes:      General: Scleral icterus present.      Extraocular Movements: Extraocular movements intact.      Conjunctiva/sclera: Conjunctivae normal.   Cardiovascular:      Rate and Rhythm: Normal rate and regular rhythm.      Pulses: Normal pulses.      Heart sounds: Murmur heard.   Pulmonary:      Effort: Pulmonary effort is " normal.      Comments: Diminished throughout  Abdominal:      General: There is distension.      Tenderness: There is abdominal tenderness.      Comments:      Musculoskeletal:      Right lower leg: No edema.      Left lower leg: No edema.   Skin:     General: Skin is warm and dry.      Capillary Refill: Capillary refill takes less than 2 seconds.      Coloration: Skin is jaundiced.   Neurological:      Mental Status: She is oriented to person, place, and time.      Motor: Weakness present.       Labs:  Recent Labs     11/06/24  0431 11/07/24 0215 11/07/24  0950 11/08/24  0244   SODIUM 135 133* 127* 130*   POTASSIUM 3.8 3.4* 3.5* 3.5*   CHLORIDE 102 101 96 97   CO2 20 19* 20 20   BUN 68* 56* 46* 32*   CREATININE 1.63* 1.38 1.07 0.44*   MAGNESIUM 2.4 1.8  --  1.9   PHOSPHORUS 4.8* 2.0*  --  2.3*   CALCIUM 9.6 8.8 8.4* 8.5     Recent Labs     11/06/24  0431 11/07/24 0215 11/07/24  0950 11/08/24  0244   ALTSGPT 72* 48  --  31   ASTSGOT 153* 112*  --  84*   ALKPHOSPHAT 143* 112*  --  101*   TBILIRUBIN 9.8* 9.6*  --  10.9*   GLUCOSE 81 104* 77 78     Recent Labs     11/06/24  0431 11/07/24 0215 11/08/24  0244   WBC 36.6* 26.9* 22.1*   ASTSGOT 153* 112* 84*   ALTSGPT 72* 48 31   ALKPHOSPHAT 143* 112* 101*   TBILIRUBIN 9.8* 9.6* 10.9*     Recent Labs     11/05/24  1358 11/06/24  0431 11/07/24 0215 11/07/24  0950 11/08/24  0244   RBC  --  4.02* 3.33*  --  3.09*   HEMOGLOBIN  --  11.5* 9.6* 9.8* 9.1*   HEMATOCRIT  --  34.7* 28.6* 29.1* 26.6*   PLATELETCT  --  163* 112*  --  89*   PROTHROMBTM 36.5*  --   --   --   --    INR 3.64*  --   --   --   --      Recent Results (from the past 24 hours)   Basic Metabolic Panel    Collection Time: 11/07/24  9:50 AM   Result Value Ref Range    Sodium 127 (L) 135 - 145 mmol/L    Potassium 3.5 (L) 3.6 - 5.5 mmol/L    Chloride 96 96 - 112 mmol/L    Co2 20 20 - 33 mmol/L    Glucose 77 65 - 99 mg/dL    Bun 46 (H) 8 - 22 mg/dL    Creatinine 1.07 0.50 - 1.40 mg/dL    Calcium 8.4 (L) 8.5 -  10.5 mg/dL    Anion Gap 11.0 7.0 - 16.0   HEMOGLOBIN AND HEMATOCRIT    Collection Time: 11/07/24  9:50 AM   Result Value Ref Range    Hemoglobin 9.8 (L) 12.0 - 16.0 g/dL    Hematocrit 29.1 (L) 37.0 - 47.0 %   ESTIMATED GFR    Collection Time: 11/07/24  9:50 AM   Result Value Ref Range    GFR (CKD-EPI) 60 >60 mL/min/1.73 m 2   Heparin Xa (Unfractionated)    Collection Time: 11/07/24  1:23 PM   Result Value Ref Range    Heparin Xa (UFH) 0.12 IU/mL   Heparin Xa (Unfractionated)    Collection Time: 11/07/24  8:22 PM   Result Value Ref Range    Heparin Xa (UFH) 0.19 IU/mL   PHOSPHORUS    Collection Time: 11/08/24  2:44 AM   Result Value Ref Range    Phosphorus 2.3 (L) 2.5 - 4.5 mg/dL   MAGNESIUM    Collection Time: 11/08/24  2:44 AM   Result Value Ref Range    Magnesium 1.9 1.5 - 2.5 mg/dL   Comp Metabolic Panel    Collection Time: 11/08/24  2:44 AM   Result Value Ref Range    Sodium 130 (L) 135 - 145 mmol/L    Potassium 3.5 (L) 3.6 - 5.5 mmol/L    Chloride 97 96 - 112 mmol/L    Co2 20 20 - 33 mmol/L    Anion Gap 13.0 7.0 - 16.0    Glucose 78 65 - 99 mg/dL    Bun 32 (H) 8 - 22 mg/dL    Creatinine 0.44 (L) 0.50 - 1.40 mg/dL    Calcium 8.5 8.5 - 10.5 mg/dL    Correct Calcium 9.3 8.5 - 10.5 mg/dL    AST(SGOT) 84 (H) 12 - 45 U/L    ALT(SGPT) 31 2 - 50 U/L    Alkaline Phosphatase 101 (H) 30 - 99 U/L    Total Bilirubin 10.9 (H) 0.1 - 1.5 mg/dL    Albumin 3.0 (L) 3.2 - 4.9 g/dL    Total Protein 5.5 (L) 6.0 - 8.2 g/dL    Globulin 2.5 1.9 - 3.5 g/dL    A-G Ratio 1.2 g/dL   CBC WITHOUT DIFFERENTIAL    Collection Time: 11/08/24  2:44 AM   Result Value Ref Range    WBC 22.1 (H) 4.8 - 10.8 K/uL    RBC 3.09 (L) 4.20 - 5.40 M/uL    Hemoglobin 9.1 (L) 12.0 - 16.0 g/dL    Hematocrit 26.6 (L) 37.0 - 47.0 %    MCV 86.1 81.4 - 97.8 fL    MCH 29.4 27.0 - 33.0 pg    MCHC 34.2 32.2 - 35.5 g/dL    RDW 57.2 (H) 35.9 - 50.0 fL    Platelet Count 89 (L) 164 - 446 K/uL    MPV 10.2 9.0 - 12.9 fL   Heparin Anti-Xa    Collection Time: 11/08/24  2:44 AM    Result Value Ref Range    Heparin Xa (UFH) 0.18 IU/mL   IMMATURE PLT FRACTION    Collection Time: 11/08/24  2:44 AM   Result Value Ref Range    Imm. Plt Fraction 7.0 0.6 - 13.1 %   ESTIMATED GFR    Collection Time: 11/08/24  2:44 AM   Result Value Ref Range    GFR (CKD-EPI) 112 >60 mL/min/1.73 m 2       Radiology Review:  US-RENAL   Final Result      1.  No hydronephrosis.   2.  Ascites.      DX-CHEST-PORTABLE (1 VIEW)   Final Result      1.  No acute cardiac or pulmonary abnormalities are identified.      2.  Slight left basilar atelectasis.      SY-HNBECEY-1 VIEW   Final Result      1.  Several markedly dilated loops of small bowel in the mid abdomen suspicious for gastroenteritis or inflammatory change. Recommend follow-up abdominal series to rule out evolving small bowel obstruction.      EC-ECHOCARDIOGRAM COMPLETE W/O CONT   Final Result      CT-ABDOMEN-PELVIS W/O   Final Result      1.  Dilated fluid-filled small bowel and colonic loops favoring ileus.   2.  No evidence of bowel perforation.   3.  Hepatic cirrhosis with portal hypertension and moderate to large volume ascites.   4.  Nonocclusive superior mesenteric vein thrombosis.   5.  Cholelithiasis.   6.  Bibasilar atelectasis with minimal bilateral pleural fluid.   7.  Subtle findings concerning for pulmonary emboli.      These findings were electronically conveyed to JAMES WILLIAM on 11/3/2024 11:50 AM.         US-ABDOMEN COMPLETE SURVEY   Final Result      1.  Cirrhosis with stigmata of portal hypertension including splenomegaly, recanalized umbilical vein and ascites.   2.  Gallbladder sludge and stones. Gallbladder wall thickening is nonspecific and could be related to liver disease but correlate clinically for evidence of acute cholecystitis. No biliary dilatation.         DX-CHEST-PORTABLE (1 VIEW)   Final Result      1.  Hazy left lower lobe opacity could represent atelectasis or pneumonitis.   2.  There is linear scarring or  atelectasis in the right lung base.        MDM (Data Review):   -Records reviewed and summarized in current documentation  -I personally reviewed and interpreted the laboratory results  -I personally reviewed the radiology images    Assessment/Recommendations:  Acute liver failure- MELD 3.0- 38 on arrival (10/31/2024); 26.8% 90-day survival. Child-Garza Class C  SMV thrombosis  Portal hypertensive gastropathy  Ileus  Cirrhosis with ascites  SBP  Hepatic encephalopathy  Coagulopathy  Esophageal varices with banding in situ  Hematemesis  Severe protein calorie malnutrition  History of decompensated liver disease with variceal bleeding, refractory ascites  Salmonella bacteremia - pending review by State lab  Sepsis  EFRAIN HRS  Respiratory alkalosis  Vitamin D deficiency    Plan:  11/5/2024  Significant clinical decompensation overnight.  Consistent with multiorgan failure.  Discussed with [place] hepatology who have agreed to accept pending ICU acceptance.  Attended lengthy bedside discussion with family in collaboration with Dr. Solorzano.  Randa shows DNR/DNI when she was alert.  Significant concern that she would not be able to transfer to [place] without being intubated  Family discussing amongst themselves regarding next steps: reverse DNI/DNR and pursue transfer vs comfort care vs home with hospice    Completed 72 hours Octreotide  Low sodium diet  Continue PPI IV twice daily  Continue ceftriaxone for SBP and salmonella bacteremia  Continue lactulose and Rifaximin    Heparin for SMV thrombosis/ possible PE  Serial abdominal exams    11/6/2024  [facility] for [facility] consideration  Now with EFRAIN  Continue IV fluids, obtain UA and renal ultrasound  Check am creatinine, if no improvement start 1 G/kg albumin x 3 days    11/7/2024:  UA with casts, renal US negative  Creatinine improved on fluids and IV albumin   Will continue current plan of care    11/8/2024:  Full code status  Consider Vitamin D  supplementation, previously deficient  Start lasix/spironolactone 20mg/50mg and uptitrate   Paracentesis ordered  Decreased lactulose dose and frequency  Transfer request cancelled, patient is clinically improving. Discussion with patient and her family bedside that she does not meet criteria for acute transfer. Recommended the following:  Infection must be treated   Optimize functional status  Optimize nutrtion  Continue to seek out opportunities for psychological and mental health support for addiction  She has very supportive family to include her parents, brother, and son  Has an appointment with [facility] hepatology in January    Long term if patient survives admission:   Patient will need repeat EGD in 4-6 weeks  Nonselective beta-blocker as outpatient    GI will follow    Discussed with patient' family, nursing, Dr. Solorzano, Dr. Ocampo    ..Oumou Mart, CHANDANA,  APRN    Core Quality Measures   Reviewed items::  Labs, Medications and Radiology reports reviewed

## 2024-11-09 PROBLEM — R79.1 ELEVATED INR: Status: ACTIVE | Noted: 2024-01-01

## 2024-11-09 LAB
ALBUMIN FLD-MCNC: 0.9 G/DL
ALBUMIN SERPL BCP-MCNC: 2.9 G/DL (ref 3.2–4.9)
ALBUMIN/GLOB SERPL: 1 G/DL
ALP SERPL-CCNC: 117 U/L (ref 30–99)
ALT SERPL-CCNC: 32 U/L (ref 2–50)
ANION GAP SERPL CALC-SCNC: 13 MMOL/L (ref 7–16)
APTT PPP: 89.7 SEC (ref 24.7–36)
AST SERPL-CCNC: 80 U/L (ref 12–45)
BILIRUB SERPL-MCNC: 13.1 MG/DL (ref 0.1–1.5)
BODY FLD TYPE: NORMAL
BUN SERPL-MCNC: 22 MG/DL (ref 8–22)
CALCIUM ALBUM COR SERPL-MCNC: 9.4 MG/DL (ref 8.5–10.5)
CALCIUM SERPL-MCNC: 8.5 MG/DL (ref 8.5–10.5)
CFT BLD TEG: 16 MIN (ref 4.6–9.1)
CFT P HPASE BLD TEG: 16.3 MIN (ref 4.3–8.3)
CHLORIDE SERPL-SCNC: 97 MMOL/L (ref 96–112)
CLOT ANGLE BLD TEG: 72.6 DEGREES (ref 63–78)
CLOT LYSIS 30M P MA LENFR BLD TEG: 0.8 % (ref 0–2.6)
CO2 SERPL-SCNC: 20 MMOL/L (ref 20–33)
CREAT SERPL-MCNC: 0.28 MG/DL (ref 0.5–1.4)
CT.EXTRINSIC BLD ROTEM: 1.3 MIN (ref 0.8–2.1)
ERYTHROCYTE [DISTWIDTH] IN BLOOD BY AUTOMATED COUNT: 57 FL (ref 35.9–50)
GFR SERPLBLD CREATININE-BSD FMLA CKD-EPI: 125 ML/MIN/1.73 M 2
GLOBULIN SER CALC-MCNC: 2.8 G/DL (ref 1.9–3.5)
GLUCOSE SERPL-MCNC: 69 MG/DL (ref 65–99)
HCT VFR BLD AUTO: 28.5 % (ref 37–47)
HGB BLD-MCNC: 9.5 G/DL (ref 12–16)
INR PPP: >=10 (ref 0.87–1.13)
MAGNESIUM SERPL-MCNC: 1.6 MG/DL (ref 1.5–2.5)
MCF BLD TEG: 51.9 MM (ref 52–69)
MCF.PLATELET INHIB BLD ROTEM: 19.2 MM (ref 15–32)
MCH RBC QN AUTO: 28.8 PG (ref 27–33)
MCHC RBC AUTO-ENTMCNC: 33.3 G/DL (ref 32.2–35.5)
MCV RBC AUTO: 86.4 FL (ref 81.4–97.8)
PA AA BLD-ACNC: 5.2 % (ref 0–11)
PA ADP BLD-ACNC: 52.2 % (ref 0–17)
PHOSPHATE SERPL-MCNC: 2.3 MG/DL (ref 2.5–4.5)
PLATELET # BLD AUTO: 91 K/UL (ref 164–446)
PLATELETS.RETICULATED NFR BLD AUTO: 7 % (ref 0.6–13.1)
PMV BLD AUTO: 10.8 FL (ref 9–12.9)
POTASSIUM SERPL-SCNC: 3.8 MMOL/L (ref 3.6–5.5)
PROT FLD-MCNC: 1.6 G/DL
PROT SERPL-MCNC: 5.7 G/DL (ref 6–8.2)
PROTHROMBIN TIME: 110.8 SEC (ref 12–14.6)
RBC # BLD AUTO: 3.3 M/UL (ref 4.2–5.4)
SODIUM SERPL-SCNC: 130 MMOL/L (ref 135–145)
TEG ALGORITHM TGALG: ABNORMAL
WBC # BLD AUTO: 20.3 K/UL (ref 4.8–10.8)

## 2024-11-09 PROCEDURE — 700111 HCHG RX REV CODE 636 W/ 250 OVERRIDE (IP): Performed by: INTERNAL MEDICINE

## 2024-11-09 PROCEDURE — 85055 RETICULATED PLATELET ASSAY: CPT

## 2024-11-09 PROCEDURE — 83735 ASSAY OF MAGNESIUM: CPT

## 2024-11-09 PROCEDURE — 700102 HCHG RX REV CODE 250 W/ 637 OVERRIDE(OP): Performed by: HOSPITALIST

## 2024-11-09 PROCEDURE — 99232 SBSQ HOSP IP/OBS MODERATE 35: CPT | Performed by: NURSE PRACTITIONER

## 2024-11-09 PROCEDURE — 84157 ASSAY OF PROTEIN OTHER: CPT

## 2024-11-09 PROCEDURE — 700102 HCHG RX REV CODE 250 W/ 637 OVERRIDE(OP): Performed by: NURSE PRACTITIONER

## 2024-11-09 PROCEDURE — 700105 HCHG RX REV CODE 258: Performed by: INTERNAL MEDICINE

## 2024-11-09 PROCEDURE — 85027 COMPLETE CBC AUTOMATED: CPT

## 2024-11-09 PROCEDURE — A9270 NON-COVERED ITEM OR SERVICE: HCPCS | Performed by: INTERNAL MEDICINE

## 2024-11-09 PROCEDURE — 82042 OTHER SOURCE ALBUMIN QUAN EA: CPT

## 2024-11-09 PROCEDURE — A9270 NON-COVERED ITEM OR SERVICE: HCPCS | Performed by: HOSPITALIST

## 2024-11-09 PROCEDURE — 85730 THROMBOPLASTIN TIME PARTIAL: CPT

## 2024-11-09 PROCEDURE — 87205 SMEAR GRAM STAIN: CPT

## 2024-11-09 PROCEDURE — 49083 ABD PARACENTESIS W/IMAGING: CPT | Performed by: INTERNAL MEDICINE

## 2024-11-09 PROCEDURE — 770020 HCHG ROOM/CARE - TELE (206)

## 2024-11-09 PROCEDURE — 700102 HCHG RX REV CODE 250 W/ 637 OVERRIDE(OP): Performed by: INTERNAL MEDICINE

## 2024-11-09 PROCEDURE — 80053 COMPREHEN METABOLIC PANEL: CPT

## 2024-11-09 PROCEDURE — A9270 NON-COVERED ITEM OR SERVICE: HCPCS | Performed by: NURSE PRACTITIONER

## 2024-11-09 PROCEDURE — 700101 HCHG RX REV CODE 250: Performed by: INTERNAL MEDICINE

## 2024-11-09 PROCEDURE — 85384 FIBRINOGEN ACTIVITY: CPT

## 2024-11-09 PROCEDURE — 87070 CULTURE OTHR SPECIMN AEROBIC: CPT

## 2024-11-09 PROCEDURE — 84100 ASSAY OF PHOSPHORUS: CPT

## 2024-11-09 PROCEDURE — 89051 BODY FLUID CELL COUNT: CPT

## 2024-11-09 PROCEDURE — 0W9G3ZX DRAINAGE OF PERITONEAL CAVITY, PERCUTANEOUS APPROACH, DIAGNOSTIC: ICD-10-PCS | Performed by: INTERNAL MEDICINE

## 2024-11-09 PROCEDURE — 85347 COAGULATION TIME ACTIVATED: CPT

## 2024-11-09 PROCEDURE — 99233 SBSQ HOSP IP/OBS HIGH 50: CPT | Performed by: INTERNAL MEDICINE

## 2024-11-09 PROCEDURE — 85576 BLOOD PLATELET AGGREGATION: CPT | Mod: 91

## 2024-11-09 PROCEDURE — 85610 PROTHROMBIN TIME: CPT

## 2024-11-09 PROCEDURE — 51798 US URINE CAPACITY MEASURE: CPT

## 2024-11-09 RX ORDER — POTASSIUM CHLORIDE 1500 MG/1
40 TABLET, EXTENDED RELEASE ORAL ONCE
Status: COMPLETED | OUTPATIENT
Start: 2024-11-09 | End: 2024-11-09

## 2024-11-09 RX ORDER — SPIRONOLACTONE 25 MG/1
100 TABLET ORAL
Status: DISCONTINUED | OUTPATIENT
Start: 2024-11-10 | End: 2024-11-10

## 2024-11-09 RX ORDER — FUROSEMIDE 40 MG/1
40 TABLET ORAL
Status: DISCONTINUED | OUTPATIENT
Start: 2024-11-10 | End: 2024-11-10

## 2024-11-09 RX ORDER — MAGNESIUM SULFATE HEPTAHYDRATE 40 MG/ML
2 INJECTION, SOLUTION INTRAVENOUS ONCE
Status: COMPLETED | OUTPATIENT
Start: 2024-11-09 | End: 2024-11-09

## 2024-11-09 RX ORDER — GAUZE BANDAGE 2" X 2"
100 BANDAGE TOPICAL DAILY
Status: DISCONTINUED | OUTPATIENT
Start: 2024-11-09 | End: 2024-11-10

## 2024-11-09 RX ADMIN — OXYCODONE 5 MG: 5 TABLET ORAL at 19:47

## 2024-11-09 RX ADMIN — OMEPRAZOLE 20 MG: 20 CAPSULE, DELAYED RELEASE ORAL at 05:16

## 2024-11-09 RX ADMIN — FLUOXETINE HYDROCHLORIDE 10 MG: 10 CAPSULE ORAL at 05:17

## 2024-11-09 RX ADMIN — OXYCODONE 5 MG: 5 TABLET ORAL at 08:32

## 2024-11-09 RX ADMIN — OXYMETAZOLINE HYDROCHLORIDE 2 SPRAY: 0.5 SPRAY NASAL at 05:27

## 2024-11-09 RX ADMIN — Medication 100 MG: at 09:23

## 2024-11-09 RX ADMIN — SUCRALFATE ORAL 1 G: 1 SUSPENSION ORAL at 05:17

## 2024-11-09 RX ADMIN — SUCRALFATE ORAL 1 G: 1 SUSPENSION ORAL at 16:40

## 2024-11-09 RX ADMIN — SUCRALFATE ORAL 1 G: 1 SUSPENSION ORAL at 00:28

## 2024-11-09 RX ADMIN — RIFAXIMIN 550 MG: 550 TABLET ORAL at 16:40

## 2024-11-09 RX ADMIN — SPIRONOLACTONE 50 MG: 25 TABLET ORAL at 05:16

## 2024-11-09 RX ADMIN — LACTULOSE 30 ML: 10 SOLUTION ORAL at 12:36

## 2024-11-09 RX ADMIN — DIBASIC SODIUM PHOSPHATE, MONOBASIC POTASSIUM PHOSPHATE AND MONOBASIC SODIUM PHOSPHATE 250 MG: 852; 155; 130 TABLET ORAL at 00:28

## 2024-11-09 RX ADMIN — OMEPRAZOLE 20 MG: 20 CAPSULE, DELAYED RELEASE ORAL at 16:40

## 2024-11-09 RX ADMIN — MAGNESIUM SULFATE HEPTAHYDRATE 2 G: 2 INJECTION, SOLUTION INTRAVENOUS at 09:26

## 2024-11-09 RX ADMIN — LACTULOSE 30 ML: 10 SOLUTION ORAL at 16:40

## 2024-11-09 RX ADMIN — DIBASIC SODIUM PHOSPHATE, MONOBASIC POTASSIUM PHOSPHATE AND MONOBASIC SODIUM PHOSPHATE 250 MG: 852; 155; 130 TABLET ORAL at 16:40

## 2024-11-09 RX ADMIN — GABAPENTIN 200 MG: 100 CAPSULE ORAL at 20:37

## 2024-11-09 RX ADMIN — FUROSEMIDE 20 MG: 20 TABLET ORAL at 05:16

## 2024-11-09 RX ADMIN — SUCRALFATE ORAL 1 G: 1 SUSPENSION ORAL at 12:36

## 2024-11-09 RX ADMIN — RIFAXIMIN 550 MG: 550 TABLET ORAL at 05:16

## 2024-11-09 RX ADMIN — LIDOCAINE 1 PATCH: 4 PATCH TOPICAL at 05:26

## 2024-11-09 RX ADMIN — DIBASIC SODIUM PHOSPHATE, MONOBASIC POTASSIUM PHOSPHATE AND MONOBASIC SODIUM PHOSPHATE 250 MG: 852; 155; 130 TABLET ORAL at 12:36

## 2024-11-09 RX ADMIN — CEFTRIAXONE SODIUM 2000 MG: 10 INJECTION, POWDER, FOR SOLUTION INTRAVENOUS at 05:27

## 2024-11-09 RX ADMIN — OXYMETAZOLINE HYDROCHLORIDE 2 SPRAY: 0.5 SPRAY NASAL at 16:47

## 2024-11-09 RX ADMIN — LACTULOSE 30 ML: 10 SOLUTION ORAL at 05:17

## 2024-11-09 RX ADMIN — POTASSIUM CHLORIDE 40 MEQ: 1500 TABLET, EXTENDED RELEASE ORAL at 12:36

## 2024-11-09 RX ADMIN — APIXABAN 10 MG: 5 TABLET, FILM COATED ORAL at 05:17

## 2024-11-09 ASSESSMENT — PAIN DESCRIPTION - PAIN TYPE
TYPE: ACUTE PAIN;CHRONIC PAIN
TYPE: ACUTE PAIN
TYPE: CHRONIC PAIN
TYPE: CHRONIC PAIN

## 2024-11-09 ASSESSMENT — LIFESTYLE VARIABLES: SUBSTANCE_ABUSE: 0

## 2024-11-09 ASSESSMENT — ENCOUNTER SYMPTOMS
TREMORS: 0
PALPITATIONS: 0
DIZZINESS: 0
NAUSEA: 0
BLURRED VISION: 0
COUGH: 0
TINGLING: 0
BLOOD IN STOOL: 0
WEAKNESS: 1
HALLUCINATIONS: 0
MYALGIAS: 0
CHILLS: 0
HEADACHES: 0
VOMITING: 0
EYE PAIN: 0
FEVER: 0
TREMORS: 1
SHORTNESS OF BREATH: 0
SPUTUM PRODUCTION: 0
ABDOMINAL PAIN: 0
MEMORY LOSS: 1
BACK PAIN: 0
DIARRHEA: 0
SENSORY CHANGE: 0
CONSTIPATION: 0
ORTHOPNEA: 0
DEPRESSION: 0
PHOTOPHOBIA: 0
WEIGHT LOSS: 0
HEARTBURN: 0
FOCAL WEAKNESS: 0
DOUBLE VISION: 0
SPEECH CHANGE: 0
NECK PAIN: 0

## 2024-11-09 NOTE — CARE PLAN
The patient is Stable - Low risk of patient condition declining or worsening    Shift Goals  Clinical Goals: vss, safety,  Patient Goals: rest  Family Goals: updates    Progress made toward(s) clinical / shift goals:      Problem: Pain - Standard  Goal: Alleviation of pain or a reduction in pain to the patient’s comfort goal  Outcome: Progressing     Problem: Knowledge Deficit - Standard  Goal: Patient and family/care givers will demonstrate understanding of plan of care, disease process/condition, diagnostic tests and medications  Outcome: Progressing     Problem: Hemodynamics  Goal: Patient's hemodynamics, fluid balance and neurologic status will be stable or improve  Outcome: Progressing     Problem: Fall Risk  Goal: Patient will remain free from falls  Outcome: Progressing     Pt calling appropriately this shift. Is axox4. Aware of need to call for being cleaned up and skin break down issues. Pt. Vital signs stable this shift. Pt. Drinking water frequently throughout the shift. Aware of need for possible paracentesis tomorrow. Pt. Knows to call and not fall. Text this shift from [email or web address], due to pts. Labs, will need a retest of clotting factors before paracentesis can be done.

## 2024-11-09 NOTE — ASSESSMENT & PLAN NOTE
11/10/2024  She found to have elevated INR could be due to liver disease and she is on anticoagulation as well.  She will need paracentesis but due to elevated INR it was not performed.  She received large dose of her antibiotic IV ceftriaxone today.  If her INR improved she may get bedside diagnostic paracentesis to ensure no further ongoing SBP.  I also ordered TEG study.

## 2024-11-09 NOTE — CARE PLAN
The patient is Stable - Low risk of patient condition declining or worsening    Shift Goals  Clinical Goals: hemodynamic stability  Patient Goals: rest  Family Goals: JESUSITA    Progress made toward(s) clinical / shift goals:    Problem: Pain - Standard  Goal: Alleviation of pain or a reduction in pain to the patient’s comfort goal  Outcome: Progressing     Problem: Hemodynamics  Goal: Patient's hemodynamics, fluid balance and neurologic status will be stable or improve  Outcome: Progressing     Problem: Fluid Volume  Goal: Fluid volume balance will be maintained  Outcome: Progressing     Problem: Urinary - Renal Perfusion  Goal: Ability to achieve and maintain adequate renal perfusion and functioning will improve  Outcome: Progressing     Problem: Respiratory  Goal: Patient will achieve/maintain optimum respiratory ventilation and gas exchange  Outcome: Progressing     Problem: Skin Integrity  Goal: Skin integrity is maintained or improved  Outcome: Progressing     Problem: Fall Risk  Goal: Patient will remain free from falls  Outcome: Progressing

## 2024-11-09 NOTE — PROGRESS NOTES
Hospital Medicine Daily Progress Note    Date of Service  11/8/2024    Chief Complaint  Kavita Freeman is a 59 y.o. female admitted 10/31/2024 with needs paracentesis    Hospital Course  60yo PMHx ETOH abuse sober x 4 months, alcoholic cirrhosis, esophageal varices s/p banding.  Pt presented requesting a paracentesis and with fever and chills.  In ED WBCs 45, Na 117, CO2 16, AG 20, creat 2.1.  Para done in ED; cloudy fluid noted with nucleated cells >34k    Interval Problem Update    11/05/24    I evaluated and examined her at the bedside.  She was in respiratory distress and tachypneic.  I obtained ABG that showed significant hypoxia.  She also found to have severe lactic acidosis.  I discussed plan of care with intensivist Dr. Turcios.  Patient is DNR/DNI.  I reviewed her again at the bedside and discussed plan of care with patient's mother and explained her about her current medical condition.  I reevaluated her again at the bedside and discussed plan of care with patient's brother and sister-in-law and mother again.  Current oxygen saturation is 98%, current blood pressure is 158/89, pulse of 108.  I am continuing heparin gtt. and I am titrating as per Anti Xa levels and monitor for signs of bleeding.    Addendum 4:24 PM     I reevaluated her again at the bedside.  Now patient's son at the bedside discussed plan of care with him and regarding her current medical condition.  I discussed with him regarding advance care planning.  I have made him that he is currently DNR/DNI.  I gave both options that if family would like to change her CODE STATUS to full code and I also discussed about comfort care/hospice.  I updated him regarding multiorgan involvement and overall clinically not responding to treatment.    11/06/24    I evaluated and examined her at the bedside.  Clinically she looks slightly more alert and she is able to follow commands but she remains confused.  Currently she is requiring a liter of oxygen  via OxyMask to maintain oxygen saturation.  CMP showed BUN of 68 and creatinine of 1.63.  I am continuing IV fluid.  Liver enzymes remain elevated total bilirubin of 9.8.  Lactic acid level is trending down last lactic acid was 2.9.  Repeat blood culture negative to date  I started on albumin due to concern for hepatorenal syndrome.    I evaluated her again at the bedside this morning and discussed plan of care with patient's son at the bedside and updated him regarding plan of care.  I explained him difference between full code and DNR.  I also explained him that family can change her CODE STATUS if they would like to as patient cannot participate in advance care planning at this time.    Later this afternoon I met with several family members at the bedside and admitted them regarding her current medical condition and discussed with them regarding CODE STATUS also updated them regarding overall poor prognosis as she has multiorgan involvement.  Family expressed understanding.    11/07/24    I evaluated and examined her at the bedside.  This morning she was significantly alert and able to answer my questions appropriately.  Patient's parents and son at the bedside.  She has been having mild bleeding from her right nostril and I ordered Afrin spray.  Renal function has significantly improved with the help of albumin administration.  I am continuing heparin gtt. and I am titrating as per Anti Xa levels and monitor for signs of bleeding.    Today I discussed CODE STATUS with patient as now she is alert and oriented and able to answer questions.  I explained her CODE STATUS and explained the difference between full code and DNR.  Patient initially expressed that she does not want to be in vegetative state and wants patient family explained her and I also explained her at the same time.  Patient expressed that she would like to change her CODE STATUS to full code but she does not want to be in vegetative state for  long-term but she would like us to try to bring her back if her heart stopped beating or she stopped breathing.  After discussion and patient request I change her CODE STATUS from full code to DNR.  I updated bedside RN as well.      11/08/24    I have noted and examined her at the bedside  She was alert and able to answer questions appropriately.  Overnight somehow her CODE STATUS changed to DNR.  I reconfirmed the CODE STATUS with patient and family and changed back to full code as per my discussion yesterday  I discussed plan of care with gastroenterology team regarding Ms. Freeman current medical condition plan of care pain  I am continuing heparin gtt. and I am titrating as per Anti Xa levels and monitor for signs of bleeding.  Now plan is to transition her to Eliquis.  Monitor for bleeding.      I have discussed this patient's plan of care and discharge plan at IDT rounds today with Case Management, Nursing, Nursing leadership, and other members of the IDT team.    Consultants/Specialty  GI    Code Status  Full Code    Disposition  The patient is not medically cleared for discharge to home or a post-acute facility.      I have placed the appropriate orders for post-discharge needs.    Review of Systems  Review of Systems   Constitutional:  Positive for malaise/fatigue. Negative for chills, fever and weight loss.   HENT:  Negative for hearing loss, nosebleeds and tinnitus.    Eyes:  Negative for blurred vision, double vision, photophobia and pain.   Respiratory:  Negative for cough, sputum production and shortness of breath.    Cardiovascular:  Negative for chest pain, palpitations, orthopnea and leg swelling.   Gastrointestinal:  Negative for abdominal pain, constipation, diarrhea, nausea and vomiting.   Genitourinary:  Negative for dysuria, frequency and urgency.   Musculoskeletal:  Negative for back pain, joint pain, myalgias and neck pain.   Skin:  Negative for rash.   Neurological:  Positive for weakness.  Negative for dizziness, tingling, tremors, sensory change, speech change, focal weakness and headaches.   Psychiatric/Behavioral:  Negative for hallucinations and substance abuse.    All other systems reviewed and are negative.       Physical Exam  Temp:  [36.1 °C (97 °F)-37.5 °C (99.5 °F)] 36.1 °C (97 °F)  Pulse:  [79-85] 81  Resp:  [22-47] 25  BP: (102-125)/(54-63) 116/58  SpO2:  [88 %-91 %] 91 %    Physical Exam  Vitals reviewed.   Constitutional:       General: She is in acute distress.      Appearance: She is ill-appearing.      Comments: Cachectic  Significantly more alert this morning.   HENT:      Head: Normocephalic and atraumatic.      Nose: No congestion.   Eyes:      General: Scleral icterus present.         Right eye: No discharge.         Left eye: No discharge.      Pupils: Pupils are equal, round, and reactive to light.   Cardiovascular:      Rate and Rhythm: Normal rate and regular rhythm.      Pulses: Normal pulses.      Heart sounds: Normal heart sounds. No murmur heard.  Pulmonary:      Effort: Respiratory distress present.      Breath sounds: No stridor.      Comments: Tachypneic  Abdominal:      General: Bowel sounds are normal. There is distension.      Palpations: Abdomen is soft.      Tenderness: There is no abdominal tenderness.   Musculoskeletal:         General: No swelling or tenderness. Normal range of motion.      Cervical back: Normal range of motion. No rigidity.   Skin:     General: Skin is warm.      Capillary Refill: Capillary refill takes less than 2 seconds.      Coloration: Skin is jaundiced. Skin is not pale.      Findings: No bruising.   Neurological:      General: No focal deficit present.      Mental Status: She is oriented to person, place, and time.      Cranial Nerves: No cranial nerve deficit.      Comments: She is alert and oriented and able to answer my questions appropriately and she is following commands.   Psychiatric:         Mood and Affect: Mood normal.          Behavior: Behavior normal.         Fluids    Intake/Output Summary (Last 24 hours) at 11/8/2024 1611  Last data filed at 11/8/2024 0457  Gross per 24 hour   Intake 929.09 ml   Output 1900 ml   Net -970.91 ml        Laboratory  Recent Labs     11/06/24  0431 11/07/24  0215 11/07/24  0950 11/08/24  0244   WBC 36.6* 26.9*  --  22.1*   RBC 4.02* 3.33*  --  3.09*   HEMOGLOBIN 11.5* 9.6* 9.8* 9.1*   HEMATOCRIT 34.7* 28.6* 29.1* 26.6*   MCV 86.3 85.9  --  86.1   MCH 28.6 28.8  --  29.4   MCHC 33.1 33.6  --  34.2   RDW 58.4* 57.8*  --  57.2*   PLATELETCT 163* 112*  --  89*   MPV 10.2 10.5  --  10.2     Recent Labs     11/07/24  0215 11/07/24  0950 11/08/24  0244   SODIUM 133* 127* 130*   POTASSIUM 3.4* 3.5* 3.5*   CHLORIDE 101 96 97   CO2 19* 20 20   GLUCOSE 104* 77 78   BUN 56* 46* 32*   CREATININE 1.38 1.07 0.44*   CALCIUM 8.8 8.4* 8.5                     Imaging  US-RENAL   Final Result      1.  No hydronephrosis.   2.  Ascites.      DX-CHEST-PORTABLE (1 VIEW)   Final Result      1.  No acute cardiac or pulmonary abnormalities are identified.      2.  Slight left basilar atelectasis.      [id number] VIEW   Final Result      1.  Several markedly dilated loops of small bowel in the mid abdomen suspicious for gastroenteritis or inflammatory change. Recommend follow-up abdominal series to rule out evolving small bowel obstruction.      EC-ECHOCARDIOGRAM COMPLETE W/O CONT   Final Result      CT-ABDOMEN-PELVIS W/O   Final Result      1.  Dilated fluid-filled small bowel and colonic loops favoring ileus.   2.  No evidence of bowel perforation.   3.  Hepatic cirrhosis with portal hypertension and moderate to large volume ascites.   4.  Nonocclusive superior mesenteric vein thrombosis.   5.  Cholelithiasis.   6.  Bibasilar atelectasis with minimal bilateral pleural fluid.   7.  Subtle findings concerning for pulmonary emboli.      These findings were electronically conveyed to JAMES WILLIAM on 11/3/2024 11:50 AM.          US-ABDOMEN COMPLETE SURVEY   Final Result      1.  Cirrhosis with stigmata of portal hypertension including splenomegaly, recanalized umbilical vein and ascites.   2.  Gallbladder sludge and stones. Gallbladder wall thickening is nonspecific and could be related to liver disease but correlate clinically for evidence of acute cholecystitis. No biliary dilatation.         DX-CHEST-PORTABLE (1 VIEW)   Final Result      1.  Hazy left lower lobe opacity could represent atelectasis or pneumonitis.   2.  There is linear scarring or atelectasis in the right lung base.      US-PARACENTESIS, ABD WITH IMAGING    (Results Pending)        Assessment/Plan  * Septic shock (HCC)- (present on admission)  Assessment & Plan  Present on admission  Source SBP  Gram Neg rods in blood, Salmonella species by PCR.  Sent to stat lab for fiinal ID and sensitivities   Second organism G+C neg for staph a.  Suspect contaminant  Cont Ceftriaxone  Have been able to titrate off of midodrine  Repeat cultures today  Repeat CT 11/3 did not show any perf, abscess etc  Previously it was discussed with ID who agree with current treatment plan  Repeat blood cultures negative to date.  Her blood pressure is currently stable.  I am continuing IV ceftriaxone.    Lactic acidosis  Assessment & Plan  She found to have severe lactic acidosis with lactic acid level of 8.3.  Lactic acid level is trending down.  Now hemodynamically stable.    Hypoxia  Assessment & Plan  Overall her mentation has improved significantly.  Change her CODE STATUS from DNR to full code on November 7, 2024.    Superior mesenteric vein thrombosis (HCC)  Assessment & Plan  New finding on CT 11/3  GI is following her.  I discussed plan of care with GI team.  I am continuing heparin gtt. and I am titrating as per Anti Xa levels and monitor for signs of bleeding.  Now plan is to transition her to Eliquis.  Bleeding from her right nostril has resolved.  Ordered CBC for tomorrow  morning.        Acute encephalopathy  Assessment & Plan  hypoNa vs HE vs sepsis or more likely components of both  Remain encephalopathic.  Have been agressively titrating up her HE meds and overnight she has started to have BMs  Treating sepsis  Overall significantly improved.    Severe protein-calorie malnutrition ([place]: less than 60% of standard weight) ([facility])  Assessment & Plan  Nutrition consult    Bacteremia due to Gram-negative bacteria  Assessment & Plan  Source SBP  S/p tap of 5000ml 10/31  Salmonella species based on PCR: sent to state lab for final ID and sensitivities  Check TTE  Repeat CT 11/3 did not show any focal infection/abscess/perf  Repeat cutlures 11/3 remain neg thus far  She remains afebrile  I am continue IV ceftriaxone      Advance care planning- (present on admission)  Assessment & Plan  Currently she is DNR/DNI and I had a lengthy discussion with patient's mother at the bedside and later with patient's brother sister-in-law.  I discussed problem by problem and explained them overall very guarded prognosis due to multiorgan involvement.    11/06/24  I evaluated her again at the bedside this morning and discussed plan of care with patient's son at the bedside and updated him regarding plan of care.  I explained him difference between full code and DNR.  I also explained him that family can change her CODE STATUS if they would like to as patient cannot participate in advance care planning at this time.    Later this afternoon I met with several family members at the bedside and admitted them regarding her current medical condition and discussed with them regarding CODE STATUS also updated them regarding overall poor prognosis as she has multiorgan involvement.  Family expressed understanding.  Time spent 18 minutes    11/07/24    Today I discussed CODE STATUS with patient as now she is alert and oriented and able to answer questions.  I explained her CODE STATUS and explained the difference  between full code and DNR.  Patient initially expressed that she does not want to be in vegetative state and wants patient family explained her and I also explained her at the same time.  Patient expressed that she would like to change her CODE STATUS to full code but she does not want to be in vegetative state for long-term but she would like us to try to bring her back if her heart stopped beating or she stopped breathing.  After discussion and patient request I change her CODE STATUS from full code to DNR.  I updated bedside RN as well.  Time spent 17 minutes    Hypoglycemia- (present on admission)  Assessment & Plan  Likely due to poor oral intake as patient reports reduced appetite, lack of glucneogenesis due to cirrhosis  -Patient also finished prednisolone 2 days ago, could be adrenal insufficiency  Reg diet  Nutrition consult  Close monitoring of BG  Ordered CMP for tomorrow morning    Transaminitis- (present on admission)  Assessment & Plan  Patient reports she already finished a 28-day course of prednisone at home  Likely worsening due to sepsis/SBP  Ultrasound neg for PVT, did show cirrhosis. GB with some sludge and non specific wall thickening.  Can be normal in setting of ascites.  Liver enzymes remain elevated bilirubin worsening.  Total bilirubin remain elevated.  I ordered CMP for tomorrow.      High anion gap metabolic acidosis- (present on admission)  Assessment & Plan  Due to lactic acidosis likely from right liver disease and sepsis  IV thiamine  resolved    Hyponatremia- (present on admission)  Assessment & Plan  Sodium 117 on admission  Elena<20  UOsm>300  Hypervolemic hypoNa  Has responded to IV lasix and now in upper 120s.   Change IV lasix to po and start spironolactone  Current sodium level is 130  I ordered CMP for tomorrow morning.    Acute renal failure (ARF) (HCC)- (present on admission)  Assessment & Plan  Creatinine 2.11 on admission  HRS vs sepsis vs hypovlemia  Of note pt had a  paracentesis one week prior to presentation with no albumin  Good UOP   Likely due to sepsis  Avoid nephrotoxic medications  No evidence of obstruction on CT  Creat now 0.44; still elevated given her muscle wasting  Due to concern for hepatorenal syndrome I started her on albumin.  Renal function has significantly improved.  Ordered lab work for tomorrow.    Alcoholic cirrhosis of liver with ascites (HCC)- (present on admission)  Assessment & Plan  F/b Dr [facility]  ETOH induced, sober since June  Has been referred to [facility] transplant service with appointment in June   MELD 32 on presentation  Status post paracentesis 5000ml 10/31  S/p albumin following tap  SBP based on fluid cell count  Rocephin  Trial off of midodrine  No BM in 72hrs and more confused today.  Picture is complicated by possible ileus noted on CT  -cont rifaximin  -increase lactulose po from 45mg TID to q4   -start lactulose retention enema  [facility] declined transfer    Continue lactulose  Now plan is to resume her Lasix and her spironolactone.      Depression with anxiety- (present on admission)  Assessment & Plan  Continue home fluoxetine      I discussed plan of care during multidisciplinary rounds regarding patient's current medical condition and plan of care.    I personally discussed plan of care with gastroenterology team.      VTE prophylaxis: I am continuing heparin gtt. and I am titrating as per Anti Xa levels and monitor for signs of bleeding.  Now plan is to transition her to Eliquis.      I have performed a physical exam and reviewed and updated ROS and Plan today (11/8/2024). In review of yesterday's note (11/7/2024), there are no changes except as documented above.

## 2024-11-09 NOTE — PROGRESS NOTES
Gastroenterology Progress Note               Author:  Oumou OWENS Date & Time Created: 11/9/2024 7:20 AM       Patient ID:  Name:             Kavita Freeman  YOB: 1965  Age:                 59 y.o.  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     History of Present Illness:   59 years old female with medical history of alcohol-related liver injury, cirrhosis, decompensation with variceal bleeding and refractory ascites formation, mild to moderate encephalopathy.  Presented to hospital this time for fluid accumulation, acute kidney injury.  GI team is consulted for recent GI bleeding     The patient has primary GI liver doctor in [place] and she is waiting for the transplantation evaluation at [facility] in the coming 2 months.  We do not have any records to support this plan, however the patient is very certain about the [facility] appointment.     For the bleeding, the patient had nausea vomiting in the last 2 days, the patient saw blood and also some coffee-ground like vomitus.  Tarry stool was noted.  Last upper GI scope August 15 with variceal bleeding and banding.     Interval History:  11/2/2024: Patient seen at bedside.  Awake, alert.   Discussed EGD findings with patient.  Inquired regarding last EGD as, per chart review, last EGD was 2-1/2 months ago.  Patient unable to discern if she has had EGD since last documented.  Positive asterixis.  Denies bowel movements overnight.  Started on rifaximin and lactulose.  Hemoglobin stable.  Abdomen tender with light palpation.  No WBC this morning, but yesterday was 39. ultrasound abdomen pending.      11/3/2024: Patient seen at bedside.  Disoriented, unable to answer orientation questions.  Abdomen distended, significantly increase in tenderness this morning, hypoactive bowel sounds and tympany with percussion.  CT abdomen and CBC pending.  No bowel movements overnight.    Update 1222: CT abdomen with findings including fluid-filled small bowel and colonic loops favoring ileus, no evidence of bowel perforation, cirrhosis with portal hypertension, moderate to large volume ascites, nonocclusive SMV thrombosis, cholelithiasis, subtle findings concerning for pulmonary emboli.      11/4/2024: Patient seen at bedside.  Disoriented, oriented to self only.  Abdomen remains distended, tender with light palpation, hypoactive bowel sounds.  Patient with multiple brown bowel movements overnight.  Started on heparin yesterday.  WBC 29.8, hemoglobin stable at 12.5, platelets 197.  Sodium 127, BUN 56, creatinine improved to 1.3.  Albumin yesterday 2.6.  INR yesterday 2.35.  MELD 3.0 as of 11/3/2024 31    11/5/2024: Patient seen and examined.  Appears to be in distress, tachypneic, unable to participate in interview.  Grimaces when I touch her abdomen.  I was concerned and got Dr. Charles who then examined the patient.  We ordered KUB, chest x-ray, ABG, further labs.  3 BM last 24 hours.     Found to have significant lactic acidosis, INR 3.64 (difficult to interpret in the context of heparin drip), ABG with acute respiratory alkalosis.  Creatinine increasing to 1.54, T. bili 8.  KUB dilated loops of bowel suspicious for  gastroenteritis or inflammatory changes.  Chest x-ray with atelectasis.      11/6/2024: Patient seen in collaboration with Dr. Solorzano. Son at bedside, all questions answered. WBC now 36.6, T. Bili 9.8, creatinine uptrending. MELD 3.0 - 37 (Although INR not reliable in the setting of heparin drip)    11/7/2024: Patient seen with mom and dad at bedside.  Significantly improved, alert and oriented and eating food.  She remembered me from when I met her before.  WBC 26.9, hgb 9.6, plt 112, cr 1.38. Urinary casts present. Renal US negative.     11/8/2024: Patient seen with family bedside. Continues to feel better, eating but doesn't really like the food. Numerous bowel movements overnight. Worsening abdominal distention with mild tenderness. WBC continues to improve, platelets worse at 89, hgb downtrended to 9.1, cr 0.44, t. Bili 10.9    11/9/2024: Patient seen this morning, no family bedside.  More lethargic today, says she is tired.  2 bowel movements past 24 hours.  VSS, WBC 20.3, T. Bili 13.1, albumin 2.9, phos 2.3.  INR greater than 10, TEG with abnormal CK R.  Pending diagnostic paracentesis and elevated INR.    Hospital Medications:  Current Facility-Administered Medications   Medication Dose Frequency Provider Last Rate Last Admin    omeprazole (PriLOSEC) capsule 20 mg  20 mg BID Claudia Solorzano M.D.   20 mg at 11/09/24 0516    apixaban (Eliquis) tablet 10 mg  10 mg BID Claudia Solorzano M.D.   10 mg at 11/09/24 0517    [START ON 11/15/2024] apixaban (Eliquis) tablet 5 mg  5 mg BID Claudia Solorzano M.D.        furosemide (Lasix) tablet 20 mg  20 mg Q DAY Oumou Mart DNP,  APRN   20 mg at 11/09/24 0516    spironolactone (Aldactone) tablet 50 mg  50 mg Q DAY Oumou Mart DNP,  APRN   50 mg at 11/09/24 0516    lactulose 20 GM/30ML solution 30 mL  30 mL TID Oumou Mart, DNP,  APRN   30 mL at 11/09/24 0517    oxymetazoline (Afrin) 0.05 % nasal spray 2 Spray  2 Spray BID  Claudia Solorzano M.D.   2 [place] at 11/09/24 0527    riFAXIMin (Xifaxan) tablet 550 mg  550 mg BID Quang Durham D.OSandy   550 mg at 11/09/24 0516    acetaminophen (Tylenol) tablet 650 mg  650 mg Q6HRS PRN Adilia Gonzales D.O.        oxyCODONE immediate-release (Roxicodone) tablet 2.5 mg  2.5 mg Q3HRS PRN Adilia Gonzales, D.O.   2.5 mg at 11/01/24 1230    Or    oxyCODONE immediate-release (Roxicodone) tablet 5 mg  5 mg Q3HRS PRN Adilia Gonzales, D.O.   5 mg at 11/03/24 0519    Or    HYDROmorphone (Dilaudid) injection 0.25 mg  0.25 mg Q3HRS PRN Adilia Gonzales, D.O.   0.25 mg at 11/06/24 0311    ondansetron (Zofran) syringe/vial injection 4 mg  4 mg Q4HRS PRN Adilia Gonzales, D.O.   4 mg at 11/04/24 1019    ondansetron (Zofran ODT) dispertab 4 mg  4 mg Q4HRS PRN Adilia Gonzales, D.O.        promethazine (Phenergan) tablet 12.5-25 mg  12.5-25 mg Q4HRS PRN Adilia Gonzales D.O.   25 mg at 11/04/24 1116    promethazine (Phenergan) suppository 12.5-25 mg  12.5-25 mg Q4HRS PRN Adilia Gonzales, D.O.        prochlorperazine (Compazine) injection 5-10 mg  5-10 mg Q4HRS PRN Adilia Gonzales, D.O.   10 mg at 11/04/24 1228    guaiFENesin dextromethorphan (Robitussin DM) 100-10 MG/5ML syrup 10 mL  10 mL Q6HRS PRN Adilia Gonzales D.O.        dextrose 50% (D50W) injection 25 g  25 g Q15 MIN PRN Adilia Gonzales, D.O.   25 g at 11/01/24 1843    FLUoxetine (PROzac) capsule 10 mg  10 mg QAM Adilia Gonzales, D.O.   10 mg at 11/09/24 0517    gabapentin (Neurontin) capsule 200 mg  200 mg Nightly Adilia Gonzales, D.O.   200 mg at 11/08/24 2031    hydrOXYzine HCl (Atarax) tablet 25 mg  25 mg TID PRN Adilia Gonzales, D.O.        lidocaine (Asperflex) 4 % patch 1 Patch  1 Patch Q24HRS Adilia Gonzales, D.O.   1 Patch at 11/09/24 0526    sucralfate (Carafate) 1 GM/10ML suspension 1 g  1 g Q6HRS Adilia Gonzales, D.O.   1 g at 11/09/24 0517   Last reviewed on 11/1/2024  9:17 AM by Celine Bustamante R.N.       Review of  "Systems:  Review of Systems   Constitutional:  Positive for malaise/fatigue. Negative for chills and fever.   HENT:  Negative for hearing loss.    Eyes:  Negative for blurred vision.   Respiratory:  Negative for cough and shortness of breath.    Cardiovascular:  Negative for chest pain and leg swelling.   Gastrointestinal:  Negative for abdominal pain, blood in stool, constipation, diarrhea, heartburn, melena, nausea and vomiting.   Genitourinary:  Negative for dysuria.   Musculoskeletal:  Negative for back pain.   Skin:  Negative for rash.   Neurological:  Positive for tremors and weakness. Negative for dizziness.   Psychiatric/Behavioral:  Positive for memory loss. Negative for depression.    All other systems reviewed and are negative.      Vital signs:  Weight/BMI: Body mass index is 23.23 kg/m².  /67   Pulse 87   Temp 36.5 °C (97.7 °F) (Temporal)   Resp (!) 22   Ht 1.727 m (5' 8\")   Wt 69.3 kg (152 lb 12.5 oz)   SpO2 90%   Vitals:    11/09/24 0300 11/09/24 0400 11/09/24 0500 11/09/24 0600   BP: 122/62 121/63 112/61 115/67   Pulse: 86 91 93 87   Resp: 20 19 (!) 22 (!) 22   Temp:       TempSrc:       SpO2: 90% 89% 91% 90%   Weight:       Height:         Oxygen Therapy:  Pulse Oximetry: 90 %, O2 (LPM): 0, O2 Delivery Device: None - Room Air    Intake/Output Summary (Last 24 hours) at 11/9/2024 0720  Last data filed at 11/9/2024 0600  Gross per 24 hour   Intake 940 ml   Output 1400 ml   Net -460 ml       Physical Exam  Vitals and nursing note reviewed.   Constitutional:       General: She is not in acute distress.     Appearance: She is cachectic. She is ill-appearing.   HENT:      Head: Normocephalic.      Nose: Nose normal. No congestion.   Eyes:      General: Scleral icterus present.      Extraocular Movements: Extraocular movements intact.      Conjunctiva/sclera: Conjunctivae normal.   Cardiovascular:      Rate and Rhythm: Normal rate and regular rhythm.      Pulses: Normal pulses.      Heart " sounds: Murmur heard.   Pulmonary:      Effort: Pulmonary effort is normal.      Comments: Diminished throughout  Abdominal:      General: There is distension.      Tenderness: There is abdominal tenderness.      Comments:      Musculoskeletal:      Right lower leg: No edema.      Left lower leg: No edema.   Skin:     General: Skin is warm and dry.      Capillary Refill: Capillary refill takes less than 2 seconds.      Coloration: Skin is jaundiced.   Neurological:      Mental Status: She is oriented to person, place, and time.      Motor: Weakness present.       Labs:  Recent Labs     11/07/24 0215 11/07/24 0950 11/08/24 0244 11/09/24  0205   SODIUM 133* 127* 130* 130*   POTASSIUM 3.4* 3.5* 3.5* 3.8   CHLORIDE 101 96 97 97   CO2 19* 20 20 20   BUN 56* 46* 32* 22   CREATININE 1.38 1.07 0.44* 0.28*   MAGNESIUM 1.8  --  1.9 1.6   PHOSPHORUS 2.0*  --  2.3* 2.3*   CALCIUM 8.8 8.4* 8.5 8.5     Recent Labs     11/07/24 0215 11/07/24  0950 11/08/24 0244 11/09/24  0205   ALTSGPT 48  --  31 32   ASTSGOT 112*  --  84* 80*   ALKPHOSPHAT 112*  --  101* 117*   TBILIRUBIN 9.6*  --  10.9* 13.1*   GLUCOSE 104* 77 78 69     Recent Labs     11/07/24 0215 11/08/24 0244 11/09/24  0205   WBC 26.9* 22.1* 20.3*   ASTSGOT 112* 84* 80*   ALTSGPT 48 31 32   ALKPHOSPHAT 112* 101* 117*   TBILIRUBIN 9.6* 10.9* 13.1*     Recent Labs     11/07/24 0215 11/07/24  0950 11/08/24 0244 11/09/24  0205   RBC 3.33*  --  3.09* 3.30*   HEMOGLOBIN 9.6* 9.8* 9.1* 9.5*   HEMATOCRIT 28.6* 29.1* 26.6* 28.5*   PLATELETCT 112*  --  89* 91*     Recent Results (from the past 24 hours)   Heparin Anti-Xa    Collection Time: 11/08/24 10:29 AM   Result Value Ref Range    Heparin Xa (UFH) 0.19 IU/mL   PHOSPHORUS    Collection Time: 11/09/24  2:05 AM   Result Value Ref Range    Phosphorus 2.3 (L) 2.5 - 4.5 mg/dL   MAGNESIUM    Collection Time: 11/09/24  2:05 AM   Result Value Ref Range    Magnesium 1.6 1.5 - 2.5 mg/dL   Comp Metabolic Panel    Collection Time:  11/09/24  2:05 AM   Result Value Ref Range    Sodium 130 (L) 135 - 145 mmol/L    Potassium 3.8 3.6 - 5.5 mmol/L    Chloride 97 96 - 112 mmol/L    Co2 20 20 - 33 mmol/L    Anion Gap 13.0 7.0 - 16.0    Glucose 69 65 - 99 mg/dL    Bun 22 8 - 22 mg/dL    Creatinine 0.28 (L) 0.50 - 1.40 mg/dL    Calcium 8.5 8.5 - 10.5 mg/dL    Correct Calcium 9.4 8.5 - 10.5 mg/dL    AST(SGOT) 80 (H) 12 - 45 U/L    ALT(SGPT) 32 2 - 50 U/L    Alkaline Phosphatase 117 (H) 30 - 99 U/L    Total Bilirubin 13.1 (H) 0.1 - 1.5 mg/dL    Albumin 2.9 (L) 3.2 - 4.9 g/dL    Total Protein 5.7 (L) 6.0 - 8.2 g/dL    Globulin 2.8 1.9 - 3.5 g/dL    A-G Ratio 1.0 g/dL   CBC WITHOUT DIFFERENTIAL    Collection Time: 11/09/24  2:05 AM   Result Value Ref Range    WBC 20.3 (H) 4.8 - 10.8 K/uL    RBC 3.30 (L) 4.20 - 5.40 M/uL    Hemoglobin 9.5 (L) 12.0 - 16.0 g/dL    Hematocrit 28.5 (L) 37.0 - 47.0 %    MCV 86.4 81.4 - 97.8 fL    MCH 28.8 27.0 - 33.0 pg    MCHC 33.3 32.2 - 35.5 g/dL    RDW 57.0 (H) 35.9 - 50.0 fL    Platelet Count 91 (L) 164 - 446 K/uL    MPV 10.8 9.0 - 12.9 fL   IMMATURE PLT FRACTION    Collection Time: 11/09/24  2:05 AM   Result Value Ref Range    Imm. Plt Fraction 7.0 0.6 - 13.1 %   ESTIMATED GFR    Collection Time: 11/09/24  2:05 AM   Result Value Ref Range    GFR (CKD-EPI) 125 >60 mL/min/1.73 m 2       Radiology Review:  US-RENAL   Final Result      1.  No hydronephrosis.   2.  Ascites.      DX-CHEST-PORTABLE (1 VIEW)   Final Result      1.  No acute cardiac or pulmonary abnormalities are identified.      2.  Slight left basilar atelectasis.      [id number] VIEW   Final Result      1.  Several markedly dilated loops of small bowel in the mid abdomen suspicious for gastroenteritis or inflammatory change. Recommend follow-up abdominal series to rule out evolving small bowel obstruction.      EC-ECHOCARDIOGRAM COMPLETE W/O CONT   Final Result      CT-ABDOMEN-PELVIS W/O   Final Result      1.  Dilated fluid-filled small bowel and colonic loops  favoring ileus.   2.  No evidence of bowel perforation.   3.  Hepatic cirrhosis with portal hypertension and moderate to large volume ascites.   4.  Nonocclusive superior mesenteric vein thrombosis.   5.  Cholelithiasis.   6.  Bibasilar atelectasis with minimal bilateral pleural fluid.   7.  Subtle findings concerning for pulmonary emboli.      These findings were electronically conveyed to JAMES WILLIAM on 11/3/2024 11:50 AM.         US-ABDOMEN COMPLETE SURVEY   Final Result      1.  Cirrhosis with stigmata of portal hypertension including splenomegaly, recanalized umbilical vein and ascites.   2.  Gallbladder sludge and stones. Gallbladder wall thickening is nonspecific and could be related to liver disease but correlate clinically for evidence of acute cholecystitis. No biliary dilatation.         DX-CHEST-PORTABLE (1 VIEW)   Final Result      1.  Hazy left lower lobe opacity could represent atelectasis or pneumonitis.   2.  There is linear scarring or atelectasis in the right lung base.      US-PARACENTESIS, ABD WITH IMAGING    (Results Pending)     MDM (Data Review):   -Records reviewed and summarized in current documentation  -I personally reviewed and interpreted the laboratory results  -I personally reviewed the radiology images    Assessment/Recommendations:  Acute liver failure- MELD 3.0- 38 on arrival (10/31/2024); 26.8% 90-day survival. Child-Garza Class C  SMV thrombosis  Portal hypertensive gastropathy  Ileus  Cirrhosis with ascites  SBP  Hepatic encephalopathy  Coagulopathy  Esophageal varices with banding in situ  Hematemesis  Severe protein calorie malnutrition  History of decompensated liver disease with variceal bleeding, refractory ascites  Salmonella bacteremia - pending review by State lab  Sepsis  EFRAIN HRS  Respiratory alkalosis  Vitamin D deficiency    Plan:  11/5/2024  Significant clinical decompensation overnight.  Consistent with multiorgan failure.  Discussed with [facility] who have agreed to accept pending ICU acceptance.  Attended lengthy bedside discussion with family in collaboration with Dr. Solorzano.  Randa shows DNR/DNI when she was alert.  Significant concern that she would not be able to transfer to [facility] without being intubated  Family discussing amongst themselves regarding next steps: reverse DNI/DNR and pursue transfer vs comfort care vs home with hospice    Completed 72 hours Octreotide  Low sodium diet  Continue PPI IV twice daily  Continue ceftriaxone for SBP and salmonella bacteremia  Continue lactulose and Rifaximin    Heparin for SMV thrombosis/ possible PE  Serial abdominal exams    11/6/2024  [facility] for [facility] consideration  Now with EFRAIN  Continue IV fluids, obtain UA and renal ultrasound  Check am creatinine, if no improvement start 1 G/kg albumin x 3 days    11/7/2024:  UA with casts, renal US negative  Creatinine improved on fluids and IV albumin   Will continue current plan of care    11/8/2024:  Full code status  Consider Vitamin D supplementation, previously deficient  Start lasix/spironolactone 20mg/50mg and uptitrate   Paracentesis ordered  Decreased lactulose dose and frequency  Transfer request cancelled, patient is clinically improving. Discussion with patient and her family bedside that she does not meet criteria for acute transfer. Recommended the following:  Infection must be treated   Optimize functional status  Optimize nutrtion  Continue to seek out opportunities for psychological and mental health support for addiction  She has very supportive family to include her parents, brother, and son  Has an appointment with [facility] hepatology in January 11/9/2024:  Hold Eliquis given elevated INR and abnormal TEG, recheck in the morning and consider vitamin K or Kcentra to reverse Eliquis  Monitor closely for bleeding  Increase Lasix to 40 mg spironolactone to 100 mg  Continue rifaximin and lactulose titrated to 2-3  bowel movements daily  Pending diagnostic paracentesis to determine duration of antibiotic therapy  Encourage out of bed, adequate nutrition, and sleep-wake cycles to avoid delirium    Long term if patient survives admission:   Patient will need repeat EGD in 4-6 weeks  Nonselective beta-blocker as outpatient    GI will follow    Discussed with patient' family, nursing, Dr. Solorzano, Dr. Vences    ..Oumou Mart, CHANDANA,  APRN    Core Quality Measures   Reviewed items::  Labs, Medications and Radiology reports reviewed

## 2024-11-09 NOTE — PROGRESS NOTES
4 Eyes Skin Assessment Completed by KAYLEY mcgrath and Ryan WALDRON RN.    Head Jaundice, petechiae on face and eyes  Ears Jaundice  Nose dried blood in nostrils   Mouth Bleeding, mouth sores  Neck Redness.\, petechiae  Breast/Chest Redness, petechiae  Shoulder Blades Redness, petechiae  Spine WDL  (R) Arm/Elbow/Hand Redness, Blanching, Abrasion, Scab, and Discoloration, petechiae, right lateral wrist (non blanching and ecchymosis  (L) Arm/Elbow/Hand Redness, Blanching, and Bruising, ecchymosis left elbow.  Abdomen distended with caput medusa at the umbilicus  Groin Excoriation  Scrotum/Coccyx/Buttocks Excoriation and Moisture Fissure  (R) Leg Scab, jaundice  (L) Leg Scab, jaundice  (R) Heel/Foot/Toe Jaundice  (L) Heel/Foot/Toe Jaundice          Devices In Places Tele Box and Pulse Ox      Interventions In Place Heel Mepilex, Heel Float Boots, TAP System, Pillows, Elbow Mepilex, Q2 Turns, Low Air Loss Mattress, and Barrier Cream    Possible Skin Injury Yes    Pictures Uploaded Into Epic Yes  Wound Consult Placed N/A  RN Wound Prevention Protocol Ordered Yes

## 2024-11-09 NOTE — PROGRESS NOTES
Latest Reference Range & Units 11/09/24 09:30   INR 0.87 - 1.13  >=10.00 (HH)   PT 12.0 - 14.6 sec 110.8 (HH)   APTT 24.7 - 36.0 sec 89.7 (H)   (): Data is critically high  (H): Data is abnormally high    x2 blood draw to confirm same results above. Hospitalist, pharmacist, and IR team notified (pending bedside paracentesis, canceled). Pt on Eliquis.

## 2024-11-09 NOTE — CARE PLAN
The patient is Stable - Low risk of patient condition declining or worsening    Shift Goals  Clinical Goals: Hemodynamic stability, no bleeding, possible transfer  Patient Goals: Rest, pain control  Family Goals: Updates    Progress made toward(s) clinical / shift goals:    Problem: Pain - Standard  Goal: Alleviation of pain or a reduction in pain to the patient’s comfort goal  Description: Target End Date:  Prior to discharge or change in level of care    Document on Vitals flowsheet    1.  Document pain using the appropriate pain scale per order or unit policy  2.  Educate and implement non-pharmacologic comfort measures (i.e. relaxation, distraction, massage, cold/heat therapy, etc.)  3.  Pain management medications as ordered  4.  Reassess pain after pain med administration per policy  5.  If opiods administered assess patient's response to pain medication is appropriate per POSS sedation scale  6.  Follow pain management plan developed in collaboration with patient and interdisciplinary team (including palliative care or pain specialists if applicable)  Outcome: Progressing     Problem: Knowledge Deficit - Standard  Goal: Patient and family/care givers will demonstrate understanding of plan of care, disease process/condition, diagnostic tests and medications  Description: Target End Date:  1-3 days or as soon as patient condition allows    Document in Patient Education    1.  Patient and family/caregiver oriented to unit, equipment, visitation policy and means for communicating concern  2.  Complete/review Learning Assessment  3.  Assess knowledge level of disease process/condition, treatment plan, diagnostic tests and medications  4.  Explain disease process/condition, treatment plan, diagnostic tests and medications  Outcome: Progressing     Problem: Hemodynamics  Goal: Patient's hemodynamics, fluid balance and neurologic status will be stable or improve  Description: Target End Date:  Prior to discharge or  change in level of care    Document on Assessment and I/O flowsheet templates    1.  Monitor vital signs, pulse oximetry and cardiac monitor per provider order and/or policy  2.  Maintain blood pressure per provider order  3.  Hemodynamic monitoring per provider order  4.  Manage IV fluids and IV infusions  5.  Monitor intake and output  6.  Daily weights per unit policy or provider order  7.  Assess peripheral pulses and capillary refill  8.  Assess color and body temperature  9.  Position patient for maximum circulation/cardiac output  10. Monitor for signs/symptoms of excessive bleeding  11. Assess mental status, restlessness and changes in level of consciousness  12. Monitor temperature and report fever or hypothermia to provider immediately. Consideration of targeted temperature management.  Outcome: Progressing     Problem: Urinary - Renal Perfusion  Goal: Ability to achieve and maintain adequate renal perfusion and functioning will improve  Description: Target End Date:  Prior to discharge or change in level of care    Document on I/O and Assessment flowsheet    1.  Urine output will remain greater than 0.5ml/Kg/HR  2.  Monitor amount and/or characteristics of urine per order/policy. Specific gravity per order/policy  3.  Assess signs and symptoms of renal dysfunction  Outcome: Progressing     Problem: Respiratory  Goal: Patient will achieve/maintain optimum respiratory ventilation and gas exchange  Description: Target End Date:  Prior to discharge or change in level of care    Document on Assessment flowsheet    1.  Assess and monitor rate, rhythm, depth and effort of respiration  2.  Breath sounds assessed qshift and/or as needed  3.  Assess O2 saturation, administer/titrate oxygen as ordered  4.  Position patient for maximum ventilatory efficiency  5.  Turn, cough, and deep breath with splinting to improve effectiveness  6.  Collaborate with RT to administer medication/treatments per order  7.  Encourage  use of incentive spirometer and encourage patient to cough after use and utilize splinting techniques if applicable  8.  Airway suctioning  9.  Monitor sputum production for changes in color, consistency and frequency  10. Perform frequent oral hygiene  11. Alternate physical activity with rest periods  Outcome: Progressing     Problem: Skin Integrity  Goal: Skin integrity is maintained or improved  Description: Target End Date:  Prior to discharge or change in level of care    Document interventions on Skin Risk/Thad flowsheet groups and corresponding LDA    1.  Assess and monitor skin integrity, appearance and/or temperature  2.  Assess risk factors for impaired skin integrity and/or pressures ulcers  3.  Implement precautions to protect skin integrity in collaboration with interdisciplinary team  4.  Implement pressure ulcer prevention protocol if at risk for skin breakdown  5.  Confirm wound care consult if at risk for skin breakdown  6.  Ensure patient use of pressure relieving devices  (Low air loss bed, waffle overlay, heel protectors, ROHO cushion, etc)  Outcome: Progressing       Patient is not progressing towards the following goals:      Problem: Fluid Volume  Goal: Fluid volume balance will be maintained  Description: Target End Date:  Prior to discharge or change in level of care    Document on I/O flowsheet    1.  Monitor intake and output as ordered  2.  Promote oral intake as appropriate  3.  Report inadequate intake or output to physician  4.  Administer IV therapy as ordered  5.  Weights per provider order  6.  Assess for signs and symptoms of bleeding  7.  Monitor for signs of fluid overload (respiratory changes, edema, weight gain, increased abdominal girth)  8.  Monitor of signs for inadequate fluid volume (poor skin turgor, dry mucous membranes)  9.  Instruct patient on adherence to fluid restrictions  Outcome: Not Progressing

## 2024-11-09 NOTE — PROGRESS NOTES
Pt refusing q2 turns. Pt educated on importance. Pt stated she will call when she would like to be turned.

## 2024-11-09 NOTE — PROGRESS NOTES
Hospital Medicine Daily Progress Note    Date of Service  11/9/2024    Chief Complaint  Kavita Freeman is a 59 y.o. female admitted 10/31/2024 with needs paracentesis    Hospital Course  60yo PMHx ETOH abuse sober x 4 months, alcoholic cirrhosis, esophageal varices s/p banding.  Pt presented requesting a paracentesis and with fever and chills.  In ED WBCs 45, Na 117, CO2 16, AG 20, creat 2.1.  Para done in ED; cloudy fluid noted with nucleated cells >34k    Interval Problem Update    11/05/24    I evaluated and examined her at the bedside.  She was in respiratory distress and tachypneic.  I obtained ABG that showed significant hypoxia.  She also found to have severe lactic acidosis.  I discussed plan of care with intensivist Dr. Turcios.  Patient is DNR/DNI.  I reviewed her again at the bedside and discussed plan of care with patient's mother and explained her about her current medical condition.  I reevaluated her again at the bedside and discussed plan of care with patient's brother and sister-in-law and mother again.  Current oxygen saturation is 98%, current blood pressure is 158/89, pulse of 108.  I am continuing heparin gtt. and I am titrating as per Anti Xa levels and monitor for signs of bleeding.    Addendum 4:24 PM     I reevaluated her again at the bedside.  Now patient's son at the bedside discussed plan of care with him and regarding her current medical condition.  I discussed with him regarding advance care planning.  I have made him that he is currently DNR/DNI.  I gave both options that if family would like to change her CODE STATUS to full code and I also discussed about comfort care/hospice.  I updated him regarding multiorgan involvement and overall clinically not responding to treatment.    11/06/24    I evaluated and examined her at the bedside.  Clinically she looks slightly more alert and she is able to follow commands but she remains confused.  Currently she is requiring a liter of oxygen  via OxyMask to maintain oxygen saturation.  CMP showed BUN of 68 and creatinine of 1.63.  I am continuing IV fluid.  Liver enzymes remain elevated total bilirubin of 9.8.  Lactic acid level is trending down last lactic acid was 2.9.  Repeat blood culture negative to date  I started on albumin due to concern for hepatorenal syndrome.    I evaluated her again at the bedside this morning and discussed plan of care with patient's son at the bedside and updated him regarding plan of care.  I explained him difference between full code and DNR.  I also explained him that family can change her CODE STATUS if they would like to as patient cannot participate in advance care planning at this time.    Later this afternoon I met with several family members at the bedside and admitted them regarding her current medical condition and discussed with them regarding CODE STATUS also updated them regarding overall poor prognosis as she has multiorgan involvement.  Family expressed understanding.    11/07/24    I evaluated and examined her at the bedside.  This morning she was significantly alert and able to answer my questions appropriately.  Patient's parents and son at the bedside.  She has been having mild bleeding from her right nostril and I ordered Afrin spray.  Renal function has significantly improved with the help of albumin administration.  I am continuing heparin gtt. and I am titrating as per Anti Xa levels and monitor for signs of bleeding.    Today I discussed CODE STATUS with patient as now she is alert and oriented and able to answer questions.  I explained her CODE STATUS and explained the difference between full code and DNR.  Patient initially expressed that she does not want to be in vegetative state and wants patient family explained her and I also explained her at the same time.  Patient expressed that she would like to change her CODE STATUS to full code but she does not want to be in vegetative state for  long-term but she would like us to try to bring her back if her heart stopped beating or she stopped breathing.  After discussion and patient request I change her CODE STATUS from full code to DNR.  I updated bedside RN as well.      11/08/24    I have noted and examined her at the bedside  She was alert and able to answer questions appropriately.  Overnight somehow her CODE STATUS changed to DNR.  I reconfirmed the CODE STATUS with patient and family and changed back to full code as per my discussion yesterday  I discussed plan of care with gastroenterology team regarding Ms. Freeman current medical condition plan of care pain  I am continuing heparin gtt. and I am titrating as per Anti Xa levels and monitor for signs of bleeding.  Now plan is to transition her to Eliquis.  Monitor for bleeding.    11/09/24    I evaluated and examined her at the bedside.  She alert but slightly more confused as compared to yesterday.  Patient's family at the bedside.  I discussed plan of care with them.  Currently she is hemodynamically stable.  Current white blood cell count is 20.3, hemoglobin of 9.5 and platelet count of 91.  CMP showed sodium of 130, BUN of 22 and creatinine of 0.28  Liver enzyme remains elevated now bilirubin has been trending up current total bilirubin is 13.1 and it was 10.9 yesterday.  She found to have magnesium of 1.6 and phosphorus of 2.3.  I ordered replacements.  I am continuing therapeutic anticoagulation with Eliquis.  Overall hemoglobin been stable current hemoglobin is 9.5 and yesterday it was 9.1.  Ultrasound-guided paracentesis ordered and currently its pending due to significant elevated INR.  If her INR remains elevated she can get benefit from bedside diagnostic paracentesis.    I discussed with Dr. Jenkins intensivist for possible diagnostic paracentesis.    Addendum: I discussed with gastroenterologist Dr. Vences and plan is to hold her Eliquis.  Due to her elevated INR.  I am concerned reversing  her INR and Eliquis she would be at risk of thrombosis at the same time her INR is elevated she is at risk of bleeding.    I have discussed this patient's plan of care and discharge plan at IDT rounds today with Case Management, Nursing, Nursing leadership, and other members of the IDT team.    Consultants/Specialty  GI    Code Status  Full Code    Disposition  The patient is not medically cleared for discharge to home or a post-acute facility.      I have placed the appropriate orders for post-discharge needs.    Review of Systems  Review of Systems   Constitutional:  Positive for malaise/fatigue. Negative for chills, fever and weight loss.   HENT:  Negative for hearing loss, nosebleeds and tinnitus.    Eyes:  Negative for blurred vision, double vision, photophobia and pain.   Respiratory:  Negative for cough, sputum production and shortness of breath.    Cardiovascular:  Negative for chest pain, palpitations, orthopnea and leg swelling.   Gastrointestinal:  Negative for abdominal pain, constipation, diarrhea, nausea and vomiting.   Genitourinary:  Negative for dysuria, frequency and urgency.   Musculoskeletal:  Negative for back pain, joint pain, myalgias and neck pain.   Skin:  Negative for rash.   Neurological:  Positive for weakness. Negative for dizziness, tingling, tremors, sensory change, speech change, focal weakness and headaches.   Psychiatric/Behavioral:  Negative for hallucinations and substance abuse.    All other systems reviewed and are negative.       Physical Exam  Temp:  [36.1 °C (97 °F)-36.5 °C (97.7 °F)] 36.5 °C (97.7 °F)  Pulse:  [81-97] 87  Resp:  [16-55] 22  BP: (102-122)/(53-67) 115/67  SpO2:  [89 %-93 %] 90 %    Physical Exam  Vitals reviewed.   Constitutional:       General: She is in acute distress.      Appearance: She is ill-appearing.      Comments: Cachectic  Significantly more alert this morning.   HENT:      Head: Normocephalic and atraumatic.      Nose: No congestion.   Eyes:       General: Scleral icterus present.         Right eye: No discharge.         Left eye: No discharge.      Pupils: Pupils are equal, round, and reactive to light.   Cardiovascular:      Rate and Rhythm: Normal rate and regular rhythm.      Pulses: Normal pulses.      Heart sounds: Normal heart sounds. No murmur heard.  Pulmonary:      Effort: No respiratory distress.      Breath sounds: No stridor.   Abdominal:      General: Bowel sounds are normal. There is distension.      Palpations: Abdomen is soft.      Tenderness: There is no abdominal tenderness.   Musculoskeletal:         General: No swelling or tenderness. Normal range of motion.      Cervical back: Normal range of motion. No rigidity.   Skin:     General: Skin is warm.      Capillary Refill: Capillary refill takes less than 2 seconds.      Coloration: Skin is jaundiced. Skin is not pale.      Findings: No bruising.   Neurological:      General: No focal deficit present.      Mental Status: She is oriented to person, place, and time.      Cranial Nerves: No cranial nerve deficit.      Comments: She is alert and oriented and able to answer my questions appropriately and she is following commands.   Psychiatric:         Mood and Affect: Mood normal.         Behavior: Behavior normal.         Fluids    Intake/Output Summary (Last 24 hours) at 11/9/2024 0805  Last data filed at 11/9/2024 0600  Gross per 24 hour   Intake 940 ml   Output 1400 ml   Net -460 ml        Laboratory  Recent Labs     11/07/24  0215 11/07/24  0950 11/08/24 0244 11/09/24  0205   WBC 26.9*  --  22.1* 20.3*   RBC 3.33*  --  3.09* 3.30*   HEMOGLOBIN 9.6* 9.8* 9.1* 9.5*   HEMATOCRIT 28.6* 29.1* 26.6* 28.5*   MCV 85.9  --  86.1 86.4   MCH 28.8  --  29.4 28.8   MCHC 33.6  --  34.2 33.3   RDW 57.8*  --  57.2* 57.0*   PLATELETCT 112*  --  89* 91*   MPV 10.5  --  10.2 10.8     Recent Labs     11/07/24  0950 11/08/24  0244 11/09/24  0205   SODIUM 127* 130* 130*   POTASSIUM 3.5* 3.5* 3.8   CHLORIDE 96  97 97   CO2 20 20 20   GLUCOSE 77 78 69   BUN 46* 32* 22   CREATININE 1.07 0.44* 0.28*   CALCIUM 8.4* 8.5 8.5                     Imaging  US-RENAL   Final Result      1.  No hydronephrosis.   2.  Ascites.      DX-CHEST-PORTABLE (1 VIEW)   Final Result      1.  No acute cardiac or pulmonary abnormalities are identified.      2.  Slight left basilar atelectasis.      TK-VBMIVAL-4 VIEW   Final Result      1.  Several markedly dilated loops of small bowel in the mid abdomen suspicious for gastroenteritis or inflammatory change. Recommend follow-up abdominal series to rule out evolving small bowel obstruction.      EC-ECHOCARDIOGRAM COMPLETE W/O CONT   Final Result      CT-ABDOMEN-PELVIS W/O   Final Result      1.  Dilated fluid-filled small bowel and colonic loops favoring ileus.   2.  No evidence of bowel perforation.   3.  Hepatic cirrhosis with portal hypertension and moderate to large volume ascites.   4.  Nonocclusive superior mesenteric vein thrombosis.   5.  Cholelithiasis.   6.  Bibasilar atelectasis with minimal bilateral pleural fluid.   7.  Subtle findings concerning for pulmonary emboli.      These findings were electronically conveyed to JAMES WILLIAM on 11/3/2024 11:50 AM.         US-ABDOMEN COMPLETE SURVEY   Final Result      1.  Cirrhosis with stigmata of portal hypertension including splenomegaly, recanalized umbilical vein and ascites.   2.  Gallbladder sludge and stones. Gallbladder wall thickening is nonspecific and could be related to liver disease but correlate clinically for evidence of acute cholecystitis. No biliary dilatation.         DX-CHEST-PORTABLE (1 VIEW)   Final Result      1.  Hazy left lower lobe opacity could represent atelectasis or pneumonitis.   2.  There is linear scarring or atelectasis in the right lung base.      US-PARACENTESIS, ABD WITH IMAGING    (Results Pending)        Assessment/Plan  * Septic shock (HCC)- (present on admission)  Assessment & Plan  Present on  admission  Source SBP  Gram Neg rods in blood, Salmonella species by PCR.  Sent to stat lab for fiinal ID and sensitivities   Second organism G+C neg for staph a.  Suspect contaminant  Have been able to titrate off of midodrine  Repeat blood cultures remain negative.  Repeat CT 11/3 did not show any perf, abscess etc  Previously it was discussed with ID who agree with current treatment plan  Repeat blood cultures negative to date.  Her blood pressure is currently stable.  Completed course of IV ceftriaxone.  Ultrasound-guided paracentesis ordered lab workup also ordered.    Elevated INR  Assessment & Plan  She found to have elevated INR could be due to liver disease and she is on anticoagulation as well.  She will need paracentesis but due to elevated INR it was not performed.  She received large dose of her antibiotic IV ceftriaxone today.  If her INR improved she may get bedside diagnostic paracentesis to ensure no further ongoing SBP.  I also ordered TEG study.    Lactic acidosis  Assessment & Plan  She found to have severe lactic acidosis with lactic acid level of 8.3.  Lactic acid level is trending down.  Now hemodynamically stable.    Hypoxia  Assessment & Plan  Overall her mentation has improved significantly.  Change her CODE STATUS from DNR to full code on November 7, 2024.    Superior mesenteric vein thrombosis (HCC)  Assessment & Plan  New finding on CT 11/3  GI is following her.  I discussed plan of care with GI team.  Now she is on therapeutic Eliquis currently on loading dose.  Hemoglobin remained stable.  Bleeding from her right nostril has resolved.  Ordered CBC for tomorrow morning.        Acute encephalopathy  Assessment & Plan  hypoNa vs HE vs sepsis or more likely components of both  Remain encephalopathic.  Have been agressively titrating up her HE meds and overnight she has started to have BMs  Treating sepsis  Significantly improved.      Severe protein-calorie malnutrition ([place]: less than  60% of standard weight) (HCC)  Assessment & Plan  Nutrition consult    Bacteremia due to Gram-negative bacteria  Assessment & Plan  Source SBP  S/p tap of 5000ml 10/31  Salmonella species based on PCR: sent to state lab for final ID and sensitivities  Check TTE  Repeat CT 11/3 did not show any focal infection/abscess/perf  Repeat cutlures 11/3 remain neg thus far  She remains afebrile  Now completed IV ceftriaxone.  Ultrasound-guided paracentesis ordered and Levaquin ordered to evaluate for ongoing infection.      Advance care planning- (present on admission)  Assessment & Plan  Currently she is DNR/DNI and I had a lengthy discussion with patient's mother at the bedside and later with patient's brother sister-in-law.  I discussed problem by problem and explained them overall very guarded prognosis due to multiorgan involvement.    11/06/24  I evaluated her again at the bedside this morning and discussed plan of care with patient's son at the bedside and updated him regarding plan of care.  I explained him difference between full code and DNR.  I also explained him that family can change her CODE STATUS if they would like to as patient cannot participate in advance care planning at this time.    Later this afternoon I met with several family members at the bedside and admitted them regarding her current medical condition and discussed with them regarding CODE STATUS also updated them regarding overall poor prognosis as she has multiorgan involvement.  Family expressed understanding.  Time spent 18 minutes    11/07/24    Today I discussed CODE STATUS with patient as now she is alert and oriented and able to answer questions.  I explained her CODE STATUS and explained the difference between full code and DNR.  Patient initially expressed that she does not want to be in vegetative state and wants patient family explained her and I also explained her at the same time.  Patient expressed that she would like to change her  CODE STATUS to full code but she does not want to be in vegetative state for long-term but she would like us to try to bring her back if her heart stopped beating or she stopped breathing.  After discussion and patient request I change her CODE STATUS from full code to DNR.  I updated bedside RN as well.  Time spent 17 minutes    Hypoglycemia- (present on admission)  Assessment & Plan  Likely due to poor oral intake as patient reports reduced appetite, lack of glucneogenesis due to cirrhosis  -Patient also finished prednisolone 2 days ago, could be adrenal insufficiency  Reg diet  Nutrition consult  Close monitoring of BG  Ordered CMP for tomorrow morning    Transaminitis- (present on admission)  Assessment & Plan  Patient reports she already finished a 28-day course of prednisone at home  Likely worsening due to sepsis/SBP  Ultrasound neg for PVT, did show cirrhosis. GB with some sludge and non specific wall thickening.  Can be normal in setting of ascites.  Liver enzymes remain elevated bilirubin worsening.  Total bilirubin remain is trending up  I ordered CMP for tomorrow.      High anion gap metabolic acidosis- (present on admission)  Assessment & Plan  Due to lactic acidosis likely from right liver disease and sepsis  IV thiamine  resolved    Hyponatremia- (present on admission)  Assessment & Plan  Sodium 117 on admission  Elena<20  UOsm>300  Hypervolemic hypoNa  Has responded to IV lasix and now in upper 120s.   Change IV lasix to po and start spironolactone  Current sodium level remains 130  I ordered CMP for tomorrow morning.    Acute renal failure (ARF) (HCC)- (present on admission)  Assessment & Plan  Creatinine 2.11 on admission  HRS vs sepsis vs hypovlemia  Of note pt had a paracentesis one week prior to presentation with no albumin  Good UOP   Likely due to sepsis  Avoid nephrotoxic medications  No evidence of obstruction on CT  Creat now 0.44; still elevated given her muscle wasting  Due to concern for  hepatorenal syndrome I started her on albumin.  Renal function has significantly improved.  Now resume diuretics.  Order lab work for tomorrow.    Alcoholic cirrhosis of liver with ascites (HCC)- (present on admission)  Assessment & Plan  F/b Dr [facility]  ETOH induced, sober since June  Has been referred to [facility] transplant service with appointment in June   MELD 32 on presentation  Status post paracentesis 5000ml 10/31  S/p albumin following tap  SBP based on fluid cell count  Rocephin  Trial off of midodrine  No BM in 72hrs and more confused today.  Picture is complicated by possible ileus noted on CT  -cont rifaximin  -increase lactulose po from 45mg TID to q4   -start lactulose retention enema  [facility] declined transfer    Continue lactulose  Now plan is to resume her Lasix and her spironolactone.  Continue to monitor input and output.      Depression with anxiety- (present on admission)  Assessment & Plan  Continue home fluoxetine      I had a long discussion with patient and her family at the bedside.  Patient overall prognosis remain guarded as her bilirubin is trending.    I personally discussed with gastroenterology team.    I discussed plan of care during multidisciplinary rounds regarding patient's current medical condition and plan of care.      VTE prophylaxis: Eliquis    I have performed a physical exam and reviewed and updated ROS and Plan today (11/9/2024). In review of yesterday's note (11/8/2024), there are no changes except as documented above.

## 2024-11-10 PROBLEM — J18.9 PNEUMONIA: Status: ACTIVE | Noted: 2024-01-01

## 2024-11-10 PROBLEM — I47.29: Status: ACTIVE | Noted: 2024-01-01

## 2024-11-10 PROBLEM — K65.2 SBP (SPONTANEOUS BACTERIAL PERITONITIS) (HCC): Status: ACTIVE | Noted: 2024-01-01

## 2024-11-10 PROBLEM — A41.9 SEPTIC SHOCK (HCC): Status: RESOLVED | Noted: 2024-01-01 | Resolved: 2024-01-01

## 2024-11-10 PROBLEM — I47.29: Status: RESOLVED | Noted: 2024-01-01 | Resolved: 2024-01-01

## 2024-11-10 PROBLEM — R65.21 SEPTIC SHOCK (HCC): Status: RESOLVED | Noted: 2024-01-01 | Resolved: 2024-01-01

## 2024-11-10 PROBLEM — I34.89 SYSTOLIC ANTERIOR MOVEMENT OF MITRAL VALVE: Status: ACTIVE | Noted: 2024-01-01

## 2024-11-10 PROBLEM — J96.01 ACUTE RESPIRATORY FAILURE WITH HYPOXIA (HCC): Status: ACTIVE | Noted: 2024-01-01

## 2024-11-10 PROBLEM — R79.89 ELEVATED LACTIC ACID LEVEL: Status: ACTIVE | Noted: 2024-01-01

## 2024-11-10 LAB
ALBUMIN SERPL BCP-MCNC: 2.9 G/DL (ref 3.2–4.9)
ALBUMIN/GLOB SERPL: 0.9 G/DL
ALP SERPL-CCNC: 124 U/L (ref 30–99)
ALT SERPL-CCNC: 38 U/L (ref 2–50)
AMMONIA PLAS-SCNC: 62 UMOL/L (ref 11–45)
ANION GAP SERPL CALC-SCNC: 13 MMOL/L (ref 7–16)
ANISOCYTOSIS BLD QL SMEAR: ABNORMAL
APPEARANCE FLD: NORMAL
AST SERPL-CCNC: 90 U/L (ref 12–45)
BASE EXCESS BLDA CALC-SCNC: -5 MMOL/L (ref -4–3)
BASOPHILS # BLD AUTO: 0 % (ref 0–1.8)
BASOPHILS # BLD: 0 K/UL (ref 0–0.12)
BILIRUB CONJ SERPL-MCNC: 9.9 MG/DL (ref 0.1–0.5)
BILIRUB SERPL-MCNC: 16.2 MG/DL (ref 0.1–1.5)
BODY FLD TYPE: NORMAL
BODY TEMPERATURE: 36.3 CENTIGRADE
BUN SERPL-MCNC: 31 MG/DL (ref 8–22)
BURR CELLS BLD QL SMEAR: NORMAL
CALCIUM ALBUM COR SERPL-MCNC: 9.7 MG/DL (ref 8.5–10.5)
CALCIUM SERPL-MCNC: 8.8 MG/DL (ref 8.5–10.5)
CFT BLD TEG: 13.4 MIN (ref 4.6–9.1)
CFT P HPASE BLD TEG: 14.1 MIN (ref 4.3–8.3)
CHLORIDE SERPL-SCNC: 99 MMOL/L (ref 96–112)
CLOT ANGLE BLD TEG: 73.4 DEGREES (ref 63–78)
CLOT LYSIS 30M P MA LENFR BLD TEG: 0.6 % (ref 0–2.6)
CO2 SERPL-SCNC: 18 MMOL/L (ref 20–33)
COLOR FLD: YELLOW
CREAT SERPL-MCNC: 1.09 MG/DL (ref 0.5–1.4)
CT.EXTRINSIC BLD ROTEM: 1.2 MIN (ref 0.8–2.1)
EOSINOPHIL # BLD AUTO: 0 K/UL (ref 0–0.51)
EOSINOPHIL NFR BLD: 0 % (ref 0–6.9)
ERYTHROCYTE [DISTWIDTH] IN BLOOD BY AUTOMATED COUNT: 54.8 FL (ref 35.9–50)
ERYTHROCYTE [DISTWIDTH] IN BLOOD BY AUTOMATED COUNT: 55.5 FL (ref 35.9–50)
GFR SERPLBLD CREATININE-BSD FMLA CKD-EPI: 59 ML/MIN/1.73 M 2
GLOBULIN SER CALC-MCNC: 3.1 G/DL (ref 1.9–3.5)
GLUCOSE SERPL-MCNC: 114 MG/DL (ref 65–99)
GRAM STN SPEC: NORMAL
HCO3 BLDA-SCNC: 18 MMOL/L (ref 17–25)
HCT VFR BLD AUTO: 26.2 % (ref 37–47)
HCT VFR BLD AUTO: 26.8 % (ref 37–47)
HGB BLD-MCNC: 8.8 G/DL (ref 12–16)
HGB BLD-MCNC: 9.1 G/DL (ref 12–16)
HGB RETIC QN AUTO: 38.2 PG/CELL (ref 29–35)
HOLDING TUBE BB 8507: NORMAL
IMM RETICS NFR: 28.5 % (ref 2.6–16.1)
INHALED O2 FLOW RATE: ABNORMAL L/MIN
INR PPP: 9.53 (ref 0.87–1.13)
INR PPP: >=10 (ref 0.87–1.13)
LACTATE SERPL-SCNC: 4.2 MMOL/L (ref 0.5–2)
LACTATE SERPL-SCNC: 4.9 MMOL/L (ref 0.5–2)
LDH SERPL L TO P-CCNC: 600 U/L (ref 107–266)
LYMPHOCYTES # BLD AUTO: 0 K/UL (ref 1–4.8)
LYMPHOCYTES NFR BLD: 0 % (ref 22–41)
LYMPHOCYTES NFR FLD: 81 %
MACROCYTES BLD QL SMEAR: ABNORMAL
MAGNESIUM SERPL-MCNC: 2.1 MG/DL (ref 1.5–2.5)
MANUAL DIFF BLD: NORMAL
MCF BLD TEG: 55.6 MM (ref 52–69)
MCF.PLATELET INHIB BLD ROTEM: 21.6 MM (ref 15–32)
MCH RBC QN AUTO: 29.3 PG (ref 27–33)
MCH RBC QN AUTO: 29.5 PG (ref 27–33)
MCHC RBC AUTO-ENTMCNC: 33.6 G/DL (ref 32.2–35.5)
MCHC RBC AUTO-ENTMCNC: 34 G/DL (ref 32.2–35.5)
MCV RBC AUTO: 86.2 FL (ref 81.4–97.8)
MCV RBC AUTO: 87.9 FL (ref 81.4–97.8)
MONOCYTES # BLD AUTO: 0.6 K/UL (ref 0–0.85)
MONOCYTES NFR BLD AUTO: 1.8 % (ref 0–13.4)
MONOS+MACROS NFR FLD MANUAL: 11 %
MORPHOLOGY BLD-IMP: NORMAL
NEUTROPHILS # BLD AUTO: 32.7 K/UL (ref 1.82–7.42)
NEUTROPHILS NFR BLD: 98.2 % (ref 44–72)
NEUTROPHILS NFR FLD: 8 %
NRBC # BLD AUTO: 0.02 K/UL
NRBC BLD-RTO: 0.1 /100 WBC (ref 0–0.2)
NUC CELL # FLD: 1175 CELLS/UL
PA AA BLD-ACNC: 9.8 % (ref 0–11)
PA ADP BLD-ACNC: 27.1 % (ref 0–17)
PCO2 BLDA: 24.4 MMHG (ref 26–37)
PH BLDA: 7.47 [PH] (ref 7.4–7.5)
PHOSPHATE SERPL-MCNC: 3.7 MG/DL (ref 2.5–4.5)
PLATELET # BLD AUTO: 177 K/UL (ref 164–446)
PLATELET # BLD AUTO: 179 K/UL (ref 164–446)
PLATELET BLD QL SMEAR: NORMAL
PMV BLD AUTO: 10.3 FL (ref 9–12.9)
PMV BLD AUTO: 10.6 FL (ref 9–12.9)
PO2 BLDA: 57.8 MMHG (ref 64–87)
POIKILOCYTOSIS BLD QL SMEAR: NORMAL
POLYCHROMASIA BLD QL SMEAR: NORMAL
POTASSIUM SERPL-SCNC: 4.7 MMOL/L (ref 3.6–5.5)
PROCALCITONIN SERPL-MCNC: 1.44 NG/ML
PROT SERPL-MCNC: 6 G/DL (ref 6–8.2)
PROTHROMBIN TIME: 77.9 SEC (ref 12–14.6)
PROTHROMBIN TIME: 82.8 SEC (ref 12–14.6)
RBC # BLD AUTO: 2.98 M/UL (ref 4.2–5.4)
RBC # BLD AUTO: 3.11 M/UL (ref 4.2–5.4)
RBC # FLD: 6000 CELLS/UL
RBC BLD AUTO: PRESENT
RETICS # AUTO: 0.17 M/UL (ref 0.04–0.12)
RETICS/RBC NFR: 5.7 % (ref 0.8–2.6)
SAO2 % BLDA: 87.1 % (ref 93–99)
SCCMEC + MECA PNL NOSE NAA+PROBE: NEGATIVE
SIGNIFICANT IND 70042: NORMAL
SITE SITE: NORMAL
SODIUM SERPL-SCNC: 130 MMOL/L (ref 135–145)
SOURCE SOURCE: NORMAL
TARGETS BLD QL SMEAR: NORMAL
TEG ALGORITHM TGALG: ABNORMAL
WBC # BLD AUTO: 30.7 K/UL (ref 4.8–10.8)
WBC # BLD AUTO: 33.3 K/UL (ref 4.8–10.8)

## 2024-11-10 PROCEDURE — 99291 CRITICAL CARE FIRST HOUR: CPT | Performed by: INTERNAL MEDICINE

## 2024-11-10 PROCEDURE — 82248 BILIRUBIN DIRECT: CPT

## 2024-11-10 PROCEDURE — 85384 FIBRINOGEN ACTIVITY: CPT | Mod: 91

## 2024-11-10 PROCEDURE — 85007 BL SMEAR W/DIFF WBC COUNT: CPT

## 2024-11-10 PROCEDURE — 83615 LACTATE (LD) (LDH) ENZYME: CPT

## 2024-11-10 PROCEDURE — 700102 HCHG RX REV CODE 250 W/ 637 OVERRIDE(OP): Performed by: NURSE PRACTITIONER

## 2024-11-10 PROCEDURE — 86901 BLOOD TYPING SEROLOGIC RH(D): CPT

## 2024-11-10 PROCEDURE — 83735 ASSAY OF MAGNESIUM: CPT

## 2024-11-10 PROCEDURE — A9270 NON-COVERED ITEM OR SERVICE: HCPCS | Performed by: INTERNAL MEDICINE

## 2024-11-10 PROCEDURE — A9270 NON-COVERED ITEM OR SERVICE: HCPCS | Performed by: NURSE PRACTITIONER

## 2024-11-10 PROCEDURE — 85347 COAGULATION TIME ACTIVATED: CPT

## 2024-11-10 PROCEDURE — P9047 ALBUMIN (HUMAN), 25%, 50ML: HCPCS | Mod: JZ | Performed by: INTERNAL MEDICINE

## 2024-11-10 PROCEDURE — 83605 ASSAY OF LACTIC ACID: CPT

## 2024-11-10 PROCEDURE — 87186 SC STD MICRODIL/AGAR DIL: CPT

## 2024-11-10 PROCEDURE — 99232 SBSQ HOSP IP/OBS MODERATE 35: CPT | Performed by: NURSE PRACTITIONER

## 2024-11-10 PROCEDURE — 770022 HCHG ROOM/CARE - ICU (200)

## 2024-11-10 PROCEDURE — 80053 COMPREHEN METABOLIC PANEL: CPT

## 2024-11-10 PROCEDURE — 700105 HCHG RX REV CODE 258: Performed by: INTERNAL MEDICINE

## 2024-11-10 PROCEDURE — 84145 PROCALCITONIN (PCT): CPT

## 2024-11-10 PROCEDURE — 86900 BLOOD TYPING SEROLOGIC ABO: CPT

## 2024-11-10 PROCEDURE — 86850 RBC ANTIBODY SCREEN: CPT

## 2024-11-10 PROCEDURE — 700102 HCHG RX REV CODE 250 W/ 637 OVERRIDE(OP): Performed by: HOSPITALIST

## 2024-11-10 PROCEDURE — 84100 ASSAY OF PHOSPHORUS: CPT

## 2024-11-10 PROCEDURE — A9270 NON-COVERED ITEM OR SERVICE: HCPCS | Performed by: HOSPITALIST

## 2024-11-10 PROCEDURE — 99292 CRITICAL CARE ADDL 30 MIN: CPT | Performed by: INTERNAL MEDICINE

## 2024-11-10 PROCEDURE — 85576 BLOOD PLATELET AGGREGATION: CPT | Mod: 91

## 2024-11-10 PROCEDURE — 87077 CULTURE AEROBIC IDENTIFY: CPT

## 2024-11-10 PROCEDURE — 85610 PROTHROMBIN TIME: CPT | Mod: 91

## 2024-11-10 PROCEDURE — 83010 ASSAY OF HAPTOGLOBIN QUANT: CPT

## 2024-11-10 PROCEDURE — 87086 URINE CULTURE/COLONY COUNT: CPT

## 2024-11-10 PROCEDURE — 87040 BLOOD CULTURE FOR BACTERIA: CPT | Mod: 91

## 2024-11-10 PROCEDURE — 85046 RETICYTE/HGB CONCENTRATE: CPT

## 2024-11-10 PROCEDURE — 700101 HCHG RX REV CODE 250: Performed by: INTERNAL MEDICINE

## 2024-11-10 PROCEDURE — 700102 HCHG RX REV CODE 250 W/ 637 OVERRIDE(OP): Performed by: INTERNAL MEDICINE

## 2024-11-10 PROCEDURE — 85027 COMPLETE CBC AUTOMATED: CPT

## 2024-11-10 PROCEDURE — 94640 AIRWAY INHALATION TREATMENT: CPT

## 2024-11-10 PROCEDURE — 82140 ASSAY OF AMMONIA: CPT

## 2024-11-10 PROCEDURE — 700111 HCHG RX REV CODE 636 W/ 250 OVERRIDE (IP): Mod: JZ | Performed by: INTERNAL MEDICINE

## 2024-11-10 PROCEDURE — 99233 SBSQ HOSP IP/OBS HIGH 50: CPT | Performed by: INTERNAL MEDICINE

## 2024-11-10 PROCEDURE — 87641 MR-STAPH DNA AMP PROBE: CPT

## 2024-11-10 PROCEDURE — 82803 BLOOD GASES ANY COMBINATION: CPT

## 2024-11-10 RX ORDER — THIAMINE HYDROCHLORIDE 100 MG/ML
200 INJECTION, SOLUTION INTRAMUSCULAR; INTRAVENOUS DAILY
Status: COMPLETED | OUTPATIENT
Start: 2024-11-10 | End: 2024-11-13

## 2024-11-10 RX ORDER — LACTULOSE 10 G/15ML
300 SOLUTION ORAL EVERY 6 HOURS
Status: DISCONTINUED | OUTPATIENT
Start: 2024-11-10 | End: 2024-11-12

## 2024-11-10 RX ORDER — LINEZOLID 2 MG/ML
600 INJECTION, SOLUTION INTRAVENOUS EVERY 12 HOURS
Status: DISCONTINUED | OUTPATIENT
Start: 2024-11-10 | End: 2024-11-11

## 2024-11-10 RX ORDER — ALBUMIN (HUMAN) 12.5 G/50ML
12.5 SOLUTION INTRAVENOUS ONCE
Status: COMPLETED | OUTPATIENT
Start: 2024-11-10 | End: 2024-11-11

## 2024-11-10 RX ORDER — MIDODRINE HYDROCHLORIDE 5 MG/1
10 TABLET ORAL EVERY 8 HOURS
Status: DISCONTINUED | OUTPATIENT
Start: 2024-11-10 | End: 2024-11-11

## 2024-11-10 RX ORDER — ALBUMIN (HUMAN) 12.5 G/50ML
87.5 SOLUTION INTRAVENOUS ONCE
Status: COMPLETED | OUTPATIENT
Start: 2024-11-10 | End: 2024-11-11

## 2024-11-10 RX ORDER — MIDODRINE HYDROCHLORIDE 5 MG/1
5 TABLET ORAL
Status: DISCONTINUED | OUTPATIENT
Start: 2024-11-10 | End: 2024-11-10

## 2024-11-10 RX ADMIN — DIBASIC SODIUM PHOSPHATE, MONOBASIC POTASSIUM PHOSPHATE AND MONOBASIC SODIUM PHOSPHATE 250 MG: 852; 155; 130 TABLET ORAL at 00:00

## 2024-11-10 RX ADMIN — Medication 100 MG: at 05:31

## 2024-11-10 RX ADMIN — RIFAXIMIN 550 MG: 550 TABLET ORAL at 05:31

## 2024-11-10 RX ADMIN — LACTULOSE 30 ML: 10 SOLUTION ORAL at 05:30

## 2024-11-10 RX ADMIN — FLUOXETINE HYDROCHLORIDE 10 MG: 10 CAPSULE ORAL at 05:32

## 2024-11-10 RX ADMIN — SUCRALFATE ORAL 1 G: 1 SUSPENSION ORAL at 12:01

## 2024-11-10 RX ADMIN — ALBUMIN (HUMAN) 87.5 G: 0.25 INJECTION, SOLUTION INTRAVENOUS at 21:39

## 2024-11-10 RX ADMIN — ALBUMIN (HUMAN) 12.5 G: 0.25 INJECTION, SOLUTION INTRAVENOUS at 17:48

## 2024-11-10 RX ADMIN — SUCRALFATE ORAL 1 G: 1 SUSPENSION ORAL at 05:31

## 2024-11-10 RX ADMIN — SUCRALFATE ORAL 1 G: 1 SUSPENSION ORAL at 00:01

## 2024-11-10 RX ADMIN — PIPERACILLIN AND TAZOBACTAM 4.5 G: 4; .5 INJECTION, POWDER, FOR SOLUTION INTRAVENOUS at 23:32

## 2024-11-10 RX ADMIN — FUROSEMIDE 40 MG: 40 TABLET ORAL at 05:31

## 2024-11-10 RX ADMIN — OMEPRAZOLE 20 MG: 20 CAPSULE, DELAYED RELEASE ORAL at 05:31

## 2024-11-10 RX ADMIN — OMEPRAZOLE 20 MG: 20 CAPSULE, DELAYED RELEASE ORAL at 17:34

## 2024-11-10 RX ADMIN — THIAMINE HYDROCHLORIDE 200 MG: 100 INJECTION, SOLUTION INTRAMUSCULAR; INTRAVENOUS at 21:34

## 2024-11-10 RX ADMIN — LACTULOSE 300 ML: 10 SOLUTION ORAL; RECTAL at 13:54

## 2024-11-10 RX ADMIN — LACTULOSE 30 ML: 10 SOLUTION ORAL at 12:01

## 2024-11-10 RX ADMIN — LACTULOSE 300 ML: 10 SOLUTION ORAL; RECTAL at 23:27

## 2024-11-10 RX ADMIN — PIPERACILLIN AND TAZOBACTAM 4.5 G: 4; .5 INJECTION, POWDER, FOR SOLUTION INTRAVENOUS at 18:49

## 2024-11-10 RX ADMIN — LINEZOLID 600 MG: 600 INJECTION, SOLUTION INTRAVENOUS at 20:11

## 2024-11-10 RX ADMIN — SPIRONOLACTONE 100 MG: 25 TABLET ORAL at 05:31

## 2024-11-10 RX ADMIN — PHYTONADIONE 10 MG: 10 INJECTION, EMULSION INTRAMUSCULAR; INTRAVENOUS; SUBCUTANEOUS at 22:29

## 2024-11-10 RX ADMIN — CEFTRIAXONE SODIUM 2000 MG: 10 INJECTION, POWDER, FOR SOLUTION INTRAVENOUS at 14:43

## 2024-11-10 RX ADMIN — OXYCODONE 5 MG: 5 TABLET ORAL at 05:31

## 2024-11-10 RX ADMIN — RIFAXIMIN 550 MG: 550 TABLET ORAL at 17:34

## 2024-11-10 RX ADMIN — DIBASIC SODIUM PHOSPHATE, MONOBASIC POTASSIUM PHOSPHATE AND MONOBASIC SODIUM PHOSPHATE 250 MG: 852; 155; 130 TABLET ORAL at 05:30

## 2024-11-10 RX ADMIN — LACTULOSE 300 ML: 10 SOLUTION ORAL; RECTAL at 17:56

## 2024-11-10 ASSESSMENT — PAIN DESCRIPTION - PAIN TYPE
TYPE: ACUTE PAIN

## 2024-11-10 ASSESSMENT — ENCOUNTER SYMPTOMS
CHILLS: 0
BLURRED VISION: 0
CONSTIPATION: 1
WEAKNESS: 1
SHORTNESS OF BREATH: 0
DEPRESSION: 0
ABDOMINAL PAIN: 0
VOMITING: 0
TREMORS: 1
COUGH: 0
DIZZINESS: 0
FEVER: 0
MEMORY LOSS: 1
DIARRHEA: 0
HEARTBURN: 0
BLOOD IN STOOL: 0
BACK PAIN: 0
NAUSEA: 0

## 2024-11-10 NOTE — PROGRESS NOTES
[facility] Medicine Daily Progress Note    Date of Service  11/10/2024    Chief Complaint  Kavita Freeman is a 59 y.o. female admitted 10/31/2024 with   Chief Complaint   Patient presents with    Abdominal Pain     Patient arrives for paracentesis due to inability to schedule appointment. Pt reports 10/10 abd cramping pain and pressure.    BP repeated multiple times 87/57 in triage. Pt skin appears green tinted.          Hospital Course  No notes on file    58yo PMHx ETOH abuse sober x 4 months, alcoholic cirrhosis, esophageal varices s/p banding. Pt presented requesting a paracentesis and with fever and chills. In ED WBCs 45, Na 117, CO2 16, AG 20, creat 2.1. Para done in ED; cloudy fluid noted with nucleated cells >34k     Interval Problem Update  Patient was seen and examined at bedside.  I have personally reviewed and interpreted vitals, labs, and imaging.    11/10.  Afebrile.  Has been tachycardic, tachypneic.  On 0-1 L nasal cannula.  Patient is very lethargic.  She does open her eyes to voice.  Otherwise maximum incomprehensible sounds.  She does follow simple commands.  She has dried blood around her nose and mouth, which after speaking with GI she had recent epistaxis in the monitor and but is actually improved.  She was able to open her mouth and say ah when requested.  She also has had a systolic murmur which is worsened auscultation this morning.  Limited echo ordered.  Does not localize to any pain.  She has been having urinary retention requiring straight cath.  Okay for Herrera placement.  With worsening leukocytosis and lethargy there was concern for ongoing infection.  She completed a course of ceftriaxone for Salmonella spontaneous bacterial peritonitis and Salmonella bacteremia yesterday.  Will continue ceftriaxone for now.  I did consult infectious disease.  CT head was negative for any abscess or stroke.  With increasing bilirubin there is concern for worsening acute liver failure.  High risk  of decompensation.   Wbc 20.3 > 30.7 > 33.3  Hgb 9.5 > 9.1 > 8.8  P 179  Na 130  Cr 0.28 > 1.09  Tbili 9.6 > 10.9 > 13.1 > 16.2  INR 9.5  Procal 1.44    I have discussed this patient's plan of care and discharge plan at IDT rounds today with Case Management, Nursing, Nursing leadership, and other members of the IDT team.    Consultants/Specialty  GI and infectious disease    Code Status  Full Code    Disposition  The patient is not medically cleared for discharge to home or a post-acute facility.  Anticipate discharge to: skilled nursing facility    I have placed the appropriate orders for post-discharge needs.    Review of Systems  Review of Systems   Unable to perform ROS: Medical condition        Physical Exam  Temp:  [37 °C (98.6 °F)-37.5 °C (99.5 °F)] 37.5 °C (99.5 °F)  Pulse:  [] 103  Resp:  [20-41] 22  BP: ()/(52-74) 95/66  SpO2:  [88 %-96 %] 91 %    Physical Exam  Vitals and nursing note reviewed.   Constitutional:       Appearance: Normal appearance. She is ill-appearing.   HENT:      Head: Normocephalic and atraumatic.      Right Ear: External ear normal.      Left Ear: External ear normal.      Nose: Nose normal.      Mouth/Throat:      Mouth: Mucous membranes are dry.      Comments: Dry blood in mouth and nostril  Eyes:      General: Scleral icterus present.   Cardiovascular:      Rate and Rhythm: Regular rhythm. Tachycardia present.      Pulses: Normal pulses.      Heart sounds: Murmur heard.   Pulmonary:      Effort: Tachypnea, accessory muscle usage and respiratory distress present.      Breath sounds: No stridor. Rales present. No wheezing.   Abdominal:      General: Abdomen is flat. Bowel sounds are normal. There is no distension.      Palpations: Abdomen is soft. There is no mass.      Tenderness: There is no abdominal tenderness.   Skin:     Capillary Refill: Capillary refill takes less than 2 seconds.      Coloration: Skin is jaundiced.   Neurological:      General: No focal deficit  present.      Mental Status: Mental status is at baseline. She is lethargic and disoriented.      Cranial Nerves: No cranial nerve deficit.   Psychiatric:      Comments: Unable to assess         Fluids    Intake/Output Summary (Last 24 hours) at 11/10/2024 0823  Last data filed at 11/10/2024 0545  Gross per 24 hour   Intake 540 ml   Output 700 ml   Net -160 ml        Laboratory  Recent Labs     11/08/24  0244 11/09/24  0205 11/10/24  0150   WBC 22.1* 20.3* 30.7*   RBC 3.09* 3.30* 3.11*   HEMOGLOBIN 9.1* 9.5* 9.1*   HEMATOCRIT 26.6* 28.5* 26.8*   MCV 86.1 86.4 86.2   MCH 29.4 28.8 29.3   MCHC 34.2 33.3 34.0   RDW 57.2* 57.0* 54.8*   PLATELETCT 89* 91* 177   MPV 10.2 10.8 10.6     Recent Labs     11/08/24 0244 11/09/24 0205 11/10/24  0050   SODIUM 130* 130* 130*   POTASSIUM 3.5* 3.8 4.7   CHLORIDE 97 97 99   CO2 20 20 18*   GLUCOSE 78 69 114*   BUN 32* 22 31*   CREATININE 0.44* 0.28* 1.09   CALCIUM 8.5 8.5 8.8     Recent Labs     11/09/24  0930 11/10/24  0050   APTT 89.7*  --    INR >=10.00* >=10.00*               Imaging  DX-CHEST-PORTABLE (1 VIEW)   Final Result         1.  New significant right pleural effusion identified.      2.  Significant increase in diffuse pulmonary opacifications could be due to pulmonary edema or pneumonia.      CT-HEAD W/O   Final Result   Impression:      1. No acute process in the brain evident.      2. The skull base sinuses are clear.                  US-RENAL   Final Result      1.  No hydronephrosis.   2.  Ascites.      DX-CHEST-PORTABLE (1 VIEW)   Final Result      1.  No acute cardiac or pulmonary abnormalities are identified.      2.  Slight left basilar atelectasis.      [id number] VIEW   Final Result      1.  Several markedly dilated loops of small bowel in the mid abdomen suspicious for gastroenteritis or inflammatory change. Recommend follow-up abdominal series to rule out evolving small bowel obstruction.      EC-ECHOCARDIOGRAM COMPLETE W/O CONT   Final Result       CT-ABDOMEN-PELVIS W/O   Final Result      1.  Dilated fluid-filled small bowel and colonic loops favoring ileus.   2.  No evidence of bowel perforation.   3.  Hepatic cirrhosis with portal hypertension and moderate to large volume ascites.   4.  Nonocclusive superior mesenteric vein thrombosis.   5.  Cholelithiasis.   6.  Bibasilar atelectasis with minimal bilateral pleural fluid.   7.  Subtle findings concerning for pulmonary emboli.      These findings were electronically conveyed to JAMES WILLIAM on 11/3/2024 11:50 AM.         US-ABDOMEN COMPLETE SURVEY   Final Result      1.  Cirrhosis with stigmata of portal hypertension including splenomegaly, recanalized umbilical vein and ascites.   2.  Gallbladder sludge and stones. Gallbladder wall thickening is nonspecific and could be related to liver disease but correlate clinically for evidence of acute cholecystitis. No biliary dilatation.         DX-CHEST-PORTABLE (1 VIEW)   Final Result      1.  Hazy left lower lobe opacity could represent atelectasis or pneumonitis.   2.  There is linear scarring or atelectasis in the right lung base.      EC-ECHOCARDIOGRAM PEDIATRIC LTD W/O CONT    (Results Pending)        Assessment/Plan  * Sepsis (HCC)- (present on admission)  Assessment & Plan  This is Sepsis Present on admission    SIRS criteria identified on my evaluation include: Tachycardia, with heart rate greater than 90 BPM, Tachypnea, with respirations greater than 20 per minute, and Leukocytosis, with WBC greater than 12,000  Clinical indicators of end organ dysfunction include Lactic Acid greater than 2, Acute Liver Failure, with bilirubin greater than 2, Coagulopathy, with INR greater than 1.5 or aPTT greater than 60 seconds, and GCS < 15  Source is Chest x-ray concerning for pneumonia.  Recently completed treatment for SBP, bacteremia  Sepsis protocol initiated  Crystalloid Fluid Administration: Reason that resuscitation volume of less than 30ml/kg was  ordered concern for fluid overload  Patient was initially being diuresed.  Hold antibiotics today for worsening kidney function.  She still appears volume overloaded.  IV antibiotics as appropriate for source of sepsis  Reassessment: I have reassessed the patient's hemodynamic status    Elevated INR  Assessment & Plan  11/10/2024  She found to have elevated INR could be due to liver disease and she is on anticoagulation as well.  She will need paracentesis but due to elevated INR it was not performed.  She received large dose of her antibiotic IV ceftriaxone today.  If her INR improved she may get bedside diagnostic paracentesis to ensure no further ongoing SBP.  I also ordered TEG study.    Lactic acidosis  Assessment & Plan  11/10/2024  She found to have severe lactic acidosis with lactic acid level of 8.3.  Lactic acid level is trending down.  Now hemodynamically stable.    Hypoxia  Assessment & Plan  Overall her mentation has improved significantly.  Change her CODE STATUS from DNR to full code on November 7, 2024.    Superior mesenteric vein thrombosis (HCC)  Assessment & Plan  11/10/2024  New finding on CT 11/3  GI is following her.  I discussed plan of care with GI team.  Now she is on therapeutic Eliquis currently on loading dose.  Hemoglobin remained stable.  Bleeding from her right nostril has resolved.  Ordered CBC for tomorrow morning.    Acute encephalopathy  Assessment & Plan  11/10/2024  hypoNa vs HE vs sepsis or more likely components of both  Remain encephalopathic.  Have been agressively titrating up her HE meds and overnight she has started to have BMs  Treating sepsis  Significantly improved.    Severe protein-calorie malnutrition ([place]: less than 60% of standard weight) ([facility])  Assessment & Plan  Nutrition consult    Bacteremia due to Gram-negative bacteria  Assessment & Plan  11/10/2024  Source SBP  S/p tap of 5000ml 10/31  Salmonella species based on PCR: sent to state lab for final ID and  sensitivities  Check TTE  Repeat CT 11/3 did not show any focal infection/abscess/perf  Repeat cutlures 11/3 remain neg thus far  She remains afebrile  Now completed IV ceftriaxone.  Ultrasound-guided paracentesis ordered and Levaquin ordered to evaluate for ongoing infection.    Advance care planning- (present on admission)  Assessment & Plan  Currently she is DNR/DNI and I had a lengthy discussion with patient's mother at the bedside and later with patient's brother sister-in-law.  I discussed problem by problem and explained them overall very guarded prognosis due to multiorgan involvement.    11/06/24  I evaluated her again at the bedside this morning and discussed plan of care with patient's son at the bedside and updated him regarding plan of care.  I explained him difference between full code and DNR.  I also explained him that family can change her CODE STATUS if they would like to as patient cannot participate in advance care planning at this time.    Later this afternoon I met with several family members at the bedside and admitted them regarding her current medical condition and discussed with them regarding CODE STATUS also updated them regarding overall poor prognosis as she has multiorgan involvement.  Family expressed understanding.  Time spent 18 minutes    11/07/24    Today I discussed CODE STATUS with patient as now she is alert and oriented and able to answer questions.  I explained her CODE STATUS and explained the difference between full code and DNR.  Patient initially expressed that she does not want to be in vegetative state and wants patient family explained her and I also explained her at the same time.  Patient expressed that she would like to change her CODE STATUS to full code but she does not want to be in vegetative state for long-term but she would like us to try to bring her back if her heart stopped beating or she stopped breathing.  After discussion and patient request I change her  CODE STATUS from full code to DNR.  I updated bedside RN as well.  Time spent 17 minutes    Hypoglycemia- (present on admission)  Assessment & Plan  11/10/2024  Likely due to poor oral intake as patient reports reduced appetite, lack of glucneogenesis due to cirrhosis  -Patient also finished prednisolone 2 days ago, could be adrenal insufficiency  Reg diet  Nutrition consult  Close monitoring of BG  Ordered CMP for tomorrow morning     Transaminitis- (present on admission)  Assessment & Plan  11/10/2024  Patient reports she already finished a 28-day course of prednisone at home  Likely worsening due to sepsis/SBP  Ultrasound neg for PVT, did show cirrhosis. GB with some sludge and non specific wall thickening.  Can be normal in setting of ascites.  Liver enzymes remain elevated bilirubin worsening.  Total bilirubin remain is trending up  I ordered CMP for tomorrow.    High anion gap metabolic acidosis- (present on admission)  Assessment & Plan  11/10/2024  Due to lactic acidosis likely from right liver disease and sepsis  IV thiamine  resolved    Hyponatremia- (present on admission)  Assessment & Plan  11/10/2024  Sodium 117 on admission  Elena<20  UOsm>300  Hypervolemic hypoNa  Has responded to IV lasix and now in upper 120s.   Change IV lasix to po and start spironolactone  Current sodium level remains 130  I ordered CMP for tomorrow morning.    Acute renal failure (ARF) (HCC)- (present on admission)  Assessment & Plan  11/10/2024  Creatinine 2.11 on admission  HRS vs sepsis vs hypovlemia  Of note pt had a paracentesis one week prior to presentation with no albumin  Good UOP   Likely due to sepsis  Avoid nephrotoxic medications  No evidence of obstruction on CT  Creat now 0.44; still elevated given her muscle wasting  Due to concern for hepatorenal syndrome I started her on albumin.  Renal function has significantly improved.  Now resume diuretics.  Order lab work for tomorrow.    Alcoholic cirrhosis of liver with  ascites (HCC)- (present on admission)  Assessment & Plan  11/10/2024   F/b [facility]  ETOH induced, sober since June  Has been referred to [facility] service with appointment in June   MELD 32 on presentation  Status post paracentesis 5000ml 10/31  S/p albumin following tap  SBP based on fluid cell count  Rocephin  Trial off of midodrine  No BM in 72hrs and more confused today.  Picture is complicated by possible ileus noted on CT  -cont rifaximin  -increase lactulose po from 45mg TID to q4   -start lactulose retention enema  [facility] declined transfer    Continue lactulose  Now plan is to resume her Lasix and her spironolactone.  Continue to monitor input and output.    Depression with anxiety- (present on admission)  Assessment & Plan  11/10/2024  Continue home fluoxetine         VTE prophylaxis: Apixaban currently on hold.  INR remains elevated    I have performed a physical exam and reviewed and updated ROS and Plan today (11/10/2024). In review of yesterday's note (11/9/2024), there are no changes except as documented above.    Greater than 53 minutes spent prepping to see patient (e.g. review of tests) obtaining and/or reviewing separately obtained history. Performing a medically appropriate examination and/ evaluation.  Counseling and educating the patient/family/caregiver.  Ordering medications, tests, or procedures.  Referring and communicating with other health care professionals.  Documenting clinical information in EPIC.  Independently interpreting results and communicating results to patient/family/caregiver.  Care coordination.

## 2024-11-10 NOTE — THERAPY
Physical Therapy Contact Note    Patient Name: Kavita Freeman  Age:  59 y.o., Sex:  female  Medical Record #: [id number]  Today's Date: 11/10/2024    PT consult received. Bedside RN requesting to hold PT eval at this time as pt is not alert/oriented or following commands at this time. Will monitor pt status and follow up for PT eval as appropriate.    Remy Power, PT, DPT

## 2024-11-10 NOTE — PROGRESS NOTES
Monitor Summary    Rhythm: SR/ST  Rate:   Ectopy: (r) pvc  Measurements: .12 / .10 / .36

## 2024-11-10 NOTE — PROGRESS NOTES
Bedside report received from off going RN/tech: geovanna Cash care of patient.     Fall Risk Score: HIGH RISK  Fall risk interventions in place: Place yellow fall risk ID band on patient, Provide patient/family education based on risk assessment, Educate patient/family to call staff for assistance when getting out of bed, Place fall precaution signage outside patient door, Place patient in room close to nursing station, and Utilize bed/chair fall alarm  Bed type: Low air loss (Thad Score less than 17 interventions in place)  Patient on cardiac monitor: Yes  IVF/IV medications: Not Applicable   Oxygen: Room Air  Bedside sitter: Not Applicable   Isolation: Not applicable

## 2024-11-10 NOTE — PROGRESS NOTES
Gastroenterology Progress Note               Author:  Oumou Barron KEENANN Date & Time Created: 11/10/2024 9:58 AM       Patient ID:  Name:             Kavita Freeman  YOB: 1965  Age:                 59 y.o.  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     History of Present Illness:   59 years old female with medical history of alcohol-related liver injury, cirrhosis, decompensation with variceal bleeding and refractory ascites formation, mild to moderate encephalopathy.  Presented to hospital this time for fluid accumulation, acute kidney injury.  GI team is consulted for recent GI bleeding     The patient has primary GI liver doctor in [place] and she is waiting for the transplantation evaluation at [facility] in the coming 2 months.  We do not have any records to support this plan, however the patient is very certain about the [facility] appointment.     For the bleeding, the patient had nausea vomiting in the last 2 days, the patient saw blood and also some coffee-ground like vomitus.  Tarry stool was noted.  Last upper GI scope August 15 with variceal bleeding and banding.     Interval History:  11/2/2024: Patient seen  at bedside.  Awake, alert.  Discussed EGD findings with patient.  Inquired regarding last EGD as, per chart review, last EGD was 2-1/2 months ago.  Patient unable to discern if she has had EGD since last documented.  Positive asterixis.  Denies bowel movements overnight.  Started on rifaximin and lactulose.  Hemoglobin stable.  Abdomen tender with light palpation.  No WBC this morning, but yesterday was 39. ultrasound abdomen pending.      11/3/2024: Patient seen at bedside.  Disoriented, unable to answer orientation questions.  Abdomen distended, significantly increase in tenderness this morning, hypoactive bowel sounds and tympany with percussion.  CT abdomen and CBC pending.  No bowel movements overnight.    Update 1222: CT abdomen with findings including fluid-filled small bowel and colonic loops favoring ileus, no evidence of bowel perforation, cirrhosis with portal hypertension, moderate to large volume ascites, nonocclusive SMV thrombosis, cholelithiasis, subtle findings concerning for pulmonary emboli.      11/4/2024: Patient seen at bedside.  Disoriented, oriented to self only.  Abdomen remains distended, tender with light palpation, hypoactive bowel sounds.  Patient with multiple brown bowel movements overnight.  Started on heparin yesterday.  WBC 29.8, hemoglobin stable at 12.5, platelets 197.  Sodium 127, BUN 56, creatinine improved to 1.3.  Albumin yesterday 2.6.  INR yesterday 2.35.  MELD 3.0 as of 11/3/2024 31    11/5/2024: Patient seen and examined.  Appears to be in distress, tachypneic, unable to participate in interview.  Grimaces when I touch her abdomen.  I was concerned and got Dr. Charles who then examined the patient.  We ordered KUB, chest x-ray, ABG, further labs.  3 BM last 24 hours.     Found to have significant lactic acidosis, INR 3.64 (difficult to interpret in the context of heparin drip), ABG with acute respiratory alkalosis.  Creatinine increasing to 1.54, T. bili 8.  KUB dilated  loops of bowel suspicious for gastroenteritis or inflammatory changes.  Chest x-ray with atelectasis.      11/6/2024: Patient seen in collaboration with Dr. Solorzano. Son at bedside, all questions answered. WBC now 36.6, T. Bili 9.8, creatinine uptrending. MELD 3.0 - 37 (Although INR not reliable in the setting of heparin drip)    11/7/2024: Patient seen with mom and dad at bedside.  Significantly improved, alert and oriented and eating food.  She remembered me from when I met her before.  WBC 26.9, hgb 9.6, plt 112, cr 1.38. Urinary casts present. Renal US negative.     11/8/2024: Patient seen with family bedside. Continues to feel better, eating but doesn't really like the food. Numerous bowel movements overnight. Worsening abdominal distention with mild tenderness. WBC continues to improve, platelets worse at 89, hgb downtrended to 9.1, cr 0.44, t. Bili 10.9    11/9/2024: Patient seen this morning, no family bedside.  More lethargic today, says she is tired.  2 bowel movements past 24 hours.  VSS, WBC 20.3, T. Bili 13.1, albumin 2.9, phos 2.3.  INR greater than 10, TEG with abnormal CK R.  Pending diagnostic paracentesis and elevated INR.    11/10/2024: Patient seen and examined this morning with mother at bedside.  More lethargic again, on oxygen, noted to be tachypneic and tachycardic.  Minimally arousable.  Worsening murmur on auscultation. Has not had a bowel movement per RN.  Finished her antibiotics yesterday and transferred from [facility].  Labs reviewed, WBC up to 30 with repeat 33, creatinine 1.09 from 0.28, bilirubin 16.2, 9.9 direct.  Increased reticulocyte response, , ammonia 62, lactic 4.2, Procal 1.44.  INR greater than 10, recheck 9.53.  Chest x-ray with new significant right pleural effusion and significant increase in diffuse pulmonary opacifications edema versus pneumonia.  Head CT negative.  Limited echocardiogram and haptoglobin pending.    Hospital Medications:  Current Facility-Administered  Medications   Medication Dose Frequency Provider Last Rate Last Admin    thiamine (Vitamin B-1) tablet 100 mg  100 mg DAILY Claudia Solorzano M.D.   100 mg at 11/10/24 0531    furosemide (Lasix) tablet 40 mg  40 mg Q DAY Oumou Mart DNP,  APRN   40 mg at 11/10/24 0531    spironolactone (Aldactone) tablet 100 mg  100 mg Q DAY Oumou Mart DNP,  APRN   100 mg at 11/10/24 0531    omeprazole (PriLOSEC) capsule 20 mg  20 mg BID Claudia Solorzano M.D.   20 mg at 11/10/24 0531    [Held by provider] apixaban (Eliquis) tablet 10 mg  10 mg BID Claudia Solorzano M.D.   10 mg at 11/09/24 0517    [Held by provider] apixaban (Eliquis) tablet 5 mg  5 mg BID Claudia Solorzano M.D.        lactulose 20 GM/30ML solution 30 mL  30 mL TID Oumou Mart DNP,  APRN   30 mL at 11/10/24 0530    oxymetazoline (Afrin) 0.05 % nasal spray 2 Spray  2 Spray BID Claudia Solorzano M.D.   2 [place] at 11/09/24 1647    riFAXIMin (Xifaxan) tablet 550 mg  550 mg BID Quang Durham, D.O.   550 mg at 11/10/24 0531    acetaminophen (Tylenol) tablet 650 mg  650 mg Q6HRS PRN Adilia Gonzales, D.O.        oxyCODONE immediate-release (Roxicodone) tablet 2.5 mg  2.5 mg Q3HRS PRN Adilia Gonzales, D.O.   2.5 mg at 11/01/24 1230    Or    oxyCODONE immediate-release (Roxicodone) tablet 5 mg  5 mg Q3HRS PRN Adilia Gonzales, D.O.   5 mg at 11/10/24 0531    Or    HYDROmorphone (Dilaudid) injection 0.25 mg  0.25 mg Q3HRS PRN Adilia Gonzales, D.O.   0.25 mg at 11/06/24 0311    ondansetron (Zofran) syringe/vial injection 4 mg  4 mg Q4HRS PRN ASTRID Raza.O.   4 mg at 11/04/24 1019    ondansetron (Zofran ODT) dispertab 4 mg  4 mg Q4HRS PRN ASTRID Raza.O.        promethazine (Phenergan) tablet 12.5-25 mg  12.5-25 mg Q4HRS PRN ASTRID Raza.O.   25 mg at 11/04/24 1116    promethazine (Phenergan) suppository 12.5-25 mg  12.5-25 mg Q4HRS PRN ASTRID Raza.O.        prochlorperazine (Compazine)  "injection 5-10 mg  5-10 mg Q4HRS PRN Adilia Gonzales, D.O.   10 mg at 11/04/24 1228    guaiFENesin dextromethorphan (Robitussin DM) 100-10 MG/5ML syrup 10 mL  10 mL Q6HRS PRN Adilia Gonzales D.O.        dextrose 50% (D50W) injection 25 g  25 g Q15 MIN PRN Adilia Gonzales D.O.   25 g at 11/01/24 1843    FLUoxetine (PROzac) capsule 10 mg  10 mg QAM Adilia Gonzales, D.O.   10 mg at 11/10/24 0532    gabapentin (Neurontin) capsule 200 mg  200 mg Nightly Adilia Gonzales, D.O.   200 mg at 11/09/24 2037    hydrOXYzine HCl (Atarax) tablet 25 mg  25 mg TID PRN Adilia Gonzales D.O.        lidocaine (Asperflex) 4 % patch 1 Patch  1 Patch Q24HRS Adilia Gonzales D.O.   1 Patch at 11/09/24 0526    sucralfate (Carafate) 1 GM/10ML suspension 1 g  1 g Q6HRS Adilia Gonzales, D.O.   1 g at 11/10/24 0531   Last reviewed on 11/1/2024  9:17 AM by Celine Bustamante R.N.       Review of Systems:  Review of Systems   Constitutional:  Positive for malaise/fatigue. Negative for chills and fever.   HENT:  Negative for hearing loss.    Eyes:  Negative for blurred vision.   Respiratory:  Negative for cough and shortness of breath.    Cardiovascular:  Negative for chest pain and leg swelling.   Gastrointestinal:  Positive for constipation. Negative for abdominal pain, blood in stool, diarrhea, heartburn, melena, nausea and vomiting.   Genitourinary:  Negative for dysuria.   Musculoskeletal:  Negative for back pain.   Skin:  Negative for rash.   Neurological:  Positive for tremors and weakness. Negative for dizziness.   Psychiatric/Behavioral:  Positive for memory loss. Negative for depression.    All other systems reviewed and are negative.      Vital signs:  Weight/BMI: Body mass index is 23.23 kg/m².  BP 95/66   Pulse (!) 103   Temp 37.5 °C (99.5 °F) (Temporal)   Resp (!) 22   Ht 1.727 m (5' 8\")   Wt 69.3 kg (152 lb 12.5 oz)   SpO2 91%   Vitals:    11/10/24 0000 11/10/24 0410 11/10/24 0729 11/10/24 0756   BP: 116/67 106/63 113/72 95/66 "   Pulse: (!) 107 (!) 104 (!) 105 (!) 103   Resp: 20 (!) 21 20 (!) 22   Temp:   37.1 °C (98.8 °F) 37.5 °C (99.5 °F)   TempSrc: Temporal Temporal Temporal Temporal   SpO2: 90% 89% 96% 91%   Weight:       Height:         Oxygen Therapy:  Pulse Oximetry: 91 %, O2 (LPM): 1, O2 Delivery Device: Oxymask    Intake/Output Summary (Last 24 hours) at 11/10/2024 0958  Last data filed at 11/10/2024 0545  Gross per 24 hour   Intake 300 ml   Output 700 ml   Net -400 ml       Physical Exam  Vitals and nursing note reviewed.   Constitutional:       General: She is not in acute distress.     Appearance: She is cachectic. She is ill-appearing.   HENT:      Head: Normocephalic.      Nose: Nose normal. No congestion.   Eyes:      General: Scleral icterus present.      Extraocular Movements: Extraocular movements intact.      Conjunctiva/sclera: Conjunctivae normal.   Cardiovascular:      Rate and Rhythm: Normal rate and regular rhythm.      Pulses: Normal pulses.      Heart sounds: Murmur heard.   Pulmonary:      Effort: Pulmonary effort is normal.      Comments: Diminished throughout  Abdominal:      General: There is distension.      Tenderness: There is abdominal tenderness.      Comments:      Musculoskeletal:      Right lower leg: No edema.      Left lower leg: No edema.   Skin:     General: Skin is warm and dry.      Capillary Refill: Capillary refill takes less than 2 seconds.      Coloration: Skin is jaundiced.   Neurological:      Mental Status: She is oriented to person, place, and time.      Motor: Weakness present.       Labs:  Recent Labs     11/08/24  0244 11/09/24  0205 11/10/24  0050   SODIUM 130* 130* 130*   POTASSIUM 3.5* 3.8 4.7   CHLORIDE 97 97 99   CO2 20 20 18*   BUN 32* 22 31*   CREATININE 0.44* 0.28* 1.09   MAGNESIUM 1.9 1.6 2.1   PHOSPHORUS 2.3* 2.3* 3.7   CALCIUM 8.5 8.5 8.8     Recent Labs     11/08/24  0244 11/09/24  0205 11/10/24  0050   ALTSGPT 31 32 38   ASTSGOT 84* 80* 90*   ALKPHOSPHAT 101* 117* 124*    TBILIRUBIN 10.9* 13.1* 16.2*   GLUCOSE 78 69 114*     Recent Labs     11/08/24  0244 11/09/24  0205 11/10/24  0050 11/10/24  0150   WBC 22.1* 20.3*  --  30.7*   ASTSGOT 84* 80* 90*  --    ALTSGPT 31 32 38  --    ALKPHOSPHAT 101* 117* 124*  --    TBILIRUBIN 10.9* 13.1* 16.2*  --      Recent Labs     11/08/24  0244 11/09/24  0205 11/09/24  0930 11/10/24  0050 11/10/24  0150 11/10/24  0814   RBC 3.09* 3.30*  --   --  3.11*  --    HEMOGLOBIN 9.1* 9.5*  --   --  9.1*  --    HEMATOCRIT 26.6* 28.5*  --   --  26.8*  --    PLATELETCT 89* 91*  --   --  177  --    PROTHROMBTM  --   --  110.8* 82.8*  --  77.9*   APTT  --   --  89.7*  --   --   --    INR  --   --  >=10.00* >=10.00*  --  9.53*     Recent Results (from the past 24 hours)   PLATELET MAPPING WITH BASIC TEG    Collection Time: 11/09/24 10:55 AM   Result Value Ref Range    Reaction Time Initial-R 16.0 (H) 4.6 - 9.1 min    React Time Initial Hep 16.3 (H) 4.3 - 8.3 min    Clot Kinetics-K 1.3 0.8 - 2.1 min    Clot Angle-Angle 72.6 63.0 - 78.0 degrees    Maximum Clot Strength-MA 51.9 (L) 52.0 - 69.0 mm    TEG Functional Fibrinogen(MA) 19.2 15.0 - 32.0 mm    Lysis 30 minutes-LY30 0.8 0.0 - 2.6 %    % Inhibition ADP 52.2 (H) 0.0 - 17.0 %    % Inhibition AA 5.2 0.0 - 11.0 %    TEG Algorithm Link Algorithm    Fluid Cell Count    Collection Time: 11/09/24 10:18 PM   Result Value Ref Range    Fluid Type Ascites     Color-Body Fluid Yellow     Character-Body Fluid Hazy     Total RBC Count 6000 cells/uL    FL Total Nucleated Cells 1175 cells/uL    Polys 8 %    Lymphs 81 %    Fl Mono Macrophages 11 %   FLUID ALBUMIN    Collection Time: 11/09/24 10:18 PM   Result Value Ref Range    Fluid Type Ascites     Albumin 0.9 g/dL   FLUID TOTAL PROTEIN    Collection Time: 11/09/24 10:18 PM   Result Value Ref Range    Total Protein Fluid 1.6 g/dL   FLUID CULTURE W/GRAM STAIN    Collection Time: 11/09/24 10:18 PM    Specimen: Body Fluid   Result Value Ref Range    Significant Indicator NEG      Source BF     Site -     Culture Result -     Gram Stain Result -    PHOSPHORUS    Collection Time: 11/10/24 12:50 AM   Result Value Ref Range    Phosphorus 3.7 2.5 - 4.5 mg/dL   MAGNESIUM    Collection Time: 11/10/24 12:50 AM   Result Value Ref Range    Magnesium 2.1 1.5 - 2.5 mg/dL   Comp Metabolic Panel    Collection Time: 11/10/24 12:50 AM   Result Value Ref Range    Sodium 130 (L) 135 - 145 mmol/L    Potassium 4.7 3.6 - 5.5 mmol/L    Chloride 99 96 - 112 mmol/L    Co2 18 (L) 20 - 33 mmol/L    Anion Gap 13.0 7.0 - 16.0    Glucose 114 (H) 65 - 99 mg/dL    Bun 31 (H) 8 - 22 mg/dL    Creatinine 1.09 0.50 - 1.40 mg/dL    Calcium 8.8 8.5 - 10.5 mg/dL    Correct Calcium 9.7 8.5 - 10.5 mg/dL    AST(SGOT) 90 (H) 12 - 45 U/L    ALT(SGPT) 38 2 - 50 U/L    Alkaline Phosphatase 124 (H) 30 - 99 U/L    Total Bilirubin 16.2 (H) 0.1 - 1.5 mg/dL    Albumin 2.9 (L) 3.2 - 4.9 g/dL    Total Protein 6.0 6.0 - 8.2 g/dL    Globulin 3.1 1.9 - 3.5 g/dL    A-G Ratio 0.9 g/dL   Prothrombin Time    Collection Time: 11/10/24 12:50 AM   Result Value Ref Range    PT 82.8 () 12.0 - 14.6 sec    INR >=10.00 () 0.87 - 1.13   PLATELET MAPPING WITH BASIC TEG    Collection Time: 11/10/24 12:50 AM   Result Value Ref Range    Reaction Time Initial-R 13.4 (H) 4.6 - 9.1 min    React Time Initial Hep 14.1 (H) 4.3 - 8.3 min    Clot Kinetics-K 1.2 0.8 - 2.1 min    Clot Angle-Angle 73.4 63.0 - 78.0 degrees    Maximum Clot Strength-MA 55.6 52.0 - 69.0 mm    TEG Functional Fibrinogen(MA) 21.6 15.0 - 32.0 mm    Lysis 30 minutes-LY30 0.6 0.0 - 2.6 %    % Inhibition ADP 27.1 (H) 0.0 - 17.0 %    % Inhibition AA 9.8 0.0 - 11.0 %    TEG Algorithm Link Algorithm    HOLD BLOOD BANK SPECIMEN (NOT TESTED)    Collection Time: 11/10/24 12:50 AM   Result Value Ref Range    Holding Tube - Bb DONE    ESTIMATED GFR    Collection Time: 11/10/24 12:50 AM   Result Value Ref Range    GFR (CKD-EPI) 59 (A) >60 mL/min/1.73 m 2   CBC WITHOUT DIFFERENTIAL    Collection Time:  11/10/24  1:50 AM   Result Value Ref Range    WBC 30.7 (H) 4.8 - 10.8 K/uL    RBC 3.11 (L) 4.20 - 5.40 M/uL    Hemoglobin 9.1 (L) 12.0 - 16.0 g/dL    Hematocrit 26.8 (L) 37.0 - 47.0 %    MCV 86.2 81.4 - 97.8 fL    MCH 29.3 27.0 - 33.0 pg    MCHC 34.0 32.2 - 35.5 g/dL    RDW 54.8 (H) 35.9 - 50.0 fL    Platelet Count 177 164 - 446 K/uL    MPV 10.6 9.0 - 12.9 fL   Prothrombin Time    Collection Time: 11/10/24  8:14 AM   Result Value Ref Range    PT 77.9 (HH) 12.0 - 14.6 sec    INR 9.53 (HH) 0.87 - 1.13       Radiology Review:  CT-HEAD W/O   Final Result   Impression:      1. No acute process in the brain evident.      2. The skull base sinuses are clear.                  US-RENAL   Final Result      1.  No hydronephrosis.   2.  Ascites.      DX-CHEST-PORTABLE (1 VIEW)   Final Result      1.  No acute cardiac or pulmonary abnormalities are identified.      2.  Slight left basilar atelectasis.      [id number] VIEW   Final Result      1.  Several markedly dilated loops of small bowel in the mid abdomen suspicious for gastroenteritis or inflammatory change. Recommend follow-up abdominal series to rule out evolving small bowel obstruction.      EC-ECHOCARDIOGRAM COMPLETE W/O CONT   Final Result      CT-ABDOMEN-PELVIS W/O   Final Result      1.  Dilated fluid-filled small bowel and colonic loops favoring ileus.   2.  No evidence of bowel perforation.   3.  Hepatic cirrhosis with portal hypertension and moderate to large volume ascites.   4.  Nonocclusive superior mesenteric vein thrombosis.   5.  Cholelithiasis.   6.  Bibasilar atelectasis with minimal bilateral pleural fluid.   7.  Subtle findings concerning for pulmonary emboli.      These findings were electronically conveyed to JAMES WILLIAM on 11/3/2024 11:50 AM.         US-ABDOMEN COMPLETE SURVEY   Final Result      1.  Cirrhosis with stigmata of portal hypertension including splenomegaly, recanalized umbilical vein and ascites.   2.  Gallbladder sludge  and stones. Gallbladder wall thickening is nonspecific and could be related to liver disease but correlate clinically for evidence of acute cholecystitis. No biliary dilatation.         DX-CHEST-PORTABLE (1 VIEW)   Final Result      1.  Hazy left lower lobe opacity could represent atelectasis or pneumonitis.   2.  There is linear scarring or atelectasis in the right lung base.      US-PARACENTESIS, ABD WITH IMAGING    (Results Pending)     MDM (Data Review):   -Records reviewed and summarized in current documentation  -I personally reviewed and interpreted the laboratory results  -I personally reviewed the radiology images    Assessment/Recommendations:  Acute liver failure- MELD 3.0- 38 on arrival (10/31/2024); 26.8% 90-day survival. Child-Garza Class C  SMV thrombosis  Portal hypertensive gastropathy  Ileus  Cirrhosis with ascites  SBP with Salmonella  Hepatic encephalopathy  Coagulopathy  Esophageal varices with banding in situ  Hematemesis  Severe protein calorie malnutrition  History of decompensated liver disease with variceal bleeding, refractory ascites  Salmonella bacteremia - pending review by State lab  Sepsis  EFRAIN HRS  Respiratory alkalosis  Vitamin D deficiency  Negative PETH  Pneumonia and right pleural effusion  Hypercoagulable state  Elevated INR   Hyperammonemia  Lactic acidosis  Elevated LDH    Plan:  11/5/2024  Significant clinical decompensation overnight.  Consistent with multiorgan failure.  Discussed with [facility] hepatology who have agreed to accept pending ICU acceptance.  Attended lengthy bedside discussion with family in collaboration with Dr. Solorzano.  Randa shows DNR/DNI when she was alert.  Significant concern that she would not be able to transfer to [facility] without being intubated  Family discussing amongst themselves regarding next steps: reverse DNI/DNR and pursue transfer vs comfort care vs home with hospice    Completed 72 hours Octreotide  Low sodium diet  Continue PPI IV twice  daily  Continue ceftriaxone for SBP and salmonella bacteremia  Continue lactulose and Rifaximin    Heparin for SMV thrombosis/ possible PE  Serial abdominal exams    11/6/2024  [facility] for transplant consideration  Now with EFRAIN  Continue IV fluids, obtain UA and renal ultrasound  Check am creatinine, if no improvement start 1 G/kg albumin x 3 days    11/7/2024:  UA with casts, renal US negative  Creatinine improved on fluids and IV albumin   Will continue current plan of care    11/8/2024:  Full code status  Consider Vitamin D supplementation, previously deficient  Start lasix/spironolactone 20mg/50mg and uptitrate   Paracentesis ordered  Decreased lactulose dose and frequency  Transfer request cancelled, patient is clinically improving. Discussion with patient and her family bedside that she does not meet criteria for acute transfer. Recommended the following:  Infection must be treated   Optimize functional status  Optimize nutrtion  Continue to seek out opportunities for psychological and mental health support for addiction  She has very supportive family to include her parents, brother, and son  Has an appointment with [facility] hepatology in January 11/9/2024:  Hold Eliquis given elevated INR and abnormal TEG, recheck in the morning and consider vitamin K or Kcentra to reverse Eliquis  Monitor closely for bleeding  Increase Lasix to 40 mg spironolactone to 100 mg  Continue rifaximin and lactulose titrated to 2-3 bowel movements daily  Pending diagnostic paracentesis to determine duration of antibiotic therapy  Encourage out of bed, adequate nutrition, and sleep-wake cycles to avoid delirium    11/10/2024:  Now septic, ceftriaxone restarted for pneumonia.  LDH and INR could be elevated secondary to sepsis versus worsening liver disease  Infectious disease consult placed  Follow results of echocardiogram to rule out endocarditis  Continue to hold Eliquis and recheck INR in a.m.  Would not  recommend reversing due to her hypercoagulable state and presence of nonobstructive SMV thrombus and possible PE  Hold diuretics in the setting of worsening creatinine  Rectal lactulose titrated to 2-3 bowel movements a day  Follow haptoglobin    Long term if patient survives admission:   Patient will need repeat EGD in 4-6 weeks  Nonselective beta-blocker as outpatient    GI will follow    Discussed with patient' family, nursing, Dr. Mcfarlane, Dr. Ocampo    ..Oumou Mart, CHANDANA,  APRN    Core Quality Measures   Reviewed items::  Labs, Medications and Radiology reports reviewed

## 2024-11-10 NOTE — CARE PLAN
The patient is Stable - Low risk of patient condition declining or worsening    Shift Goals  Clinical Goals: hemodynamic stability  Patient Goals: pain control  Family Goals: pratibha    Progress made toward(s) clinical / shift goals:      Problem: Pain - Standard  Goal: Alleviation of pain or a reduction in pain to the patient’s comfort goal  Outcome: Progressing     Problem: Knowledge Deficit - Standard  Goal: Patient and family/care givers will demonstrate understanding of plan of care, disease process/condition, diagnostic tests and medications  Outcome: Progressing       Patient is not progressing towards the following goals:

## 2024-11-10 NOTE — PROCEDURES
"Paracentesis    Date/Time: 11/9/2024 10:36 PM    Performed by: Shahzad Jenkins M.D.  Authorized by: Shahzad Jenkins M.D.  Consent: Verbal consent obtained.  Risks and benefits: risks, benefits and alternatives were discussed  Consent given by: patient  Patient understanding: patient states understanding of the procedure being performed  Patient consent: the patient's understanding of the procedure matches consent given  Procedure consent: procedure consent matches procedure scheduled  Relevant documents: relevant documents present and verified  Test results: test results available and properly labeled  Site marked: the operative site was marked  Imaging studies: imaging studies available  Required items: required blood products, implants, devices, and special equipment available  Patient identity confirmed: verbally with patient  Time out: Immediately prior to procedure a \"time out\" was called to verify the correct patient, procedure, equipment, support staff and site/side marked as required.  Initial or subsequent exam: initial  Procedure purpose: diagnostic  Indications: secondary bacterial peritonitis  Anesthesia: local infiltration    Anesthesia:  Local Anesthetic: lidocaine 2% without epinephrine  Anesthetic total: 5 mL    Sedation:  Patient sedated: no    Preparation: Patient was prepped and draped in the usual sterile fashion.  Needle gauge: 20  Ultrasound guidance: yes  Puncture site: left lower quadrant  Fluid removed: 28(ml)  Fluid appearance: cloudy  Dressing: pressure dressing  Patient tolerance: patient tolerated the procedure well with no immediate complications  Comments: Ultrasound used to identify inferior epigastric artery and superficial veins. These were easily avoided with the chosen location. Large, multiple centimeter pocket sampled without difficulty.               "

## 2024-11-10 NOTE — ASSESSMENT & PLAN NOTE
This is Sepsis Present on admission    SIRS criteria identified on my evaluation include: Tachycardia, with heart rate greater than 90 BPM, Tachypnea, with respirations greater than 20 per minute, and Leukocytosis, with WBC greater than 12,000  Clinical indicators of end organ dysfunction include Lactic Acid greater than 2, Acute Liver Failure, with bilirubin greater than 2, Coagulopathy, with INR greater than 1.5 or aPTT greater than 60 seconds, and GCS < 15  Source is Chest x-ray concerning for pneumonia.  Recently completed treatment for SBP, bacteremia  Sepsis protocol initiated  Crystalloid Fluid Administration: Reason that resuscitation volume of less than 30ml/kg was ordered concern for fluid overload  Patient was initially being diuresed.  Hold antibiotics today for worsening kidney function.  She still appears volume overloaded.  IV antibiotics as appropriate for source of sepsis  Reassessment: I have reassessed the patient's hemodynamic status

## 2024-11-11 LAB
ABO GROUP BLD: NORMAL
ALBUMIN SERPL BCP-MCNC: 3.6 G/DL (ref 3.2–4.9)
ALBUMIN/GLOB SERPL: 1.6 G/DL
ALP SERPL-CCNC: 83 U/L (ref 30–99)
ALT SERPL-CCNC: 26 U/L (ref 2–50)
AMMONIA PLAS-SCNC: 43 UMOL/L (ref 11–45)
ANION GAP SERPL CALC-SCNC: 16 MMOL/L (ref 7–16)
AST SERPL-CCNC: 83 U/L (ref 12–45)
BARCODED ABORH UBTYP: 5100
BARCODED ABORH UBTYP: 7300
BARCODED PRD CODE UBPRD: NORMAL
BARCODED UNIT NUM UBUNT: NORMAL
BASOPHILS # BLD AUTO: 0.1 % (ref 0–1.8)
BASOPHILS # BLD: 0.03 K/UL (ref 0–0.12)
BILIRUB SERPL-MCNC: 15.2 MG/DL (ref 0.1–1.5)
BLD GP AB SCN SERPL QL: NORMAL
BUN SERPL-MCNC: 41 MG/DL (ref 8–22)
CA-I SERPL-SCNC: 1.1 MMOL/L (ref 1.1–1.3)
CALCIUM ALBUM COR SERPL-MCNC: 9.2 MG/DL (ref 8.5–10.5)
CALCIUM SERPL-MCNC: 8.9 MG/DL (ref 8.5–10.5)
CFT BLD TEG: 13.7 MIN (ref 4.6–9.1)
CFT P HPASE BLD TEG: 15.4 MIN (ref 4.3–8.3)
CHLORIDE SERPL-SCNC: 101 MMOL/L (ref 96–112)
CLOT ANGLE BLD TEG: 71.5 DEGREES (ref 63–78)
CLOT LYSIS 30M P MA LENFR BLD TEG: 1 % (ref 0–2.6)
CO2 SERPL-SCNC: 18 MMOL/L (ref 20–33)
COMPONENT CT 8504CT: NORMAL
COMPONENT F 8504F: NORMAL
COMPONENT R 8504R: NORMAL
CREAT SERPL-MCNC: 1.37 MG/DL (ref 0.5–1.4)
CT.EXTRINSIC BLD ROTEM: 1.4 MIN (ref 0.8–2.1)
EOSINOPHIL # BLD AUTO: 0.11 K/UL (ref 0–0.51)
EOSINOPHIL NFR BLD: 0.5 % (ref 0–6.9)
ERYTHROCYTE [DISTWIDTH] IN BLOOD BY AUTOMATED COUNT: 52.4 FL (ref 35.9–50)
ERYTHROCYTE [DISTWIDTH] IN BLOOD BY AUTOMATED COUNT: 54 FL (ref 35.9–50)
ERYTHROCYTE [DISTWIDTH] IN BLOOD BY AUTOMATED COUNT: 54.4 FL (ref 35.9–50)
ERYTHROCYTE [DISTWIDTH] IN BLOOD BY AUTOMATED COUNT: 56.5 FL (ref 35.9–50)
FIBRINOGEN PPP-MCNC: 120 MG/DL (ref 215–460)
GFR SERPLBLD CREATININE-BSD FMLA CKD-EPI: 45 ML/MIN/1.73 M 2
GLOBULIN SER CALC-MCNC: 2.3 G/DL (ref 1.9–3.5)
GLUCOSE SERPL-MCNC: 83 MG/DL (ref 65–99)
HCT VFR BLD AUTO: 19.8 % (ref 37–47)
HCT VFR BLD AUTO: 22 % (ref 37–47)
HCT VFR BLD AUTO: 22.3 % (ref 37–47)
HCT VFR BLD AUTO: 22.8 % (ref 37–47)
HGB BLD-MCNC: 6.5 G/DL (ref 12–16)
HGB BLD-MCNC: 7.4 G/DL (ref 12–16)
HGB BLD-MCNC: 7.6 G/DL (ref 12–16)
HGB BLD-MCNC: 7.8 G/DL (ref 12–16)
HOLDING TUBE BB 8507: NORMAL
IMM GRANULOCYTES # BLD AUTO: 0.34 K/UL (ref 0–0.11)
IMM GRANULOCYTES NFR BLD AUTO: 1.4 % (ref 0–0.9)
INR PPP: 7.15 (ref 0.87–1.13)
LYMPHOCYTES # BLD AUTO: 0.93 K/UL (ref 1–4.8)
LYMPHOCYTES NFR BLD: 4 % (ref 22–41)
MAGNESIUM SERPL-MCNC: 1.9 MG/DL (ref 1.5–2.5)
MCF BLD TEG: 48.5 MM (ref 52–69)
MCF.PLATELET INHIB BLD ROTEM: 14.2 MM (ref 15–32)
MCH RBC QN AUTO: 29.1 PG (ref 27–33)
MCH RBC QN AUTO: 29.7 PG (ref 27–33)
MCH RBC QN AUTO: 30 PG (ref 27–33)
MCH RBC QN AUTO: 30.1 PG (ref 27–33)
MCHC RBC AUTO-ENTMCNC: 32.8 G/DL (ref 32.2–35.5)
MCHC RBC AUTO-ENTMCNC: 33.6 G/DL (ref 32.2–35.5)
MCHC RBC AUTO-ENTMCNC: 34.1 G/DL (ref 32.2–35.5)
MCHC RBC AUTO-ENTMCNC: 34.2 G/DL (ref 32.2–35.5)
MCV RBC AUTO: 87.1 FL (ref 81.4–97.8)
MCV RBC AUTO: 88 FL (ref 81.4–97.8)
MCV RBC AUTO: 88.8 FL (ref 81.4–97.8)
MCV RBC AUTO: 89.1 FL (ref 81.4–97.8)
MONOCYTES # BLD AUTO: 1.3 K/UL (ref 0–0.85)
MONOCYTES NFR BLD AUTO: 5.5 % (ref 0–13.4)
NEUTROPHILS # BLD AUTO: 20.81 K/UL (ref 1.82–7.42)
NEUTROPHILS NFR BLD: 88.5 % (ref 44–72)
NRBC # BLD AUTO: 0 K/UL
NRBC BLD-RTO: 0 /100 WBC (ref 0–0.2)
PA AA BLD-ACNC: 11.7 % (ref 0–11)
PA ADP BLD-ACNC: 79.7 % (ref 0–17)
PHOSPHATE SERPL-MCNC: 4.7 MG/DL (ref 2.5–4.5)
PLATELET # BLD AUTO: 108 K/UL (ref 164–446)
PLATELET # BLD AUTO: 85 K/UL (ref 164–446)
PLATELET # BLD AUTO: 91 K/UL (ref 164–446)
PLATELET # BLD AUTO: 93 K/UL (ref 164–446)
PLATELETS.RETICULATED NFR BLD AUTO: 4.6 % (ref 0.6–13.1)
PLATELETS.RETICULATED NFR BLD AUTO: 5.9 % (ref 0.6–13.1)
PMV BLD AUTO: 10.2 FL (ref 9–12.9)
PMV BLD AUTO: 10.2 FL (ref 9–12.9)
PMV BLD AUTO: 10.5 FL (ref 9–12.9)
PMV BLD AUTO: 10.9 FL (ref 9–12.9)
POTASSIUM SERPL-SCNC: 4.4 MMOL/L (ref 3.6–5.5)
PRODUCT TYPE UPROD: NORMAL
PROT SERPL-MCNC: 5.9 G/DL (ref 6–8.2)
PROTHROMBIN TIME: 62.1 SEC (ref 12–14.6)
RBC # BLD AUTO: 2.23 M/UL (ref 4.2–5.4)
RBC # BLD AUTO: 2.47 M/UL (ref 4.2–5.4)
RBC # BLD AUTO: 2.56 M/UL (ref 4.2–5.4)
RBC # BLD AUTO: 2.59 M/UL (ref 4.2–5.4)
RH BLD: NORMAL
SODIUM SERPL-SCNC: 135 MMOL/L (ref 135–145)
TEG ALGORITHM TGALG: ABNORMAL
TSH SERPL DL<=0.005 MIU/L-ACNC: 0.68 UIU/ML (ref 0.38–5.33)
UNIT STATUS USTAT: NORMAL
WBC # BLD AUTO: 23.5 K/UL (ref 4.8–10.8)
WBC # BLD AUTO: 27.4 K/UL (ref 4.8–10.8)
WBC # BLD AUTO: 29.7 K/UL (ref 4.8–10.8)
WBC # BLD AUTO: 31.3 K/UL (ref 4.8–10.8)

## 2024-11-11 PROCEDURE — 770022 HCHG ROOM/CARE - ICU (200)

## 2024-11-11 PROCEDURE — 85384 FIBRINOGEN ACTIVITY: CPT

## 2024-11-11 PROCEDURE — 84443 ASSAY THYROID STIM HORMONE: CPT

## 2024-11-11 PROCEDURE — 85610 PROTHROMBIN TIME: CPT

## 2024-11-11 PROCEDURE — A9270 NON-COVERED ITEM OR SERVICE: HCPCS | Performed by: NURSE PRACTITIONER

## 2024-11-11 PROCEDURE — 700111 HCHG RX REV CODE 636 W/ 250 OVERRIDE (IP): Performed by: INTERNAL MEDICINE

## 2024-11-11 PROCEDURE — 700101 HCHG RX REV CODE 250: Performed by: INTERNAL MEDICINE

## 2024-11-11 PROCEDURE — A9270 NON-COVERED ITEM OR SERVICE: HCPCS | Performed by: INTERNAL MEDICINE

## 2024-11-11 PROCEDURE — A9270 NON-COVERED ITEM OR SERVICE: HCPCS | Performed by: HOSPITALIST

## 2024-11-11 PROCEDURE — 99255 IP/OBS CONSLTJ NEW/EST HI 80: CPT | Performed by: NAPRAPATH

## 2024-11-11 PROCEDURE — 99232 SBSQ HOSP IP/OBS MODERATE 35: CPT | Performed by: NURSE PRACTITIONER

## 2024-11-11 PROCEDURE — 85025 COMPLETE CBC W/AUTO DIFF WBC: CPT

## 2024-11-11 PROCEDURE — 85055 RETICULATED PLATELET ASSAY: CPT

## 2024-11-11 PROCEDURE — 82330 ASSAY OF CALCIUM: CPT

## 2024-11-11 PROCEDURE — P9012 CRYOPRECIPITATE EACH UNIT: HCPCS

## 2024-11-11 PROCEDURE — 700102 HCHG RX REV CODE 250 W/ 637 OVERRIDE(OP): Performed by: INTERNAL MEDICINE

## 2024-11-11 PROCEDURE — 99497 ADVNCD CARE PLAN 30 MIN: CPT | Performed by: NAPRAPATH

## 2024-11-11 PROCEDURE — 80053 COMPREHEN METABOLIC PANEL: CPT

## 2024-11-11 PROCEDURE — 84100 ASSAY OF PHOSPHORUS: CPT

## 2024-11-11 PROCEDURE — 36430 TRANSFUSION BLD/BLD COMPNT: CPT

## 2024-11-11 PROCEDURE — 30233M1 TRANSFUSION OF NONAUTOLOGOUS PLASMA CRYOPRECIPITATE INTO PERIPHERAL VEIN, PERCUTANEOUS APPROACH: ICD-10-PCS | Performed by: INTERNAL MEDICINE

## 2024-11-11 PROCEDURE — 86923 COMPATIBILITY TEST ELECTRIC: CPT

## 2024-11-11 PROCEDURE — 700105 HCHG RX REV CODE 258: Performed by: INTERNAL MEDICINE

## 2024-11-11 PROCEDURE — 85347 COAGULATION TIME ACTIVATED: CPT

## 2024-11-11 PROCEDURE — P9017 PLASMA 1 DONOR FRZ W/IN 8 HR: HCPCS

## 2024-11-11 PROCEDURE — 30233N1 TRANSFUSION OF NONAUTOLOGOUS RED BLOOD CELLS INTO PERIPHERAL VEIN, PERCUTANEOUS APPROACH: ICD-10-PCS | Performed by: EMERGENCY MEDICINE

## 2024-11-11 PROCEDURE — 700111 HCHG RX REV CODE 636 W/ 250 OVERRIDE (IP): Mod: JZ | Performed by: INTERNAL MEDICINE

## 2024-11-11 PROCEDURE — P9016 RBC LEUKOCYTES REDUCED: HCPCS

## 2024-11-11 PROCEDURE — 30233K1 TRANSFUSION OF NONAUTOLOGOUS FROZEN PLASMA INTO PERIPHERAL VEIN, PERCUTANEOUS APPROACH: ICD-10-PCS | Performed by: INTERNAL MEDICINE

## 2024-11-11 PROCEDURE — 82140 ASSAY OF AMMONIA: CPT

## 2024-11-11 PROCEDURE — 85027 COMPLETE CBC AUTOMATED: CPT

## 2024-11-11 PROCEDURE — 94640 AIRWAY INHALATION TREATMENT: CPT

## 2024-11-11 PROCEDURE — 700102 HCHG RX REV CODE 250 W/ 637 OVERRIDE(OP): Performed by: NURSE PRACTITIONER

## 2024-11-11 PROCEDURE — 99291 CRITICAL CARE FIRST HOUR: CPT | Performed by: INTERNAL MEDICINE

## 2024-11-11 PROCEDURE — 85576 BLOOD PLATELET AGGREGATION: CPT | Mod: 91

## 2024-11-11 PROCEDURE — 700102 HCHG RX REV CODE 250 W/ 637 OVERRIDE(OP): Performed by: HOSPITALIST

## 2024-11-11 PROCEDURE — 99292 CRITICAL CARE ADDL 30 MIN: CPT | Performed by: INTERNAL MEDICINE

## 2024-11-11 PROCEDURE — 83735 ASSAY OF MAGNESIUM: CPT

## 2024-11-11 RX ORDER — PANTOPRAZOLE SODIUM 40 MG/10ML
40 INJECTION, POWDER, LYOPHILIZED, FOR SOLUTION INTRAVENOUS 2 TIMES DAILY
Status: DISCONTINUED | OUTPATIENT
Start: 2024-11-11 | End: 2024-11-16

## 2024-11-11 RX ORDER — SODIUM CHLORIDE 9 MG/ML
INJECTION, SOLUTION INTRAVENOUS CONTINUOUS
Status: DISCONTINUED | OUTPATIENT
Start: 2024-11-11 | End: 2024-11-11

## 2024-11-11 RX ADMIN — LINEZOLID 600 MG: 600 INJECTION, SOLUTION INTRAVENOUS at 06:05

## 2024-11-11 RX ADMIN — FLUOXETINE HYDROCHLORIDE 10 MG: 10 CAPSULE ORAL at 05:17

## 2024-11-11 RX ADMIN — LACTULOSE 300 ML: 10 SOLUTION ORAL; RECTAL at 05:18

## 2024-11-11 RX ADMIN — PANTOPRAZOLE SODIUM 40 MG: 40 INJECTION, POWDER, FOR SOLUTION INTRAVENOUS at 18:13

## 2024-11-11 RX ADMIN — LACTULOSE 300 ML: 10 SOLUTION ORAL; RECTAL at 14:56

## 2024-11-11 RX ADMIN — LACTULOSE 300 ML: 10 SOLUTION ORAL; RECTAL at 20:36

## 2024-11-11 RX ADMIN — LIDOCAINE 1 PATCH: 4 PATCH TOPICAL at 05:17

## 2024-11-11 RX ADMIN — PIPERACILLIN AND TAZOBACTAM 4.5 G: 4; .5 INJECTION, POWDER, FOR SOLUTION INTRAVENOUS at 21:22

## 2024-11-11 RX ADMIN — RIFAXIMIN 550 MG: 550 TABLET ORAL at 05:17

## 2024-11-11 RX ADMIN — PIPERACILLIN AND TAZOBACTAM 4.5 G: 4; .5 INJECTION, POWDER, FOR SOLUTION INTRAVENOUS at 15:39

## 2024-11-11 RX ADMIN — PIPERACILLIN AND TAZOBACTAM 4.5 G: 4; .5 INJECTION, POWDER, FOR SOLUTION INTRAVENOUS at 04:15

## 2024-11-11 RX ADMIN — ONDANSETRON 4 MG: 2 INJECTION INTRAMUSCULAR; INTRAVENOUS at 15:55

## 2024-11-11 RX ADMIN — RIFAXIMIN 550 MG: 550 TABLET ORAL at 18:14

## 2024-11-11 RX ADMIN — OMEPRAZOLE 20 MG: 20 CAPSULE, DELAYED RELEASE ORAL at 05:17

## 2024-11-11 ASSESSMENT — COGNITIVE AND FUNCTIONAL STATUS - GENERAL
MOVING TO AND FROM BED TO CHAIR: TOTAL
HELP NEEDED FOR BATHING: TOTAL
MOBILITY SCORE: 6
CLIMB 3 TO 5 STEPS WITH RAILING: TOTAL
SUGGESTED CMS G CODE MODIFIER MOBILITY: CN
TURNING FROM BACK TO SIDE WHILE IN FLAT BAD: TOTAL
TOILETING: TOTAL
PERSONAL GROOMING: TOTAL
MOVING FROM LYING ON BACK TO SITTING ON SIDE OF FLAT BED: TOTAL
DRESSING REGULAR UPPER BODY CLOTHING: TOTAL
WALKING IN HOSPITAL ROOM: TOTAL
DRESSING REGULAR LOWER BODY CLOTHING: TOTAL
DAILY ACTIVITIY SCORE: 6
STANDING UP FROM CHAIR USING ARMS: TOTAL
EATING MEALS: TOTAL
SUGGESTED CMS G CODE MODIFIER DAILY ACTIVITY: CN

## 2024-11-11 ASSESSMENT — PAIN DESCRIPTION - PAIN TYPE
TYPE: ACUTE PAIN

## 2024-11-11 ASSESSMENT — ENCOUNTER SYMPTOMS
BLURRED VISION: 0
SHORTNESS OF BREATH: 1
SHORTNESS OF BREATH: 0
FEVER: 0
ABDOMINAL PAIN: 1
BLOOD IN STOOL: 0
ABDOMINAL PAIN: 0
CONSTIPATION: 1
MEMORY LOSS: 1
WEIGHT LOSS: 1
HEARTBURN: 0
NERVOUS/ANXIOUS: 1
TREMORS: 1
NAUSEA: 0
VOMITING: 0
WEAKNESS: 1
BACK PAIN: 0
DIARRHEA: 0
CHILLS: 0
BRUISES/BLEEDS EASILY: 1
DEPRESSION: 0
COUGH: 0
DIZZINESS: 0

## 2024-11-11 NOTE — PROGRESS NOTES
Critical Care Progress Note    Date of admission  10/31/2024    Chief Complaint  59 y.o. female admitted 10/31/2024 with EtOH Cirrhosis, sober 4 months, admitted with fever chills and abdominal pain, found to have SBP with salmonella in culture as well as bacteremia. She has been followed in IMCU with lots of goals of care changes back and forth with advanced liver failure and multiorgan dyfunction. Transferred to ICU 11/10 for increase lactate, worsening encephalopathy and respiratory failure.     Hospital Course  11/10 transferred to ICU.     Interval Problem Update  Reviewed last 24 hour events:  Neuro: responding to name only, moving all ext  HR: 90's  SBP: 100-120's  Tmax: afebrile  GI: diet held, BMS q6 lactulose  UOP: 725ml  Lines: 2 peripheral IV, pagan  Resp: HFNC 30l 80%   Vte: contra  PPI/H2:PPI IV  Antibx: zyvox and zosyn  Last 24hrs + 403ml net -9.4L  Check coags, CXR with large right effusion with dropping hg concerns for spontaneous hemothorax -> new CXR without effusion  Repeat CXR, check pt/inr, ical, fibrinogen, teg w/ mapping  Steroids?  Acute anemia getting transfused  Looks to be dieing goals of care and intubation  Transplant eval  [facility] [place] followed at [facility]  Apixaban off sing 10/9  Transfuse 2 FFP and 1 Cryo while awaiting INR and fibrinogen level  Called for transfer center for transfer evaluation [facility] [place]     Review of Systems  Review of Systems   Unable to perform ROS: Critical illness        Vital Signs for last 24 hours   Temp:  [36.3 °C (97.3 °F)-37.4 °C (99.3 °F)] 36.4 °C (97.6 °F)  Pulse:  [] 94  Resp:  [21-55] 24  BP: ()/(53-74) 106/58  SpO2:  [89 %-96 %] 93 %    Hemodynamic parameters for last 24 hours       Respiratory Information for the last 24 hours       Physical Exam   Physical Exam  Vitals and nursing note reviewed.   Constitutional:       General: She is in acute distress.      Appearance: She is ill-appearing.      Comments:  Ill appearing tachypnea on HFNC with bleeding from oral mucosa, jaundice   HENT:      Head: Normocephalic.      Mouth/Throat:      Mouth: Mucous membranes are dry.      Comments: Dried blood from nose and mouth  Eyes:      Pupils: Pupils are equal, round, and reactive to light.      Comments: jaundice   Cardiovascular:      Rate and Rhythm: Tachycardia present.      Heart sounds: No murmur heard.  Pulmonary:      Effort: Respiratory distress present.      Breath sounds: No stridor. Rhonchi and rales present. No wheezing.   Chest:      Chest wall: No tenderness.   Abdominal:      General: There is no distension.      Palpations: There is no mass.      Tenderness: There is abdominal tenderness. There is no guarding or rebound.      Hernia: No hernia is present.      Comments: Mild ascites with fluid wave not tense   Musculoskeletal:         General: No swelling or tenderness.      Cervical back: No rigidity or tenderness.   Skin:     Coloration: Skin is jaundiced. Skin is not pale.      Findings: Bruising present.   Neurological:      Comments: She is able to say her name but rest of history is limited, moves all extremities         Medications  Current Facility-Administered Medications   Medication Dose Route Frequency Provider Last Rate Last Admin    pantoprazole (Protonix) injection 40 mg  40 mg Intravenous BID Olvin Hanson M.D.        NS infusion   Intravenous Continuous Olvin Hanson M.D.        lactulose 10 GM/15ML solution 300 mL  300 mL Rectal Q6HRS Oumou Mart, DNP,  APRN   300 mL at 11/11/24 0518    piperacillin-tazobactam (Zosyn) 4.5 g in  mL IVPB  4.5 g Intravenous Q8HRS Shahzad Jenkins M.D.   Stopped at 11/11/24 0815    thiamine (B-1) injection 200 mg  200 mg Intravenous DAILY Shahzad Jenkins M.D.   200 mg at 11/10/24 2134    riFAXIMin (Xifaxan) tablet 550 mg  550 mg Oral BID Quang Durham D.O.   550 mg at 11/11/24 0517    acetaminophen (Tylenol) tablet 650 mg  650 mg  Oral Q6HRS PRN Adilia Gonzales, D.O.        ondansetron (Zofran) syringe/vial injection 4 mg  4 mg Intravenous Q4HRS PRN Adilia Gonzales, D.O.   4 mg at 11/04/24 1019    ondansetron (Zofran ODT) dispertab 4 mg  4 mg Oral Q4HRS PRN Adilia Gonzales, D.O.        promethazine (Phenergan) tablet 12.5-25 mg  12.5-25 mg Oral Q4HRS PRN Adilia Gonzales, D.O.   25 mg at 11/04/24 1116    promethazine (Phenergan) suppository 12.5-25 mg  12.5-25 mg Rectal Q4HRS PRN Adilia Gonzales, D.O.        prochlorperazine (Compazine) injection 5-10 mg  5-10 mg Intravenous Q4HRS PRN Adilia Gonzales, D.O.   10 mg at 11/04/24 1228    dextrose 50% (D50W) injection 25 g  25 g Intravenous Q15 MIN PRN Adilia Gonzales, D.O.   25 g at 11/01/24 1843    FLUoxetine (PROzac) capsule 10 mg  10 mg Oral QAM Adilia Gonzales, D.O.   10 mg at 11/11/24 0517    lidocaine (Asperflex) 4 % patch 1 Patch  1 Patch Transdermal Q24HRS Adilia Gonzales, D.O.   1 Patch at 11/11/24 0517       Fluids    Intake/Output Summary (Last 24 hours) at 11/11/2024 1028  Last data filed at 11/11/2024 0600  Gross per 24 hour   Intake 1624.87 ml   Output 1200 ml   Net 424.87 ml       Laboratory  Recent Labs     11/10/24  1148   SVPIZ27O 7.47   HVILMX550Q 24.4*   EISNW738D 57.8*   TPBG4IMR 87.1*   ARTHCO3 18   A6TOZMIAE 1.5L   ARTBE -5*         Recent Labs     11/09/24  0205 11/10/24  0050 11/11/24  0206   SODIUM 130* 130* 135   POTASSIUM 3.8 4.7 4.4   CHLORIDE 97 99 101   CO2 20 18* 18*   BUN 22 31* 41*   CREATININE 0.28* 1.09 1.37   MAGNESIUM 1.6 2.1 1.9   PHOSPHORUS 2.3* 3.7 4.7*   CALCIUM 8.5 8.8 8.9     Recent Labs     11/09/24  0205 11/10/24  0050 11/10/24  1148 11/11/24  0206   ALTSGPT 32 38  --  26   ASTSGOT 80* 90*  --  83*   ALKPHOSPHAT 117* 124*  --  83   TBILIRUBIN 13.1* 16.2*  --  15.2*   DBILIRUBIN  --   --  9.9*  --    GLUCOSE 69 114*  --  83     Recent Labs     11/09/24  0205 11/10/24  0050 11/10/24  0150 11/10/24  1148 11/11/24  0206 11/11/24  0300   WBC 20.3*  --   30.7* 33.3*  --  23.5*   NEUTSPOLYS  --   --   --  98.20*  --  88.50*   LYMPHOCYTES  --   --   --  0.00*  --  4.00*   MONOCYTES  --   --   --  1.80  --  5.50   EOSINOPHILS  --   --   --  0.00  --  0.50   BASOPHILS  --   --   --  0.00  --  0.10   ASTSGOT 80* 90*  --   --  83*  --    ALTSGPT 32 38  --   --  26  --    ALKPHOSPHAT 117* 124*  --   --  83  --    TBILIRUBIN 13.1* 16.2*  --   --  15.2*  --      Recent Labs     11/09/24  0930 11/10/24  0050 11/10/24  0150 11/10/24  0814 11/10/24  1148 11/11/24  0300   RBC  --   --  3.11*  --  2.98* 2.23*   HEMOGLOBIN  --   --  9.1*  --  8.8* 6.5*   HEMATOCRIT  --   --  26.8*  --  26.2* 19.8*   PLATELETCT  --   --  177  --  179 85*   PROTHROMBTM 110.8* 82.8*  --  77.9*  --   --    APTT 89.7*  --   --   --   --   --    INR >=10.00* >=10.00*  --  9.53*  --   --        Imaging  X-Ray:  I have personally reviewed the images and compared with prior images.  CT:    Reviewed  Echo:   Reviewed  Ultrasound:  Reviewed    Assessment/Plan  * Sepsis (HCC)- (present on admission)  Assessment & Plan  This is Sepsis Present on admission  SIRS criteria identified on my evaluation include: Tachycardia, with heart rate greater than 90 BPM and Tachypnea, with respirations greater than 20 per minute  Clinical indicators of end organ dysfunction include Lactic Acid greater than 2 and Toxic Metabolic Encephalopathy  Source is PNA  Sepsis protocol initiated  Crystalloid Fluid Administration: Resuscitation volume of 20 cc/kg ordered. Reason that resuscitation volume of less than 30ml/kg was ordered concern for fluid overload  IV antibiotics as appropriate for source of sepsis  Reassessment: I have reassessed the patient's hemodynamic status    Zosyn   Follow cultures  Trend lactic acid    Acute respiratory failure with hypoxia (HCC)  Assessment & Plan  Secondary to multifocal PNA and aspiration pneumonitis  Small right effusion   HFNC  Empiric antibiotics  SpO2 > 90%  Monitor need for  intubation    Systolic anterior movement of mitral valve  Assessment & Plan  Based upon ECHO 11/10/24   Very high risk for obstructive shock with hypovolemia and tachycardia     Maintain euvolemia  Neosynephrine for vasopressor if needed     Pneumonia  Assessment & Plan  Admitted to the ICU 11/10/24 for elevated LA, worsening CXR    SpO2 > 90%  Empiric linezolid + zosyn  MRSA nare  Follow cultures     SBP (spontaneous bacterial peritonitis) ([facility])  Assessment & Plan  Salmonella sp with associated bacteremia based upon cultures from 10/31    Elevated lactic acid level  Assessment & Plan  Multifactorial: cirrhosis, sepsis, malnutrition  Thiamine  Resuscitation with albumin  Empiric antibiotics  Trend  No shock so likely Type B lactate    Elevated INR  Assessment & Plan  See coagulopathy plan    Superior mesenteric vein thrombosis (HCC)  Assessment & Plan  Non-occlusive on CT 11/3   Anticoagulation is strictly contraindicated    Severe protein-calorie malnutrition ([place]: less than 60% of standard weight) ([facility])  Assessment & Plan  Thiamine replacement  Optimize nutrition as able    Advance care planning- (present on admission)  Assessment & Plan  She and family reverted to full code status during this hospitalization  Pall med consult     Hepatic encephalopathy (HCC)- (present on admission)  Assessment & Plan  Lactulose  Rifaximin   Optimize acid/base and electrolytes  Maintain euvolemia    Coagulopathy ([facility])- (present on admission)  Assessment & Plan  Secondary to liver failure  Last dose of Eliquis 11/9  Follow Teg w/ platelet mapping, INR and cryo  Monitor for source of spontaneous hemorrhage  Correct and monitor calcium  Prognosis poor    Anemia- (present on admission)  Assessment & Plan  Acute on chronic goal hg >7  Monitor for active hemorrhage and monitor and serial correct coagulopathy  Conservative transfusion protocol      Acute renal failure (ARF) ([facility])- (present on admission)  Assessment & Plan  Ischemic ATN  + sepsis + HRS  Albumin resuscitation  Strict I/Os  Renally dosed medications  Avoid nephrotoxic agents as able  Trend     Alcoholic cirrhosis of liver with ascites (HCC)- (present on admission)  Assessment & Plan  Patient with alcoholic cirrhosis sober since June 2024, now with multiple organ failure trigger by SBP.   Avoid hepatoxins, serial monitor liver and synthetic function   Monitor for hepatic encephalopathy and serial monitor ammonia level  Check: hepatitis panel, tylenol level, liver ultrasound and rule out vascular etiology  S/p course of steroids finished last month  Non-occlusive SMA thrombosis  Will reach out to Transplant centers again to discuss if she is a candidate for transplant evaluation.   She has a 1st outpatient hepatology eval at [facility] in January. [place] had accepted her in the past week.   Daily meld labs, prognosis looks grim with multiple organ failure.       Essential hypertension- (present on admission)  Assessment & Plan  Reintroduce oral antihypertensives when appropriate           VTE:  Contraindicated  Ulcer: PPI  Lines: Herrera Catheter  Ongoing indication addressed    I have performed a physical exam and reviewed and updated ROS and Plan today (11/11/2024). In review of yesterday's note (11/10/2024), there are no changes except as documented above.     Discussed patient condition and risk of morbidity and/or mortality with RN, RT, Pharmacy, Charge nurse / hot rounds, Patient, and GI    The patient remains critically ill on HFNC and coagulopathy with active transfusion.  Critical care time = 90 minutes in directly providing and coordinating critical care and extensive data review.  No time overlap and excludes procedures.

## 2024-11-11 NOTE — PROGRESS NOTES
4 Eyes Skin Assessment Completed by KAYLEY Quiroz and KAYLEY Ivey.    Head Matted Hair  Ears Jaundice  Nose Scab and Jaundice  Mouth Ulcer(s) and Bleeding  Neck Redness, Discoloration, and Jaundice  Breast/Chest Redness, Bruising, Discoloration, and Jaundice  Shoulder Blades WDL  Spine WDL  (R) Arm/Elbow/Hand Redness, Blanching, Bruising, and Discoloration  (L) Arm/Elbow/Hand Redness, Blanching, Bruising, and Discoloration  Abdomen Swelling and Jaundice  Groin WDL  Scrotum/Coccyx/Buttocks Redness, Blanching, Moisture Fissure, and Discoloration    (R) Leg Redness, Bruising, and Jaundice  (L) Leg Redness, Bruising, and Jaundice  (R) Heel/Foot/Toe Redness, Blanching, and Boggy  (L) Heel/Foot/Toe Redness, Blanching, and Boggy          Devices In Places ECG, Blood Pressure Cuff, Pulse Ox, Herrera, SCD's, and HFNC      Interventions In Place Gray Ear Foams, Heel Mepilex, Sacral Mepilex, Heel Float Boots, TAP System, Pillows, Elbow Mepilex, Optifoam, Q2 Turns, Low Air Loss Mattress, Barrier Cream, and Heels Loaded W/Pillows    Possible Skin Injury Yes    Pictures Uploaded Into Epic Yes  Wound Consult Placed Yes  RN Wound Prevention Protocol Ordered Yes

## 2024-11-11 NOTE — ASSESSMENT & PLAN NOTE
She and family reverted to full code status during this hospitalization    Now intubated 11/13 with ARDS will need to further discuss goals of care family flying in on 11/16 11/16 now with worsening again with EFRAIN, acidosis and new fever and sepsis and looks to be actively dieing recommend comfort care when family arrives.

## 2024-11-11 NOTE — ASSESSMENT & PLAN NOTE
Patient with alcoholic cirrhosis sober since June 2024, now with multiple organ failure trigger by SBP.   Avoid hepatoxins, serial monitor liver and synthetic function   Monitor for hepatic encephalopathy and serial monitor ammonia level  Check: hepatitis panel, tylenol level, liver ultrasound and rule out vascular etiology  S/p course of steroids finished last month  Non-occlusive SMA thrombosis  I have reached out to [facility] and [facility] pending transfer. Was accepted to [facility] pending bed availabilty.   She has a 1st outpatient hepatology eval at [facility] in January but not established.   Daily meld labs, prognosis looks grim with multiple organ failure.

## 2024-11-11 NOTE — ASSESSMENT & PLAN NOTE
Salmonella sp with associated bacteremia based upon cultures from 10/31  Start SBP prophylaxis 11/16 w/ Cipro

## 2024-11-11 NOTE — ASSESSMENT & PLAN NOTE
Intubated date: 11/13 due to ongoing aspiration and aspiration pneumonitis causing ARDS  Goal saturation > 88%  Monitor ventilator waveforms and titrate flow/peep and volumes according.   Lung protective ventilation strategy w/ A-F bundle  CXR: monitor lung volumes and tube/line placement  VAP bundle prevention, oral care, post pyloric feeding  Head of bed > 30 degree  GI prophylaxis  Daily awakening and SBT trials unless contraindicated  Monitor for liberation  Respiratory treatments: prn

## 2024-11-11 NOTE — PROGRESS NOTES
Critical labs called at 11:00 by Annie. Lab values verified and read back by this nurse.     INR 7.15.  PT 62.1    Dr. Hanson notified via Volte and in person on unit at 11:05. Read back and verified by this RN.

## 2024-11-11 NOTE — ASSESSMENT & PLAN NOTE
Transferred to the ICU 11/10/24 for elevated LA, worsening CXR  Went from room air to needing max HFNC in < 12 with bilateral alveolar infiltrates consistent with aspiration event.   SpO2 > 90%  Finish course of Zosyn x5 days  MRSA nare negative stopped Linezolid with sudden drop of platelets  Follow cultures, respiratory PCR  Aspiration precaution and speech eval  Naso/oral bleeding leading to event? INR 9 at time

## 2024-11-11 NOTE — ASSESSMENT & PLAN NOTE
This is Sepsis Present on admission  SIRS criteria identified on my evaluation include: Tachycardia, with heart rate greater than 90 BPM and Tachypnea, with respirations greater than 20 per minute  Clinical indicators of end organ dysfunction include Lactic Acid greater than 2 and Toxic Metabolic Encephalopathy  Source is PNA  Sepsis protocol initiated  Crystalloid Fluid Administration: Resuscitation volume of 20 cc/kg ordered. Reason that resuscitation volume of less than 30ml/kg was ordered concern for fluid overload  IV antibiotics as appropriate for source of sepsis  Reassessment: I have reassessed the patient's hemodynamic status    Zosyn finished 5/5 days linezolid 3/3

## 2024-11-11 NOTE — PROGRESS NOTES
Gastroenterology Progress Note               Author:  Oumou Barron APRN Date & Time Created: 11/11/2024 7:24 AM       Patient ID:  Name:             Kavita Freeman  YOB: 1965  Age:                 59 y.o.  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]     Acute encephalopathy [G93.40]     Bacteremia due to Gram-negative bacteria [R78.81]     Severe protein-calorie malnutrition ([place]: less than 60% of standard weight) (HCC) [E43]     Transaminitis [R74.01]     Hypoglycemia [E16.2]     Advance care planning [Z71.89]     Sepsis (HCC) [A41.9]     Hepatic encephalopathy (HCC) [K76.82]     High anion gap metabolic acidosis [E87.29]     Hyponatremia [E87.1]     Coagulopathy (HCC) [D68.9]     Anemia [D64.9]     Alcoholic cirrhosis of liver with ascites (HCC) [K70.31]     Acute renal failure (ARF) (HCC) [N17.9]     Depression with anxiety [F41.8]     Essential hypertension [I10]      Presenting Chief Complaint:  Cirrhosis, GI bleeding.     History of Present Illness:   59 years old female with medical history of alcohol-related liver injury, cirrhosis, decompensation with variceal bleeding and refractory ascites formation, mild to moderate encephalopathy.  Presented to hospital this time for fluid accumulation, acute kidney injury.  GI team is consulted for recent GI bleeding     The patient has primary GI liver doctor in [place] and she is waiting for the transplantation evaluation at [facility] in the coming 2 months.  We do not have any records to support this plan,  however the patient is very certain about the [facility] appointment.     For the bleeding, the patient had nausea vomiting in the last 2 days, the patient saw blood and also some coffee-ground like vomitus.  Tarry stool was noted.  Last upper GI scope August 15 with variceal bleeding and banding.     Interval History:  11/2/2024: Patient seen at bedside.  Awake, alert.  Discussed EGD findings with patient.  Inquired regarding last EGD as, per chart review, last EGD was 2-1/2 months ago.  Patient unable to discern if she has had EGD since last documented.  Positive asterixis.  Denies bowel movements overnight.  Started on rifaximin and lactulose.  Hemoglobin stable.  Abdomen tender with light palpation.  No WBC this morning, but yesterday was 39. ultrasound abdomen pending.      11/3/2024: Patient seen at bedside.  Disoriented, unable to answer orientation questions.  Abdomen distended, significantly increase in tenderness this morning, hypoactive bowel sounds and tympany with percussion.  CT abdomen and CBC pending.  No bowel movements overnight.    Update 1222: CT abdomen with findings including fluid-filled small bowel and colonic loops favoring ileus, no evidence of bowel perforation, cirrhosis with portal hypertension, moderate to large volume ascites, nonocclusive SMV thrombosis, cholelithiasis, subtle findings concerning for pulmonary emboli.      11/4/2024: Patient seen at bedside.  Disoriented, oriented to self only.  Abdomen remains distended, tender with light palpation, hypoactive bowel sounds.  Patient with multiple brown bowel movements overnight.  Started on heparin yesterday.  WBC 29.8, hemoglobin stable at 12.5, platelets 197.  Sodium 127, BUN 56, creatinine improved to 1.3.  Albumin yesterday 2.6.  INR yesterday 2.35.  MELD 3.0 as of 11/3/2024 31    11/5/2024: Patient seen and examined.  Appears to be in distress, tachypneic, unable to participate in interview.  Grimaces when I touch her abdomen.  I  was concerned and got Dr. Charles who then examined the patient.  We ordered KUB, chest x-ray, ABG, further labs.  3 BM last 24 hours.     Found to have significant lactic acidosis, INR 3.64 (difficult to interpret in the context of heparin drip), ABG with acute respiratory alkalosis.  Creatinine increasing to 1.54, T. bili 8.  KUB dilated loops of bowel suspicious for gastroenteritis or inflammatory changes.  Chest x-ray with atelectasis.      11/6/2024: Patient seen in collaboration with Dr. Solorzano. Son at bedside, all questions answered. WBC now 36.6, T. Bili 9.8, creatinine uptrending. MELD 3.0 - 37 (Although INR not reliable in the setting of heparin drip)    11/7/2024: Patient seen with mom and dad at bedside.  Significantly improved, alert and oriented and eating food.  She remembered me from when I met her before.  WBC 26.9, hgb 9.6, plt 112, cr 1.38. Urinary casts present. Renal US negative.     11/8/2024: Patient seen with family bedside. Continues to feel better, eating but doesn't really like the food. Numerous bowel movements overnight. Worsening abdominal distention with mild tenderness. WBC continues to improve, platelets worse at 89, hgb downtrended to 9.1, cr 0.44, t. Bili 10.9    11/9/2024: Patient seen this morning, no family bedside.  More lethargic today, says she is tired.  2 bowel movements past 24 hours.  VSS, WBC 20.3, T. Bili 13.1, albumin 2.9, phos 2.3.  INR greater than 10, TEG with abnormal CK R.  Pending diagnostic paracentesis and elevated INR.    11/10/2024: Patient seen and examined this morning with mother at bedside.  More lethargic again, on oxygen, noted to be tachypneic and tachycardic.  Minimally arousable.  Worsening murmur on auscultation. Has not had a bowel movement per RN.  Finished her antibiotics yesterday and transferred from [facility].  Labs reviewed, WBC up to 30 with repeat 33, creatinine 1.09 from 0.28, bilirubin 16.2, 9.9 direct.  Increased reticulocyte response, LDH  600, ammonia 62, lactic 4.2, Procal 1.44.  INR greater than 10, recheck 9.53.  Chest x-ray with new significant right pleural effusion and significant increase in diffuse pulmonary opacifications edema versus pneumonia.  Head CT negative.  Limited echocardiogram and haptoglobin pending.    11/11/2024: Patient upgraded to ICU last night. Awake, alert, encephalopathic. Complaining of back pain. Dried crusted blood noted on oral mucosa. Tachycardic, tachypneic, on 10L oxymask. WBC 31.3, hgb 7.4, plt 93, INR 7.15, BUN 41, cr 1.37, T. Bili 15.2, lactic 4.9. On Zosyn.    Hospital Medications:  Current Facility-Administered Medications   Medication Dose Frequency Provider Last Rate Last Admin    lactulose 10 GM/15ML solution 300 mL  300 mL Q6HRS Oumou Mart, DNP,  APRN   300 mL at 11/11/24 0518    piperacillin-tazobactam (Zosyn) 4.5 g in  mL IVPB  4.5 g Q8HRS Shahzad Jenkins M.D. 25 mL/hr at 11/11/24 0415 4.5 g at 11/11/24 0415    Linezolid (Zyvox) premix 600 mg  600 mg Q12HRS Shahzad Jenkins M.D. 300 mL/hr at 11/11/24 0605 600 mg at 11/11/24 0605    midodrine (Proamatine) tablet 10 mg  10 mg Q8HRS Shahzad Jenkins M.D.        thiamine (B-1) injection 200 mg  200 mg DAILY Shahzad Jenkins M.D.   200 mg at 11/10/24 2134    omeprazole (PriLOSEC) capsule 20 mg  20 mg BID Claudia Solorzano M.D.   20 mg at 11/11/24 0517    riFAXIMin (Xifaxan) tablet 550 mg  550 mg BID Quang Durham D.O.   550 mg at 11/11/24 0517    acetaminophen (Tylenol) tablet 650 mg  650 mg Q6HRS PRN Adilia Gonzales D.O.        ondansetron (Zofran) syringe/vial injection 4 mg  4 mg Q4HRS PRN Adilia Gonzales D.O.   4 mg at 11/04/24 1019    ondansetron (Zofran ODT) dispertab 4 mg  4 mg Q4HRS PRN Adilia Gonzales D.O.        promethazine (Phenergan) tablet 12.5-25 mg  12.5-25 mg Q4HRS PRN Adilia Gonzales D.O.   25 mg at 11/04/24 1116    promethazine (Phenergan) suppository 12.5-25 mg  12.5-25 mg Q4HRS PRN Adilia Gonzales,  "D.O.        prochlorperazine (Compazine) injection 5-10 mg  5-10 mg Q4HRS PRN Adilia Gonzales D.O.   10 mg at 11/04/24 1228    dextrose 50% (D50W) injection 25 g  25 g Q15 MIN PRN Adilia Gonzales D.O.   25 g at 11/01/24 1843    FLUoxetine (PROzac) capsule 10 mg  10 mg QAM Adilia Gonzales, D.O.   10 mg at 11/11/24 0517    lidocaine (Asperflex) 4 % patch 1 Patch  1 Patch Q24HRS Adilia Gonzales D.O.   1 Patch at 11/11/24 0517   Last reviewed on 11/1/2024  9:17 AM by Celine Bustamante R.N.       Review of Systems:  Review of Systems   Constitutional:  Positive for malaise/fatigue. Negative for chills and fever.   HENT:  Negative for hearing loss.    Eyes:  Negative for blurred vision.   Respiratory:  Negative for cough and shortness of breath.    Cardiovascular:  Negative for chest pain and leg swelling.   Gastrointestinal:  Positive for constipation. Negative for abdominal pain, blood in stool, diarrhea, heartburn, melena, nausea and vomiting.   Genitourinary:  Negative for dysuria.   Musculoskeletal:  Negative for back pain.   Skin:  Negative for rash.   Neurological:  Positive for tremors and weakness. Negative for dizziness.   Psychiatric/Behavioral:  Positive for memory loss. Negative for depression.    All other systems reviewed and are negative.      Vital signs:  Weight/BMI: Body mass index is 23.23 kg/m².  /59   Pulse 94   Temp 36.4 °C (97.6 °F)   Resp (!) 24   Ht 1.727 m (5' 8\")   Wt 69.3 kg (152 lb 12.5 oz)   SpO2 93%   Vitals:    11/11/24 0545 11/11/24 0554 11/11/24 0600 11/11/24 0611   BP: 105/59 105/59 (P) 106/58    Pulse: 94  (P) 94    Resp: (!) 43  (!) (P) 38 (!) 24   Temp:  36.4 °C (97.6 °F)     TempSrc:       SpO2: 93%  (P) 93%    Weight:       Height:         Oxygen Therapy:  Pulse Oximetry: (P) 93 %, O2 (LPM): 30, FiO2%: 80 %, O2 Delivery Device: Heated High Flow Nasal Cannula    Intake/Output Summary (Last 24 hours) at 11/11/2024 0724  Last data filed at 11/11/2024 0600  Gross per 24 " hour   Intake 1624.87 ml   Output 1200 ml   Net 424.87 ml       Physical Exam  Vitals and nursing note reviewed.   Constitutional:       General: She is not in acute distress.     Appearance: She is cachectic. She is ill-appearing.   HENT:      Head: Normocephalic.      Nose: Nose normal. No congestion.   Eyes:      General: Scleral icterus present.      Extraocular Movements: Extraocular movements intact.      Conjunctiva/sclera: Conjunctivae normal.   Cardiovascular:      Rate and Rhythm: Normal rate and regular rhythm.      Pulses: Normal pulses.      Heart sounds: Murmur heard.   Pulmonary:      Effort: Pulmonary effort is normal.      Comments: Diminished throughout  Abdominal:      General: There is distension.      Tenderness: There is abdominal tenderness.      Comments:      Genitourinary:     Comments: Herrera with dark urine  BMS in place  Musculoskeletal:      Right lower leg: No edema.      Left lower leg: No edema.   Skin:     General: Skin is warm and dry.      Capillary Refill: Capillary refill takes less than 2 seconds.      Coloration: Skin is jaundiced.   Neurological:      Mental Status: She is oriented to person, place, and time.      Motor: Weakness present.       Labs:  Recent Labs     11/09/24  0205 11/10/24  0050 11/11/24  0206   SODIUM 130* 130* 135   POTASSIUM 3.8 4.7 4.4   CHLORIDE 97 99 101   CO2 20 18* 18*   BUN 22 31* 41*   CREATININE 0.28* 1.09 1.37   MAGNESIUM 1.6 2.1 1.9   PHOSPHORUS 2.3* 3.7 4.7*   CALCIUM 8.5 8.8 8.9     Recent Labs     11/09/24  0205 11/10/24  0050 11/10/24  1148 11/11/24  0206   ALTSGPT 32 38  --  26   ASTSGOT 80* 90*  --  83*   ALKPHOSPHAT 117* 124*  --  83   TBILIRUBIN 13.1* 16.2*  --  15.2*   DBILIRUBIN  --   --  9.9*  --    GLUCOSE 69 114*  --  83     Recent Labs     11/09/24  0205 11/10/24  0050 11/10/24  0150 11/10/24  1148 11/11/24  0206 11/11/24  0300   WBC 20.3*  --  30.7* 33.3*  --  23.5*   NEUTSPOLYS  --   --   --  98.20*  --  88.50*   LYMPHOCYTES  --    --   --  0.00*  --  4.00*   MONOCYTES  --   --   --  1.80  --  5.50   EOSINOPHILS  --   --   --  0.00  --  0.50   BASOPHILS  --   --   --  0.00  --  0.10   ASTSGOT 80* 90*  --   --  83*  --    ALTSGPT 32 38  --   --  26  --    ALKPHOSPHAT 117* 124*  --   --  83  --    TBILIRUBIN 13.1* 16.2*  --   --  15.2*  --      Recent Labs     11/09/24  0930 11/10/24  0050 11/10/24  0150 11/10/24  0814 11/10/24  1148 11/11/24  0300   RBC  --   --  3.11*  --  2.98* 2.23*   HEMOGLOBIN  --   --  9.1*  --  8.8* 6.5*   HEMATOCRIT  --   --  26.8*  --  26.2* 19.8*   PLATELETCT  --   --  177  --  179 85*   PROTHROMBTM 110.8* 82.8*  --  77.9*  --   --    APTT 89.7*  --   --   --   --   --    INR >=10.00* >=10.00*  --  9.53*  --   --      Recent Results (from the past 24 hours)   Prothrombin Time    Collection Time: 11/10/24  8:14 AM   Result Value Ref Range    PT 77.9 (HH) 12.0 - 14.6 sec    INR 9.53 (HH) 0.87 - 1.13   LDH    Collection Time: 11/10/24 11:48 AM   Result Value Ref Range    LDH Total 600 (H) 107 - 266 U/L   CBC WITH DIFFERENTIAL    Collection Time: 11/10/24 11:48 AM   Result Value Ref Range    WBC 33.3 (H) 4.8 - 10.8 K/uL    RBC 2.98 (L) 4.20 - 5.40 M/uL    Hemoglobin 8.8 (L) 12.0 - 16.0 g/dL    Hematocrit 26.2 (L) 37.0 - 47.0 %    MCV 87.9 81.4 - 97.8 fL    MCH 29.5 27.0 - 33.0 pg    MCHC 33.6 32.2 - 35.5 g/dL    RDW 55.5 (H) 35.9 - 50.0 fL    Platelet Count 179 164 - 446 K/uL    MPV 10.3 9.0 - 12.9 fL    Neutrophils-Polys 98.20 (H) 44.00 - 72.00 %    Lymphocytes 0.00 (L) 22.00 - 41.00 %    Monocytes 1.80 0.00 - 13.40 %    Eosinophils 0.00 0.00 - 6.90 %    Basophils 0.00 0.00 - 1.80 %    Nucleated RBC 0.10 0.00 - 0.20 /100 WBC    Neutrophils (Absolute) 32.70 (H) 1.82 - 7.42 K/uL    Lymphs (Absolute) 0.00 (L) 1.00 - 4.80 K/uL    Monos (Absolute) 0.60 0.00 - 0.85 K/uL    Eos (Absolute) 0.00 0.00 - 0.51 K/uL    Baso (Absolute) 0.00 0.00 - 0.12 K/uL    NRBC (Absolute) 0.02 K/uL    Anisocytosis 1+     Macrocytosis 1+     RETICULOCYTES COUNT    Collection Time: 11/10/24 11:48 AM   Result Value Ref Range    Reticulocyte Count 5.7 (H) 0.8 - 2.6 %    Retic, Absolute 0.17 (H) 0.04 - 0.12 M/uL    Imm. Reticulocyte Fraction 28.5 (H) 2.6 - 16.1 %    Retic Hgb Equivalent 38.2 (H) 29.0 - 35.0 pg/cell   ABG - LAB    Collection Time: 11/10/24 11:48 AM   Result Value Ref Range    Ph 7.47 7.40 - 7.50    Pco2 24.4 (L) 26.0 - 37.0 mmHg    Po2 57.8 (L) 64.0 - 87.0 mmHg    O2 Saturation 87.1 (L) 93.0 - 99.0 %    Hco3 18 17 - 25 mmol/L    Base Excess -5 (L) -4 - 3 mmol/L    Body Temp 36.3 Centigrade    O2 Therapy 1.5L    LACTIC ACID    Collection Time: 11/10/24 11:48 AM   Result Value Ref Range    Lactic Acid 4.2 (HH) 0.5 - 2.0 mmol/L   AMMONIA    Collection Time: 11/10/24 11:48 AM   Result Value Ref Range    Ammonia 62 (H) 11 - 45 umol/L   BLOOD CULTURE    Collection Time: 11/10/24 11:48 AM    Specimen: Peripheral; Blood   Result Value Ref Range    Significant Indicator NEG     Source BLD     Site PERIPHERAL     Culture Result       No Growth  Note: Blood cultures are incubated for 5 days and  are monitored continuously.Positive blood cultures  are called to the RN and reported as soon as  they are identified.     BLOOD CULTURE    Collection Time: 11/10/24 11:48 AM    Specimen: Peripheral; Blood   Result Value Ref Range    Significant Indicator NEG     Source BLD     Site Art Line     Culture Result       No Growth  Note: Blood cultures are incubated for 5 days and  are monitored continuously.Positive blood cultures  are called to the RN and reported as soon as  they are identified.     BILIRUBIN DIRECT    Collection Time: 11/10/24 11:48 AM   Result Value Ref Range    Direct Bilirubin 9.9 (H) 0.1 - 0.5 mg/dL   DIFFERENTIAL MANUAL    Collection Time: 11/10/24 11:48 AM   Result Value Ref Range    Manual Diff Status PERFORMED    PERIPHERAL SMEAR REVIEW    Collection Time: 11/10/24 11:48 AM   Result Value Ref Range    Peripheral Smear Review see below     PLATELET ESTIMATE    Collection Time: 11/10/24 11:48 AM   Result Value Ref Range    Plt Estimation Normal    MORPHOLOGY    Collection Time: 11/10/24 11:48 AM   Result Value Ref Range    RBC Morphology Present     Polychromia 1+     Poikilocytosis 2+     Target Cells 1+     Echinocytes 2+    PROCALCITONIN    Collection Time: 11/10/24 11:48 AM   Result Value Ref Range    Procalcitonin 1.44 (H) <0.25 ng/mL   LACTIC ACID    Collection Time: 11/10/24  4:10 PM   Result Value Ref Range    Lactic Acid 4.9 (HH) 0.5 - 2.0 mmol/L   MRSA By PCR (Amp)    Collection Time: 11/10/24  9:47 PM    Specimen: Nares; Respirate   Result Value Ref Range    MRSA by PCR Negative Negative   AMMONIA    Collection Time: 11/11/24  2:06 AM   Result Value Ref Range    Ammonia 43 11 - 45 umol/L   Comp Metabolic Panel    Collection Time: 11/11/24  2:06 AM   Result Value Ref Range    Sodium 135 135 - 145 mmol/L    Potassium 4.4 3.6 - 5.5 mmol/L    Chloride 101 96 - 112 mmol/L    Co2 18 (L) 20 - 33 mmol/L    Anion Gap 16.0 7.0 - 16.0    Glucose 83 65 - 99 mg/dL    Bun 41 (H) 8 - 22 mg/dL    Creatinine 1.37 0.50 - 1.40 mg/dL    Calcium 8.9 8.5 - 10.5 mg/dL    Correct Calcium 9.2 8.5 - 10.5 mg/dL    AST(SGOT) 83 (H) 12 - 45 U/L    ALT(SGPT) 26 2 - 50 U/L    Alkaline Phosphatase 83 30 - 99 U/L    Total Bilirubin 15.2 (H) 0.1 - 1.5 mg/dL    Albumin 3.6 3.2 - 4.9 g/dL    Total Protein 5.9 (L) 6.0 - 8.2 g/dL    Globulin 2.3 1.9 - 3.5 g/dL    A-G Ratio 1.6 g/dL   Magnesium    Collection Time: 11/11/24  2:06 AM   Result Value Ref Range    Magnesium 1.9 1.5 - 2.5 mg/dL   Phosphorus    Collection Time: 11/11/24  2:06 AM   Result Value Ref Range    Phosphorus 4.7 (H) 2.5 - 4.5 mg/dL   TSH WITH REFLEX TO FT4    Collection Time: 11/11/24  2:06 AM   Result Value Ref Range    TSH 0.680 0.380 - 5.330 uIU/mL   ESTIMATED GFR    Collection Time: 11/11/24  2:06 AM   Result Value Ref Range    GFR (CKD-EPI) 45 (A) >60 mL/min/1.73 m 2   CBC WITH DIFFERENTIAL    Collection  Time: 11/11/24  3:00 AM   Result Value Ref Range    WBC 23.5 (H) 4.8 - 10.8 K/uL    RBC 2.23 (L) 4.20 - 5.40 M/uL    Hemoglobin 6.5 (L) 12.0 - 16.0 g/dL    Hematocrit 19.8 (L) 37.0 - 47.0 %    MCV 88.8 81.4 - 97.8 fL    MCH 29.1 27.0 - 33.0 pg    MCHC 32.8 32.2 - 35.5 g/dL    RDW 56.5 (H) 35.9 - 50.0 fL    Platelet Count 85 (L) 164 - 446 K/uL    MPV 10.9 9.0 - 12.9 fL    Neutrophils-Polys 88.50 (H) 44.00 - 72.00 %    Lymphocytes 4.00 (L) 22.00 - 41.00 %    Monocytes 5.50 0.00 - 13.40 %    Eosinophils 0.50 0.00 - 6.90 %    Basophils 0.10 0.00 - 1.80 %    Immature Granulocytes 1.40 (H) 0.00 - 0.90 %    Nucleated RBC 0.00 0.00 - 0.20 /100 WBC    Neutrophils (Absolute) 20.81 (H) 1.82 - 7.42 K/uL    Lymphs (Absolute) 0.93 (L) 1.00 - 4.80 K/uL    Monos (Absolute) 1.30 (H) 0.00 - 0.85 K/uL    Eos (Absolute) 0.11 0.00 - 0.51 K/uL    Baso (Absolute) 0.03 0.00 - 0.12 K/uL    Immature Granulocytes (abs) 0.34 (H) 0.00 - 0.11 K/uL    NRBC (Absolute) 0.00 K/uL   HOLD BLOOD BANK SPECIMEN (NOT TESTED)    Collection Time: 11/11/24  4:02 AM   Result Value Ref Range    Holding Tube - Bb DONE        Radiology Review:  EC-ECHOCARDIOGRAM LTD W/O CONT   Final Result      DX-CHEST-PORTABLE (1 VIEW)   Final Result         1.  New significant right pleural effusion identified.      2.  Significant increase in diffuse pulmonary opacifications could be due to pulmonary edema or pneumonia.      CT-HEAD W/O   Final Result   Impression:      1. No acute process in the brain evident.      2. The skull base sinuses are clear.                  US-RENAL   Final Result      1.  No hydronephrosis.   2.  Ascites.      DX-CHEST-PORTABLE (1 VIEW)   Final Result      1.  No acute cardiac or pulmonary abnormalities are identified.      2.  Slight left basilar atelectasis.      [id number] VIEW   Final Result      1.  Several markedly dilated loops of small bowel in the mid abdomen suspicious for gastroenteritis or inflammatory change. Recommend follow-up  abdominal series to rule out evolving small bowel obstruction.      EC-ECHOCARDIOGRAM COMPLETE W/O CONT   Final Result      CT-ABDOMEN-PELVIS W/O   Final Result      1.  Dilated fluid-filled small bowel and colonic loops favoring ileus.   2.  No evidence of bowel perforation.   3.  Hepatic cirrhosis with portal hypertension and moderate to large volume ascites.   4.  Nonocclusive superior mesenteric vein thrombosis.   5.  Cholelithiasis.   6.  Bibasilar atelectasis with minimal bilateral pleural fluid.   7.  Subtle findings concerning for pulmonary emboli.      These findings were electronically conveyed to JAMES WILLIAM on 11/3/2024 11:50 AM.         US-ABDOMEN COMPLETE SURVEY   Final Result      1.  Cirrhosis with stigmata of portal hypertension including splenomegaly, recanalized umbilical vein and ascites.   2.  Gallbladder sludge and stones. Gallbladder wall thickening is nonspecific and could be related to liver disease but correlate clinically for evidence of acute cholecystitis. No biliary dilatation.         DX-CHEST-PORTABLE (1 VIEW)   Final Result      1.  Hazy left lower lobe opacity could represent atelectasis or pneumonitis.   2.  There is linear scarring or atelectasis in the right lung base.      DX-CHEST-PORTABLE (1 VIEW)    (Results Pending)     MDM (Data Review):   -Records reviewed and summarized in current documentation  -I personally reviewed and interpreted the laboratory results  -I personally reviewed the radiology images    Assessment/Recommendations:  Acute liver failure- MELD 3.0- 38 on arrival (10/31/2024); 26.8% 90-day survival. Child-Garza Class C  SMV thrombosis  Portal hypertensive gastropathy  Ileus  Cirrhosis with ascites  SBP with Salmonella  Hepatic encephalopathy  Coagulopathy  Esophageal varices with banding in situ  Hematemesis  Severe protein calorie malnutrition  History of decompensated liver disease with variceal bleeding, refractory ascites  Salmonella  bacteremia - pending review by State lab  Sepsis  EFRAIN HRS  Respiratory alkalosis  Vitamin D deficiency  Negative PETH  Pneumonia and right pleural effusion  Hyperdynamic left ventricular systolic function of 75%, LVOT obstruction  Elevated INR   Hyperammonemia  Lactic acidosis  Elevated LDH    Plan:  11/5/2024  Significant clinical decompensation overnight.  Consistent with multiorgan failure.  Discussed with [facility] hepatology who have agreed to accept pending ICU acceptance.  Attended lengthy bedside discussion with family in collaboration with Dr. Solorzano.  Randa shows DNR/DNI when she was alert.  Significant concern that she would not be able to transfer to [facility] without being intubated  Family discussing amongst themselves regarding next steps: reverse DNI/DNR and pursue transfer vs comfort care vs home with hospice    Completed 72 hours Octreotide  Low sodium diet  Continue PPI IV twice daily  Continue ceftriaxone for SBP and salmonella bacteremia  Continue lactulose and Rifaximin    Heparin for SMV thrombosis/ possible PE  Serial abdominal exams    11/6/2024  [facility] for transplant consideration  Now with EFRAIN  Continue IV fluids, obtain UA and renal ultrasound  Check am creatinine, if no improvement start 1 G/kg albumin x 3 days    11/7/2024:  UA with casts, renal US negative  Creatinine improved on fluids and IV albumin   Will continue current plan of care    11/8/2024:  Full code status  Consider Vitamin D supplementation, previously deficient  Start lasix/spironolactone 20mg/50mg and uptitrate   Paracentesis ordered  Decreased lactulose dose and frequency  Transfer request cancelled, patient is clinically improving. Discussion with patient and her family bedside that she does not meet criteria for acute transfer. Recommended the following:  Infection must be treated   Optimize functional status  Optimize nutrtion  Continue to seek out opportunities for psychological and mental health  support for addiction  She has very supportive family to include her parents, brother, and son  Has an appointment with [facility] hepatology in January 11/9/2024:  Hold Eliquis given elevated INR and abnormal TEG, recheck in the morning and consider vitamin K or Kcentra to reverse Eliquis  Monitor closely for bleeding  Increase Lasix to 40 mg spironolactone to 100 mg  Continue rifaximin and lactulose titrated to 2-3 bowel movements daily  Pending diagnostic paracentesis to determine duration of antibiotic therapy  Encourage out of bed, adequate nutrition, and sleep-wake cycles to avoid delirium    11/10/2024:  Now septic, ceftriaxone restarted for pneumonia.  LDH and INR could be elevated secondary to sepsis versus worsening liver disease  Infectious disease consult placed  Follow results of echocardiogram to rule out endocarditis  Continue to hold Eliquis and recheck INR in a.m.  Would not recommend reversing due to her hypercoagulable state and presence of nonobstructive SMV thrombus and possible PE  Hold diuretics in the setting of worsening creatinine  Rectal lactulose titrated to 2-3 bowel movements a day  Follow haptoglobin    11/11/2024:  Transferred to ICU.  On Zosyn for pneumonia  Encephalopathy persists, continues on rectal lactulose with BMS  Now on 10L oxymask  Kidney function worsening, holding diuretics   Ascites persists  INR now 7.15 s/p FFP and cryo  Transfused one unit PRBC this am  Per RN [facility] has accepted pending bed availability    Long term if patient survives admission:   Patient will need repeat EGD in 4-6 weeks  Nonselective beta-blocker as outpatient    GI will follow    Discussed with patient' family, nursing, Dr. Hanson, Dr. Vences    ..Oumou Mart, CHANDANA,  APRN    Core Quality Measures   Reviewed items::  Labs, Medications and Radiology reports reviewed

## 2024-11-11 NOTE — PROGRESS NOTES
Pt lethargic, a/o x2. Pt responds to voice, unable to stay awake. Pt back from head CT. MD Mcfarlane and ROMAN Barron at bedside.   Orders in place. Labs sent, blood cultures collected.

## 2024-11-11 NOTE — WOUND TEAM
"Wound consult placed on 11/11/24 for BMS placement. Confirmed \"Insert rectal tube\" in place. Confirmed Rectal tube placed by super-user and appropriate LDA opened. This RN confirmed/placed \"rectal tube care\" order. Wound consult then completed and pt on appropriate follow up lists.       "

## 2024-11-11 NOTE — CONSULTS
Critical Care Consultation    Date of consult: 11/10/2024    Referring Physician  Clem Mcfarlane DO    Reason for Consultation  Sepsis, hepatic encephalopathy     History of Presenting Illness  58yo PMHx ETOH abuse sober x 4 months, alcoholic cirrhosis, esophageal varices s/p banding admitted 10/31 after requesting a  paracentesis and with fever/chills. In ED WBCs 45, Na 117, CO2 16, AG 20, creat 2.1. Para done in ED; cloudy fluid noted with nucleated cells >34k which eventually grew salmonella sp with associated bacteremia.    11/10/24 critical care was consulted for rising LA, worsening encephalopathy, and CXR concerning for PNA. The patient is altered and unable to provide any meaningful history at this time.     Code Status  Full Code    Review of Systems  Review of Systems   Unable to perform ROS: Critical illness       Past Medical History   has a past medical history of Acute cystitis with hematuria (09/21/2016), Acute kidney injury (HCC) (06/14/2024), Allergy, Anxiety, Arrhythmia, Back pain, Depression, Depression, GIB (gastrointestinal bleeding) (08/14/2024), Hyperlipidemia, Hypertension, Hypomagnesemia (08/14/2024), Hypotension (07/15/2016), Indigestion, Lightheadedness (12/17/2015), Macrocytosis (10/26/2016), Necrotizing fasciitis ([facility]) (06/14/2024), Other chest pain (12/17/2015), Pain (02/24/2015), Perirectal abscess (06/14/2024), PSVT (paroxysmal supraventricular tachycardia) ([facility]) (06/24/2013), Psychiatric disorder, Septic shock ([facility]) (06/14/2024), Thrombocytopenia ([facility]) (06/27/2024), Tobacco abuse (05/03/2013), URI (upper respiratory infection) (09/21/2013), Urinary tract infection, site not specified, UTI (lower urinary tract infection) (09/21/2013), and Wound check, abscess (06/27/2024).    She has no past medical history of Breast cancer ([facility]), Chronic airway obstruction, not elsewhere classified, Diabetes, Fall, Thyroid disease, or Unspecified asthma(493.90).    Surgical History   has a past  surgical history that includes breast biopsy (7/18/08); shoulder arthroscopy (3/23/2009); tubal ligation; gyn surgery (1995); shoulder arthroscopy (2005); other (6/2014); shoulder arthroscopy w/ rotator cuff repair (3/9/2015); trigger finger release (3/9/2015); open reduction; pr i&d perirectal abscess (6/14/2024); pr i&d perirectal abscess (N/A, 6/17/2024); pr upper gi endoscopy,diagnosis (N/A, 8/15/2024); pr upper gi endoscopy,ligat varix (N/A, 8/15/2024); and pr upper gi endoscopy,diagnosis (N/A, 11/1/2024).    Family History  family history includes Cancer in her paternal grandmother; Hypertension in her brother, father, and mother; Stroke in her mother.    Social History   reports that she has been smoking cigarettes. She started smoking about 30 years ago. She has a 12.5 pack-year smoking history. She has never used smokeless tobacco. She reports that she does not currently use drugs. She reports that she does not drink alcohol.    Medications  Home Medications       Reviewed by Celine Bustamante R.N. (Registered Nurse) on 11/01/24 at 0917  Med List Status: Complete     Medication Last Dose Status   acetaminophen (TYLENOL) 500 MG Tab 10/30/2024 Active   FLUoxetine (PROZAC) 10 MG Cap 10/30/2024 Active   gabapentin (NEURONTIN) 100 MG Cap 10/30/2024 Active   hydrOXYzine HCl (ATARAX) 25 MG Tab 10/30/2024 Active   lactulose (CONSTULOSE) 10 GM/15ML Solution 10/30/2024 Active   lidocaine (ASPERFLEX) 4 % Patch 10/30/2024 Active   omeprazole (PRILOSEC) 20 MG delayed-release capsule 10/30/2024 Active   predniSONE (DELTASONE) 20 MG Tab 10/29/2024 Active   sucralfate (CARAFATE) 1 GM/10ML Suspension 10/30/2024 Active                  Audit from Redirected Encounters    **Home medications have not yet been reviewed for this encounter**       Current Facility-Administered Medications   Medication Dose Route Frequency Provider Last Rate Last Admin    lactulose 10 GM/15ML solution 300 mL  300 mL Rectal Q6HRS Oumou SPARKS  Barron, DNP,  APRN   300 mL at 11/10/24 1756    piperacillin-tazobactam (Zosyn) 4.5 g in  mL IVPB  4.5 g Intravenous Q8HRS Shahzad Jenkins M.D.        Linezolid (Zyvox) premix 600 mg  600 mg Intravenous Q12HRS Shahzad Jenkins M.D. 300 mL/hr at 11/10/24 2011 600 mg at 11/10/24 2011    midodrine (Proamatine) tablet 10 mg  10 mg Oral Q8HRS Shahzad Jenkins M.D.        thiamine (B-1) injection 200 mg  200 mg Intravenous DAILY Shahzad Jenkins M.D.   200 mg at 11/10/24 2134    phytonadione (Aqua-Mephyton) 10 mg in NS 50 mL IVPB  10 mg Intravenous Once Shahzad Jenkins M.D.        omeprazole (PriLOSEC) capsule 20 mg  20 mg Oral BID Claudia Solorzano M.D.   20 mg at 11/10/24 1734    riFAXIMin (Xifaxan) tablet 550 mg  550 mg Oral BID Quang Durham, D.O.   550 mg at 11/10/24 1734    acetaminophen (Tylenol) tablet 650 mg  650 mg Oral Q6HRS PRN Adilia Gonzales, D.O.        ondansetron (Zofran) syringe/vial injection 4 mg  4 mg Intravenous Q4HRS PRN Adilia Gonzales, D.O.   4 mg at 11/04/24 1019    ondansetron (Zofran ODT) dispertab 4 mg  4 mg Oral Q4HRS PRN Adilia Gonzales, D.O.        promethazine (Phenergan) tablet 12.5-25 mg  12.5-25 mg Oral Q4HRS PRN Adilia Gonzales, D.O.   25 mg at 11/04/24 1116    promethazine (Phenergan) suppository 12.5-25 mg  12.5-25 mg Rectal Q4HRS PRN Adilia Gonzales, D.O.        prochlorperazine (Compazine) injection 5-10 mg  5-10 mg Intravenous Q4HRS PRN Adilia Gonzales, D.O.   10 mg at 11/04/24 1228    dextrose 50% (D50W) injection 25 g  25 g Intravenous Q15 MIN PRN TRUDY RazaOSandy   25 g at 11/01/24 1843    FLUoxetine (PROzac) capsule 10 mg  10 mg Oral QAM TRUDY RazaOSandy   10 mg at 11/10/24 0532    lidocaine (Asperflex) 4 % patch 1 Patch  1 Patch Transdermal Q24HRS Adilia Gonzales D.O.   1 Patch at 11/09/24 0526       Allergies  Allergies   Allergen Reactions    Nitrofurantoin Vomiting    Sulfamethoxazole W-Trimethoprim Vomiting       Vital Signs last 24  hours  Temp:  [36.3 °C (97.3 °F)-37.5 °C (99.5 °F)] (P) 36.3 °C (97.3 °F)  Pulse:  [] (P) 99  Resp:  [20-31] (P) 25  BP: ()/(63-74) 118/71  SpO2:  [89 %-96 %] (P) 90 %    Physical Exam  Physical Exam  Vitals and nursing note reviewed.   Constitutional:       Appearance: She is toxic-appearing.   HENT:      Head: Normocephalic and atraumatic.      Nose: Nose normal.      Mouth/Throat:      Mouth: Mucous membranes are moist.      Comments: Dried blood in mouth  Eyes:      Pupils: Pupils are equal, round, and reactive to light.   Cardiovascular:      Rate and Rhythm: Tachycardia present.      Pulses: Normal pulses.   Pulmonary:      Comments: Mild tachypnea, fair air movement   Abdominal:      General: There is distension.      Palpations: Abdomen is soft.      Tenderness: There is no abdominal tenderness. There is no guarding or rebound.   Musculoskeletal:      Right lower leg: No edema.      Left lower leg: No edema.   Skin:     General: Skin is warm and dry.      Capillary Refill: Capillary refill takes less than 2 seconds.      Coloration: Skin is jaundiced.   Neurological:      Mental Status: She is alert.      Comments: Awake, alert, confused, moves extremities          Fluids    Intake/Output Summary (Last 24 hours) at 11/10/2024 2141  Last data filed at 11/10/2024 2000  Gross per 24 hour   Intake 763.76 ml   Output 575 ml   Net 188.76 ml       Laboratory  Recent Results (from the past 48 hours)   PHOSPHORUS    Collection Time: 11/09/24  2:05 AM   Result Value Ref Range    Phosphorus 2.3 (L) 2.5 - 4.5 mg/dL   MAGNESIUM    Collection Time: 11/09/24  2:05 AM   Result Value Ref Range    Magnesium 1.6 1.5 - 2.5 mg/dL   Comp Metabolic Panel    Collection Time: 11/09/24  2:05 AM   Result Value Ref Range    Sodium 130 (L) 135 - 145 mmol/L    Potassium 3.8 3.6 - 5.5 mmol/L    Chloride 97 96 - 112 mmol/L    Co2 20 20 - 33 mmol/L    Anion Gap 13.0 7.0 - 16.0    Glucose 69 65 - 99 mg/dL    Bun 22 8 - 22 mg/dL     Creatinine 0.28 (L) 0.50 - 1.40 mg/dL    Calcium 8.5 8.5 - 10.5 mg/dL    Correct Calcium 9.4 8.5 - 10.5 mg/dL    AST(SGOT) 80 (H) 12 - 45 U/L    ALT(SGPT) 32 2 - 50 U/L    Alkaline Phosphatase 117 (H) 30 - 99 U/L    Total Bilirubin 13.1 (H) 0.1 - 1.5 mg/dL    Albumin 2.9 (L) 3.2 - 4.9 g/dL    Total Protein 5.7 (L) 6.0 - 8.2 g/dL    Globulin 2.8 1.9 - 3.5 g/dL    A-G Ratio 1.0 g/dL   CBC WITHOUT DIFFERENTIAL    Collection Time: 11/09/24  2:05 AM   Result Value Ref Range    WBC 20.3 (H) 4.8 - 10.8 K/uL    RBC 3.30 (L) 4.20 - 5.40 M/uL    Hemoglobin 9.5 (L) 12.0 - 16.0 g/dL    Hematocrit 28.5 (L) 37.0 - 47.0 %    MCV 86.4 81.4 - 97.8 fL    MCH 28.8 27.0 - 33.0 pg    MCHC 33.3 32.2 - 35.5 g/dL    RDW 57.0 (H) 35.9 - 50.0 fL    Platelet Count 91 (L) 164 - 446 K/uL    MPV 10.8 9.0 - 12.9 fL   IMMATURE PLT FRACTION    Collection Time: 11/09/24  2:05 AM   Result Value Ref Range    Imm. Plt Fraction 7.0 0.6 - 13.1 %   ESTIMATED GFR    Collection Time: 11/09/24  2:05 AM   Result Value Ref Range    GFR (CKD-EPI) 125 >60 mL/min/1.73 m 2   Prothrombin Time    Collection Time: 11/09/24  9:30 AM   Result Value Ref Range    .8 (HH) 12.0 - 14.6 sec    INR >=10.00 (HH) 0.87 - 1.13   APTT    Collection Time: 11/09/24  9:30 AM   Result Value Ref Range    APTT 89.7 (H) 24.7 - 36.0 sec   PLATELET MAPPING WITH BASIC TEG    Collection Time: 11/09/24 10:55 AM   Result Value Ref Range    Reaction Time Initial-R 16.0 (H) 4.6 - 9.1 min    React Time Initial Hep 16.3 (H) 4.3 - 8.3 min    Clot Kinetics-K 1.3 0.8 - 2.1 min    Clot Angle-Angle 72.6 63.0 - 78.0 degrees    Maximum Clot Strength-MA 51.9 (L) 52.0 - 69.0 mm    TEG Functional Fibrinogen(MA) 19.2 15.0 - 32.0 mm    Lysis 30 minutes-LY30 0.8 0.0 - 2.6 %    % Inhibition ADP 52.2 (H) 0.0 - 17.0 %    % Inhibition AA 5.2 0.0 - 11.0 %    TEG Algorithm Link Algorithm    FLUID CULTURE W/GRAM STAIN    Collection Time: 11/09/24 10:16 PM    Specimen: Ascities Fluid; Body Fluid   Result  Value Ref Range    Significant Indicator NEG     Source BF     Site ASCITIES FLUID     Culture Result No growth at 24 hours.     Gram Stain Result No organisms seen.    GRAM STAIN    Collection Time: 11/09/24 10:16 PM    Specimen: Body Fluid   Result Value Ref Range    Significant Indicator .     Source BF     Site ASCITIES FLUID     Gram Stain Result No organisms seen.    Fluid Cell Count    Collection Time: 11/09/24 10:18 PM   Result Value Ref Range    Fluid Type Ascites     Color-Body Fluid Yellow     Character-Body Fluid Hazy     Total RBC Count 6000 cells/uL    FL Total Nucleated Cells 1175 cells/uL    Polys 8 %    Lymphs 81 %    Fl Mono Macrophages 11 %   FLUID ALBUMIN    Collection Time: 11/09/24 10:18 PM   Result Value Ref Range    Fluid Type Ascites     Albumin 0.9 g/dL   FLUID TOTAL PROTEIN    Collection Time: 11/09/24 10:18 PM   Result Value Ref Range    Total Protein Fluid 1.6 g/dL   PHOSPHORUS    Collection Time: 11/10/24 12:50 AM   Result Value Ref Range    Phosphorus 3.7 2.5 - 4.5 mg/dL   MAGNESIUM    Collection Time: 11/10/24 12:50 AM   Result Value Ref Range    Magnesium 2.1 1.5 - 2.5 mg/dL   Comp Metabolic Panel    Collection Time: 11/10/24 12:50 AM   Result Value Ref Range    Sodium 130 (L) 135 - 145 mmol/L    Potassium 4.7 3.6 - 5.5 mmol/L    Chloride 99 96 - 112 mmol/L    Co2 18 (L) 20 - 33 mmol/L    Anion Gap 13.0 7.0 - 16.0    Glucose 114 (H) 65 - 99 mg/dL    Bun 31 (H) 8 - 22 mg/dL    Creatinine 1.09 0.50 - 1.40 mg/dL    Calcium 8.8 8.5 - 10.5 mg/dL    Correct Calcium 9.7 8.5 - 10.5 mg/dL    AST(SGOT) 90 (H) 12 - 45 U/L    ALT(SGPT) 38 2 - 50 U/L    Alkaline Phosphatase 124 (H) 30 - 99 U/L    Total Bilirubin 16.2 (H) 0.1 - 1.5 mg/dL    Albumin 2.9 (L) 3.2 - 4.9 g/dL    Total Protein 6.0 6.0 - 8.2 g/dL    Globulin 3.1 1.9 - 3.5 g/dL    A-G Ratio 0.9 g/dL   Prothrombin Time    Collection Time: 11/10/24 12:50 AM   Result Value Ref Range    PT 82.8 () 12.0 - 14.6 sec    INR >=10.00 () 0.87 -  1.13   PLATELET MAPPING WITH BASIC TEG    Collection Time: 11/10/24 12:50 AM   Result Value Ref Range    Reaction Time Initial-R 13.4 (H) 4.6 - 9.1 min    React Time Initial Hep 14.1 (H) 4.3 - 8.3 min    Clot Kinetics-K 1.2 0.8 - 2.1 min    Clot Angle-Angle 73.4 63.0 - 78.0 degrees    Maximum Clot Strength-MA 55.6 52.0 - 69.0 mm    TEG Functional Fibrinogen(MA) 21.6 15.0 - 32.0 mm    Lysis 30 minutes-LY30 0.6 0.0 - 2.6 %    % Inhibition ADP 27.1 (H) 0.0 - 17.0 %    % Inhibition AA 9.8 0.0 - 11.0 %    TEG Algorithm Link Algorithm    HOLD BLOOD BANK SPECIMEN (NOT TESTED)    Collection Time: 11/10/24 12:50 AM   Result Value Ref Range    Holding Tube - Bb DONE    ESTIMATED GFR    Collection Time: 11/10/24 12:50 AM   Result Value Ref Range    GFR (CKD-EPI) 59 (A) >60 mL/min/1.73 m 2   CBC WITHOUT DIFFERENTIAL    Collection Time: 11/10/24  1:50 AM   Result Value Ref Range    WBC 30.7 (H) 4.8 - 10.8 K/uL    RBC 3.11 (L) 4.20 - 5.40 M/uL    Hemoglobin 9.1 (L) 12.0 - 16.0 g/dL    Hematocrit 26.8 (L) 37.0 - 47.0 %    MCV 86.2 81.4 - 97.8 fL    MCH 29.3 27.0 - 33.0 pg    MCHC 34.0 32.2 - 35.5 g/dL    RDW 54.8 (H) 35.9 - 50.0 fL    Platelet Count 177 164 - 446 K/uL    MPV 10.6 9.0 - 12.9 fL   Prothrombin Time    Collection Time: 11/10/24  8:14 AM   Result Value Ref Range    PT 77.9 (HH) 12.0 - 14.6 sec    INR 9.53 (HH) 0.87 - 1.13   LDH    Collection Time: 11/10/24 11:48 AM   Result Value Ref Range    LDH Total 600 (H) 107 - 266 U/L   CBC WITH DIFFERENTIAL    Collection Time: 11/10/24 11:48 AM   Result Value Ref Range    WBC 33.3 (H) 4.8 - 10.8 K/uL    RBC 2.98 (L) 4.20 - 5.40 M/uL    Hemoglobin 8.8 (L) 12.0 - 16.0 g/dL    Hematocrit 26.2 (L) 37.0 - 47.0 %    MCV 87.9 81.4 - 97.8 fL    MCH 29.5 27.0 - 33.0 pg    MCHC 33.6 32.2 - 35.5 g/dL    RDW 55.5 (H) 35.9 - 50.0 fL    Platelet Count 179 164 - 446 K/uL    MPV 10.3 9.0 - 12.9 fL    Neutrophils-Polys 98.20 (H) 44.00 - 72.00 %    Lymphocytes 0.00 (L) 22.00 - 41.00 %     Monocytes 1.80 0.00 - 13.40 %    Eosinophils 0.00 0.00 - 6.90 %    Basophils 0.00 0.00 - 1.80 %    Nucleated RBC 0.10 0.00 - 0.20 /100 WBC    Neutrophils (Absolute) 32.70 (H) 1.82 - 7.42 K/uL    Lymphs (Absolute) 0.00 (L) 1.00 - 4.80 K/uL    Monos (Absolute) 0.60 0.00 - 0.85 K/uL    Eos (Absolute) 0.00 0.00 - 0.51 K/uL    Baso (Absolute) 0.00 0.00 - 0.12 K/uL    NRBC (Absolute) 0.02 K/uL    Anisocytosis 1+     Macrocytosis 1+    RETICULOCYTES COUNT    Collection Time: 11/10/24 11:48 AM   Result Value Ref Range    Reticulocyte Count 5.7 (H) 0.8 - 2.6 %    Retic, Absolute 0.17 (H) 0.04 - 0.12 M/uL    Imm. Reticulocyte Fraction 28.5 (H) 2.6 - 16.1 %    Retic Hgb Equivalent 38.2 (H) 29.0 - 35.0 pg/cell   ABG - LAB    Collection Time: 11/10/24 11:48 AM   Result Value Ref Range    Ph 7.47 7.40 - 7.50    Pco2 24.4 (L) 26.0 - 37.0 mmHg    Po2 57.8 (L) 64.0 - 87.0 mmHg    O2 Saturation 87.1 (L) 93.0 - 99.0 %    Hco3 18 17 - 25 mmol/L    Base Excess -5 (L) -4 - 3 mmol/L    Body Temp 36.3 Centigrade    O2 Therapy 1.5L    LACTIC ACID    Collection Time: 11/10/24 11:48 AM   Result Value Ref Range    Lactic Acid 4.2 (HH) 0.5 - 2.0 mmol/L   AMMONIA    Collection Time: 11/10/24 11:48 AM   Result Value Ref Range    Ammonia 62 (H) 11 - 45 umol/L   BLOOD CULTURE    Collection Time: 11/10/24 11:48 AM    Specimen: Peripheral; Blood   Result Value Ref Range    Significant Indicator NEG     Source BLD     Site PERIPHERAL     Culture Result       No Growth  Note: Blood cultures are incubated for 5 days and  are monitored continuously.Positive blood cultures  are called to the RN and reported as soon as  they are identified.     BLOOD CULTURE    Collection Time: 11/10/24 11:48 AM    Specimen: Peripheral; Blood   Result Value Ref Range    Significant Indicator NEG     Source BLD     Site Art Line     Culture Result       No Growth  Note: Blood cultures are incubated for 5 days and  are monitored continuously.Positive blood cultures  are  called to the RN and reported as soon as  they are identified.     BILIRUBIN DIRECT    Collection Time: 11/10/24 11:48 AM   Result Value Ref Range    Direct Bilirubin 9.9 (H) 0.1 - 0.5 mg/dL   DIFFERENTIAL MANUAL    Collection Time: 11/10/24 11:48 AM   Result Value Ref Range    Manual Diff Status PERFORMED    PERIPHERAL SMEAR REVIEW    Collection Time: 11/10/24 11:48 AM   Result Value Ref Range    Peripheral Smear Review see below    PLATELET ESTIMATE    Collection Time: 11/10/24 11:48 AM   Result Value Ref Range    Plt Estimation Normal    MORPHOLOGY    Collection Time: 11/10/24 11:48 AM   Result Value Ref Range    RBC Morphology Present     Polychromia 1+     Poikilocytosis 2+     Target Cells 1+     Echinocytes 2+    PROCALCITONIN    Collection Time: 11/10/24 11:48 AM   Result Value Ref Range    Procalcitonin 1.44 (H) <0.25 ng/mL   LACTIC ACID    Collection Time: 11/10/24  4:10 PM   Result Value Ref Range    Lactic Acid 4.9 (HH) 0.5 - 2.0 mmol/L       Imaging  EC-ECHOCARDIOGRAM LTD W/O CONT   Final Result      DX-CHEST-PORTABLE (1 VIEW)   Final Result         1.  New significant right pleural effusion identified.      2.  Significant increase in diffuse pulmonary opacifications could be due to pulmonary edema or pneumonia.      CT-HEAD W/O   Final Result   Impression:      1. No acute process in the brain evident.      2. The skull base sinuses are clear.                  US-RENAL   Final Result      1.  No hydronephrosis.   2.  Ascites.      DX-CHEST-PORTABLE (1 VIEW)   Final Result      1.  No acute cardiac or pulmonary abnormalities are identified.      2.  Slight left basilar atelectasis.      [id number] VIEW   Final Result      1.  Several markedly dilated loops of small bowel in the mid abdomen suspicious for gastroenteritis or inflammatory change. Recommend follow-up abdominal series to rule out evolving small bowel obstruction.      EC-ECHOCARDIOGRAM COMPLETE W/O CONT   Final Result       CT-ABDOMEN-PELVIS W/O   Final Result      1.  Dilated fluid-filled small bowel and colonic loops favoring ileus.   2.  No evidence of bowel perforation.   3.  Hepatic cirrhosis with portal hypertension and moderate to large volume ascites.   4.  Nonocclusive superior mesenteric vein thrombosis.   5.  Cholelithiasis.   6.  Bibasilar atelectasis with minimal bilateral pleural fluid.   7.  Subtle findings concerning for pulmonary emboli.      These findings were electronically conveyed to JAMES WILLIAM on 11/3/2024 11:50 AM.         US-ABDOMEN COMPLETE SURVEY   Final Result      1.  Cirrhosis with stigmata of portal hypertension including splenomegaly, recanalized umbilical vein and ascites.   2.  Gallbladder sludge and stones. Gallbladder wall thickening is nonspecific and could be related to liver disease but correlate clinically for evidence of acute cholecystitis. No biliary dilatation.         DX-CHEST-PORTABLE (1 VIEW)   Final Result      1.  Hazy left lower lobe opacity could represent atelectasis or pneumonitis.   2.  There is linear scarring or atelectasis in the right lung base.          Assessment/Plan  * Sepsis (HCC)- (present on admission)  Assessment & Plan  This is Sepsis Present on admission  SIRS criteria identified on my evaluation include: Tachycardia, with heart rate greater than 90 BPM and Tachypnea, with respirations greater than 20 per minute  Clinical indicators of end organ dysfunction include Lactic Acid greater than 2 and Toxic Metabolic Encephalopathy  Source is PNA  Sepsis protocol initiated  Crystalloid Fluid Administration: Resuscitation volume of 20 cc/kg ordered. Reason that resuscitation volume of less than 30ml/kg was ordered concern for fluid overload  IV antibiotics as appropriate for source of sepsis  Reassessment: I have reassessed the patient's hemodynamic status    Empiric linezolid + zosyn  Follow cultures  Trend lactic acid    Acute respiratory failure with  hypoxia (HCC)  Assessment & Plan  Secondary to PNA and aspiration    HFNC  Empiric antibiotics  SpO2 > 90%    Systolic anterior movement of mitral valve  Assessment & Plan  Based upon ECHO 11/10/24   Very high risk for obstructive shock with hypovolemia and tachycardia     Maintain euvolemia  Neosynephrine for vasopressor if needed     Pneumonia  Assessment & Plan  Admitted to the ICU 11/10/24 for elevated LA, worsening CXR    SpO2 > 90%  Empiric linezolid + zosyn  MRSA nare  Follow cultures     SBP (spontaneous bacterial peritonitis) ([facility])  Assessment & Plan  Salmonella sp with associated bacteremia based upon cultures from 10/31    Elevated lactic acid level  Assessment & Plan  Multifactorial: cirrhosis, sepsis, malnutrition    Thiamine  Resuscitation with albumin  Empiric antibiotics  Trend    Elevated INR  Assessment & Plan  See coagulopathy plan    Superior mesenteric vein thrombosis (HCC)  Assessment & Plan  New finding on CT 11/3     Unable to administer AC at this time given coagulopathy and clinical signs of bleeding (mouth/oropharynx)    Severe protein-calorie malnutrition ([place]: less than 60% of standard weight) ([facility])  Assessment & Plan  Thiamine replacement  Optimize nutrition as able    Advance care planning- (present on admission)  Assessment & Plan  She and family reverted to full code status during this hospitalization  Pall med consult     Hepatic encephalopathy (HCC)- (present on admission)  Assessment & Plan  Lactulose  Rifaximin   Optimize acid/base and electrolytes  Maintain euvolemia    Coagulopathy (HCC)- (present on admission)  Assessment & Plan  Secondary to liver failure  Hold VTE for now  Discontinue DOAC  Vitamin K supplementing  Trend    Anemia- (present on admission)  Assessment & Plan  Chronic    Monitor for active hemorrhage  Conservative transfusion protocol  Trend    Acute renal failure (ARF) (HCC)- (present on admission)  Assessment & Plan  Ischemic ATN + sepsis + HRS    Albumin  resuscitation  Strict I/Os  Renally dosed medications  Avoid nephrotoxic agents as able  Trend     Alcoholic cirrhosis of liver with ascites (HCC)- (present on admission)  Assessment & Plan  Incredibly guarded prognosis     Avoid hepatotoxins  Supportive care  Trend labs    Essential hypertension- (present on admission)  Assessment & Plan  Reintroduce oral antihypertensives when appropriate          Discussed patient condition and risk of morbidity and/or mortality with RN, RT, Pharmacy, Charge nurse / hot rounds, and Patient.    The patient remains critically ill.  Critical care time = 110 minutes in directly providing and coordinating critical care and extensive data review.  No time overlap and excludes procedures.

## 2024-11-11 NOTE — CARE PLAN
Problem: Pain - Standard  Goal: Alleviation of pain or a reduction in pain to the patient’s comfort goal  Description: Target End Date:  Prior to discharge or change in level of care    Document on Vitals flowsheet    1.  Document pain using the appropriate pain scale per order or unit policy  2.  Educate and implement non-pharmacologic comfort measures (i.e. relaxation, distraction, massage, cold/heat therapy, etc.)  3.  Pain management medications as ordered  4.  Reassess pain after pain med administration per policy  5.  If opiods administered assess patient's response to pain medication is appropriate per POSS sedation scale  6.  Follow pain management plan developed in collaboration with patient and interdisciplinary team (including palliative care or pain specialists if applicable)  Outcome: Progressing     Problem: Knowledge Deficit - Standard  Goal: Patient and family/care givers will demonstrate understanding of plan of care, disease process/condition, diagnostic tests and medications  Description: Target End Date:  1-3 days or as soon as patient condition allows    Document in Patient Education    1.  Patient and family/caregiver oriented to unit, equipment, visitation policy and means for communicating concern  2.  Complete/review Learning Assessment  3.  Assess knowledge level of disease process/condition, treatment plan, diagnostic tests and medications  4.  Explain disease process/condition, treatment plan, diagnostic tests and medications  Outcome: Progressing     Problem: Hemodynamics  Goal: Patient's hemodynamics, fluid balance and neurologic status will be stable or improve  Description: Target End Date:  Prior to discharge or change in level of care    Document on Assessment and I/O flowsheet templates    1.  Monitor vital signs, pulse oximetry and cardiac monitor per provider order and/or policy  2.  Maintain blood pressure per provider order  3.  Hemodynamic monitoring per provider order  4.   Manage IV fluids and IV infusions  5.  Monitor intake and output  6.  Daily weights per unit policy or provider order  7.  Assess peripheral pulses and capillary refill  8.  Assess color and body temperature  9.  Position patient for maximum circulation/cardiac output  10. Monitor for signs/symptoms of excessive bleeding  11. Assess mental status, restlessness and changes in level of consciousness  12. Monitor temperature and report fever or hypothermia to provider immediately. Consideration of targeted temperature management.  Outcome: Progressing     Problem: Fluid Volume  Goal: Fluid volume balance will be maintained  Description: Target End Date:  Prior to discharge or change in level of care    Document on I/O flowsheet    1.  Monitor intake and output as ordered  2.  Promote oral intake as appropriate  3.  Report inadequate intake or output to physician  4.  Administer IV therapy as ordered  5.  Weights per provider order  6.  Assess for signs and symptoms of bleeding  7.  Monitor for signs of fluid overload (respiratory changes, edema, weight gain, increased abdominal girth)  8.  Monitor of signs for inadequate fluid volume (poor skin turgor, dry mucous membranes)  9.  Instruct patient on adherence to fluid restrictions  Outcome: Progressing     Problem: Urinary - Renal Perfusion  Goal: Ability to achieve and maintain adequate renal perfusion and functioning will improve  Description: Target End Date:  Prior to discharge or change in level of care    Document on I/O and Assessment flowsheet    1.  Urine output will remain greater than 0.5ml/Kg/HR  2.  Monitor amount and/or characteristics of urine per order/policy. Specific gravity per order/policy  3.  Assess signs and symptoms of renal dysfunction  Outcome: Progressing     Problem: Respiratory  Goal: Patient will achieve/maintain optimum respiratory ventilation and gas exchange  Description: Target End Date:  Prior to discharge or change in level of  care    Document on Assessment flowsheet    1.  Assess and monitor rate, rhythm, depth and effort of respiration  2.  Breath sounds assessed qshift and/or as needed  3.  Assess O2 saturation, administer/titrate oxygen as ordered  4.  Position patient for maximum ventilatory efficiency  5.  Turn, cough, and deep breath with splinting to improve effectiveness  6.  Collaborate with RT to administer medication/treatments per order  7.  Encourage use of incentive spirometer and encourage patient to cough after use and utilize splinting techniques if applicable  8.  Airway suctioning  9.  Monitor sputum production for changes in color, consistency and frequency  10. Perform frequent oral hygiene  11. Alternate physical activity with rest periods  Outcome: Progressing   The patient is Watcher - Medium risk of patient condition declining or worsening    Shift Goals  Clinical Goals: Monitor neuro, Trend labs, monitor for bleeding  Patient Goals: Rest  Family Goals: JESUSITA    Progress made toward(s) clinical / shift goals:  Progress toward goals as stated above.    Patient is not progressing towards the following goals:

## 2024-11-11 NOTE — PROGRESS NOTES
Informed Hb came back at 6.5  No evidence of bleeding as of yet.  This is in the setting of improving hemodynamics and mental status.    Has recently received a significant amount of albumin  (~ 100gms) so possibly related to hemodilution.     Will give 1 U PRBC to be safe, in the event there is occult bleeding, as she is at risk.     Has active T/S as of today.     -Q 4 CBC for a few cycles to be sure here counts stabilize

## 2024-11-11 NOTE — ASSESSMENT & PLAN NOTE
Resolved 11/12 but continue to closely monitor -> worsened again with new EFRAIN and acidosis  Avoid nephrotoxins  Midodrine for map > 70  Give bicarb for fluid resuscitation

## 2024-11-11 NOTE — ASSESSMENT & PLAN NOTE
Acute on chronic goal hg >7  Monitor for active hemorrhage and monitor and serial correct coagulopathy  Conservative transfusion protocol

## 2024-11-11 NOTE — ASSESSMENT & PLAN NOTE
Secondary to liver failure  Last dose of Eliquis 11/9  Follow Teg w/ platelet mapping, INR and cryo  Monitor for source of spontaneous hemorrhage  Correct and monitor calcium  Prognosis poor  Improved 11/12 s/p 3 FFP and cyro

## 2024-11-11 NOTE — ASSESSMENT & PLAN NOTE
Based upon ECHO 11/10/24     Maintain euvolemia  Neosynephrine for vasopressor if needed  Avoid hypovolemia and tachycardia, as able

## 2024-11-11 NOTE — PROGRESS NOTES
Bedside shift report received from night shift RN. Patient A&Ox2, in bed resting comfortably. Bed in lowest, locked position with call light and belongings within reach. Fall precautions reviewed with patient. Patient updated on POC.

## 2024-11-11 NOTE — ASSESSMENT & PLAN NOTE
Multifactorial: cirrhosis, sepsis, malnutrition  Thiamine  Resuscitation with albumin  Empiric antibiotics  Trend  No shock so likely Type B lactate

## 2024-11-11 NOTE — ASSESSMENT & PLAN NOTE
Based upon ECHO 11/10/24   Very high risk for obstructive shock with hypovolemia and tachycardia   Maintain euvolemia  Afterload vasopressor if needed

## 2024-11-11 NOTE — PROGRESS NOTES
Notified resident MD of patients arrival to unit and increased O2, currently requiring 8L Oxymask.

## 2024-11-12 LAB
ALBUMIN SERPL BCP-MCNC: 3 G/DL (ref 3.2–4.9)
ALBUMIN/GLOB SERPL: 1.1 G/DL
ALP SERPL-CCNC: 100 U/L (ref 30–99)
ALT SERPL-CCNC: 24 U/L (ref 2–50)
AMMONIA PLAS-SCNC: 49 UMOL/L (ref 11–45)
ANION GAP SERPL CALC-SCNC: 16 MMOL/L (ref 7–16)
AST SERPL-CCNC: 86 U/L (ref 12–45)
B PARAP IS1001 DNA NPH QL NAA+NON-PROBE: NOT DETECTED
B PERT.PT PRMT NPH QL NAA+NON-PROBE: NOT DETECTED
BACTERIA FLD AEROBE CULT: NORMAL
BASOPHILS # BLD AUTO: 0.2 % (ref 0–1.8)
BASOPHILS # BLD: 0.05 K/UL (ref 0–0.12)
BILIRUB SERPL-MCNC: 16.9 MG/DL (ref 0.1–1.5)
BUN SERPL-MCNC: 31 MG/DL (ref 8–22)
C PNEUM DNA NPH QL NAA+NON-PROBE: NOT DETECTED
CALCIUM ALBUM COR SERPL-MCNC: 10 MG/DL (ref 8.5–10.5)
CALCIUM SERPL-MCNC: 9.2 MG/DL (ref 8.5–10.5)
CHLORIDE SERPL-SCNC: 101 MMOL/L (ref 96–112)
CO2 SERPL-SCNC: 18 MMOL/L (ref 20–33)
CREAT SERPL-MCNC: 0.51 MG/DL (ref 0.5–1.4)
EOSINOPHIL # BLD AUTO: 0.07 K/UL (ref 0–0.51)
EOSINOPHIL NFR BLD: 0.3 % (ref 0–6.9)
ERYTHROCYTE [DISTWIDTH] IN BLOOD BY AUTOMATED COUNT: 57.1 FL (ref 35.9–50)
FLUAV RNA NPH QL NAA+NON-PROBE: NOT DETECTED
FLUAV RNA SPEC QL NAA+PROBE: NEGATIVE
FLUBV RNA NPH QL NAA+NON-PROBE: NOT DETECTED
FLUBV RNA SPEC QL NAA+PROBE: NEGATIVE
GFR SERPLBLD CREATININE-BSD FMLA CKD-EPI: 109 ML/MIN/1.73 M 2
GLOBULIN SER CALC-MCNC: 2.8 G/DL (ref 1.9–3.5)
GLUCOSE BLD STRIP.AUTO-MCNC: 101 MG/DL (ref 65–99)
GLUCOSE BLD STRIP.AUTO-MCNC: 112 MG/DL (ref 65–99)
GLUCOSE BLD STRIP.AUTO-MCNC: 117 MG/DL (ref 65–99)
GLUCOSE BLD STRIP.AUTO-MCNC: 123 MG/DL (ref 65–99)
GLUCOSE BLD STRIP.AUTO-MCNC: 124 MG/DL (ref 65–99)
GLUCOSE BLD STRIP.AUTO-MCNC: 127 MG/DL (ref 65–99)
GLUCOSE BLD STRIP.AUTO-MCNC: 186 MG/DL (ref 65–99)
GLUCOSE BLD STRIP.AUTO-MCNC: 27 MG/DL (ref 65–99)
GLUCOSE BLD STRIP.AUTO-MCNC: 59 MG/DL (ref 65–99)
GLUCOSE BLD STRIP.AUTO-MCNC: 64 MG/DL (ref 65–99)
GLUCOSE BLD STRIP.AUTO-MCNC: 67 MG/DL (ref 65–99)
GLUCOSE SERPL-MCNC: 34 MG/DL (ref 65–99)
GRAM STN SPEC: NORMAL
HADV DNA NPH QL NAA+NON-PROBE: NOT DETECTED
HAPTOGLOB SERPL-MCNC: <10 MG/DL (ref 30–200)
HCOV 229E RNA NPH QL NAA+NON-PROBE: NOT DETECTED
HCOV HKU1 RNA NPH QL NAA+NON-PROBE: NOT DETECTED
HCOV NL63 RNA NPH QL NAA+NON-PROBE: NOT DETECTED
HCOV OC43 RNA NPH QL NAA+NON-PROBE: NOT DETECTED
HCT VFR BLD AUTO: 23.6 % (ref 37–47)
HGB BLD-MCNC: 7.8 G/DL (ref 12–16)
HMPV RNA NPH QL NAA+NON-PROBE: NOT DETECTED
HPIV1 RNA NPH QL NAA+NON-PROBE: NOT DETECTED
HPIV2 RNA NPH QL NAA+NON-PROBE: NOT DETECTED
HPIV3 RNA NPH QL NAA+NON-PROBE: NOT DETECTED
HPIV4 RNA NPH QL NAA+NON-PROBE: NOT DETECTED
IMM GRANULOCYTES # BLD AUTO: 0.32 K/UL (ref 0–0.11)
IMM GRANULOCYTES NFR BLD AUTO: 1.2 % (ref 0–0.9)
INR PPP: 3.85 (ref 0.87–1.13)
LACTATE SERPL-SCNC: 3.8 MMOL/L (ref 0.5–2)
LYMPHOCYTES # BLD AUTO: 0.89 K/UL (ref 1–4.8)
LYMPHOCYTES NFR BLD: 3.4 % (ref 22–41)
M PNEUMO DNA NPH QL NAA+NON-PROBE: NOT DETECTED
MAGNESIUM SERPL-MCNC: 1.7 MG/DL (ref 1.5–2.5)
MCH RBC QN AUTO: 30.1 PG (ref 27–33)
MCHC RBC AUTO-ENTMCNC: 33.1 G/DL (ref 32.2–35.5)
MCV RBC AUTO: 91.1 FL (ref 81.4–97.8)
MONOCYTES # BLD AUTO: 1.28 K/UL (ref 0–0.85)
MONOCYTES NFR BLD AUTO: 4.9 % (ref 0–13.4)
NEUTROPHILS # BLD AUTO: 23.37 K/UL (ref 1.82–7.42)
NEUTROPHILS NFR BLD: 90 % (ref 44–72)
NRBC # BLD AUTO: 0 K/UL
NRBC BLD-RTO: 0 /100 WBC (ref 0–0.2)
PHOSPHATE SERPL-MCNC: 3.9 MG/DL (ref 2.5–4.5)
PLATELET # BLD AUTO: 103 K/UL (ref 164–446)
PMV BLD AUTO: 10.1 FL (ref 9–12.9)
POTASSIUM SERPL-SCNC: 4.4 MMOL/L (ref 3.6–5.5)
PROT SERPL-MCNC: 5.8 G/DL (ref 6–8.2)
PROTHROMBIN TIME: 38.2 SEC (ref 12–14.6)
RBC # BLD AUTO: 2.59 M/UL (ref 4.2–5.4)
RSV RNA NPH QL NAA+NON-PROBE: NOT DETECTED
RSV RNA SPEC QL NAA+PROBE: NEGATIVE
RV+EV RNA NPH QL NAA+NON-PROBE: NOT DETECTED
SARS-COV-2 RNA NPH QL NAA+NON-PROBE: NOTDETECTED
SARS-COV-2 RNA RESP QL NAA+PROBE: NOTDETECTED
SIGNIFICANT IND 70042: NORMAL
SITE SITE: NORMAL
SODIUM SERPL-SCNC: 135 MMOL/L (ref 135–145)
SOURCE SOURCE: NORMAL
SPECIMEN SOURCE: NORMAL
WBC # BLD AUTO: 26 K/UL (ref 4.8–10.8)

## 2024-11-12 PROCEDURE — 49082 ABD PARACENTESIS: CPT

## 2024-11-12 PROCEDURE — 94640 AIRWAY INHALATION TREATMENT: CPT

## 2024-11-12 PROCEDURE — 97163 PT EVAL HIGH COMPLEX 45 MIN: CPT

## 2024-11-12 PROCEDURE — 84100 ASSAY OF PHOSPHORUS: CPT

## 2024-11-12 PROCEDURE — 85025 COMPLETE CBC W/AUTO DIFF WBC: CPT

## 2024-11-12 PROCEDURE — A9270 NON-COVERED ITEM OR SERVICE: HCPCS | Performed by: INTERNAL MEDICINE

## 2024-11-12 PROCEDURE — 99232 SBSQ HOSP IP/OBS MODERATE 35: CPT | Performed by: NURSE PRACTITIONER

## 2024-11-12 PROCEDURE — A9270 NON-COVERED ITEM OR SERVICE: HCPCS | Performed by: NURSE PRACTITIONER

## 2024-11-12 PROCEDURE — 82140 ASSAY OF AMMONIA: CPT

## 2024-11-12 PROCEDURE — 700111 HCHG RX REV CODE 636 W/ 250 OVERRIDE (IP): Performed by: INTERNAL MEDICINE

## 2024-11-12 PROCEDURE — 700102 HCHG RX REV CODE 250 W/ 637 OVERRIDE(OP): Performed by: INTERNAL MEDICINE

## 2024-11-12 PROCEDURE — 0202U NFCT DS 22 TRGT SARS-COV-2: CPT

## 2024-11-12 PROCEDURE — 770022 HCHG ROOM/CARE - ICU (200)

## 2024-11-12 PROCEDURE — 700105 HCHG RX REV CODE 258: Performed by: INTERNAL MEDICINE

## 2024-11-12 PROCEDURE — G0480 DRUG TEST DEF 1-7 CLASSES: HCPCS

## 2024-11-12 PROCEDURE — 99291 CRITICAL CARE FIRST HOUR: CPT | Performed by: INTERNAL MEDICINE

## 2024-11-12 PROCEDURE — 83605 ASSAY OF LACTIC ACID: CPT

## 2024-11-12 PROCEDURE — 80053 COMPREHEN METABOLIC PANEL: CPT

## 2024-11-12 PROCEDURE — 82962 GLUCOSE BLOOD TEST: CPT | Mod: 91

## 2024-11-12 PROCEDURE — 700102 HCHG RX REV CODE 250 W/ 637 OVERRIDE(OP): Performed by: HOSPITALIST

## 2024-11-12 PROCEDURE — 700111 HCHG RX REV CODE 636 W/ 250 OVERRIDE (IP): Mod: JZ | Performed by: INTERNAL MEDICINE

## 2024-11-12 PROCEDURE — A9270 NON-COVERED ITEM OR SERVICE: HCPCS | Performed by: HOSPITALIST

## 2024-11-12 PROCEDURE — 92526 ORAL FUNCTION THERAPY: CPT

## 2024-11-12 PROCEDURE — 0241U HCHG SARS-COV-2 COVID-19 NFCT DS RESP RNA 4 TRGT MIC: CPT

## 2024-11-12 PROCEDURE — 700101 HCHG RX REV CODE 250: Performed by: INTERNAL MEDICINE

## 2024-11-12 PROCEDURE — 85610 PROTHROMBIN TIME: CPT

## 2024-11-12 PROCEDURE — 700102 HCHG RX REV CODE 250 W/ 637 OVERRIDE(OP): Performed by: NURSE PRACTITIONER

## 2024-11-12 PROCEDURE — 83735 ASSAY OF MAGNESIUM: CPT

## 2024-11-12 RX ORDER — MAGNESIUM SULFATE HEPTAHYDRATE 40 MG/ML
4 INJECTION, SOLUTION INTRAVENOUS ONCE
Status: COMPLETED | OUTPATIENT
Start: 2024-11-12 | End: 2024-11-12

## 2024-11-12 RX ORDER — LACTULOSE 10 G/15ML
30 SOLUTION ORAL 3 TIMES DAILY
Status: DISCONTINUED | OUTPATIENT
Start: 2024-11-12 | End: 2024-11-13

## 2024-11-12 RX ADMIN — ONDANSETRON 4 MG: 2 INJECTION INTRAMUSCULAR; INTRAVENOUS at 21:58

## 2024-11-12 RX ADMIN — FLUOXETINE HYDROCHLORIDE 10 MG: 10 CAPSULE ORAL at 05:12

## 2024-11-12 RX ADMIN — PIPERACILLIN AND TAZOBACTAM 4.5 G: 4; .5 INJECTION, POWDER, FOR SOLUTION INTRAVENOUS at 05:20

## 2024-11-12 RX ADMIN — LACTULOSE 300 ML: 10 SOLUTION ORAL; RECTAL at 05:18

## 2024-11-12 RX ADMIN — LIDOCAINE 1 PATCH: 4 PATCH TOPICAL at 05:12

## 2024-11-12 RX ADMIN — LIDOCAINE HYDROCHLORIDE 5 ML: 20 SOLUTION ORAL; TOPICAL at 17:27

## 2024-11-12 RX ADMIN — ACETAMINOPHEN 650 MG: 325 TABLET ORAL at 14:00

## 2024-11-12 RX ADMIN — ACETAMINOPHEN 650 MG: 325 TABLET ORAL at 22:03

## 2024-11-12 RX ADMIN — PANTOPRAZOLE SODIUM 40 MG: 40 INJECTION, POWDER, FOR SOLUTION INTRAVENOUS at 05:13

## 2024-11-12 RX ADMIN — DEXTROSE MONOHYDRATE 25 G: 25 INJECTION, SOLUTION INTRAVENOUS at 22:04

## 2024-11-12 RX ADMIN — PROCHLORPERAZINE EDISYLATE 10 MG: 5 INJECTION INTRAMUSCULAR; INTRAVENOUS at 22:11

## 2024-11-12 RX ADMIN — DEXTROSE MONOHYDRATE 25 G: 25 INJECTION, SOLUTION INTRAVENOUS at 17:28

## 2024-11-12 RX ADMIN — PANTOPRAZOLE SODIUM 40 MG: 40 INJECTION, POWDER, FOR SOLUTION INTRAVENOUS at 17:27

## 2024-11-12 RX ADMIN — DEXTROSE MONOHYDRATE 25 G: 25 INJECTION, SOLUTION INTRAVENOUS at 04:39

## 2024-11-12 RX ADMIN — LIDOCAINE HYDROCHLORIDE 5 ML: 20 SOLUTION ORAL; TOPICAL at 10:59

## 2024-11-12 RX ADMIN — ONDANSETRON 4 MG: 2 INJECTION INTRAMUSCULAR; INTRAVENOUS at 08:39

## 2024-11-12 RX ADMIN — RIFAXIMIN 550 MG: 550 TABLET ORAL at 05:12

## 2024-11-12 RX ADMIN — LACTULOSE 30 ML: 10 SOLUTION ORAL at 11:03

## 2024-11-12 RX ADMIN — MAGNESIUM SULFATE HEPTAHYDRATE 4 G: 4 INJECTION, SOLUTION INTRAVENOUS at 09:42

## 2024-11-12 RX ADMIN — LACTULOSE 300 ML: 10 SOLUTION ORAL; RECTAL at 00:53

## 2024-11-12 RX ADMIN — THIAMINE HYDROCHLORIDE 200 MG: 100 INJECTION, SOLUTION INTRAMUSCULAR; INTRAVENOUS at 05:13

## 2024-11-12 RX ADMIN — PIPERACILLIN AND TAZOBACTAM 4.5 G: 4; .5 INJECTION, POWDER, FOR SOLUTION INTRAVENOUS at 20:29

## 2024-11-12 RX ADMIN — RIFAXIMIN 550 MG: 550 TABLET ORAL at 17:28

## 2024-11-12 RX ADMIN — PIPERACILLIN AND TAZOBACTAM 4.5 G: 4; .5 INJECTION, POWDER, FOR SOLUTION INTRAVENOUS at 13:00

## 2024-11-12 RX ADMIN — LACTULOSE 30 ML: 10 SOLUTION ORAL at 17:28

## 2024-11-12 ASSESSMENT — PAIN DESCRIPTION - PAIN TYPE
TYPE: ACUTE PAIN

## 2024-11-12 ASSESSMENT — ENCOUNTER SYMPTOMS
DIZZINESS: 0
CONSTIPATION: 0
HEADACHES: 0
WEIGHT LOSS: 0
BACK PAIN: 0
SORE THROAT: 0
FEVER: 0
SHORTNESS OF BREATH: 1
TREMORS: 1
DEPRESSION: 0
HEMOPTYSIS: 1
MYALGIAS: 0
FOCAL WEAKNESS: 0
VOMITING: 0
NAUSEA: 0
WEAKNESS: 0
HEARTBURN: 0
MEMORY LOSS: 1
BLURRED VISION: 0
DIARRHEA: 0
BLOOD IN STOOL: 0
SENSORY CHANGE: 0
COUGH: 1
WEAKNESS: 1
ABDOMINAL PAIN: 0
CHILLS: 0

## 2024-11-12 ASSESSMENT — COGNITIVE AND FUNCTIONAL STATUS - GENERAL
MOBILITY SCORE: 6
WALKING IN HOSPITAL ROOM: TOTAL
MOVING FROM LYING ON BACK TO SITTING ON SIDE OF FLAT BED: TOTAL
TURNING FROM BACK TO SIDE WHILE IN FLAT BAD: TOTAL
CLIMB 3 TO 5 STEPS WITH RAILING: TOTAL
MOVING TO AND FROM BED TO CHAIR: TOTAL
SUGGESTED CMS G CODE MODIFIER MOBILITY: CN
STANDING UP FROM CHAIR USING ARMS: TOTAL

## 2024-11-12 ASSESSMENT — FIBROSIS 4 INDEX: FIB4 SCORE: 9.71

## 2024-11-12 ASSESSMENT — GAIT ASSESSMENTS: GAIT LEVEL OF ASSIST: UNABLE TO PARTICIPATE

## 2024-11-12 ASSESSMENT — PAIN SCALES - PAIN ASSESSMENT IN ADVANCED DEMENTIA (PAINAD)
BREATHING: NORMAL
BODYLANGUAGE: TENSE, DISTRESSED PACING, FIDGETING
CONSOLABILITY: NO NEED TO CONSOLE

## 2024-11-12 ASSESSMENT — PAIN SCALES - WONG BAKER: WONGBAKER_NUMERICALRESPONSE: HURTS JUST A LITTLE BIT

## 2024-11-12 NOTE — PROGRESS NOTES
Patient asking for warm broth. Ok per palliative APRN. Doing well with ice chips and sips of water.       Addendum - swallowed pill split in half with no signs of aspiration.

## 2024-11-12 NOTE — CONSULTS
MRN: [id number]  Date of palliative consult: 2024  Reason for consult: Advance care planning  Referring provider: Dr. Jenkins  Location of consult: [facility]  Additional consulting services: Critical care, gastroenterology    HPI:   Kavita Freeman is a 59 y.o. female with medical history significant for alcoholic cirrhosis, esophageal varices with history of banding, ascites with history of paracentesis, was admitted on 10/31/2024 for worsening abdominal distention, abdominal pain, coughing up blood, and intermittent fevers and chills.  Paracentesis in ED with cloudy fluid, grew Salmonella, indicative of spontaneous bacterial peritonitis.  Patient also with acute renal failure, anemia, hepatic encephalopathy, coagulopathy with elevated INR, systolic anterior movement of mitral valve, pneumonia, acute respiratory failure with hypoxia, bacteremia, and severe protein calorie malnutrition.  Prognosis is extremely guarded.    Additional Pertinent Medical History: She has abstained from alcohol since 2024, had an appointment with [facility] hepatology in January and had been accepted to [facility] for possible transplantation.      ROS:    Review of Systems   Constitutional:  Positive for malaise/fatigue and weight loss.   Respiratory:  Positive for shortness of breath.    Gastrointestinal:  Positive for abdominal pain.   Neurological:  Positive for weakness.   Endo/Heme/Allergies:  Bruises/bleeds easily.   Psychiatric/Behavioral:  The patient is nervous/anxious.        PE:   Recent vital signs  BMI: Body mass index is 23.23 kg/m².    Temp (24hrs), Av.7 °C (98 °F), Min:36.3 °C (97.3 °F), Max:37.1 °C (98.8 °F)  Temperature: 36.7 °C (98 °F)  Pulse  Av.2  Min: 79  Max: 148   Blood Pressure: 113/57       Physical Exam  Constitutional:       Appearance: She is ill-appearing.   HENT:      Mouth/Throat:      Comments: Dried blood around mouth  Eyes:      General: Scleral icterus present.   Abdominal:      General:  There is distension.      Tenderness: There is abdominal tenderness.   Skin:     Coloration: Skin is jaundiced.   Neurological:      Mental Status: She is alert.   Psychiatric:         Mood and Affect: Mood normal.         Behavior: Behavior normal.         Thought Content: Thought content normal.         ASSESSMENT/PLAN WITH SHARED DECISION MAKING:   PHYSICAL ASPECTS OF CARE  Palliative Performance Scale: 30%    # Alcoholic cirrhosis, decompensated  # Spontaneous bacterial peritonitis  # Sepsis  # Acute respiratory failure with hypoxia  -ICU admission on 11/10/2024 with  lactic acidosis and worsening encephalopathy, chest x-ray concerning for pneumonia, empiric IV antibiotics initiated  -Patient on high flow nasal cannula  # Coagulopathy  -INR elevated, supplemented with vitamin K  # Acute renal failure  -Hepatorenal syndrome, sepsis, acute tubular necrosis    SOCIAL ASPECTS OF CARE  Patient has one adult son and her mother Ivelisse, who both live together in [place] and are very supportive of patient.    SPIRITUAL ASPECTS OF CARE   Patient is Episcopal, but does not indicate any specific Anabaptist.  She is interested in spiritual care consult with Pastor Shankar.  Spiritual care consult placed.    GOALS OF CARE/SERIOUS ILLNESS CONVERSATION  Introduced myself to Kavita. Discussed role of palliative care and reason for consult.  Kavita agreeable to discuss goals of care.  No family present in the room at this time.  Kavita states that she came to the hospital for a paracentesis and that she is very gravely ill due to liver cirrhosis.  She does confirm that she quit drinking in June 2024 and that her family has been supportive of this change.  She affirms that she had been seeking the possibility of liver transplantation and although she continues to want to pursue this as a possibility, she states that she has fear she may not make it to transplantation.  Acknowledged this concern and that it was important to discuss  what care she would want if she is unable to get the transplant.  Patient expressed fear and sadness about this possibility and would be willing to discuss it further tomorrow.  Patient affirmed that she wants to remain full CODE STATUS at this time, but that she would not want to be on a ventilator for a prolonged period.  Provided palliative care contact information and encouraged Maureene to reach out with any questions/needs.     Code Status: Full code    ACP Documents: No documents    20 minutes spent discussing advance care planning, this time excludes any other billed services.    Communicated palliative care discussion with Dr. Hanson.  Will check in with Dr. Hanson tomorrow for specific palliative care needs going forward.    Interval diagnostic studies and medical documentation entries pertinent to this case were reviewed independently by me. This patient has at least one acute or chronic illness or injury that poses a threat to life or bodily function. This patient suffers from a high risk of morbidity from additional invasive diagnostic testing or intensive treatment. Discussion of recommendations and coordination of care undertaken with primary provider/treatment team.      Darlene Song, APRN  Palliative Care  [phone]

## 2024-11-12 NOTE — PROGRESS NOTES
Hypoglycemia Intervention    Hypoglycemia protocol intervention:  Blood glucose 27 at 0436.  Intervention: 25 g IV dextrose per MAR   Repeat blood glucose 127 at 0506.  MD Krysten España notified

## 2024-11-12 NOTE — THERAPY
Physical Therapy   Initial Evaluation     Patient Name: Kavita Freeman  Age:  59 y.o., Sex:  female  Medical Record #: [id number]  Today's Date: 11/12/2024     Precautions  Precautions: Fall Risk;Swallow Precautions  Comments: Rectal tube    Assessment  Patient is 59 y.o. female with a diagnosis of sepsis,  past medical history of alcoholic cirrhosis, esophageal varices status post banding, who presented 10/31/2024 for a paracentesis.  Patient reported worsening abdominal distention, last paracentesis was 1 week ago.  Initially she denied any other symptoms to the ERP.  On my evaluation patient does admit to intermittent fevers and chills for the last 2 weeks.  She has been having abdominal pain with dry heaving and coughing up blood.  She reports the last time she coughed up blood was yesterday.  She does report recent esophageal banding.  Pt is on list for transplant consideration.  She has not had any alcohol since June. .  Additional factors influencing patient status / progress : today, pt is met sitting up in bed with some confusion, very distractible and pain focused. Nsg in with pain medication. Pt is unable to follow most cues, needing total assist to come to EOB, unable to attempt sit to stand due to pain and distraction. Pt is asking for BTB within a few minutes of sitting. PT to follow at reduced frequency until pt is more able to participate. .      Plan    Physical Therapy Initial Treatment Plan   Treatment Plan : Bed Mobility, Gait Training, Neuro Re-Education / Balance, Therapeutic Activities, Therapeutic Exercise, Self Care / Home Evaluation  Treatment Frequency: 2 Times per Week  Duration: Until Therapy Goals Met (until pt able to participate more actively in therapy)    DC Equipment Recommendations: Unable to determine at this time  Discharge Recommendations: Recommend post-acute placement for additional physical therapy services prior to discharge home          Objective       11/12/24 4942    Initial Contact Note    Initial Contact Note Order Received and Verified, Physical Therapy Evaluation in Progress with Full Report to Follow.   Precautions   Precautions Fall Risk;Non Weight Bearing Left Upper Extremity;Swallow Precautions   Comments Rectal tube   Vitals   O2 (LPM) 60   O2 Delivery Device Heated High Flow Nasal Cannula   Pain 0 - 10 Group   Therapist Pain Assessment Prior to Activity;During Activity;Nurse Notified  (Back pain, not rated, pt unable to articulate.)   Prior Living Situation   Prior Services Other (Comments)  (need to clarify if has help or not.)   Housing / Facility 1 Story House   Steps Into Home 0   Steps In Home 0   Equipment Owned None   Lives with - Patient's Self Care Capacity Other (Comments)  (Mom reports pt's broke up with partner and was planning to get her own apt this week.)   Prior Level of Functional Mobility   Bed Mobility Independent   Transfer Status Dependent   Ambulation Other (Comments)  (need to clarify)   Assistive Devices Used None   Comments Mom reports that pt was supposed to be walking but that she would not due to pain.   Cognition    Cognition / Consciousness X   Comments pt is distracted pain issues, unable to clarify PLF, Nsg in with pain meds and to assist   Passive ROM Lower Body   Passive ROM Lower Body X   Comments barely reaching neutral ankle df with c/o pain   Active ROM Lower Body    Active ROM Lower Body  X   Comments pt unable to perform active df, unclear if due to distraction vs pain.   Strength Lower Body   Lower Body Strength  X   Comments pt not following to move legs across mattress.   Balance Assessment   Sitting Balance (Static) Poor +   Sitting Balance (Dynamic) Poor   Weight Shift Sitting Poor   Comments pt refusing to lean forward, unclear if due to pain as pt unable to articulate. Pt is asking for BTB. Pt shows brief moments of being able to sit self supported.   Bed Mobility    Supine to Sit Total Assist   Sit to Supine Total  Assist   Scooting Total Assist   Rolling Total Assist to Rt.;Total Assist to Lt.   Gait Analysis   Gait Level Of Assist Unable to Participate   Functional Mobility   Sit to Stand Unable to Participate   6 Clicks Assessment - How much HELP from from another person do you currently need... (If the patient hasn't done an activity recently, how much help from another person do you think he/she would need if he/she tried?)   Turning from your back to your side while in a flat bed without using bedrails? 1   Moving from lying on your back to sitting on the side of a flat bed without using bedrails? 1   Moving to and from a bed to a chair (including a wheelchair)? 1   Standing up from a chair using your arms (e.g., wheelchair, or bedside chair)? 1   Walking in hospital room? 1   Climbing 3-5 steps with a railing? 1   6 clicks Mobility Score 6   Activity Tolerance   Sitting Edge of Bed 5 min supported   Edema / Skin Assessment   Edema / Skin    (ascites)   Short Term Goals    Short Term Goal # 1 Pt will perform bed mobility with supervision in 6 visits to improve functional indep.   Short Term Goal # 2 Pt will transfer with min A from bed to bs chair in 6 visits to improve functional indep.   Short Term Goal # 3 Pt will ambulate x 25 feet using FWW with mod A in 6 visits to improve functional indep.   Education Group   Education Provided Role of Physical Therapist   Role of Physical Therapist Patient Response Patient;Family;Acceptance;Explanation;Reinforcement Needed   Physical Therapy Initial Treatment Plan    Treatment Plan  Bed Mobility;Gait Training;Neuro Re-Education / Balance;Therapeutic Activities;Therapeutic Exercise;Self Care / Home Evaluation   Treatment Frequency 2 Times per Week   Duration Until Therapy Goals Met  (until pt able to participate more actively in therapy)   Problem List    Problems Pain;Impaired Bed Mobility;Impaired Transfers;Impaired Ambulation;Functional Strength Deficit;Impaired  Balance;Decreased Activity Tolerance   Anticipated Discharge Equipment and Recommendations   DC Equipment Recommendations Unable to determine at this time   Discharge Recommendations Recommend post-acute placement for additional physical therapy services prior to discharge home   Interdisciplinary Plan of Care Collaboration   IDT Collaboration with  Nursing   Patient Position at End of Therapy In Bed;Call Light within Reach;Tray Table within Reach;Phone within Reach;Family / Friend in Room   Collaboration Comments nsg updated   Session Information   Date / Session Number  11/12-1 (1/2, 11/18)

## 2024-11-12 NOTE — PROGRESS NOTES
[facility] transfer [facility] called for report. Spoke with Gretel.    Patient is accepted at [facility]. Awaiting bed assignment.

## 2024-11-12 NOTE — PROGRESS NOTES
Gastroenterology Progress Note               Author:  YANA Pierre   Date & Time Created: 11/12/2024 11:04 AM       Patient ID:  Name:             Kavita Freeman  YOB: 1965  Age:                 59 y.o.  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]     Acute encephalopathy [G93.40]     Bacteremia due to Gram-negative bacteria [R78.81]     Severe protein-calorie malnutrition ([place]: less than 60% of standard weight) (HCC) [E43]     Transaminitis [R74.01]     Hypoglycemia [E16.2]     Advance care planning [Z71.89]     Sepsis (HCC) [A41.9]     Hepatic encephalopathy (HCC) [K76.82]     High anion gap metabolic acidosis [E87.29]     Hyponatremia [E87.1]     Coagulopathy (HCC) [D68.9]     Anemia [D64.9]     Alcoholic cirrhosis of liver with ascites (HCC) [K70.31]     Acute renal failure (ARF) (HCC) [N17.9]     Depression with anxiety [F41.8]     Essential hypertension [I10]      Presenting Chief Complaint:  Cirrhosis, GI bleeding.     History of Present Illness:   59 years old female with medical history of alcohol-related liver injury, cirrhosis, decompensation with variceal bleeding and refractory ascites formation, mild to moderate encephalopathy.  Presented to hospital this time for fluid accumulation, acute kidney injury.  GI team is consulted for recent GI bleeding     The patient has primary GI liver doctor in [place] and she is waiting for the transplantation evaluation at [facility] in the coming 2 months.  We do not have any records to support this  plan, however the patient is very certain about the [employer] [employer] appointment.     For the bleeding, the patient had nausea vomiting in the last 2 days, the patient saw blood and also some coffee-ground like vomitus.  Tarry stool was noted.  Last upper GI scope August 15 with variceal bleeding and banding.     Interval History:  11/2/2024: Patient seen at bedside.  Awake, alert.  Discussed EGD findings with patient.  Inquired regarding last EGD as, per chart review, last EGD was 2-1/2 months ago.  Patient unable to discern if she has had EGD since last documented.  Positive asterixis.  Denies bowel movements overnight.  Started on rifaximin and lactulose.  Hemoglobin stable.  Abdomen tender with light palpation.  No WBC this morning, but yesterday was 39. ultrasound abdomen pending.      11/3/2024: Patient seen at bedside.  Disoriented, unable to answer orientation questions.  Abdomen distended, significantly increase in tenderness this morning, hypoactive bowel sounds and tympany with percussion.  CT abdomen and CBC pending.  No bowel movements overnight.    Update 1222: CT abdomen with findings including fluid-filled small bowel and colonic loops favoring ileus, no evidence of bowel perforation, cirrhosis with portal hypertension, moderate to large volume ascites, nonocclusive SMV thrombosis, cholelithiasis, subtle findings concerning for pulmonary emboli.      11/4/2024: Patient seen at bedside.  Disoriented, oriented to self only.  Abdomen remains distended, tender with light palpation, hypoactive bowel sounds.  Patient with multiple brown bowel movements overnight.  Started on heparin yesterday.  WBC 29.8, hemoglobin stable at 12.5, platelets 197.  Sodium 127, BUN 56, creatinine improved to 1.3.  Albumin yesterday 2.6.  INR yesterday 2.35.  MELD 3.0 as of 11/3/2024 31    11/5/2024: Patient seen and examined.  Appears to be in distress, tachypneic, unable to participate in interview.  Grimaces when I touch her  abdomen.  I was concerned and got Dr. Charles who then examined the patient.  We ordered KUB, chest x-ray, ABG, further labs.  3 BM last 24 hours.     Found to have significant lactic acidosis, INR 3.64 (difficult to interpret in the context of heparin drip), ABG with acute respiratory alkalosis.  Creatinine increasing to 1.54, T. bili 8.  KUB dilated loops of bowel suspicious for gastroenteritis or inflammatory changes.  Chest x-ray with atelectasis.      11/6/2024: Patient seen in collaboration with Dr. Solorzano. Son at bedside, all questions answered. WBC now 36.6, T. Bili 9.8, creatinine uptrending. MELD 3.0 - 37 (Although INR not reliable in the setting of heparin drip)    11/7/2024: Patient seen with mom and dad at bedside.  Significantly improved, alert and oriented and eating food.  She remembered me from when I met her before.  WBC 26.9, hgb 9.6, plt 112, cr 1.38. Urinary casts present. Renal US negative.     11/8/2024: Patient seen with family bedside. Continues to feel better, eating but doesn't really like the food. Numerous bowel movements overnight. Worsening abdominal distention with mild tenderness. WBC continues to improve, platelets worse at 89, hgb downtrended to 9.1, cr 0.44, t. Bili 10.9    11/9/2024: Patient seen this morning, no family bedside.  More lethargic today, says she is tired.  2 bowel movements past 24 hours.  VSS, WBC 20.3, T. Bili 13.1, albumin 2.9, phos 2.3.  INR greater than 10, TEG with abnormal CK R.  Pending diagnostic paracentesis and elevated INR.    11/10/2024: Patient seen and examined this morning with mother at bedside.  More lethargic again, on oxygen, noted to be tachypneic and tachycardic.  Minimally arousable.  Worsening murmur on auscultation. Has not had a bowel movement per RN.  Finished her antibiotics yesterday and transferred from [facility].  Labs reviewed, WBC up to 30 with repeat 33, creatinine 1.09 from 0.28, bilirubin 16.2, 9.9 direct.  Increased reticulocyte  response, , ammonia 62, lactic 4.2, Procal 1.44.  INR greater than 10, recheck 9.53.  Chest x-ray with new significant right pleural effusion and significant increase in diffuse pulmonary opacifications edema versus pneumonia.  Head CT negative.  Limited echocardiogram and haptoglobin pending.    11/11/2024: Patient upgraded to ICU last night. Awake, alert, encephalopathic. Complaining of back pain. Dried crusted blood noted on oral mucosa. Tachycardic, tachypneic, on 10L oxymask. WBC 31.3, hgb 7.4, plt 93, INR 7.15, BUN 41, cr 1.37, T. Bili 15.2, lactic 4.9. On Zosyn.    11/12/2024: Patient seen at bedside.  Awake, alert.  Patient was accepted by [facility], bed pending.  Patient on 50 LPM HFNC, FiO2 90%.  Leukocytosis improved to 26, hemoglobin stable 7.8.  Platelets 103.  Sodium 135, BUN 31, creatinine 0.51, AST 86, ALT 24, alk phos 100, total bilirubin 16.9.  Albumin 3.  Ammonia 49 INR 3.5 after products yesterday. MELD 3.0 32 63.5%; 90 day survival rate. Remains on antibiotics    Hospital Medications:  Current Facility-Administered Medications   Medication Dose Frequency Provider Last Rate Last Admin    lactulose 20 GM/30ML solution 30 mL  30 mL TID Olvin Hanson M.D.   30 mL at 11/12/24 1103    MBX (diphenhydrAMINE-lidocaine-Maalox) oral susp Cup 5 mL  5 mL Q6HRS PRN Olvin Hanson M.D.   5 mL at 11/12/24 1059    MD Alert...ICU Electrolyte Replacement per Pharmacy   PHARMACY TO DOSE Olvin Hanson M.D.        magnesium sulfate IVPB premix 4 g  4 g Once Olvin Hanson M.D. 25 mL/hr at 11/12/24 0942 4 g at 11/12/24 0942    pantoprazole (Protonix) injection 40 mg  40 mg BID Olvin Hanson M.D.   40 mg at 11/12/24 0513    piperacillin-tazobactam (Zosyn) 4.5 g in  mL IVPB  4.5 g Q8HRS Shahzad Jenkins M.D.   Stopped at 11/12/24 0920    thiamine (B-1) injection 200 mg  200 mg DAILY Shahzad Jenkins M.D.   200 mg at 11/12/24 0513    riFAXIMin (Xifaxan) tablet 550 mg  550 mg BID Quang  Marva, D.O.   550 mg at 11/12/24 0512    acetaminophen (Tylenol) tablet 650 mg  650 mg Q6HRS PRN Adilia Gonzales, D.O.        ondansetron (Zofran) syringe/vial injection 4 mg  4 mg Q4HRS PRN Adilia Gonzales, D.O.   4 mg at 11/12/24 0839    ondansetron (Zofran ODT) dispertab 4 mg  4 mg Q4HRS PRN Adilia Gonzales, D.O.        promethazine (Phenergan) tablet 12.5-25 mg  12.5-25 mg Q4HRS PRN Adilia Gonzales, D.O.   25 mg at 11/04/24 1116    promethazine (Phenergan) suppository 12.5-25 mg  12.5-25 mg Q4HRS PRN Adilia Gonzales, D.O.        prochlorperazine (Compazine) injection 5-10 mg  5-10 mg Q4HRS PRN Adilia Gonzales, D.O.   10 mg at 11/04/24 1228    dextrose 50% (D50W) injection 25 g  25 g Q15 MIN PRN Adilia Gonzales, D.O.   25 g at 11/12/24 0439    FLUoxetine (PROzac) capsule 10 mg  10 mg QAM Adilia Gonzales, D.O.   10 mg at 11/12/24 0512    lidocaine (Asperflex) 4 % patch 1 Patch  1 Patch Q24HRS Adilia Gonzales, D.O.   1 Patch at 11/12/24 0512   Last reviewed on 11/1/2024  9:17 AM by Celine Bustamante R.N.       Review of Systems:  Review of Systems   Constitutional:  Positive for malaise/fatigue. Negative for chills and fever.   HENT:  Negative for hearing loss and sore throat.    Eyes:  Negative for blurred vision.   Respiratory:  Positive for cough, hemoptysis and shortness of breath.    Cardiovascular:  Negative for chest pain and leg swelling.   Gastrointestinal:  Negative for abdominal pain, blood in stool, constipation, diarrhea, heartburn, melena, nausea and vomiting.   Genitourinary:  Negative for dysuria.   Musculoskeletal:  Negative for back pain.   Skin:  Negative for rash.   Neurological:  Positive for tremors and weakness. Negative for dizziness.   Psychiatric/Behavioral:  Positive for memory loss. Negative for depression.    All other systems reviewed and are negative.      Vital signs:  Weight/BMI: Body mass index is 23.46 kg/m².  /55   Pulse (!) 106   Temp 36.4 °C (97.5 °F) (Temporal)   " Resp (!) 22   Ht 1.727 m (5' 8\")   Wt 70 kg (154 lb 5.2 oz)   SpO2 (!) 86%   Vitals:    11/12/24 0500 11/12/24 0600 11/12/24 0602 11/12/24 1033   BP: 107/57 105/55     Pulse: 97 (!) 102 (!) 104 (!) 106   Resp: (!) 53 (!) 59 (!) 22 (!) 22   Temp:       TempSrc:       SpO2: 94% 93% 92% (!) 86%   Weight:       Height:         Oxygen Therapy:  Pulse Oximetry: (!) 86 %, O2 (LPM): 60, FiO2%: 100 %, O2 Delivery Device: Heated High Flow Nasal Cannula    Intake/Output Summary (Last 24 hours) at 11/12/2024 1104  Last data filed at 11/12/2024 1000  Gross per 24 hour   Intake 687 ml   Output 3950 ml   Net -3263 ml       Physical Exam  Vitals and nursing note reviewed.   Constitutional:       General: She is not in acute distress.     Appearance: She is cachectic. She is ill-appearing.   HENT:      Head: Normocephalic and atraumatic.      Nose: Nose normal. No congestion.   Eyes:      General: Scleral icterus present.      Extraocular Movements: Extraocular movements intact.      Conjunctiva/sclera: Conjunctivae normal.   Cardiovascular:      Rate and Rhythm: Regular rhythm. Tachycardia present.      Pulses: Normal pulses.      Heart sounds: Murmur heard.   Pulmonary:      Effort: Pulmonary effort is normal.      Comments: Diminished throughout  HFNC in place: 50LPM; 90% FiO2  Abdominal:      General: There is distension.      Tenderness: There is abdominal tenderness.      Comments:      Genitourinary:     Comments: Herrera with dark urine  BMS in place  Musculoskeletal:      Right lower leg: No edema.      Left lower leg: No edema.   Skin:     General: Skin is warm and dry.      Capillary Refill: Capillary refill takes less than 2 seconds.      Coloration: Skin is jaundiced.   Neurological:      Mental Status: She is oriented to person, place, and time.      Motor: Weakness present.       Labs:  Recent Labs     11/10/24  0050 11/11/24  0206 11/12/24  0341   SODIUM 130* 135 135   POTASSIUM 4.7 4.4 4.4   CHLORIDE 99 101 101 "   CO2 18* 18* 18*   BUN 31* 41* 31*   CREATININE 1.09 1.37 0.51   MAGNESIUM 2.1 1.9 1.7   PHOSPHORUS 3.7 4.7* 3.9   CALCIUM 8.8 8.9 9.2     Recent Labs     11/10/24  0050 11/10/24  1148 11/11/24 0206 11/12/24  0341   ALTSGPT 38  --  26 24   ASTSGOT 90*  --  83* 86*   ALKPHOSPHAT 124*  --  83 100*   TBILIRUBIN 16.2*  --  15.2* 16.9*   DBILIRUBIN  --  9.9*  --   --    GLUCOSE 114*  --  83 34*     Recent Labs     11/10/24  0050 11/10/24  0150 11/10/24  1148 11/11/24  0206 11/11/24  0300 11/11/24 1055 11/11/24 1610 11/11/24 2122 11/12/24 0341   WBC  --    < > 33.3*  --  23.5*   < > 31.3* 29.7* 26.0*   NEUTSPOLYS  --   --  98.20*  --  88.50*  --   --   --  90.00*   LYMPHOCYTES  --   --  0.00*  --  4.00*  --   --   --  3.40*   MONOCYTES  --   --  1.80  --  5.50  --   --   --  4.90   EOSINOPHILS  --   --  0.00  --  0.50  --   --   --  0.30   BASOPHILS  --   --  0.00  --  0.10  --   --   --  0.20   ASTSGOT 90*  --   --  83*  --   --   --   --  86*   ALTSGPT 38  --   --  26  --   --   --   --  24   ALKPHOSPHAT 124*  --   --  83  --   --   --   --  100*   TBILIRUBIN 16.2*  --   --  15.2*  --   --   --   --  16.9*    < > = values in this interval not displayed.     Recent Labs     11/10/24  0814 11/10/24  1148 11/11/24  0951 11/11/24  1055 11/11/24 1610 11/11/24 2122 11/12/24  0341   RBC  --    < >  --    < > 2.47* 2.59* 2.59*   HEMOGLOBIN  --    < >  --    < > 7.4* 7.8* 7.8*   HEMATOCRIT  --    < >  --    < > 22.0* 22.8* 23.6*   PLATELETCT  --    < >  --    < > 93* 108* 103*   PROTHROMBTM 77.9*  --  62.1*  --   --   --  38.2*   INR 9.53*  --  7.15*  --   --   --  3.85*    < > = values in this interval not displayed.     Recent Results (from the past 24 hours)   CBC WITHOUT DIFFERENTIAL    Collection Time: 11/11/24  4:10 PM   Result Value Ref Range    WBC 31.3 (H) 4.8 - 10.8 K/uL    RBC 2.47 (L) 4.20 - 5.40 M/uL    Hemoglobin 7.4 (L) 12.0 - 16.0 g/dL    Hematocrit 22.0 (L) 37.0 - 47.0 %    MCV 89.1 81.4 - 97.8 fL    MCH  30.0 27.0 - 33.0 pg    MCHC 33.6 32.2 - 35.5 g/dL    RDW 54.0 (H) 35.9 - 50.0 fL    Platelet Count 93 (L) 164 - 446 K/uL    MPV 10.2 9.0 - 12.9 fL   IMMATURE PLT FRACTION    Collection Time: 11/11/24  4:10 PM   Result Value Ref Range    Imm. Plt Fraction 4.6 0.6 - 13.1 %   CBC WITHOUT DIFFERENTIAL    Collection Time: 11/11/24  9:22 PM   Result Value Ref Range    WBC 29.7 (H) 4.8 - 10.8 K/uL    RBC 2.59 (L) 4.20 - 5.40 M/uL    Hemoglobin 7.8 (L) 12.0 - 16.0 g/dL    Hematocrit 22.8 (L) 37.0 - 47.0 %    MCV 88.0 81.4 - 97.8 fL    MCH 30.1 27.0 - 33.0 pg    MCHC 34.2 32.2 - 35.5 g/dL    RDW 54.4 (H) 35.9 - 50.0 fL    Platelet Count 108 (L) 164 - 446 K/uL    MPV 10.5 9.0 - 12.9 fL   AMMONIA    Collection Time: 11/12/24  3:41 AM   Result Value Ref Range    Ammonia 49 (H) 11 - 45 umol/L   CBC With Differential    Collection Time: 11/12/24  3:41 AM   Result Value Ref Range    WBC 26.0 (H) 4.8 - 10.8 K/uL    RBC 2.59 (L) 4.20 - 5.40 M/uL    Hemoglobin 7.8 (L) 12.0 - 16.0 g/dL    Hematocrit 23.6 (L) 37.0 - 47.0 %    MCV 91.1 81.4 - 97.8 fL    MCH 30.1 27.0 - 33.0 pg    MCHC 33.1 32.2 - 35.5 g/dL    RDW 57.1 (H) 35.9 - 50.0 fL    Platelet Count 103 (L) 164 - 446 K/uL    MPV 10.1 9.0 - 12.9 fL    Neutrophils-Polys 90.00 (H) 44.00 - 72.00 %    Lymphocytes 3.40 (L) 22.00 - 41.00 %    Monocytes 4.90 0.00 - 13.40 %    Eosinophils 0.30 0.00 - 6.90 %    Basophils 0.20 0.00 - 1.80 %    Immature Granulocytes 1.20 (H) 0.00 - 0.90 %    Nucleated RBC 0.00 0.00 - 0.20 /100 WBC    Neutrophils (Absolute) 23.37 (H) 1.82 - 7.42 K/uL    Lymphs (Absolute) 0.89 (L) 1.00 - 4.80 K/uL    Monos (Absolute) 1.28 (H) 0.00 - 0.85 K/uL    Eos (Absolute) 0.07 0.00 - 0.51 K/uL    Baso (Absolute) 0.05 0.00 - 0.12 K/uL    Immature Granulocytes (abs) 0.32 (H) 0.00 - 0.11 K/uL    NRBC (Absolute) 0.00 K/uL   Comp Metabolic Panel    Collection Time: 11/12/24  3:41 AM   Result Value Ref Range    Sodium 135 135 - 145 mmol/L    Potassium 4.4 3.6 - 5.5 mmol/L     Chloride 101 96 - 112 mmol/L    Co2 18 (L) 20 - 33 mmol/L    Anion Gap 16.0 7.0 - 16.0    Glucose 34 (LL) 65 - 99 mg/dL    Bun 31 (H) 8 - 22 mg/dL    Creatinine 0.51 0.50 - 1.40 mg/dL    Calcium 9.2 8.5 - 10.5 mg/dL    Correct Calcium 10.0 8.5 - 10.5 mg/dL    AST(SGOT) 86 (H) 12 - 45 U/L    ALT(SGPT) 24 2 - 50 U/L    Alkaline Phosphatase 100 (H) 30 - 99 U/L    Total Bilirubin 16.9 (H) 0.1 - 1.5 mg/dL    Albumin 3.0 (L) 3.2 - 4.9 g/dL    Total Protein 5.8 (L) 6.0 - 8.2 g/dL    Globulin 2.8 1.9 - 3.5 g/dL    A-G Ratio 1.1 g/dL   Magnesium    Collection Time: 11/12/24  3:41 AM   Result Value Ref Range    Magnesium 1.7 1.5 - 2.5 mg/dL   Phosphorus    Collection Time: 11/12/24  3:41 AM   Result Value Ref Range    Phosphorus 3.9 2.5 - 4.5 mg/dL   Prothrombin Time    Collection Time: 11/12/24  3:41 AM   Result Value Ref Range    PT 38.2 (H) 12.0 - 14.6 sec    INR 3.85 (H) 0.87 - 1.13   LACTIC ACID    Collection Time: 11/12/24  3:41 AM   Result Value Ref Range    Lactic Acid 3.8 (H) 0.5 - 2.0 mmol/L   ESTIMATED GFR    Collection Time: 11/12/24  3:41 AM   Result Value Ref Range    GFR (CKD-EPI) 109 >60 mL/min/1.73 m 2   POCT glucose device results    Collection Time: 11/12/24  4:36 AM   Result Value Ref Range    POC Glucose, Blood 27 (LL) 65 - 99 mg/dL   POCT glucose device results    Collection Time: 11/12/24  5:06 AM   Result Value Ref Range    POC Glucose, Blood 127 (H) 65 - 99 mg/dL   POCT glucose device results    Collection Time: 11/12/24  6:50 AM   Result Value Ref Range    POC Glucose, Blood 112 (H) 65 - 99 mg/dL   POCT glucose device results    Collection Time: 11/12/24  7:53 AM   Result Value Ref Range    POC Glucose, Blood 101 (H) 65 - 99 mg/dL   POCT glucose device results    Collection Time: 11/12/24 10:36 AM   Result Value Ref Range    POC Glucose, Blood 124 (H) 65 - 99 mg/dL   CoV-2, Flu A/B, And RSV by PCR ([employer])    Collection Time: 11/12/24 10:40 AM    Specimen: Nasopharyngeal; Respirate   Result Value  Ref Range    SARS-CoV-2 Source NP Swab        Radiology Review:  DX-CHEST-PORTABLE (1 VIEW)   Final Result         1.  Pulmonary edema and/or infiltrates, similar to prior study.   2.  Layering right pleural effusion, decreased since prior      DX-CHEST-PORTABLE (1 VIEW)   Final Result         1.  Pulmonary edema and/or infiltrates, similar to prior study.   2.  Layering right pleural effusion, decreased since prior      EC-ECHOCARDIOGRAM LTD W/O CONT   Final Result      DX-CHEST-PORTABLE (1 VIEW)   Final Result         1.  New significant right pleural effusion identified.      2.  Significant increase in diffuse pulmonary opacifications could be due to pulmonary edema or pneumonia.      CT-HEAD W/O   Final Result   Impression:      1. No acute process in the brain evident.      2. The skull base sinuses are clear.                  US-RENAL   Final Result      1.  No hydronephrosis.   2.  Ascites.      DX-CHEST-PORTABLE (1 VIEW)   Final Result      1.  No acute cardiac or pulmonary abnormalities are identified.      2.  Slight left basilar atelectasis.      [id number] VIEW   Final Result      1.  Several markedly dilated loops of small bowel in the mid abdomen suspicious for gastroenteritis or inflammatory change. Recommend follow-up abdominal series to rule out evolving small bowel obstruction.      EC-ECHOCARDIOGRAM COMPLETE W/O CONT   Final Result      CT-ABDOMEN-PELVIS W/O   Final Result      1.  Dilated fluid-filled small bowel and colonic loops favoring ileus.   2.  No evidence of bowel perforation.   3.  Hepatic cirrhosis with portal hypertension and moderate to large volume ascites.   4.  Nonocclusive superior mesenteric vein thrombosis.   5.  Cholelithiasis.   6.  Bibasilar atelectasis with minimal bilateral pleural fluid.   7.  Subtle findings concerning for pulmonary emboli.      These findings were electronically conveyed to JAMES WILLIAM on 11/3/2024 11:50 AM.         US-ABDOMEN COMPLETE  SURVEY   Final Result      1.  Cirrhosis with stigmata of portal hypertension including splenomegaly, recanalized umbilical vein and ascites.   2.  Gallbladder sludge and stones. Gallbladder wall thickening is nonspecific and could be related to liver disease but correlate clinically for evidence of acute cholecystitis. No biliary dilatation.         DX-CHEST-PORTABLE (1 VIEW)   Final Result      1.  Hazy left lower lobe opacity could represent atelectasis or pneumonitis.   2.  There is linear scarring or atelectasis in the right lung base.        MDM (Data Review):   -Records reviewed and summarized in current documentation  -I personally reviewed and interpreted the laboratory results  -I personally reviewed the radiology images    Assessment/Recommendations:  Acute liver failure- MELD 3.0- 38 on arrival (10/31/2024); 26.8% 90-day survival. Child-Garza Class C  SMV thrombosis  Portal hypertensive gastropathy  Ileus  Cirrhosis with ascites  SBP with Salmonella  Hepatic encephalopathy  Coagulopathy  Esophageal varices with banding in situ  Hematemesis  Severe protein calorie malnutrition  History of decompensated liver disease with variceal bleeding, refractory ascites  Salmonella bacteremia - pending review by State lab  Sepsis  EFRAIN  Respiratory alkalosis  Vitamin D deficiency  Negative PETH  Pneumonia and right pleural effusion  Hyperdynamic left ventricular systolic function of 75%, LVOT obstruction  Elevated INR   Hyperammonemia  Lactic acidosis  Elevated LDH    Plan:  Low sodium diet  Continue PPI IV twice daily  Continue zosyn   Continue lactulose (titrate to 2-3 loose BM/day) and Rifaximin    Serial abdominal exams  Hold anticoagulation, Monitor closely for bleeding- correction as needed.   Encourage out of bed, adequate nutrition, and sleep-wake cycles to avoid delirium  Accepted by [facility], awaiting bed availability.       Long term if patient survives admission:   Patient will need repeat EGD in 4-6  weeks  Nonselective beta-blocker as outpatient    GI will follow    Discussed with patient, Dr. Jean Baptiste, Dr. Ocampo    ..KRYS Pierre.    Core Quality Measures   Reviewed items::  Labs, Medications and Radiology reports reviewed

## 2024-11-12 NOTE — CARE PLAN
Problem: Humidified High Flow Nasal Cannula  Goal: Maintain adequate oxygenation dependent on patient condition  Description: Target End Date:  resolve prior to discharge or when underlying condition is resolved/stabilized    1.  Implement humidified high flow oxygen therapy  2.  Titrate high flow oxygen to maintain appropriate SpO2  Outcome: Not Met   30L/100%

## 2024-11-12 NOTE — THERAPY
Speech Language Pathology   Daily Treatment     Patient Name: Kavita Freeman  AGE:  59 y.o., SEX:  female  Medical Record #: [id number]  Date of Service: 11/12/2024    Precautions:  Precautions: Fall Risk, Swallow Precautions     Subjective  Pt seen A&Ox1 saturating on 60L HFNC 100% FiO2  Pt reported odynophagia, visualized irritation in the oral cavity consistent w/ oral thrush. RN notified.     Assessment  Pt required close assistance for self-feeding d/t mentation. No overt s/s of aspiration noted w/ cup and straw sips of thin liquids. Pt adverse to solids d/t odynophagia, able to take small amounts of puree only. She denied globus sensation. RR remained < 30 throughout.     Clinical Impressions  Pt w/ oral stage impacted by xerostomia and mentation. Now also w/ odynophagia w/ suspected oral thrush.   Pt also w/ increasing O2 needs w/ overall guarded medical status. Continue to monitor for signs of difficulty coordinating swallow-breath patterning. No difficulties noted on this date.   She would benefit from a modified diet w/ 1:1 feeding assistance and general swallow precautions. ST to f/u for diet advancement as mentation improves.     Recommendations  Treatment Completed: Dysphagia Treatment     Dysphagia Treatment  Diet Consistency: Thin/all Liquids, Purees  Instrumentation: None indicated at this time  Medication: As tolerated  Supervision: Direct supervision during meals  Positioning: Fully upright and midline during oral intake, Meals sitting upright in a chair, as tolerated  Risk Management : Small bites/sips, Slow rate of intake    SLP Treatment Plan  Treatment Plan: Dysphagia Treatment  SLP Frequency: 3x Per Week  Estimated Duration: Until Therapy Goals Met    Anticipated Discharge Needs  Discharge Recommendations: Recommend post-acute placement for additional speech therapy services prior to discharge home  Therapy Recommendations Upon DC: Dysphagia Training    Patient / Family Goals  Patient /  "Family Goal #1: \"I am thirsty\"  Goal #1 Outcome: Progressing as expected  Short Term Goals  Short Term Goal # 1: Pt will tolerate the safest, least restrictive diet w/ no signs of aspiration related pulmonary complications  Goal Outcome # 1: Progressing as expected  Short Term Goal # 2: Pt will demo timely and functional oral clearance to justify diet advancement  Goal Outcome # 2 : Progressing slower than expected    Mackenzie Pisano SLP  "

## 2024-11-12 NOTE — ASSESSMENT & PLAN NOTE
Aspiration precautions  Continue rifaximin and lactulose goal stool 1-1.5L  Limit sedative with advance liver disease and poor clearance of sedative/narcotic and benzo's to not precipitate/worsen HE  Increase lactulose

## 2024-11-12 NOTE — CARE PLAN
The patient is Unstable - High likelihood or risk of patient condition declining or worsening    Shift Goals  Clinical Goals: Stable hemodynamics, wean O2  Patient Goals: Transfer  Family Goals: JESUSITA    Progress made toward(s) clinical / shift goals: Pt. Remained hemodynamically stable, Q2 turns were performed.   Problem: Pain - Standard  Goal: Alleviation of pain or a reduction in pain to the patient’s comfort goal  Outcome: Progressing     Problem: Knowledge Deficit - Standard  Goal: Patient and family/care givers will demonstrate understanding of plan of care, disease process/condition, diagnostic tests and medications  Outcome: Progressing     Problem: Hemodynamics  Goal: Patient's hemodynamics, fluid balance and neurologic status will be stable or improve  Outcome: Progressing     Problem: Respiratory  Goal: Patient will achieve/maintain optimum respiratory ventilation and gas exchange  Outcome: Progressing     Problem: Skin Integrity  Goal: Skin integrity is maintained or improved  Outcome: Progressing       Patient is not progressing towards the following goals:

## 2024-11-12 NOTE — PROGRESS NOTES
Critical Care Progress Note    Date of admission  10/31/2024    Chief Complaint  59 y.o. female admitted 10/31/2024 with EtOH Cirrhosis, sober 4 months, admitted with fever chills and abdominal pain, found to have SBP with salmonella in culture as well as bacteremia. She has been followed in IMCU with lots of goals of care changes back and forth with advanced liver failure and multiorgan dyfunction. Transferred to ICU 11/10 for increase lactate, worsening encephalopathy and respiratory failure.     Hospital Course  11/10 transferred to ICU.   11/11 HFNC/Oxy RTOC and establish transfer for liver transplant eval. Coagulopathy s/p transfusion. Family conference of goals of care.     Interval Problem Update  Reviewed last 24 hour events:  Hypoglycemia 101  Neuro: aox3-4 no pain, ordynophagia  HR:   SBP: 100-110  Tmax: afebrile  GI: Stool 1.6L  UOP: 2L  Lines: peripheral IV, pagan  Resp: HFNC 50/90% weaning today  Vte: contra  PPI/H2: PPI  Antibx: Zosyn 2/5  -2.9L net -12.2L  Lactulose to oral  SLP eval  Respiratory viral panel PCR  Magic mouth wash swish and swallow    Review of Systems  Review of Systems   Constitutional:  Positive for malaise/fatigue. Negative for fever and weight loss.   Respiratory:  Positive for cough, hemoptysis and shortness of breath.    Gastrointestinal:  Negative for abdominal pain, nausea and vomiting.   Genitourinary:  Negative for dysuria.   Musculoskeletal:  Negative for back pain and myalgias.   Neurological:  Negative for dizziness, sensory change, focal weakness, weakness and headaches.   Psychiatric/Behavioral:  Negative for depression.         Vital Signs for last 24 hours   Temp:  [36.4 °C (97.5 °F)-37 °C (98.6 °F)] 36.4 °C (97.5 °F)  Pulse:  [] 106  Resp:  [22-59] 22  BP: (100-120)/(53-67) 105/55  SpO2:  [86 %-97 %] 86 %    Hemodynamic parameters for last 24 hours       Respiratory Information for the last 24 hours       Physical Exam   Physical Exam  Vitals and nursing  note reviewed.   Constitutional:       General: She is in acute distress.      Appearance: She is ill-appearing.      Comments: Ill appearing tachypnea on HFNC with bleeding from oral mucosa, jaundice   HENT:      Head: Normocephalic.      Mouth/Throat:      Mouth: Mucous membranes are dry.      Comments: Dried blood from nose and mouth  Eyes:      Pupils: Pupils are equal, round, and reactive to light.      Comments: jaundice   Cardiovascular:      Rate and Rhythm: Tachycardia present.      Heart sounds: No murmur heard.  Pulmonary:      Effort: Respiratory distress present.      Breath sounds: No stridor. No wheezing or rales.      Comments: Patient is tachypnea with fine crackles bilateral  Chest:      Chest wall: No tenderness.   Abdominal:      General: There is no distension.      Palpations: There is no mass.      Tenderness: There is abdominal tenderness. There is no guarding or rebound.      Hernia: No hernia is present.      Comments: Mild ascites with fluid wave not tense   Musculoskeletal:         General: No swelling or tenderness.      Cervical back: No rigidity or tenderness.   Skin:     Coloration: Skin is jaundiced. Skin is not pale.      Findings: Bruising present.      Comments: Spider angiomata and jaundiced with bruising, warm ext, no mottling or pallor.    Neurological:      Comments: She is able to provide conversation mild slow to respond but appropriate, moving all extremities, no focal weakness or slurred speech.          Medications  Current Facility-Administered Medications   Medication Dose Route Frequency Provider Last Rate Last Admin    lactulose 20 GM/30ML solution 30 mL  30 mL Oral TID Olvin Hanson M.D.        MBX (diphenhydrAMINE-lidocaine-Maalox) oral susp Cup 5 mL  5 mL Swish & Swallow Q6HRS PRN Olvin Hanson M.D. MD Alert...ICU Electrolyte Replacement per Pharmacy   Other PHARMACY TO DOSE Olvin Hanson M.D.        magnesium sulfate IVPB premix 4 g  4 g  Intravenous Once Olvin Hanson M.D. 25 mL/hr at 11/12/24 0942 4 g at 11/12/24 0942    pantoprazole (Protonix) injection 40 mg  40 mg Intravenous BID Olvin Hanson M.D.   40 mg at 11/12/24 0513    piperacillin-tazobactam (Zosyn) 4.5 g in  mL IVPB  4.5 g Intravenous Q8HRS Shahzad Jenkins M.D.   Stopped at 11/12/24 0920    thiamine (B-1) injection 200 mg  200 mg Intravenous DAILY Shahzad Jenkins M.D.   200 mg at 11/12/24 0513    riFAXIMin (Xifaxan) tablet 550 mg  550 mg Oral BID Quang Durham D.OSandy   550 mg at 11/12/24 0512    acetaminophen (Tylenol) tablet 650 mg  650 mg Oral Q6HRS PRN Adilia Gonzales D.O.        ondansetron (Zofran) syringe/vial injection 4 mg  4 mg Intravenous Q4HRS PRN Adilia Gonzales, D.O.   4 mg at 11/12/24 0839    ondansetron (Zofran ODT) dispertab 4 mg  4 mg Oral Q4HRS PRN Adilia Gonzales, D.O.        promethazine (Phenergan) tablet 12.5-25 mg  12.5-25 mg Oral Q4HRS PRN Adilia Gonzales, D.O.   25 mg at 11/04/24 1116    promethazine (Phenergan) suppository 12.5-25 mg  12.5-25 mg Rectal Q4HRS PRN Adilia Gonzales, D.O.        prochlorperazine (Compazine) injection 5-10 mg  5-10 mg Intravenous Q4HRS PRN Adilia Gonzales, D.O.   10 mg at 11/04/24 1228    dextrose 50% (D50W) injection 25 g  25 g Intravenous Q15 MIN PRN Adilia Gonzales D.O.   25 g at 11/12/24 0439    FLUoxetine (PROzac) capsule 10 mg  10 mg Oral QAM Adilia Gonzales, D.O.   10 mg at 11/12/24 0512    lidocaine (Asperflex) 4 % patch 1 Patch  1 Patch Transdermal Q24HRS Adilia Gonzales D.O.   1 Patch at 11/12/24 0512       Fluids    Intake/Output Summary (Last 24 hours) at 11/12/2024 1043  Last data filed at 11/12/2024 1000  Gross per 24 hour   Intake 687 ml   Output 3950 ml   Net -3263 ml       Laboratory  Recent Labs     11/10/24  1148   IRFBK02D 7.47   [id number] 24.4*   [id number] 57.8*   [id number] 87.1*   ARTHCO3 18   [id number] 1.5L   ARTBE -5*         Recent Labs     11/10/24  0050 11/11/24  0206  11/12/24 0341   SODIUM 130* 135 135   POTASSIUM 4.7 4.4 4.4   CHLORIDE 99 101 101   CO2 18* 18* 18*   BUN 31* 41* 31*   CREATININE 1.09 1.37 0.51   MAGNESIUM 2.1 1.9 1.7   PHOSPHORUS 3.7 4.7* 3.9   CALCIUM 8.8 8.9 9.2     Recent Labs     11/10/24  0050 11/10/24  1148 11/11/24 0206 11/12/24 0341   ALTSGPT 38  --  26 24   ASTSGOT 90*  --  83* 86*   ALKPHOSPHAT 124*  --  83 100*   TBILIRUBIN 16.2*  --  15.2* 16.9*   DBILIRUBIN  --  9.9*  --   --    GLUCOSE 114*  --  83 34*     Recent Labs     11/10/24  0050 11/10/24  0150 11/10/24  1148 11/11/24  0206 11/11/24  0300 11/11/24  1055 11/11/24  1610 11/11/24 2122 11/12/24 0341   WBC  --    < > 33.3*  --  23.5*   < > 31.3* 29.7* 26.0*   NEUTSPOLYS  --   --  98.20*  --  88.50*  --   --   --  90.00*   LYMPHOCYTES  --   --  0.00*  --  4.00*  --   --   --  3.40*   MONOCYTES  --   --  1.80  --  5.50  --   --   --  4.90   EOSINOPHILS  --   --  0.00  --  0.50  --   --   --  0.30   BASOPHILS  --   --  0.00  --  0.10  --   --   --  0.20   ASTSGOT 90*  --   --  83*  --   --   --   --  86*   ALTSGPT 38  --   --  26  --   --   --   --  24   ALKPHOSPHAT 124*  --   --  83  --   --   --   --  100*   TBILIRUBIN 16.2*  --   --  15.2*  --   --   --   --  16.9*    < > = values in this interval not displayed.     Recent Labs     11/10/24  0814 11/10/24  1148 11/11/24  0951 11/11/24  1055 11/11/24  1610 11/11/24  2122 11/12/24  0341   RBC  --    < >  --    < > 2.47* 2.59* 2.59*   HEMOGLOBIN  --    < >  --    < > 7.4* 7.8* 7.8*   HEMATOCRIT  --    < >  --    < > 22.0* 22.8* 23.6*   PLATELETCT  --    < >  --    < > 93* 108* 103*   PROTHROMBTM 77.9*  --  62.1*  --   --   --  38.2*   INR 9.53*  --  7.15*  --   --   --  3.85*    < > = values in this interval not displayed.       Imaging  X-Ray:  I have personally reviewed the images and compared with prior images.  CT:    Reviewed  Echo:   Reviewed  Ultrasound:  Reviewed    Assessment/Plan  * Sepsis (HCC)- (present on admission)  Assessment &  Plan  This is Sepsis Present on admission  SIRS criteria identified on my evaluation include: Tachycardia, with heart rate greater than 90 BPM and Tachypnea, with respirations greater than 20 per minute  Clinical indicators of end organ dysfunction include Lactic Acid greater than 2 and Toxic Metabolic Encephalopathy  Source is PNA  Sepsis protocol initiated  Crystalloid Fluid Administration: Resuscitation volume of 20 cc/kg ordered. Reason that resuscitation volume of less than 30ml/kg was ordered concern for fluid overload  IV antibiotics as appropriate for source of sepsis  Reassessment: I have reassessed the patient's hemodynamic status    Zosyn   Follow cultures  Trend lactic acid    Acute respiratory failure with hypoxia (HCC)  Assessment & Plan  Secondary to multifocal PNA and aspiration pneumonitis  Small right effusion   HFNC  Empiric antibiotics  SpO2 > 90%  Monitor need for intubation  MRSA nares negative, check respiratory PCR and Covid seems unlikely with sudden desaturation and oxygen needs from room air to max HFNC    Systolic anterior movement of mitral valve  Assessment & Plan  Based upon ECHO 11/10/24   Very high risk for obstructive shock with hypovolemia and tachycardia     Maintain euvolemia  Neosynephrine for vasopressor if needed     Pneumonia  Assessment & Plan  Transferred to the ICU 11/10/24 for elevated LA, worsening CXR  Went from room air to needing max HFNC in < 12 with bilateral alveolar infiltrates consistent with aspiration event.   SpO2 > 90%  Finish course of Zosyn x5 days  MRSA nare negative stopped Linezolid with sudden drop of platelets  Follow cultures, respiratory PCR  Aspiration precaution and speech eval  Naso/oral bleeding leading to event? INR 9 at time      SBP (spontaneous bacterial peritonitis) (HCC)  Assessment & Plan  Salmonella sp with associated bacteremia based upon cultures from 10/31    Elevated lactic acid level  Assessment & Plan  Multifactorial: cirrhosis,  sepsis, malnutrition  Thiamine  Resuscitation with albumin  Empiric antibiotics  Trend  No shock so likely Type B lactate    Elevated INR  Assessment & Plan  See coagulopathy plan    Superior mesenteric vein thrombosis (HCC)  Assessment & Plan  Non-occlusive on CT 11/3   Anticoagulation is strictly contraindicated    Acute encephalopathy  Assessment & Plan  Improved  Aspiration precautions  Continue rifaximin and lactulose goal stool 1-1.5L    Severe protein-calorie malnutrition ([place]: less than 60% of standard weight) (HCC)  Assessment & Plan  Thiamine replacement  Optimize nutrition as able    Advance care planning- (present on admission)  Assessment & Plan  She and family reverted to full code status during this hospitalization  Pall med consult     Hepatic encephalopathy (HCC)- (present on admission)  Assessment & Plan  Lactulose  Rifaximin   Optimize acid/base and electrolytes  Maintain euvolemia    Coagulopathy (HCC)- (present on admission)  Assessment & Plan  Secondary to liver failure  Last dose of Eliquis 11/9  Follow Teg w/ platelet mapping, INR and cryo  Monitor for source of spontaneous hemorrhage  Correct and monitor calcium  Prognosis poor  Improved 11/12 s/p 3 FFP and cyro    Anemia- (present on admission)  Assessment & Plan  Acute on chronic goal hg >7  Monitor for active hemorrhage and monitor and serial correct coagulopathy  Conservative transfusion protocol      Acute renal failure (ARF) (HCC)- (present on admission)  Assessment & Plan  Resolved 11/12  Avoid nephrotoxins    Alcoholic cirrhosis of liver with ascites (HCC)- (present on admission)  Assessment & Plan  Patient with alcoholic cirrhosis sober since June 2024, now with multiple organ failure trigger by SBP.   Avoid hepatoxins, serial monitor liver and synthetic function   Monitor for hepatic encephalopathy and serial monitor ammonia level  Check: hepatitis panel, tylenol level, liver ultrasound and rule out vascular etiology  S/p course  of steroids finished last month  Non-occlusive SMA thrombosis  I have reached out to [facility] and [facility] pending transfer. Was accepted to [facility] pending bed availabilty.   She has a 1st outpatient hepatology eval at [facility] in January but not established.   Daily meld labs, prognosis looks grim with multiple organ failure.       Essential hypertension- (present on admission)  Assessment & Plan  Reintroduce oral antihypertensives when appropriate           VTE:  Contraindicated  Ulcer: PPI  Lines: None    I have performed a physical exam and reviewed and updated ROS and Plan today (11/12/2024). In review of yesterday's note (11/11/2024), there are no changes except as documented above.     Discussed patient condition and risk of morbidity and/or mortality with RN, RT, Pharmacy, Charge nurse / hot rounds, Patient, and GI    The patient remains critically ill on HFNC.  Critical care time = 47 minutes in directly providing and coordinating critical care and extensive data review.  No time overlap and excludes procedures.

## 2024-11-12 NOTE — PROGRESS NOTES
Pt O2 sustaining in 70s, staff assist called. NRB applied at 15L. MD to bedside. O2 sats improved to 90s.

## 2024-11-12 NOTE — CARE PLAN
Problem: Pain - Standard  Goal: Alleviation of pain or a reduction in pain to the patient’s comfort goal  Outcome: Progressing     Problem: Knowledge Deficit - Standard  Goal: Patient and family/care givers will demonstrate understanding of plan of care, disease process/condition, diagnostic tests and medications  Outcome: Progressing     Problem: Hemodynamics  Goal: Patient's hemodynamics, fluid balance and neurologic status will be stable or improve  Outcome: Progressing     Problem: Fluid Volume  Goal: Fluid volume balance will be maintained  Outcome: Progressing     Problem: Urinary - Renal Perfusion  Goal: Ability to achieve and maintain adequate renal perfusion and functioning will improve  Outcome: Progressing     Problem: Respiratory  Goal: Patient will achieve/maintain optimum respiratory ventilation and gas exchange  Outcome: Progressing     Problem: Mechanical Ventilation  Goal: Safe management of artificial airway and ventilation  Outcome: Progressing  Goal: Successful weaning off mechanical ventilator, spontaneously maintains adequate gas exchange  Outcome: Progressing  Goal: Patient will be able to express needs and understand communication  Outcome: Progressing     Problem: Physical Regulation  Goal: Diagnostic test results will improve  Outcome: Progressing  Goal: Signs and symptoms of infection will decrease  Outcome: Progressing     Problem: Skin Integrity  Goal: Skin integrity is maintained or improved  Outcome: Progressing     Problem: Fall Risk  Goal: Patient will remain free from falls  Outcome: Progressing   The patient is Stable - Low risk of patient condition declining or worsening    Shift Goals  Clinical Goals: Stable hemodynamics, wean O2  Patient Goals: Transfer  Family Goals: JESUSITA    Progress made toward(s) clinical / shift goals:  yes    Patient is not progressing towards the following goals:

## 2024-11-12 NOTE — PROGRESS NOTES
[facility] ICU Progress Note      Admit Date: 10/31/2024    Resident(s): Roberto Jean Baptiste M.D.   Attending:  Dr. Olvin Hanson    Patient ID:    Name:  Kavita Freeman   YOB: 1965  Age:  59 y.o.  female   MRN:  [id number]    Hospital Course (carried forward and updated):  59 y.o. female admitted 10/31/2024 with EtOH Cirrhosis, sober 4 months, admitted with fever chills and abdominal pain, found to have SBP with salmonella in culture as well as bacteremia. She has been followed in IMCU with lots of goals of care changes back and forth with advanced liver failure and multiorgan dyfunction. Transferred to ICU 11/10 for increase lactate, worsening encephalopathy and respiratory failure.     Interval Events:  11/10 transferred to ICU.   11/11: Zyvox and zosyn for aspiration pneumonitis. CXR improved without effusion. Reversed coagulopathy INR of 9 with 2FFP 1 Cryoprecipitate  11/12: Remains encephalopathic. 1.6L stool out through BMS.      Consultants:  Critical Care  Palliative care    Vitals Range last 24h:  Temp:  [36.4 °C (97.5 °F)-37 °C (98.6 °F)] 36.4 °C (97.5 °F)  Pulse:  [] 104  Resp:  [22-59] 22  BP: (100-120)/(53-67) 105/55  SpO2:  [86 %-97 %] 92 %      Intake/Output Summary (Last 24 hours) at 11/12/2024 0642  Last data filed at 11/12/2024 0600  Gross per 24 hour   Intake 687 ml   Output 3600 ml   Net -2913 ml        Review of Systems   Unable to perform ROS: Mental status change        PHYSICAL EXAM:  Vitals:    11/12/24 0400 11/12/24 0500 11/12/24 0600 11/12/24 0602   BP: 108/57 107/57 105/55    Pulse: 91 97 (!) 102 (!) 104   Resp: (!) 38 (!) 53 (!) 59 (!) 22   Temp: 36.4 °C (97.5 °F)      TempSrc: Temporal      SpO2: 97% 94% 93% 92%   Weight: 70 kg (154 lb 5.2 oz)      Height:        Body mass index is 23.46 kg/m².    O2 therapy: Pulse Oximetry: 92 %, O2 (LPM): 50, O2 Delivery Device: Heated High Flow Nasal Cannula         Physical Exam  Vitals and nursing note reviewed.   Constitutional:        General: She is not in acute distress.     Appearance: She is ill-appearing.      Comments: Ill appearing on [facility]   HENT:      Head: Normocephalic.      Mouth/Throat:      Mouth: Mucous membranes are dry.      Comments: Dried blood from nose and mouth  Eyes:      General: Scleral icterus present.      Pupils: Pupils are equal, round, and reactive to light.   Cardiovascular:      Rate and Rhythm: Regular rhythm. Tachycardia present.      Heart sounds: No murmur heard.  Pulmonary:      Effort: Respiratory distress present.      Breath sounds: No stridor. Rhonchi and rales present. No wheezing.   Chest:      Chest wall: No tenderness.   Abdominal:      General: There is distension.      Palpations: There is no mass.      Tenderness: There is abdominal tenderness. There is no guarding or rebound.      Hernia: No hernia is present.      Comments: Mild ascites with fluid wave not tense   Musculoskeletal:         General: No swelling or tenderness.      Cervical back: No rigidity or tenderness.   Skin:     Coloration: Skin is jaundiced. Skin is not pale.      Findings: Bruising present.   Neurological:      Mental Status: She is disoriented.      Comments: She is able to say her name but rest of history is limited, moves all extremities       Recent Labs     11/10/24  1148   XMBHW50X 7.47   [id number] 24.4*   LUPWE087L 57.8*   HHXI2ZED 87.1*   ARTHCO3 18   [id number] 1.5L   ARTBE -5*     Recent Labs     11/10/24  0050 11/11/24  0206 11/12/24  0341   SODIUM 130* 135 135   POTASSIUM 4.7 4.4 4.4   CHLORIDE 99 101 101   CO2 18* 18* 18*   BUN 31* 41* 31*   CREATININE 1.09 1.37 0.51   MAGNESIUM 2.1 1.9 1.7   PHOSPHORUS 3.7 4.7* 3.9   CALCIUM 8.8 8.9 9.2     Recent Labs     11/10/24  0050 11/10/24  1148 11/11/24  0206 11/12/24  0341   ALTSGPT 38  --  26 24   ASTSGOT 90*  --  83* 86*   ALKPHOSPHAT 124*  --  83 100*   TBILIRUBIN 16.2*  --  15.2* 16.9*   DBILIRUBIN  --  9.9*  --   --    GLUCOSE 114*  --  83 34*     Recent Labs      11/09/24  0930 11/10/24  0050 11/10/24  0814 11/10/24  1148 11/11/24  0951 11/11/24  1055 11/11/24 1610 11/11/24 2122 11/12/24 0341   RBC  --    < >  --    < >  --    < > 2.47* 2.59* 2.59*   HEMOGLOBIN  --    < >  --    < >  --    < > 7.4* 7.8* 7.8*   HEMATOCRIT  --    < >  --    < >  --    < > 22.0* 22.8* 23.6*   PLATELETCT  --    < >  --    < >  --    < > 93* 108* 103*   PROTHROMBTM 110.8*   < > 77.9*  --  62.1*  --   --   --  38.2*   APTT 89.7*  --   --   --   --   --   --   --   --    INR >=10.00*   < > 9.53*  --  7.15*  --   --   --  3.85*    < > = values in this interval not displayed.     Recent Labs     11/10/24  0050 11/10/24  0150 11/10/24  1148 11/11/24  0206 11/11/24  0300 11/11/24  1055 11/11/24 1610 11/11/24 2122 11/12/24 0341   WBC  --    < > 33.3*  --  23.5*   < > 31.3* 29.7* 26.0*   NEUTSPOLYS  --   --  98.20*  --  88.50*  --   --   --  90.00*   LYMPHOCYTES  --   --  0.00*  --  4.00*  --   --   --  3.40*   MONOCYTES  --   --  1.80  --  5.50  --   --   --  4.90   EOSINOPHILS  --   --  0.00  --  0.50  --   --   --  0.30   BASOPHILS  --   --  0.00  --  0.10  --   --   --  0.20   ASTSGOT 90*  --   --  83*  --   --   --   --  86*   ALTSGPT 38  --   --  26  --   --   --   --  24   ALKPHOSPHAT 124*  --   --  83  --   --   --   --  100*   TBILIRUBIN 16.2*  --   --  15.2*  --   --   --   --  16.9*    < > = values in this interval not displayed.       Meds:   pantoprazole  40 mg      lactulose  300 mL      piperacillin-tazobactam  4.5 g 4.5 g (11/12/24 0520)    thiamine  200 mg      riFAXIMin  550 mg      acetaminophen  650 mg      ondansetron  4 mg      ondansetron  4 mg      promethazine  12.5-25 mg      promethazine  12.5-25 mg      prochlorperazine  5-10 mg      dextrose bolus  25 g      FLUoxetine  10 mg      lidocaine  1 Patch          Imaging:  DX-CHEST-PORTABLE (1 VIEW)   Final Result         1.  Pulmonary edema and/or infiltrates, similar to prior study.   2.  Layering right pleural effusion,  decreased since prior      DX-CHEST-PORTABLE (1 VIEW)   Final Result         1.  Pulmonary edema and/or infiltrates, similar to prior study.   2.  Layering right pleural effusion, decreased since prior      EC-ECHOCARDIOGRAM LTD W/O CONT   Final Result      DX-CHEST-PORTABLE (1 VIEW)   Final Result         1.  New significant right pleural effusion identified.      2.  Significant increase in diffuse pulmonary opacifications could be due to pulmonary edema or pneumonia.      CT-HEAD W/O   Final Result   Impression:      1. No acute process in the brain evident.      2. The skull base sinuses are clear.                  US-RENAL   Final Result      1.  No hydronephrosis.   2.  Ascites.      DX-CHEST-PORTABLE (1 VIEW)   Final Result      1.  No acute cardiac or pulmonary abnormalities are identified.      2.  Slight left basilar atelectasis.      BD-FPUPTOY-3 VIEW   Final Result      1.  Several markedly dilated loops of small bowel in the mid abdomen suspicious for gastroenteritis or inflammatory change. Recommend follow-up abdominal series to rule out evolving small bowel obstruction.      EC-ECHOCARDIOGRAM COMPLETE W/O CONT   Final Result      CT-ABDOMEN-PELVIS W/O   Final Result      1.  Dilated fluid-filled small bowel and colonic loops favoring ileus.   2.  No evidence of bowel perforation.   3.  Hepatic cirrhosis with portal hypertension and moderate to large volume ascites.   4.  Nonocclusive superior mesenteric vein thrombosis.   5.  Cholelithiasis.   6.  Bibasilar atelectasis with minimal bilateral pleural fluid.   7.  Subtle findings concerning for pulmonary emboli.      These findings were electronically conveyed to JAMES WILLIAM on 11/3/2024 11:50 AM.         US-ABDOMEN COMPLETE SURVEY   Final Result      1.  Cirrhosis with stigmata of portal hypertension including splenomegaly, recanalized umbilical vein and ascites.   2.  Gallbladder sludge and stones. Gallbladder wall thickening is  nonspecific and could be related to liver disease but correlate clinically for evidence of acute cholecystitis. No biliary dilatation.         DX-CHEST-PORTABLE (1 VIEW)   Final Result      1.  Hazy left lower lobe opacity could represent atelectasis or pneumonitis.   2.  There is linear scarring or atelectasis in the right lung base.          ASSESSMENT and PLAN:    * Sepsis (HCC)- (present on admission)  Assessment & Plan  This is Sepsis Present on admission  SIRS criteria identified on my evaluation include: Tachycardia, with heart rate greater than 90 BPM and Tachypnea, with respirations greater than 20 per minute  Clinical indicators of end organ dysfunction include Lactic Acid greater than 2 and Toxic Metabolic Encephalopathy  Source is PNA  Sepsis protocol initiated  Crystalloid Fluid Administration: Resuscitation volume of 20 cc/kg ordered. Reason that resuscitation volume of less than 30ml/kg was ordered concern for fluid overload  IV antibiotics as appropriate for source of sepsis  Reassessment: I have reassessed the patient's hemodynamic status    Zosyn   Follow cultures  Trend lactic acid, improving     Acute respiratory failure with hypoxia (HCC)  Assessment & Plan  Secondary to multifocal PNA and aspiration pneumonitis  Small right effusion   HFNC  Empiric antibiotics  SpO2 > 90%  Monitor need for intubation    Systolic anterior movement of mitral valve  Assessment & Plan  Based upon ECHO 11/10/24   Very high risk for obstructive shock with hypovolemia and tachycardia     Maintain euvolemia  Neosynephrine for vasopressor if needed     Pneumonia  Assessment & Plan  Admitted to the ICU 11/10/24 for elevated LA, worsening CXR    SpO2 > 90%  Empiric linezolid + zosyn  MRSA nare  Follow cultures     SBP (spontaneous bacterial peritonitis) (HCC)  Assessment & Plan  Salmonella sp with associated bacteremia based upon cultures from 10/31    Elevated lactic acid level  Assessment & Plan  Multifactorial:  cirrhosis, sepsis, malnutrition  Thiamine  Resuscitation with albumin  Empiric antibiotics  Trend  No shock so likely Type B lactate    Elevated INR  Assessment & Plan  See coagulopathy plan    Superior mesenteric vein thrombosis (HCC)  Assessment & Plan  Non-occlusive on CT 11/3   Anticoagulation is strictly contraindicated    Severe protein-calorie malnutrition ([place]: less than 60% of standard weight) (HCC)  Assessment & Plan  Thiamine replacement  Optimize nutrition as able    Advance care planning- (present on admission)  Assessment & Plan  She and family reverted to full code status during this hospitalization  Pall med consult     Hepatic encephalopathy (HCC)- (present on admission)  Assessment & Plan  Lactulose  Rifaximin   Target 1.5L stool output daily  Optimize acid/base and electrolytes  Maintain euvolemia    Coagulopathy (HCC)- (present on admission)  Assessment & Plan  Secondary to liver failure  Last dose of Eliquis 11/9  Follow Teg w/ platelet mapping, INR and cryo  Monitor for source of spontaneous hemorrhage  Correct and monitor calcium  Prognosis poor    Anemia- (present on admission)  Assessment & Plan  Acute on chronic goal hg >7  Monitor for active hemorrhage and monitor and serial correct coagulopathy  Conservative transfusion protocol    Acute renal failure (ARF) (HCC)- (present on admission)  Assessment & Plan  Ischemic ATN + sepsis + HRS  Albumin resuscitation  Strict I/Os  Renally dosed medications  Avoid nephrotoxic agents as able  Trend; improving    Alcoholic cirrhosis of liver with ascites (HCC)- (present on admission)  Assessment & Plan  Patient with alcoholic cirrhosis sober since June 2024, now with multiple organ failure trigger by SBP.   Avoid hepatoxins, serial monitor liver and synthetic function   Monitor for hepatic encephalopathy and serial monitor ammonia level  Check: hepatitis panel, tylenol level, liver ultrasound and rule out vascular etiology  S/p course of steroids  finished last month  Non-occlusive SMA thrombosis  Will reach out to Transplant centers again to discuss if she is a candidate for transplant evaluation.   She has a 1st outpatient hepatology eval at [facility] in January. [facility] had accepted her in the past week.   Daily meld labs, prognosis looks grim with multiple organ failure.       Essential hypertension- (present on admission)  Assessment & Plan  Reintroduce oral antihypertensives when appropriate        DISPO: ICU    CODE STATUS: Full code    Quality Measures:  Feeding: Level 6 soft  Analgesia: None  Sedation: None  Thromboprophylaxis: Contraindicated  Head of bed: >30 degrees  Ulcer prophylaxis: PPI  Glycemic control: Hypoglycemia protocol  Bowel care: bowel regimen  Indwelling lines: Herrera, 2x PIV  Deescalation of antibiotics: Zosyn d3/5

## 2024-11-13 PROBLEM — J80 ARDS (ADULT RESPIRATORY DISTRESS SYNDROME) (HCC): Status: ACTIVE | Noted: 2024-01-01

## 2024-11-13 LAB
ALBUMIN SERPL BCP-MCNC: 3.2 G/DL (ref 3.2–4.9)
ALBUMIN/GLOB SERPL: 1.1 G/DL
ALP SERPL-CCNC: 112 U/L (ref 30–99)
ALT SERPL-CCNC: 30 U/L (ref 2–50)
ANION GAP SERPL CALC-SCNC: 13 MMOL/L (ref 7–16)
AST SERPL-CCNC: 107 U/L (ref 12–45)
BASE EXCESS BLDA CALC-SCNC: -4 MMOL/L (ref -4–3)
BASE EXCESS BLDA CALC-SCNC: -6 MMOL/L (ref -4–3)
BASE EXCESS BLDA CALC-SCNC: -6 MMOL/L (ref -4–3)
BASOPHILS # BLD AUTO: 0.1 % (ref 0–1.8)
BASOPHILS # BLD: 0.03 K/UL (ref 0–0.12)
BILIRUB SERPL-MCNC: 17.4 MG/DL (ref 0.1–1.5)
BODY TEMPERATURE: ABNORMAL DEGREES
BREATHS SETTING VENT: 20
BREATHS SETTING VENT: 22
BUN SERPL-MCNC: 37 MG/DL (ref 8–22)
CALCIUM ALBUM COR SERPL-MCNC: 9.4 MG/DL (ref 8.5–10.5)
CALCIUM SERPL-MCNC: 8.8 MG/DL (ref 8.5–10.5)
CHLORIDE SERPL-SCNC: 101 MMOL/L (ref 96–112)
CO2 BLDA-SCNC: 21 MMOL/L (ref 20–33)
CO2 BLDA-SCNC: 21 MMOL/L (ref 20–33)
CO2 BLDA-SCNC: 22 MMOL/L (ref 20–33)
CO2 SERPL-SCNC: 20 MMOL/L (ref 20–33)
CREAT SERPL-MCNC: 1.27 MG/DL (ref 0.5–1.4)
DELSYS IDSYS: ABNORMAL
END TIDAL CARBON DIOXIDE IECO2: 24 MMHG
END TIDAL CARBON DIOXIDE IECO2: 30 MMHG
EOSINOPHIL # BLD AUTO: 0.19 K/UL (ref 0–0.51)
EOSINOPHIL NFR BLD: 0.8 % (ref 0–6.9)
ERYTHROCYTE [DISTWIDTH] IN BLOOD BY AUTOMATED COUNT: 62.1 FL (ref 35.9–50)
FUNGUS SPEC FUNGUS STN: NORMAL
GFR SERPLBLD CREATININE-BSD FMLA CKD-EPI: 49 ML/MIN/1.73 M 2
GLOBULIN SER CALC-MCNC: 2.9 G/DL (ref 1.9–3.5)
GLUCOSE BLD STRIP.AUTO-MCNC: 116 MG/DL (ref 65–99)
GLUCOSE BLD STRIP.AUTO-MCNC: 117 MG/DL (ref 65–99)
GLUCOSE BLD STRIP.AUTO-MCNC: 143 MG/DL (ref 65–99)
GLUCOSE BLD STRIP.AUTO-MCNC: 172 MG/DL (ref 65–99)
GLUCOSE BLD STRIP.AUTO-MCNC: 62 MG/DL (ref 65–99)
GLUCOSE BLD STRIP.AUTO-MCNC: 69 MG/DL (ref 65–99)
GLUCOSE BLD STRIP.AUTO-MCNC: 73 MG/DL (ref 65–99)
GLUCOSE SERPL-MCNC: 49 MG/DL (ref 65–99)
GRAM STN SPEC: NORMAL
HCO3 BLDA-SCNC: 19.4 MMOL/L (ref 17–25)
HCO3 BLDA-SCNC: 19.9 MMOL/L (ref 17–25)
HCO3 BLDA-SCNC: 21 MMOL/L (ref 17–25)
HCT VFR BLD AUTO: 24.1 % (ref 37–47)
HGB BLD-MCNC: 7.8 G/DL (ref 12–16)
HOROWITZ INDEX BLDA+IHG-RTO: 110 MM[HG]
HOROWITZ INDEX BLDA+IHG-RTO: 41 MM[HG]
HOROWITZ INDEX BLDA+IHG-RTO: 90 MM[HG]
IMM GRANULOCYTES # BLD AUTO: 0.24 K/UL (ref 0–0.11)
IMM GRANULOCYTES NFR BLD AUTO: 1.1 % (ref 0–0.9)
INR PPP: 4.44 (ref 0.87–1.13)
LACTATE BLD-SCNC: 4.2 MMOL/L (ref 0.5–2)
LACTATE SERPL-SCNC: 4.3 MMOL/L (ref 0.5–2)
LPM ILPM: 60 LPM
LYMPHOCYTES # BLD AUTO: 0.69 K/UL (ref 1–4.8)
LYMPHOCYTES NFR BLD: 3 % (ref 22–41)
MAGNESIUM SERPL-MCNC: 2.4 MG/DL (ref 1.5–2.5)
MCH RBC QN AUTO: 30.1 PG (ref 27–33)
MCHC RBC AUTO-ENTMCNC: 32.4 G/DL (ref 32.2–35.5)
MCV RBC AUTO: 93.1 FL (ref 81.4–97.8)
MODE IMODE: ABNORMAL
MODE IMODE: ABNORMAL
MONOCYTES # BLD AUTO: 0.81 K/UL (ref 0–0.85)
MONOCYTES NFR BLD AUTO: 3.6 % (ref 0–13.4)
MYCOBACTERIUM SPEC CULT: NORMAL
NEUTROPHILS # BLD AUTO: 20.85 K/UL (ref 1.82–7.42)
NEUTROPHILS NFR BLD: 91.4 % (ref 44–72)
NRBC # BLD AUTO: 0 K/UL
NRBC BLD-RTO: 0 /100 WBC (ref 0–0.2)
O2/TOTAL GAS SETTING VFR VENT: 100 %
O2/TOTAL GAS SETTING VFR VENT: 50 %
O2/TOTAL GAS SETTING VFR VENT: 70 %
PCO2 BLDA: 36.8 MMHG (ref 26–37)
PCO2 BLDA: 39.2 MMHG (ref 26–37)
PCO2 BLDA: 39.8 MMHG (ref 26–37)
PCO2 TEMP ADJ BLDA: 35 MMHG (ref 26–37)
PCO2 TEMP ADJ BLDA: 38.4 MMHG (ref 26–37)
PCO2 TEMP ADJ BLDA: 38.4 MMHG (ref 26–37)
PEEP END EXPIRATORY PRESSURE IPEEP: 10 CMH20
PEEP END EXPIRATORY PRESSURE IPEEP: 8 CMH20
PH BLDA: 7.31 [PH] (ref 7.4–7.5)
PH BLDA: 7.33 [PH] (ref 7.4–7.5)
PH BLDA: 7.34 [PH] (ref 7.4–7.5)
PH TEMP ADJ BLDA: 7.32 [PH] (ref 7.4–7.5)
PH TEMP ADJ BLDA: 7.34 [PH] (ref 7.4–7.5)
PH TEMP ADJ BLDA: 7.35 [PH] (ref 7.4–7.5)
PHOSPHATE SERPL-MCNC: 3.9 MG/DL (ref 2.5–4.5)
PLATELET # BLD AUTO: 118 K/UL (ref 164–446)
PMV BLD AUTO: 10.5 FL (ref 9–12.9)
PO2 BLDA: 41 MMHG (ref 64–87)
PO2 BLDA: 55 MMHG (ref 64–87)
PO2 BLDA: 63 MMHG (ref 64–87)
PO2 TEMP ADJ BLDA: 40 MMHG (ref 64–87)
PO2 TEMP ADJ BLDA: 51 MMHG (ref 64–87)
PO2 TEMP ADJ BLDA: 60 MMHG (ref 64–87)
POTASSIUM SERPL-SCNC: 3.6 MMOL/L (ref 3.6–5.5)
PROT SERPL-MCNC: 6.1 G/DL (ref 6–8.2)
PROTHROMBIN TIME: 42.7 SEC (ref 12–14.6)
RBC # BLD AUTO: 2.59 M/UL (ref 4.2–5.4)
RHODAMINE-AURAMINE STN SPEC: NORMAL
RHODAMINE-AURAMINE STN SPEC: NORMAL
SAO2 % BLDA: 74 % (ref 93–99)
SAO2 % BLDA: 86 % (ref 93–99)
SAO2 % BLDA: 90 % (ref 93–99)
SIGNIFICANT IND 70042: NORMAL
SITE SITE: NORMAL
SODIUM SERPL-SCNC: 134 MMOL/L (ref 135–145)
SOURCE SOURCE: NORMAL
SPECIMEN DRAWN FROM PATIENT: ABNORMAL
TIDAL VOLUME IVT: 430 ML
TIDAL VOLUME IVT: 470 ML
WBC # BLD AUTO: 22.8 K/UL (ref 4.8–10.8)

## 2024-11-13 PROCEDURE — 0B9F8ZX DRAINAGE OF RIGHT LOWER LUNG LOBE, VIA NATURAL OR ARTIFICIAL OPENING ENDOSCOPIC, DIAGNOSTIC: ICD-10-PCS | Performed by: INTERNAL MEDICINE

## 2024-11-13 PROCEDURE — 87102 FUNGUS ISOLATION CULTURE: CPT

## 2024-11-13 PROCEDURE — 700111 HCHG RX REV CODE 636 W/ 250 OVERRIDE (IP): Mod: JZ | Performed by: INTERNAL MEDICINE

## 2024-11-13 PROCEDURE — 92950 HEART/LUNG RESUSCITATION CPR: CPT

## 2024-11-13 PROCEDURE — 700102 HCHG RX REV CODE 250 W/ 637 OVERRIDE(OP): Performed by: HOSPITALIST

## 2024-11-13 PROCEDURE — A9270 NON-COVERED ITEM OR SERVICE: HCPCS | Performed by: INTERNAL MEDICINE

## 2024-11-13 PROCEDURE — 31645 BRNCHSC W/THER ASPIR 1ST: CPT | Performed by: INTERNAL MEDICINE

## 2024-11-13 PROCEDURE — 36600 WITHDRAWAL OF ARTERIAL BLOOD: CPT

## 2024-11-13 PROCEDURE — 700105 HCHG RX REV CODE 258: Performed by: INTERNAL MEDICINE

## 2024-11-13 PROCEDURE — 85025 COMPLETE CBC W/AUTO DIFF WBC: CPT

## 2024-11-13 PROCEDURE — 87116 MYCOBACTERIA CULTURE: CPT

## 2024-11-13 PROCEDURE — 700102 HCHG RX REV CODE 250 W/ 637 OVERRIDE(OP): Performed by: INTERNAL MEDICINE

## 2024-11-13 PROCEDURE — 700111 HCHG RX REV CODE 636 W/ 250 OVERRIDE (IP): Mod: JZ

## 2024-11-13 PROCEDURE — 99232 SBSQ HOSP IP/OBS MODERATE 35: CPT | Performed by: NURSE PRACTITIONER

## 2024-11-13 PROCEDURE — 80053 COMPREHEN METABOLIC PANEL: CPT

## 2024-11-13 PROCEDURE — 87205 SMEAR GRAM STAIN: CPT

## 2024-11-13 PROCEDURE — 83735 ASSAY OF MAGNESIUM: CPT

## 2024-11-13 PROCEDURE — 31500 INSERT EMERGENCY AIRWAY: CPT

## 2024-11-13 PROCEDURE — 302978 HCHG BRONCHOSCOPY-DIAGNOSTIC

## 2024-11-13 PROCEDURE — 99292 CRITICAL CARE ADDL 30 MIN: CPT | Mod: 25 | Performed by: INTERNAL MEDICINE

## 2024-11-13 PROCEDURE — 700101 HCHG RX REV CODE 250: Performed by: INTERNAL MEDICINE

## 2024-11-13 PROCEDURE — 94003 VENT MGMT INPAT SUBQ DAY: CPT

## 2024-11-13 PROCEDURE — 85610 PROTHROMBIN TIME: CPT

## 2024-11-13 PROCEDURE — 5A1945Z RESPIRATORY VENTILATION, 24-96 CONSECUTIVE HOURS: ICD-10-PCS | Performed by: INTERNAL MEDICINE

## 2024-11-13 PROCEDURE — 99152 MOD SED SAME PHYS/QHP 5/>YRS: CPT

## 2024-11-13 PROCEDURE — 87070 CULTURE OTHR SPECIMN AEROBIC: CPT

## 2024-11-13 PROCEDURE — 94799 UNLISTED PULMONARY SVC/PX: CPT

## 2024-11-13 PROCEDURE — 84100 ASSAY OF PHOSPHORUS: CPT

## 2024-11-13 PROCEDURE — 82803 BLOOD GASES ANY COMBINATION: CPT | Mod: 91

## 2024-11-13 PROCEDURE — 94002 VENT MGMT INPAT INIT DAY: CPT

## 2024-11-13 PROCEDURE — 770022 HCHG ROOM/CARE - ICU (200)

## 2024-11-13 PROCEDURE — 87015 SPECIMEN INFECT AGNT CONCNTJ: CPT

## 2024-11-13 PROCEDURE — A9270 NON-COVERED ITEM OR SERVICE: HCPCS | Performed by: HOSPITALIST

## 2024-11-13 PROCEDURE — 94640 AIRWAY INHALATION TREATMENT: CPT

## 2024-11-13 PROCEDURE — 83605 ASSAY OF LACTIC ACID: CPT | Mod: 91

## 2024-11-13 PROCEDURE — 87206 SMEAR FLUORESCENT/ACID STAI: CPT

## 2024-11-13 PROCEDURE — 700101 HCHG RX REV CODE 250

## 2024-11-13 PROCEDURE — 0BH17EZ INSERTION OF ENDOTRACHEAL AIRWAY INTO TRACHEA, VIA NATURAL OR ARTIFICIAL OPENING: ICD-10-PCS | Performed by: INTERNAL MEDICINE

## 2024-11-13 PROCEDURE — 31624 DX BRONCHOSCOPE/LAVAGE: CPT | Performed by: INTERNAL MEDICINE

## 2024-11-13 PROCEDURE — 31500 INSERT EMERGENCY AIRWAY: CPT | Mod: GC | Performed by: INTERNAL MEDICINE

## 2024-11-13 PROCEDURE — 99291 CRITICAL CARE FIRST HOUR: CPT | Mod: 25 | Performed by: INTERNAL MEDICINE

## 2024-11-13 PROCEDURE — 82962 GLUCOSE BLOOD TEST: CPT | Mod: 91

## 2024-11-13 RX ORDER — FLUOXETINE 10 MG/1
10 CAPSULE ORAL EVERY MORNING
Status: DISCONTINUED | OUTPATIENT
Start: 2024-11-14 | End: 2024-11-16

## 2024-11-13 RX ORDER — ACETAMINOPHEN 325 MG/1
650 TABLET ORAL EVERY 6 HOURS PRN
Status: DISCONTINUED | OUTPATIENT
Start: 2024-11-13 | End: 2024-11-16

## 2024-11-13 RX ORDER — BISACODYL 10 MG
10 SUPPOSITORY, RECTAL RECTAL
Status: CANCELLED | OUTPATIENT
Start: 2024-11-13

## 2024-11-13 RX ORDER — NOREPINEPHRINE BITARTRATE 0.03 MG/ML
0-1 INJECTION, SOLUTION INTRAVENOUS CONTINUOUS
Status: DISCONTINUED | OUTPATIENT
Start: 2024-11-13 | End: 2024-11-15

## 2024-11-13 RX ORDER — PROMETHAZINE HYDROCHLORIDE 25 MG/1
12.5-25 TABLET ORAL EVERY 4 HOURS PRN
Status: DISCONTINUED | OUTPATIENT
Start: 2024-11-13 | End: 2024-11-16

## 2024-11-13 RX ORDER — POLYETHYLENE GLYCOL 3350 17 G/17G
1 POWDER, FOR SOLUTION ORAL
Status: CANCELLED | OUTPATIENT
Start: 2024-11-13

## 2024-11-13 RX ORDER — DEXMEDETOMIDINE HYDROCHLORIDE 4 UG/ML
.1-1.5 INJECTION, SOLUTION INTRAVENOUS CONTINUOUS
Status: DISCONTINUED | OUTPATIENT
Start: 2024-11-13 | End: 2024-11-16

## 2024-11-13 RX ORDER — FAMOTIDINE 20 MG/1
20 TABLET, FILM COATED ORAL EVERY 12 HOURS
Status: DISCONTINUED | OUTPATIENT
Start: 2024-11-13 | End: 2024-11-13

## 2024-11-13 RX ORDER — FAMOTIDINE 20 MG/1
20 TABLET, FILM COATED ORAL EVERY 12 HOURS
Status: CANCELLED | OUTPATIENT
Start: 2024-11-13

## 2024-11-13 RX ORDER — ETOMIDATE 2 MG/ML
20 INJECTION INTRAVENOUS ONCE
Status: COMPLETED | OUTPATIENT
Start: 2024-11-13 | End: 2024-11-13

## 2024-11-13 RX ORDER — DEXMEDETOMIDINE HYDROCHLORIDE 4 UG/ML
0-1.5 INJECTION, SOLUTION INTRAVENOUS CONTINUOUS
Status: CANCELLED | OUTPATIENT
Start: 2024-11-13

## 2024-11-13 RX ORDER — AMOXICILLIN 250 MG
2 CAPSULE ORAL 2 TIMES DAILY
Status: DISCONTINUED | OUTPATIENT
Start: 2024-11-13 | End: 2024-11-13

## 2024-11-13 RX ORDER — ROCURONIUM BROMIDE 10 MG/ML
50 INJECTION, SOLUTION INTRAVENOUS ONCE
Status: COMPLETED | OUTPATIENT
Start: 2024-11-13 | End: 2024-11-13

## 2024-11-13 RX ORDER — LACTULOSE 10 G/15ML
30 SOLUTION ORAL 3 TIMES DAILY
Status: DISCONTINUED | OUTPATIENT
Start: 2024-11-13 | End: 2024-11-14

## 2024-11-13 RX ORDER — HYDROMORPHONE HYDROCHLORIDE 1 MG/ML
1 INJECTION, SOLUTION INTRAMUSCULAR; INTRAVENOUS; SUBCUTANEOUS
Status: DISCONTINUED | OUTPATIENT
Start: 2024-11-13 | End: 2024-11-16

## 2024-11-13 RX ORDER — LINEZOLID 600 MG/1
600 TABLET, FILM COATED ORAL EVERY 12 HOURS
Status: DISCONTINUED | OUTPATIENT
Start: 2024-11-13 | End: 2024-11-16

## 2024-11-13 RX ORDER — MIDODRINE HYDROCHLORIDE 5 MG/1
10 TABLET ORAL EVERY 8 HOURS
Status: DISCONTINUED | OUTPATIENT
Start: 2024-11-13 | End: 2024-11-14

## 2024-11-13 RX ORDER — AMOXICILLIN 250 MG
2 CAPSULE ORAL 2 TIMES DAILY
Status: CANCELLED | OUTPATIENT
Start: 2024-11-13

## 2024-11-13 RX ORDER — HYDROMORPHONE HYDROCHLORIDE 1 MG/ML
1 INJECTION, SOLUTION INTRAMUSCULAR; INTRAVENOUS; SUBCUTANEOUS
Status: CANCELLED | OUTPATIENT
Start: 2024-11-13

## 2024-11-13 RX ORDER — SODIUM CHLORIDE 9 MG/ML
1000 INJECTION, SOLUTION INTRAVENOUS ONCE
Status: COMPLETED | OUTPATIENT
Start: 2024-11-13 | End: 2024-11-13

## 2024-11-13 RX ORDER — ONDANSETRON 4 MG/1
4 TABLET, ORALLY DISINTEGRATING ORAL EVERY 4 HOURS PRN
Status: DISCONTINUED | OUTPATIENT
Start: 2024-11-13 | End: 2024-11-16 | Stop reason: HOSPADM

## 2024-11-13 RX ORDER — HYDROMORPHONE HYDROCHLORIDE 1 MG/ML
0.5 INJECTION, SOLUTION INTRAMUSCULAR; INTRAVENOUS; SUBCUTANEOUS
Status: CANCELLED | OUTPATIENT
Start: 2024-11-13

## 2024-11-13 RX ORDER — HYDROCORTISONE SODIUM SUCCINATE 100 MG/2ML
50 INJECTION INTRAMUSCULAR; INTRAVENOUS EVERY 6 HOURS
Status: DISCONTINUED | OUTPATIENT
Start: 2024-11-13 | End: 2024-11-15

## 2024-11-13 RX ORDER — BISACODYL 10 MG
10 SUPPOSITORY, RECTAL RECTAL
Status: DISCONTINUED | OUTPATIENT
Start: 2024-11-13 | End: 2024-11-13

## 2024-11-13 RX ORDER — POLYETHYLENE GLYCOL 3350 17 G/17G
1 POWDER, FOR SOLUTION ORAL
Status: DISCONTINUED | OUTPATIENT
Start: 2024-11-13 | End: 2024-11-13

## 2024-11-13 RX ORDER — HYDROMORPHONE HYDROCHLORIDE 1 MG/ML
0.5 INJECTION, SOLUTION INTRAMUSCULAR; INTRAVENOUS; SUBCUTANEOUS
Status: DISCONTINUED | OUTPATIENT
Start: 2024-11-13 | End: 2024-11-16

## 2024-11-13 RX ADMIN — DEXMEDETOMIDINE HYDROCHLORIDE 0.9 MCG/KG/HR: 4 INJECTION, SOLUTION INTRAVENOUS at 13:38

## 2024-11-13 RX ADMIN — HYDROCORTISONE SODIUM SUCCINATE 50 MG: 100 INJECTION, POWDER, FOR SOLUTION INTRAMUSCULAR; INTRAVENOUS at 09:11

## 2024-11-13 RX ADMIN — THIAMINE HYDROCHLORIDE 200 MG: 100 INJECTION, SOLUTION INTRAMUSCULAR; INTRAVENOUS at 06:28

## 2024-11-13 RX ADMIN — SODIUM CHLORIDE 1000 ML: 9 INJECTION, SOLUTION INTRAVENOUS at 08:10

## 2024-11-13 RX ADMIN — LINEZOLID 600 MG: 600 TABLET, FILM COATED ORAL at 17:12

## 2024-11-13 RX ADMIN — HYDROCORTISONE SODIUM SUCCINATE 50 MG: 100 INJECTION, POWDER, FOR SOLUTION INTRAMUSCULAR; INTRAVENOUS at 12:17

## 2024-11-13 RX ADMIN — LACTULOSE 30 ML: 10 SOLUTION ORAL at 17:11

## 2024-11-13 RX ADMIN — DEXTROSE MONOHYDRATE 25 G: 25 INJECTION, SOLUTION INTRAVENOUS at 06:38

## 2024-11-13 RX ADMIN — PIPERACILLIN AND TAZOBACTAM 4.5 G: 4; .5 INJECTION, POWDER, FOR SOLUTION INTRAVENOUS at 05:04

## 2024-11-13 RX ADMIN — ETOMIDATE 20 MG: 2 INJECTION, SOLUTION INTRAVENOUS at 06:06

## 2024-11-13 RX ADMIN — MIDODRINE HYDROCHLORIDE 10 MG: 5 TABLET ORAL at 21:49

## 2024-11-13 RX ADMIN — MIDODRINE HYDROCHLORIDE 10 MG: 5 TABLET ORAL at 12:09

## 2024-11-13 RX ADMIN — DEXMEDETOMIDINE HYDROCHLORIDE 0.7 MCG/KG/HR: 4 INJECTION, SOLUTION INTRAVENOUS at 21:48

## 2024-11-13 RX ADMIN — RIFAXIMIN 550 MG: 550 TABLET ORAL at 06:28

## 2024-11-13 RX ADMIN — ROCURONIUM BROMIDE 50 MG: 10 INJECTION, SOLUTION INTRAVENOUS at 06:06

## 2024-11-13 RX ADMIN — PANTOPRAZOLE SODIUM 40 MG: 40 INJECTION, POWDER, FOR SOLUTION INTRAVENOUS at 17:09

## 2024-11-13 RX ADMIN — DEXTROSE MONOHYDRATE 25 G: 25 INJECTION, SOLUTION INTRAVENOUS at 04:54

## 2024-11-13 RX ADMIN — PIPERACILLIN AND TAZOBACTAM 4.5 G: 4; .5 INJECTION, POWDER, FOR SOLUTION INTRAVENOUS at 12:12

## 2024-11-13 RX ADMIN — HYDROMORPHONE HYDROCHLORIDE 0.5 MG: 1 INJECTION, SOLUTION INTRAMUSCULAR; INTRAVENOUS; SUBCUTANEOUS at 23:48

## 2024-11-13 RX ADMIN — LACTULOSE 30 ML: 10 SOLUTION ORAL at 06:28

## 2024-11-13 RX ADMIN — NOREPINEPHRINE BITARTRATE 0.1 MCG/KG/MIN: 32 SOLUTION INTRAVENOUS at 07:53

## 2024-11-13 RX ADMIN — HYDROMORPHONE HYDROCHLORIDE 1 MG: 1 INJECTION, SOLUTION INTRAMUSCULAR; INTRAVENOUS; SUBCUTANEOUS at 07:40

## 2024-11-13 RX ADMIN — DEXMEDETOMIDINE HYDROCHLORIDE 0.2 MCG/KG/HR: 4 INJECTION, SOLUTION INTRAVENOUS at 06:47

## 2024-11-13 RX ADMIN — LINEZOLID 600 MG: 600 TABLET, FILM COATED ORAL at 09:59

## 2024-11-13 RX ADMIN — FLUOXETINE HYDROCHLORIDE 10 MG: 10 CAPSULE ORAL at 06:28

## 2024-11-13 RX ADMIN — RIFAXIMIN 550 MG: 550 TABLET ORAL at 17:12

## 2024-11-13 RX ADMIN — PIPERACILLIN AND TAZOBACTAM 4.5 G: 4; .5 INJECTION, POWDER, FOR SOLUTION INTRAVENOUS at 20:11

## 2024-11-13 RX ADMIN — PANTOPRAZOLE SODIUM 40 MG: 40 INJECTION, POWDER, FOR SOLUTION INTRAVENOUS at 06:28

## 2024-11-13 RX ADMIN — SODIUM CHLORIDE: 4 INJECTION, SOLUTION, CONCENTRATE INTRAVENOUS at 13:50

## 2024-11-13 RX ADMIN — HYDROCORTISONE SODIUM SUCCINATE 50 MG: 100 INJECTION, POWDER, FOR SOLUTION INTRAMUSCULAR; INTRAVENOUS at 17:10

## 2024-11-13 RX ADMIN — LACTULOSE 30 ML: 10 SOLUTION ORAL at 12:08

## 2024-11-13 ASSESSMENT — ENCOUNTER SYMPTOMS: MEMORY LOSS: 0

## 2024-11-13 ASSESSMENT — PAIN DESCRIPTION - PAIN TYPE
TYPE: ACUTE PAIN

## 2024-11-13 ASSESSMENT — FIBROSIS 4 INDEX: FIB4 SCORE: 9.71

## 2024-11-13 NOTE — PROCEDURES
Accession # [id number]    Procedure: DX-CHEST-PORTABLE (1 VIEW)    Technique: Single AP view of the chest.    Date: 11/13/2024 5:24:11 AM    Reason: Shortness of Breath    Comparison: Yesterday    FINDINGS:    Overlying cardiac leads are seen.    The cardiothymic silhouette appears within normal limits.    Bilateral lung volumes are diminished.  Extensive scattered patchy bilateral pulmonary opacities are seen.    No significant pleural effusion is identified.    Postsurgical changes of ORIF of the right ribs are seen, otherwise the bony structures appear age-appropriate.    IMPRESSION:    Extensive pulmonary edema and/or infiltrates, increased since prior study.

## 2024-11-13 NOTE — PROCEDURES
Accession # [id number]    Procedure: DX-CHEST-LIMITED (1 VIEW)    Technique: Single AP view of the chest    Date: 11/13/2024 7:20:08 AM    Reason: Shortness of Breath    Comparison: Today at 0525    FINDINGS:    Endotracheal tube is seen terminating at the superior margin of the clavicles.  Nasogastric tube is seen with tip overlying the gastric body.    The cardiomediastinal silhouette appears within normal limits.    The central pulmonary vasculature appears prominent and indistinct.    Layering right pleural effusion is seen.    Bilateral lung volumes are diminished.  Scattered hazy bilateral pulmonary opacities are seen.    Postsurgical changes of ORIF the right ribs are seen, otherwise the bony structures appear grossly unremarkable.    IMPRESSION:    Pulmonary edema and/or infiltrates, overall stable since prior study.

## 2024-11-13 NOTE — PROGRESS NOTES
Gastroenterology Progress Note               Author:  YANA Pierre   Date & Time Created: 11/13/2024 1:09 PM       Patient ID:  Name:             Kavita Freeman  YOB: 1965  Age:                 59 y.o.  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]     Acute encephalopathy [G93.40]     Bacteremia due to Gram-negative bacteria [R78.81]     Severe protein-calorie malnutrition ([place]: less than 60% of standard weight) (HCC) [E43]     Transaminitis [R74.01]     Hypoglycemia [E16.2]     Advance care planning [Z71.89]     Sepsis (HCC) [A41.9]     Hepatic encephalopathy (HCC) [K76.82]     High anion gap metabolic acidosis [E87.29]     Hyponatremia [E87.1]     Coagulopathy (HCC) [D68.9]     Anemia [D64.9]     Alcoholic cirrhosis of liver with ascites (HCC) [K70.31]     Acute renal failure (ARF) (HCC) [N17.9]     Depression with anxiety [F41.8]     UTI (urinary tract infection) [N39.0]     Essential hypertension [I10]      Presenting Chief Complaint:  Cirrhosis, GI bleeding.     History of Present Illness:   59 years old female with medical history of alcohol-related liver injury, cirrhosis, decompensation with variceal bleeding and refractory ascites formation, mild to moderate encephalopathy.  Presented to hospital this time for fluid accumulation, acute kidney injury.  GI team is consulted for recent GI bleeding     The patient has primary GI liver doctor in [place] and she is waiting for the  transplantation evaluation at [facility] in the coming 2 months.  We do not have any records to support this plan, however the patient is very certain about the [facility] appointment.     For the bleeding, the patient had nausea vomiting in the last 2 days, the patient saw blood and also some coffee-ground like vomitus.  Tarry stool was noted.  Last upper GI scope August 15 with variceal bleeding and banding.     Interval History:  11/2/2024: Patient seen at bedside.  Awake, alert.  Discussed EGD findings with patient.  Inquired regarding last EGD as, per chart review, last EGD was 2-1/2 months ago.  Patient unable to discern if she has had EGD since last documented.  Positive asterixis.  Denies bowel movements overnight.  Started on rifaximin and lactulose.  Hemoglobin stable.  Abdomen tender with light palpation.  No WBC this morning, but yesterday was 39. ultrasound abdomen pending.      11/3/2024: Patient seen at bedside.  Disoriented, unable to answer orientation questions.  Abdomen distended, significantly increase in tenderness this morning, hypoactive bowel sounds and tympany with percussion.  CT abdomen and CBC pending.  No bowel movements overnight.    Update 1222: CT abdomen with findings including fluid-filled small bowel and colonic loops favoring ileus, no evidence of bowel perforation, cirrhosis with portal hypertension, moderate to large volume ascites, nonocclusive SMV thrombosis, cholelithiasis, subtle findings concerning for pulmonary emboli.      11/4/2024: Patient seen at bedside.  Disoriented, oriented to self only.  Abdomen remains distended, tender with light palpation, hypoactive bowel sounds.  Patient with multiple brown bowel movements overnight.  Started on heparin yesterday.  WBC 29.8, hemoglobin stable at 12.5, platelets 197.  Sodium 127, BUN 56, creatinine improved to 1.3.  Albumin yesterday 2.6.  INR yesterday 2.35.  MELD 3.0 as of 11/3/2024 31    11/5/2024: Patient seen and examined.   Appears to be in distress, tachypneic, unable to participate in interview.  Grimaces when I touch her abdomen.  I was concerned and got Dr. Charles who then examined the patient.  We ordered KUB, chest x-ray, ABG, further labs.  3 BM last 24 hours.     Found to have significant lactic acidosis, INR 3.64 (difficult to interpret in the context of heparin drip), ABG with acute respiratory alkalosis.  Creatinine increasing to 1.54, T. bili 8.  KUB dilated loops of bowel suspicious for gastroenteritis or inflammatory changes.  Chest x-ray with atelectasis.      11/6/2024: Patient seen in collaboration with Dr. Solorzano. Son at bedside, all questions answered. WBC now 36.6, T. Bili 9.8, creatinine uptrending. MELD 3.0 - 37 (Although INR not reliable in the setting of heparin drip)    11/7/2024: Patient seen with mom and dad at bedside.  Significantly improved, alert and oriented and eating food.  She remembered me from when I met her before.  WBC 26.9, hgb 9.6, plt 112, cr 1.38. Urinary casts present. Renal US negative.     11/8/2024: Patient seen with family bedside. Continues to feel better, eating but doesn't really like the food. Numerous bowel movements overnight. Worsening abdominal distention with mild tenderness. WBC continues to improve, platelets worse at 89, hgb downtrended to 9.1, cr 0.44, t. Bili 10.9    11/9/2024: Patient seen this morning, no family bedside.  More lethargic today, says she is tired.  2 bowel movements past 24 hours.  VSS, WBC 20.3, T. Bili 13.1, albumin 2.9, phos 2.3.  INR greater than 10, TEG with abnormal CK R.  Pending diagnostic paracentesis and elevated INR.    11/10/2024: Patient seen and examined this morning with mother at bedside.  More lethargic again, on oxygen, noted to be tachypneic and tachycardic.  Minimally arousable.  Worsening murmur on auscultation. Has not had a bowel movement per RN.  Finished her antibiotics yesterday and transferred from [facility].  Labs reviewed, WBC up to  30 with repeat 33, creatinine 1.09 from 0.28, bilirubin 16.2, 9.9 direct.  Increased reticulocyte response, , ammonia 62, lactic 4.2, Procal 1.44.  INR greater than 10, recheck 9.53.  Chest x-ray with new significant right pleural effusion and significant increase in diffuse pulmonary opacifications edema versus pneumonia.  Head CT negative.  Limited echocardiogram and haptoglobin pending.    11/11/2024: Patient upgraded to ICU last night. Awake, alert, encephalopathic. Complaining of back pain. Dried crusted blood noted on oral mucosa. Tachycardic, tachypneic, on 10L oxymask. WBC 31.3, hgb 7.4, plt 93, INR 7.15, BUN 41, cr 1.37, T. Bili 15.2, lactic 4.9. On Zosyn.    11/12/2024: Patient seen at bedside.  Awake, alert.  Patient was accepted by [facility], bed pending.  Patient on 50 LPM HFNC, FiO2 90%.  Leukocytosis improved to 26, hemoglobin stable 7.8.  Platelets 103.  Sodium 135, BUN 31, creatinine 0.51, AST 86, ALT 24, alk phos 100, total bilirubin 16.9.  Albumin 3.  Ammonia 49 INR 3.5 after products yesterday. MELD 3.0 32 63.5%; 90 day survival rate. Remains on antibiotics    11/13/2024: Overnight, patient went into respiratory failure maxed out on high flow nasal cannula.  She required intubation earlier this morning.  [facility] transfer on hold.  Primary team had family meeting to discuss goals of care.  Patient DNR/I okay.  They have asked to do a trial of therapy and allow her son to see patient this weekend.  Leukocytosis down to 22.8.  Hemoglobin stable 7.8.  Sodium 134, BUN 37, creatinine 1.27, , ALT 30, alk phos 112, total bilirubin 17.4, albumin 3.2 INR increased to 4.4.  MELD 3.0 36; 39.7 estimated 90-day survival rate       Hospital Medications:  Current Facility-Administered Medications   Medication Dose Frequency Provider Last Rate Last Admin    dexmedetomidine (Precedex) 400 mcg/100mL infusion  0.1-1.5 mcg/kg/hr (Ideal) Chastity Bell M.D. 14.4 mL/hr at 11/13/24 1234 0.9  mcg/kg/hr at 11/13/24 1234    Respiratory Therapy Consult   Continuous RT Olvin Hanson M.D. MD Alert...ICU Electrolyte Replacement per Pharmacy   PHARMACY TO DOSE Olvin Hanson M.D.        lidocaine (Xylocaine) 1 % injection 2 mL  2 mL Q30 MIN PRN Olvin Hanson M.D.        Pharmacy Consult: Enteral tube insertion - review meds/change route/product selection  1 Each PHARMACY TO DOSE Olvin Hanson M.D.        HYDROmorphone (Dilaudid) injection 0.5 mg  0.5 mg Q HOUR PRN Olvin Hanson M.D.        Or    HYDROmorphone (Dilaudid) injection 1 mg  1 mg Q HOUR PRN Olvin Hanson M.D.   1 mg at 11/13/24 0740    norepinephrine (Levophed) 8 mg in 250 mL NS infusion (premix)  0-1 mcg/kg/min (Ideal) Continuous Roberto Jean Baptiste M.D. 6 mL/hr at 11/13/24 1150 0.05 mcg/kg/min at 11/13/24 1150    hydrocortisone sodium succinate PF (Solu-CORTEF) 100 MG injection 50 mg  50 mg Q6HRS Roberto Jean Baptiste M.D.   50 mg at 11/13/24 1217    [START ON 11/14/2024] FLUoxetine (PROzac) capsule 10 mg  10 mg QAM Olvin Hanson M.D.        lactulose 20 GM/30ML solution 30 mL  30 mL TID Olvin Hanson M.D.   30 mL at 11/13/24 1208    riFAXIMin (Xifaxan) tablet 550 mg  550 mg BID Olvin Hanson M.D.        acetaminophen (Tylenol) tablet 650 mg  650 mg Q6HRS PRN Olvin Hanson M.D.        ondansetron (Zofran ODT) dispertab 4 mg  4 mg Q4HRS PRN Olvin Hanson M.D.        promethazine (Phenergan) tablet 12.5-25 mg  12.5-25 mg Q4HRS PRN Olvin B. Valentin, M.D.        linezolid (Zyvox) tablet 600 mg  600 mg Q12HRS Olvin Hanson M.D.   600 mg at 11/13/24 0959    midodrine (Proamatine) tablet 10 mg  10 mg Q8HRS Olvin Hanson M.D.   10 mg at 11/13/24 1209    sodium chloride 154 mEq in dextrose 10% 1,000 mL infusion   Continuous Olvin Hanson M.D.        [Held by provider] MBX (diphenhydrAMINE-lidocaine-Maalox) oral susp Cup 5 mL  5 mL Q6HRS PRN Olvin Hanson M.D.   5 mL at 11/12/24 8737    pantoprazole  "(Protonix) injection 40 mg  40 mg BID Olvin Hanson M.D.   40 mg at 11/13/24 0628    piperacillin-tazobactam (Zosyn) 4.5 g in  mL IVPB  4.5 g Q8HRS Shahzad Jenkins M.D. 25 mL/hr at 11/13/24 1212 4.5 g at 11/13/24 1212    ondansetron (Zofran) syringe/vial injection 4 mg  4 mg Q4HRS PRN Adilia Gonzales D.O.   4 mg at 11/12/24 2158    promethazine (Phenergan) suppository 12.5-25 mg  12.5-25 mg Q4HRS PRN Adilia Gonzales D.O.        prochlorperazine (Compazine) injection 5-10 mg  5-10 mg Q4HRS PRN Adilia Gonzales, D.O.   10 mg at 11/12/24 2211    dextrose 50% (D50W) injection 25 g  25 g Q15 MIN PRN Adilia Gonzales, D.O.   25 g at 11/13/24 0638    lidocaine (Asperflex) 4 % patch 1 Patch  1 Patch Q24HRS Adilia Gonzales D.O.   1 Patch at 11/12/24 0512   Last reviewed on 11/1/2024  9:17 AM by Celine Bustamante R.N.       Review of Systems:  Review of Systems   Unable to perform ROS: Critical illness   Psychiatric/Behavioral:  Negative for memory loss.        Vital signs:  Weight/BMI: Body mass index is 23.46 kg/m².  BP 90/53   Pulse 64   Temp (!) 35.8 °C (96.4 °F) (Bladder)   Resp (!) 23   Ht 1.727 m (5' 8\")   Wt 70 kg (154 lb 5.2 oz)   SpO2 95%   Vitals:    11/13/24 1145 11/13/24 1200 11/13/24 1215 11/13/24 1230   BP: 133/72 108/55 102/57 90/53   Pulse: 68 66 66 64   Resp: (!) 22 (!) 22 (!) 22 (!) 23   Temp:  (!) 35.8 °C (96.4 °F)     TempSrc:  Bladder     SpO2: 97% 97% 96% 95%   Weight:       Height:         Oxygen Therapy:  Pulse Oximetry: 95 %, O2 (LPM): 60 (w/ 15L NRB), FiO2%: 70 %, O2 Delivery Device: Ventilator    Intake/Output Summary (Last 24 hours) at 11/13/2024 1309  Last data filed at 11/13/2024 1234  Gross per 24 hour   Intake 1155.34 ml   Output 910 ml   Net 245.34 ml       Physical Exam  Vitals and nursing note reviewed.   Constitutional:       General: She is in acute distress.      Appearance: She is cachectic. She is ill-appearing.   HENT:      Head: Normocephalic and atraumatic.      " Nose: Nose normal. No congestion.      Mouth/Throat:      Comments: Dried blood in mouth  Eyes:      General: Scleral icterus present.   Cardiovascular:      Rate and Rhythm: Normal rate and regular rhythm.      Pulses: Normal pulses.      Heart sounds: Murmur heard.   Pulmonary:      Effort: Pulmonary effort is normal.      Breath sounds: Rhonchi present.      Comments: On mechanical ventilator via ET tube  Abdominal:      General: There is distension.      Palpations: Abdomen is soft.      Tenderness: There is no abdominal tenderness. There is no guarding.      Comments: Hypoactive bowel sounds  + Fluid wave   Genitourinary:     Comments: Herrera with dark urine  BMS in place  Musculoskeletal:      Right lower leg: No edema.      Left lower leg: No edema.   Skin:     General: Skin is warm and dry.      Capillary Refill: Capillary refill takes less than 2 seconds.      Coloration: Skin is jaundiced.       Labs:  Recent Labs     11/11/24 0206 11/12/24 0341 11/13/24 0345   SODIUM 135 135 134*   POTASSIUM 4.4 4.4 3.6   CHLORIDE 101 101 101   CO2 18* 18* 20   BUN 41* 31* 37*   CREATININE 1.37 0.51 1.27   MAGNESIUM 1.9 1.7 2.4   PHOSPHORUS 4.7* 3.9 3.9   CALCIUM 8.9 9.2 8.8     Recent Labs     11/11/24  0206 11/12/24 0341 11/13/24 0345   ALTSGPT 26 24 30   ASTSGOT 83* 86* 107*   ALKPHOSPHAT 83 100* 112*   TBILIRUBIN 15.2* 16.9* 17.4*   GLUCOSE 83 34* 49*     Recent Labs     11/11/24  0206 11/11/24  0300 11/11/24  1055 11/11/24 2122 11/12/24 0341 11/13/24 0345   WBC  --  23.5*   < > 29.7* 26.0* 22.8*   NEUTSPOLYS  --  88.50*  --   --  90.00* 91.40*   LYMPHOCYTES  --  4.00*  --   --  3.40* 3.00*   MONOCYTES  --  5.50  --   --  4.90 3.60   EOSINOPHILS  --  0.50  --   --  0.30 0.80   BASOPHILS  --  0.10  --   --  0.20 0.10   ASTSGOT 83*  --   --   --  86* 107*   ALTSGPT 26  --   --   --  24 30   ALKPHOSPHAT 83  --   --   --  100* 112*   TBILIRUBIN 15.2*  --   --   --  16.9* 17.4*    < > = values in this interval not  displayed.     Recent Labs     11/11/24  0951 11/11/24  1055 11/11/24  2122 11/12/24  0341 11/13/24  0345   RBC  --    < > 2.59* 2.59* 2.59*   HEMOGLOBIN  --    < > 7.8* 7.8* 7.8*   HEMATOCRIT  --    < > 22.8* 23.6* 24.1*   PLATELETCT  --    < > 108* 103* 118*   PROTHROMBTM 62.1*  --   --  38.2* 42.7*   INR 7.15*  --   --  3.85* 4.44*    < > = values in this interval not displayed.     Recent Results (from the past 24 hours)   POCT glucose device results    Collection Time: 11/12/24  5:00 PM   Result Value Ref Range    POC Glucose, Blood 64 (L) 65 - 99 mg/dL   POCT glucose device results    Collection Time: 11/12/24  5:22 PM   Result Value Ref Range    POC Glucose, Blood 59 (L) 65 - 99 mg/dL   POCT glucose device results    Collection Time: 11/12/24  5:43 PM   Result Value Ref Range    POC Glucose, Blood 186 (H) 65 - 99 mg/dL   POCT glucose device results    Collection Time: 11/12/24  6:28 PM   Result Value Ref Range    POC Glucose, Blood 123 (H) 65 - 99 mg/dL   POCT glucose device results    Collection Time: 11/12/24  9:40 PM   Result Value Ref Range    POC Glucose, Blood 67 65 - 99 mg/dL   POCT glucose device results    Collection Time: 11/12/24 10:42 PM   Result Value Ref Range    POC Glucose, Blood 117 (H) 65 - 99 mg/dL   POCT arterial blood gas device results    Collection Time: 11/13/24  3:43 AM   Result Value Ref Range    Ph 7.337 (L) 7.400 - 7.500    Pco2 39.2 (H) 26.0 - 37.0 mmHg    Po2 41 (LL) 64 - 87 mmHg    Tco2 22 20 - 33 mmol/L    S02 74 (L) 93 - 99 %    Hco3 21.0 17.0 - 25.0 mmol/L    BE -4 -4 - 3 mmol/L    Body Temp 97.8 F degrees    O2 Therapy 100 %    iPF Ratio 41     Ph Temp Chuck 7.343 (L) 7.400 - 7.500    Pco2 Temp Co 38.4 (H) 26.0 - 37.0 mmHg    Po2 Temp Cor 40 (LL) 64 - 87 mmHg    Specimen Arterial     DelSys HHFNC     LPM 60 lpm   POCT lactate device results    Collection Time: 11/13/24  3:43 AM   Result Value Ref Range    iStat Lactate 4.2 (HH) 0.5 - 2.0 mmol/L   CBC With Differential     Collection Time: 11/13/24  3:45 AM   Result Value Ref Range    WBC 22.8 (H) 4.8 - 10.8 K/uL    RBC 2.59 (L) 4.20 - 5.40 M/uL    Hemoglobin 7.8 (L) 12.0 - 16.0 g/dL    Hematocrit 24.1 (L) 37.0 - 47.0 %    MCV 93.1 81.4 - 97.8 fL    MCH 30.1 27.0 - 33.0 pg    MCHC 32.4 32.2 - 35.5 g/dL    RDW 62.1 (H) 35.9 - 50.0 fL    Platelet Count 118 (L) 164 - 446 K/uL    MPV 10.5 9.0 - 12.9 fL    Neutrophils-Polys 91.40 (H) 44.00 - 72.00 %    Lymphocytes 3.00 (L) 22.00 - 41.00 %    Monocytes 3.60 0.00 - 13.40 %    Eosinophils 0.80 0.00 - 6.90 %    Basophils 0.10 0.00 - 1.80 %    Immature Granulocytes 1.10 (H) 0.00 - 0.90 %    Nucleated RBC 0.00 0.00 - 0.20 /100 WBC    Neutrophils (Absolute) 20.85 (H) 1.82 - 7.42 K/uL    Lymphs (Absolute) 0.69 (L) 1.00 - 4.80 K/uL    Monos (Absolute) 0.81 0.00 - 0.85 K/uL    Eos (Absolute) 0.19 0.00 - 0.51 K/uL    Baso (Absolute) 0.03 0.00 - 0.12 K/uL    Immature Granulocytes (abs) 0.24 (H) 0.00 - 0.11 K/uL    NRBC (Absolute) 0.00 K/uL   Comp Metabolic Panel    Collection Time: 11/13/24  3:45 AM   Result Value Ref Range    Sodium 134 (L) 135 - 145 mmol/L    Potassium 3.6 3.6 - 5.5 mmol/L    Chloride 101 96 - 112 mmol/L    Co2 20 20 - 33 mmol/L    Anion Gap 13.0 7.0 - 16.0    Glucose 49 (LL) 65 - 99 mg/dL    Bun 37 (H) 8 - 22 mg/dL    Creatinine 1.27 0.50 - 1.40 mg/dL    Calcium 8.8 8.5 - 10.5 mg/dL    Correct Calcium 9.4 8.5 - 10.5 mg/dL    AST(SGOT) 107 (H) 12 - 45 U/L    ALT(SGPT) 30 2 - 50 U/L    Alkaline Phosphatase 112 (H) 30 - 99 U/L    Total Bilirubin 17.4 (H) 0.1 - 1.5 mg/dL    Albumin 3.2 3.2 - 4.9 g/dL    Total Protein 6.1 6.0 - 8.2 g/dL    Globulin 2.9 1.9 - 3.5 g/dL    A-G Ratio 1.1 g/dL   Magnesium    Collection Time: 11/13/24  3:45 AM   Result Value Ref Range    Magnesium 2.4 1.5 - 2.5 mg/dL   Phosphorus    Collection Time: 11/13/24  3:45 AM   Result Value Ref Range    Phosphorus 3.9 2.5 - 4.5 mg/dL   Prothrombin Time    Collection Time: 11/13/24  3:45 AM   Result Value Ref Range     PT 42.7 (H) 12.0 - 14.6 sec    INR 4.44 (H) 0.87 - 1.13   LACTIC ACID    Collection Time: 11/13/24  3:45 AM   Result Value Ref Range    Lactic Acid 4.3 (HH) 0.5 - 2.0 mmol/L   ESTIMATED GFR    Collection Time: 11/13/24  3:45 AM   Result Value Ref Range    GFR (CKD-EPI) 49 (A) >60 mL/min/1.73 m 2   POCT glucose device results    Collection Time: 11/13/24  5:15 AM   Result Value Ref Range    POC Glucose, Blood 116 (H) 65 - 99 mg/dL   CULTURE RESPIRATORY W/ GRM STN    Collection Time: 11/13/24  6:01 AM    Specimen: Respirate   Result Value Ref Range    Significant Indicator NEG     Source RESP     Site Bronchoalveolar Lavage RLL     Culture Result -     Gram Stain Result       Few WBCs.  Few mixed bacteria, no predominant organism seen.     Fungal Culture    Collection Time: 11/13/24  6:01 AM    Specimen: Respirate   Result Value Ref Range    Significant Indicator NEG     Source RESP     Site Bronchoalveolar Lavage RLL     Culture Result -     Fungal Smear Results -    AFB Culture    Collection Time: 11/13/24  6:01 AM    Specimen: Respirate   Result Value Ref Range    Significant Indicator NEG     Source RESP     Site Bronchoalveolar Lavage RLL     Culture Result Culture in progress.     AFB Smear Results -    GRAM STAIN    Collection Time: 11/13/24  6:01 AM    Specimen: Respirate   Result Value Ref Range    Significant Indicator .     Source RESP     Site Bronchoalveolar Lavage RLL     Gram Stain Result       Few WBCs.  Few mixed bacteria, no predominant organism seen.     POCT glucose device results    Collection Time: 11/13/24  6:33 AM   Result Value Ref Range    POC Glucose, Blood 62 (L) 65 - 99 mg/dL   POCT glucose device results    Collection Time: 11/13/24  6:52 AM   Result Value Ref Range    POC Glucose, Blood 172 (H) 65 - 99 mg/dL   POCT arterial blood gas device results    Collection Time: 11/13/24  7:35 AM   Result Value Ref Range    Ph 7.308 (L) 7.400 - 7.500    Pco2 39.8 (H) 26.0 - 37.0 mmHg    Po2 63 (L)  64 - 87 mmHg    Tco2 21 20 - 33 mmol/L    S02 90 (L) 93 - 99 %    Hco3 19.9 17.0 - 25.0 mmol/L    BE -6 (L) -4 - 3 mmol/L    Body Temp 97.1 F degrees    O2 Therapy 70 %    iPF Ratio 90     Ph Temp Chuck 7.319 (L) 7.400 - 7.500    Pco2 Temp Co 38.4 (H) 26.0 - 37.0 mmHg    Po2 Temp Cor 60 (L) 64 - 87 mmHg    Specimen Arterial     DelSys Vent     End Tidal Carbon Dioxide 30 mmhg    Tidal Volume 470 mL    Peep End Expiratory Pressure 10 cmh20    Set Rate 20     Mode APV-CMV    POCT glucose device results    Collection Time: 11/13/24 10:02 AM   Result Value Ref Range    POC Glucose, Blood 73 65 - 99 mg/dL   POCT arterial blood gas device results    Collection Time: 11/13/24 10:07 AM   Result Value Ref Range    Ph 7.331 (L) 7.400 - 7.500    Pco2 36.8 26.0 - 37.0 mmHg    Po2 55 (L) 64 - 87 mmHg    Tco2 21 20 - 33 mmol/L    S02 86 (L) 93 - 99 %    Hco3 19.4 17.0 - 25.0 mmol/L    BE -6 (L) -4 - 3 mmol/L    Body Temp 35.9 C degrees    O2 Therapy 50 %    iPF Ratio 110     Ph Temp Chuck 7.347 (L) 7.400 - 7.500    Pco2 Temp Co 35.0 26.0 - 37.0 mmHg    Po2 Temp Cor 51 (L) 64 - 87 mmHg    Specimen Arterial     DelSys Vent     End Tidal Carbon Dioxide 24 mmhg    Tidal Volume 430 mL    Peep End Expiratory Pressure 8 cmh20    Set Rate 22     Mode APV-CMV        Radiology Review:  DX-CHEST-PORTABLE (1 VIEW)   Final Result         1.  Pulmonary edema and/or infiltrates, similar to prior study.   2.  Layering right pleural effusion, decreased since prior      DX-CHEST-PORTABLE (1 VIEW)   Final Result         1.  Pulmonary edema and/or infiltrates, similar to prior study.   2.  Layering right pleural effusion, decreased since prior      EC-ECHOCARDIOGRAM LTD W/O CONT   Final Result      DX-CHEST-PORTABLE (1 VIEW)   Final Result         1.  New significant right pleural effusion identified.      2.  Significant increase in diffuse pulmonary opacifications could be due to pulmonary edema or pneumonia.      CT-HEAD W/O   Final Result    Impression:      1. No acute process in the brain evident.      2. The skull base sinuses are clear.                  US-RENAL   Final Result      1.  No hydronephrosis.   2.  Ascites.      DX-CHEST-PORTABLE (1 VIEW)   Final Result      1.  No acute cardiac or pulmonary abnormalities are identified.      2.  Slight left basilar atelectasis.      AP-[id number]-4 VIEW   Final Result      1.  Several markedly dilated loops of small bowel in the mid abdomen suspicious for gastroenteritis or inflammatory change. Recommend follow-up abdominal series to rule out evolving small bowel obstruction.      EC-ECHOCARDIOGRAM COMPLETE W/O CONT   Final Result      CT-ABDOMEN-PELVIS W/O   Final Result      1.  Dilated fluid-filled small bowel and colonic loops favoring ileus.   2.  No evidence of bowel perforation.   3.  Hepatic cirrhosis with portal hypertension and moderate to large volume ascites.   4.  Nonocclusive superior mesenteric vein thrombosis.   5.  Cholelithiasis.   6.  Bibasilar atelectasis with minimal bilateral pleural fluid.   7.  Subtle findings concerning for pulmonary emboli.      These findings were electronically conveyed to JAMES WILLIAM on 11/3/2024 11:50 AM.         US-ABDOMEN COMPLETE SURVEY   Final Result      1.  Cirrhosis with stigmata of portal hypertension including splenomegaly, recanalized umbilical vein and ascites.   2.  Gallbladder sludge and stones. Gallbladder wall thickening is nonspecific and could be related to liver disease but correlate clinically for evidence of acute cholecystitis. No biliary dilatation.         DX-CHEST-PORTABLE (1 VIEW)   Final Result      1.  Hazy left lower lobe opacity could represent atelectasis or pneumonitis.   2.  There is linear scarring or atelectasis in the right lung base.      DX-CHEST-PORTABLE (1 VIEW)    (Results Pending)   DX-ABDOMEN FOR TUBE PLACEMENT    (Results Pending)   DX-CHEST-LIMITED (1 VIEW)    (Results Pending)     [facility] (Data Review):    -Records reviewed and summarized in current documentation  -I personally reviewed and interpreted the laboratory results  -I personally reviewed the radiology images    Assessment/Recommendations:  Acute liver failure- MELD 3.0- 38 on arrival (10/31/2024); 26.8% 90-day survival. Child-Garza Class C  SMV thrombosis  Portal hypertensive gastropathy  Ileus  Cirrhosis with ascites  SBP with Salmonella  Hepatic encephalopathy  Coagulopathy  Esophageal varices with banding in situ  Hematemesis  Severe protein calorie malnutrition  History of decompensated liver disease with variceal bleeding, refractory ascites  Salmonella bacteremia - pending review by State lab  Sepsis  EFRAIN  Respiratory alkalosis  Vitamin D deficiency  Negative PETH  Pneumonia and right pleural effusion  Hyperdynamic left ventricular systolic function of 75%, LVOT obstruction  Elevated INR   Hyperammonemia  Lactic acidosis  Elevated LDH    Plan:  Low sodium diet  Continue PPI IV twice daily  Continue zosyn   Continue lactulose (titrate to 2-3 loose BM/day) and Rifaximin    Serial abdominal exams  Hold anticoagulation, Monitor closely for bleeding- correction as needed.   Encourage out of bed, adequate nutrition, and sleep-wake cycles to avoid delirium  Transfer to [place] on hold given acute clinical change, lung injury.      Long term if patient survives admission:   Patient will need repeat EGD in 4-6 weeks  Nonselective beta-blocker as outpatient    GI will follow    Discussed with Dr. Jean Baptiste, Dr. Womack, Dr. Vences    ..Clementina Shepard, A.P.R.N.    Core Quality Measures   Reviewed items::  Labs, Medications and Radiology reports reviewed

## 2024-11-13 NOTE — PROGRESS NOTES
Critical Care Progress Note    Date of admission  10/31/2024    Chief Complaint  59 y.o. female admitted 10/31/2024 with EtOH Cirrhosis, sober 4 months, admitted with fever chills and abdominal pain, found to have SBP with salmonella in culture as well as bacteremia. She has been followed in IMCU with lots of goals of care changes back and forth with advanced liver failure and multiorgan dyfunction. Transferred to ICU 11/10 for increase lactate, worsening encephalopathy and respiratory failure.     Hospital Course  11/10 transferred to ICU.   11/11 HFNC/Oxy RTOC and establish transfer for liver transplant eval. Coagulopathy s/p transfusion. Family conference of goals of care.   11/12 Worsening respiratory status on max HFNC and tachypnea, renal function improved, INR improved, family updated, hypoglycemia.     Interval Problem Update  Reviewed last 24 hour events:  Neuro: was awake and following commands prior to intubation. Dex and dilaudid.   HR: 90's  SBP: 100's levophed 0.1  Tmax: afebrile  GI: Intubated start enternal nutrition  UOP: good  Lines: peripheral IV, pagan, BMS  Resp: VD1 430 20 8 50% cstat 25  Vte: contra  PPI/H2:PPI  Antibx: Zosyn respiratory PCR negative  -2.5L net -13.5L  Family conference at 10 am  Start enteral nutrition  Meld Na 37 65% mortality   Now with shock start norepinephrine due to sedation  Start stress dose steroids  1L NS bolus careful volume challenge with lung injury  Entrococcus in urine start linezolid   Continue Q4 accuchecks for hypoglycemia  Will discuss with family need for central line and mckenna pending if within goals of care.     Review of Systems  Review of Systems   Unable to perform ROS: Intubated        Vital Signs for last 24 hours   Temp:  [36.3 °C (97.4 °F)-36.9 °C (98.4 °F)] 36.3 °C (97.4 °F)  Pulse:  [] 70  Resp:  [20-50] 22  BP: ()/(46-62) 116/55  SpO2:  [83 %-97 %] 97 %    Hemodynamic parameters for last 24 hours       Respiratory Information for  the last 24 hours  Vent Mode: APVCMV  Rate (breaths/min): 22  Vt Target (mL): 430  PEEP/CPAP: 8  MAP: 15  Control VTE (exp VT): 418    Physical Exam   Physical Exam  Vitals and nursing note reviewed.   Constitutional:       General: She is in acute distress.      Appearance: She is ill-appearing.      Comments: Ill appearing tachypnea on ventilator   HENT:      Head: Normocephalic.      Mouth/Throat:      Mouth: Mucous membranes are dry.      Comments: Dried blood from nose and mouth, ET in place  Eyes:      Pupils: Pupils are equal, round, and reactive to light.      Comments: jaundice   Cardiovascular:      Rate and Rhythm: Tachycardia present.      Heart sounds: Murmur heard.   Pulmonary:      Effort: Respiratory distress present.      Breath sounds: No stridor. Rhonchi present. No wheezing or rales.      Comments: Course crackles and tachypnea while on ventilator  Chest:      Chest wall: No tenderness.   Abdominal:      General: There is no distension.      Palpations: There is no mass.      Tenderness: There is abdominal tenderness. There is no guarding or rebound.      Hernia: No hernia is present.      Comments: Mild ascites with fluid wave not tense   Musculoskeletal:         General: No swelling or tenderness.      Cervical back: No rigidity or tenderness.   Skin:     Coloration: Skin is jaundiced. Skin is not pale.      Findings: Bruising present.      Comments: Spider angiomata and jaundiced with bruising, warm ext, no mottling or pallor.    Neurological:      Comments: She is awake while on ventilator moving ext and shaking head to yes and no questions         Medications  Current Facility-Administered Medications   Medication Dose Route Frequency Provider Last Rate Last Admin    dexmedetomidine (Precedex) 400 mcg/100mL infusion  0.1-1.5 mcg/kg/hr (Ideal) Intravenous Continuous Dawit Bell M.D. 12.8 mL/hr at 11/13/24 0815 0.8 mcg/kg/hr at 11/13/24 0815    Respiratory Therapy Consult   Nebulization  Continuous RT Olvin Hanson M.D. MD Alert...ICU Electrolyte Replacement per Pharmacy   Other PHARMACY TO DOSE Olvin Hanson M.D.        lidocaine (Xylocaine) 1 % injection 2 mL  2 mL Tracheal Tube Q30 MIN PRN Ovlin Hanson M.D.        Pharmacy Consult: Enteral tube insertion - review meds/change route/product selection  1 Each Other PHARMACY TO DOSE Olvin Hanson M.D.        HYDROmorphone (Dilaudid) injection 0.5 mg  0.5 mg Intravenous Q HOUR PRN Olvin Hanson M.D.        Or    HYDROmorphone (Dilaudid) injection 1 mg  1 mg Intravenous Q HOUR PRN Olvin Hanson M.D.   1 mg at 11/13/24 0740    norepinephrine (Levophed) 8 mg in 250 mL NS infusion (premix)  0-1 mcg/kg/min (Ideal) Intravenous Continuous Roberto Jean Baptiste M.D. 12 mL/hr at 11/13/24 0849 0.1 mcg/kg/min at 11/13/24 0849    hydrocortisone sodium succinate PF (Solu-CORTEF) 100 MG injection 50 mg  50 mg Intravenous Q6HRS Roberto Jean Baptiste M.D.   50 mg at 11/13/24 0911    [START ON 11/14/2024] FLUoxetine (PROzac) capsule 10 mg  10 mg Enteral Tube QAM Olvin Hanson M.D.        lactulose 20 GM/30ML solution 30 mL  30 mL Enteral Tube TID Olvin Hanson M.D.        riFAXIMin (Xifaxan) tablet 550 mg  550 mg Enteral Tube BID Olvin Hanson M.D.        acetaminophen (Tylenol) tablet 650 mg  650 mg Enteral Tube Q6HRS PRN Olvin Hanson M.D.        ondansetron (Zofran ODT) dispertab 4 mg  4 mg Enteral Tube Q4HRS PRN Olvin Hanson M.D.        promethazine (Phenergan) tablet 12.5-25 mg  12.5-25 mg Enteral Tube Q4HRS PRN Olvin Hanson M.D.        linezolid (Zyvox) tablet 600 mg  600 mg Enteral Tube Q12HRS Olvin Hanson M.D.        [Held by provider] MBX (diphenhydrAMINE-lidocaine-Maalox) oral susp Cup 5 mL  5 mL Swish & Swallow Q6HRS PRN Olvin Hanson M.D.   5 mL at 11/12/24 1727    pantoprazole (Protonix) injection 40 mg  40 mg Intravenous BID Olvin Hanson M.D.   40 mg at 11/13/24 0628    piperacillin-tazobactam (Zosyn)  4.5 g in  mL IVPB  4.5 g Intravenous Q8HRS Shahzad Jenkins M.D. 25 mL/hr at 11/13/24 0504 4.5 g at 11/13/24 0504    ondansetron (Zofran) syringe/vial injection 4 mg  4 mg Intravenous Q4HRS PRN TRUDY RazaOSandy   4 mg at 11/12/24 2158    promethazine (Phenergan) suppository 12.5-25 mg  12.5-25 mg Rectal Q4HRS PRN Adilia Gonzales D.O.        prochlorperazine (Compazine) injection 5-10 mg  5-10 mg Intravenous Q4HRS PRN ASTRID Raza.O.   10 mg at 11/12/24 2211    dextrose 50% (D50W) injection 25 g  25 g Intravenous Q15 MIN PRN ASTRID Raza.O.   25 g at 11/13/24 0638    lidocaine (Asperflex) 4 % patch 1 Patch  1 Patch Transdermal Q24HRS ASTRID Raza.O.   1 Patch at 11/12/24 0512       Fluids    Intake/Output Summary (Last 24 hours) at 11/13/2024 0919  Last data filed at 11/13/2024 0855  Gross per 24 hour   Intake 154.16 ml   Output 1350 ml   Net -1195.84 ml       Laboratory  Recent Labs     11/10/24  1148 11/13/24  0343 11/13/24  0735   OQZBG40Y 7.47  --   --    JGCLPD370I 24.4*  --   --    IRBGQ691G 57.8*  --   --    ZAMT2MJA 87.1*  --   --    ARTHCO3 18  --   --    Q4FJWWGES 1.5L  --   --    ARTBE -5*  --   --    ISTATAPH  --  7.337* 7.308*   ISTATAPCO2  --  39.2* 39.8*   ISTATAPO2  --  41* 63*   ISTATATCO2  --  22 21   AYFZQYD3CVP  --  74* 90*   ISTATARTHCO3  --  21.0 19.9   ISTATARTBE  --  -4 -6*   ISTATTEMP  --  97.8 F 97.1 F   ISTATFIO2  --  100 70   ISTATSPEC  --  Arterial Arterial   ISTATAPHTC  --  7.343* 7.319*   FCQBAHIB1AJ  --  40* 60*         Recent Labs     11/11/24  0206 11/12/24 0341 11/13/24  0345   SODIUM 135 135 134*   POTASSIUM 4.4 4.4 3.6   CHLORIDE 101 101 101   CO2 18* 18* 20   BUN 41* 31* 37*   CREATININE 1.37 0.51 1.27   MAGNESIUM 1.9 1.7 2.4   PHOSPHORUS 4.7* 3.9 3.9   CALCIUM 8.9 9.2 8.8     Recent Labs     11/10/24  1148 11/11/24  0206 11/12/24 0341 11/13/24  0345   ALTSGPT  --  26 24 30   ASTSGOT  --  83* 86* 107*   ALKPHOSPHAT  --  83 100* 112*   TBILIRUBIN   --  15.2* 16.9* 17.4*   DBILIRUBIN 9.9*  --   --   --    GLUCOSE  --  83 34* 49*     Recent Labs     11/11/24  0206 11/11/24  0300 11/11/24  1055 11/11/24 2122 11/12/24 0341 11/13/24 0345   WBC  --  23.5*   < > 29.7* 26.0* 22.8*   NEUTSPOLYS  --  88.50*  --   --  90.00* 91.40*   LYMPHOCYTES  --  4.00*  --   --  3.40* 3.00*   MONOCYTES  --  5.50  --   --  4.90 3.60   EOSINOPHILS  --  0.50  --   --  0.30 0.80   BASOPHILS  --  0.10  --   --  0.20 0.10   ASTSGOT 83*  --   --   --  86* 107*   ALTSGPT 26  --   --   --  24 30   ALKPHOSPHAT 83  --   --   --  100* 112*   TBILIRUBIN 15.2*  --   --   --  16.9* 17.4*    < > = values in this interval not displayed.     Recent Labs     11/11/24  0951 11/11/24  1055 11/11/24 2122 11/12/24 0341 11/13/24 0345   RBC  --    < > 2.59* 2.59* 2.59*   HEMOGLOBIN  --    < > 7.8* 7.8* 7.8*   HEMATOCRIT  --    < > 22.8* 23.6* 24.1*   PLATELETCT  --    < > 108* 103* 118*   PROTHROMBTM 62.1*  --   --  38.2* 42.7*   INR 7.15*  --   --  3.85* 4.44*    < > = values in this interval not displayed.       Imaging  X-Ray:  I have personally reviewed the images and compared with prior images.  CT:    Reviewed  Echo:   Reviewed  Ultrasound:  Reviewed    Assessment/Plan  * Sepsis (HCC)- (present on admission)  Assessment & Plan  This is Sepsis Present on admission  SIRS criteria identified on my evaluation include: Tachycardia, with heart rate greater than 90 BPM and Tachypnea, with respirations greater than 20 per minute  Clinical indicators of end organ dysfunction include Lactic Acid greater than 2 and Toxic Metabolic Encephalopathy  Source is PNA  Sepsis protocol initiated  Crystalloid Fluid Administration: Resuscitation volume of 20 cc/kg ordered. Reason that resuscitation volume of less than 30ml/kg was ordered concern for fluid overload  IV antibiotics as appropriate for source of sepsis  Reassessment: I have reassessed the patient's hemodynamic status    Zosyn   Follow cultures  Trend  "lactic acid    ARDS (adult respiratory distress syndrome) (HCC)  Assessment & Plan  Optimized ventilation startegies to achieving a PBW TV of <6ml/kg, plt < 30, delta p <14   Will allow for permissive hypercapnea unless contraindicated to a ph 7.15-7.2  Goal sat > 88%  Peep optimization for open lung model and recruitment and consider early APRV  A conservative fluid strategy will be employed FACT trial   Early paralysis will be considered if p/f<150 and continued for 48 hrs  Consider prone position p/f <150  Steroid treatment p/f <200 if not contraindicated (methylprednisone 1mg/kg w/n 72hrs x 28 days or 3 days post extubation) check CRP    Workup: bronchoscopy w/ BAL, culture, ct chest and abdomen, immunogenic, DAH, echo w/ bubble study, COPD/asthma, aspiration, transfusion    Will calculate Mendoza Score and early referral for ECMO    Phenotype consideration:   Type 1 \"direct\"/pna/aspiration/asymmetric ARDS Rx: lower peep, recruitment manuevers ok/proning, fluids not as restrictive mortality 20%   Type 2: \"indirect\" sepsis, transfusion etc Rx: higher peep, fluid restriction, statin, avoid recruitment mortality 50%  20% w/ mixed phenotype (ie lobar pna direct causing contralateral indirect injury)    Patient lung injury is due to aspiration pneumonia  Respiratory PCR negative, viral swab negative  With her advance liver disease this is fatal event recommend a palliative approach      Acute respiratory failure with hypoxia (HCC)  Assessment & Plan  Intubated date: 11/13 due to ongoing aspiration and aspiration pneumonitis causing ARDS  Goal saturation > 88%  Monitor ventilator waveforms and titrate flow/peep and volumes according.   Lung protective ventilation strategy w/ A-F bundle  CXR: monitor lung volumes and tube/line placement  VAP bundle prevention, oral care, post pyloric feeding  Head of bed > 30 degree  GI prophylaxis  Daily awakening and SBT trials unless contraindicated  Monitor for " liberation  Respiratory treatments: prn      Systolic anterior movement of mitral valve  Assessment & Plan  Based upon ECHO 11/10/24   Very high risk for obstructive shock with hypovolemia and tachycardia   Maintain euvolemia  Afterload vasopressor if needed     Pneumonia  Assessment & Plan  Transferred to the ICU 11/10/24 for elevated LA, worsening CXR  Went from room air to needing max HFNC in < 12 with bilateral alveolar infiltrates consistent with aspiration event.   SpO2 > 90%  Finish course of Zosyn x5 days  MRSA nare negative stopped Linezolid with sudden drop of platelets  Follow cultures, respiratory PCR  Aspiration precaution and speech eval  Naso/oral bleeding leading to event? INR 9 at time      SBP (spontaneous bacterial peritonitis) (HCC)  Assessment & Plan  Salmonella sp with associated bacteremia based upon cultures from 10/31    Elevated lactic acid level  Assessment & Plan  Multifactorial: cirrhosis, sepsis, malnutrition  Thiamine  Resuscitation with albumin  Empiric antibiotics  Trend  No shock so likely Type B lactate    Elevated INR  Assessment & Plan  See coagulopathy plan    Superior mesenteric vein thrombosis (HCC)  Assessment & Plan  Non-occlusive on CT 11/3   Anticoagulation is strictly contraindicated    Acute encephalopathy  Assessment & Plan  Improved  Aspiration precautions  Continue rifaximin and lactulose goal stool 1-1.5L    Severe protein-calorie malnutrition ([place]: less than 60% of standard weight) (HCC)  Assessment & Plan  Thiamine replacement  Optimize nutrition as able    Advance care planning- (present on admission)  Assessment & Plan  She and family reverted to full code status during this hospitalization    Now intubated 11/13 with ARDS will need to further discuss goals of care    Hypoglycemia- (present on admission)  Assessment & Plan  Due to poor glycogen store and advance liver disease  Serial monitor   Enteral nutrition    Hepatic encephalopathy (HCC)- (present on  admission)  Assessment & Plan  Lactulose  Rifaximin   Optimize acid/base and electrolytes  Maintain euvolemia    Coagulopathy (HCC)- (present on admission)  Assessment & Plan  Secondary to liver failure  Last dose of Eliquis 11/9  Follow Teg w/ platelet mapping, INR and cryo  Monitor for source of spontaneous hemorrhage  Correct and monitor calcium  Prognosis poor  Improved 11/12 s/p 3 FFP and cyro    Anemia- (present on admission)  Assessment & Plan  Acute on chronic goal hg >7  Monitor for active hemorrhage and monitor and serial correct coagulopathy  Conservative transfusion protocol      Acute renal failure (ARF) (HCC)- (present on admission)  Assessment & Plan  Resolved 11/12  Avoid nephrotoxins    Alcoholic cirrhosis of liver with ascites (HCC)- (present on admission)  Assessment & Plan  Patient with alcoholic cirrhosis sober since June 2024, now with multiple organ failure trigger by SBP.   Avoid hepatoxins, serial monitor liver and synthetic function   Monitor for hepatic encephalopathy and serial monitor ammonia level  Check: hepatitis panel, tylenol level, liver ultrasound and rule out vascular etiology  S/p course of steroids finished last month  Non-occlusive SMA thrombosis  I have reached out to [facility] and [facility] pending transfer. Was accepted to [facility] pending bed availabilty.   She has a 1st outpatient hepatology eval at [facility] in January but not established.   Daily meld labs, prognosis looks grim with multiple organ failure.       UTI (urinary tract infection)- (present on admission)  Assessment & Plan  With enterococcus faecium in urine follow up final sensitivities start linezolid 11/13    Essential hypertension- (present on admission)  Assessment & Plan  Reintroduce oral antihypertensives when appropriate           VTE:  Contraindicated  Ulcer: PPI  Lines: None    I have performed a physical exam and reviewed and updated ROS and Plan today (11/13/2024). In review of yesterday's note  (11/12/2024), there are no changes except as documented above.     Discussed patient condition and risk of morbidity and/or mortality with RN, RT, Pharmacy, Charge nurse / hot rounds, Patient, and GI    The patient remains critically ill on ventilator with active titration, norepinephrine gtt and decompensated cirrhosis and hypoglycemia.  Critical care time = 110 minutes in directly providing and coordinating critical care and extensive data review.  No time overlap and excludes procedures.

## 2024-11-13 NOTE — PROCEDURES
Accession # [id number]    Procedure: DX-ABDOMEN FOR TUBE PLACEMENT    Technique: Single AP view of the abdomen    Date: 11/13/2024 7:10:26 AM    Reason: Line/Tube Placement    Comparison: November 5, 2024.    Findings:    Limited views of the lung bases demonstrate scattered hazy bilateral pulmonary opacities.    Nonspecific bowel gas pattern is observed in the upper abdomen.    Nasogastric tube is seen with tip overlying the gastric body.    The bony structures appear age-appropriate.    IMPRESSION:    Nasogastric tube terminating with tip overlying the gastric body.  Nonspecific bowel gas pattern.  Pulmonary edema and/or infiltrates.

## 2024-11-13 NOTE — PROCEDURES
"Date of Service: 11/13/2024    Procedure:  Diagnostic and therapeutic bronchoscopy with BAL    Indication: Respiratory failure, ? pneumonia    Physician:  Dr. Dawit Bell MD    Post Procedure Diagnosis:  1.  Mild airway inflammation   2.  Thin bright yellow secretions distal trachea and RMB, suspected aspirated gastric vs oral secretions  3.  Therapeutic airway lavage RLL and LLL  4.  BAL from RLL sent for cx's    Narrative:  Appropriate consent was obtained and \"time out\" was performed.  A flexible fiberoptic bronchoscope was then inserted through the ETT without difficulty.  All airways were evaluated to the sub-segmental level.  The airway mucosa was mildly inflamed.  There were thin bright yellow secretions noted in the trachea, RMB and RLL airways.  No endobronchial lesions were seen.  Therapeutic airway lavage of RLL and LLL airway segments.  The bronchoscope was then wedged in a segment of the RLL bronchus.  40cc of saline was instilled with moderate return of thin, yellow BAL fluid.  The BAL specimen will be sent for appropriate culture.  No immediate complications.  EBL = 0.      Dawit Bell M.D.      "

## 2024-11-13 NOTE — CARE PLAN
The patient is Unstable - High likelihood or risk of patient condition declining or worsening    Shift Goals  Clinical Goals: Stable hemodynamics/respiratory  Patient Goals: comfort, breathe easier, transfer  Family Goals: na    Progress made toward(s) clinical / shift goals:    Problem: Respiratory  Goal: Patient will achieve/maintain optimum respiratory ventilation and gas exchange  Outcome: Progressing     Problem: Mechanical Ventilation  Goal: Safe management of artificial airway and ventilation  Outcome: Progressing     Problem: Skin Integrity  Goal: Skin integrity is maintained or improved  Outcome: Progressing     Problem: Safety - Medical Restraint  Goal: Remains free of injury from restraints (Restraint for Interference with Medical Device)  Outcome: Progressing       Patient is not progressing towards the following goals:

## 2024-11-13 NOTE — CARE PLAN
Problem: Humidified High Flow Nasal Cannula  Goal: Maintain adequate oxygenation dependent on patient condition  Description: Target End Date:  resolve prior to discharge or when underlying condition is resolved/stabilized    1.  Implement humidified high flow oxygen therapy  2.  Titrate high flow oxygen to maintain appropriate SpO2  Outcome: Not Progressing   60L/100% with 15L non-rebreather

## 2024-11-13 NOTE — CARE PLAN
Problem: Humidified High Flow Nasal Cannula  Goal: Maintain adequate oxygenation dependent on patient condition  Description: Target End Date:  resolve prior to discharge or when underlying condition is resolved/stabilized    1.  Implement humidified high flow oxygen therapy  2.  Titrate high flow oxygen to maintain appropriate SpO2  Outcome: Not Met   60L/90%

## 2024-11-13 NOTE — PROGRESS NOTES
"Critical Care    Called for increasing hypoxia and resp distress. SpO2 85% and HFNC 60L/100% +NRB.  AB.33/39/41.  CXR increasing bilat airspace opacities and effusions.    Patient states \"I don't want to die\" and agreeable to intubation.  She understands that her prognosis is poor.  Pending transfer for liver transplant evaluation.    "

## 2024-11-13 NOTE — PROGRESS NOTES
Report received from outgoing nurse, care transferred. Patient currently Tachypneic at 22 maxed out on high flow.  Lines traced. Patient currently in SR 96 on the monitor and A&Ox 3, disoriented to time and notably anxious requiring reorienting to avoid removal of HHFNC. No other questions or concerns at this time from the patient. Call light within reach, fall precautions in place.      2017 - Patient notably anxious and confused in conversation and repeatedly taking of HHFNC resulting in desatting to 68% within seconds. Attempted reorientation and distraction prior. Bilateral soft wrist restraints applied and Dr. España notified with order placed. Dr. España updated regarding resp status.     2210 - Patient FSBG 67, D50 given, rechecked to be at 117    2340 - Patient RR sustaining in the high 40's, SpO2, 85% maxed out on high flow, 10L non-rebreather applied over highflow. SpO2 up to 88%. Patient notably using accessory muscles with shallow breathes and appears to be tiring out. Respiratory therapist at bedside discussing plan and assisting with care. Difficulties with BiPAP discussed w/ respiratory therapist given patient reoccuring nausea, restraints, and frequent oral bleeding and difficulty swallowing. Dr. España updated and notified of the above. ABG added on to AM labs. RT and Charge RN updated.     0010 - [facility] called for updates, still following patient. [facility] aware patient may require intubation    0400 - Patient on 15L NRB while maxed out on high flow sitting at 90 degree angle and repositioned. Patient not coming up above 86% for several minutes. ABG and AM labs drawn. ABG resulted w/ iSTAT and Dr. España notified. Charge RN notified. Dr. España requested to come to bedside to assess patient. Pt ventilating all parts of lungs but crackles getting significantly worse. Mentation and hemodynamics stable.     0615 - Report given to Sofie ZALDIVAR

## 2024-11-13 NOTE — CARE PLAN
The patient is Watcher - Medium risk of patient condition declining or worsening    Shift Goals  Clinical Goals: hemodynamic stability, Improved oxygenation/ventilation  Patient Goals: JESUSITA  Family Goals: updates    Progress made toward(s) clinical / shift goals:    Problem: Pain - Standard  Goal: Alleviation of pain or a reduction in pain to the patient’s comfort goal  Description: Target End Date:  Prior to discharge or change in level of care    Document on Vitals flowsheet    1.  Document pain using the appropriate pain scale per order or unit policy  2.  Educate and implement non-pharmacologic comfort measures (i.e. relaxation, distraction, massage, cold/heat therapy, etc.)  3.  Pain management medications as ordered  4.  Reassess pain after pain med administration per policy  5.  If opiods administered assess patient's response to pain medication is appropriate per POSS sedation scale  6.  Follow pain management plan developed in collaboration with patient and interdisciplinary team (including palliative care or pain specialists if applicable)  Outcome: Progressing     Problem: Hemodynamics  Goal: Patient's hemodynamics, fluid balance and neurologic status will be stable or improve  Description: Target End Date:  Prior to discharge or change in level of care    Document on Assessment and I/O flowsheet templates    1.  Monitor vital signs, pulse oximetry and cardiac monitor per provider order and/or policy  2.  Maintain blood pressure per provider order  3.  Hemodynamic monitoring per provider order  4.  Manage IV fluids and IV infusions  5.  Monitor intake and output  6.  Daily weights per unit policy or provider order  7.  Assess peripheral pulses and capillary refill  8.  Assess color and body temperature  9.  Position patient for maximum circulation/cardiac output  10. Monitor for signs/symptoms of excessive bleeding  11. Assess mental status, restlessness and changes in level of consciousness  12. Monitor  temperature and report fever or hypothermia to provider immediately. Consideration of targeted temperature management.  Outcome: Progressing     Problem: Skin Integrity  Goal: Skin integrity is maintained or improved  Description: Target End Date:  Prior to discharge or change in level of care    Document interventions on Skin Risk/Thad flowsheet groups and corresponding LDA    1.  Assess and monitor skin integrity, appearance and/or temperature  2.  Assess risk factors for impaired skin integrity and/or pressures ulcers  3.  Implement precautions to protect skin integrity in collaboration with interdisciplinary team  4.  Implement pressure ulcer prevention protocol if at risk for skin breakdown  5.  Confirm wound care consult if at risk for skin breakdown  6.  Ensure patient use of pressure relieving devices  (Low air loss bed, waffle overlay, heel protectors, ROHO cushion, etc)  Outcome: Progressing     Problem: Safety - Medical Restraint  Goal: Remains free of injury from restraints (Restraint for Interference with Medical Device)  Description: INTERVENTIONS:  1. Determine that other, less restrictive measures have been tried or would not be effective before applying the restraint  2. Evaluate the patient's condition at the time of restraint application  3. Educate patient/family regarding the reason for restraint  4. Q2H: Monitor safety, psychosocial status, comfort, circulation, respiratory status, LOC, nutrition and hydration  Outcome: Progressing       Patient is not progressing towards the following goals:

## 2024-11-13 NOTE — PROGRESS NOTES
Intubation and Bronchoscopy  Time out for intubation and bronchoscopy at 0554  0556 - 20mg Etomidate administered  0556 - 50 of Rocuronium administered  0558 - Color change and bilateral breath sounds  7.5 ETT at 21 at the teeth  0600 - OG placed  CXR and KUB ordered

## 2024-11-13 NOTE — DIETARY
"Nutrition Services: Initial Assessment     Day 13 of admit. Kavita Freeman is a 59 y.o. female with admitting DX of Sepsis     Consult received for enteral nutrition.    Current Hospital Problems:  ARDS (adult respiratory distress syndrome)  Elevated lactic acid level  Spontaneous bacterial peritonitis  Pneumonia  Systolic anterior movement of mitral valve  Acute respiratory failure with hypoxia  Elevated INR  Hypoxia  Lactic acidosis  Superior mesenteric vein thrombosis  Acute encephalopathy  Bacteremia due to gram negative bacteria  Severe protein-calorie malnutrition  Transaminitis  Hypoglycemia  Sepsis  Hepatic encephalopathy  High anion gap metabolic acidosis  Hyponatremia  Coagulopathy  Anemia  Alcoholic cirrhosis of liver with ascites  Acute renal failure  Depression with anxiety  UTI (urinary tract infection)  Essential hypertension     Nutrition Assessment:      Height: 172.7 cm (5' 8\")  Weight: 62.5 kg (137 lb 12.6 oz) - admit bed scale wt  Ideal Body Weight: 63.5 kg (140 lb)  Percent Ideal Body Weight: 110.2  Weight taken via bed scale  Body mass index is 23.46 kg/m². BMI classification: Normal.    Wt Readings from Last 6 Encounters:   24 70 kg (154 lb 5.2 oz)   10/21/24 75.8 kg (167 lb)   24 69.8 kg (153 lb 14.1 oz)   24 90.3 kg (199 lb)   24 85.7 kg (189 lb)   24 73.5 kg (162 lb 0.6 oz)      Calculation/Equation: MSJ x 1.2 = 1512 kcals/day; RMR per PSU 2003b (vent L/min: 9.5, Tmax/24 hrs: 36.9) = 1451 kcals/day  Total Calories / day: 1512 - 1688 (Calories / k - 27)  Total Grams Protein / day: 81 - 94 (Grams Protein / k.3 - 1.5) - high protein provision indicated in pt with cirrhosis    Objective:  Admitted 10/31 with abdominal pain.   Pt was admitted to IMCU, transferred to the floor . She transferred to the ICU 11/10 for worsening encephalopathy and higher level of care.  Pertinent medical hx: Acute cystitis with hematuria, EFRAIN, GI bleeding, " Hyperlipidemia, Hypertension, Hypomagnesemia, Hypotension, Indigestion, Septic shock  Palliative met with pt 11/11. Palliative signed off on pt 11/12.  Pt was intubated 11/13.  Previous oral diet with noted poor intake.  Nutrition support indicated to meet estimated nutrition needs.  Enteral tube placed, awaiting   Pertinent labs: Sosium 134, Glucose 49, BUN 37, , Alk phos 112  Pertinent meds: Lactulose, Protonix, Rifaximin; Levo @ 0.08 mcg/kg/min  No propofol running at this time.  Skin/wounds: wound team saw pt 11/2 for moisture fissure to sacrum  Food Allergies: none noted  Last BM: 11/13/24 per flowsheets  Most appropriate formula based on RD evaluation: Vital AF 1.2     Current diet order:   NPO    Subjective:   Patient was previously seen by RD 11/8. Please see full note.    Nutrition Focused Physical Exam (NFPE) 11/8 - pt is intubated  Weight loss: Significant wt fluctuations, but overall trend is down in the past year, 24.3% wt loss in 10 months, 16.7% in 5 months - severe wt loss, suspect fluid changes r/t ascites impacting wt loss percentages.   NFPE findings:  Muscle mass: moderate loss - flat/slightly depressed temporals and triceps; severely depressed interosseous; severe wasting at shoulder   Subcutaneous fat: moderate loss - some hollowing of orbital region, moderate loss at triceps and arm. Legs not assessed d/t pain.  Fluid Accumulation: Note net I/O since admit is -9.2 L w/ no present edema; pt with ascites.   Reduced  Strength: N/A in acute care setting.     Nutrition Diagnosis:      Inadequate oral intake related to intubation status as evidenced by need for nutrition support.    Moderate malnutrition in context of chronic illness related to ascites 2/2 cirrhosis as evidenced by <75% PO intake, severe fluid accumulation, moderate fat and muscle wasting on NFPE.      Nutrition Interventions:      Initiate  Vital AF 1.2  at 25 ml/hr continuous and advance per protocol to goal rate of 55  ml/hr.  Goal tube feed volume provides 1584 kcals, 99 g protein, and 1071 ml free water daily.   Patient aware of active plan of care as appropriate.     Nutrition Monitoring and Evaluation:     Monitor nutrition POC  Additional fluids per MD/DO  Monitor vital signs pertinent to nutrition       RD following and will provide updated recommendations as indicated.

## 2024-11-13 NOTE — PROGRESS NOTES
Communicated with Dr. Hanson regarding this patient and established that ongoing palliative care support is not indicated at this time.  Palliative care will sign off.  Please place a new consult for palliative care if new needs arise.    Thank you for allowing Palliative Care to support this patient and family. Contact [phone] for additional assistance, change in patient status, or with any questions/concerns.      Darlene Song, APRN  Palliative Care  [phone]

## 2024-11-13 NOTE — ASSESSMENT & PLAN NOTE
"Optimized ventilation startegies to achieving a PBW TV of <6ml/kg, plt < 30, delta p <14   Will allow for permissive hypercapnea unless contraindicated to a ph 7.15-7.2  Goal sat > 88%  Peep optimization for open lung model and recruitment and consider early APRV  A conservative fluid strategy will be employed FACT trial   Early paralysis will be considered if p/f<150 and continued for 48 hrs  Consider prone position p/f <150  Steroid treatment p/f <200 if not contraindicated (methylprednisone 1mg/kg w/n 72hrs x 28 days or 3 days post extubation) check CRP    Workup: bronchoscopy w/ BAL, culture, ct chest and abdomen, immunogenic, DAH, echo w/ bubble study, COPD/asthma, aspiration, transfusion    Will calculate Mendoza Score and early referral for ECMO    Phenotype consideration:   Type 1 \"direct\"/pna/aspiration/asymmetric ARDS Rx: lower peep, recruitment manuevers ok/proning, fluids not as restrictive mortality 20%   Type 2: \"indirect\" sepsis, transfusion etc Rx: higher peep, fluid restriction, statin, avoid recruitment mortality 50%  20% w/ mixed phenotype (ie lobar pna direct causing contralateral indirect injury)    Patient lung injury is due to aspiration pneumonia  Respiratory PCR negative, viral swab negative  With her advance liver disease this is fatal event recommend a palliative approach    Improving Vent setting but now new sepsis and likely aspiration event 11/16    "

## 2024-11-13 NOTE — PROGRESS NOTES
[facility] ICU Progress Note      Admit Date: 10/31/2024    Resident(s): Roberto Jean Baptiste M.D.   Attending:  Dr. Olvin Hanson    Patient ID:    Name:  Kavita Freeman   YOB: 1965  Age:  59 y.o.  female   MRN:  [id number]    Hospital Course (carried forward and updated):  59 y.o. female admitted 10/31/2024 with EtOH Cirrhosis, sober 4 months, admitted with fever chills and abdominal pain, found to have SBP with salmonella in culture as well as bacteremia. She has been followed in IMCU with lots of goals of care changes back and forth with advanced liver failure and multiorgan dyfunction. Transferred to ICU 11/10 for increase lactate, worsening encephalopathy and respiratory failure.     Interval Events:  11/10 transferred to ICU.   11/11: Zyvox and zosyn for aspiration pneumonitis. CXR improved without effusion. Reversed coagulopathy INR of 9 with 2FFP 1 Cryoprecipitate  11/12: Remains encephalopathic. 1.6L stool out through BMS.    11/13: Intubated in the early AM for increasing hypoxia and respiratory distress, worsened CXR with bilateral opacities/effusions. Post-intubation bronch suggestive of aspirated gastric/oral contents. Progressively hypotensive started on levophed. Start linezolid for Enterococcus faecium in urine     Consultants:  Critical Care  Palliative care    Vitals Range last 24h:  Temp:  [36.3 °C (97.4 °F)-36.9 °C (98.4 °F)] 36.3 °C (97.4 °F)  Pulse:  [] 70  Resp:  [20-50] 22  BP: ()/(46-62) 116/55  SpO2:  [83 %-97 %] 97 %      Intake/Output Summary (Last 24 hours) at 11/13/2024 0912  Last data filed at 11/13/2024 0855  Gross per 24 hour   Intake 154.16 ml   Output 1350 ml   Net -1195.84 ml        Review of Systems   Unable to perform ROS: Intubated        PHYSICAL EXAM:  Vitals:    11/13/24 0800 11/13/24 0815 11/13/24 0830 11/13/24 0845   BP: (!) 75/46 116/58 104/51 116/55   Pulse: 65 69 71 70   Resp: (!) 22 (!) 22 (!) 22 (!) 22   Temp:       TempSrc:       SpO2: 93% 93% 97%  97%   Weight:       Height:        Body mass index is 23.46 kg/m².    O2 therapy: Pulse Oximetry: 97 %, O2 (LPM): 60 (w/ 15L NRB), O2 Delivery Device: Ventilator    Date 11/13/24 0700 - 11/14/24 0659   Shift 6669-5047 7923-4114 7013-1719 24 Hour Total   INTAKE   I.V. 34.2   34.2     Precedex Volume 19.2   19.2     Norepinephrine Volume 15   15   Shift Total 34.2   34.2   OUTPUT   Shift Total       NET 34.2   34.2        Physical Exam  Vitals and nursing note reviewed.   Constitutional:       General: She is not in acute distress.     Appearance: She is ill-appearing.      Comments: Intubated, sedated   HENT:      Head: Normocephalic.      Mouth/Throat:      Mouth: Mucous membranes are dry.      Comments: Dried blood from nose and mouth  Eyes:      General: Scleral icterus present.      Pupils: Pupils are equal, round, and reactive to light.   Cardiovascular:      Rate and Rhythm: Regular rhythm. Tachycardia present.      Heart sounds: No murmur heard.  Pulmonary:      Effort: Respiratory distress present.      Breath sounds: No stridor. Rhonchi and rales present. No wheezing.   Chest:      Chest wall: No tenderness.   Abdominal:      General: There is distension.      Palpations: There is no mass.      Hernia: No hernia is present.      Comments: Mild ascites with fluid wave not tense   Musculoskeletal:         General: No swelling or tenderness.      Cervical back: No rigidity or tenderness.   Skin:     Coloration: Skin is jaundiced. Skin is not pale.      Findings: Bruising present.   Neurological:      Comments: intubated         Recent Labs     11/10/24  1148 11/13/24  0343 11/13/24  0735   [id number] 7.47  --   --    [id number] 24.4*  --   --    [id number] 57.8*  --   --    [id number] 87.1*  --   --    ARTHCO3 18  --   --    D7HFXUHYE 1.5L  --   --    ARTBE -5*  --   --    ISTATAPH  --  7.337* 7.308*   ISTATAPCO2  --  39.2* 39.8*   ISTATAPO2  --  41* 63*   ISTATATCO2  --  22 21   SISKIAQ0NJY  --  74* 90*    ISTATARTHCO3  --  21.0 19.9   ISTATARTBE  --  -4 -6*   ISTATTEMP  --  97.8 F 97.1 F   ISTATFIO2  --  100 70   ISTATSPEC  --  Arterial Arterial   ISTATAPHTC  --  7.343* 7.319*   AGGIZGVU8KS  --  40* 60*     Recent Labs     11/11/24 0206 11/12/24 0341 11/13/24 0345   SODIUM 135 135 134*   POTASSIUM 4.4 4.4 3.6   CHLORIDE 101 101 101   CO2 18* 18* 20   BUN 41* 31* 37*   CREATININE 1.37 0.51 1.27   MAGNESIUM 1.9 1.7 2.4   PHOSPHORUS 4.7* 3.9 3.9   CALCIUM 8.9 9.2 8.8     Recent Labs     11/10/24  1148 11/11/24 0206 11/12/24 0341 11/13/24 0345   ALTSGPT  --  26 24 30   ASTSGOT  --  83* 86* 107*   ALKPHOSPHAT  --  83 100* 112*   TBILIRUBIN  --  15.2* 16.9* 17.4*   DBILIRUBIN 9.9*  --   --   --    GLUCOSE  --  83 34* 49*     Recent Labs     11/11/24  0951 11/11/24  1055 11/11/24 2122 11/12/24 0341 11/13/24 0345   RBC  --    < > 2.59* 2.59* 2.59*   HEMOGLOBIN  --    < > 7.8* 7.8* 7.8*   HEMATOCRIT  --    < > 22.8* 23.6* 24.1*   PLATELETCT  --    < > 108* 103* 118*   PROTHROMBTM 62.1*  --   --  38.2* 42.7*   INR 7.15*  --   --  3.85* 4.44*    < > = values in this interval not displayed.     Recent Labs     11/11/24  0206 11/11/24  0300 11/11/24  1055 11/11/24 2122 11/12/24 0341 11/13/24 0345   WBC  --  23.5*   < > 29.7* 26.0* 22.8*   NEUTSPOLYS  --  88.50*  --   --  90.00* 91.40*   LYMPHOCYTES  --  4.00*  --   --  3.40* 3.00*   MONOCYTES  --  5.50  --   --  4.90 3.60   EOSINOPHILS  --  0.50  --   --  0.30 0.80   BASOPHILS  --  0.10  --   --  0.20 0.10   ASTSGOT 83*  --   --   --  86* 107*   ALTSGPT 26  --   --   --  24 30   ALKPHOSPHAT 83  --   --   --  100* 112*   TBILIRUBIN 15.2*  --   --   --  16.9* 17.4*    < > = values in this interval not displayed.       Meds:   dexmedetomidine (Precedex) infusion  0.1-1.5 mcg/kg/hr (Ideal) 0.8 mcg/kg/hr (11/13/24 0815)    Respiratory Therapy Consult        MD Alert...Adult ICU Electrolyte Replacement per Pharmacy        lidocaine  2 mL      Pharmacy  1 Each       HYDROmorphone  0.5 mg      Or    HYDROmorphone  1 mg      NORepinephrine  0-1 mcg/kg/min (Ideal) 0.1 mcg/kg/min (11/13/24 0849)    hydrocortisone sodium succinate PF  50 mg      [START ON 11/14/2024] FLUoxetine  10 mg      lactulose  30 mL      riFAXIMin  550 mg      acetaminophen  650 mg      ondansetron  4 mg      promethazine  12.5-25 mg      linezolid  600 mg      [Held by provider] MBX (diphenhydrAMINE-lidocaine-Maalox)  5 mL      pantoprazole  40 mg      piperacillin-tazobactam  4.5 g 4.5 g (11/13/24 8677)    ondansetron  4 mg      promethazine  12.5-25 mg      prochlorperazine  5-10 mg      dextrose bolus  25 g      lidocaine  1 Patch          Imaging:  DX-CHEST-PORTABLE (1 VIEW)   Final Result         1.  Pulmonary edema and/or infiltrates, similar to prior study.   2.  Layering right pleural effusion, decreased since prior      DX-CHEST-PORTABLE (1 VIEW)   Final Result         1.  Pulmonary edema and/or infiltrates, similar to prior study.   2.  Layering right pleural effusion, decreased since prior      EC-ECHOCARDIOGRAM LTD W/O CONT   Final Result      DX-CHEST-PORTABLE (1 VIEW)   Final Result         1.  New significant right pleural effusion identified.      2.  Significant increase in diffuse pulmonary opacifications could be due to pulmonary edema or pneumonia.      CT-HEAD W/O   Final Result   Impression:      1. No acute process in the brain evident.      2. The skull base sinuses are clear.                  US-RENAL   Final Result      1.  No hydronephrosis.   2.  Ascites.      DX-CHEST-PORTABLE (1 VIEW)   Final Result      1.  No acute cardiac or pulmonary abnormalities are identified.      2.  Slight left basilar atelectasis.      [id number] VIEW   Final Result      1.  Several markedly dilated loops of small bowel in the mid abdomen suspicious for gastroenteritis or inflammatory change. Recommend follow-up abdominal series to rule out evolving small bowel obstruction.      EC-ECHOCARDIOGRAM  COMPLETE W/O CONT   Final Result      CT-ABDOMEN-PELVIS W/O   Final Result      1.  Dilated fluid-filled small bowel and colonic loops favoring ileus.   2.  No evidence of bowel perforation.   3.  Hepatic cirrhosis with portal hypertension and moderate to large volume ascites.   4.  Nonocclusive superior mesenteric vein thrombosis.   5.  Cholelithiasis.   6.  Bibasilar atelectasis with minimal bilateral pleural fluid.   7.  Subtle findings concerning for pulmonary emboli.      These findings were electronically conveyed to JAMES WILLIAM on 11/3/2024 11:50 AM.         US-ABDOMEN COMPLETE SURVEY   Final Result      1.  Cirrhosis with stigmata of portal hypertension including splenomegaly, recanalized umbilical vein and ascites.   2.  Gallbladder sludge and stones. Gallbladder wall thickening is nonspecific and could be related to liver disease but correlate clinically for evidence of acute cholecystitis. No biliary dilatation.         DX-CHEST-PORTABLE (1 VIEW)   Final Result      1.  Hazy left lower lobe opacity could represent atelectasis or pneumonitis.   2.  There is linear scarring or atelectasis in the right lung base.      DX-CHEST-PORTABLE (1 VIEW)    (Results Pending)   DX-ABDOMEN FOR TUBE PLACEMENT    (Results Pending)   DX-CHEST-LIMITED (1 VIEW)    (Results Pending)       ASSESSMENT and PLAN:    * Sepsis (HCC)- (present on admission)  Assessment & Plan  This is Sepsis Present on admission  SIRS criteria identified on my evaluation include: Tachycardia, with heart rate greater than 90 BPM and Tachypnea, with respirations greater than 20 per minute  Clinical indicators of end organ dysfunction include Lactic Acid greater than 2 and Toxic Metabolic Encephalopathy  Source is PNA  Sepsis protocol initiated  Crystalloid Fluid Administration: Resuscitation volume of 20 cc/kg ordered. Reason that resuscitation volume of less than 30ml/kg was ordered concern for fluid overload  IV antibiotics as  appropriate for source of sepsis  Reassessment: I have reassessed the patient's hemodynamic status    Zosyn   Follow cultures  Type B lactic acidosis ongoing  Levophed drip titrate to MAP >65  Hydrocortisone 50mg q6h for relative adrenal insufficiency     Acute respiratory failure with hypoxia (HCC)  Assessment & Plan  Secondary to multifocal PNA and aspiration pneumonitis  HFNC on initial presentation to ICU, worsening 11/13 early morning requiring intubation  PEEP 8 50%FiO2  Bronchoscopy post-intubation supporting aspiration with identification of what appeared to be gastric contents/oral secretions in the bronchi   Empiric antibiotics with Zosyn   ABCDEF bundle  Hydrocortisone 50mg q6h for severe PNA     Enterococcus UTI  Assessment & Plan  Noted on urine cultures, also growing candida  Hold on initiating antifungal coverage at this time  Linezolid starting 11/13    Systolic anterior movement of mitral valve  Assessment & Plan  Based upon ECHO 11/10/24   Very high risk for obstructive shock with hypovolemia and tachycardia     Maintain euvolemia  Neosynephrine for vasopressor if needed     Pneumonia  Assessment & Plan  Admitted to the ICU 11/10/24 for elevated LA, worsening CXR    SpO2 > 90%  Empiric zosyn  Follow cultures     SBP (spontaneous bacterial peritonitis) (HCC)  Assessment & Plan  Salmonella sp with associated bacteremia based upon cultures from 10/31  Completed treatment for this    Elevated lactic acid level  Assessment & Plan  Multifactorial: cirrhosis, sepsis, malnutrition  Thiamine  Resuscitation with albumin  Empiric antibiotics  Trend  No shock so likely Type B lactate    Elevated INR  Assessment & Plan  See coagulopathy plan    Superior mesenteric vein thrombosis (HCC)  Assessment & Plan  Non-occlusive on CT 11/3   Anticoagulation is strictly contraindicated    Severe protein-calorie malnutrition ([place]: less than 60% of standard weight) (HCC)  Assessment & Plan  Thiamine replacement  Optimize  nutrition as able    Advance care planning- (present on admission)  Assessment & Plan  She and family reverted to full code status during this hospitalization  Pall med consult   Family conference 11/13 to discuss plan of care    Hepatic encephalopathy (HCC)- (present on admission)  Assessment & Plan  Lactulose  Rifaximin   Target 1.5L stool output daily  Optimize acid/base and electrolytes  Maintain euvolemia    Coagulopathy (HCC)- (present on admission)  Assessment & Plan  Secondary to liver failure  Last dose of Eliquis 11/9  Follow Teg w/ platelet mapping, INR and cryo  Monitor for source of spontaneous hemorrhage  Correct and monitor calcium  Prognosis poor    Anemia- (present on admission)  Assessment & Plan  Acute on chronic goal hg >7  Monitor for active hemorrhage and monitor and serial correct coagulopathy  Conservative transfusion protocol    Acute renal failure (ARF) (HCC)- (present on admission)  Assessment & Plan  Ischemic ATN + sepsis + HRS  Albumin resuscitation  Strict I/Os  Renally dosed medications  Avoid nephrotoxic agents as able  Trend; rebolused 1L for EFRAIN on 11/13 with Cr. 0.5 to 1.2     Alcoholic cirrhosis of liver with ascites (HCC)- (present on admission)  Assessment & Plan  Patient with alcoholic cirrhosis sober since June 2024, now with multiple organ failure trigger by SBP.   Avoid hepatoxins, serial monitor liver and synthetic function   Monitor for hepatic encephalopathy and serial monitor ammonia level  Check: hepatitis panel, tylenol level, liver ultrasound and rule out vascular etiology  S/p course of steroids finished last month  Non-occlusive SMA thrombosis  Will reach out to Transplant centers again to discuss if she is a candidate for transplant evaluation.   She has a 1st outpatient hepatology eval at [facility] in January. [facility] had accepted her in the past week.   Daily meld labs, prognosis looks grim with multiple organ failure.       DISPO: ICU, pending transfer to  [place] for transplant eval    CODE STATUS: Full code    Quality Measures:  Feeding: Transition to tube feeds  Analgesia: None  Sedation: None  Thromboprophylaxis: Contraindicated  Head of bed: >30 degrees  Ulcer prophylaxis: PPI  Glycemic control: Hypoglycemia protocol  Bowel care: bowel regimen  Indwelling lines: ET tube, Herrera, 2x PIV  Deescalation of antibiotics: Zosyn d4/5, linezolid d1

## 2024-11-13 NOTE — THERAPY
Speech Language Therapy Contact Note    Patient Name: Kavita Freeman  Age:  59 y.o., Sex:  female  Medical Record #: [id number]  Today's Date: 11/13/2024    Pt now intubated- ST to hold dysphagia tx.

## 2024-11-13 NOTE — PROGRESS NOTES
Had a family discussion and we went over her case and her currently with ARDS, ESLD meld 37, EFRAIN, coagulopathy, malnutrition, decondition, delirium, aspiration. Offered aggressive care and continue for transplant evaluation, trial of therapy and comfort focused treatment. They have asked to do a trial of therapy and allow her son to see her this weekend. We have changed her code status to DNR/I okay and have asked to refrain from central line and arterial line.     Olvin Hanson MD  Critical Care Medicine    Spoke to [facility] hepatologist and updated her on her clinical status change and lung injury her transfer has placed on hold. We can re-engage depending on her clinical status and if her lung injury improves.

## 2024-11-14 LAB
ALBUMIN SERPL BCP-MCNC: 3 G/DL (ref 3.2–4.9)
ALBUMIN/GLOB SERPL: 1 G/DL
ALP SERPL-CCNC: 134 U/L (ref 30–99)
ALT SERPL-CCNC: 26 U/L (ref 2–50)
ANION GAP SERPL CALC-SCNC: 13 MMOL/L (ref 7–16)
AST SERPL-CCNC: 75 U/L (ref 12–45)
BASE EXCESS BLDA CALC-SCNC: -4 MMOL/L (ref -4–3)
BASOPHILS # BLD AUTO: 0.1 % (ref 0–1.8)
BASOPHILS # BLD: 0.02 K/UL (ref 0–0.12)
BILIRUB SERPL-MCNC: 16.1 MG/DL (ref 0.1–1.5)
BODY TEMPERATURE: ABNORMAL DEGREES
BREATHS SETTING VENT: 22
BUN SERPL-MCNC: 42 MG/DL (ref 8–22)
CALCIUM ALBUM COR SERPL-MCNC: 9.4 MG/DL (ref 8.5–10.5)
CALCIUM SERPL-MCNC: 8.6 MG/DL (ref 8.5–10.5)
CHLORIDE SERPL-SCNC: 106 MMOL/L (ref 96–112)
CO2 BLDA-SCNC: 22 MMOL/L (ref 20–33)
CO2 SERPL-SCNC: 18 MMOL/L (ref 20–33)
CREAT SERPL-MCNC: 0.41 MG/DL (ref 0.5–1.4)
CRP SERPL HS-MCNC: 9.17 MG/DL (ref 0–0.75)
DELSYS IDSYS: ABNORMAL
END TIDAL CARBON DIOXIDE IECO2: 30 MMHG
EOSINOPHIL # BLD AUTO: 0.01 K/UL (ref 0–0.51)
EOSINOPHIL NFR BLD: 0.1 % (ref 0–6.9)
ERYTHROCYTE [DISTWIDTH] IN BLOOD BY AUTOMATED COUNT: 71.7 FL (ref 35.9–50)
GFR SERPLBLD CREATININE-BSD FMLA CKD-EPI: 114 ML/MIN/1.73 M 2
GLOBULIN SER CALC-MCNC: 3.1 G/DL (ref 1.9–3.5)
GLUCOSE BLD STRIP.AUTO-MCNC: 140 MG/DL (ref 65–99)
GLUCOSE BLD STRIP.AUTO-MCNC: 150 MG/DL (ref 65–99)
GLUCOSE BLD STRIP.AUTO-MCNC: 194 MG/DL (ref 65–99)
GLUCOSE SERPL-MCNC: 132 MG/DL (ref 65–99)
HCO3 BLDA-SCNC: 21 MMOL/L (ref 17–25)
HCT VFR BLD AUTO: 27.6 % (ref 37–47)
HGB BLD-MCNC: 8.7 G/DL (ref 12–16)
HOROWITZ INDEX BLDA+IHG-RTO: 130 MM[HG]
IMM GRANULOCYTES # BLD AUTO: 0.14 K/UL (ref 0–0.11)
IMM GRANULOCYTES NFR BLD AUTO: 0.9 % (ref 0–0.9)
INR PPP: 4 (ref 0.87–1.13)
LABORATORY REPORT: NORMAL
LACTATE BLD-SCNC: 3.4 MMOL/L (ref 0.5–2)
LYMPHOCYTES # BLD AUTO: 0.39 K/UL (ref 1–4.8)
LYMPHOCYTES NFR BLD: 2.5 % (ref 22–41)
MAGNESIUM SERPL-MCNC: 2.3 MG/DL (ref 1.5–2.5)
MCH RBC QN AUTO: 30.1 PG (ref 27–33)
MCHC RBC AUTO-ENTMCNC: 31.5 G/DL (ref 32.2–35.5)
MCV RBC AUTO: 95.5 FL (ref 81.4–97.8)
MODE IMODE: ABNORMAL
MONOCYTES # BLD AUTO: 0.68 K/UL (ref 0–0.85)
MONOCYTES NFR BLD AUTO: 4.3 % (ref 0–13.4)
NEUTROPHILS # BLD AUTO: 14.44 K/UL (ref 1.82–7.42)
NEUTROPHILS NFR BLD: 92.1 % (ref 44–72)
NRBC # BLD AUTO: 0 K/UL
NRBC BLD-RTO: 0 /100 WBC (ref 0–0.2)
O2/TOTAL GAS SETTING VFR VENT: 60 %
PCO2 BLDA: 37.7 MMHG (ref 26–37)
PCO2 TEMP ADJ BLDA: 37.9 MMHG (ref 26–37)
PEEP END EXPIRATORY PRESSURE IPEEP: 10 CMH20
PETH INTERPRETATION NL11780: NORMAL
PH BLDA: 7.36 [PH] (ref 7.4–7.5)
PH TEMP ADJ BLDA: 7.35 [PH] (ref 7.4–7.5)
PHOSPHATE SERPL-MCNC: 4.1 MG/DL (ref 2.5–4.5)
PLATELET # BLD AUTO: 113 K/UL (ref 164–446)
PLPETH BLD-MCNC: <10 NG/ML
PMV BLD AUTO: 10.7 FL (ref 9–12.9)
PO2 BLDA: 78 MMHG (ref 64–87)
PO2 TEMP ADJ BLDA: 79 MMHG (ref 64–87)
POPETH BLD-MCNC: <10 NG/ML
POTASSIUM SERPL-SCNC: 4 MMOL/L (ref 3.6–5.5)
PREALB SERPL-MCNC: 3.2 MG/DL (ref 18–38)
PROT SERPL-MCNC: 6.1 G/DL (ref 6–8.2)
PROTHROMBIN TIME: 39.3 SEC (ref 12–14.6)
RBC # BLD AUTO: 2.89 M/UL (ref 4.2–5.4)
SAO2 % BLDA: 95 % (ref 93–99)
SODIUM SERPL-SCNC: 137 MMOL/L (ref 135–145)
SPECIMEN DRAWN FROM PATIENT: ABNORMAL
TIDAL VOLUME IVT: 430 ML
WBC # BLD AUTO: 15.7 K/UL (ref 4.8–10.8)

## 2024-11-14 PROCEDURE — 94150 VITAL CAPACITY TEST: CPT

## 2024-11-14 PROCEDURE — 85025 COMPLETE CBC W/AUTO DIFF WBC: CPT

## 2024-11-14 PROCEDURE — 84134 ASSAY OF PREALBUMIN: CPT

## 2024-11-14 PROCEDURE — 700111 HCHG RX REV CODE 636 W/ 250 OVERRIDE (IP): Mod: JZ

## 2024-11-14 PROCEDURE — 86140 C-REACTIVE PROTEIN: CPT

## 2024-11-14 PROCEDURE — 83605 ASSAY OF LACTIC ACID: CPT

## 2024-11-14 PROCEDURE — 700111 HCHG RX REV CODE 636 W/ 250 OVERRIDE (IP): Mod: JZ | Performed by: INTERNAL MEDICINE

## 2024-11-14 PROCEDURE — 82962 GLUCOSE BLOOD TEST: CPT

## 2024-11-14 PROCEDURE — 700111 HCHG RX REV CODE 636 W/ 250 OVERRIDE (IP): Performed by: INTERNAL MEDICINE

## 2024-11-14 PROCEDURE — 700105 HCHG RX REV CODE 258: Performed by: INTERNAL MEDICINE

## 2024-11-14 PROCEDURE — 770022 HCHG ROOM/CARE - ICU (200)

## 2024-11-14 PROCEDURE — 700102 HCHG RX REV CODE 250 W/ 637 OVERRIDE(OP): Performed by: INTERNAL MEDICINE

## 2024-11-14 PROCEDURE — 99291 CRITICAL CARE FIRST HOUR: CPT | Performed by: INTERNAL MEDICINE

## 2024-11-14 PROCEDURE — 84100 ASSAY OF PHOSPHORUS: CPT

## 2024-11-14 PROCEDURE — 94003 VENT MGMT INPAT SUBQ DAY: CPT

## 2024-11-14 PROCEDURE — 99232 SBSQ HOSP IP/OBS MODERATE 35: CPT | Performed by: NURSE PRACTITIONER

## 2024-11-14 PROCEDURE — A9270 NON-COVERED ITEM OR SERVICE: HCPCS | Performed by: INTERNAL MEDICINE

## 2024-11-14 PROCEDURE — 36600 WITHDRAWAL OF ARTERIAL BLOOD: CPT

## 2024-11-14 PROCEDURE — 85610 PROTHROMBIN TIME: CPT

## 2024-11-14 PROCEDURE — 700101 HCHG RX REV CODE 250: Performed by: INTERNAL MEDICINE

## 2024-11-14 PROCEDURE — 94799 UNLISTED PULMONARY SVC/PX: CPT

## 2024-11-14 PROCEDURE — 83735 ASSAY OF MAGNESIUM: CPT

## 2024-11-14 PROCEDURE — 80053 COMPREHEN METABOLIC PANEL: CPT

## 2024-11-14 PROCEDURE — 82803 BLOOD GASES ANY COMBINATION: CPT

## 2024-11-14 RX ORDER — MIDODRINE HYDROCHLORIDE 5 MG/1
10 TABLET ORAL EVERY 8 HOURS
Status: DISCONTINUED | OUTPATIENT
Start: 2024-11-14 | End: 2024-11-16

## 2024-11-14 RX ORDER — LACTULOSE 10 G/15ML
30 SOLUTION ORAL 4 TIMES DAILY
Status: DISCONTINUED | OUTPATIENT
Start: 2024-11-14 | End: 2024-11-15

## 2024-11-14 RX ADMIN — MIDODRINE HYDROCHLORIDE 10 MG: 5 TABLET ORAL at 05:08

## 2024-11-14 RX ADMIN — LACTULOSE 30 ML: 10 SOLUTION ORAL at 13:05

## 2024-11-14 RX ADMIN — DEXMEDETOMIDINE HYDROCHLORIDE 0.9 MCG/KG/HR: 4 INJECTION, SOLUTION INTRAVENOUS at 05:01

## 2024-11-14 RX ADMIN — LINEZOLID 600 MG: 600 TABLET, FILM COATED ORAL at 05:08

## 2024-11-14 RX ADMIN — HYDROCORTISONE SODIUM SUCCINATE 50 MG: 100 INJECTION, POWDER, FOR SOLUTION INTRAMUSCULAR; INTRAVENOUS at 16:54

## 2024-11-14 RX ADMIN — HYDROMORPHONE HYDROCHLORIDE 1 MG: 1 INJECTION, SOLUTION INTRAMUSCULAR; INTRAVENOUS; SUBCUTANEOUS at 23:12

## 2024-11-14 RX ADMIN — LACTULOSE 30 ML: 10 SOLUTION ORAL at 05:08

## 2024-11-14 RX ADMIN — HYDROCORTISONE SODIUM SUCCINATE 50 MG: 100 INJECTION, POWDER, FOR SOLUTION INTRAMUSCULAR; INTRAVENOUS at 00:38

## 2024-11-14 RX ADMIN — LACTULOSE 30 ML: 10 SOLUTION ORAL at 16:31

## 2024-11-14 RX ADMIN — HYDROCORTISONE SODIUM SUCCINATE 50 MG: 100 INJECTION, POWDER, FOR SOLUTION INTRAMUSCULAR; INTRAVENOUS at 05:07

## 2024-11-14 RX ADMIN — PANTOPRAZOLE SODIUM 40 MG: 40 INJECTION, POWDER, FOR SOLUTION INTRAVENOUS at 05:08

## 2024-11-14 RX ADMIN — PIPERACILLIN AND TAZOBACTAM 4.5 G: 4; .5 INJECTION, POWDER, FOR SOLUTION INTRAVENOUS at 05:01

## 2024-11-14 RX ADMIN — RIFAXIMIN 550 MG: 550 TABLET ORAL at 05:07

## 2024-11-14 RX ADMIN — HYDROCORTISONE SODIUM SUCCINATE 50 MG: 100 INJECTION, POWDER, FOR SOLUTION INTRAMUSCULAR; INTRAVENOUS at 12:05

## 2024-11-14 RX ADMIN — RIFAXIMIN 550 MG: 550 TABLET ORAL at 16:53

## 2024-11-14 RX ADMIN — PIPERACILLIN AND TAZOBACTAM 4.5 G: 4; .5 INJECTION, POWDER, FOR SOLUTION INTRAVENOUS at 13:06

## 2024-11-14 RX ADMIN — MIDODRINE HYDROCHLORIDE 10 MG: 5 TABLET ORAL at 14:00

## 2024-11-14 RX ADMIN — DEXMEDETOMIDINE HYDROCHLORIDE 0.9 MCG/KG/HR: 4 INJECTION, SOLUTION INTRAVENOUS at 16:54

## 2024-11-14 RX ADMIN — PIPERACILLIN AND TAZOBACTAM 4.5 G: 4; .5 INJECTION, POWDER, FOR SOLUTION INTRAVENOUS at 21:06

## 2024-11-14 RX ADMIN — HYDROMORPHONE HYDROCHLORIDE 1 MG: 1 INJECTION, SOLUTION INTRAMUSCULAR; INTRAVENOUS; SUBCUTANEOUS at 12:01

## 2024-11-14 RX ADMIN — FLUOXETINE HYDROCHLORIDE 10 MG: 10 CAPSULE ORAL at 05:08

## 2024-11-14 RX ADMIN — LACTULOSE 30 ML: 10 SOLUTION ORAL at 21:04

## 2024-11-14 RX ADMIN — MIDODRINE HYDROCHLORIDE 10 MG: 5 TABLET ORAL at 21:04

## 2024-11-14 RX ADMIN — PANTOPRAZOLE SODIUM 40 MG: 40 INJECTION, POWDER, FOR SOLUTION INTRAVENOUS at 16:54

## 2024-11-14 RX ADMIN — LINEZOLID 600 MG: 600 TABLET, FILM COATED ORAL at 16:53

## 2024-11-14 RX ADMIN — HYDROCORTISONE SODIUM SUCCINATE 50 MG: 100 INJECTION, POWDER, FOR SOLUTION INTRAMUSCULAR; INTRAVENOUS at 23:34

## 2024-11-14 ASSESSMENT — PAIN DESCRIPTION - PAIN TYPE
TYPE: ACUTE PAIN

## 2024-11-14 ASSESSMENT — PULMONARY FUNCTION TESTS: FVC: 0.9

## 2024-11-14 ASSESSMENT — FIBROSIS 4 INDEX: FIB4 SCORE: 9.44

## 2024-11-14 NOTE — PROGRESS NOTES
Critical Care Progress Note    Date of admission  10/31/2024    Chief Complaint  59 y.o. female admitted 10/31/2024 with EtOH Cirrhosis, sober 4 months, admitted with fever chills and abdominal pain, found to have SBP with salmonella in culture as well as bacteremia. She has been followed in IMCU with lots of goals of care changes back and forth with advanced liver failure and multiorgan dyfunction. Transferred to ICU 11/10 for increase lactate, worsening encephalopathy and respiratory failure.     Hospital Course  11/10 transferred to ICU.   11/11 HFNC/Oxy RTOC and establish transfer for liver transplant eval. Coagulopathy s/p transfusion. Family conference of goals of care.   11/12 Worsening respiratory status on max HFNC and tachypnea, renal function improved, INR improved, family updated, hypoglycemia.   11/13 intubated with ARDS, recurrent hypoglycemia, MELD Na 37, bolus fluids pressors, family meeting DNR I okay, [place] transfer held due to ARDS.     Interval Problem Update  Reviewed last 24 hour events:  Neuro: paused sedation still rass -2 +3 withdrawal dex 0.5  HR: 60's  SBP: 100-120's off pressors on midodrine  Tmax: afebrile  GI: TF at goal, BMS 375ml yesterday  UOP: 620 total yesterday  Lines: peripheral IV, OG, ET, pagan  Resp: VD2 430 22 10 60% P/F 130 SBT nif -25 RSBI 35 SBT 5/8 50%  Vte: contra  PPI/H2:ppi  Antibx: 2/3 Linezolid, 4/5 zosyn, enterococcus urine, resp PCR, viral swab negative, BAL pending  +1.2L net neg -12.3L  Meld Na 33  Increase stool output  Hydrocortisone 50mg Q6       Review of Systems  Review of Systems   Unable to perform ROS: Intubated        Vital Signs for last 24 hours   Temp:  [35.8 °C (96.4 °F)-36.7 °C (98.1 °F)] 36.6 °C (97.9 °F)  Pulse:  [61-75] 75  Resp:  [21-36] 24  BP: ()/(53-75) 127/70  SpO2:  [94 %-99 %] 96 %    Hemodynamic parameters for last 24 hours       Respiratory Information for the last 24 hours  Vent Mode: APVCMV  Rate (breaths/min): 22  Vt Target  (mL): 430  PEEP/CPAP: 10  P Support: 5  MAP: 14  Length of Weaning Trial (Hours): 1.5  Control VTE (exp VT): 430    Physical Exam   Physical Exam  Vitals and nursing note reviewed.   Constitutional:       General: She is in acute distress.      Appearance: She is ill-appearing.      Comments: Ill appearing tachypnea on ventilator   HENT:      Head: Normocephalic.      Mouth/Throat:      Mouth: Mucous membranes are dry.      Comments: Dried blood from nose and mouth, ET in place  Eyes:      Pupils: Pupils are equal, round, and reactive to light.      Comments: jaundice   Cardiovascular:      Rate and Rhythm: Tachycardia present.      Heart sounds: Murmur heard.   Pulmonary:      Effort: Respiratory distress present.      Breath sounds: No stridor. Rhonchi present. No wheezing or rales.      Comments: Course crackles and tachypnea while on ventilator  Chest:      Chest wall: No tenderness.   Abdominal:      General: There is no distension.      Palpations: There is no mass.      Tenderness: There is abdominal tenderness. There is no guarding or rebound.      Hernia: No hernia is present.   Musculoskeletal:         General: No swelling or tenderness.      Cervical back: No rigidity or tenderness.   Skin:     Coloration: Skin is jaundiced. Skin is not pale.      Findings: Bruising present.      Comments: Spider angiomata and jaundiced with bruising, warm ext, no mottling or pallor.    Neurological:      Comments: Sedated on ventilator         Medications  Current Facility-Administered Medications   Medication Dose Route Frequency Provider Last Rate Last Admin    midodrine (Proamatine) tablet 10 mg  10 mg Enteral Tube Q8HRS Olvin Hanson M.D.        lactulose 20 GM/30ML solution 30 mL  30 mL Enteral Tube 4X/DAY Olvin Hanson M.D.        dexmedetomidine (Precedex) 400 mcg/100mL infusion  0.1-1.5 mcg/kg/hr (Ideal) Intravenous Continuous Dawit Bell M.D. 8 mL/hr at 11/14/24 1003 0.5 mcg/kg/hr at 11/14/24 1003     Respiratory Therapy Consult   Nebulization Continuous RT Olvin Hanson M.D. MD Alert...ICU Electrolyte Replacement per Pharmacy   Other PHARMACY TO DOSE Olvin Hanson M.D.        lidocaine (Xylocaine) 1 % injection 2 mL  2 mL Tracheal Tube Q30 MIN PRN Olvin Hanson M.D.        Pharmacy Consult: Enteral tube insertion - review meds/change route/product selection  1 Each Other PHARMACY TO DOSE Olvin Hanson M.D.        HYDROmorphone (Dilaudid) injection 0.5 mg  0.5 mg Intravenous Q HOUR PRN Olvin Hanson M.D.   0.5 mg at 11/13/24 2348    Or    HYDROmorphone (Dilaudid) injection 1 mg  1 mg Intravenous Q HOUR PRN Olvin Hanson M.D.   1 mg at 11/13/24 0740    norepinephrine (Levophed) 8 mg in 250 mL NS infusion (premix)  0-1 mcg/kg/min (Ideal) Intravenous Continuous Roberto Jean Baptiste M.D.   Stopped at 11/13/24 1431    hydrocortisone sodium succinate PF (Solu-CORTEF) 100 MG injection 50 mg  50 mg Intravenous Q6HRS Roberto Jean Baptiste M.D.   50 mg at 11/14/24 0507    FLUoxetine (PROzac) capsule 10 mg  10 mg Enteral Tube QAM Olvin Hanson M.D.   10 mg at 11/14/24 0508    riFAXIMin (Xifaxan) tablet 550 mg  550 mg Enteral Tube BID Olvin Hanson M.D.   550 mg at 11/14/24 0507    acetaminophen (Tylenol) tablet 650 mg  650 mg Enteral Tube Q6HRS PRN Olvin Hanson M.D.        ondansetron (Zofran ODT) dispertab 4 mg  4 mg Enteral Tube Q4HRS PRN Olvin Hanson M.D.        promethazine (Phenergan) tablet 12.5-25 mg  12.5-25 mg Enteral Tube Q4HRS PRN Olvin Hanson M.D.        linezolid (Zyvox) tablet 600 mg  600 mg Enteral Tube Q12HRS Olvin Hanson M.D.   600 mg at 11/14/24 0508    [Held by provider] MBX (diphenhydrAMINE-lidocaine-Maalox) oral susp Cup 5 mL  5 mL Swish & Swallow Q6HRS PRN Olvin Hanson M.D.   5 mL at 11/12/24 1727    pantoprazole (Protonix) injection 40 mg  40 mg Intravenous BID Olvin Hanson M.D.   40 mg at 11/14/24 0508    piperacillin-tazobactam (Zosyn) 4.5 g in NS  100 mL IVPB  4.5 g Intravenous Q8HRS Shahzad Jenkins M.D.   Stopped at 11/14/24 0901    ondansetron (Zofran) syringe/vial injection 4 mg  4 mg Intravenous Q4HRS PRN TRUDY RazaO.   4 mg at 11/12/24 2158    promethazine (Phenergan) suppository 12.5-25 mg  12.5-25 mg Rectal Q4HRS PRN ASTRID Raza.O.        prochlorperazine (Compazine) injection 5-10 mg  5-10 mg Intravenous Q4HRS PRN ASTRID Raza.O.   10 mg at 11/12/24 2211    dextrose 50% (D50W) injection 25 g  25 g Intravenous Q15 MIN PRN ASTRID Raza.O.   25 g at 11/13/24 0638    lidocaine (Asperflex) 4 % patch 1 Patch  1 Patch Transdermal Q24HRS ASTRID Raza.O.   1 Patch at 11/12/24 0512       Fluids    Intake/Output Summary (Last 24 hours) at 11/14/2024 1034  Last data filed at 11/14/2024 1003  Gross per 24 hour   Intake 2578.54 ml   Output 1220 ml   Net 1358.54 ml       Laboratory  Recent Labs     11/13/24  0735 11/13/24  1007 11/14/24  0422   ISTATAPH 7.308* 7.331* 7.355*   ISTATAPCO2 39.8* 36.8 37.7*   ISTATAPO2 63* 55* 78   ISTATATCO2 21 21 22   ROOMHQQ9EBF 90* 86* 95   ISTATARTHCO3 19.9 19.4 21.0   ISTATARTBE -6* -6* -4   ISTATTEMP 97.1 F 35.9 C 37.1 C   ISTATFIO2 70 50 60   ISTATSPEC Arterial Arterial Arterial   ISTATAPHTC 7.319* 7.347* 7.353*   [id number] 60* 51* 79         Recent Labs     11/12/24  0341 11/13/24  0345 11/14/24  0415   SODIUM 135 134* 137   POTASSIUM 4.4 3.6 4.0   CHLORIDE 101 101 106   CO2 18* 20 18*   BUN 31* 37* 42*   CREATININE 0.51 1.27 0.41*   MAGNESIUM 1.7 2.4 2.3   PHOSPHORUS 3.9 3.9 4.1   CALCIUM 9.2 8.8 8.6     Recent Labs     11/12/24 0341 11/13/24 0345 11/14/24  0415   ALTSGPT 24 30 26   ASTSGOT 86* 107* 75*   ALKPHOSPHAT 100* 112* 134*   TBILIRUBIN 16.9* 17.4* 16.1*   PREALBUMIN  --   --  3.2*   GLUCOSE 34* 49* 132*     Recent Labs     11/12/24 0341 11/13/24  0345 11/14/24  0415   WBC 26.0* 22.8* 15.7*   NEUTSPOLYS 90.00* 91.40* 92.10*   LYMPHOCYTES 3.40* 3.00* 2.50*   MONOCYTES 4.90 3.60  4.30   EOSINOPHILS 0.30 0.80 0.10   BASOPHILS 0.20 0.10 0.10   ASTSGOT 86* 107* 75*   ALTSGPT 24 30 26   ALKPHOSPHAT 100* 112* 134*   TBILIRUBIN 16.9* 17.4* 16.1*     Recent Labs     11/12/24  0341 11/13/24  0345 11/14/24  0415   RBC 2.59* 2.59* 2.89*   HEMOGLOBIN 7.8* 7.8* 8.7*   HEMATOCRIT 23.6* 24.1* 27.6*   PLATELETCT 103* 118* 113*   PROTHROMBTM 38.2* 42.7* 39.3*   INR 3.85* 4.44* 4.00*       Imaging  X-Ray:  I have personally reviewed the images and compared with prior images.  CT:    Reviewed  Echo:   Reviewed  Ultrasound:  Reviewed    Assessment/Plan  * Sepsis (HCC)- (present on admission)  Assessment & Plan  This is Sepsis Present on admission  SIRS criteria identified on my evaluation include: Tachycardia, with heart rate greater than 90 BPM and Tachypnea, with respirations greater than 20 per minute  Clinical indicators of end organ dysfunction include Lactic Acid greater than 2 and Toxic Metabolic Encephalopathy  Source is PNA  Sepsis protocol initiated  Crystalloid Fluid Administration: Resuscitation volume of 20 cc/kg ordered. Reason that resuscitation volume of less than 30ml/kg was ordered concern for fluid overload  IV antibiotics as appropriate for source of sepsis  Reassessment: I have reassessed the patient's hemodynamic status    Zosyn   Follow cultures  Trend lactic acid    ARDS (adult respiratory distress syndrome) (HCC)  Assessment & Plan  Optimized ventilation startegies to achieving a PBW TV of <6ml/kg, plt < 30, delta p <14   Will allow for permissive hypercapnea unless contraindicated to a ph 7.15-7.2  Goal sat > 88%  Peep optimization for open lung model and recruitment and consider early APRV  A conservative fluid strategy will be employed FACT trial   Early paralysis will be considered if p/f<150 and continued for 48 hrs  Consider prone position p/f <150  Steroid treatment p/f <200 if not contraindicated (methylprednisone 1mg/kg w/n 72hrs x 28 days or 3 days post extubation) check  "CRP    Workup: bronchoscopy w/ BAL, culture, ct chest and abdomen, immunogenic, DAH, echo w/ bubble study, COPD/asthma, aspiration, transfusion    Will calculate Mendoza Score and early referral for ECMO    Phenotype consideration:   Type 1 \"direct\"/pna/aspiration/asymmetric ARDS Rx: lower peep, recruitment manuevers ok/proning, fluids not as restrictive mortality 20%   Type 2: \"indirect\" sepsis, transfusion etc Rx: higher peep, fluid restriction, statin, avoid recruitment mortality 50%  20% w/ mixed phenotype (ie lobar pna direct causing contralateral indirect injury)    Patient lung injury is due to aspiration pneumonia  Respiratory PCR negative, viral swab negative  With her advance liver disease this is fatal event recommend a palliative approach      Acute respiratory failure with hypoxia (HCC)  Assessment & Plan  Intubated date: 11/13 due to ongoing aspiration and aspiration pneumonitis causing ARDS  Goal saturation > 88%  Monitor ventilator waveforms and titrate flow/peep and volumes according.   Lung protective ventilation strategy w/ A-F bundle  CXR: monitor lung volumes and tube/line placement  VAP bundle prevention, oral care, post pyloric feeding  Head of bed > 30 degree  GI prophylaxis  Daily awakening and SBT trials unless contraindicated  Monitor for liberation  Respiratory treatments: prn      Systolic anterior movement of mitral valve  Assessment & Plan  Based upon ECHO 11/10/24   Very high risk for obstructive shock with hypovolemia and tachycardia   Maintain euvolemia  Afterload vasopressor if needed     Pneumonia  Assessment & Plan  Transferred to the ICU 11/10/24 for elevated LA, worsening CXR  Went from room air to needing max HFNC in < 12 with bilateral alveolar infiltrates consistent with aspiration event.   SpO2 > 90%  Finish course of Zosyn x5 days  MRSA nare negative stopped Linezolid with sudden drop of platelets  Follow cultures, respiratory PCR  Aspiration precaution and speech " eval  Naso/oral bleeding leading to event? INR 9 at time      SBP (spontaneous bacterial peritonitis) (HCC)  Assessment & Plan  Salmonella sp with associated bacteremia based upon cultures from 10/31    Elevated lactic acid level  Assessment & Plan  Multifactorial: cirrhosis, sepsis, malnutrition  Thiamine  Resuscitation with albumin  Empiric antibiotics  Trend  No shock so likely Type B lactate    Elevated INR  Assessment & Plan  See coagulopathy plan    Superior mesenteric vein thrombosis (HCC)  Assessment & Plan  Non-occlusive on CT 11/3   Anticoagulation is strictly contraindicated    Acute encephalopathy  Assessment & Plan  Aspiration precautions  Continue rifaximin and lactulose goal stool 1-1.5L  Limit sedative with advance liver disease and poor clearance of sedative to not precipitate HE    Severe protein-calorie malnutrition ([place]: less than 60% of standard weight) ([facility])  Assessment & Plan  Thiamine replacement  Optimize nutrition as able    Advance care planning- (present on admission)  Assessment & Plan  She and family reverted to full code status during this hospitalization    Now intubated 11/13 with ARDS will need to further discuss goals of care    Hypoglycemia- (present on admission)  Assessment & Plan  Due to poor glycogen store and advance liver disease  Serial monitor   Enteral nutrition    Hepatic encephalopathy (HCC)- (present on admission)  Assessment & Plan  Lactulose  Rifaximin   Optimize acid/base and electrolytes  Maintain euvolemia    Coagulopathy (HCC)- (present on admission)  Assessment & Plan  Secondary to liver failure  Last dose of Eliquis 11/9  Follow Teg w/ platelet mapping, INR and cryo  Monitor for source of spontaneous hemorrhage  Correct and monitor calcium  Prognosis poor  Improved 11/12 s/p 3 FFP and cyro    Anemia- (present on admission)  Assessment & Plan  Acute on chronic goal hg >7  Monitor for active hemorrhage and monitor and serial correct coagulopathy  Conservative  transfusion protocol      Acute renal failure (ARF) (HCC)- (present on admission)  Assessment & Plan  Resolved 11/12 but continue to closely monitor  Avoid nephrotoxins  Midodrine for map > 70    Alcoholic cirrhosis of liver with ascites (HCC)- (present on admission)  Assessment & Plan  Patient with alcoholic cirrhosis sober since June 2024, now with multiple organ failure trigger by SBP.   Avoid hepatoxins, serial monitor liver and synthetic function   Monitor for hepatic encephalopathy and serial monitor ammonia level  Check: hepatitis panel, tylenol level, liver ultrasound and rule out vascular etiology  S/p course of steroids finished last month  Non-occlusive SMA thrombosis  I have reached out to [facility] and [facility] pending transfer. Was accepted to [facility] pending bed availabilty.   She has a 1st outpatient hepatology eval at [facility] in January but not established.   Daily meld labs, prognosis looks grim with multiple organ failure.       UTI (urinary tract infection)- (present on admission)  Assessment & Plan  With enterococcus faecium in urine follow up final sensitivities start linezolid 11/13    Essential hypertension- (present on admission)  Assessment & Plan  Reintroduce oral antihypertensives when appropriate           VTE:  Contraindicated  Ulcer: PPI  Lines: None    I have performed a physical exam and reviewed and updated ROS and Plan today (11/14/2024). In review of yesterday's note (11/13/2024), there are no changes except as documented above.     Discussed patient condition and risk of morbidity and/or mortality with RN, RT, Pharmacy, Charge nurse / hot rounds, Patient, and GI    The patient remains critically ill on ventilator with active titration.  Critical care time = 65 minutes in directly providing and coordinating critical care and extensive data review.  No time overlap and excludes procedures.
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
## 2024-11-14 NOTE — CARE PLAN
Problem: Ventilation  Goal: Ability to achieve and maintain unassisted ventilation or tolerate decreased levels of ventilator support  Description: Target End Date:  4 days     Document on Vent flowsheet    1.  Support and monitor invasive and noninvasive mechanical ventilation  2.  Monitor ventilator weaning response  3.  Perform ventilator associated pneumonia prevention interventions  4.  Manage ventilation therapy by monitoring diagnostic test results  Outcome: Not Met     Ventilator Daily Summary    Vent Day # 1  Airway: 7.5@22    Ventilator settings: 22 430 10 60%  Weaning trials:   Respiratory Procedures:     Plan: Continue current ventilator settings and wean mechanical ventilation as tolerated per physician orders.

## 2024-11-14 NOTE — CARE PLAN
The patient is Watcher - Medium risk of patient condition declining or worsening    Shift Goals  Clinical Goals: improved engagement, oxygenation  Patient Goals: pratibha  Family Goals: pratibha    Progress made toward(s) clinical / shift goals:      Problem: Pain - Standard  Goal: Alleviation of pain or a reduction in pain to the patient’s comfort goal  Outcome: Progressing     Problem: Hemodynamics  Goal: Patient's hemodynamics, fluid balance and neurologic status will be stable or improve  Outcome: Progressing   Weaned off levo yesterday   Problem: Urinary - Renal Perfusion  Goal: Ability to achieve and maintain adequate renal perfusion and functioning will improve  Outcome: Progressing   UO 35-45 mL/hr    Problem: Respiratory  Goal: Patient will achieve/maintain optimum respiratory ventilation and gas exchange  Outcome: Progressing   Able to wean fio2 to 50%  SBT aprox 90 minutes this morning   Problem: Safety - Medical Restraint  Goal: Remains free of injury from restraints (Restraint for Interference with Medical Device)  Outcome: Progressing  Goal: Free from restraint(s) (Restraint for Interference with Medical Device)  Outcome: Progressing       Patient is not progressing towards the following goals:

## 2024-11-14 NOTE — CARE PLAN
The patient is Watcher - Medium risk of patient condition declining or worsening    Shift Goals  Clinical Goals: Hemodynamics  Patient Goals: JESUSITA  Family Goals: JESUSITA    Progress made toward(s) clinical / shift goals:    Problem: Hemodynamics  Goal: Patient's hemodynamics, fluid balance and neurologic status will be stable or improve  Description: Target End Date:  Prior to discharge or change in level of care    Document on Assessment and I/O flowsheet templates    1.  Monitor vital signs, pulse oximetry and cardiac monitor per provider order and/or policy  2.  Maintain blood pressure per provider order  3.  Hemodynamic monitoring per provider order  4.  Manage IV fluids and IV infusions  5.  Monitor intake and output  6.  Daily weights per unit policy or provider order  7.  Assess peripheral pulses and capillary refill  8.  Assess color and body temperature  9.  Position patient for maximum circulation/cardiac output  10. Monitor for signs/symptoms of excessive bleeding  11. Assess mental status, restlessness and changes in level of consciousness  12. Monitor temperature and report fever or hypothermia to provider immediately. Consideration of targeted temperature management.  Outcome: Progressing  Note: SR/SB, vagals when suctioned, MAP > 65     Problem: Pain - Standard  Goal: Alleviation of pain or a reduction in pain to the patient’s comfort goal  Description: Target End Date:  Prior to discharge or change in level of care    Document on Vitals flowsheet    1.  Document pain using the appropriate pain scale per order or unit policy  2.  Educate and implement non-pharmacologic comfort measures (i.e. relaxation, distraction, massage, cold/heat therapy, etc.)  3.  Pain management medications as ordered  4.  Reassess pain after pain med administration per policy  5.  If opiods administered assess patient's response to pain medication is appropriate per POSS sedation scale  6.  Follow pain management plan developed in  collaboration with patient and interdisciplinary team (including palliative care or pain specialists if applicable)  Outcome: Progressing  Note: Pain frequently assessed. Gtts titrated to achieve RASS goals.      Problem: Safety - Medical Restraint  Goal: Remains free of injury from restraints (Restraint for Interference with Medical Device)  Description: INTERVENTIONS:  1. Determine that other, less restrictive measures have been tried or would not be effective before applying the restraint  2. Evaluate the patient's condition at the time of restraint application  3. Educate patient/family regarding the reason for restraint  4. Q2H: Monitor safety, psychosocial status, comfort, circulation, respiratory status, LOC, nutrition and hydration  Outcome: Progressing  Flowsheets (Taken 11/13/2024 2054)  Addressed this shift: Remains free of injury from restraints (restraint for interference with medical device):   Determine that other, less restrictive measures have been tried or would not be effective before applying the restraint   Evaluate the patient's condition at the time of restraint application   Inform patient/family regarding the reason for restraint   Every 2 hours: Monitor safety, psychosocial status, comfort, nutrition and hydration  Goal: Free from restraint(s) (Restraint for Interference with Medical Device)  Description: INTERVENTIONS:  1.  ONCE/SHIFT or MINIMUM Q12H: Assess and document the continuing need for restraints  2.  Q24H: Continued use of restraint requires LIP to perform face to face examination and written order  3.  Identify and implement measures to help patient regain control  4.  Educate patient/family on discontinuation criteria   5.  Assess patient's understanding and retention of education provided  6.  Assess readiness for release & initiate progressive release per protocol  7.  Identify and document criteria for restraints  Flowsheets (Taken 11/13/2024 2054)  Addressed this shift:  Free from restraint(s) (restraint for interference with medical device):   ONCE/SHIFT or MINIMUM Every 12 hours: Assess and document the continuing need for restraints   Every 24 hours: Continued use of restraint requires Licensed Independent Practitioner to perform face to face examination and written order   Identify and implement measures to help patient regain control

## 2024-11-14 NOTE — PROCEDURES
"Date: 11/13/2024    Procedure:  Orotracheal intubation    Indication: Respiratory failure, PNA    Physician:  Dr. Krysten España MD   Supervisor: Dr. Dawit Bell MD    Consent:  Appropriate consent was obtained by the patient     Procedure:  Consent by the patient and \"time out\" was performed. This patient has developed increasing respiratory failure SpO2 86% with 60L HFNC and required intubation.  RSI given with rocuronium and amidate.  Using a #4 glidescope, an 8.0 ETT was placed on the first attempt w/o complication.  Tube placement confirmed by direct visualization of placement, end-tidal CO2 monitor color change and equal breath sounds.  No immediate complications.  Vitals remained stable throughout the procedure.  A post-procedure CXR will be reviewed.     Krysten España MD  Internal Medicine PGY-2        "

## 2024-11-14 NOTE — DIETARY
Nutrition Services Brief Update:    Problem: Nutritional:  Goal: Nutrition support tolerated and meeting greater than 85% of estimated needs  Outcome: Progressing    TF Vital AF 1.2 is running at goal rate 55 mL/hr per flowsheets.   Last BM 11/13, type 7 via BMS - lactulose given per MAR.  [facility] transfer currently on hold per EMR.     RD continues to follow.

## 2024-11-14 NOTE — CARE PLAN
Problem: Ventilation  Goal: Ability to achieve and maintain unassisted ventilation or tolerate decreased levels of ventilator support  Description: Target End Date:  4 days     Document on Vent flowsheet    1.  Support and monitor invasive and noninvasive mechanical ventilation  2.  Monitor ventilator weaning response  3.  Perform ventilator associated pneumonia prevention interventions  4.  Manage ventilation therapy by monitoring diagnostic test results  Outcome: Not Met     Ventilator Daily Summary    Vent Day # 2  Airway: 7.5@22    Ventilator settings: APVcmv/22/430/+10/60%  Weaning trials: none  Respiratory Procedures: none    Plan: Continue current ventilator settings and wean mechanical ventilation as tolerated per physician orders.

## 2024-11-14 NOTE — PROGRESS NOTES
Gastroenterology Progress Note               Author:  YANA Pierre   Date & Time Created: 11/14/2024 1:14 PM       Patient ID:  Name:             Kavita Freeman  YOB: 1965  Age:                 59 y.o.  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]     Acute encephalopathy [G93.40]     Bacteremia due to Gram-negative bacteria [R78.81]     Severe protein-calorie malnutrition ([place]: less than 60% of standard weight) (HCC) [E43]     Transaminitis [R74.01]     Hypoglycemia [E16.2]     Advance care planning [Z71.89]     Sepsis (HCC) [A41.9]     Hepatic encephalopathy (HCC) [K76.82]     High anion gap metabolic acidosis [E87.29]     Hyponatremia [E87.1]     Coagulopathy (HCC) [D68.9]     Anemia [D64.9]     Alcoholic cirrhosis of liver with ascites (HCC) [K70.31]     Acute renal failure (ARF) (HCC) [N17.9]     Depression with anxiety [F41.8]     UTI (urinary tract infection) [N39.0]     Essential hypertension [I10]      Presenting Chief Complaint:  Cirrhosis, GI bleeding.     History of Present Illness:   59 years old female with medical history of alcohol-related liver injury, cirrhosis, decompensation with variceal bleeding and refractory ascites formation, mild to moderate encephalopathy.  Presented to hospital this time for fluid accumulation, acute kidney injury.  GI team is consulted for recent GI bleeding     The patient has primary GI liver doctor in [place] and she is waiting for the  transplantation evaluation at [facility] in the coming 2 months.  We do not have any records to support this plan, however the patient is very certain about the [facility] appointment.     For the bleeding, the patient had nausea vomiting in the last 2 days, the patient saw blood and also some coffee-ground like vomitus.  Tarry stool was noted.  Last upper GI scope August 15 with variceal bleeding and banding.     Interval History:  11/2/2024: Patient seen at bedside.  Awake, alert.  Discussed EGD findings with patient.  Inquired regarding last EGD as, per chart review, last EGD was 2-1/2 months ago.  Patient unable to discern if she has had EGD since last documented.  Positive asterixis.  Denies bowel movements overnight.  Started on rifaximin and lactulose.  Hemoglobin stable.  Abdomen tender with light palpation.  No WBC this morning, but yesterday was 39. ultrasound abdomen pending.      11/3/2024: Patient seen at bedside.  Disoriented, unable to answer orientation questions.  Abdomen distended, significantly increase in tenderness this morning, hypoactive bowel sounds and tympany with percussion.  CT abdomen and CBC pending.  No bowel movements overnight.    Update 1222: CT abdomen with findings including fluid-filled small bowel and colonic loops favoring ileus, no evidence of bowel perforation, cirrhosis with portal hypertension, moderate to large volume ascites, nonocclusive SMV thrombosis, cholelithiasis, subtle findings concerning for pulmonary emboli.      11/4/2024: Patient seen at bedside.  Disoriented, oriented to self only.  Abdomen remains distended, tender with light palpation, hypoactive bowel sounds.  Patient with multiple brown bowel movements overnight.  Started on heparin yesterday.  WBC 29.8, hemoglobin stable at 12.5, platelets 197.  Sodium 127, BUN 56, creatinine improved to 1.3.  Albumin yesterday 2.6.  INR yesterday 2.35.  MELD 3.0 as of 11/3/2024 31    11/5/2024: Patient seen and examined.   Appears to be in distress, tachypneic, unable to participate in interview.  Grimaces when I touch her abdomen.  I was concerned and got Dr. Charles who then examined the patient.  We ordered KUB, chest x-ray, ABG, further labs.  3 BM last 24 hours.     Found to have significant lactic acidosis, INR 3.64 (difficult to interpret in the context of heparin drip), ABG with acute respiratory alkalosis.  Creatinine increasing to 1.54, T. bili 8.  KUB dilated loops of bowel suspicious for gastroenteritis or inflammatory changes.  Chest x-ray with atelectasis.      11/6/2024: Patient seen in collaboration with Dr. Solorzano. Son at bedside, all questions answered. WBC now 36.6, T. Bili 9.8, creatinine uptrending. MELD 3.0 - 37 (Although INR not reliable in the setting of heparin drip)    11/7/2024: Patient seen with mom and dad at bedside.  Significantly improved, alert and oriented and eating food.  She remembered me from when I met her before.  WBC 26.9, hgb 9.6, plt 112, cr 1.38. Urinary casts present. Renal US negative.     11/8/2024: Patient seen with family bedside. Continues to feel better, eating but doesn't really like the food. Numerous bowel movements overnight. Worsening abdominal distention with mild tenderness. WBC continues to improve, platelets worse at 89, hgb downtrended to 9.1, cr 0.44, t. Bili 10.9    11/9/2024: Patient seen this morning, no family bedside.  More lethargic today, says she is tired.  2 bowel movements past 24 hours.  VSS, WBC 20.3, T. Bili 13.1, albumin 2.9, phos 2.3.  INR greater than 10, TEG with abnormal CK R.  Pending diagnostic paracentesis and elevated INR.    11/10/2024: Patient seen and examined this morning with mother at bedside.  More lethargic again, on oxygen, noted to be tachypneic and tachycardic.  Minimally arousable.  Worsening murmur on auscultation. Has not had a bowel movement per RN.  Finished her antibiotics yesterday and transferred from [facility].  Labs reviewed, WBC up to  30 with repeat 33, creatinine 1.09 from 0.28, bilirubin 16.2, 9.9 direct.  Increased reticulocyte response, , ammonia 62, lactic 4.2, Procal 1.44.  INR greater than 10, recheck 9.53.  Chest x-ray with new significant right pleural effusion and significant increase in diffuse pulmonary opacifications edema versus pneumonia.  Head CT negative.  Limited echocardiogram and haptoglobin pending.    11/11/2024: Patient upgraded to ICU last night. Awake, alert, encephalopathic. Complaining of back pain. Dried crusted blood noted on oral mucosa. Tachycardic, tachypneic, on 10L oxymask. WBC 31.3, hgb 7.4, plt 93, INR 7.15, BUN 41, cr 1.37, T. Bili 15.2, lactic 4.9. On Zosyn.    11/12/2024: Patient seen at bedside.  Awake, alert.  Patient was accepted by [facility], bed pending.  Patient on 50 LPM HFNC, FiO2 90%.  Leukocytosis improved to 26, hemoglobin stable 7.8.  Platelets 103.  Sodium 135, BUN 31, creatinine 0.51, AST 86, ALT 24, alk phos 100, total bilirubin 16.9.  Albumin 3.  Ammonia 49 INR 3.5 after products yesterday. MELD 3.0 32 63.5%; 90 day survival rate. Remains on antibiotics    11/13/2024: Overnight, patient went into respiratory failure maxed out on high flow nasal cannula.  She required intubation earlier this morning.  [facility] transfer on hold.  Primary team had family meeting to discuss goals of care.  Patient DNR/I okay.  They have asked to do a trial of therapy and allow her son to see patient this weekend.  Leukocytosis down to 22.8.  Hemoglobin stable 7.8.  Sodium 134, BUN 37, creatinine 1.27, , ALT 30, alk phos 112, total bilirubin 17.4, albumin 3.2 INR increased to 4.4.  MELD 3.0 36; 39.7 estimated 90-day survival rate     11/14/2024: Patient seen at bedside.  Remains on mechanical ventilator via ET tube.  Increase stool output overnight.  Leukocytosis continues to improve, hemoglobin stable 8.7.  Sodium 137 BUN 42, creatinine 0.41, total bilirubin 16.1, albumin 3, INR 4.  MELD 3.0 score:  34 52.3% estimated level      Hospital Medications:  Current Facility-Administered Medications   Medication Dose Frequency Provider Last Rate Last Admin    midodrine (Proamatine) tablet 10 mg  10 mg Q8HRS Olvin Hanson M.D.   10 mg at 11/14/24 1400    lactulose 20 GM/30ML solution 30 mL  30 mL 4X/DAY Olvin Hanson M.D.   30 mL at 11/14/24 1305    dexmedetomidine (Precedex) 400 mcg/100mL infusion  0.1-1.5 mcg/kg/hr (Ideal) Continuous Dawit Bell M.D. 8 mL/hr at 11/14/24 1003 0.5 mcg/kg/hr at 11/14/24 1003    Respiratory Therapy Consult   Continuous RT Olvin Hanson M.D. MD Alert...ICU Electrolyte Replacement per Pharmacy   PHARMACY TO DOSE Olvin Hanson M.D.        lidocaine (Xylocaine) 1 % injection 2 mL  2 mL Q30 MIN PRN Olvin Hanson M.D.        Pharmacy Consult: Enteral tube insertion - review meds/change route/product selection  1 Each PHARMACY TO DOSE Olvin Hanson M.D.        HYDROmorphone (Dilaudid) injection 0.5 mg  0.5 mg Q HOUR PRN Olvin Hanson M.D.   0.5 mg at 11/13/24 2348    Or    HYDROmorphone (Dilaudid) injection 1 mg  1 mg Q HOUR PRN Olvin Hanson M.D.   1 mg at 11/14/24 1201    norepinephrine (Levophed) 8 mg in 250 mL NS infusion (premix)  0-1 mcg/kg/min (Ideal) Continuous Roberto Jean Baptiste M.D.   Stopped at 11/13/24 1431    hydrocortisone sodium succinate PF (Solu-CORTEF) 100 MG injection 50 mg  50 mg Q6HRS Roberto Jean Baptiste M.D.   50 mg at 11/14/24 1205    FLUoxetine (PROzac) capsule 10 mg  10 mg QAM Olvin Hanson M.D.   10 mg at 11/14/24 0508    riFAXIMin (Xifaxan) tablet 550 mg  550 mg BID Olvin Hanson M.D.   550 mg at 11/14/24 0507    acetaminophen (Tylenol) tablet 650 mg  650 mg Q6HRS PRDEAN Hanson M.D.        ondansetron (Zofran ODT) dispertab 4 mg  4 mg Q4HRS PRDEAN Hanson M.D.        promethazine (Phenergan) tablet 12.5-25 mg  12.5-25 mg Q4HRS PRDEAN Hanson M.D.        linezolid (Zyvox) tablet 600 mg  600 mg Q12HRS Olvin DIOR  "NAIMA Hanson   600 mg at 11/14/24 0508    [Held by provider] MBX (diphenhydrAMINE-lidocaine-Maalox) oral susp Cup 5 mL  5 mL Q6HRS PRN Olvin Hanson M.D.   5 mL at 11/12/24 1727    pantoprazole (Protonix) injection 40 mg  40 mg BID Olvin Hanson M.D.   40 mg at 11/14/24 0508    piperacillin-tazobactam (Zosyn) 4.5 g in  mL IVPB  4.5 g Q8HRS Shahzad Jenkins M.D. 25 mL/hr at 11/14/24 1306 4.5 g at 11/14/24 1306    ondansetron (Zofran) syringe/vial injection 4 mg  4 mg Q4HRS PRN Adilia Gonzales D.O.   4 mg at 11/12/24 2158    promethazine (Phenergan) suppository 12.5-25 mg  12.5-25 mg Q4HRS PRN Adilia Gonzales D.O.        prochlorperazine (Compazine) injection 5-10 mg  5-10 mg Q4HRS PRN Adilia Gonzales, D.O.   10 mg at 11/12/24 2211    dextrose 50% (D50W) injection 25 g  25 g Q15 MIN PRN Adilia Gonzales D.O.   25 g at 11/13/24 0638    lidocaine (Asperflex) 4 % patch 1 Patch  1 Patch Q24HRS Adilia Gonzales D.O.   1 Patch at 11/12/24 0512   Last reviewed on 11/1/2024  9:17 AM by Celine Bustamante R.N.       Review of Systems:  Review of Systems   Unable to perform ROS: Critical illness       Vital signs:  Weight/BMI: Body mass index is 24.87 kg/m².  /64   Pulse 68   Temp 36.6 °C (97.9 °F) (Bladder)   Resp (!) 23   Ht 1.727 m (5' 8\")   Wt 74.2 kg (163 lb 9.3 oz)   SpO2 97%   Vitals:    11/14/24 1000 11/14/24 1034 11/14/24 1100 11/14/24 1200   BP: 127/70  119/69 111/64   Pulse: 75 72 72 68   Resp: (!) 24 (!) 23 (!) 28 (!) 23   Temp:       TempSrc:       SpO2: 96% 98% 97% 97%   Weight:       Height:         Oxygen Therapy:  Pulse Oximetry: 97 %, FiO2%: 50 %, O2 Delivery Device: Ventilator    Intake/Output Summary (Last 24 hours) at 11/14/2024 1314  Last data filed at 11/14/2024 1200  Gross per 24 hour   Intake 2501.78 ml   Output 1345 ml   Net 1156.78 ml       Physical Exam  Vitals and nursing note reviewed.   Constitutional:       General: She is in acute distress.      Appearance: She is " cachectic. She is ill-appearing.   HENT:      Head: Normocephalic and atraumatic.      Nose: Nose normal. No congestion.      Mouth/Throat:      Comments: Dried blood in mouth  Eyes:      General: Scleral icterus present.   Cardiovascular:      Rate and Rhythm: Normal rate and regular rhythm.      Pulses: Normal pulses.      Heart sounds: Murmur heard.   Pulmonary:      Effort: Pulmonary effort is normal.      Breath sounds: Rhonchi present.      Comments: On mechanical ventilator via ET tube  Abdominal:      General: There is distension.      Palpations: Abdomen is soft.      Tenderness: There is no abdominal tenderness. There is no guarding.      Comments: + Fluid wave   Genitourinary:     Comments: Herrera with dark urine  BMS in place  Musculoskeletal:      Right lower leg: No edema.      Left lower leg: No edema.   Skin:     General: Skin is warm and dry.      Capillary Refill: Capillary refill takes less than 2 seconds.      Coloration: Skin is jaundiced.       Labs:  Recent Labs     11/12/24 0341 11/13/24 0345 11/14/24 0415   SODIUM 135 134* 137   POTASSIUM 4.4 3.6 4.0   CHLORIDE 101 101 106   CO2 18* 20 18*   BUN 31* 37* 42*   CREATININE 0.51 1.27 0.41*   MAGNESIUM 1.7 2.4 2.3   PHOSPHORUS 3.9 3.9 4.1   CALCIUM 9.2 8.8 8.6     Recent Labs     11/12/24 0341 11/13/24 0345 11/14/24  0415   ALTSGPT 24 30 26   ASTSGOT 86* 107* 75*   ALKPHOSPHAT 100* 112* 134*   TBILIRUBIN 16.9* 17.4* 16.1*   PREALBUMIN  --   --  3.2*   GLUCOSE 34* 49* 132*     Recent Labs     11/12/24 0341 11/13/24 0345 11/14/24  0415   WBC 26.0* 22.8* 15.7*   NEUTSPOLYS 90.00* 91.40* 92.10*   LYMPHOCYTES 3.40* 3.00* 2.50*   MONOCYTES 4.90 3.60 4.30   EOSINOPHILS 0.30 0.80 0.10   BASOPHILS 0.20 0.10 0.10   ASTSGOT 86* 107* 75*   ALTSGPT 24 30 26   ALKPHOSPHAT 100* 112* 134*   TBILIRUBIN 16.9* 17.4* 16.1*     Recent Labs     11/12/24  0341 11/13/24  0345 11/14/24  0415   RBC 2.59* 2.59* 2.89*   HEMOGLOBIN 7.8* 7.8* 8.7*   HEMATOCRIT 23.6*  24.1* 27.6*   PLATELETCT 103* 118* 113*   PROTHROMBTM 38.2* 42.7* 39.3*   INR 3.85* 4.44* 4.00*     Recent Results (from the past 24 hours)   POCT glucose device results    Collection Time: 11/13/24  2:33 PM   Result Value Ref Range    POC Glucose, Blood 117 (H) 65 - 99 mg/dL   POCT glucose device results    Collection Time: 11/13/24  5:13 PM   Result Value Ref Range    POC Glucose, Blood 143 (H) 65 - 99 mg/dL   POCT glucose device results    Collection Time: 11/14/24 12:38 AM   Result Value Ref Range    POC Glucose, Blood 194 (H) 65 - 99 mg/dL   Prealbumin    Collection Time: 11/14/24  4:15 AM   Result Value Ref Range    Pre-Albumin 3.2 (L) 18.0 - 38.0 mg/dL   CBC With Differential    Collection Time: 11/14/24  4:15 AM   Result Value Ref Range    WBC 15.7 (H) 4.8 - 10.8 K/uL    RBC 2.89 (L) 4.20 - 5.40 M/uL    Hemoglobin 8.7 (L) 12.0 - 16.0 g/dL    Hematocrit 27.6 (L) 37.0 - 47.0 %    MCV 95.5 81.4 - 97.8 fL    MCH 30.1 27.0 - 33.0 pg    MCHC 31.5 (L) 32.2 - 35.5 g/dL    RDW 71.7 (H) 35.9 - 50.0 fL    Platelet Count 113 (L) 164 - 446 K/uL    MPV 10.7 9.0 - 12.9 fL    Neutrophils-Polys 92.10 (H) 44.00 - 72.00 %    Lymphocytes 2.50 (L) 22.00 - 41.00 %    Monocytes 4.30 0.00 - 13.40 %    Eosinophils 0.10 0.00 - 6.90 %    Basophils 0.10 0.00 - 1.80 %    Immature Granulocytes 0.90 0.00 - 0.90 %    Nucleated RBC 0.00 0.00 - 0.20 /100 WBC    Neutrophils (Absolute) 14.44 (H) 1.82 - 7.42 K/uL    Lymphs (Absolute) 0.39 (L) 1.00 - 4.80 K/uL    Monos (Absolute) 0.68 0.00 - 0.85 K/uL    Eos (Absolute) 0.01 0.00 - 0.51 K/uL    Baso (Absolute) 0.02 0.00 - 0.12 K/uL    Immature Granulocytes (abs) 0.14 (H) 0.00 - 0.11 K/uL    NRBC (Absolute) 0.00 K/uL   Comp Metabolic Panel    Collection Time: 11/14/24  4:15 AM   Result Value Ref Range    Sodium 137 135 - 145 mmol/L    Potassium 4.0 3.6 - 5.5 mmol/L    Chloride 106 96 - 112 mmol/L    Co2 18 (L) 20 - 33 mmol/L    Anion Gap 13.0 7.0 - 16.0    Glucose 132 (H) 65 - 99 mg/dL    Bun 42  (H) 8 - 22 mg/dL    Creatinine 0.41 (L) 0.50 - 1.40 mg/dL    Calcium 8.6 8.5 - 10.5 mg/dL    Correct Calcium 9.4 8.5 - 10.5 mg/dL    AST(SGOT) 75 (H) 12 - 45 U/L    ALT(SGPT) 26 2 - 50 U/L    Alkaline Phosphatase 134 (H) 30 - 99 U/L    Total Bilirubin 16.1 (H) 0.1 - 1.5 mg/dL    Albumin 3.0 (L) 3.2 - 4.9 g/dL    Total Protein 6.1 6.0 - 8.2 g/dL    Globulin 3.1 1.9 - 3.5 g/dL    A-G Ratio 1.0 g/dL   Magnesium    Collection Time: 11/14/24  4:15 AM   Result Value Ref Range    Magnesium 2.3 1.5 - 2.5 mg/dL   Phosphorus    Collection Time: 11/14/24  4:15 AM   Result Value Ref Range    Phosphorus 4.1 2.5 - 4.5 mg/dL   Prothrombin Time    Collection Time: 11/14/24  4:15 AM   Result Value Ref Range    PT 39.3 (H) 12.0 - 14.6 sec    INR 4.00 (H) 0.87 - 1.13   CRP QUANTITIVE (NON-CARDIAC)    Collection Time: 11/14/24  4:15 AM   Result Value Ref Range    Stat C-Reactive Protein 9.17 (H) 0.00 - 0.75 mg/dL   ESTIMATED GFR    Collection Time: 11/14/24  4:15 AM   Result Value Ref Range    GFR (CKD-EPI) 114 >60 mL/min/1.73 m 2   POCT arterial blood gas device results    Collection Time: 11/14/24  4:22 AM   Result Value Ref Range    Ph 7.355 (L) 7.400 - 7.500    Pco2 37.7 (H) 26.0 - 37.0 mmHg    Po2 78 64 - 87 mmHg    Tco2 22 20 - 33 mmol/L    S02 95 93 - 99 %    Hco3 21.0 17.0 - 25.0 mmol/L    BE -4 -4 - 3 mmol/L    Body Temp 37.1 C degrees    O2 Therapy 60 %    iPF Ratio 130     Ph Temp Chuck 7.353 (L) 7.400 - 7.500    Pco2 Temp Co 37.9 (H) 26.0 - 37.0 mmHg    Po2 Temp Cor 79 64 - 87 mmHg    Specimen Arterial     DelSys Vent     End Tidal Carbon Dioxide 30 mmhg    Tidal Volume 430 mL    Peep End Expiratory Pressure 10 cmh20    Set Rate 22     Mode APV-CMV    POCT lactate device results    Collection Time: 11/14/24  4:22 AM   Result Value Ref Range    iStat Lactate 3.4 (H) 0.5 - 2.0 mmol/L   POCT glucose device results    Collection Time: 11/14/24 10:56 AM   Result Value Ref Range    POC Glucose, Blood 140 (H) 65 - 99 mg/dL        Radiology Review:  DX-CHEST-PORTABLE (1 VIEW)   Final Result         1.  Pulmonary edema and/or infiltrates, similar to prior study.   2.  Layering right pleural effusion, stable since prior      DX-ABDOMEN FOR TUBE PLACEMENT   Preliminary Result         1. Nasogastric tube terminating with tip overlying the gastric body.      2. Nonspecific bowel gas pattern.      3. Pulmonary edema and/or infiltrates.         INTERPRETING LOCATION: [address], [place], [address]      DX-CHEST-LIMITED (1 VIEW)   Preliminary Result      Pulmonary edema and/or infiltrates, overall stable since prior study.         INTERPRETING LOCATION: [address], [place], [id number]      DX-CHEST-PORTABLE (1 VIEW)   Preliminary Result      Extensive pulmonary edema and/or infiltrates, increased since prior study.         INTERPRETING LOCATION: [address], [place], [id number]      DX-CHEST-PORTABLE (1 VIEW)   Final Result         1.  Pulmonary edema and/or infiltrates, similar to prior study.   2.  Layering right pleural effusion, decreased since prior      DX-CHEST-PORTABLE (1 VIEW)   Final Result         1.  Pulmonary edema and/or infiltrates, similar to prior study.   2.  Layering right pleural effusion, decreased since prior      EC-ECHOCARDIOGRAM LTD W/O CONT   Final Result      DX-CHEST-PORTABLE (1 VIEW)   Final Result         1.  New significant right pleural effusion identified.      2.  Significant increase in diffuse pulmonary opacifications could be due to pulmonary edema or pneumonia.      CT-HEAD W/O   Final Result   Impression:      1. No acute process in the brain evident.      2. The skull base sinuses are clear.                  US-RENAL   Final Result      1.  No hydronephrosis.   2.  Ascites.      DX-CHEST-PORTABLE (1 VIEW)   Final Result      1.  No acute cardiac or pulmonary abnormalities are identified.      2.  Slight left basilar atelectasis.      PD-[id number]-0 VIEW   Final Result      1.  Several markedly dilated loops of small  bowel in the mid abdomen suspicious for gastroenteritis or inflammatory change. Recommend follow-up abdominal series to rule out evolving small bowel obstruction.      EC-ECHOCARDIOGRAM COMPLETE W/O CONT   Final Result      CT-ABDOMEN-PELVIS W/O   Final Result      1.  Dilated fluid-filled small bowel and colonic loops favoring ileus.   2.  No evidence of bowel perforation.   3.  Hepatic cirrhosis with portal hypertension and moderate to large volume ascites.   4.  Nonocclusive superior mesenteric vein thrombosis.   5.  Cholelithiasis.   6.  Bibasilar atelectasis with minimal bilateral pleural fluid.   7.  Subtle findings concerning for pulmonary emboli.      These findings were electronically conveyed to JAMES WILLIAM on 11/3/2024 11:50 AM.         US-ABDOMEN COMPLETE SURVEY   Final Result      1.  Cirrhosis with stigmata of portal hypertension including splenomegaly, recanalized umbilical vein and ascites.   2.  Gallbladder sludge and stones. Gallbladder wall thickening is nonspecific and could be related to liver disease but correlate clinically for evidence of acute cholecystitis. No biliary dilatation.         DX-CHEST-PORTABLE (1 VIEW)   Final Result      1.  Hazy left lower lobe opacity could represent atelectasis or pneumonitis.   2.  There is linear scarring or atelectasis in the right lung base.        MDM (Data Review):   -Records reviewed and summarized in current documentation  -I personally reviewed and interpreted the laboratory results  -I personally reviewed the radiology images    Assessment/Recommendations:  Acute liver failure- MELD 3.0- 38 on arrival (10/31/2024); 26.8% 90-day survival. Child-Garza Class C  SMV thrombosis  Portal hypertensive gastropathy  Ileus  Cirrhosis with ascites  SBP with Salmonella  Hepatic encephalopathy  Coagulopathy  Esophageal varices with banding in situ  Hematemesis  Severe protein calorie malnutrition  History of decompensated liver disease with variceal  bleeding, refractory ascites  Salmonella bacteremia - pending review by State lab  Sepsis  EFRAIN  Respiratory alkalosis  Vitamin D deficiency  Negative PETH  Pneumonia and right pleural effusion  Hyperdynamic left ventricular systolic function of 75%, LVOT obstruction  Elevated INR   Hyperammonemia  Lactic acidosis  Elevated LDH    Plan:  Low sodium diet  Continue PPI IV twice daily  Continue zosyn per primary team  Continue lactulose (titrate to 2-3 loose BM/day) and Rifaximin    Serial abdominal exams  Hold anticoagulation, Monitor closely for bleeding- correction as needed.   Encourage out of bed, adequate nutrition, and sleep-wake cycles to avoid delirium  Transfer to [place] on hold given acute clinical change, lung injury.      Long term if patient survives admission:   Patient will need repeat EGD in 4-6 weeks  Nonselective beta-blocker as outpatient    GI will follow    Discussed with Dr. Jean Baptiste, Dr. Womack, Dr. Cardozo    ..CLARE [email or web address].    Core Quality Measures   Reviewed items::  Labs, Medications and Radiology reports reviewed

## 2024-11-14 NOTE — THERAPY
11/14/24 1059   Interdisciplinary Plan of Care Collaboration   Collaboration Comments OT consult received. Pt intubated yesterday due to increased respiratory needs. Per RN, pt is not very alert and is not follow commands at this time. Will hold and follow for appropriate timing of eval when pt better able to participate.

## 2024-11-14 NOTE — CARE PLAN
Problem: Ventilation  Goal: Ability to achieve and maintain unassisted ventilation or tolerate decreased levels of ventilator support  Description: Target End Date:  4 days     Document on Vent flowsheet    1.  Support and monitor invasive and noninvasive mechanical ventilation  2.  Monitor ventilator weaning response  3.  Perform ventilator associated pneumonia prevention interventions  4.  Manage ventilation therapy by monitoring diagnostic test results  Outcome: Not Met     Ventilator Daily Summary    Vent Day # 2  Airway: 7.5@22    Ventilator settings: 22 430 10 50%  Weaning trials: spont in am and pm  Respiratory Procedures:     Plan: Continue current ventilator settings and wean mechanical ventilation as tolerated per physician orders.

## 2024-11-15 LAB
ALBUMIN SERPL BCP-MCNC: 2.8 G/DL (ref 3.2–4.9)
ALBUMIN/GLOB SERPL: 0.8 G/DL
ALP SERPL-CCNC: 150 U/L (ref 30–99)
ALT SERPL-CCNC: 36 U/L (ref 2–50)
ANION GAP SERPL CALC-SCNC: 14 MMOL/L (ref 7–16)
ANISOCYTOSIS BLD QL SMEAR: ABNORMAL
AST SERPL-CCNC: 87 U/L (ref 12–45)
BACTERIA BLD CULT: NORMAL
BACTERIA BLD CULT: NORMAL
BACTERIA SPEC RESP CULT: NORMAL
BACTERIA UR CULT: ABNORMAL
BASE EXCESS BLDA CALC-SCNC: -6 MMOL/L (ref -4–3)
BASOPHILS # BLD AUTO: 0 % (ref 0–1.8)
BASOPHILS # BLD: 0 K/UL (ref 0–0.12)
BILIRUB SERPL-MCNC: 15.7 MG/DL (ref 0.1–1.5)
BODY TEMPERATURE: ABNORMAL DEGREES
BREATHS SETTING VENT: 22
BUN SERPL-MCNC: 52 MG/DL (ref 8–22)
BURR CELLS BLD QL SMEAR: NORMAL
CALCIUM ALBUM COR SERPL-MCNC: 9.8 MG/DL (ref 8.5–10.5)
CALCIUM SERPL-MCNC: 8.8 MG/DL (ref 8.5–10.5)
CHLORIDE SERPL-SCNC: 108 MMOL/L (ref 96–112)
CO2 BLDA-SCNC: 20 MMOL/L (ref 20–33)
CO2 SERPL-SCNC: 19 MMOL/L (ref 20–33)
CREAT SERPL-MCNC: 0.5 MG/DL (ref 0.5–1.4)
DELSYS IDSYS: ABNORMAL
END TIDAL CARBON DIOXIDE IECO2: 33 MMHG
EOSINOPHIL # BLD AUTO: 0 K/UL (ref 0–0.51)
EOSINOPHIL NFR BLD: 0 % (ref 0–6.9)
ERYTHROCYTE [DISTWIDTH] IN BLOOD BY AUTOMATED COUNT: 79.8 FL (ref 35.9–50)
GFR SERPLBLD CREATININE-BSD FMLA CKD-EPI: 109 ML/MIN/1.73 M 2
GLOBULIN SER CALC-MCNC: 3.7 G/DL (ref 1.9–3.5)
GLUCOSE SERPL-MCNC: 160 MG/DL (ref 65–99)
GRAM STN SPEC: NORMAL
HCO3 BLDA-SCNC: 19.4 MMOL/L (ref 17–25)
HCT VFR BLD AUTO: 28.8 % (ref 37–47)
HGB BLD-MCNC: 9.3 G/DL (ref 12–16)
HOROWITZ INDEX BLDA+IHG-RTO: 204 MM[HG]
INR PPP: 3.85 (ref 0.87–1.13)
LACTATE BLD-SCNC: 6.3 MMOL/L (ref 0.5–2)
LYMPHOCYTES # BLD AUTO: 0.15 K/UL (ref 1–4.8)
LYMPHOCYTES NFR BLD: 0.9 % (ref 22–41)
MACROCYTES BLD QL SMEAR: ABNORMAL
MAGNESIUM SERPL-MCNC: 2.4 MG/DL (ref 1.5–2.5)
MANUAL DIFF BLD: NORMAL
MCH RBC QN AUTO: 30.7 PG (ref 27–33)
MCHC RBC AUTO-ENTMCNC: 32.3 G/DL (ref 32.2–35.5)
MCV RBC AUTO: 95 FL (ref 81.4–97.8)
MODE IMODE: ABNORMAL
MONOCYTES # BLD AUTO: 0.57 K/UL (ref 0–0.85)
MONOCYTES NFR BLD AUTO: 3.4 % (ref 0–13.4)
MORPHOLOGY BLD-IMP: NORMAL
NEUTROPHILS # BLD AUTO: 16.08 K/UL (ref 1.82–7.42)
NEUTROPHILS NFR BLD: 95.7 % (ref 44–72)
NRBC # BLD AUTO: 0 K/UL
NRBC BLD-RTO: 0 /100 WBC (ref 0–0.2)
O2/TOTAL GAS SETTING VFR VENT: 50 %
PCO2 BLDA: 35.9 MMHG (ref 26–37)
PCO2 TEMP ADJ BLDA: 36.2 MMHG (ref 26–37)
PEEP END EXPIRATORY PRESSURE IPEEP: 10 CMH20
PH BLDA: 7.34 [PH] (ref 7.4–7.5)
PH TEMP ADJ BLDA: 7.34 [PH] (ref 7.4–7.5)
PHOSPHATE SERPL-MCNC: 4.2 MG/DL (ref 2.5–4.5)
PLATELET # BLD AUTO: 141 K/UL (ref 164–446)
PLATELET BLD QL SMEAR: NORMAL
PMV BLD AUTO: 10.6 FL (ref 9–12.9)
PO2 BLDA: 102 MMHG (ref 64–87)
PO2 TEMP ADJ BLDA: 103 MMHG (ref 64–87)
POIKILOCYTOSIS BLD QL SMEAR: NORMAL
POTASSIUM SERPL-SCNC: 4 MMOL/L (ref 3.6–5.5)
PROT SERPL-MCNC: 6.5 G/DL (ref 6–8.2)
PROTHROMBIN TIME: 38.2 SEC (ref 12–14.6)
RBC # BLD AUTO: 3.03 M/UL (ref 4.2–5.4)
RBC BLD AUTO: PRESENT
SAO2 % BLDA: 98 % (ref 93–99)
SIGNIFICANT IND 70042: ABNORMAL
SIGNIFICANT IND 70042: NORMAL
SITE SITE: ABNORMAL
SITE SITE: NORMAL
SODIUM SERPL-SCNC: 141 MMOL/L (ref 135–145)
SOURCE SOURCE: ABNORMAL
SOURCE SOURCE: NORMAL
SPECIMEN DRAWN FROM PATIENT: ABNORMAL
TIDAL VOLUME IVT: 430 ML
WBC # BLD AUTO: 16.8 K/UL (ref 4.8–10.8)

## 2024-11-15 PROCEDURE — 99291 CRITICAL CARE FIRST HOUR: CPT | Performed by: INTERNAL MEDICINE

## 2024-11-15 PROCEDURE — 83735 ASSAY OF MAGNESIUM: CPT

## 2024-11-15 PROCEDURE — 700111 HCHG RX REV CODE 636 W/ 250 OVERRIDE (IP): Mod: JZ

## 2024-11-15 PROCEDURE — 80053 COMPREHEN METABOLIC PANEL: CPT

## 2024-11-15 PROCEDURE — 94150 VITAL CAPACITY TEST: CPT

## 2024-11-15 PROCEDURE — 83605 ASSAY OF LACTIC ACID: CPT

## 2024-11-15 PROCEDURE — 94799 UNLISTED PULMONARY SVC/PX: CPT

## 2024-11-15 PROCEDURE — 99152 MOD SED SAME PHYS/QHP 5/>YRS: CPT

## 2024-11-15 PROCEDURE — A9270 NON-COVERED ITEM OR SERVICE: HCPCS | Performed by: INTERNAL MEDICINE

## 2024-11-15 PROCEDURE — 36600 WITHDRAWAL OF ARTERIAL BLOOD: CPT

## 2024-11-15 PROCEDURE — 82803 BLOOD GASES ANY COMBINATION: CPT

## 2024-11-15 PROCEDURE — 85610 PROTHROMBIN TIME: CPT

## 2024-11-15 PROCEDURE — 302135 SEQUENTIAL COMPRESSION MACHINE: Performed by: INTERNAL MEDICINE

## 2024-11-15 PROCEDURE — 700111 HCHG RX REV CODE 636 W/ 250 OVERRIDE (IP): Performed by: INTERNAL MEDICINE

## 2024-11-15 PROCEDURE — 770022 HCHG ROOM/CARE - ICU (200)

## 2024-11-15 PROCEDURE — 700102 HCHG RX REV CODE 250 W/ 637 OVERRIDE(OP): Performed by: INTERNAL MEDICINE

## 2024-11-15 PROCEDURE — 31500 INSERT EMERGENCY AIRWAY: CPT

## 2024-11-15 PROCEDURE — 85007 BL SMEAR W/DIFF WBC COUNT: CPT

## 2024-11-15 PROCEDURE — 99232 SBSQ HOSP IP/OBS MODERATE 35: CPT | Performed by: NURSE PRACTITIONER

## 2024-11-15 PROCEDURE — 700111 HCHG RX REV CODE 636 W/ 250 OVERRIDE (IP): Mod: JZ | Performed by: INTERNAL MEDICINE

## 2024-11-15 PROCEDURE — 85027 COMPLETE CBC AUTOMATED: CPT

## 2024-11-15 PROCEDURE — 700101 HCHG RX REV CODE 250: Performed by: INTERNAL MEDICINE

## 2024-11-15 PROCEDURE — 84100 ASSAY OF PHOSPHORUS: CPT

## 2024-11-15 PROCEDURE — 700105 HCHG RX REV CODE 258: Performed by: INTERNAL MEDICINE

## 2024-11-15 PROCEDURE — 94003 VENT MGMT INPAT SUBQ DAY: CPT

## 2024-11-15 RX ORDER — CIPROFLOXACIN 500 MG/1
500 TABLET, FILM COATED ORAL EVERY 24 HOURS
Status: DISCONTINUED | OUTPATIENT
Start: 2024-11-15 | End: 2024-11-16

## 2024-11-15 RX ORDER — HYDROCORTISONE SODIUM SUCCINATE 100 MG/2ML
50 INJECTION INTRAMUSCULAR; INTRAVENOUS EVERY 12 HOURS
Status: DISCONTINUED | OUTPATIENT
Start: 2024-11-15 | End: 2024-11-16

## 2024-11-15 RX ORDER — LACTULOSE 10 G/15ML
45 SOLUTION ORAL 4 TIMES DAILY
Status: DISCONTINUED | OUTPATIENT
Start: 2024-11-15 | End: 2024-11-16

## 2024-11-15 RX ADMIN — PANTOPRAZOLE SODIUM 40 MG: 40 INJECTION, POWDER, FOR SOLUTION INTRAVENOUS at 17:07

## 2024-11-15 RX ADMIN — MIDODRINE HYDROCHLORIDE 10 MG: 5 TABLET ORAL at 21:29

## 2024-11-15 RX ADMIN — LACTULOSE 45 ML: 10 SOLUTION ORAL at 12:44

## 2024-11-15 RX ADMIN — PIPERACILLIN AND TAZOBACTAM 4.5 G: 4; .5 INJECTION, POWDER, FOR SOLUTION INTRAVENOUS at 05:13

## 2024-11-15 RX ADMIN — RIFAXIMIN 550 MG: 550 TABLET ORAL at 05:14

## 2024-11-15 RX ADMIN — DEXMEDETOMIDINE HYDROCHLORIDE 0.9 MCG/KG/HR: 4 INJECTION, SOLUTION INTRAVENOUS at 00:32

## 2024-11-15 RX ADMIN — HYDROCORTISONE SODIUM SUCCINATE 50 MG: 100 INJECTION, POWDER, FOR SOLUTION INTRAMUSCULAR; INTRAVENOUS at 05:14

## 2024-11-15 RX ADMIN — PIPERACILLIN AND TAZOBACTAM 4.5 G: 4; .5 INJECTION, POWDER, FOR SOLUTION INTRAVENOUS at 13:07

## 2024-11-15 RX ADMIN — LACTULOSE 30 ML: 10 SOLUTION ORAL at 08:19

## 2024-11-15 RX ADMIN — PANTOPRAZOLE SODIUM 40 MG: 40 INJECTION, POWDER, FOR SOLUTION INTRAVENOUS at 05:14

## 2024-11-15 RX ADMIN — RIFAXIMIN 550 MG: 550 TABLET ORAL at 17:11

## 2024-11-15 RX ADMIN — LINEZOLID 600 MG: 600 TABLET, FILM COATED ORAL at 05:14

## 2024-11-15 RX ADMIN — CIPROFLOXACIN HYDROCHLORIDE 500 MG: 500 TABLET, FILM COATED ORAL at 21:29

## 2024-11-15 RX ADMIN — DEXMEDETOMIDINE HYDROCHLORIDE 0.7 MCG/KG/HR: 4 INJECTION, SOLUTION INTRAVENOUS at 13:12

## 2024-11-15 RX ADMIN — FLUOXETINE HYDROCHLORIDE 10 MG: 10 CAPSULE ORAL at 05:14

## 2024-11-15 RX ADMIN — DEXMEDETOMIDINE HYDROCHLORIDE 0.8 MCG/KG/HR: 4 INJECTION, SOLUTION INTRAVENOUS at 21:03

## 2024-11-15 RX ADMIN — LACTULOSE 45 ML: 10 SOLUTION ORAL at 17:05

## 2024-11-15 RX ADMIN — HYDROCORTISONE SODIUM SUCCINATE 50 MG: 100 INJECTION, POWDER, FOR SOLUTION INTRAMUSCULAR; INTRAVENOUS at 17:09

## 2024-11-15 RX ADMIN — LACTULOSE 45 ML: 10 SOLUTION ORAL at 21:28

## 2024-11-15 RX ADMIN — LINEZOLID 600 MG: 600 TABLET, FILM COATED ORAL at 17:11

## 2024-11-15 RX ADMIN — MIDODRINE HYDROCHLORIDE 10 MG: 5 TABLET ORAL at 15:14

## 2024-11-15 ASSESSMENT — PAIN DESCRIPTION - PAIN TYPE
TYPE: ACUTE PAIN

## 2024-11-15 ASSESSMENT — PULMONARY FUNCTION TESTS
FVC: 0.6
FVC: 0.5

## 2024-11-15 ASSESSMENT — FIBROSIS 4 INDEX: FIB4 SCORE: 5.86

## 2024-11-15 NOTE — CARE PLAN
Problem: Ventilation  Goal: Ability to achieve and maintain unassisted ventilation or tolerate decreased levels of ventilator support  Description: Target End Date:  4 days     Document on Vent flowsheet    1.  Support and monitor invasive and noninvasive mechanical ventilation  2.  Monitor ventilator weaning response  3.  Perform ventilator associated pneumonia prevention interventions  4.  Manage ventilation therapy by monitoring diagnostic test results  Outcome: Not Progressing     Ventilator Daily Summary    Vent Day #3  Airway: 7.5 ETT @24    Ventilator settings: APV/CMV 22 - 430 - 8 - 50%  Weaning trials: Yes  Respiratory Procedures: No    Plan: Continue current ventilator settings and wean mechanical ventilation as tolerated per physician orders.

## 2024-11-15 NOTE — FLOWSHEET NOTE
11/15/24 1438   Weaning Parameters   RR (bpm) 23   $ FVC / Vital Capacity (liters)  0.5  (not following)   NIF (cm H2O)    (not following)   Rapid Shallow Breathing Index (RR/VT) 52   Spontaneous VE 10.8   Spontaneous

## 2024-11-15 NOTE — PROGRESS NOTES
Gastroenterology Progress Note               Author:  YANA Pierre   Date & Time Created: 11/15/2024 12:44 PM       Patient ID:  Name:             Kavita Freeman  YOB: 1965  Age:                 59 y.o.  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]     Acute encephalopathy [G93.40]     Bacteremia due to Gram-negative bacteria [R78.81]     Severe protein-calorie malnutrition ([place]: less than 60% of standard weight) (HCC) [E43]     Transaminitis [R74.01]     Hypoglycemia [E16.2]     Advance care planning [Z71.89]     Sepsis (HCC) [A41.9]     Hepatic encephalopathy (HCC) [K76.82]     High anion gap metabolic acidosis [E87.29]     Hyponatremia [E87.1]     Coagulopathy (HCC) [D68.9]     Anemia [D64.9]     Alcoholic cirrhosis of liver with ascites (HCC) [K70.31]     Acute renal failure (ARF) (HCC) [N17.9]     Depression with anxiety [F41.8]     UTI (urinary tract infection) [N39.0]     Essential hypertension [I10]      Presenting Chief Complaint:  Cirrhosis, GI bleeding.     History of Present Illness:   59 years old female with medical history of alcohol-related liver injury, cirrhosis, decompensation with variceal bleeding and refractory ascites formation, mild to moderate encephalopathy.  Presented to hospital this time for fluid accumulation, acute kidney injury.  GI team is consulted for recent GI bleeding     The patient has primary GI liver doctor in [place] and she is waiting for the  transplantation evaluation at [facility] in the coming 2 months.  We do not have any records to support this plan, however the patient is very certain about the [facility] appointment.     For the bleeding, the patient had nausea vomiting in the last 2 days, the patient saw blood and also some coffee-ground like vomitus.  Tarry stool was noted.  Last upper GI scope August 15 with variceal bleeding and banding.     Interval History:  11/2/2024: Patient seen at bedside.  Awake, alert.  Discussed EGD findings with patient.  Inquired regarding last EGD as, per chart review, last EGD was 2-1/2 months ago.  Patient unable to discern if she has had EGD since last documented.  Positive asterixis.  Denies bowel movements overnight.  Started on rifaximin and lactulose.  Hemoglobin stable.  Abdomen tender with light palpation.  No WBC this morning, but yesterday was 39. ultrasound abdomen pending.      11/3/2024: Patient seen at bedside.  Disoriented, unable to answer orientation questions.  Abdomen distended, significantly increase in tenderness this morning, hypoactive bowel sounds and tympany with percussion.  CT abdomen and CBC pending.  No bowel movements overnight.    Update 1222: CT abdomen with findings including fluid-filled small bowel and colonic loops favoring ileus, no evidence of bowel perforation, cirrhosis with portal hypertension, moderate to large volume ascites, nonocclusive SMV thrombosis, cholelithiasis, subtle findings concerning for pulmonary emboli.      11/4/2024: Patient seen at bedside.  Disoriented, oriented to self only.  Abdomen remains distended, tender with light palpation, hypoactive bowel sounds.  Patient with multiple brown bowel movements overnight.  Started on heparin yesterday.  WBC 29.8, hemoglobin stable at 12.5, platelets 197.  Sodium 127, BUN 56, creatinine improved to 1.3.  Albumin yesterday 2.6.  INR yesterday 2.35.  MELD 3.0 as of 11/3/2024 31    11/5/2024: Patient seen and examined.   Appears to be in distress, tachypneic, unable to participate in interview.  Grimaces when I touch her abdomen.  I was concerned and got Dr. Charles who then examined the patient.  We ordered KUB, chest x-ray, ABG, further labs.  3 BM last 24 hours.     Found to have significant lactic acidosis, INR 3.64 (difficult to interpret in the context of heparin drip), ABG with acute respiratory alkalosis.  Creatinine increasing to 1.54, T. bili 8.  KUB dilated loops of bowel suspicious for gastroenteritis or inflammatory changes.  Chest x-ray with atelectasis.      11/6/2024: Patient seen in collaboration with Dr. Solorzano. Son at bedside, all questions answered. WBC now 36.6, T. Bili 9.8, creatinine uptrending. MELD 3.0 - 37 (Although INR not reliable in the setting of heparin drip)    11/7/2024: Patient seen with mom and dad at bedside.  Significantly improved, alert and oriented and eating food.  She remembered me from when I met her before.  WBC 26.9, hgb 9.6, plt 112, cr 1.38. Urinary casts present. Renal US negative.     11/8/2024: Patient seen with family bedside. Continues to feel better, eating but doesn't really like the food. Numerous bowel movements overnight. Worsening abdominal distention with mild tenderness. WBC continues to improve, platelets worse at 89, hgb downtrended to 9.1, cr 0.44, t. Bili 10.9    11/9/2024: Patient seen this morning, no family bedside.  More lethargic today, says she is tired.  2 bowel movements past 24 hours.  VSS, WBC 20.3, T. Bili 13.1, albumin 2.9, phos 2.3.  INR greater than 10, TEG with abnormal CK R.  Pending diagnostic paracentesis and elevated INR.    11/10/2024: Patient seen and examined this morning with mother at bedside.  More lethargic again, on oxygen, noted to be tachypneic and tachycardic.  Minimally arousable.  Worsening murmur on auscultation. Has not had a bowel movement per RN.  Finished her antibiotics yesterday and transferred from [facility].  Labs reviewed, WBC up to  30 with repeat 33, creatinine 1.09 from 0.28, bilirubin 16.2, 9.9 direct.  Increased reticulocyte response, , ammonia 62, lactic 4.2, Procal 1.44.  INR greater than 10, recheck 9.53.  Chest x-ray with new significant right pleural effusion and significant increase in diffuse pulmonary opacifications edema versus pneumonia.  Head CT negative.  Limited echocardiogram and haptoglobin pending.    11/11/2024: Patient upgraded to ICU last night. Awake, alert, encephalopathic. Complaining of back pain. Dried crusted blood noted on oral mucosa. Tachycardic, tachypneic, on 10L oxymask. WBC 31.3, hgb 7.4, plt 93, INR 7.15, BUN 41, cr 1.37, T. Bili 15.2, lactic 4.9. On Zosyn.    11/12/2024: Patient seen at bedside.  Awake, alert.  Patient was accepted by [facility], bed pending.  Patient on 50 LPM HFNC, FiO2 90%.  Leukocytosis improved to 26, hemoglobin stable 7.8.  Platelets 103.  Sodium 135, BUN 31, creatinine 0.51, AST 86, ALT 24, alk phos 100, total bilirubin 16.9.  Albumin 3.  Ammonia 49 INR 3.5 after products yesterday. MELD 3.0 32 63.5%; 90 day survival rate. Remains on antibiotics    11/13/2024: Overnight, patient went into respiratory failure maxed out on high flow nasal cannula.  She required intubation earlier this morning.  [facility] transfer on hold.  Primary team had family meeting to discuss goals of care.  Patient DNR/I okay.  They have asked to do a trial of therapy and allow her son to see patient this weekend.  Leukocytosis down to 22.8.  Hemoglobin stable 7.8.  Sodium 134, BUN 37, creatinine 1.27, , ALT 30, alk phos 112, total bilirubin 17.4, albumin 3.2 INR increased to 4.4.  MELD 3.0 36; 39.7 estimated 90-day survival rate     11/14/2024: Patient seen at bedside.  Remains on mechanical ventilator via ET tube.  Increase stool output overnight.  Leukocytosis continues to improve, hemoglobin stable 8.7.  Sodium 137 BUN 42, creatinine 0.41, total bilirubin 16.1, albumin 3, INR 4.  MELD 3.0 score:  34 52.3% estimated level    11/15/2024: Patient seen at bedside.  Remains on mechanical ventilator via ET tube decrease stool output overnight.  Increase in lactulose administration.  WBC 16.8, hemoglobin stable 9.3.  Platelets 141.  Sodium 141, BUN 52, creatinine 0.5, AST 87, ALT 36, alk phos 150, total bilirubin 15.7, albumin 2.8.  INR 3.85.  MELD 3.0: 33    Hospital Medications:  Current Facility-Administered Medications   Medication Dose Frequency Provider Last Rate Last Admin    hydrocortisone sodium succinate PF (Solu-CORTEF) 100 MG injection 50 mg  50 mg Q12HRS Olvin Hanson M.D.        ciprofloxacin (Cipro) tablet 500 mg  500 mg Q24HRS Olvin Hanson M.D.        lactulose 20 GM/30ML solution 45 mL  45 mL 4X/DAY Olvin Hanson M.D.        midodrine (Proamatine) tablet 10 mg  10 mg Q8HRS Olvin Hanson M.D.   10 mg at 11/14/24 2104    dexmedetomidine (Precedex) 400 mcg/100mL infusion  0.1-1.5 mcg/kg/hr (Ideal) Continuous Dawit Bell M.D. 14.4 mL/hr at 11/15/24 1153 0.9 mcg/kg/hr at 11/15/24 1153    Respiratory Therapy Consult   Continuous RT Olvin Hanson M.D. MD Alert...ICU Electrolyte Replacement per Pharmacy   PHARMACY TO DOSE Olvin Hanson M.D.        lidocaine (Xylocaine) 1 % injection 2 mL  2 mL Q30 MIN PRN Olvin Hanson M.D.        Pharmacy Consult: Enteral tube insertion - review meds/change route/product selection  1 Each PHARMACY TO DOSE Olvin Hanson M.D.        HYDROmorphone (Dilaudid) injection 0.5 mg  0.5 mg Q HOUR PRN Olvin Hanson M.D.   0.5 mg at 11/13/24 2348    Or    HYDROmorphone (Dilaudid) injection 1 mg  1 mg Q HOUR PRN Olvin Hanson M.D.   1 mg at 11/14/24 2312    FLUoxetine (PROzac) capsule 10 mg  10 mg QAM Olvin Hanson M.D.   10 mg at 11/15/24 0514    riFAXIMin (Xifaxan) tablet 550 mg  550 mg BID Olvin Hanson M.D.   550 mg at 11/15/24 0514    acetaminophen (Tylenol) tablet 650 mg  650 mg Q6HRS PRN Olvin Hanson M.D.         "ondansetron (Zofran ODT) dispertab 4 mg  4 mg Q4HRS PRN Olvin Hanson M.D.        promethazine (Phenergan) tablet 12.5-25 mg  12.5-25 mg Q4HRS PRN Olvin Hanson M.D.        linezolid (Zyvox) tablet 600 mg  600 mg Q12HRS Olvin Hanson M.D.   600 mg at 11/15/24 0514    [Held by provider] MBX (diphenhydrAMINE-lidocaine-Maalox) oral susp Cup 5 mL  5 mL Q6HRS PRN Olvin Hanson M.D.   5 mL at 11/12/24 1727    pantoprazole (Protonix) injection 40 mg  40 mg BID Olvin Hanson M.D.   40 mg at 11/15/24 0514    piperacillin-tazobactam (Zosyn) 4.5 g in  mL IVPB  4.5 g Q8HRS Shahzad Jenkins M.D.   Stopped at 11/15/24 0913    ondansetron (Zofran) syringe/vial injection 4 mg  4 mg Q4HRS PRN Adilia Gonzales, D.O.   4 mg at 11/12/24 2158    promethazine (Phenergan) suppository 12.5-25 mg  12.5-25 mg Q4HRS PRN Adilia Gonzales, D.O.        prochlorperazine (Compazine) injection 5-10 mg  5-10 mg Q4HRS PRN Adilia Gonzales D.O.   10 mg at 11/12/24 2211    dextrose 50% (D50W) injection 25 g  25 g Q15 MIN PRN Adilia Gonzales D.O.   25 g at 11/13/24 0638    lidocaine (Asperflex) 4 % patch 1 Patch  1 Patch Q24HRS Adilia Gonzales D.O.   1 Patch at 11/12/24 0512   Last reviewed on 11/1/2024  9:17 AM by Celine Bustamante R.N.       Review of Systems:  Review of Systems   Unable to perform ROS: Critical illness       Vital signs:  Weight/BMI: Body mass index is 24.87 kg/m².  /63   Pulse 85   Temp 37.9 °C (100.2 °F) (Bladder)   Resp (!) 26   Ht 1.727 m (5' 8\")   Wt 74.2 kg (163 lb 9.3 oz)   SpO2 98%   Vitals:    11/15/24 1000 11/15/24 1100 11/15/24 1150 11/15/24 1200   BP: 109/58 115/63  116/63   Pulse: 78 82 89 85   Resp: (!) 29 (!) 24 (!) 23 (!) 26   Temp: 37.9 °C (100.2 °F)   37.9 °C (100.2 °F)   TempSrc: Bladder   Bladder   SpO2: 98% 98% 97% 98%   Weight:       Height:         Oxygen Therapy:  Pulse Oximetry: 98 %, FiO2%: 50 %, O2 Delivery Device: Ventilator    Intake/Output Summary (Last 24 hours) at " 11/15/2024 1244  Last data filed at 11/15/2024 1153  Gross per 24 hour   Intake 1627.93 ml   Output 1980 ml   Net -352.07 ml       Physical Exam  Vitals and nursing note reviewed.   Constitutional:       General: She is in acute distress.      Appearance: She is cachectic. She is ill-appearing.   HENT:      Head: Normocephalic and atraumatic.      Nose: Nose normal. No congestion.      Mouth/Throat:      Comments: Dried blood in mouth  Eyes:      General: Scleral icterus present.   Cardiovascular:      Rate and Rhythm: Normal rate and regular rhythm.      Pulses: Normal pulses.      Heart sounds: Murmur heard.   Pulmonary:      Effort: Pulmonary effort is normal.      Breath sounds: Rhonchi present.      Comments: On mechanical ventilator via ET tube  Abdominal:      General: There is distension.      Palpations: Abdomen is soft.      Tenderness: There is no abdominal tenderness. There is no guarding.      Comments: + Fluid wave   Genitourinary:     Comments: Herrera with dark urine  BMS in place  Musculoskeletal:      Right lower leg: No edema.      Left lower leg: No edema.   Skin:     General: Skin is warm and dry.      Capillary Refill: Capillary refill takes less than 2 seconds.      Coloration: Skin is jaundiced.       Labs:  Recent Labs     11/13/24  0345 11/14/24  0415 11/15/24  0420   SODIUM 134* 137 141   POTASSIUM 3.6 4.0 4.0   CHLORIDE 101 106 108   CO2 20 18* 19*   BUN 37* 42* 52*   CREATININE 1.27 0.41* 0.50   MAGNESIUM 2.4 2.3 2.4   PHOSPHORUS 3.9 4.1 4.2   CALCIUM 8.8 8.6 8.8     Recent Labs     11/13/24  0345 11/14/24  0415 11/15/24  0420   ALTSGPT 30 26 36   ASTSGOT 107* 75* 87*   ALKPHOSPHAT 112* 134* 150*   TBILIRUBIN 17.4* 16.1* 15.7*   PREALBUMIN  --  3.2*  --    GLUCOSE 49* 132* 160*     Recent Labs     11/13/24  0345 11/14/24  0415 11/15/24  0420   WBC 22.8* 15.7* 16.8*   NEUTSPOLYS 91.40* 92.10* 95.70*   LYMPHOCYTES 3.00* 2.50* 0.90*   MONOCYTES 3.60 4.30 3.40   EOSINOPHILS 0.80 0.10 0.00    BASOPHILS 0.10 0.10 0.00   ASTSGOT 107* 75* 87*   ALTSGPT 30 26 36   ALKPHOSPHAT 112* 134* 150*   TBILIRUBIN 17.4* 16.1* 15.7*     Recent Labs     11/13/24  0345 11/14/24  0415 11/15/24  0420   RBC 2.59* 2.89* 3.03*   HEMOGLOBIN 7.8* 8.7* 9.3*   HEMATOCRIT 24.1* 27.6* 28.8*   PLATELETCT 118* 113* 141*   PROTHROMBTM 42.7* 39.3* 38.2*   INR 4.44* 4.00* 3.85*     Recent Results (from the past 24 hours)   POCT glucose device results    Collection Time: 11/14/24  4:30 PM   Result Value Ref Range    POC Glucose, Blood 150 (H) 65 - 99 mg/dL   POCT arterial blood gas device results    Collection Time: 11/15/24  3:36 AM   Result Value Ref Range    Ph 7.341 (L) 7.400 - 7.500    Pco2 35.9 26.0 - 37.0 mmHg    Po2 102 (H) 64 - 87 mmHg    Tco2 20 20 - 33 mmol/L    S02 98 93 - 99 %    Hco3 19.4 17.0 - 25.0 mmol/L    BE -6 (L) -4 - 3 mmol/L    Body Temp 37.2 C degrees    O2 Therapy 50 %    iPF Ratio 204     Ph Temp Chuck 7.338 (L) 7.400 - 7.500    Pco2 Temp Co 36.2 26.0 - 37.0 mmHg    Po2 Temp Cor 103 (H) 64 - 87 mmHg    Specimen Arterial     DelSys Vent     End Tidal Carbon Dioxide 33 mmhg    Tidal Volume 430 mL    Peep End Expiratory Pressure 10 cmh20    Set Rate 22     Mode APV-CMV    POCT lactate device results    Collection Time: 11/15/24  3:36 AM   Result Value Ref Range    iStat Lactate 6.3 (HH) 0.5 - 2.0 mmol/L   CBC With Differential    Collection Time: 11/15/24  4:20 AM   Result Value Ref Range    WBC 16.8 (H) 4.8 - 10.8 K/uL    RBC 3.03 (L) 4.20 - 5.40 M/uL    Hemoglobin 9.3 (L) 12.0 - 16.0 g/dL    Hematocrit 28.8 (L) 37.0 - 47.0 %    MCV 95.0 81.4 - 97.8 fL    MCH 30.7 27.0 - 33.0 pg    MCHC 32.3 32.2 - 35.5 g/dL    RDW 79.8 (H) 35.9 - 50.0 fL    Platelet Count 141 (L) 164 - 446 K/uL    MPV 10.6 9.0 - 12.9 fL    Neutrophils-Polys 95.70 (H) 44.00 - 72.00 %    Lymphocytes 0.90 (L) 22.00 - 41.00 %    Monocytes 3.40 0.00 - 13.40 %    Eosinophils 0.00 0.00 - 6.90 %    Basophils 0.00 0.00 - 1.80 %    Nucleated RBC 0.00 0.00  - 0.20 /100 WBC    Neutrophils (Absolute) 16.08 (H) 1.82 - 7.42 K/uL    Lymphs (Absolute) 0.15 (L) 1.00 - 4.80 K/uL    Monos (Absolute) 0.57 0.00 - 0.85 K/uL    Eos (Absolute) 0.00 0.00 - 0.51 K/uL    Baso (Absolute) 0.00 0.00 - 0.12 K/uL    NRBC (Absolute) 0.00 K/uL    Anisocytosis 1+     Macrocytosis 1+    Comp Metabolic Panel    Collection Time: 11/15/24  4:20 AM   Result Value Ref Range    Sodium 141 135 - 145 mmol/L    Potassium 4.0 3.6 - 5.5 mmol/L    Chloride 108 96 - 112 mmol/L    Co2 19 (L) 20 - 33 mmol/L    Anion Gap 14.0 7.0 - 16.0    Glucose 160 (H) 65 - 99 mg/dL    Bun 52 (H) 8 - 22 mg/dL    Creatinine 0.50 0.50 - 1.40 mg/dL    Calcium 8.8 8.5 - 10.5 mg/dL    Correct Calcium 9.8 8.5 - 10.5 mg/dL    AST(SGOT) 87 (H) 12 - 45 U/L    ALT(SGPT) 36 2 - 50 U/L    Alkaline Phosphatase 150 (H) 30 - 99 U/L    Total Bilirubin 15.7 (H) 0.1 - 1.5 mg/dL    Albumin 2.8 (L) 3.2 - 4.9 g/dL    Total Protein 6.5 6.0 - 8.2 g/dL    Globulin 3.7 (H) 1.9 - 3.5 g/dL    A-G Ratio 0.8 g/dL   Magnesium    Collection Time: 11/15/24  4:20 AM   Result Value Ref Range    Magnesium 2.4 1.5 - 2.5 mg/dL   Phosphorus    Collection Time: 11/15/24  4:20 AM   Result Value Ref Range    Phosphorus 4.2 2.5 - 4.5 mg/dL   Prothrombin Time    Collection Time: 11/15/24  4:20 AM   Result Value Ref Range    PT 38.2 (H) 12.0 - 14.6 sec    INR 3.85 (H) 0.87 - 1.13   ESTIMATED GFR    Collection Time: 11/15/24  4:20 AM   Result Value Ref Range    GFR (CKD-EPI) 109 >60 mL/min/1.73 m 2   DIFFERENTIAL MANUAL    Collection Time: 11/15/24  4:20 AM   Result Value Ref Range    Manual Diff Status PERFORMED    PERIPHERAL SMEAR REVIEW    Collection Time: 11/15/24  4:20 AM   Result Value Ref Range    Peripheral Smear Review see below    PLATELET ESTIMATE    Collection Time: 11/15/24  4:20 AM   Result Value Ref Range    Plt Estimation Decreased    MORPHOLOGY    Collection Time: 11/15/24  4:20 AM   Result Value Ref Range    RBC Morphology Present     Poikilocytosis  2+     Echinocytes 2+        Radiology Review:  DX-CHEST-PORTABLE (1 VIEW)   Final Result         1. No significant interval change.      DX-CHEST-PORTABLE (1 VIEW)   Final Result         1.  Pulmonary edema and/or infiltrates, similar to prior study.   2.  Layering right pleural effusion, stable since prior      DX-ABDOMEN FOR TUBE PLACEMENT   Final Result         1. Nasogastric tube terminating with tip overlying the gastric body.      2. Nonspecific bowel gas pattern.      3. Pulmonary edema and/or infiltrates.         INTERPRETING LOCATION: [address], [place], [address]      DX-CHEST-LIMITED (1 VIEW)   Final Result      Pulmonary edema and/or infiltrates, overall stable since prior study.         INTERPRETING LOCATION: [address], [place], [address]      DX-CHEST-PORTABLE (1 VIEW)   Final Result      Extensive pulmonary edema and/or infiltrates, increased since prior study.         INTERPRETING LOCATION: [address], [place], [address]      DX-CHEST-PORTABLE (1 VIEW)   Final Result         1.  Pulmonary edema and/or infiltrates, similar to prior study.   2.  Layering right pleural effusion, decreased since prior      DX-CHEST-PORTABLE (1 VIEW)   Final Result         1.  Pulmonary edema and/or infiltrates, similar to prior study.   2.  Layering right pleural effusion, decreased since prior      EC-ECHOCARDIOGRAM LTD W/O CONT   Final Result      DX-CHEST-PORTABLE (1 VIEW)   Final Result         1.  New significant right pleural effusion identified.      2.  Significant increase in diffuse pulmonary opacifications could be due to pulmonary edema or pneumonia.      CT-HEAD W/O   Final Result   Impression:      1. No acute process in the brain evident.      2. The skull base sinuses are clear.                  US-RENAL   Final Result      1.  No hydronephrosis.   2.  Ascites.      DX-CHEST-PORTABLE (1 VIEW)   Final Result      1.  No acute cardiac or pulmonary abnormalities are identified.      2.  Slight left basilar  atelectasis.      VL-FOANCQL-6 VIEW   Final Result      1.  Several markedly dilated loops of small bowel in the mid abdomen suspicious for gastroenteritis or inflammatory change. Recommend follow-up abdominal series to rule out evolving small bowel obstruction.      EC-ECHOCARDIOGRAM COMPLETE W/O CONT   Final Result      CT-ABDOMEN-PELVIS W/O   Final Result      1.  Dilated fluid-filled small bowel and colonic loops favoring ileus.   2.  No evidence of bowel perforation.   3.  Hepatic cirrhosis with portal hypertension and moderate to large volume ascites.   4.  Nonocclusive superior mesenteric vein thrombosis.   5.  Cholelithiasis.   6.  Bibasilar atelectasis with minimal bilateral pleural fluid.   7.  Subtle findings concerning for pulmonary emboli.      These findings were electronically conveyed to JAMES WILLIAM on 11/3/2024 11:50 AM.         US-ABDOMEN COMPLETE SURVEY   Final Result      1.  Cirrhosis with stigmata of portal hypertension including splenomegaly, recanalized umbilical vein and ascites.   2.  Gallbladder sludge and stones. Gallbladder wall thickening is nonspecific and could be related to liver disease but correlate clinically for evidence of acute cholecystitis. No biliary dilatation.         DX-CHEST-PORTABLE (1 VIEW)   Final Result      1.  Hazy left lower lobe opacity could represent atelectasis or pneumonitis.   2.  There is linear scarring or atelectasis in the right lung base.        MDM (Data Review):   -Records reviewed and summarized in current documentation  -I personally reviewed and interpreted the laboratory results  -I personally reviewed the radiology images    Assessment/Recommendations:  Acute liver failure- MELD 3.0- 38 on arrival (10/31/2024); 26.8% 90-day survival. Child-Garza Class C  SMV thrombosis  Portal hypertensive gastropathy  Ileus  Cirrhosis with ascites  SBP with Salmonella  Hepatic encephalopathy  Coagulopathy  Esophageal varices with banding in  situ  Hematemesis  Severe protein calorie malnutrition  History of decompensated liver disease with variceal bleeding, refractory ascites  Salmonella bacteremia - pending review by State lab  Sepsis  EFRAIN  Respiratory alkalosis  Vitamin D deficiency  Negative PETH  Pneumonia and right pleural effusion  Hyperdynamic left ventricular systolic function of 75%, LVOT obstruction  Elevated INR   Hyperammonemia  Lactic acidosis  Elevated LDH    Plan:  Low sodium diet  Continue PPI IV twice daily  Continue zosyn per primary team  Continue lactulose (titrate to 2-3 loose BM/day) and Rifaximin    Serial abdominal exams  Hold anticoagulation, Monitor closely for bleeding- correction as needed.   Encourage out of bed, adequate nutrition, and sleep-wake cycles to avoid delirium  Transfer to [facility] on hold given acute clinical change, lung injury.      Long term if patient survives admission:   Patient will need repeat EGD in 4-6 weeks  Nonselective beta-blocker as outpatient    GI will follow    Discussed with Dr. Jean Baptiste, Dr. Vences, nursing    ..YANA Pierre    Core Quality Measures   Reviewed items::  Labs, Medications and Radiology reports reviewed

## 2024-11-15 NOTE — PROGRESS NOTES
[facility] ICU Progress Note      Admit Date: 10/31/2024    Resident(s): Roberto Jean Baptiste M.D.   Attending:  Dr. Olvin Hanson    Patient ID:    Name:  Kavita Freeman   YOB: 1965  Age:  59 y.o.  female   MRN:  [id number]    Hospital Course (carried forward and updated):  59 y.o. female admitted 10/31/2024 with EtOH Cirrhosis, sober 4 months, admitted with fever chills and abdominal pain, found to have SBP with salmonella in culture as well as bacteremia. She has been followed in IMCU with lots of goals of care changes back and forth with advanced liver failure and multiorgan dyfunction. Transferred to ICU 11/10 for increase lactate, worsening encephalopathy and respiratory failure.     Interval Events:  11/10 transferred to ICU.   11/11: Zyvox and zosyn for aspiration pneumonitis. CXR improved without effusion. Reversed coagulopathy INR of 9 with 2FFP 1 Cryoprecipitate  11/12: Remains encephalopathic. 1.6L stool out through BMS.    11/13: Intubated in the early AM for increasing hypoxia and respiratory distress, worsened CXR with bilateral opacities/effusions. Post-intubation bronch suggestive of aspirated gastric/oral contents. Progressively hypotensive started on levophed. Start linezolid for Enterococcus faecium in urine   11/14: Family meeting yesterday DNR/I-OK, [place] transfer on hold due to ARDS     Consultants:  Critical Care  Palliative care    Vitals Range last 24h:  Pulse:  [67-81] 77  Resp:  [19-38] 33  BP: ()/(48-78) 152/72  SpO2:  [95 %-99 %] 99 %      Intake/Output Summary (Last 24 hours) at 11/15/2024 0651  Last data filed at 11/15/2024 0600  Gross per 24 hour   Intake 1685.27 ml   Output 2030 ml   Net -344.73 ml        Review of Systems   Unable to perform ROS: Intubated        PHYSICAL EXAM:  Vitals:    11/15/24 0400 11/15/24 0500 11/15/24 0600 11/15/24 0640   BP: (!) 143/68 (!) 152/72 (!) 152/72    Pulse: 75 73 75 77   Resp: (!) 22 (!) 25 (!) 33 (!) 33   Temp:       TempSrc:        SpO2: 98%   99%   Weight:       Height:        Body mass index is 24.87 kg/m².    O2 therapy: Pulse Oximetry: 99 %, O2 Delivery Device: Ventilator           Physical Exam  Vitals and nursing note reviewed.   Constitutional:       General: She is not in acute distress.     Appearance: She is ill-appearing.      Comments: Intubated, sedated   HENT:      Head: Normocephalic.      Mouth/Throat:      Mouth: Mucous membranes are dry.      Comments: Dried blood from nose and mouth  Eyes:      General: Scleral icterus present.      Pupils: Pupils are equal, round, and reactive to light.   Cardiovascular:      Rate and Rhythm: Regular rhythm. Tachycardia present.      Heart sounds: No murmur heard.  Pulmonary:      Effort: Respiratory distress present.      Breath sounds: No stridor. Rhonchi and rales present. No wheezing.   Chest:      Chest wall: No tenderness.   Abdominal:      General: There is distension.      Palpations: There is no mass.      Hernia: No hernia is present.      Comments: Mild ascites with fluid wave not tense   Musculoskeletal:         General: No swelling or tenderness.      Cervical back: No rigidity or tenderness.   Skin:     Coloration: Skin is jaundiced. Skin is not pale.      Findings: Bruising present.   Neurological:      Comments: intubated       Recent Labs     11/13/24  1007 11/14/24  0422 11/15/24  0336   ISTATAPH 7.331* 7.355* 7.341*   ISTATAPCO2 36.8 37.7* 35.9   ISTATAPO2 55* 78 102*   ISTATATCO2 21 22 20   HZVEPER5MAU 86* 95 98   ISTATARTHCO3 19.4 21.0 19.4   ISTATARTBE -6* -4 -6*   ISTATTEMP 35.9 C 37.1 C 37.2 C   ISTATFIO2 50 60 50   ISTATSPEC Arterial Arterial Arterial   ISTATAPHTC 7.347* 7.353* 7.338*   [id number] 51* 79 103*     Recent Labs     11/13/24  0345 11/14/24  0415 11/15/24  0420   SODIUM 134* 137 141   POTASSIUM 3.6 4.0 4.0   CHLORIDE 101 106 108   CO2 20 18* 19*   BUN 37* 42* 52*   CREATININE 1.27 0.41* 0.50   MAGNESIUM 2.4 2.3 2.4   PHOSPHORUS 3.9 4.1 4.2   CALCIUM  8.8 8.6 8.8     Recent Labs     11/13/24  0345 11/14/24  0415 11/15/24  0420   ALTSGPT 30 26 36   ASTSGOT 107* 75* 87*   ALKPHOSPHAT 112* 134* 150*   TBILIRUBIN 17.4* 16.1* 15.7*   PREALBUMIN  --  3.2*  --    GLUCOSE 49* 132* 160*     Recent Labs     11/13/24  0345 11/14/24  0415 11/15/24  0420   RBC 2.59* 2.89* 3.03*   HEMOGLOBIN 7.8* 8.7* 9.3*   HEMATOCRIT 24.1* 27.6* 28.8*   PLATELETCT 118* 113* 141*   PROTHROMBTM 42.7* 39.3* 38.2*   INR 4.44* 4.00* 3.85*     Recent Labs     11/13/24  0345 11/14/24  0415 11/15/24  0420   WBC 22.8* 15.7* 16.8*   NEUTSPOLYS 91.40* 92.10* 95.70*   LYMPHOCYTES 3.00* 2.50* 0.90*   MONOCYTES 3.60 4.30 3.40   EOSINOPHILS 0.80 0.10 0.00   BASOPHILS 0.10 0.10 0.00   ASTSGOT 107* 75* 87*   ALTSGPT 30 26 36   ALKPHOSPHAT 112* 134* 150*   TBILIRUBIN 17.4* 16.1* 15.7*       Meds:   midodrine  10 mg      lactulose  30 mL      dexmedetomidine (Precedex) infusion  0.1-1.5 mcg/kg/hr (Ideal) Stopped (11/15/24 0622)    Respiratory Therapy Consult        MD Alert...Adult ICU Electrolyte Replacement per Pharmacy        lidocaine  2 mL      Pharmacy  1 Each      HYDROmorphone  0.5 mg      Or    HYDROmorphone  1 mg      NORepinephrine  0-1 mcg/kg/min (Ideal) Stopped (11/13/24 1431)    hydrocortisone sodium succinate PF  50 mg      FLUoxetine  10 mg      riFAXIMin  550 mg      acetaminophen  650 mg      ondansetron  4 mg      promethazine  12.5-25 mg      linezolid  600 mg      [Held by provider] MBX (diphenhydrAMINE-lidocaine-Maalox)  5 mL      pantoprazole  40 mg      piperacillin-tazobactam  4.5 g 4.5 g (11/15/24 0513)    ondansetron  4 mg      promethazine  12.5-25 mg      prochlorperazine  5-10 mg      dextrose bolus  25 g      lidocaine  1 Patch          Imaging:  DX-CHEST-PORTABLE (1 VIEW)   Final Result         1. No significant interval change.      DX-CHEST-PORTABLE (1 VIEW)   Final Result         1.  Pulmonary edema and/or infiltrates, similar to prior study.   2.  Layering right pleural  effusion, stable since prior      DX-ABDOMEN FOR TUBE PLACEMENT   Preliminary Result         1. Nasogastric tube terminating with tip overlying the gastric body.      2. Nonspecific bowel gas pattern.      3. Pulmonary edema and/or infiltrates.         INTERPRETING LOCATION: [address], [place], [address]      DX-CHEST-LIMITED (1 VIEW)   Preliminary Result      Pulmonary edema and/or infiltrates, overall stable since prior study.         INTERPRETING LOCATION: [address], [place], [address]      DX-CHEST-PORTABLE (1 VIEW)   Preliminary Result      Extensive pulmonary edema and/or infiltrates, increased since prior study.         INTERPRETING LOCATION: [address], [place], [address]      DX-CHEST-PORTABLE (1 VIEW)   Final Result         1.  Pulmonary edema and/or infiltrates, similar to prior study.   2.  Layering right pleural effusion, decreased since prior      DX-CHEST-PORTABLE (1 VIEW)   Final Result         1.  Pulmonary edema and/or infiltrates, similar to prior study.   2.  Layering right pleural effusion, decreased since prior      EC-ECHOCARDIOGRAM LTD W/O CONT   Final Result      DX-CHEST-PORTABLE (1 VIEW)   Final Result         1.  New significant right pleural effusion identified.      2.  Significant increase in diffuse pulmonary opacifications could be due to pulmonary edema or pneumonia.      CT-HEAD W/O   Final Result   Impression:      1. No acute process in the brain evident.      2. The skull base sinuses are clear.                  US-RENAL   Final Result      1.  No hydronephrosis.   2.  Ascites.      DX-CHEST-PORTABLE (1 VIEW)   Final Result      1.  No acute cardiac or pulmonary abnormalities are identified.      2.  Slight left basilar atelectasis.      [id number] VIEW   Final Result      1.  Several markedly dilated loops of small bowel in the mid abdomen suspicious for gastroenteritis or inflammatory change. Recommend follow-up abdominal series to rule out evolving small bowel obstruction.       EC-ECHOCARDIOGRAM COMPLETE W/O CONT   Final Result      CT-ABDOMEN-PELVIS W/O   Final Result      1.  Dilated fluid-filled small bowel and colonic loops favoring ileus.   2.  No evidence of bowel perforation.   3.  Hepatic cirrhosis with portal hypertension and moderate to large volume ascites.   4.  Nonocclusive superior mesenteric vein thrombosis.   5.  Cholelithiasis.   6.  Bibasilar atelectasis with minimal bilateral pleural fluid.   7.  Subtle findings concerning for pulmonary emboli.      These findings were electronically conveyed to JAMES WILLIAM on 11/3/2024 11:50 AM.         US-ABDOMEN COMPLETE SURVEY   Final Result      1.  Cirrhosis with stigmata of portal hypertension including splenomegaly, recanalized umbilical vein and ascites.   2.  Gallbladder sludge and stones. Gallbladder wall thickening is nonspecific and could be related to liver disease but correlate clinically for evidence of acute cholecystitis. No biliary dilatation.         DX-CHEST-PORTABLE (1 VIEW)   Final Result      1.  Hazy left lower lobe opacity could represent atelectasis or pneumonitis.   2.  There is linear scarring or atelectasis in the right lung base.          ASSESSMENT and PLAN:    * Sepsis (HCC)- (present on admission)  Assessment & Plan  This is Sepsis Present on admission  SIRS criteria identified on my evaluation include: Tachycardia, with heart rate greater than 90 BPM and Tachypnea, with respirations greater than 20 per minute  Clinical indicators of end organ dysfunction include Lactic Acid greater than 2 and Toxic Metabolic Encephalopathy  Source is PNA  Sepsis protocol initiated  Crystalloid Fluid Administration: Resuscitation volume of 20 cc/kg ordered. Reason that resuscitation volume of less than 30ml/kg was ordered concern for fluid overload  IV antibiotics as appropriate for source of sepsis  Reassessment: I have reassessed the patient's hemodynamic status     Zosyn completed  Follow  "cultures  Trend lactic acid     ARDS (adult respiratory distress syndrome) (HCC)  Assessment & Plan  Optimized ventilation startegies to achieving a PBW TV of <6ml/kg, plt < 30, delta p <14   Will allow for permissive hypercapnea unless contraindicated to a ph 7.15-7.2  Goal sat > 88%  Peep optimization for open lung model and recruitment and consider early APRV  A conservative fluid strategy will be employed FACT trial   Early paralysis will be considered if p/f<150 and continued for 48 hrs  Consider prone position p/f <150  Steroid treatment p/f <200 if not contraindicated (methylprednisone 1mg/kg w/n 72hrs x 28 days or 3 days post extubation) check CRP     Workup: bronchoscopy w/ BAL, culture, ct chest and abdomen, immunogenic, DAH, echo w/ bubble study, COPD/asthma, aspiration, transfusion     Will calculate Mendoza Score and early referral for ECMO     Phenotype consideration:   Type 1 \"direct\"/pna/aspiration/asymmetric ARDS Rx: lower peep, recruitment manuevers ok/proning, fluids not as restrictive mortality 20%   Type 2: \"indirect\" sepsis, transfusion etc Rx: higher peep, fluid restriction, statin, avoid recruitment mortality 50%  20% w/ mixed phenotype (ie lobar pna direct causing contralateral indirect injury)     Patient lung injury is due to aspiration pneumonia  Respiratory PCR negative, viral swab negative  Completed steroid course   With her advance liver disease this is potentially fatal event recommend a palliative approach        Acute respiratory failure with hypoxia (HCC)  Assessment & Plan  Intubated date: 11/13 due to ongoing aspiration and aspiration pneumonitis causing ARDS  Goal saturation > 88%  Monitor ventilator waveforms and titrate flow/peep and volumes according.   Lung protective ventilation strategy w/ A-F bundle  CXR: monitor lung volumes and tube/line placement  VAP bundle prevention, oral care, post pyloric feeding  Head of bed > 30 degree  GI prophylaxis  Daily awakening and SBT " trials unless contraindicated  Monitor for liberation  Respiratory treatments: prn        Systolic anterior movement of mitral valve  Assessment & Plan  Based upon ECHO 11/10/24   Very high risk for obstructive shock with hypovolemia and tachycardia   Maintain euvolemia  Afterload vasopressor if needed      Pneumonia  Assessment & Plan  Transferred to the ICU 11/10/24 for elevated LA, worsening CXR  Went from room air to needing max HFNC in < 12 with bilateral alveolar infiltrates consistent with aspiration event.   SpO2 > 90%  Finish course of Zosyn x5 days  MRSA nare negative stopped Linezolid with sudden drop of platelets  Follow cultures, respiratory PCR  Aspiration precaution and speech eval  Naso/oral bleeding leading to event? INR 9 at time        SBP (spontaneous bacterial peritonitis) ([facility])  Assessment & Plan  Salmonella sp with associated bacteremia based upon cultures from 10/31  Ciprofloxacin lifelong ppx start 11/15     Elevated lactic acid level  Assessment & Plan  Multifactorial: cirrhosis, sepsis, malnutrition  Thiamine  Resuscitation with albumin  Empiric antibiotics  Trend  No shock so likely Type B lactate     Elevated INR  Assessment & Plan  See coagulopathy plan     Superior mesenteric vein thrombosis ([facility])  Assessment & Plan  Non-occlusive on CT 11/3   Anticoagulation is strictly contraindicated     Acute encephalopathy  Assessment & Plan  Aspiration precautions  Continue rifaximin and lactulose goal stool 1-1.5L  Limit sedative with advance liver disease and poor clearance of sedative to not precipitate HE     Severe protein-calorie malnutrition ([place]: less than 60% of standard weight) ([facility])  Assessment & Plan  Thiamine replacement  Optimize nutrition as able     Advance care planning- (present on admission)  Assessment & Plan  She and family reverted to full code status during this hospitalization     Now intubated 11/13 with ARDS will need to further discuss goals of care, family meeting  agreed to change DNR/I-OK     Hypoglycemia- (present on admission)  Assessment & Plan  Due to poor glycogen store and advance liver disease  Serial monitor   Enteral nutrition     Hepatic encephalopathy (HCC)- (present on admission)  Assessment & Plan  Lactulose  Rifaximin   Optimize acid/base and electrolytes  Maintain euvolemia     Coagulopathy (HCC)- (present on admission)  Assessment & Plan  Secondary to liver failure  Last dose of Eliquis 11/9  Follow Teg w/ platelet mapping, INR and cryo  Monitor for source of spontaneous hemorrhage  Correct and monitor calcium  Prognosis poor  Improved 11/12 s/p 3 FFP and cyro     Anemia- (present on admission)  Assessment & Plan  Acute on chronic goal hg >7  Monitor for active hemorrhage and monitor and serial correct coagulopathy  Conservative transfusion protocol        Acute renal failure (ARF) (HCC)- (present on admission)  Assessment & Plan  Resolved 11/12 but continue to closely monitor  Avoid nephrotoxins  Midodrine for map > 70     Alcoholic cirrhosis of liver with ascites (HCC)- (present on admission)  Assessment & Plan  Patient with alcoholic cirrhosis sober since June 2024, now with multiple organ failure trigger by SBP.   Avoid hepatoxins, serial monitor liver and synthetic function   Monitor for hepatic encephalopathy and serial monitor ammonia level  Check: hepatitis panel, tylenol level, liver ultrasound and rule out vascular etiology  S/p course of steroids finished last month  Non-occlusive SMA thrombosis  I have reached out to [facility] and [facility] pending transfer. Was accepted to [facility] pending bed availabilty.   She has a 1st outpatient hepatology eval at [facility] in January but not established.   Daily meld labs, prognosis looks grim with multiple organ failure.         UTI (urinary tract infection)- (present on admission)  Assessment & Plan  With enterococcus faecium in urine follow up final sensitivities start linezolid 11/13     Essential  hypertension- (present on admission)  Assessment & Plan  Reintroduce oral antihypertensives when appropriate        DISPO: ICU    CODE STATUS: DNR/I-OK    Quality Measures:  Feeding: Tube feeds  Analgesia: None  Sedation: None  Thromboprophylaxis: Contraindicated  Head of bed: >30 degrees  Ulcer prophylaxis: PPI  Glycemic control: Hypoglycemia protocol  Bowel care: bowel regimen  Indwelling lines: ET tube, Herrera, 2x PIV  Deescalation of antibiotics: Linezolid d3/3 Zosyn d5/5. Cipro for SBP ppx

## 2024-11-15 NOTE — CARE PLAN
Problem: Ventilation  Goal: Ability to achieve and maintain unassisted ventilation or tolerate decreased levels of ventilator support  Description: Target End Date:  4 days     Document on Vent flowsheet    1.  Support and monitor invasive and noninvasive mechanical ventilation  2.  Monitor ventilator weaning response  3.  Perform ventilator associated pneumonia prevention interventions  4.  Manage ventilation therapy by monitoring diagnostic test results  11/15/2024 0357 by Xiao Garcia RRT  Outcome: Not Met  11/15/2024 0357 by Xiao Garcia, RRT  Outcome: Not Met     Ventilator Daily Summary    Vent Day # 3  Airway: 7.5@22  Ventilator settings: APVcmv/22/430/+8/50%  Weaning trials: none  Respiratory Procedures: none    Plan: Continue current ventilator settings and wean mechanical ventilation as tolerated per physician orders.

## 2024-11-15 NOTE — CONSULTS
Referral # [id number]    Please call [phone] to contact the on-call coordinator with any updates including significant changes in neuro status, patient condition, plans for brain death declarations, or plans for end of life discussions.     Date: 11/15/24    Thank you for the referral of this patient. A chart review has been completed to determine suitability for organ and tissue donation.    *Donation is an option:  -Upon meeting the criteria for brain death this patient will be a potential candidate for organ donation, upon further evaluation.  -This patient may meet the criteria for DCD (donation after circulatory death). Further evaluation will be needed should the family decide to transition to comfort care.  -This patient has been located on an organ donor registry and is a first person authorized organ donor, in the event of their death.    *[facility] will continue to follow this case.  -Please contact the clinical coordinator with any changes in the patient's neurological or hemodynamic status, family questions/concerns/decisions, or changes in plan of care.    -Organ donation should not be mentioned to the family. Discussion of organ donation should be a planned, one-time coordinated event in collaboration with [facility].     Thank you for your continued support of organ and tissue donation.

## 2024-11-15 NOTE — CARE PLAN
The patient is Watcher - Medium risk of patient condition declining or worsening    Shift Goals  Clinical Goals: Hemodynamics  Patient Goals: JESUSITA  Family Goals: JESUSITA    Progress made toward(s) clinical / shift goals:    Problem: Hemodynamics  Goal: Patient's hemodynamics, fluid balance and neurologic status will be stable or improve  Description: Target End Date:  Prior to discharge or change in level of care    Document on Assessment and I/O flowsheet templates    1.  Monitor vital signs, pulse oximetry and cardiac monitor per provider order and/or policy  2.  Maintain blood pressure per provider order  3.  Hemodynamic monitoring per provider order  4.  Manage IV fluids and IV infusions  5.  Monitor intake and output  6.  Daily weights per unit policy or provider order  7.  Assess peripheral pulses and capillary refill  8.  Assess color and body temperature  9.  Position patient for maximum circulation/cardiac output  10. Monitor for signs/symptoms of excessive bleeding  11. Assess mental status, restlessness and changes in level of consciousness  12. Monitor temperature and report fever or hypothermia to provider immediately. Consideration of targeted temperature management.  Outcome: Progressing  Note: SR, MAP > 65, adequate UOP     Problem: Pain - Standard  Goal: Alleviation of pain or a reduction in pain to the patient’s comfort goal  Description: Target End Date:  Prior to discharge or change in level of care    Document on Vitals flowsheet    1.  Document pain using the appropriate pain scale per order or unit policy  2.  Educate and implement non-pharmacologic comfort measures (i.e. relaxation, distraction, massage, cold/heat therapy, etc.)  3.  Pain management medications as ordered  4.  Reassess pain after pain med administration per policy  5.  If opiods administered assess patient's response to pain medication is appropriate per POSS sedation scale  6.  Follow pain management plan developed in  collaboration with patient and interdisciplinary team (including palliative care or pain specialists if applicable)  Outcome: Progressing  Note: Pain frequently assessed, gtts titrated and medicated per MAR to achieve RASS/CPOT goals     Problem: Safety - Medical Restraint  Goal: Remains free of injury from restraints (Restraint for Interference with Medical Device)  Description: INTERVENTIONS:  1. Determine that other, less restrictive measures have been tried or would not be effective before applying the restraint  2. Evaluate the patient's condition at the time of restraint application  3. Educate patient/family regarding the reason for restraint  4. Q2H: Monitor safety, psychosocial status, comfort, circulation, respiratory status, LOC, nutrition and hydration  Outcome: Progressing  Flowsheets (Taken 11/15/2024 0010)  Addressed this shift: Remains free of injury from restraints (restraint for interference with medical device):   Evaluate the patient's condition at the time of restraint application   Determine that other, less restrictive measures have been tried or would not be effective before applying the restraint   Inform patient/family regarding the reason for restraint   Every 2 hours: Monitor safety, psychosocial status, comfort, nutrition and hydration  Goal: Free from restraint(s) (Restraint for Interference with Medical Device)  Description: INTERVENTIONS:  1.  ONCE/SHIFT or MINIMUM Q12H: Assess and document the continuing need for restraints  2.  Q24H: Continued use of restraint requires LIP to perform face to face examination and written order  3.  Identify and implement measures to help patient regain control  4.  Educate patient/family on discontinuation criteria   5.  Assess patient's understanding and retention of education provided  6.  Assess readiness for release & initiate progressive release per protocol  7.  Identify and document criteria for restraints  Outcome: Progressing  Flowsheets  (Taken 11/15/2024 0010)  Addressed this shift: Free from restraint(s) (restraint for interference with medical device):   ONCE/SHIFT or MINIMUM Every 12 hours: Assess and document the continuing need for restraints   Every 24 hours: Continued use of restraint requires Licensed Independent Practitioner to perform face to face examination and written order   Identify and implement measures to help patient regain control

## 2024-11-15 NOTE — PROGRESS NOTES
Critical Care Progress Note    Date of admission  10/31/2024    Chief Complaint  59 y.o. female admitted 10/31/2024 with EtOH Cirrhosis, sober 4 months, admitted with fever chills and abdominal pain, found to have SBP with salmonella in culture as well as bacteremia. She has been followed in IMCU with lots of goals of care changes back and forth with advanced liver failure and multiorgan dyfunction. Transferred to ICU 11/10 for increase lactate, worsening encephalopathy and respiratory failure.     Hospital Course  11/10 transferred to ICU.   11/11 HFNC/Oxy RTOC and establish transfer for liver transplant eval. Coagulopathy s/p transfusion. Family conference of goals of care.   11/12 Worsening respiratory status on max HFNC and tachypnea, renal function improved, INR improved, family updated, hypoglycemia.   11/13 intubated with ARDS, recurrent hypoglycemia, MELD Na 37, bolus fluids pressors, family meeting DNR I okay, [place] transfer held due to ARDS.   11/14 Mild improving ARDS p/f ratio, more encephalopathic, renal function and INR and glucose stabilized.     Interval Problem Update  Reviewed last 24 hour events:  Neuro: dex 0.9 off since 6am rass -2/-3 one dilaudid with bath, moves all  HR: 70's  SBP: 130-150's off levo 11/13  Tmax: afebrile  GI: TF at goal, BMS with low output 850 ml overnight  UOP: 1.1L last 24hrs  Lines: peripheral IV, pagan, BMS  Resp: VD 3 22 430 8 50% no secretions  Vte: contra  PPI/H2:PPI  Antibx: Linezolid 3/3 Zosyn 5/5, BAL negative, Enterococcus urine, viral panel and extended respiratory panel negative, cipro for prophylaxis  +892ml Net -7.8L  Advance ET 2 cm  Increase lactulose  Wean steroids      Review of Systems  Review of Systems   Unable to perform ROS: Intubated        Vital Signs for last 24 hours   Pulse:  [68-81] 76  Resp:  [19-38] 26  BP: ()/(48-78) 130/66  SpO2:  [95 %-99 %] 98 %    Hemodynamic parameters for last 24 hours       Respiratory Information for the last  24 hours  Vent Mode: APVCMV  Rate (breaths/min): 22  Vt Target (mL): 430  PEEP/CPAP: 8  P Support: 5  MAP: 10  Length of Weaning Trial (Hours): 1 hour  Control VTE (exp VT): 513    Physical Exam   Physical Exam  Vitals and nursing note reviewed.   Constitutional:       General: She is in acute distress.      Appearance: She is ill-appearing.      Comments: Ill appearing tachypnea on ventilator   HENT:      Head: Normocephalic.      Mouth/Throat:      Mouth: Mucous membranes are dry.      Comments: Dried blood from nose and mouth, ET in place  Eyes:      Pupils: Pupils are equal, round, and reactive to light.      Comments: jaundice   Cardiovascular:      Rate and Rhythm: Tachycardia present.      Heart sounds: Murmur heard.   Pulmonary:      Effort: Respiratory distress present.      Breath sounds: No stridor. Rhonchi present. No wheezing or rales.      Comments: Course crackles and tachypnea while on ventilator  Chest:      Chest wall: No tenderness.   Abdominal:      General: There is no distension.      Palpations: There is no mass.      Tenderness: There is abdominal tenderness. There is no guarding or rebound.      Hernia: No hernia is present.   Musculoskeletal:         General: No swelling or tenderness.      Cervical back: No rigidity or tenderness.   Skin:     Coloration: Skin is jaundiced. Skin is not pale.      Findings: Bruising present.      Comments: Spider angiomata and jaundiced with bruising, warm ext, no mottling or pallor.    Neurological:      Comments: Worsening encephalopathy with limited sedation         Medications  Current Facility-Administered Medications   Medication Dose Route Frequency Provider Last Rate Last Admin    hydrocortisone sodium succinate PF (Solu-CORTEF) 100 MG injection 50 mg  50 mg Intravenous Q12HRS Olvin Hanson M.D.        ciprofloxacin (Cipro) tablet 500 mg  500 mg Enteral Tube Q24HRS Olvin Hanson M.D.        lactulose 20 GM/30ML solution 45 mL  45 mL Enteral  Tube 4X/DAY Olvin Hanson M.D.        midodrine (Proamatine) tablet 10 mg  10 mg Enteral Tube Q8HRS Olvin Hanson M.D.   10 mg at 11/14/24 2104    dexmedetomidine (Precedex) 400 mcg/100mL infusion  0.1-1.5 mcg/kg/hr (Ideal) Intravenous Continuous Dawit Bell M.D.   Stopped at 11/15/24 0622    Respiratory Therapy Consult   Nebulization Continuous RT Olvin Hanson M.D. MD Alert...ICU Electrolyte Replacement per Pharmacy   Other PHARMACY TO DOSE Olvin Hanson M.D.        lidocaine (Xylocaine) 1 % injection 2 mL  2 mL Tracheal Tube Q30 MIN PRN Olvin Hanson M.D.        Pharmacy Consult: Enteral tube insertion - review meds/change route/product selection  1 Each Other PHARMACY TO DOSE Olvin Hanson M.D.        HYDROmorphone (Dilaudid) injection 0.5 mg  0.5 mg Intravenous Q HOUR PRN Olvin Hanson M.D.   0.5 mg at 11/13/24 2348    Or    HYDROmorphone (Dilaudid) injection 1 mg  1 mg Intravenous Q HOUR PRN Olvin Hanson M.D.   1 mg at 11/14/24 2312    FLUoxetine (PROzac) capsule 10 mg  10 mg Enteral Tube QAM Olvin Hanson M.D.   10 mg at 11/15/24 0514    riFAXIMin (Xifaxan) tablet 550 mg  550 mg Enteral Tube BID Olvin Hanson M.D.   550 mg at 11/15/24 0514    acetaminophen (Tylenol) tablet 650 mg  650 mg Enteral Tube Q6HRS PRN Olvin Hanson M.D.        ondansetron (Zofran ODT) dispertab 4 mg  4 mg Enteral Tube Q4HRS PRN Olvin Hanson M.D.        promethazine (Phenergan) tablet 12.5-25 mg  12.5-25 mg Enteral Tube Q4HRS PRN Olvin Hanson M.D.        linezolid (Zyvox) tablet 600 mg  600 mg Enteral Tube Q12HRS Olvin Hanson M.D.   600 mg at 11/15/24 0514    [Held by provider] MBX (diphenhydrAMINE-lidocaine-Maalox) oral susp Cup 5 mL  5 mL Swish & Swallow Q6HRS PRN Olvin Hanson M.D.   5 mL at 11/12/24 1727    pantoprazole (Protonix) injection 40 mg  40 mg Intravenous BID Olvin Hanson M.D.   40 mg at 11/15/24 0514    piperacillin-tazobactam (Zosyn) 4.5 g  in  mL IVPB  4.5 g Intravenous Q8HRS Shahzad Jenkins M.D.   Stopped at 11/15/24 0913    ondansetron (Zofran) syringe/vial injection 4 mg  4 mg Intravenous Q4HRS PRN ASTRID Raza.O.   4 mg at 11/12/24 2158    promethazine (Phenergan) suppository 12.5-25 mg  12.5-25 mg Rectal Q4HRS PRN ASTRID Raza.O.        prochlorperazine (Compazine) injection 5-10 mg  5-10 mg Intravenous Q4HRS PRN Adilia Gonzales D.O.   10 mg at 11/12/24 2211    dextrose 50% (D50W) injection 25 g  25 g Intravenous Q15 MIN PRN Adilia Gonzales D.O.   25 g at 11/13/24 0638    lidocaine (Asperflex) 4 % patch 1 Patch  1 Patch Transdermal Q24HRS ASTRID Raza.O.   1 Patch at 11/12/24 0512       Fluids    Intake/Output Summary (Last 24 hours) at 11/15/2024 1001  Last data filed at 11/15/2024 0600  Gross per 24 hour   Intake 1465.27 ml   Output 1880 ml   Net -414.73 ml       Laboratory  Recent Labs     11/13/24  1007 11/14/24  0422 11/15/24  0336   ISTATAPH 7.331* 7.355* 7.341*   ISTATAPCO2 36.8 37.7* 35.9   ISTATAPO2 55* 78 102*   ISTATATCO2 21 22 20   HTSFQLT2NGS 86* 95 98   ISTATARTHCO3 19.4 21.0 19.4   ISTATARTBE -6* -4 -6*   ISTATTEMP 35.9 C 37.1 C 37.2 C   ISTATFIO2 50 60 50   ISTATSPEC Arterial Arterial Arterial   ISTATAPHTC 7.347* 7.353* 7.338*   DLGMTVVC1PF 51* 79 103*         Recent Labs     11/13/24  0345 11/14/24  0415 11/15/24  0420   SODIUM 134* 137 141   POTASSIUM 3.6 4.0 4.0   CHLORIDE 101 106 108   CO2 20 18* 19*   BUN 37* 42* 52*   CREATININE 1.27 0.41* 0.50   MAGNESIUM 2.4 2.3 2.4   PHOSPHORUS 3.9 4.1 4.2   CALCIUM 8.8 8.6 8.8     Recent Labs     11/13/24  0345 11/14/24  0415 11/15/24  0420   ALTSGPT 30 26 36   ASTSGOT 107* 75* 87*   ALKPHOSPHAT 112* 134* 150*   TBILIRUBIN 17.4* 16.1* 15.7*   PREALBUMIN  --  3.2*  --    GLUCOSE 49* 132* 160*     Recent Labs     11/13/24 0345 11/14/24 0415 11/15/24  0420   WBC 22.8* 15.7* 16.8*   NEUTSPOLYS 91.40* 92.10* 95.70*   LYMPHOCYTES 3.00* 2.50* 0.90*   MONOCYTES 3.60  4.30 3.40   EOSINOPHILS 0.80 0.10 0.00   BASOPHILS 0.10 0.10 0.00   ASTSGOT 107* 75* 87*   ALTSGPT 30 26 36   ALKPHOSPHAT 112* 134* 150*   TBILIRUBIN 17.4* 16.1* 15.7*     Recent Labs     11/13/24  0345 11/14/24  0415 11/15/24  0420   RBC 2.59* 2.89* 3.03*   HEMOGLOBIN 7.8* 8.7* 9.3*   HEMATOCRIT 24.1* 27.6* 28.8*   PLATELETCT 118* 113* 141*   PROTHROMBTM 42.7* 39.3* 38.2*   INR 4.44* 4.00* 3.85*       Imaging  X-Ray:  I have personally reviewed the images and compared with prior images.  CT:    Reviewed  Echo:   Reviewed  Ultrasound:  Reviewed    Assessment/Plan  * Sepsis (HCC)- (present on admission)  Assessment & Plan  This is Sepsis Present on admission  SIRS criteria identified on my evaluation include: Tachycardia, with heart rate greater than 90 BPM and Tachypnea, with respirations greater than 20 per minute  Clinical indicators of end organ dysfunction include Lactic Acid greater than 2 and Toxic Metabolic Encephalopathy  Source is PNA  Sepsis protocol initiated  Crystalloid Fluid Administration: Resuscitation volume of 20 cc/kg ordered. Reason that resuscitation volume of less than 30ml/kg was ordered concern for fluid overload  IV antibiotics as appropriate for source of sepsis  Reassessment: I have reassessed the patient's hemodynamic status    Zosyn finished 5/5 days linezolid 3/3      ARDS (adult respiratory distress syndrome) (HCC)  Assessment & Plan  Optimized ventilation startegies to achieving a PBW TV of <6ml/kg, plt < 30, delta p <14   Will allow for permissive hypercapnea unless contraindicated to a ph 7.15-7.2  Goal sat > 88%  Peep optimization for open lung model and recruitment and consider early APRV  A conservative fluid strategy will be employed FACT trial   Early paralysis will be considered if p/f<150 and continued for 48 hrs  Consider prone position p/f <150  Steroid treatment p/f <200 if not contraindicated (methylprednisone 1mg/kg w/n 72hrs x 28 days or 3 days post extubation) check  "CRP    Workup: bronchoscopy w/ BAL, culture, ct chest and abdomen, immunogenic, DAH, echo w/ bubble study, COPD/asthma, aspiration, transfusion    Will calculate Mendoza Score and early referral for ECMO    Phenotype consideration:   Type 1 \"direct\"/pna/aspiration/asymmetric ARDS Rx: lower peep, recruitment manuevers ok/proning, fluids not as restrictive mortality 20%   Type 2: \"indirect\" sepsis, transfusion etc Rx: higher peep, fluid restriction, statin, avoid recruitment mortality 50%  20% w/ mixed phenotype (ie lobar pna direct causing contralateral indirect injury)    Patient lung injury is due to aspiration pneumonia  Respiratory PCR negative, viral swab negative  With her advance liver disease this is fatal event recommend a palliative approach      Acute respiratory failure with hypoxia (HCC)  Assessment & Plan  Intubated date: 11/13 due to ongoing aspiration and aspiration pneumonitis causing ARDS  Goal saturation > 88%  Monitor ventilator waveforms and titrate flow/peep and volumes according.   Lung protective ventilation strategy w/ A-F bundle  CXR: monitor lung volumes and tube/line placement  VAP bundle prevention, oral care, post pyloric feeding  Head of bed > 30 degree  GI prophylaxis  Daily awakening and SBT trials unless contraindicated  Monitor for liberation  Respiratory treatments: prn      Systolic anterior movement of mitral valve  Assessment & Plan  Based upon ECHO 11/10/24   Very high risk for obstructive shock with hypovolemia and tachycardia   Maintain euvolemia  Afterload vasopressor if needed     Pneumonia  Assessment & Plan  Transferred to the ICU 11/10/24 for elevated LA, worsening CXR  Went from room air to needing max HFNC in < 12 with bilateral alveolar infiltrates consistent with aspiration event.   SpO2 > 90%  Finish course of Zosyn x5 days  MRSA nare negative stopped Linezolid with sudden drop of platelets  Follow cultures, respiratory PCR  Aspiration precaution and speech " eval  Naso/oral bleeding leading to event? INR 9 at time      SBP (spontaneous bacterial peritonitis) (HCC)  Assessment & Plan  Salmonella sp with associated bacteremia based upon cultures from 10/31  Start SBP prophylaxis 11/16 w/ Cipro    Elevated lactic acid level  Assessment & Plan  Multifactorial: cirrhosis, sepsis, malnutrition  Thiamine  Resuscitation with albumin  Empiric antibiotics  Trend  No shock so likely Type B lactate    Elevated INR  Assessment & Plan  See coagulopathy plan    Superior mesenteric vein thrombosis (HCC)  Assessment & Plan  Non-occlusive on CT 11/3   Anticoagulation is strictly contraindicated    Acute encephalopathy  Assessment & Plan  Aspiration precautions  Continue rifaximin and lactulose goal stool 1-1.5L  Limit sedative with advance liver disease and poor clearance of sedative/narcotic and benzo's to not precipitate/worsen HE  Increase lactulose    Severe protein-calorie malnutrition ([place]: less than 60% of standard weight) (HCC)  Assessment & Plan  Thiamine replacement  Optimize nutrition as able    Advance care planning- (present on admission)  Assessment & Plan  She and family reverted to full code status during this hospitalization    Now intubated 11/13 with ARDS will need to further discuss goals of care    Hypoglycemia- (present on admission)  Assessment & Plan  Due to poor glycogen store and advance liver disease  Serial monitor   Enteral nutrition    Hepatic encephalopathy (HCC)- (present on admission)  Assessment & Plan  Lactulose  Rifaximin   Optimize acid/base and electrolytes  Maintain euvolemia    Coagulopathy (HCC)- (present on admission)  Assessment & Plan  Secondary to liver failure  Last dose of Eliquis 11/9  Follow Teg w/ platelet mapping, INR and cryo  Monitor for source of spontaneous hemorrhage  Correct and monitor calcium  Prognosis poor  Improved 11/12 s/p 3 FFP and cyro    Anemia- (present on admission)  Assessment & Plan  Acute on chronic goal hg  >7  Monitor for active hemorrhage and monitor and serial correct coagulopathy  Conservative transfusion protocol      Acute renal failure (ARF) (HCC)- (present on admission)  Assessment & Plan  Resolved 11/12 but continue to closely monitor  Avoid nephrotoxins  Midodrine for map > 70    Alcoholic cirrhosis of liver with ascites (HCC)- (present on admission)  Assessment & Plan  Patient with alcoholic cirrhosis sober since June 2024, now with multiple organ failure trigger by SBP.   Avoid hepatoxins, serial monitor liver and synthetic function   Monitor for hepatic encephalopathy and serial monitor ammonia level  Check: hepatitis panel, tylenol level, liver ultrasound and rule out vascular etiology  S/p course of steroids finished last month  Non-occlusive SMA thrombosis  I have reached out to [place] and [facility] pending transfer. Was accepted to [place] pending bed availabilty.   She has a 1st outpatient hepatology eval at [facility] in January but not established.   Daily meld labs, prognosis looks grim with multiple organ failure.       UTI (urinary tract infection)- (present on admission)  Assessment & Plan  With enterococcus faecium in urine follow up final sensitivities start linezolid 11/13    Essential hypertension- (present on admission)  Assessment & Plan  Reintroduce oral antihypertensives when appropriate           VTE:  Contraindicated  Ulcer: PPI  Lines: Herrera Catheter  Ongoing indication addressed    I have performed a physical exam and reviewed and updated ROS and Plan today (11/15/2024). In review of yesterday's note (11/14/2024), there are no changes except as documented above.     Discussed patient condition and risk of morbidity and/or mortality with RN, RT, Pharmacy, Charge nurse / hot rounds, Patient, and GI    The patient remains critically ill on ventilator with active titration.  Critical care time = 51 minutes in directly providing and coordinating critical care and extensive data review.   No time overlap and excludes procedures.

## 2024-11-15 NOTE — CARE PLAN
The patient is Watcher - Medium risk of patient condition declining or worsening    Shift Goals  Clinical Goals: Hemodynamics; Improved Mentation  Patient Goals: JESUSITA  Family Goals: JESUSITA    Progress made toward(s) clinical / shift goals:  RASS maintained within goals for majority of shift. Tolerating Q2 turns. Mobilized to edge of bed. MAP >70 entirety of shift. SAT performed for 5 hours at beginning of shift.    Patient is not progressing towards the following goals: Not following commands for neuro check. Remains encephalopathic despite increase in lactulose administration.       Problem: Pain - Standard  Goal: Alleviation of pain or a reduction in pain to the patient’s comfort goal  Outcome: Progressing     Problem: Hemodynamics  Goal: Patient's hemodynamics, fluid balance and neurologic status will be stable or improve  Outcome: Progressing     Problem: Skin Integrity  Goal: Skin integrity is maintained or improved  Outcome: Progressing     Problem: Safety - Medical Restraint  Goal: Remains free of injury from restraints (Restraint for Interference with Medical Device)  Outcome: Progressing

## 2024-11-15 NOTE — PROGRESS NOTES
MONITOR SUMMARY  Rate: 74-96  Rhythm: Sinus Rhythm  Ectopy: N/A  Measurements: 0.127/0.101/0.438

## 2024-11-15 NOTE — FLOWSHEET NOTE
11/15/24 0800   Airway ETT 7.5   Placement Date/Time: 11/13/24 0601   Airway Placement: Central  Airway Type: ETT  Style: Cuffed  Airway Size: 7.5  Inserted In: Unit  Inserted by: MD   Secured At  (cm) 24     ETT advanced 2cm per MD Hanson

## 2024-11-16 VITALS
DIASTOLIC BLOOD PRESSURE: 64 MMHG | BODY MASS INDEX: 24.12 KG/M2 | OXYGEN SATURATION: 96 % | TEMPERATURE: 101.3 F | WEIGHT: 159.17 LBS | SYSTOLIC BLOOD PRESSURE: 101 MMHG | HEIGHT: 68 IN

## 2024-11-16 LAB
ALBUMIN SERPL BCP-MCNC: 2.8 G/DL (ref 3.2–4.9)
ALBUMIN/GLOB SERPL: 0.8 G/DL
ALP SERPL-CCNC: 180 U/L (ref 30–99)
ALT SERPL-CCNC: 68 U/L (ref 2–50)
ANION GAP SERPL CALC-SCNC: 20 MMOL/L (ref 7–16)
ANION GAP SERPL CALC-SCNC: 24 MMOL/L (ref 7–16)
ARTERIAL PATENCY WRIST A: ABNORMAL
AST SERPL-CCNC: 135 U/L (ref 12–45)
BASE EXCESS BLDA CALC-SCNC: -26 MMOL/L (ref -4–3)
BASOPHILS # BLD AUTO: 0.1 % (ref 0–1.8)
BASOPHILS # BLD: 0.01 K/UL (ref 0–0.12)
BILIRUB SERPL-MCNC: 16 MG/DL (ref 0.1–1.5)
BODY TEMPERATURE: ABNORMAL DEGREES
BUN SERPL-MCNC: 67 MG/DL (ref 8–22)
BUN SERPL-MCNC: 70 MG/DL (ref 8–22)
CALCIUM ALBUM COR SERPL-MCNC: 10.6 MG/DL (ref 8.5–10.5)
CALCIUM SERPL-MCNC: 9.6 MG/DL (ref 8.5–10.5)
CALCIUM SERPL-MCNC: 9.6 MG/DL (ref 8.5–10.5)
CHLORIDE SERPL-SCNC: 114 MMOL/L (ref 96–112)
CHLORIDE SERPL-SCNC: 114 MMOL/L (ref 96–112)
CO2 BLDA-SCNC: 6 MMOL/L (ref 20–33)
CO2 SERPL-SCNC: 12 MMOL/L (ref 20–33)
CO2 SERPL-SCNC: 15 MMOL/L (ref 20–33)
CREAT SERPL-MCNC: 1.28 MG/DL (ref 0.5–1.4)
CREAT SERPL-MCNC: 1.4 MG/DL (ref 0.5–1.4)
DELSYS IDSYS: ABNORMAL
END TIDAL CARBON DIOXIDE IECO2: 23 MMHG
EOSINOPHIL # BLD AUTO: 0 K/UL (ref 0–0.51)
EOSINOPHIL NFR BLD: 0 % (ref 0–6.9)
ERYTHROCYTE [DISTWIDTH] IN BLOOD BY AUTOMATED COUNT: 83.3 FL (ref 35.9–50)
GFR SERPLBLD CREATININE-BSD FMLA CKD-EPI: 44 ML/MIN/1.73 M 2
GFR SERPLBLD CREATININE-BSD FMLA CKD-EPI: 49 ML/MIN/1.73 M 2
GLOBULIN SER CALC-MCNC: 3.7 G/DL (ref 1.9–3.5)
GLUCOSE BLD STRIP.AUTO-MCNC: 114 MG/DL (ref 65–99)
GLUCOSE SERPL-MCNC: 123 MG/DL (ref 65–99)
GLUCOSE SERPL-MCNC: 131 MG/DL (ref 65–99)
HCO3 BLDA-SCNC: 5.4 MMOL/L (ref 17–25)
HCT VFR BLD AUTO: 27.1 % (ref 37–47)
HGB BLD-MCNC: 8.6 G/DL (ref 12–16)
HOROWITZ INDEX BLDA+IHG-RTO: 154 MM[HG]
IMM GRANULOCYTES # BLD AUTO: 0.12 K/UL (ref 0–0.11)
IMM GRANULOCYTES NFR BLD AUTO: 0.8 % (ref 0–0.9)
INR PPP: 5.44 (ref 0.87–1.13)
LACTATE BLD-SCNC: >20 MMOL/L (ref 0.5–2)
LYMPHOCYTES # BLD AUTO: 0.62 K/UL (ref 1–4.8)
LYMPHOCYTES NFR BLD: 4.3 % (ref 22–41)
MAGNESIUM SERPL-MCNC: 2.5 MG/DL (ref 1.5–2.5)
MCH RBC QN AUTO: 30.4 PG (ref 27–33)
MCHC RBC AUTO-ENTMCNC: 31.7 G/DL (ref 32.2–35.5)
MCV RBC AUTO: 95.8 FL (ref 81.4–97.8)
MODE IMODE: ABNORMAL
MONOCYTES # BLD AUTO: 0.9 K/UL (ref 0–0.85)
MONOCYTES NFR BLD AUTO: 6.2 % (ref 0–13.4)
NEUTROPHILS # BLD AUTO: 12.83 K/UL (ref 1.82–7.42)
NEUTROPHILS NFR BLD: 88.6 % (ref 44–72)
NRBC # BLD AUTO: 0 K/UL
NRBC BLD-RTO: 0 /100 WBC (ref 0–0.2)
O2/TOTAL GAS SETTING VFR VENT: 70 %
PCO2 BLDA: 30.7 MMHG (ref 26–37)
PCO2 TEMP ADJ BLDA: 33.5 MMHG (ref 26–37)
PEEP END EXPIRATORY PRESSURE IPEEP: 8 CMH20
PERCENT MINUTE VOLUME IPMV: 120
PH BLDA: 6.85 [PH] (ref 7.4–7.5)
PH TEMP ADJ BLDA: 6.83 [PH] (ref 7.4–7.5)
PHOSPHATE SERPL-MCNC: 3.9 MG/DL (ref 2.5–4.5)
PLATELET # BLD AUTO: 121 K/UL (ref 164–446)
PMV BLD AUTO: 10.4 FL (ref 9–12.9)
PO2 BLDA: 108 MMHG (ref 64–87)
PO2 TEMP ADJ BLDA: 121 MMHG (ref 64–87)
POTASSIUM SERPL-SCNC: 4 MMOL/L (ref 3.6–5.5)
POTASSIUM SERPL-SCNC: 4.4 MMOL/L (ref 3.6–5.5)
PROT SERPL-MCNC: 6.5 G/DL (ref 6–8.2)
PROTHROMBIN TIME: 50.1 SEC (ref 12–14.6)
RBC # BLD AUTO: 2.83 M/UL (ref 4.2–5.4)
SAO2 % BLDA: 92 % (ref 93–99)
SODIUM SERPL-SCNC: 149 MMOL/L (ref 135–145)
SODIUM SERPL-SCNC: 150 MMOL/L (ref 135–145)
SPECIMEN DRAWN FROM PATIENT: ABNORMAL
WBC # BLD AUTO: 14.5 K/UL (ref 4.8–10.8)

## 2024-11-16 PROCEDURE — 700101 HCHG RX REV CODE 250

## 2024-11-16 PROCEDURE — 82962 GLUCOSE BLOOD TEST: CPT

## 2024-11-16 PROCEDURE — A9270 NON-COVERED ITEM OR SERVICE: HCPCS

## 2024-11-16 PROCEDURE — A9270 NON-COVERED ITEM OR SERVICE: HCPCS | Performed by: INTERNAL MEDICINE

## 2024-11-16 PROCEDURE — 700105 HCHG RX REV CODE 258

## 2024-11-16 PROCEDURE — 700101 HCHG RX REV CODE 250: Performed by: INTERNAL MEDICINE

## 2024-11-16 PROCEDURE — 99291 CRITICAL CARE FIRST HOUR: CPT | Performed by: INTERNAL MEDICINE

## 2024-11-16 PROCEDURE — 80048 BASIC METABOLIC PNL TOTAL CA: CPT

## 2024-11-16 PROCEDURE — 99232 SBSQ HOSP IP/OBS MODERATE 35: CPT | Performed by: NURSE PRACTITIONER

## 2024-11-16 PROCEDURE — 700111 HCHG RX REV CODE 636 W/ 250 OVERRIDE (IP): Mod: JZ | Performed by: INTERNAL MEDICINE

## 2024-11-16 PROCEDURE — 84100 ASSAY OF PHOSPHORUS: CPT

## 2024-11-16 PROCEDURE — 36600 WITHDRAWAL OF ARTERIAL BLOOD: CPT

## 2024-11-16 PROCEDURE — 700105 HCHG RX REV CODE 258: Performed by: INTERNAL MEDICINE

## 2024-11-16 PROCEDURE — 700102 HCHG RX REV CODE 250 W/ 637 OVERRIDE(OP)

## 2024-11-16 PROCEDURE — 82803 BLOOD GASES ANY COMBINATION: CPT

## 2024-11-16 PROCEDURE — 94799 UNLISTED PULMONARY SVC/PX: CPT

## 2024-11-16 PROCEDURE — 83735 ASSAY OF MAGNESIUM: CPT

## 2024-11-16 PROCEDURE — 94003 VENT MGMT INPAT SUBQ DAY: CPT

## 2024-11-16 PROCEDURE — 700102 HCHG RX REV CODE 250 W/ 637 OVERRIDE(OP): Performed by: INTERNAL MEDICINE

## 2024-11-16 PROCEDURE — 99292 CRITICAL CARE ADDL 30 MIN: CPT | Performed by: INTERNAL MEDICINE

## 2024-11-16 PROCEDURE — 83605 ASSAY OF LACTIC ACID: CPT

## 2024-11-16 PROCEDURE — 85025 COMPLETE CBC W/AUTO DIFF WBC: CPT

## 2024-11-16 PROCEDURE — 80053 COMPREHEN METABOLIC PANEL: CPT

## 2024-11-16 PROCEDURE — 85610 PROTHROMBIN TIME: CPT

## 2024-11-16 RX ORDER — LORAZEPAM 2 MG/ML
1 INJECTION INTRAMUSCULAR
Status: DISCONTINUED | OUTPATIENT
Start: 2024-11-16 | End: 2024-11-16 | Stop reason: HOSPADM

## 2024-11-16 RX ORDER — NOREPINEPHRINE BITARTRATE 0.03 MG/ML
INJECTION, SOLUTION INTRAVENOUS
Status: DISCONTINUED
Start: 2024-11-16 | End: 2024-11-16 | Stop reason: HOSPADM

## 2024-11-16 RX ORDER — LORAZEPAM 2 MG/ML
1 CONCENTRATE ORAL
Status: DISCONTINUED | OUTPATIENT
Start: 2024-11-16 | End: 2024-11-16 | Stop reason: HOSPADM

## 2024-11-16 RX ORDER — HYDROMORPHONE HYDROCHLORIDE 1 MG/ML
1 INJECTION, SOLUTION INTRAMUSCULAR; INTRAVENOUS; SUBCUTANEOUS
Status: DISCONTINUED | OUTPATIENT
Start: 2024-11-16 | End: 2024-11-16 | Stop reason: HOSPADM

## 2024-11-16 RX ORDER — NOREPINEPHRINE BITARTRATE 0.03 MG/ML
0-1 INJECTION, SOLUTION INTRAVENOUS CONTINUOUS
Status: DISCONTINUED | OUTPATIENT
Start: 2024-11-16 | End: 2024-11-16

## 2024-11-16 RX ORDER — OXYMETAZOLINE HYDROCHLORIDE 0.05 G/100ML
2 SPRAY NASAL
Status: DISCONTINUED | OUTPATIENT
Start: 2024-11-16 | End: 2024-11-16

## 2024-11-16 RX ADMIN — LACTULOSE 45 ML: 10 SOLUTION ORAL at 14:13

## 2024-11-16 RX ADMIN — ACETAMINOPHEN 650 MG: 325 TABLET ORAL at 08:47

## 2024-11-16 RX ADMIN — HYDROCORTISONE SODIUM SUCCINATE 50 MG: 100 INJECTION, POWDER, FOR SOLUTION INTRAMUSCULAR; INTRAVENOUS at 05:23

## 2024-11-16 RX ADMIN — DEXMEDETOMIDINE HYDROCHLORIDE 1.1 MCG/KG/HR: 4 INJECTION, SOLUTION INTRAVENOUS at 09:20

## 2024-11-16 RX ADMIN — HYDROMORPHONE HYDROCHLORIDE 0.5 MG: 1 INJECTION, SOLUTION INTRAMUSCULAR; INTRAVENOUS; SUBCUTANEOUS at 08:44

## 2024-11-16 RX ADMIN — SODIUM BICARBONATE 100 MEQ: 84 INJECTION INTRAVENOUS at 16:47

## 2024-11-16 RX ADMIN — LINEZOLID 600 MG: 600 TABLET, FILM COATED ORAL at 05:23

## 2024-11-16 RX ADMIN — PANTOPRAZOLE SODIUM 40 MG: 40 INJECTION, POWDER, FOR SOLUTION INTRAVENOUS at 05:23

## 2024-11-16 RX ADMIN — DEXMEDETOMIDINE HYDROCHLORIDE 1.3 MCG/KG/HR: 4 INJECTION, SOLUTION INTRAVENOUS at 14:30

## 2024-11-16 RX ADMIN — HYDROMORPHONE HYDROCHLORIDE 1 MG: 1 INJECTION, SOLUTION INTRAMUSCULAR; INTRAVENOUS; SUBCUTANEOUS at 17:44

## 2024-11-16 RX ADMIN — MIDODRINE HYDROCHLORIDE 10 MG: 5 TABLET ORAL at 14:13

## 2024-11-16 RX ADMIN — LACTULOSE 45 ML: 10 SOLUTION ORAL at 10:24

## 2024-11-16 RX ADMIN — OXYMETAZOLINE HYDROCHLORIDE 2 SPRAY: 0.5 SPRAY NASAL at 06:23

## 2024-11-16 RX ADMIN — SODIUM BICARBONATE 75 MEQ: 84 INJECTION, SOLUTION INTRAVENOUS at 11:36

## 2024-11-16 RX ADMIN — DEXMEDETOMIDINE HYDROCHLORIDE 1.2 MCG/KG/HR: 4 INJECTION, SOLUTION INTRAVENOUS at 03:30

## 2024-11-16 RX ADMIN — ACETAMINOPHEN 650 MG: 325 TABLET ORAL at 14:25

## 2024-11-16 RX ADMIN — RIFAXIMIN 550 MG: 550 TABLET ORAL at 05:23

## 2024-11-16 RX ADMIN — FLUOXETINE HYDROCHLORIDE 10 MG: 10 CAPSULE ORAL at 05:23

## 2024-11-16 RX ADMIN — MIDODRINE HYDROCHLORIDE 10 MG: 5 TABLET ORAL at 05:23

## 2024-11-16 RX ADMIN — HYDROMORPHONE HYDROCHLORIDE 0.5 MG: 1 INJECTION, SOLUTION INTRAMUSCULAR; INTRAVENOUS; SUBCUTANEOUS at 14:13

## 2024-11-16 RX ADMIN — LORAZEPAM 1 MG: 2 INJECTION INTRAMUSCULAR; INTRAVENOUS at 17:47

## 2024-11-16 RX ADMIN — LIDOCAINE 1 PATCH: 4 PATCH TOPICAL at 05:24

## 2024-11-16 RX ADMIN — NOREPINEPHRINE BITARTRATE 0.05 MCG/KG/MIN: 1 INJECTION, SOLUTION, CONCENTRATE INTRAVENOUS at 15:37

## 2024-11-16 ASSESSMENT — PAIN DESCRIPTION - PAIN TYPE
TYPE: ACUTE PAIN
TYPE: ACUTE PAIN
TYPE: ACUTE PAIN;PHANTOM PAIN
TYPE: ACUTE PAIN

## 2024-11-16 NOTE — PROGRESS NOTES
Family all arrived discuss her clinical status and acute change overnight. Everyone agrees will transition to comfort focused treatment. Order placed and will be released once family has had a chance to say good bye.     Olvin Hanson MD  Critical Care Medicine

## 2024-11-16 NOTE — CARE PLAN
Problem: Ventilation  Goal: Ability to achieve and maintain unassisted ventilation or tolerate decreased levels of ventilator support  Description: Target End Date:  4 days     Document on Vent flowsheet    1.  Support and monitor invasive and noninvasive mechanical ventilation  2.  Monitor ventilator weaning response  3.  Perform ventilator associated pneumonia prevention interventions  4.  Manage ventilation therapy by monitoring diagnostic test results  Outcome: Progressing                                        Ventilator Daily Summary    Vent Day #4    Airway: 7.5 ETT at 24    Ventilator settings: %, PEEP: 8 FiO2: 30%    Weaning trials: none    Respiratory Procedures: none    Plan: Continue current ventilator settings and wean mechanical ventilation as tolerated per physician orders.

## 2024-11-16 NOTE — PROGRESS NOTES
[facility] ICU Progress Note      Admit Date: 10/31/2024    Resident(s): Roberto Jean Baptiste M.D.   Attending:  Dr. Olvin Hanson    Patient ID:    Name:  Kavita Freeman   YOB: 1965  Age:  59 y.o.  female   MRN:  [id number]    Hospital Course (carried forward and updated):  59 y.o. female admitted 10/31/2024 with EtOH Cirrhosis, sober 4 months, admitted with fever chills and abdominal pain, found to have SBP with salmonella in culture as well as bacteremia. She has been followed in IMCU with lots of goals of care changes back and forth with advanced liver failure and multiorgan dyfunction. Transferred to ICU 11/10 for increase lactate, worsening encephalopathy and respiratory failure.     Interval Events:  11/10 transferred to ICU.   11/11: Zyvox and zosyn for aspiration pneumonitis. CXR improved without effusion. Reversed coagulopathy INR of 9 with 2FFP 1 Cryoprecipitate  11/12: Remains encephalopathic. 1.6L stool out through BMS.    11/13: Intubated in the early AM for increasing hypoxia and respiratory distress, worsened CXR with bilateral opacities/effusions. Post-intubation bronch suggestive of aspirated gastric/oral contents. Progressively hypotensive started on levophed. Start linezolid for Enterococcus faecium in urine   11/14: Family meeting yesterday DNR/I-OK, [place] transfer on hold due to ARDS   11/15: Remains highly encephalopathic with SAT , p/f ratio somewhat improved   11/16: Respiratory parameters improved however now spiking fever, tachycardia, lactic acidosis and increasing anion gap. Hypernatremia as well. Will revisit family discussion when son arrives    Consultants:  Critical Care  Palliative care    Vitals Range last 24h:  Temp:  [37.9 °C (100.2 °F)] 37.9 °C (100.2 °F)  Pulse:  [] 104  Resp:  [19-45] 27  BP: (106-143)/(58-79) 126/59  SpO2:  [81 %-99 %] 94 %      Intake/Output Summary (Last 24 hours) at 11/16/2024 0754  Last data filed at 11/16/2024 0645  Gross per 24 hour    Intake 1537.5 ml   Output 2950 ml   Net -1412.5 ml        Review of Systems   Unable to perform ROS: Intubated        PHYSICAL EXAM:  Vitals:    11/16/24 0342 11/16/24 0400 11/16/24 0500 11/16/24 0600   BP: 132/70 117/65 119/64 126/59   Pulse: 97 96 100 (!) 104   Resp: (!) 24 (!) 24 (!) 26 (!) 27   Temp:       TempSrc:  Bladder  Bladder   SpO2: 94% 94% 94% 94%   Weight:       Height:        Body mass index is 24.2 kg/m².    O2 therapy: Pulse Oximetry: 94 %, O2 Delivery Device: Ventilator           Physical Exam  Vitals and nursing note reviewed.   Constitutional:       General: She is not in acute distress.     Appearance: She is ill-appearing.      Comments: Intubated, sedated   HENT:      Head: Normocephalic.      Mouth/Throat:      Mouth: Mucous membranes are dry.      Comments: Dried blood from nose and mouth  Eyes:      General: Scleral icterus present.      Pupils: Pupils are equal, round, and reactive to light.   Cardiovascular:      Rate and Rhythm: Regular rhythm. Tachycardia present.      Heart sounds: No murmur heard.  Pulmonary:      Effort: Respiratory distress present.      Breath sounds: No stridor. Rhonchi and rales present. No wheezing.   Chest:      Chest wall: No tenderness.   Abdominal:      General: There is distension.      Palpations: There is no mass.      Hernia: No hernia is present.      Comments: Mild ascites with fluid wave not tense   Musculoskeletal:         General: No swelling or tenderness.      Cervical back: No rigidity or tenderness.   Skin:     Coloration: Skin is jaundiced. Skin is not pale.      Findings: Bruising present.   Neurological:      Comments: intubated       Recent Labs     11/13/24  1007 11/14/24  0422 11/15/24  0336   ISTATAPH 7.331* 7.355* 7.341*   ISTATAPCO2 36.8 37.7* 35.9   ISTATAPO2 55* 78 102*   ISTATATCO2 21 22 20   ATHXHTN3MRA 86* 95 98   ISTATARTHCO3 19.4 21.0 19.4   ISTATARTBE -6* -4 -6*   ISTATTEMP 35.9 C 37.1 C 37.2 C   ISTATFIO2 50 60 50   ISTATSPEC  Arterial Arterial Arterial   ISTATAPHTC 7.347* 7.353* 7.338*   RFODGPEP8XP 51* 79 103*     Recent Labs     11/14/24  0415 11/15/24  0420 11/16/24  0404   SODIUM 137 141 149*   POTASSIUM 4.0 4.0 4.0   CHLORIDE 106 108 114*   CO2 18* 19* 15*   BUN 42* 52* 67*   CREATININE 0.41* 0.50 1.28   MAGNESIUM 2.3 2.4 2.5   PHOSPHORUS 4.1 4.2 3.9   CALCIUM 8.6 8.8 9.6     Recent Labs     11/14/24  0415 11/15/24  0420 11/16/24  0404   ALTSGPT 26 36 68*   ASTSGOT 75* 87* 135*   ALKPHOSPHAT 134* 150* 180*   TBILIRUBIN 16.1* 15.7* 16.0*   PREALBUMIN 3.2*  --   --    GLUCOSE 132* 160* 131*     Recent Labs     11/14/24  0415 11/15/24  0420 11/16/24  0404   RBC 2.89* 3.03* 2.83*   HEMOGLOBIN 8.7* 9.3* 8.6*   HEMATOCRIT 27.6* 28.8* 27.1*   PLATELETCT 113* 141* 121*   PROTHROMBTM 39.3* 38.2*  --    INR 4.00* 3.85*  --      Recent Labs     11/14/24  0415 11/15/24  0420 11/16/24  0404   WBC 15.7* 16.8* 14.5*   NEUTSPOLYS 92.10* 95.70* 88.60*   LYMPHOCYTES 2.50* 0.90* 4.30*   MONOCYTES 4.30 3.40 6.20   EOSINOPHILS 0.10 0.00 0.00   BASOPHILS 0.10 0.00 0.10   ASTSGOT 75* 87* 135*   ALTSGPT 26 36 68*   ALKPHOSPHAT 134* 150* 180*   TBILIRUBIN 16.1* 15.7* 16.0*       Meds:   oxymetazoline  2 Spray      hydrocortisone sodium succinate PF  50 mg      ciprofloxacin  500 mg      lactulose  45 mL      midodrine  10 mg      dexmedetomidine (Precedex) infusion  0.1-1.5 mcg/kg/hr (Ideal) 1.5 mcg/kg/hr (11/16/24 0521)    Respiratory Therapy Consult        MD Alert...Adult ICU Electrolyte Replacement per Pharmacy        lidocaine  2 mL      Pharmacy  1 Each      HYDROmorphone  0.5 mg      Or    HYDROmorphone  1 mg      FLUoxetine  10 mg      riFAXIMin  550 mg      acetaminophen  650 mg      ondansetron  4 mg      promethazine  12.5-25 mg      linezolid  600 mg      [Held by provider] MBX (diphenhydrAMINE-lidocaine-Maalox)  5 mL      pantoprazole  40 mg      ondansetron  4 mg      promethazine  12.5-25 mg      prochlorperazine  5-10 mg      dextrose bolus   25 g      lidocaine  1 Patch          Imaging:  DX-CHEST-PORTABLE (1 VIEW)   Final Result         1. No significant interval change.      DX-CHEST-PORTABLE (1 VIEW)   Final Result         1.  Pulmonary edema and/or infiltrates, similar to prior study.   2.  Layering right pleural effusion, stable since prior      DX-ABDOMEN FOR TUBE PLACEMENT   Final Result         1. Nasogastric tube terminating with tip overlying the gastric body.      2. Nonspecific bowel gas pattern.      3. Pulmonary edema and/or infiltrates.         INTERPRETING LOCATION: [address], [place], [address]      DX-CHEST-LIMITED (1 VIEW)   Final Result      Pulmonary edema and/or infiltrates, overall stable since prior study.         INTERPRETING LOCATION: [address], [place], [address]      DX-CHEST-PORTABLE (1 VIEW)   Final Result      Extensive pulmonary edema and/or infiltrates, increased since prior study.         INTERPRETING LOCATION: [address], [place], [address]      DX-CHEST-PORTABLE (1 VIEW)   Final Result         1.  Pulmonary edema and/or infiltrates, similar to prior study.   2.  Layering right pleural effusion, decreased since prior      DX-CHEST-PORTABLE (1 VIEW)   Final Result         1.  Pulmonary edema and/or infiltrates, similar to prior study.   2.  Layering right pleural effusion, decreased since prior      EC-ECHOCARDIOGRAM LTD W/O CONT   Final Result      DX-CHEST-PORTABLE (1 VIEW)   Final Result         1.  New significant right pleural effusion identified.      2.  Significant increase in diffuse pulmonary opacifications could be due to pulmonary edema or pneumonia.      CT-HEAD W/O   Final Result   Impression:      1. No acute process in the brain evident.      2. The skull base sinuses are clear.                  US-RENAL   Final Result      1.  No hydronephrosis.   2.  Ascites.      DX-CHEST-PORTABLE (1 VIEW)   Final Result      1.  No acute cardiac or pulmonary abnormalities are identified.      2.  Slight left basilar  atelectasis.      [id number] VIEW   Final Result      1.  Several markedly dilated loops of small bowel in the mid abdomen suspicious for gastroenteritis or inflammatory change. Recommend follow-up abdominal series to rule out evolving small bowel obstruction.      EC-ECHOCARDIOGRAM COMPLETE W/O CONT   Final Result      CT-ABDOMEN-PELVIS W/O   Final Result      1.  Dilated fluid-filled small bowel and colonic loops favoring ileus.   2.  No evidence of bowel perforation.   3.  Hepatic cirrhosis with portal hypertension and moderate to large volume ascites.   4.  Nonocclusive superior mesenteric vein thrombosis.   5.  Cholelithiasis.   6.  Bibasilar atelectasis with minimal bilateral pleural fluid.   7.  Subtle findings concerning for pulmonary emboli.      These findings were electronically conveyed to JAMES WILLIAM on 11/3/2024 11:50 AM.         US-ABDOMEN COMPLETE SURVEY   Final Result      1.  Cirrhosis with stigmata of portal hypertension including splenomegaly, recanalized umbilical vein and ascites.   2.  Gallbladder sludge and stones. Gallbladder wall thickening is nonspecific and could be related to liver disease but correlate clinically for evidence of acute cholecystitis. No biliary dilatation.         DX-CHEST-PORTABLE (1 VIEW)   Final Result      1.  Hazy left lower lobe opacity could represent atelectasis or pneumonitis.   2.  There is linear scarring or atelectasis in the right lung base.          ASSESSMENT and PLAN:    * Sepsis (HCC)- (present on admission)  Assessment & Plan  This is Sepsis Present on admission  SIRS criteria identified on my evaluation include: Tachycardia, with heart rate greater than 90 BPM and Tachypnea, with respirations greater than 20 per minute  Clinical indicators of end organ dysfunction include Lactic Acid greater than 2 and Toxic Metabolic Encephalopathy  Source is PNA  Sepsis protocol initiated  Crystalloid Fluid Administration: Resuscitation volume of 20  "cc/kg ordered. Reason that resuscitation volume of less than 30ml/kg was ordered concern for fluid overload  IV antibiotics as appropriate for source of sepsis  Reassessment: I have reassessed the patient's hemodynamic status     Zosyn completed  Follow cultures  Trend lactic acid  11/16 Now worsening again, with signs of lactic acidosis, fever, tachycardia. No new culture data, does not appear to be respiratory source maybe intraabdominal. Will revisit with family in the meantime continue current antibiotic course      Hypernatremia  Assessment & Plan  Worsening hypernatremia to 150 on 11/16 will replete IV fluids bairon given concurrent worsening acidosis    ARDS (adult respiratory distress syndrome) (HCC)  Assessment & Plan  Optimized ventilation startegies to achieving a PBW TV of <6ml/kg, plt < 30, delta p <14   Will allow for permissive hypercapnea unless contraindicated to a ph 7.15-7.2  Goal sat > 88%  Peep optimization for open lung model and recruitment and consider early APRV  A conservative fluid strategy will be employed FACT trial   Early paralysis will be considered if p/f<150 and continued for 48 hrs  Consider prone position p/f <150  Steroid treatment p/f <200 if not contraindicated (methylprednisone 1mg/kg w/n 72hrs x 28 days or 3 days post extubation) check CRP     Workup: bronchoscopy w/ BAL, culture, ct chest and abdomen, immunogenic, DAH, echo w/ bubble study, COPD/asthma, aspiration, transfusion     Will calculate Mendoza Score and early referral for ECMO     Phenotype consideration:   Type 1 \"direct\"/pna/aspiration/asymmetric ARDS Rx: lower peep, recruitment manuevers ok/proning, fluids not as restrictive mortality 20%   Type 2: \"indirect\" sepsis, transfusion etc Rx: higher peep, fluid restriction, statin, avoid recruitment mortality 50%  20% w/ mixed phenotype (ie lobar pna direct causing contralateral indirect injury)     Patient lung injury is due to aspiration pneumonia  Respiratory PCR " negative, viral swab negative  Completed steroid course   With her advance liver disease this is potentially fatal event recommend a palliative approach        Acute respiratory failure with hypoxia (HCC)  Assessment & Plan  Intubated date: 11/13 due to ongoing aspiration and aspiration pneumonitis causing ARDS  Goal saturation > 88%  Monitor ventilator waveforms and titrate flow/peep and volumes according.   Lung protective ventilation strategy w/ A-F bundle  CXR: monitor lung volumes and tube/line placement  VAP bundle prevention, oral care, post pyloric feeding  Head of bed > 30 degree  GI prophylaxis  Daily awakening and SBT trials unless contraindicated  Monitor for liberation  Respiratory treatments: prn        Systolic anterior movement of mitral valve  Assessment & Plan  Based upon ECHO 11/10/24   Very high risk for obstructive shock with hypovolemia and tachycardia   Maintain euvolemia  Afterload vasopressor if needed      Pneumonia  Assessment & Plan  Transferred to the ICU 11/10/24 for elevated LA, worsening CXR  Went from room air to needing max HFNC in < 12 with bilateral alveolar infiltrates consistent with aspiration event.   SpO2 > 90%  Finish course of Zosyn x5 days  MRSA nare negative stopped Linezolid with sudden drop of platelets  Follow cultures, respiratory PCR  Aspiration precaution and speech eval  Naso/oral bleeding leading to event? INR 9 at time        SBP (spontaneous bacterial peritonitis) (HCC)  Assessment & Plan  Salmonella sp with associated bacteremia based upon cultures from 10/31  Ciprofloxacin lifelong ppx start 11/15     Elevated lactic acid level  Assessment & Plan  Multifactorial: cirrhosis, sepsis, malnutrition  Thiamine  Resuscitation with albumin  Empiric antibiotics  Trend  No shock so likely Type B lactate     Elevated INR  Assessment & Plan  See coagulopathy plan     Superior mesenteric vein thrombosis (HCC)  Assessment & Plan  Non-occlusive on CT 11/3   Anticoagulation  is strictly contraindicated     Acute encephalopathy  Assessment & Plan  Aspiration precautions  Continue rifaximin and lactulose goal stool 1-1.5L  Limit sedative with advance liver disease and poor clearance of sedative to not precipitate HE     Severe protein-calorie malnutrition ([place]: less than 60% of standard weight) (HCC)  Assessment & Plan  Thiamine replacement  Optimize nutrition as able     Advance care planning- (present on admission)  Assessment & Plan  She and family reverted to full code status during this hospitalization     Now intubated 11/13 with ARDS will need to further discuss goals of care, family meeting agreed to change DNR/I-OK     Hypoglycemia- (present on admission)  Assessment & Plan  Due to poor glycogen store and advance liver disease  Serial monitor   Enteral nutrition     Hepatic encephalopathy (HCC)- (present on admission)  Assessment & Plan  Lactulose  Rifaximin   Optimize acid/base and electrolytes  Maintain euvolemia     Coagulopathy (HCC)- (present on admission)  Assessment & Plan  Secondary to liver failure  Last dose of Eliquis 11/9  Follow Teg w/ platelet mapping, INR and cryo  Monitor for source of spontaneous hemorrhage  Correct and monitor calcium  Prognosis poor  Improved 11/12 s/p 3 FFP and cyro     Anemia- (present on admission)  Assessment & Plan  Acute on chronic goal hg >7  Monitor for active hemorrhage and monitor and serial correct coagulopathy  Conservative transfusion protocol        Acute renal failure (ARF) (HCC)- (present on admission)  Assessment & Plan  Resolved 11/12 but continue to closely monitor  Avoid nephrotoxins  Midodrine for map > 70     Alcoholic cirrhosis of liver with ascites (HCC)- (present on admission)  Assessment & Plan  Patient with alcoholic cirrhosis sober since June 2024, now with multiple organ failure trigger by SBP.   Avoid hepatoxins, serial monitor liver and synthetic function   Monitor for hepatic encephalopathy and serial monitor  ammonia level  Check: hepatitis panel, tylenol level, liver ultrasound and rule out vascular etiology  S/p course of steroids finished last month  Non-occlusive SMA thrombosis  I have reached out to [facility] and [facility] pending transfer. Was accepted to [facility] pending bed availabilty.   She has a 1st outpatient hepatology eval at [facility] in January but not established.   Daily meld labs, prognosis looks grim with multiple organ failure.         UTI (urinary tract infection)- (present on admission)  Assessment & Plan  With enterococcus faecium in urine follow up final sensitivities start linezolid 11/13     Essential hypertension- (present on admission)  Assessment & Plan  Reintroduce oral antihypertensives when appropriate        DISPO: ICU    CODE STATUS: DNR/I-OK    Quality Measures:  Feeding: Tube feeds  Analgesia: None  Sedation: None  Thromboprophylaxis: Contraindicated  Head of bed: >30 degrees  Ulcer prophylaxis: PPI  Glycemic control: Hypoglycemia protocol  Bowel care: bowel regimen  Indwelling lines: ET tube, Herrera, 2x PIV  Deescalation of antibiotics: Finished Zosyn, continuing linezolid. Cipro for SBP ppx

## 2024-11-16 NOTE — CARE PLAN
The patient is Watcher - Medium risk of patient condition declining or worsening    Shift Goals  Clinical Goals: RASS -1 to +1, hemodynamics  Patient Goals: pratibha  Family Goals: pratibha    Progress made toward(s) clinical / shift goals:    Problem: Pain - Standard  Goal: Alleviation of pain or a reduction in pain to the patient’s comfort goal  Outcome: Progressing     Problem: Hemodynamics  Goal: Patient's hemodynamics, fluid balance and neurologic status will be stable or improve  Outcome: Progressing     Problem: Mechanical Ventilation  Goal: Safe management of artificial airway and ventilation  Outcome: Progressing     Problem: Skin Integrity  Goal: Skin integrity is maintained or improved  Outcome: Progressing     Problem: Safety - Medical Restraint  Goal: Remains free of injury from restraints (Restraint for Interference with Medical Device)  Outcome: Progressing       Patient is not progressing towards the following goals:

## 2024-11-16 NOTE — PROGRESS NOTES
Critical Care Progress Note    Date of admission  10/31/2024    Chief Complaint  59 y.o. female admitted 10/31/2024 with EtOH Cirrhosis, sober 4 months, admitted with fever chills and abdominal pain, found to have SBP with salmonella in culture as well as bacteremia. She has been followed in IMCU with lots of goals of care changes back and forth with advanced liver failure and multiorgan dyfunction. Transferred to ICU 11/10 for increase lactate, worsening encephalopathy and respiratory failure.     Hospital Course  11/10 transferred to ICU.   11/11 HFNC/Oxy RTOC and establish transfer for liver transplant eval. Coagulopathy s/p transfusion. Family conference of goals of care.   11/12 Worsening respiratory status on max HFNC and tachypnea, renal function improved, INR improved, family updated, hypoglycemia.   11/13 intubated with ARDS, recurrent hypoglycemia, MELD Na 37, bolus fluids pressors, family meeting DNR I okay, [place] transfer held due to ARDS.   11/14 Mild improving ARDS p/f ratio, more encephalopathic, renal function and INR and glucose stabilized.   11/15 Some improvement in vent settings, worsening encephalopathy escalating lactulose    Interval Problem Update  Reviewed last 24 hour events:  Neuro: dex 1.3, agitated with SAT  HR: 's  SBP: 120-140's  Tmax: afebrile -> increasing today  GI: TF at goal free, BMS 1700ml last 24hrs  UOP: 1.3L  Lines: peripheral IV, BMS, pagan  Resp: VD4  % 8 30%  Vte: contra  PPI/H2:PPI  Antibx: finished zosyn and linezolid, cipro prophy  -1.2L net -8.8L  Recheck BMP  IVF  Free water flushes  Steroid stopped today  1.5 amps bicarb at 200ml/hr x 5hrs  Check INR  Looks to be actively dieing       Review of Systems  Review of Systems   Unable to perform ROS: Intubated        Vital Signs for last 24 hours   Temp:  [37.9 °C (100.2 °F)] 37.9 °C (100.2 °F)  Pulse:  [] 138  Resp:  [18-45] 18  BP: (106-143)/(53-79) 113/53  SpO2:  [81 %-99 %] 97 %    Hemodynamic  parameters for last 24 hours       Respiratory Information for the last 24 hours  Vent Mode: ASV  Rate (breaths/min): 22  Vt Target (mL): 430  PEEP/CPAP: 8  P Support: 5  MAP: 9.6  Length of Weaning Trial (Hours): 1 hour  Control VTE (exp VT): 993    Physical Exam   Physical Exam  Vitals and nursing note reviewed.   Constitutional:       General: She is in acute distress.      Appearance: She is ill-appearing.      Comments: Ill appearing tachypnea on ventilator, bleeding from gums   HENT:      Head: Normocephalic.      Mouth/Throat:      Mouth: Mucous membranes are dry.      Comments: Venous blood from missing tooth, ET in place  Eyes:      Pupils: Pupils are equal, round, and reactive to light.      Comments: jaundice   Cardiovascular:      Rate and Rhythm: Tachycardia present.      Heart sounds: Murmur heard.   Pulmonary:      Effort: Respiratory distress present.      Breath sounds: No stridor. Rhonchi present. No wheezing or rales.      Comments: Course ronchi right > left and  tachypnea while on ventilator  Chest:      Chest wall: No tenderness.   Abdominal:      General: There is distension.      Palpations: There is no mass.      Tenderness: There is abdominal tenderness. There is no guarding or rebound.      Hernia: No hernia is present.   Musculoskeletal:         General: No swelling or tenderness.      Cervical back: No rigidity or tenderness.   Skin:     Coloration: Skin is jaundiced. Skin is not pale.      Findings: Bruising present.      Comments: Spider angiomata and jaundiced with bruising, warm ext, no mottling or pallor.    Neurological:      Comments: Sedated on ventilator         Medications  Current Facility-Administered Medications   Medication Dose Route Frequency Provider Last Rate Last Admin    oxymetazoline (Afrin) 0.05 % nasal spray 2 Spray  2 Spray Nasal QDAY PRN Krysten España M.D.   2 [place] at 11/16/24 0623    sodium bicarbonate 8.4 % 75 mEq in dextrose 5% 1,000 mL infusion  75 mEq  Intravenous Once Olvin Hanson M.D.        hydrocortisone sodium succinate PF (Solu-CORTEF) 100 MG injection 50 mg  50 mg Intravenous Q12HRS Olvin Hanson M.D.   50 mg at 11/16/24 0523    ciprofloxacin (Cipro) tablet 500 mg  500 mg Enteral Tube Q24HRS Olvin Hanson M.D.   500 mg at 11/15/24 2129    lactulose 20 GM/30ML solution 45 mL  45 mL Enteral Tube 4X/DAY Olvin Hanson M.D.   45 mL at 11/16/24 1024    midodrine (Proamatine) tablet 10 mg  10 mg Enteral Tube Q8HRS Olvin Hanson M.D.   10 mg at 11/16/24 0523    dexmedetomidine (Precedex) 400 mcg/100mL infusion  0.1-1.5 mcg/kg/hr (Ideal) Intravenous Continuous Dawit Bell M.D. 17.6 mL/hr at 11/16/24 0920 1.1 mcg/kg/hr at 11/16/24 0920    Respiratory Therapy Consult   Nebulization Continuous RT Olvin Hanson M.D. MD Alert...ICU Electrolyte Replacement per Pharmacy   Other PHARMACY TO DOSE Olvin Hanson M.D.        lidocaine (Xylocaine) 1 % injection 2 mL  2 mL Tracheal Tube Q30 MIN PRN Olvin Hanson M.D.        Pharmacy Consult: Enteral tube insertion - review meds/change route/product selection  1 Each Other PHARMACY TO DOSE Olvin Hanson M.D.        HYDROmorphone (Dilaudid) injection 0.5 mg  0.5 mg Intravenous Q HOUR PRN Olvin Hanson M.D.   0.5 mg at 11/16/24 0844    Or    HYDROmorphone (Dilaudid) injection 1 mg  1 mg Intravenous Q HOUR PRN Olvin Hanson M.D.   1 mg at 11/14/24 2312    FLUoxetine (PROzac) capsule 10 mg  10 mg Enteral Tube QAM Olvin Hanson M.D.   10 mg at 11/16/24 0523    riFAXIMin (Xifaxan) tablet 550 mg  550 mg Enteral Tube BID Olvin Hanson M.D.   550 mg at 11/16/24 0523    acetaminophen (Tylenol) tablet 650 mg  650 mg Enteral Tube Q6HRS PRDEAN Hanson M.D.   650 mg at 11/16/24 0847    ondansetron (Zofran ODT) dispertab 4 mg  4 mg Enteral Tube Q4HRS PRN Olvin Hanson M.D.        promethazine (Phenergan) tablet 12.5-25 mg  12.5-25 mg Enteral Tube Q4HRS PRN Olvin DIOR  NAIMA Hanson        linezolid (Zyvox) tablet 600 mg  600 mg Enteral Tube Q12HRS Olvin Hanson M.D.   600 mg at 11/16/24 0523    [Held by provider] MBX (diphenhydrAMINE-lidocaine-Maalox) oral susp Cup 5 mL  5 mL Swish & Swallow Q6HRS PRN Olvin Hanson M.D.   5 mL at 11/12/24 1727    pantoprazole (Protonix) injection 40 mg  40 mg Intravenous BID Olvin Hanson M.D.   40 mg at 11/16/24 0523    ondansetron (Zofran) syringe/vial injection 4 mg  4 mg Intravenous Q4HRS PRN Adilia Gonzales D.O.   4 mg at 11/12/24 2158    promethazine (Phenergan) suppository 12.5-25 mg  12.5-25 mg Rectal Q4HRS PRN Adilia Gonzales D.O.        prochlorperazine (Compazine) injection 5-10 mg  5-10 mg Intravenous Q4HRS PRN Adilia Gonzales D.O.   10 mg at 11/12/24 2211    dextrose 50% (D50W) injection 25 g  25 g Intravenous Q15 MIN PRN Adilia Gonzales D.O.   25 g at 11/13/24 0638    lidocaine (Asperflex) 4 % patch 1 Patch  1 Patch Transdermal Q24HRS Adilia Gonzales D.O.   1 Patch at 11/16/24 0524       Fluids    Intake/Output Summary (Last 24 hours) at 11/16/2024 1027  Last data filed at 11/16/2024 0855  Gross per 24 hour   Intake 1451.73 ml   Output 2825 ml   Net -1373.27 ml       Laboratory  Recent Labs     11/14/24  0422 11/15/24  0336   ISTATAPH 7.355* 7.341*   ISTATAPCO2 37.7* 35.9   ISTATAPO2 78 102*   ISTATATCO2 22 20   GJJHNLV2HKF 95 98   ISTATARTHCO3 21.0 19.4   ISTATARTBE -4 -6*   ISTATTEMP 37.1 C 37.2 C   ISTATFIO2 60 50   ISTATSPEC Arterial Arterial   ISTATAPHTC 7.353* 7.338*   [id number] 79 103*         Recent Labs     11/14/24  0415 11/15/24  0420 11/16/24  0404 11/16/24  0800   SODIUM 137 141 149* 150*   POTASSIUM 4.0 4.0 4.0 4.4   CHLORIDE 106 108 114* 114*   CO2 18* 19* 15* 12*   BUN 42* 52* 67* 70*   CREATININE 0.41* 0.50 1.28 1.40   MAGNESIUM 2.3 2.4 2.5  --    PHOSPHORUS 4.1 4.2 3.9  --    CALCIUM 8.6 8.8 9.6 9.6     Recent Labs     11/14/24  0415 11/15/24  0420 11/16/24  0404 11/16/24  0800   ALTSGPT 26 36 68*   --    ASTSGOT 75* 87* 135*  --    ALKPHOSPHAT 134* 150* 180*  --    TBILIRUBIN 16.1* 15.7* 16.0*  --    PREALBUMIN 3.2*  --   --   --    GLUCOSE 132* 160* 131* 123*     Recent Labs     11/14/24  0415 11/15/24  0420 11/16/24  0404   WBC 15.7* 16.8* 14.5*   NEUTSPOLYS 92.10* 95.70* 88.60*   LYMPHOCYTES 2.50* 0.90* 4.30*   MONOCYTES 4.30 3.40 6.20   EOSINOPHILS 0.10 0.00 0.00   BASOPHILS 0.10 0.00 0.10   ASTSGOT 75* 87* 135*   ALTSGPT 26 36 68*   ALKPHOSPHAT 134* 150* 180*   TBILIRUBIN 16.1* 15.7* 16.0*     Recent Labs     11/14/24  0415 11/15/24  0420 11/16/24  0404   RBC 2.89* 3.03* 2.83*   HEMOGLOBIN 8.7* 9.3* 8.6*   HEMATOCRIT 27.6* 28.8* 27.1*   PLATELETCT 113* 141* 121*   PROTHROMBTM 39.3* 38.2*  --    INR 4.00* 3.85*  --        Imaging  X-Ray:  I have personally reviewed the images and compared with prior images.  CT:    Reviewed  Echo:   Reviewed  Ultrasound:  Reviewed    Assessment/Plan  * Sepsis (HCC)- (present on admission)  Assessment & Plan  This is Sepsis Present on admission  SIRS criteria identified on my evaluation include: Tachycardia, with heart rate greater than 90 BPM and Tachypnea, with respirations greater than 20 per minute  Clinical indicators of end organ dysfunction include Lactic Acid greater than 2 and Toxic Metabolic Encephalopathy  Source is PNA  Sepsis protocol initiated  Crystalloid Fluid Administration: Resuscitation volume of 20 cc/kg ordered. Reason that resuscitation volume of less than 30ml/kg was ordered concern for fluid overload  IV antibiotics as appropriate for source of sepsis  Reassessment: I have reassessed the patient's hemodynamic status    Zosyn finished 5/5 days linezolid 3/3      ARDS (adult respiratory distress syndrome) (HCC)  Assessment & Plan  Optimized ventilation startegies to achieving a PBW TV of <6ml/kg, plt < 30, delta p <14   Will allow for permissive hypercapnea unless contraindicated to a ph 7.15-7.2  Goal sat > 88%  Peep optimization for open lung model  "and recruitment and consider early APRV  A conservative fluid strategy will be employed FACT trial   Early paralysis will be considered if p/f<150 and continued for 48 hrs  Consider prone position p/f <150  Steroid treatment p/f <200 if not contraindicated (methylprednisone 1mg/kg w/n 72hrs x 28 days or 3 days post extubation) check CRP    Workup: bronchoscopy w/ BAL, culture, ct chest and abdomen, immunogenic, DAH, echo w/ bubble study, COPD/asthma, aspiration, transfusion    Will calculate Mendoza Score and early referral for ECMO    Phenotype consideration:   Type 1 \"direct\"/pna/aspiration/asymmetric ARDS Rx: lower peep, recruitment manuevers ok/proning, fluids not as restrictive mortality 20%   Type 2: \"indirect\" sepsis, transfusion etc Rx: higher peep, fluid restriction, statin, avoid recruitment mortality 50%  20% w/ mixed phenotype (ie lobar pna direct causing contralateral indirect injury)    Patient lung injury is due to aspiration pneumonia  Respiratory PCR negative, viral swab negative  With her advance liver disease this is fatal event recommend a palliative approach    Improving Vent setting but now new sepsis and likely aspiration event 11/16      Acute respiratory failure with hypoxia (HCC)  Assessment & Plan  Intubated date: 11/13 due to ongoing aspiration and aspiration pneumonitis causing ARDS  Goal saturation > 88%  Monitor ventilator waveforms and titrate flow/peep and volumes according.   Lung protective ventilation strategy w/ A-F bundle  CXR: monitor lung volumes and tube/line placement  VAP bundle prevention, oral care, post pyloric feeding  Head of bed > 30 degree  GI prophylaxis  Daily awakening and SBT trials unless contraindicated  Monitor for liberation  Respiratory treatments: prn      Systolic anterior movement of mitral valve  Assessment & Plan  Based upon ECHO 11/10/24   Very high risk for obstructive shock with hypovolemia and tachycardia   Maintain euvolemia  Afterload vasopressor " if needed     Pneumonia  Assessment & Plan  Transferred to the ICU 11/10/24 for elevated LA, worsening CXR  Went from room air to needing max HFNC in < 12 with bilateral alveolar infiltrates consistent with aspiration event.   SpO2 > 90%  Finish course of Zosyn x5 days  MRSA nare negative stopped Linezolid with sudden drop of platelets  Follow cultures, respiratory PCR  Aspiration precaution and speech eval  Naso/oral bleeding leading to event? INR 9 at time      SBP (spontaneous bacterial peritonitis) ([facility])  Assessment & Plan  Salmonella sp with associated bacteremia based upon cultures from 10/31  Start SBP prophylaxis 11/16 w/ Cipro    Elevated lactic acid level  Assessment & Plan  Multifactorial: cirrhosis, sepsis, malnutrition  Thiamine  Resuscitation with albumin  Empiric antibiotics  Trend  No shock so likely Type B lactate    Elevated INR  Assessment & Plan  See coagulopathy plan    Superior mesenteric vein thrombosis ([facility])  Assessment & Plan  Non-occlusive on CT 11/3   Anticoagulation is strictly contraindicated    Acute encephalopathy  Assessment & Plan  Aspiration precautions  Continue rifaximin and lactulose goal stool 1-1.5L  Limit sedative with advance liver disease and poor clearance of sedative/narcotic and benzo's to not precipitate/worsen HE  Increase lactulose    Severe protein-calorie malnutrition ([place]: less than 60% of standard weight) ([facility])  Assessment & Plan  Thiamine replacement  Optimize nutrition as able    Advance care planning- (present on admission)  Assessment & Plan  She and family reverted to full code status during this hospitalization    Now intubated 11/13 with ARDS will need to further discuss goals of care family flying in on 11/16 11/16 now with worsening again with EFRAIN, acidosis and new fever and sepsis and looks to be actively dieing recommend comfort care when family arrives.     Hypoglycemia- (present on admission)  Assessment & Plan  Due to poor glycogen store and  advance liver disease  Serial monitor   Enteral nutrition    Hepatic encephalopathy (HCC)- (present on admission)  Assessment & Plan  Lactulose  Rifaximin   Optimize acid/base and electrolytes  Maintain euvolemia    Coagulopathy (HCC)- (present on admission)  Assessment & Plan  Secondary to liver failure  Last dose of Eliquis 11/9  Follow Teg w/ platelet mapping, INR and cryo  Monitor for source of spontaneous hemorrhage  Correct and monitor calcium  Prognosis poor  Improved 11/12 s/p 3 FFP and cyro    Anemia- (present on admission)  Assessment & Plan  Acute on chronic goal hg >7  Monitor for active hemorrhage and monitor and serial correct coagulopathy  Conservative transfusion protocol      Acute renal failure (ARF) (HCC)- (present on admission)  Assessment & Plan  Resolved 11/12 but continue to closely monitor -> worsened again with new EFRAIN and acidosis  Avoid nephrotoxins  Midodrine for map > 70  Give bicarb for fluid resuscitation    Alcoholic cirrhosis of liver with ascites (HCC)- (present on admission)  Assessment & Plan  Patient with alcoholic cirrhosis sober since June 2024, now with multiple organ failure trigger by SBP.   Avoid hepatoxins, serial monitor liver and synthetic function   Monitor for hepatic encephalopathy and serial monitor ammonia level  Check: hepatitis panel, tylenol level, liver ultrasound and rule out vascular etiology  S/p course of steroids finished last month  Non-occlusive SMA thrombosis  I have reached out to [facility] and [facility] pending transfer. Was accepted to [facility] pending bed availabilty.   She has a 1st outpatient hepatology eval at [facility] in January but not established.   Daily meld labs, prognosis looks grim with multiple organ failure.       UTI (urinary tract infection)- (present on admission)  Assessment & Plan  With enterococcus faecium in urine follow up final sensitivities start linezolid 11/13    Essential hypertension- (present on admission)  Assessment &  Plan  Reintroduce oral antihypertensives when appropriate           VTE:  Contraindicated  Ulcer: PPI  Lines: Herrera Catheter  Ongoing indication addressed    I have performed a physical exam and reviewed and updated ROS and Plan today (11/16/2024). In review of yesterday's note (11/15/2024), there are no changes except as documented above.     Discussed patient condition and risk of morbidity and/or mortality with RN, RT, Pharmacy, Charge nurse / hot rounds, Patient, and GI    The patient remains critically ill on ventilator with active titration, new sepsis and acidosis on bicarb gtt.  Critical care time = 90 minutes in directly providing and coordinating critical care and extensive data review.  No time overlap and excludes procedures.

## 2024-11-16 NOTE — DISCHARGE SUMMARY
[facility] Internal Medicine Death Summary      Attending: Olvin Hanson M.d.  Senior Resident: Dr. Jean Baptiste  Call Back Contact Number: [phone]    Admission Date  10/31/2024    Cause of Death  Alcoholic cirrhosis complicated by spontaneous bacterial peritonitis further complicated Acute respiratory distress syndrome and coagulopathy.     Comorbid Conditions at the Time of Death  Principal Problem:    Sepsis (HCC) (POA: Yes)  Active Problems:    Essential hypertension (POA: Yes)    UTI (urinary tract infection) (POA: Yes)      Overview: Start suppressive antibiotic therapy due to chronic UTIs.      Diagnois update 10/1/2016    Depression with anxiety (POA: Yes)    Alcoholic cirrhosis of liver with ascites (HCC) (POA: Yes)    Acute renal failure (ARF) (HCC) (POA: Yes)    Anemia (POA: Yes)    Coagulopathy (HCC) (POA: Yes)    Hyponatremia (POA: Yes)    High anion gap metabolic acidosis (POA: Yes)    Hepatic encephalopathy (HCC) (POA: Yes)    Transaminitis (POA: Yes)    Hypoglycemia (POA: Yes)    Advance care planning (POA: Yes)    Bacteremia due to Gram-negative bacteria (POA: Unknown)    Severe protein-calorie malnutrition ([place]: less than 60% of standard weight) (HCC) (POA: Unknown)    Acute encephalopathy (POA: Unknown)    Superior mesenteric vein thrombosis (HCC) (POA: Unknown)    Hypoxia (POA: Unknown)    Lactic acidosis (POA: Unknown)    Elevated INR (POA: Unknown)    Elevated lactic acid level (POA: Unknown)    SBP (spontaneous bacterial peritonitis) (HCC) (POA: Unknown)    Pneumonia (POA: Unknown)    Systolic anterior movement of mitral valve (POA: Unknown)    Acute respiratory failure with hypoxia (HCC) (POA: Unknown)    ARDS (adult respiratory distress syndrome) (HCC) (POA: Unknown)  Resolved Problems:    Septic shock (HCC) (POA: Yes)    Left ventricular outflow tract ventricular tachycardia (HCC) (POA: Unknown)      History of Presenting Illness and Hospital Course  59 y.o. female presented 10/31/2024, history  of advanced alcoholic cirrhosis, sober 4 months, admitted with fever chills and abdominal pain, found to have SBP with salmonella in culture as well as bacteremia. She was initially followed in IMCU with lots of goals of care changes back and forth given advanced liver failure with MELD 3.0 >37 and multiorgan dyfunction. Transferred to ICU 11/10 for increasing lactate, worsening encephalopathy and respiratory failure. Initially was pending transfer to [place] for liver transplant evaluation however this was put on hold on  when she developed ARDS requiring intubation likely due to aspiration in the setting of severe encephalopathy. Ongoing discussions took place with family regarding grim prognosis and she was changed to DNR status at this time. While ARDS metrics did improve over the next few days she developed worsening encephalopathy, decline in renal function, and evidence of superinfection of unclear origin with escalating lactate, fevers, and tachycardia despite coverage with broad spectrum antibiotics and aggressive lactulose. Another family meeting took place on  and agreed to transition to comfort focused treatment at that time.     Family at bedside and had said their final farewells, patient was extubated at 1747     Patient Medicated per MAR orders for comfort. All life supporting IV medications stopped per MAR orders.     Patient  at 1754 surrounded by her loved ones.    Consultants  GI    Procedures  Intubated

## 2024-11-16 NOTE — PROGRESS NOTES
0800 Confirmed with Dr. Markel zaman to initiate SAT    0805 SAT initiated    MD also notified that patient has fever of 38.2, appears to have been febrile and tachy since 0500.    0837 SAT ended due to HR > 140, & SpO2 dropped to 84% on two occasions. Angie to give hydromorphone per Dr. Jean Baptiste 0.5 mg administered + APAP for fever

## 2024-11-16 NOTE — PROGRESS NOTES
MD Krysten España notified of increased bleeding on pt gums and another tooth almost came out due to biting down on the ET tube. Afrin ordered. Hold SAT this AM per MD.

## 2024-11-16 NOTE — CONSULTS
Billing Purposes    Donor [facility] Referral:  # [id number]    DCD Billing:    Donor [facility] assumes billing of this Donation after Circulatory Death (DCD) donor at the time of notification of registry status (First Person Authorization) on 11/16/2024 at 2:22 PM     In Summary:    Donor [facility] billing is from 11/16/2024 at 2:22 PM through organ recovery/OR.     If you have any questions/concerns regarding billing please contact Jael Yeung, In-Hospital Donation Coordinator at [phone].

## 2024-11-16 NOTE — CARE PLAN
Problem: Ventilation  Goal: Ability to achieve and maintain unassisted ventilation or tolerate decreased levels of ventilator support  Description: Target End Date:  4 days     Document on Vent flowsheet    1.  Support and monitor invasive and noninvasive mechanical ventilation  2.  Monitor ventilator weaning response  3.  Perform ventilator associated pneumonia prevention interventions  4.  Manage ventilation therapy by monitoring diagnostic test results  Outcome: Not Met     Ventilator Daily Summary    Vent Day #4  Airway: 7.5 ETT @24    Ventilator settings:  - 8 - 70%  Weaning trials: No  Respiratory Procedures: No    Plan: Continue current ventilator settings and wean mechanical ventilation as tolerated per physician orders.

## 2024-11-16 NOTE — PROGRESS NOTES
Gastroenterology Progress Note               Author:  YANA Pierre   Date & Time Created: 11/16/2024 8:25 AM       Patient ID:  Name:             Kavita Freeman  YOB: 1965  Age:                 59 y.o.  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]     Acute encephalopathy [G93.40]     Bacteremia due to Gram-negative bacteria [R78.81]     Severe protein-calorie malnutrition ([place]: less than 60% of standard weight) (HCC) [E43]     Transaminitis [R74.01]     Hypoglycemia [E16.2]     Advance care planning [Z71.89]     Sepsis (HCC) [A41.9]     Hepatic encephalopathy (HCC) [K76.82]     High anion gap metabolic acidosis [E87.29]     Hyponatremia [E87.1]     Coagulopathy (HCC) [D68.9]     Anemia [D64.9]     Alcoholic cirrhosis of liver with ascites (HCC) [K70.31]     Acute renal failure (ARF) (HCC) [N17.9]     Depression with anxiety [F41.8]     UTI (urinary tract infection) [N39.0]     Essential hypertension [I10]      Presenting Chief Complaint:  Cirrhosis, GI bleeding.     History of Present Illness:   59 years old female with medical history of alcohol-related liver injury, cirrhosis, decompensation with variceal bleeding and refractory ascites formation, mild to moderate encephalopathy.  Presented to hospital this time for fluid accumulation, acute kidney injury.  GI team is consulted for recent GI bleeding     The patient has primary GI liver doctor in [place] and she is waiting for the  transplantation evaluation at [facility] in the coming 2 months.  We do not have any records to support this plan, however the patient is very certain about the [facility] appointment.     For the bleeding, the patient had nausea vomiting in the last 2 days, the patient saw blood and also some coffee-ground like vomitus.  Tarry stool was noted.  Last upper GI scope August 15 with variceal bleeding and banding.     Interval History:  11/2/2024: Patient seen at bedside.  Awake, alert.  Discussed EGD findings with patient.  Inquired regarding last EGD as, per chart review, last EGD was 2-1/2 months ago.  Patient unable to discern if she has had EGD since last documented.  Positive asterixis.  Denies bowel movements overnight.  Started on rifaximin and lactulose.  Hemoglobin stable.  Abdomen tender with light palpation.  No WBC this morning, but yesterday was 39. ultrasound abdomen pending.      11/3/2024: Patient seen at bedside.  Disoriented, unable to answer orientation questions.  Abdomen distended, significantly increase in tenderness this morning, hypoactive bowel sounds and tympany with percussion.  CT abdomen and CBC pending.  No bowel movements overnight.    Update 1222: CT abdomen with findings including fluid-filled small bowel and colonic loops favoring ileus, no evidence of bowel perforation, cirrhosis with portal hypertension, moderate to large volume ascites, nonocclusive SMV thrombosis, cholelithiasis, subtle findings concerning for pulmonary emboli.      11/4/2024: Patient seen at bedside.  Disoriented, oriented to self only.  Abdomen remains distended, tender with light palpation, hypoactive bowel sounds.  Patient with multiple brown bowel movements overnight.  Started on heparin yesterday.  WBC 29.8, hemoglobin stable at 12.5, platelets 197.  Sodium 127, BUN 56, creatinine improved to 1.3.  Albumin yesterday 2.6.  INR yesterday 2.35.  MELD 3.0 as of 11/3/2024 31    11/5/2024: Patient seen and examined.   Appears to be in distress, tachypneic, unable to participate in interview.  Grimaces when I touch her abdomen.  I was concerned and got Dr. Charles who then examined the patient.  We ordered KUB, chest x-ray, ABG, further labs.  3 BM last 24 hours.     Found to have significant lactic acidosis, INR 3.64 (difficult to interpret in the context of heparin drip), ABG with acute respiratory alkalosis.  Creatinine increasing to 1.54, T. bili 8.  KUB dilated loops of bowel suspicious for gastroenteritis or inflammatory changes.  Chest x-ray with atelectasis.      11/6/2024: Patient seen in collaboration with Dr. Solorzano. Son at bedside, all questions answered. WBC now 36.6, T. Bili 9.8, creatinine uptrending. MELD 3.0 - 37 (Although INR not reliable in the setting of heparin drip)    11/7/2024: Patient seen with mom and dad at bedside.  Significantly improved, alert and oriented and eating food.  She remembered me from when I met her before.  WBC 26.9, hgb 9.6, plt 112, cr 1.38. Urinary casts present. Renal US negative.     11/8/2024: Patient seen with family bedside. Continues to feel better, eating but doesn't really like the food. Numerous bowel movements overnight. Worsening abdominal distention with mild tenderness. WBC continues to improve, platelets worse at 89, hgb downtrended to 9.1, cr 0.44, t. Bili 10.9    11/9/2024: Patient seen this morning, no family bedside.  More lethargic today, says she is tired.  2 bowel movements past 24 hours.  VSS, WBC 20.3, T. Bili 13.1, albumin 2.9, phos 2.3.  INR greater than 10, TEG with abnormal CK R.  Pending diagnostic paracentesis and elevated INR.    11/10/2024: Patient seen and examined this morning with mother at bedside.  More lethargic again, on oxygen, noted to be tachypneic and tachycardic.  Minimally arousable.  Worsening murmur on auscultation. Has not had a bowel movement per RN.  Finished her antibiotics yesterday and transferred from [facility].  Labs reviewed, WBC up to  30 with repeat 33, creatinine 1.09 from 0.28, bilirubin 16.2, 9.9 direct.  Increased reticulocyte response, , ammonia 62, lactic 4.2, Procal 1.44.  INR greater than 10, recheck 9.53.  Chest x-ray with new significant right pleural effusion and significant increase in diffuse pulmonary opacifications edema versus pneumonia.  Head CT negative.  Limited echocardiogram and haptoglobin pending.    11/11/2024: Patient upgraded to ICU last night. Awake, alert, encephalopathic. Complaining of back pain. Dried crusted blood noted on oral mucosa. Tachycardic, tachypneic, on 10L oxymask. WBC 31.3, hgb 7.4, plt 93, INR 7.15, BUN 41, cr 1.37, T. Bili 15.2, lactic 4.9. On Zosyn.    11/12/2024: Patient seen at bedside.  Awake, alert.  Patient was accepted by [facility], bed pending.  Patient on 50 LPM HFNC, FiO2 90%.  Leukocytosis improved to 26, hemoglobin stable 7.8.  Platelets 103.  Sodium 135, BUN 31, creatinine 0.51, AST 86, ALT 24, alk phos 100, total bilirubin 16.9.  Albumin 3.  Ammonia 49 INR 3.5 after products yesterday. MELD 3.0 32 63.5%; 90 day survival rate. Remains on antibiotics    11/13/2024: Overnight, patient went into respiratory failure maxed out on high flow nasal cannula.  She required intubation earlier this morning.  [facility] transfer on hold.  Primary team had family meeting to discuss goals of care.  Patient DNR/I okay.  They have asked to do a trial of therapy and allow her son to see patient this weekend.  Leukocytosis down to 22.8.  Hemoglobin stable 7.8.  Sodium 134, BUN 37, creatinine 1.27, , ALT 30, alk phos 112, total bilirubin 17.4, albumin 3.2 INR increased to 4.4.  MELD 3.0 36; 39.7 estimated 90-day survival rate     11/14/2024: Patient seen at bedside.  Remains on mechanical ventilator via ET tube.  Increase stool output overnight.  Leukocytosis continues to improve, hemoglobin stable 8.7.  Sodium 137 BUN 42, creatinine 0.41, total bilirubin 16.1, albumin 3, INR 4.  MELD 3.0 score:  34 52.3% estimated level    11/15/2024: Patient seen at bedside.  Remains on mechanical ventilator via ET tube decrease stool output overnight.  Increase in lactulose administration.  WBC 16.8, hemoglobin stable 9.3.  Platelets 141.  Sodium 141, BUN 52, creatinine 0.5, AST 87, ALT 36, alk phos 150, total bilirubin 15.7, albumin 2.8.  INR 3.85.  MELD 3.0: 33    11/16/2024:  patient seen in collaboration with intensivisit. Transitioning to comfort care when family is ready.     Hospital Medications:  Current Facility-Administered Medications   Medication Dose Frequency Provider Last Rate Last Admin    oxymetazoline (Afrin) 0.05 % nasal spray 2 Spray  2 Spray QDAY PRN Krysten España M.D.   2 [employer] at 11/16/24 0623    hydrocortisone sodium succinate PF (Solu-CORTEF) 100 MG injection 50 mg  50 mg Q12HRS Olvin Hanson M.D.   50 mg at 11/16/24 0523    ciprofloxacin (Cipro) tablet 500 mg  500 mg Q24HRS Olvin Hanson M.D.   500 mg at 11/15/24 2129    lactulose 20 GM/30ML solution 45 mL  45 mL 4X/DAY Olvin Hanson M.D.   45 mL at 11/15/24 2128    midodrine (Proamatine) tablet 10 mg  10 mg Q8HRS Olvin Hanson M.D.   10 mg at 11/16/24 0523    dexmedetomidine (Precedex) 400 mcg/100mL infusion  0.1-1.5 mcg/kg/hr (Ideal) Continuous Dawit Bell M.D. 24 mL/hr at 11/16/24 0521 1.5 mcg/kg/hr at 11/16/24 0521    Respiratory Therapy Consult   Continuous RT Olvin Hanson M.D.        MD Alert...ICU Electrolyte Replacement per Pharmacy   PHARMACY TO DOSE Olvin Hanson M.D.        lidocaine (Xylocaine) 1 % injection 2 mL  2 mL Q30 MIN PRN Olvin Hanson M.D.        Pharmacy Consult: Enteral tube insertion - review meds/change route/product selection  1 Each PHARMACY TO DOSE Olvin Hanson M.D.        HYDROmorphone (Dilaudid) injection 0.5 mg  0.5 mg Q HOUR PRN Olvin Hanson M.D.   0.5 mg at 11/13/24 2348    Or    HYDROmorphone (Dilaudid) injection 1 mg  1 mg Q HOUR PRN Olvin Hanson M.D.   1 mg at  "11/14/24 2312    FLUoxetine (PROzac) capsule 10 mg  10 mg QAM Olvin Hanson M.D.   10 mg at 11/16/24 0523    riFAXIMin (Xifaxan) tablet 550 mg  550 mg BID Olvin Hanson M.D.   550 mg at 11/16/24 0523    acetaminophen (Tylenol) tablet 650 mg  650 mg Q6HRS PRN Olvin Hanson M.D.        ondansetron (Zofran ODT) dispertab 4 mg  4 mg Q4HRS PRN Olvin Hanson M.D.        promethazine (Phenergan) tablet 12.5-25 mg  12.5-25 mg Q4HRS PRDEAN Hanson M.D.        linezolid (Zyvox) tablet 600 mg  600 mg Q12HRS Olvin Hanson M.D.   600 mg at 11/16/24 0523    [Held by provider] MBX (diphenhydrAMINE-lidocaine-Maalox) oral susp Cup 5 mL  5 mL Q6HRS PRDEAN Hanson M.D.   5 mL at 11/12/24 1727    pantoprazole (Protonix) injection 40 mg  40 mg BID Olvin Hanson M.D.   40 mg at 11/16/24 0523    ondansetron (Zofran) syringe/vial injection 4 mg  4 mg Q4HRS PRN Adilia Gonzales DSandyOSandy   4 mg at 11/12/24 2158    promethazine (Phenergan) suppository 12.5-25 mg  12.5-25 mg Q4HRS PRN Adilia Gonzales DSandyOSandy        prochlorperazine (Compazine) injection 5-10 mg  5-10 mg Q4HRS PRN Adilia Gonzales DSandyOSandy   10 mg at 11/12/24 2211    dextrose 50% (D50W) injection 25 g  25 g Q15 MIN PRN Adilia Gonzales D.O.   25 g at 11/13/24 0638    lidocaine (Asperflex) 4 % patch 1 Patch  1 Patch Q24HRS Adiliatim Gonzales D.O.   1 Patch at 11/16/24 0524   Last reviewed on 11/1/2024  9:17 AM by Celine Bustamante R.N.       Review of Systems:  Review of Systems   Unable to perform ROS: Critical illness       Vital signs:  Weight/BMI: Body mass index is 24.2 kg/m².  /59   Pulse (!) 108   Temp 37.9 °C (100.2 °F) (Bladder)   Resp (!) 27   Ht 1.727 m (5' 8\")   Wt 72.2 kg (159 lb 2.8 oz)   SpO2 96%   Vitals:    11/16/24 0400 11/16/24 0500 11/16/24 0600 11/16/24 0731   BP: 117/65 119/64 126/59    Pulse: 96 100 (!) 104 (!) 108   Resp: (!) 24 (!) 26 (!) 27 (!) 27   Temp:       TempSrc: Bladder  Bladder    SpO2: 94% 94% 94% 96% "   Weight:       Height:         Oxygen Therapy:  Pulse Oximetry: 96 %, FiO2%: 30 %, O2 Delivery Device: Ventilator    Intake/Output Summary (Last 24 hours) at 11/16/2024 0825  Last data filed at 11/16/2024 0645  Gross per 24 hour   Intake 1422.14 ml   Output 2825 ml   Net -1402.86 ml       Physical Exam  Vitals and nursing note reviewed.   Constitutional:       General: She is in acute distress.      Appearance: She is cachectic. She is ill-appearing.   HENT:      Head: Normocephalic and atraumatic.      Nose: Nose normal. No congestion.      Mouth/Throat:      Comments: Dried blood in mouth  Eyes:      General: Scleral icterus present.   Cardiovascular:      Rate and Rhythm: Normal rate and regular rhythm.      Pulses: Normal pulses.      Heart sounds: Murmur heard.   Pulmonary:      Effort: Pulmonary effort is normal.      Breath sounds: Rhonchi present.      Comments: On mechanical ventilator via ET tube  Abdominal:      General: There is distension.      Palpations: Abdomen is soft.      Tenderness: There is no abdominal tenderness. There is no guarding.      Comments: + Fluid wave   Genitourinary:     Comments: Herrera with dark urine  BMS in place  Musculoskeletal:      Right lower leg: No edema.      Left lower leg: No edema.   Skin:     General: Skin is warm and dry.      Capillary Refill: Capillary refill takes less than 2 seconds.      Coloration: Skin is jaundiced.       Labs:  Recent Labs     11/14/24  0415 11/15/24  0420 11/16/24  0404   SODIUM 137 141 149*   POTASSIUM 4.0 4.0 4.0   CHLORIDE 106 108 114*   CO2 18* 19* 15*   BUN 42* 52* 67*   CREATININE 0.41* 0.50 1.28   MAGNESIUM 2.3 2.4 2.5   PHOSPHORUS 4.1 4.2 3.9   CALCIUM 8.6 8.8 9.6     Recent Labs     11/14/24  0415 11/15/24  0420 11/16/24  0404   ALTSGPT 26 36 68*   ASTSGOT 75* 87* 135*   ALKPHOSPHAT 134* 150* 180*   TBILIRUBIN 16.1* 15.7* 16.0*   PREALBUMIN 3.2*  --   --    GLUCOSE 132* 160* 131*     Recent Labs     11/14/24  0415 11/15/24  0420  11/16/24  0404   WBC 15.7* 16.8* 14.5*   NEUTSPOLYS 92.10* 95.70* 88.60*   LYMPHOCYTES 2.50* 0.90* 4.30*   MONOCYTES 4.30 3.40 6.20   EOSINOPHILS 0.10 0.00 0.00   BASOPHILS 0.10 0.00 0.10   ASTSGOT 75* 87* 135*   ALTSGPT 26 36 68*   ALKPHOSPHAT 134* 150* 180*   TBILIRUBIN 16.1* 15.7* 16.0*     Recent Labs     11/14/24  0415 11/15/24  0420 11/16/24  0404   RBC 2.89* 3.03* 2.83*   HEMOGLOBIN 8.7* 9.3* 8.6*   HEMATOCRIT 27.6* 28.8* 27.1*   PLATELETCT 113* 141* 121*   PROTHROMBTM 39.3* 38.2*  --    INR 4.00* 3.85*  --      Recent Results (from the past 24 hours)   POCT glucose device results    Collection Time: 11/16/24  3:28 AM   Result Value Ref Range    POC Glucose, Blood 114 (H) 65 - 99 mg/dL   CBC With Differential    Collection Time: 11/16/24  4:04 AM   Result Value Ref Range    WBC 14.5 (H) 4.8 - 10.8 K/uL    RBC 2.83 (L) 4.20 - 5.40 M/uL    Hemoglobin 8.6 (L) 12.0 - 16.0 g/dL    Hematocrit 27.1 (L) 37.0 - 47.0 %    MCV 95.8 81.4 - 97.8 fL    MCH 30.4 27.0 - 33.0 pg    MCHC 31.7 (L) 32.2 - 35.5 g/dL    RDW 83.3 (H) 35.9 - 50.0 fL    Platelet Count 121 (L) 164 - 446 K/uL    MPV 10.4 9.0 - 12.9 fL    Neutrophils-Polys 88.60 (H) 44.00 - 72.00 %    Lymphocytes 4.30 (L) 22.00 - 41.00 %    Monocytes 6.20 0.00 - 13.40 %    Eosinophils 0.00 0.00 - 6.90 %    Basophils 0.10 0.00 - 1.80 %    Immature Granulocytes 0.80 0.00 - 0.90 %    Nucleated RBC 0.00 0.00 - 0.20 /100 WBC    Neutrophils (Absolute) 12.83 (H) 1.82 - 7.42 K/uL    Lymphs (Absolute) 0.62 (L) 1.00 - 4.80 K/uL    Monos (Absolute) 0.90 (H) 0.00 - 0.85 K/uL    Eos (Absolute) 0.00 0.00 - 0.51 K/uL    Baso (Absolute) 0.01 0.00 - 0.12 K/uL    Immature Granulocytes (abs) 0.12 (H) 0.00 - 0.11 K/uL    NRBC (Absolute) 0.00 K/uL   Comp Metabolic Panel    Collection Time: 11/16/24  4:04 AM   Result Value Ref Range    Sodium 149 (H) 135 - 145 mmol/L    Potassium 4.0 3.6 - 5.5 mmol/L    Chloride 114 (H) 96 - 112 mmol/L    Co2 15 (L) 20 - 33 mmol/L    Anion Gap 20.0 (H) 7.0 -  16.0    Glucose 131 (H) 65 - 99 mg/dL    Bun 67 (H) 8 - 22 mg/dL    Creatinine 1.28 0.50 - 1.40 mg/dL    Calcium 9.6 8.5 - 10.5 mg/dL    Correct Calcium 10.6 (H) 8.5 - 10.5 mg/dL    AST(SGOT) 135 (H) 12 - 45 U/L    ALT(SGPT) 68 (H) 2 - 50 U/L    Alkaline Phosphatase 180 (H) 30 - 99 U/L    Total Bilirubin 16.0 (H) 0.1 - 1.5 mg/dL    Albumin 2.8 (L) 3.2 - 4.9 g/dL    Total Protein 6.5 6.0 - 8.2 g/dL    Globulin 3.7 (H) 1.9 - 3.5 g/dL    A-G Ratio 0.8 g/dL   Magnesium    Collection Time: 11/16/24  4:04 AM   Result Value Ref Range    Magnesium 2.5 1.5 - 2.5 mg/dL   Phosphorus    Collection Time: 11/16/24  4:04 AM   Result Value Ref Range    Phosphorus 3.9 2.5 - 4.5 mg/dL   ESTIMATED GFR    Collection Time: 11/16/24  4:04 AM   Result Value Ref Range    GFR (CKD-EPI) 49 (A) >60 mL/min/1.73 m 2       Radiology Review:  DX-CHEST-PORTABLE (1 VIEW)   Final Result         1. No significant interval change.      DX-CHEST-PORTABLE (1 VIEW)   Final Result         1.  Pulmonary edema and/or infiltrates, similar to prior study.   2.  Layering right pleural effusion, stable since prior      DX-ABDOMEN FOR TUBE PLACEMENT   Final Result         1. Nasogastric tube terminating with tip overlying the gastric body.      2. Nonspecific bowel gas pattern.      3. Pulmonary edema and/or infiltrates.         INTERPRETING LOCATION: [address] [facility], [place], [address]      DX-CHEST-LIMITED (1 VIEW)   Final Result      Pulmonary edema and/or infiltrates, overall stable since prior study.         INTERPRETING LOCATION: [address] , [place], [address]      DX-CHEST-PORTABLE (1 VIEW)   Final Result      Extensive pulmonary edema and/or infiltrates, increased since prior study.         INTERPRETING LOCATION: [address] , [place], [address]      DX-CHEST-PORTABLE (1 VIEW)   Final Result         1.  Pulmonary edema and/or infiltrates, similar to prior study.   2.  Layering right pleural effusion, decreased since prior      DX-CHEST-PORTABLE (1 VIEW)   Final  Result         1.  Pulmonary edema and/or infiltrates, similar to prior study.   2.  Layering right pleural effusion, decreased since prior      EC-ECHOCARDIOGRAM LTD W/O CONT   Final Result      DX-CHEST-PORTABLE (1 VIEW)   Final Result         1.  New significant right pleural effusion identified.      2.  Significant increase in diffuse pulmonary opacifications could be due to pulmonary edema or pneumonia.      CT-HEAD W/O   Final Result   Impression:      1. No acute process in the brain evident.      2. The skull base sinuses are clear.                  US-RENAL   Final Result      1.  No hydronephrosis.   2.  Ascites.      DX-CHEST-PORTABLE (1 VIEW)   Final Result      1.  No acute cardiac or pulmonary abnormalities are identified.      2.  Slight left basilar atelectasis.      [id number] VIEW   Final Result      1.  Several markedly dilated loops of small bowel in the mid abdomen suspicious for gastroenteritis or inflammatory change. Recommend follow-up abdominal series to rule out evolving small bowel obstruction.      EC-ECHOCARDIOGRAM COMPLETE W/O CONT   Final Result      CT-ABDOMEN-PELVIS W/O   Final Result      1.  Dilated fluid-filled small bowel and colonic loops favoring ileus.   2.  No evidence of bowel perforation.   3.  Hepatic cirrhosis with portal hypertension and moderate to large volume ascites.   4.  Nonocclusive superior mesenteric vein thrombosis.   5.  Cholelithiasis.   6.  Bibasilar atelectasis with minimal bilateral pleural fluid.   7.  Subtle findings concerning for pulmonary emboli.      These findings were electronically conveyed to JAMES WILLIAM on 11/3/2024 11:50 AM.         US-ABDOMEN COMPLETE SURVEY   Final Result      1.  Cirrhosis with stigmata of portal hypertension including splenomegaly, recanalized umbilical vein and ascites.   2.  Gallbladder sludge and stones. Gallbladder wall thickening is nonspecific and could be related to liver disease but correlate  clinically for evidence of acute cholecystitis. No biliary dilatation.         DX-CHEST-PORTABLE (1 VIEW)   Final Result      1.  Hazy left lower lobe opacity could represent atelectasis or pneumonitis.   2.  There is linear scarring or atelectasis in the right lung base.        MDM (Data Review):   -Records reviewed and summarized in current documentation  -I personally reviewed and interpreted the laboratory results  -I personally reviewed the radiology images    Assessment/Recommendations:  Acute liver failure- MELD 3.0- 38 on arrival (10/31/2024); 26.8% 90-day survival. Child-Garza Class C  SMV thrombosis  Portal hypertensive gastropathy  Ileus  Cirrhosis with ascites  SBP with Salmonella  Hepatic encephalopathy  Coagulopathy  Esophageal varices with banding in situ  Hematemesis  Severe protein calorie malnutrition  History of decompensated liver disease with variceal bleeding, refractory ascites  Salmonella bacteremia - pending review by State lab  Sepsis  EFRAIN  Respiratory alkalosis  Vitamin D deficiency  Negative PETH  Pneumonia and right pleural effusion  Hyperdynamic left ventricular systolic function of 75%, LVOT obstruction  Elevated INR   Hyperammonemia  Lactic acidosis  Elevated LDH    Plan:  Unfortunately Kavita did not recover from her grave and serious illness  Family is taking time to say goodbye and transitioning to comfort care measures  We expressed our condolences to the family at bedside    Discussed with family, Dr. Hanson, Dr. Ocampo    ..Oumou Mart, CHANDANA,  APRN    Core Quality Measures   Reviewed items::  Labs, Medications and Radiology reports reviewed

## 2024-11-17 NOTE — PROGRESS NOTES
1526 - Dr. Jean Baptiste and Doctor Valentin notified patient had BP 71/42 on repeat, MAP 52. Blood coming out of Mouth    1527 RT at bedside for desaturation to 83%, Fi O2 to 70%    1530 - Levophed ordered by Dr. Trevino.    1607 - Dr. Trevino ordered vasopressin, MD notified that levophed dose has exceeded 0.25, MD ordered vasopressin. New PIV started in RU.     1608 - ABG requested to RRT.    1626 - Difficult stick with difficulty palpating Artery. ABG performed.      1631 RRT notified Dr. Guzman of ABG results. Rapid KAYLEY Prieto retrieved DNW team to bedside.    1638 - 2 amps of sodium bicarbonate ordered by MD.    1642 - Dr. Guzman notified that patient maxed on Levophed.    1645 - MD to bedside - no further escalation of care

## 2024-11-17 NOTE — CONSULTS
Donor [facility] Referral:  # [id number]       Donor [facility] assumed billing of this Donation after Circulatory Death (DCD) donor at the time of notification of registry status (First Person Authorization) on 11/16/2024 at 2:22 PM. Patient became medically unstable and was rule out for organ donation at 5:54 pm.      In Summary:     Donor [facility] billing is from 11/16/2024 at 2:22 PM through 11/16/2024 at 5:54 PM.      If you have any questions/concerns regarding billing please contact Jael Yeung, In-Hospital Donation Coordinator at [phone].

## 2024-11-17 NOTE — PROGRESS NOTES
Per Dr. Guzman no escalation of care, DNW aware- as clinical condition has deteriorated, okay to proceed with comfort measures.     Family at bedside and had said their final farewells, patient was extubated at 1747     Patient Medicated per MAR orders for comfort. All life supporting IV medications stopped per MAR orders.    Patient  at 1754 surrounded by her loved ones.

## 2024-11-19 LAB
ALBUMIN SERPL BCP-MCNC: NORMAL G/DL (ref 3.2–4.9)
ALBUMIN/GLOB SERPL: NORMAL G/DL
ALP SERPL-CCNC: NORMAL U/L (ref 30–99)
ALT SERPL-CCNC: NORMAL U/L (ref 2–50)
ANION GAP SERPL CALC-SCNC: NORMAL MMOL/L (ref 7–16)
AST SERPL-CCNC: NORMAL U/L (ref 12–45)
BILIRUB SERPL-MCNC: NORMAL MG/DL (ref 0.1–1.5)
BUN SERPL-MCNC: NORMAL MG/DL (ref 8–22)
CALCIUM ALBUM COR SERPL-MCNC: NORMAL MG/DL (ref 8.5–10.5)
CALCIUM SERPL-MCNC: NORMAL MG/DL (ref 8.5–10.5)
CHLORIDE SERPL-SCNC: NORMAL MMOL/L (ref 96–112)
CO2 SERPL-SCNC: NORMAL MMOL/L (ref 20–33)
CREAT SERPL-MCNC: NORMAL MG/DL (ref 0.5–1.4)
FUNGUS SPEC CULT: NORMAL
FUNGUS SPEC FUNGUS STN: NORMAL
GLOBULIN SER CALC-MCNC: NORMAL G/DL (ref 1.9–3.5)
GLUCOSE SERPL-MCNC: NORMAL MG/DL (ref 65–99)
POTASSIUM SERPL-SCNC: NORMAL MMOL/L (ref 3.6–5.5)
PROT SERPL-MCNC: NORMAL G/DL (ref 6–8.2)
SIGNIFICANT IND 70042: NORMAL
SITE SITE: NORMAL
SODIUM SERPL-SCNC: NORMAL MMOL/L (ref 135–145)
SOURCE SOURCE: NORMAL

## 2024-11-21 ENCOUNTER — APPOINTMENT (OUTPATIENT)
Dept: NEUROLOGY | Facility: MEDICAL CENTER | Age: 59
End: 2024-11-21
Attending: STUDENT IN AN ORGANIZED HEALTH CARE EDUCATION/TRAINING PROGRAM
Payer: COMMERCIAL

## 2024-12-26 LAB
MYCOBACTERIUM SPEC CULT: NORMAL
RHODAMINE-AURAMINE STN SPEC: NORMAL
SIGNIFICANT IND 70042: NORMAL
SITE SITE: NORMAL
SOURCE SOURCE: NORMAL

## (undated) DEVICE — COVER LIGHT HANDLE ALC PLUS DISP (18EA/BX)

## (undated) DEVICE — GLOVE SZ 6.5 BIOGEL PI MICRO - PF LF (50PR/BX)

## (undated) DEVICE — ELECTRODE DUAL RETURN W/ CORD - (50/PK)

## (undated) DEVICE — KIT CUSTOM PROCEDURE SINGLE FOR ENDO (15/CA)

## (undated) DEVICE — BLOCK BITE MAXI DENTAL RETENTION RIM (100EA/BX)

## (undated) DEVICE — FILM CASSETTE ENDO

## (undated) DEVICE — CANISTER SUCTION RIGID RED 1500CC (40EA/CA)

## (undated) DEVICE — SPEEDBAND SUPERVIEW SUPER 7 (4/BX)

## (undated) DEVICE — TUBING CLEARLINK DUO-VENT - C-FLO (48EA/CA)

## (undated) DEVICE — SENSOR OXIMETER ADULT SPO2 RD SET (20EA/BX)

## (undated) DEVICE — GLOVE BIOGEL PI INDICATOR SZ 6.5 SURGICAL PF LF - (50/BX 4BX/CA)

## (undated) DEVICE — GLOVE BIOGEL SZ 7.5 SURGICAL PF LTX - (50PR/BX 4BX/CA)

## (undated) DEVICE — DRESSING ABDOMINAL PAD STERILE 8 X 10" (360EA/CA)"

## (undated) DEVICE — PORT AUXILLARY WATER (50EA/BX)

## (undated) DEVICE — DRAPE MAYO STAND - (30/CA)

## (undated) DEVICE — SUCTION INSTRUMENT YANKAUER BULBOUS TIP W/O VENT (50EA/CA)

## (undated) DEVICE — KIT  I.V. START (100EA/CA)

## (undated) DEVICE — CHLORAPREP 26 ML APPLICATOR - ORANGE TINT(25/CA)

## (undated) DEVICE — NEPTUNE 4 PORT MANIFOLD - (20/PK)

## (undated) DEVICE — SLEEVE VASO CALF MED - (10PR/CA)

## (undated) DEVICE — WATER IRRIGATION STERILE 1000ML (12EA/CA)

## (undated) DEVICE — MASK PANORAMIC OXYGEN PRO2 (30EA/CA)

## (undated) DEVICE — TOWEL STOP TIMEOUT SAFETY FLAG (40EA/CA)

## (undated) DEVICE — SODIUM CHL IRRIGATION 0.9% 1000ML (12EA/CA)

## (undated) DEVICE — SUTURE GENERAL

## (undated) DEVICE — ELECTRODE 850 FOAM ADHESIVE - HYDROGEL RADIOTRNSPRNT (50/PK)

## (undated) DEVICE — BUTTON ENDOSCOPY DISPOSABLE

## (undated) DEVICE — GLOVE BIOGEL SZ 6.5 SURGICAL PF LTX (50PR/BX 4BX/CA)

## (undated) DEVICE — Device

## (undated) DEVICE — GLOVE BIOGEL INDICATOR SZ 6.5 SURGICAL PF LTX - (50PR/BX 4BX/CA)

## (undated) DEVICE — TUBE CONNECTING SUCTION - CLEAR PLASTIC STERILE 72 IN (50EA/CA)

## (undated) DEVICE — SET LEADWIRE 5 LEAD BEDSIDE DISPOSABLE ECG (1SET OF 5/EA)

## (undated) DEVICE — TRAY SRGPRP PVP IOD WT PRP - (20/CA)

## (undated) DEVICE — GLOVE BIOGEL INDICATOR SZ 8 SURGICAL PF LTX - (50/BX 4BX/CA)

## (undated) DEVICE — LACTATED RINGERS INJ 1000 ML - (14EA/CA 60CA/PF)

## (undated) DEVICE — GOWN WARMING STANDARD FLEX - (30/CA)

## (undated) DEVICE — SET EXTENSION WITH 2 PORTS (48EA/CA) ***PART #2C8610 IS A SUBSTITUTE*****

## (undated) DEVICE — GAUZE PACKING STRIP STERILE IODOFORM 1 IN X 5 YDS

## (undated) DEVICE — CANISTER SUCTION 3000ML MECHANICAL FILTER AUTO SHUTOFF MEDI-VAC NONSTERILE LF DISP  (40EA/CA)

## (undated) DEVICE — BLADE SURGICAL #15 - (50/BX 3BX/CA)